# Patient Record
Sex: FEMALE | Race: WHITE | Employment: OTHER | URBAN - METROPOLITAN AREA
[De-identification: names, ages, dates, MRNs, and addresses within clinical notes are randomized per-mention and may not be internally consistent; named-entity substitution may affect disease eponyms.]

---

## 2017-01-05 RX ORDER — INSULIN ASPART 100 [IU]/ML
INJECTION, SOLUTION INTRAVENOUS; SUBCUTANEOUS
Qty: 3 PEN | Refills: 7 | Status: SHIPPED | OUTPATIENT
Start: 2017-01-05 | End: 2017-03-02 | Stop reason: SDUPTHER

## 2017-03-02 DIAGNOSIS — J30.2 SEASONAL ALLERGIC RHINITIS, UNSPECIFIED ALLERGIC RHINITIS TRIGGER: ICD-10-CM

## 2017-03-06 RX ORDER — LORATADINE 10 MG/1
TABLET ORAL
Qty: 90 TABLET | Refills: 0 | Status: SHIPPED | OUTPATIENT
Start: 2017-03-06 | End: 2017-06-02 | Stop reason: SDUPTHER

## 2017-03-06 RX ORDER — INSULIN ASPART 100 [IU]/ML
INJECTION, SOLUTION INTRAVENOUS; SUBCUTANEOUS
Qty: 3 PEN | Refills: 5 | Status: SHIPPED | OUTPATIENT
Start: 2017-03-06 | End: 2017-04-28 | Stop reason: SDUPTHER

## 2017-03-06 RX ORDER — PEN NEEDLE, DIABETIC 31 GX5/16"
NEEDLE, DISPOSABLE MISCELLANEOUS
Qty: 100 EACH | Refills: 5 | Status: SHIPPED | OUTPATIENT
Start: 2017-03-06 | End: 2017-10-02 | Stop reason: SDUPTHER

## 2017-04-05 ENCOUNTER — OFFICE VISIT (OUTPATIENT)
Dept: INTERNAL MEDICINE CLINIC | Age: 42
End: 2017-04-05

## 2017-04-05 VITALS
DIASTOLIC BLOOD PRESSURE: 90 MMHG | OXYGEN SATURATION: 97 % | BODY MASS INDEX: 44.75 KG/M2 | HEART RATE: 93 BPM | SYSTOLIC BLOOD PRESSURE: 174 MMHG | WEIGHT: 293 LBS

## 2017-04-05 DIAGNOSIS — J01.00 ACUTE NON-RECURRENT MAXILLARY SINUSITIS: Primary | ICD-10-CM

## 2017-04-05 PROCEDURE — 99213 OFFICE O/P EST LOW 20 MIN: CPT | Performed by: INTERNAL MEDICINE

## 2017-04-05 RX ORDER — SULFAMETHOXAZOLE AND TRIMETHOPRIM 800; 160 MG/1; MG/1
1 TABLET ORAL 2 TIMES DAILY
Qty: 20 TABLET | Refills: 0 | Status: SHIPPED | OUTPATIENT
Start: 2017-04-05 | End: 2017-04-15

## 2017-04-05 ASSESSMENT — ENCOUNTER SYMPTOMS
COUGH: 0
SHORTNESS OF BREATH: 1
SORE THROAT: 1
SINUS PRESSURE: 1
SINUS COMPLAINT: 1

## 2017-04-28 ENCOUNTER — OFFICE VISIT (OUTPATIENT)
Dept: INTERNAL MEDICINE CLINIC | Age: 42
End: 2017-04-28

## 2017-04-28 VITALS
WEIGHT: 293 LBS | HEART RATE: 94 BPM | DIASTOLIC BLOOD PRESSURE: 88 MMHG | SYSTOLIC BLOOD PRESSURE: 132 MMHG | BODY MASS INDEX: 45.63 KG/M2 | OXYGEN SATURATION: 96 %

## 2017-04-28 DIAGNOSIS — Z13.9 SCREENING: ICD-10-CM

## 2017-04-28 DIAGNOSIS — E78.5 HYPERLIPIDEMIA WITH TARGET LDL LESS THAN 100: ICD-10-CM

## 2017-04-28 DIAGNOSIS — E11.22 CONTROLLED TYPE 2 DIABETES MELLITUS WITH CHRONIC KIDNEY DISEASE, WITH LONG-TERM CURRENT USE OF INSULIN, UNSPECIFIED CKD STAGE (HCC): Primary | ICD-10-CM

## 2017-04-28 DIAGNOSIS — R80.9 MICROALBUMINURIA: ICD-10-CM

## 2017-04-28 DIAGNOSIS — Z79.4 CONTROLLED TYPE 2 DIABETES MELLITUS WITH CHRONIC KIDNEY DISEASE, WITH LONG-TERM CURRENT USE OF INSULIN, UNSPECIFIED CKD STAGE (HCC): Primary | ICD-10-CM

## 2017-04-28 DIAGNOSIS — Z23 NEED FOR STREPTOCOCCUS PNEUMONIAE VACCINATION: ICD-10-CM

## 2017-04-28 LAB
A/G RATIO: 1.1 (ref 1.1–2.2)
ALBUMIN SERPL-MCNC: 3.1 G/DL (ref 3.4–5)
ALP BLD-CCNC: 96 U/L (ref 40–129)
ALT SERPL-CCNC: 11 U/L (ref 10–40)
ANION GAP SERPL CALCULATED.3IONS-SCNC: 17 MMOL/L (ref 3–16)
AST SERPL-CCNC: 11 U/L (ref 15–37)
BILIRUB SERPL-MCNC: <0.2 MG/DL (ref 0–1)
BUN BLDV-MCNC: 45 MG/DL (ref 7–20)
CALCIUM SERPL-MCNC: 8.4 MG/DL (ref 8.3–10.6)
CHLORIDE BLD-SCNC: 102 MMOL/L (ref 99–110)
CHOLESTEROL, TOTAL: 299 MG/DL (ref 0–199)
CO2: 19 MMOL/L (ref 21–32)
CREAT SERPL-MCNC: 2.8 MG/DL (ref 0.6–1.1)
CREATININE URINE: 83.7 MG/DL (ref 28–259)
GFR AFRICAN AMERICAN: 22
GFR NON-AFRICAN AMERICAN: 18
GLOBULIN: 2.7 G/DL
GLUCOSE BLD-MCNC: 162 MG/DL (ref 70–99)
HDLC SERPL-MCNC: 39 MG/DL (ref 40–60)
LDL CHOLESTEROL CALCULATED: 223 MG/DL
MICROALBUMIN UR-MCNC: 609.4 MG/DL
MICROALBUMIN/CREAT UR-RTO: 7280.8 MG/G (ref 0–30)
POTASSIUM SERPL-SCNC: 4.9 MMOL/L (ref 3.5–5.1)
SODIUM BLD-SCNC: 138 MMOL/L (ref 136–145)
TOTAL PROTEIN: 5.8 G/DL (ref 6.4–8.2)
TRIGL SERPL-MCNC: 184 MG/DL (ref 0–150)
VLDLC SERPL CALC-MCNC: 37 MG/DL

## 2017-04-28 PROCEDURE — 99214 OFFICE O/P EST MOD 30 MIN: CPT | Performed by: INTERNAL MEDICINE

## 2017-04-28 PROCEDURE — 90732 PPSV23 VACC 2 YRS+ SUBQ/IM: CPT | Performed by: INTERNAL MEDICINE

## 2017-04-28 PROCEDURE — 90471 IMMUNIZATION ADMIN: CPT | Performed by: INTERNAL MEDICINE

## 2017-04-29 LAB
ESTIMATED AVERAGE GLUCOSE: 180 MG/DL
HBA1C MFR BLD: 7.9 %

## 2017-04-30 LAB — HIV-1 AND HIV-2 ANTIBODIES: NEGATIVE

## 2017-05-09 ENCOUNTER — TELEPHONE (OUTPATIENT)
Dept: INTERNAL MEDICINE CLINIC | Age: 42
End: 2017-05-09

## 2017-05-10 ENCOUNTER — OFFICE VISIT (OUTPATIENT)
Dept: INTERNAL MEDICINE CLINIC | Age: 42
End: 2017-05-10

## 2017-05-10 VITALS
WEIGHT: 293 LBS | SYSTOLIC BLOOD PRESSURE: 164 MMHG | OXYGEN SATURATION: 97 % | HEART RATE: 90 BPM | DIASTOLIC BLOOD PRESSURE: 94 MMHG | BODY MASS INDEX: 44.6 KG/M2

## 2017-05-10 DIAGNOSIS — E78.5 HYPERLIPIDEMIA WITH TARGET LDL LESS THAN 100: Primary | ICD-10-CM

## 2017-05-10 DIAGNOSIS — E11.22 CONTROLLED TYPE 2 DIABETES MELLITUS WITH CHRONIC KIDNEY DISEASE, WITH LONG-TERM CURRENT USE OF INSULIN, UNSPECIFIED CKD STAGE (HCC): ICD-10-CM

## 2017-05-10 DIAGNOSIS — N18.4 STAGE 4 CHRONIC KIDNEY DISEASE DUE TO DIABETES MELLITUS (HCC): ICD-10-CM

## 2017-05-10 DIAGNOSIS — Z79.4 CONTROLLED TYPE 2 DIABETES MELLITUS WITH CHRONIC KIDNEY DISEASE, WITH LONG-TERM CURRENT USE OF INSULIN, UNSPECIFIED CKD STAGE (HCC): ICD-10-CM

## 2017-05-10 DIAGNOSIS — E11.22 STAGE 4 CHRONIC KIDNEY DISEASE DUE TO DIABETES MELLITUS (HCC): ICD-10-CM

## 2017-05-10 PROCEDURE — 99214 OFFICE O/P EST MOD 30 MIN: CPT | Performed by: INTERNAL MEDICINE

## 2017-05-10 RX ORDER — AMLODIPINE BESYLATE 5 MG/1
5 TABLET ORAL DAILY
Qty: 30 TABLET | Refills: 3 | Status: SHIPPED | OUTPATIENT
Start: 2017-05-10 | End: 2017-08-30 | Stop reason: SDUPTHER

## 2017-05-10 RX ORDER — PRAVASTATIN SODIUM 40 MG
40 TABLET ORAL DAILY
Qty: 90 TABLET | Refills: 3 | Status: SHIPPED | OUTPATIENT
Start: 2017-05-10 | End: 2018-05-14

## 2017-05-10 ASSESSMENT — ENCOUNTER SYMPTOMS
EYE PAIN: 0
COUGH: 0
EYE ITCHING: 0
CHOKING: 0

## 2017-05-18 ENCOUNTER — TELEPHONE (OUTPATIENT)
Dept: INTERNAL MEDICINE CLINIC | Age: 42
End: 2017-05-18

## 2017-05-23 ENCOUNTER — OFFICE VISIT (OUTPATIENT)
Dept: INTERNAL MEDICINE CLINIC | Age: 42
End: 2017-05-23

## 2017-05-23 VITALS
SYSTOLIC BLOOD PRESSURE: 156 MMHG | HEIGHT: 69 IN | BODY MASS INDEX: 43.4 KG/M2 | WEIGHT: 293 LBS | DIASTOLIC BLOOD PRESSURE: 84 MMHG | OXYGEN SATURATION: 98 % | HEART RATE: 73 BPM

## 2017-05-23 DIAGNOSIS — N18.4 CHRONIC KIDNEY DISEASE, STAGE 4 (SEVERE) (HCC): ICD-10-CM

## 2017-05-23 DIAGNOSIS — E11.22 CONTROLLED TYPE 2 DIABETES MELLITUS WITH CHRONIC KIDNEY DISEASE, WITH LONG-TERM CURRENT USE OF INSULIN, UNSPECIFIED CKD STAGE (HCC): ICD-10-CM

## 2017-05-23 DIAGNOSIS — Z79.4 CONTROLLED TYPE 2 DIABETES MELLITUS WITH CHRONIC KIDNEY DISEASE, WITH LONG-TERM CURRENT USE OF INSULIN, UNSPECIFIED CKD STAGE (HCC): ICD-10-CM

## 2017-05-23 DIAGNOSIS — I10 ESSENTIAL HYPERTENSION: Primary | ICD-10-CM

## 2017-05-23 LAB
A/G RATIO: 1.2 (ref 1.1–2.2)
ALBUMIN SERPL-MCNC: 3.2 G/DL (ref 3.4–5)
ALP BLD-CCNC: 96 U/L (ref 40–129)
ALT SERPL-CCNC: 10 U/L (ref 10–40)
ANION GAP SERPL CALCULATED.3IONS-SCNC: 15 MMOL/L (ref 3–16)
AST SERPL-CCNC: 12 U/L (ref 15–37)
BASOPHILS ABSOLUTE: 0.1 K/UL (ref 0–0.2)
BASOPHILS RELATIVE PERCENT: 1 %
BILIRUB SERPL-MCNC: <0.2 MG/DL (ref 0–1)
BUN BLDV-MCNC: 49 MG/DL (ref 7–20)
CALCIUM SERPL-MCNC: 8.3 MG/DL (ref 8.3–10.6)
CHLORIDE BLD-SCNC: 104 MMOL/L (ref 99–110)
CO2: 19 MMOL/L (ref 21–32)
CREAT SERPL-MCNC: 2.6 MG/DL (ref 0.6–1.1)
EOSINOPHILS ABSOLUTE: 0.3 K/UL (ref 0–0.6)
EOSINOPHILS RELATIVE PERCENT: 4 %
GFR AFRICAN AMERICAN: 24
GFR NON-AFRICAN AMERICAN: 20
GLOBULIN: 2.7 G/DL
GLUCOSE BLD-MCNC: 248 MG/DL (ref 70–99)
HCT VFR BLD CALC: 35.3 % (ref 36–48)
HEMOGLOBIN: 11 G/DL (ref 12–16)
LYMPHOCYTES ABSOLUTE: 1.6 K/UL (ref 1–5.1)
LYMPHOCYTES RELATIVE PERCENT: 23.7 %
MCH RBC QN AUTO: 25.3 PG (ref 26–34)
MCHC RBC AUTO-ENTMCNC: 31 G/DL (ref 31–36)
MCV RBC AUTO: 81.5 FL (ref 80–100)
MONOCYTES ABSOLUTE: 0.6 K/UL (ref 0–1.3)
MONOCYTES RELATIVE PERCENT: 8.3 %
NEUTROPHILS ABSOLUTE: 4.4 K/UL (ref 1.7–7.7)
NEUTROPHILS RELATIVE PERCENT: 63 %
PDW BLD-RTO: 17.2 % (ref 12.4–15.4)
PLATELET # BLD: 325 K/UL (ref 135–450)
PMV BLD AUTO: 8.8 FL (ref 5–10.5)
POTASSIUM SERPL-SCNC: 4.6 MMOL/L (ref 3.5–5.1)
RBC # BLD: 4.34 M/UL (ref 4–5.2)
SODIUM BLD-SCNC: 138 MMOL/L (ref 136–145)
TOTAL PROTEIN: 5.9 G/DL (ref 6.4–8.2)
WBC # BLD: 7 K/UL (ref 4–11)

## 2017-05-23 PROCEDURE — 99214 OFFICE O/P EST MOD 30 MIN: CPT | Performed by: INTERNAL MEDICINE

## 2017-05-23 RX ORDER — MUPIROCIN CALCIUM 20 MG/G
CREAM TOPICAL
Qty: 15 G | Refills: 0 | Status: SHIPPED | OUTPATIENT
Start: 2017-05-23 | End: 2017-06-30

## 2017-05-23 ASSESSMENT — ENCOUNTER SYMPTOMS
SHORTNESS OF BREATH: 0
EYE REDNESS: 0
COUGH: 0
PHOTOPHOBIA: 0

## 2017-06-02 DIAGNOSIS — J30.2 SEASONAL ALLERGIC RHINITIS, UNSPECIFIED ALLERGIC RHINITIS TRIGGER: ICD-10-CM

## 2017-06-05 RX ORDER — LOSARTAN POTASSIUM AND HYDROCHLOROTHIAZIDE 12.5; 5 MG/1; MG/1
TABLET ORAL
Qty: 30 TABLET | Refills: 2 | Status: SHIPPED | OUTPATIENT
Start: 2017-06-05 | End: 2017-08-30 | Stop reason: SDUPTHER

## 2017-06-05 RX ORDER — LORATADINE 10 MG/1
TABLET ORAL
Qty: 30 TABLET | Refills: 0 | Status: SHIPPED | OUTPATIENT
Start: 2017-06-05 | End: 2021-05-10

## 2017-06-05 RX ORDER — FUROSEMIDE 20 MG/1
TABLET ORAL
Qty: 30 TABLET | Refills: 6 | Status: SHIPPED | OUTPATIENT
Start: 2017-06-05 | End: 2018-02-06 | Stop reason: SDUPTHER

## 2017-06-19 RX ORDER — INSULIN DETEMIR 100 [IU]/ML
INJECTION, SOLUTION SUBCUTANEOUS
Qty: 3 ML | Refills: 6 | Status: SHIPPED | OUTPATIENT
Start: 2017-06-19 | End: 2018-09-11 | Stop reason: SDUPTHER

## 2017-07-18 DIAGNOSIS — E11.22 CONTROLLED TYPE 2 DIABETES MELLITUS WITH CHRONIC KIDNEY DISEASE, WITH LONG-TERM CURRENT USE OF INSULIN, UNSPECIFIED CKD STAGE (HCC): Primary | ICD-10-CM

## 2017-07-18 DIAGNOSIS — Z79.4 CONTROLLED TYPE 2 DIABETES MELLITUS WITH CHRONIC KIDNEY DISEASE, WITH LONG-TERM CURRENT USE OF INSULIN, UNSPECIFIED CKD STAGE (HCC): Primary | ICD-10-CM

## 2017-07-25 ENCOUNTER — TELEPHONE (OUTPATIENT)
Dept: INTERNAL MEDICINE CLINIC | Age: 42
End: 2017-07-25

## 2017-08-21 ENCOUNTER — TELEPHONE (OUTPATIENT)
Dept: INTERNAL MEDICINE CLINIC | Age: 42
End: 2017-08-21

## 2017-08-25 ENCOUNTER — OFFICE VISIT (OUTPATIENT)
Dept: INTERNAL MEDICINE CLINIC | Age: 42
End: 2017-08-25

## 2017-08-25 VITALS
SYSTOLIC BLOOD PRESSURE: 116 MMHG | HEART RATE: 66 BPM | OXYGEN SATURATION: 99 % | DIASTOLIC BLOOD PRESSURE: 62 MMHG | BODY MASS INDEX: 44.15 KG/M2 | WEIGHT: 293 LBS

## 2017-08-25 DIAGNOSIS — I10 ESSENTIAL HYPERTENSION: Primary | ICD-10-CM

## 2017-08-25 DIAGNOSIS — K08.89 PAIN, DENTAL: ICD-10-CM

## 2017-08-25 PROCEDURE — 99212 OFFICE O/P EST SF 10 MIN: CPT | Performed by: INTERNAL MEDICINE

## 2017-08-25 RX ORDER — LABETALOL 100 MG/1
100 TABLET, FILM COATED ORAL 2 TIMES DAILY
Qty: 60 TABLET | Refills: 0 | COMMUNITY
Start: 2017-08-25 | End: 2018-05-24

## 2017-08-27 ASSESSMENT — ENCOUNTER SYMPTOMS
CHOKING: 0
COUGH: 0

## 2017-08-31 RX ORDER — LOSARTAN POTASSIUM AND HYDROCHLOROTHIAZIDE 12.5; 5 MG/1; MG/1
TABLET ORAL
Qty: 30 TABLET | Refills: 1 | Status: SHIPPED | OUTPATIENT
Start: 2017-08-31 | End: 2017-11-01 | Stop reason: SDUPTHER

## 2017-08-31 RX ORDER — AMLODIPINE BESYLATE 5 MG/1
TABLET ORAL
Qty: 30 TABLET | Refills: 2 | Status: SHIPPED | OUTPATIENT
Start: 2017-08-31 | End: 2018-02-06 | Stop reason: SDUPTHER

## 2017-10-09 RX ORDER — PEN NEEDLE, DIABETIC 31 GX5/16"
NEEDLE, DISPOSABLE MISCELLANEOUS
Qty: 100 EACH | Refills: 4 | Status: SHIPPED | OUTPATIENT
Start: 2017-10-09 | End: 2018-06-20 | Stop reason: SDUPTHER

## 2017-11-02 RX ORDER — PEN NEEDLE, DIABETIC 31 GX5/16"
NEEDLE, DISPOSABLE MISCELLANEOUS
Qty: 100 EACH | Refills: 4 | Status: SHIPPED | OUTPATIENT
Start: 2017-11-02 | End: 2018-05-14 | Stop reason: SDUPTHER

## 2017-11-02 RX ORDER — LOSARTAN POTASSIUM AND HYDROCHLOROTHIAZIDE 12.5; 5 MG/1; MG/1
TABLET ORAL
Qty: 30 TABLET | Refills: 0 | Status: SHIPPED | OUTPATIENT
Start: 2017-11-02 | End: 2017-12-12 | Stop reason: SDUPTHER

## 2017-11-24 ENCOUNTER — TELEPHONE (OUTPATIENT)
Dept: INTERNAL MEDICINE CLINIC | Age: 42
End: 2017-11-24

## 2017-11-24 NOTE — TELEPHONE ENCOUNTER
Patients  called and stated his wife has same cold and cough that he has, and would like to get an RX on board because she has Asthma and is coughing.

## 2017-11-27 RX ORDER — AZITHROMYCIN 250 MG/1
TABLET, FILM COATED ORAL
Qty: 1 PACKET | Refills: 0 | Status: SHIPPED | OUTPATIENT
Start: 2017-11-27 | End: 2017-12-07

## 2017-12-14 RX ORDER — LOSARTAN POTASSIUM AND HYDROCHLOROTHIAZIDE 12.5; 5 MG/1; MG/1
TABLET ORAL
Qty: 30 TABLET | Refills: 0 | Status: SHIPPED | OUTPATIENT
Start: 2017-12-14 | End: 2018-01-14 | Stop reason: SDUPTHER

## 2017-12-18 ENCOUNTER — OFFICE VISIT (OUTPATIENT)
Dept: INTERNAL MEDICINE CLINIC | Age: 42
End: 2017-12-18

## 2017-12-18 ENCOUNTER — TELEPHONE (OUTPATIENT)
Dept: INTERNAL MEDICINE CLINIC | Age: 42
End: 2017-12-18

## 2017-12-18 VITALS
HEART RATE: 87 BPM | OXYGEN SATURATION: 96 % | WEIGHT: 293 LBS | SYSTOLIC BLOOD PRESSURE: 150 MMHG | HEIGHT: 69 IN | DIASTOLIC BLOOD PRESSURE: 72 MMHG | BODY MASS INDEX: 43.4 KG/M2

## 2017-12-18 DIAGNOSIS — L08.9 BLISTER OF NOSE WITH INFECTION, INITIAL ENCOUNTER: Primary | ICD-10-CM

## 2017-12-18 DIAGNOSIS — S00.32XA BLISTER OF NOSE WITH INFECTION, INITIAL ENCOUNTER: Primary | ICD-10-CM

## 2017-12-18 PROCEDURE — 99213 OFFICE O/P EST LOW 20 MIN: CPT | Performed by: INTERNAL MEDICINE

## 2017-12-18 RX ORDER — MUPIROCIN CALCIUM 20 MG/G
CREAM TOPICAL
Qty: 15 G | Refills: 0 | Status: SHIPPED | OUTPATIENT
Start: 2017-12-18 | End: 2018-01-17

## 2017-12-18 RX ORDER — CLINDAMYCIN HYDROCHLORIDE 150 MG/1
150 CAPSULE ORAL 3 TIMES DAILY
Qty: 15 CAPSULE | Refills: 0 | Status: SHIPPED | OUTPATIENT
Start: 2017-12-18 | End: 2017-12-23

## 2017-12-18 NOTE — TELEPHONE ENCOUNTER
Patient would like to speak to someone to see what can ease the pain until she sees Dr. Adeline Murillo tomorrow.      Patient  [phone]

## 2017-12-18 NOTE — TELEPHONE ENCOUNTER
Patient's  called and stated that patient has been experiencing a swollen eyes and lips. Asked other physicians for any openings and there were none for today. Patient stated patient has been a patient since 2011 and never has been told this before. I suggested patient be seen at Urgent Care,  said ok, thank you and hung up.

## 2017-12-22 ENCOUNTER — TELEPHONE (OUTPATIENT)
Dept: INTERNAL MEDICINE CLINIC | Age: 42
End: 2017-12-22

## 2017-12-25 ASSESSMENT — ENCOUNTER SYMPTOMS
DIARRHEA: 0
SHORTNESS OF BREATH: 0
RHINORRHEA: 1
NAIL CHANGES: 0
COUGH: 0

## 2017-12-25 NOTE — PROGRESS NOTES
Subjective:      Patient ID: Lida Perdomo is a 36 y.o. female. Rash   This is a new problem. The current episode started in the past 7 days. The problem has been gradually worsening since onset. The affected locations include the face. The rash is characterized by blistering and pain. Associated symptoms include rhinorrhea. Pertinent negatives include no congestion, cough, diarrhea, fatigue, fever, nail changes or shortness of breath. Past treatments include cold compress. The treatment provided mild relief. There is no history of asthma or eczema. Review of Systems   Constitutional: Negative for fatigue and fever. HENT: Positive for rhinorrhea. Negative for congestion. Respiratory: Negative for cough and shortness of breath. Gastrointestinal: Negative for diarrhea. Skin: Positive for rash. Negative for nail changes. Vitals:    12/18/17 1418   BP: (!) 150/72   Site: Left Arm   Position: Sitting   Cuff Size: Large Adult   Pulse: 87   SpO2: 96%   Weight: 295 lb (133.8 kg)   Height: 5' 9\" (1.753 m)      Wt Readings from Last 3 Encounters:   12/18/17 295 lb (133.8 kg)   08/25/17 299 lb (135.6 kg)   05/23/17 298 lb 6.4 oz (135.4 kg)     BP Readings from Last 3 Encounters:   12/18/17 (!) 150/72   08/25/17 116/62   05/23/17 (!) 156/84     Body mass index is 43.56 kg/m². Facility age limit for growth percentiles is 20 years. Objective:   Physical Exam   Constitutional: She appears well-nourished. HENT:   Nose: Mucosal edema and rhinorrhea present. No nose lacerations, septal deviation or nasal septal hematoma. Right sinus exhibits no maxillary sinus tenderness and no frontal sinus tenderness. Left sinus exhibits no maxillary sinus tenderness and no frontal sinus tenderness. Eyes: EOM are normal. Pupils are equal, round, and reactive to light. Right eye exhibits no discharge. Left eye exhibits no discharge. Cardiovascular: Normal rate, regular rhythm and normal heart sounds.   Exam reveals no friction rub. No murmur heard. Pulmonary/Chest: Effort normal and breath sounds normal. No respiratory distress. She has no wheezes. She has no rales. Assessment/Plan:  Ana Hays was seen today for eye problem and oral swelling. Diagnoses and all orders for this visit:    Blister of nose with infection, initial encounter  -     mupirocin (BACTROBAN) 2 % cream; Apply 3 times daily to left nares  -     clindamycin (CLEOCIN) 150 MG capsule; Take 1 capsule by mouth 3 times daily for 5 days      No Follow-up on file.

## 2017-12-25 NOTE — TELEPHONE ENCOUNTER
That is a different insulin and would not work as well. Let's have her increase her toujeo to 20 units per night.

## 2018-01-04 ENCOUNTER — TELEPHONE (OUTPATIENT)
Dept: INTERNAL MEDICINE CLINIC | Age: 43
End: 2018-01-04

## 2018-01-05 NOTE — TELEPHONE ENCOUNTER
Please let patient know that we do not have any samples of Humalog at this time. We will try and get some next week.

## 2018-02-02 ENCOUNTER — TELEPHONE (OUTPATIENT)
Dept: INTERNAL MEDICINE CLINIC | Age: 43
End: 2018-02-02

## 2018-02-08 ENCOUNTER — PATIENT MESSAGE (OUTPATIENT)
Dept: INTERNAL MEDICINE CLINIC | Age: 43
End: 2018-02-08

## 2018-05-11 ENCOUNTER — TELEPHONE (OUTPATIENT)
Dept: INTERNAL MEDICINE CLINIC | Age: 43
End: 2018-05-11

## 2018-05-14 ENCOUNTER — TELEPHONE (OUTPATIENT)
Dept: INTERNAL MEDICINE CLINIC | Age: 43
End: 2018-05-14

## 2018-05-14 ENCOUNTER — OFFICE VISIT (OUTPATIENT)
Dept: INTERNAL MEDICINE CLINIC | Age: 43
End: 2018-05-14

## 2018-05-14 VITALS
WEIGHT: 293 LBS | BODY MASS INDEX: 45.19 KG/M2 | HEART RATE: 95 BPM | DIASTOLIC BLOOD PRESSURE: 76 MMHG | OXYGEN SATURATION: 92 % | SYSTOLIC BLOOD PRESSURE: 188 MMHG

## 2018-05-14 DIAGNOSIS — R23.2 HOT FLASHES: ICD-10-CM

## 2018-05-14 DIAGNOSIS — Z79.4 CONTROLLED TYPE 2 DIABETES MELLITUS WITH CHRONIC KIDNEY DISEASE, WITH LONG-TERM CURRENT USE OF INSULIN, UNSPECIFIED CKD STAGE (HCC): Primary | ICD-10-CM

## 2018-05-14 DIAGNOSIS — N28.9 NEPHROPATHY: ICD-10-CM

## 2018-05-14 DIAGNOSIS — E11.22 CONTROLLED TYPE 2 DIABETES MELLITUS WITH CHRONIC KIDNEY DISEASE, WITH LONG-TERM CURRENT USE OF INSULIN, UNSPECIFIED CKD STAGE (HCC): Primary | ICD-10-CM

## 2018-05-14 DIAGNOSIS — R60.0 PEDAL EDEMA: ICD-10-CM

## 2018-05-14 LAB
CREATININE URINE: 29.2 MG/DL (ref 28–259)
MICROALBUMIN UR-MCNC: 284.4 MG/DL
MICROALBUMIN/CREAT UR-RTO: 9739.7 MG/G (ref 0–30)

## 2018-05-14 PROCEDURE — 99214 OFFICE O/P EST MOD 30 MIN: CPT | Performed by: INTERNAL MEDICINE

## 2018-05-14 RX ORDER — FUROSEMIDE 20 MG/1
20 TABLET ORAL 2 TIMES DAILY
Qty: 60 TABLET | Refills: 5 | Status: SHIPPED | OUTPATIENT
Start: 2018-05-14 | End: 2018-05-24

## 2018-05-14 RX ORDER — AMOXICILLIN AND CLAVULANATE POTASSIUM 875; 125 MG/1; MG/1
1 TABLET, FILM COATED ORAL 2 TIMES DAILY
Qty: 20 TABLET | Refills: 0 | Status: SHIPPED | OUTPATIENT
Start: 2018-05-14 | End: 2018-05-24 | Stop reason: ALTCHOICE

## 2018-05-14 RX ORDER — AMLODIPINE BESYLATE 5 MG/1
5 TABLET ORAL DAILY
Qty: 30 TABLET | Refills: 11 | Status: SHIPPED | OUTPATIENT
Start: 2018-05-14 | End: 2018-05-24 | Stop reason: SDUPTHER

## 2018-05-14 RX ORDER — LOSARTAN POTASSIUM AND HYDROCHLOROTHIAZIDE 25; 100 MG/1; MG/1
1 TABLET ORAL DAILY
Qty: 30 TABLET | Refills: 5 | Status: SHIPPED | OUTPATIENT
Start: 2018-05-14 | End: 2018-05-24

## 2018-05-16 DIAGNOSIS — N28.9 NEPHROPATHY: ICD-10-CM

## 2018-05-16 DIAGNOSIS — R23.2 HOT FLASHES: ICD-10-CM

## 2018-05-16 DIAGNOSIS — Z79.4 CONTROLLED TYPE 2 DIABETES MELLITUS WITH CHRONIC KIDNEY DISEASE, WITH LONG-TERM CURRENT USE OF INSULIN, UNSPECIFIED CKD STAGE (HCC): ICD-10-CM

## 2018-05-16 DIAGNOSIS — E11.22 CONTROLLED TYPE 2 DIABETES MELLITUS WITH CHRONIC KIDNEY DISEASE, WITH LONG-TERM CURRENT USE OF INSULIN, UNSPECIFIED CKD STAGE (HCC): ICD-10-CM

## 2018-05-16 LAB
A/G RATIO: 1.1 (ref 1.1–2.2)
ALBUMIN SERPL-MCNC: 2.9 G/DL (ref 3.4–5)
ALP BLD-CCNC: 83 U/L (ref 40–129)
ALT SERPL-CCNC: 10 U/L (ref 10–40)
ANION GAP SERPL CALCULATED.3IONS-SCNC: 20 MMOL/L (ref 3–16)
AST SERPL-CCNC: 10 U/L (ref 15–37)
BASOPHILS ABSOLUTE: 0.1 K/UL (ref 0–0.2)
BASOPHILS RELATIVE PERCENT: 0.6 %
BILIRUB SERPL-MCNC: <0.2 MG/DL (ref 0–1)
BUN BLDV-MCNC: 84 MG/DL (ref 7–20)
CALCIUM SERPL-MCNC: 7.9 MG/DL (ref 8.3–10.6)
CHLORIDE BLD-SCNC: 98 MMOL/L (ref 99–110)
CO2: 17 MMOL/L (ref 21–32)
CREAT SERPL-MCNC: 6.4 MG/DL (ref 0.6–1.1)
EOSINOPHILS ABSOLUTE: 0.5 K/UL (ref 0–0.6)
EOSINOPHILS RELATIVE PERCENT: 4.3 %
ESTRADIOL LEVEL: 38 PG/ML
FOLLICLE STIMULATING HORMONE: 10.4 MIU/ML
GFR AFRICAN AMERICAN: 9
GFR NON-AFRICAN AMERICAN: 7
GLOBULIN: 2.6 G/DL
GLUCOSE BLD-MCNC: 142 MG/DL (ref 70–99)
HCT VFR BLD CALC: 25.3 % (ref 36–48)
HEMOGLOBIN: 8.4 G/DL (ref 12–16)
LUTEINIZING HORMONE: 6.5 MIU/ML
LYMPHOCYTES ABSOLUTE: 1.3 K/UL (ref 1–5.1)
LYMPHOCYTES RELATIVE PERCENT: 11.1 %
MCH RBC QN AUTO: 27.7 PG (ref 26–34)
MCHC RBC AUTO-ENTMCNC: 33.3 G/DL (ref 31–36)
MCV RBC AUTO: 83.3 FL (ref 80–100)
MONOCYTES ABSOLUTE: 1.1 K/UL (ref 0–1.3)
MONOCYTES RELATIVE PERCENT: 9.9 %
NEUTROPHILS ABSOLUTE: 8.5 K/UL (ref 1.7–7.7)
NEUTROPHILS RELATIVE PERCENT: 74.1 %
PDW BLD-RTO: 14.9 % (ref 12.4–15.4)
PLATELET # BLD: 315 K/UL (ref 135–450)
PMV BLD AUTO: 8.2 FL (ref 5–10.5)
POTASSIUM SERPL-SCNC: 4.4 MMOL/L (ref 3.5–5.1)
RBC # BLD: 3.04 M/UL (ref 4–5.2)
SODIUM BLD-SCNC: 135 MMOL/L (ref 136–145)
TOTAL PROTEIN: 5.5 G/DL (ref 6.4–8.2)
WBC # BLD: 11.5 K/UL (ref 4–11)

## 2018-05-17 LAB
ESTIMATED AVERAGE GLUCOSE: 200.1 MG/DL
HBA1C MFR BLD: 8.6 %

## 2018-05-24 ENCOUNTER — OFFICE VISIT (OUTPATIENT)
Dept: INTERNAL MEDICINE CLINIC | Age: 43
End: 2018-05-24

## 2018-05-24 VITALS
SYSTOLIC BLOOD PRESSURE: 166 MMHG | BODY MASS INDEX: 40.76 KG/M2 | DIASTOLIC BLOOD PRESSURE: 72 MMHG | HEART RATE: 78 BPM | WEIGHT: 276 LBS | OXYGEN SATURATION: 97 %

## 2018-05-24 DIAGNOSIS — R80.9 MICROALBUMINURIA: ICD-10-CM

## 2018-05-24 DIAGNOSIS — I10 ESSENTIAL HYPERTENSION: Primary | ICD-10-CM

## 2018-05-24 DIAGNOSIS — N18.5 CHRONIC KIDNEY DISEASE (CKD), STAGE V (HCC): ICD-10-CM

## 2018-05-24 PROCEDURE — 99214 OFFICE O/P EST MOD 30 MIN: CPT | Performed by: INTERNAL MEDICINE

## 2018-05-24 RX ORDER — AMLODIPINE BESYLATE 10 MG/1
10 TABLET ORAL DAILY
Qty: 90 TABLET | Refills: 1 | Status: SHIPPED | OUTPATIENT
Start: 2018-05-24 | End: 2018-09-28 | Stop reason: SDUPTHER

## 2018-05-24 RX ORDER — CALCITRIOL 0.25 UG/1
0.25 CAPSULE, LIQUID FILLED ORAL
COMMUNITY
Start: 2018-05-18 | End: 2018-09-06 | Stop reason: SDUPTHER

## 2018-05-24 RX ORDER — TORSEMIDE 20 MG/1
60 TABLET ORAL 2 TIMES DAILY
COMMUNITY
End: 2018-07-11 | Stop reason: SDUPTHER

## 2018-05-27 PROBLEM — N18.5 CHRONIC KIDNEY DISEASE (CKD), STAGE V (HCC): Status: ACTIVE | Noted: 2018-05-27

## 2018-05-27 ASSESSMENT — ENCOUNTER SYMPTOMS
EYE PAIN: 0
SHORTNESS OF BREATH: 0
COUGH: 0
CHOKING: 0

## 2018-05-30 ENCOUNTER — OFFICE VISIT (OUTPATIENT)
Dept: INTERNAL MEDICINE CLINIC | Age: 43
End: 2018-05-30

## 2018-05-30 VITALS
HEART RATE: 82 BPM | SYSTOLIC BLOOD PRESSURE: 150 MMHG | OXYGEN SATURATION: 98 % | DIASTOLIC BLOOD PRESSURE: 62 MMHG | WEIGHT: 272 LBS | BODY MASS INDEX: 40.17 KG/M2

## 2018-05-30 DIAGNOSIS — I10 ESSENTIAL HYPERTENSION: Primary | ICD-10-CM

## 2018-05-30 PROCEDURE — 99214 OFFICE O/P EST MOD 30 MIN: CPT | Performed by: INTERNAL MEDICINE

## 2018-05-30 RX ORDER — METOPROLOL TARTRATE 50 MG/1
50 TABLET, FILM COATED ORAL 2 TIMES DAILY
Qty: 60 TABLET | Refills: 3 | Status: SHIPPED | OUTPATIENT
Start: 2018-05-30 | End: 2018-07-31 | Stop reason: ALTCHOICE

## 2018-06-21 RX ORDER — PEN NEEDLE, DIABETIC 31 GX5/16"
NEEDLE, DISPOSABLE MISCELLANEOUS
Qty: 100 EACH | Refills: 2 | Status: SHIPPED | OUTPATIENT
Start: 2018-06-21 | End: 2018-10-17 | Stop reason: SDUPTHER

## 2018-06-25 ENCOUNTER — TELEPHONE (OUTPATIENT)
Dept: INTERNAL MEDICINE CLINIC | Age: 43
End: 2018-06-25

## 2018-06-27 ENCOUNTER — TELEPHONE (OUTPATIENT)
Dept: INTERNAL MEDICINE CLINIC | Age: 43
End: 2018-06-27

## 2018-06-27 RX ORDER — IBUPROFEN 200 MG
1 TABLET ORAL DAILY
Qty: 200 EACH | Refills: 2 | Status: SHIPPED | OUTPATIENT
Start: 2018-06-27 | End: 2021-05-10

## 2018-07-11 RX ORDER — TORSEMIDE 20 MG/1
60 TABLET ORAL 2 TIMES DAILY
Qty: 30 TABLET | Refills: 5 | Status: SHIPPED | OUTPATIENT
Start: 2018-07-11 | End: 2018-07-12 | Stop reason: SDUPTHER

## 2018-07-12 ENCOUNTER — OFFICE VISIT (OUTPATIENT)
Dept: INTERNAL MEDICINE CLINIC | Age: 43
End: 2018-07-12

## 2018-07-12 VITALS
HEART RATE: 74 BPM | DIASTOLIC BLOOD PRESSURE: 70 MMHG | TEMPERATURE: 98.3 F | WEIGHT: 278 LBS | SYSTOLIC BLOOD PRESSURE: 172 MMHG | BODY MASS INDEX: 41.05 KG/M2 | OXYGEN SATURATION: 95 %

## 2018-07-12 DIAGNOSIS — E11.21 DIABETIC NEPHROPATHY ASSOCIATED WITH TYPE 2 DIABETES MELLITUS (HCC): ICD-10-CM

## 2018-07-12 DIAGNOSIS — N18.5 CHRONIC KIDNEY DISEASE (CKD), STAGE V (HCC): Primary | ICD-10-CM

## 2018-07-12 PROCEDURE — 99213 OFFICE O/P EST LOW 20 MIN: CPT | Performed by: INTERNAL MEDICINE

## 2018-07-12 RX ORDER — TORSEMIDE 20 MG/1
40 TABLET ORAL 2 TIMES DAILY
Qty: 120 TABLET | Refills: 2 | Status: SHIPPED | OUTPATIENT
Start: 2018-07-12 | End: 2018-08-06 | Stop reason: SDUPTHER

## 2018-07-13 ENCOUNTER — TELEPHONE (OUTPATIENT)
Dept: INTERNAL MEDICINE CLINIC | Age: 43
End: 2018-07-13

## 2018-07-13 NOTE — TELEPHONE ENCOUNTER
Nephrology referral faxed to Dr. Red Cuellar at [phone]. Fax confirmation received. AARON sent via referral queue.

## 2018-07-16 ENCOUNTER — TELEPHONE (OUTPATIENT)
Dept: INTERNAL MEDICINE CLINIC | Age: 43
End: 2018-07-16

## 2018-07-16 DIAGNOSIS — E11.22 CONTROLLED TYPE 2 DIABETES MELLITUS WITH CHRONIC KIDNEY DISEASE, WITH LONG-TERM CURRENT USE OF INSULIN, UNSPECIFIED CKD STAGE (HCC): ICD-10-CM

## 2018-07-16 DIAGNOSIS — Z79.4 CONTROLLED TYPE 2 DIABETES MELLITUS WITH CHRONIC KIDNEY DISEASE, WITH LONG-TERM CURRENT USE OF INSULIN, UNSPECIFIED CKD STAGE (HCC): ICD-10-CM

## 2018-07-16 DIAGNOSIS — R80.9 MICROALBUMINURIA: ICD-10-CM

## 2018-07-16 DIAGNOSIS — N18.5 CHRONIC KIDNEY DISEASE (CKD), STAGE V (HCC): Primary | ICD-10-CM

## 2018-07-16 NOTE — TELEPHONE ENCOUNTER
We will place a referral for her to see Dr. Lavon Nunez. I will place an order and give to [facility].

## 2018-07-16 NOTE — TELEPHONE ENCOUNTER
Mr. Karla Neri called very upset about call from nephrologist referred to for 2nd opinion. Please call pt or  as soon as possible.

## 2018-07-24 ENCOUNTER — TELEPHONE (OUTPATIENT)
Dept: INTERNAL MEDICINE CLINIC | Age: 43
End: 2018-07-24

## 2018-07-24 NOTE — TELEPHONE ENCOUNTER
We have not received any samples from the [facility]. We will call when they arrive.

## 2018-07-27 ENCOUNTER — TELEPHONE (OUTPATIENT)
Dept: INTERNAL MEDICINE CLINIC | Age: 43
End: 2018-07-27

## 2018-07-27 NOTE — TELEPHONE ENCOUNTER
Patient's  called and stated patient was approved for Medicaid.  Patient's  is also requesting a refill for insulin aspart (NOVOLOG) 100 UNIT/ML injection vial

## 2018-07-31 ENCOUNTER — OFFICE VISIT (OUTPATIENT)
Dept: INTERNAL MEDICINE CLINIC | Age: 43
End: 2018-07-31

## 2018-07-31 VITALS
OXYGEN SATURATION: 93 % | WEIGHT: 277 LBS | BODY MASS INDEX: 40.91 KG/M2 | HEART RATE: 79 BPM | DIASTOLIC BLOOD PRESSURE: 62 MMHG | SYSTOLIC BLOOD PRESSURE: 164 MMHG | TEMPERATURE: 98 F

## 2018-07-31 DIAGNOSIS — N18.5 CHRONIC KIDNEY DISEASE (CKD), STAGE V (HCC): ICD-10-CM

## 2018-07-31 DIAGNOSIS — I10 ESSENTIAL HYPERTENSION: ICD-10-CM

## 2018-07-31 DIAGNOSIS — J01.01 ACUTE RECURRENT MAXILLARY SINUSITIS: Primary | ICD-10-CM

## 2018-07-31 PROCEDURE — 99214 OFFICE O/P EST MOD 30 MIN: CPT | Performed by: INTERNAL MEDICINE

## 2018-07-31 RX ORDER — NEBIVOLOL 5 MG/1
5 TABLET ORAL DAILY
Qty: 30 TABLET | Refills: 5 | Status: ON HOLD | OUTPATIENT
Start: 2018-07-31 | End: 2018-08-14 | Stop reason: SDUPTHER

## 2018-07-31 RX ORDER — NEBIVOLOL 5 MG/1
5 TABLET ORAL DAILY
Qty: 14 TABLET | Refills: 0 | COMMUNITY
Start: 2018-07-31 | End: 2021-02-08

## 2018-07-31 RX ORDER — AZITHROMYCIN 250 MG/1
250 TABLET, FILM COATED ORAL DAILY
Qty: 7 TABLET | Refills: 0 | Status: SHIPPED | OUTPATIENT
Start: 2018-07-31 | End: 2018-08-07

## 2018-07-31 RX ORDER — NEBIVOLOL 10 MG/1
10 TABLET ORAL DAILY
Qty: 30 TABLET | Refills: 5 | Status: SHIPPED | OUTPATIENT
Start: 2018-07-31 | End: 2018-07-31 | Stop reason: ALTCHOICE

## 2018-07-31 ASSESSMENT — ENCOUNTER SYMPTOMS
EYE REDNESS: 0
COUGH: 1
EYE PAIN: 0
SHORTNESS OF BREATH: 0
RHINORRHEA: 1

## 2018-07-31 NOTE — PROGRESS NOTES
 Not on file. Social History Main Topics    Smoking status: Never Smoker    Smokeless tobacco: Never Used    Alcohol use No    Drug use: No    Sexual activity: Yes     Other Topics Concern    Not on file     Social History Narrative    Lives with spouse      Vitals:    07/31/18 1043 07/31/18 1057   BP: (!) 164/64 (!) 164/62   Site: Left Arm    Position: Sitting    Cuff Size: Large Adult    Pulse: 79    Temp: 98 °F (36.7 °C)    TempSrc: Oral    SpO2: 93%    Weight: 277 lb (125.6 kg)       Wt Readings from Last 3 Encounters:   07/31/18 277 lb (125.6 kg)   07/12/18 278 lb (126.1 kg)   05/30/18 272 lb (123.4 kg)     BP Readings from Last 3 Encounters:   07/31/18 (!) 164/62 07/12/18 (!) 172/70   05/30/18 (!) 150/62     Body mass index is 40.91 kg/m². Facility age limit for growth percentiles is 20 years. Objective:   Physical Exam   Constitutional: She is oriented to person, place, and time. She appears well-nourished. No distress. HENT:   Nose: Rhinorrhea, nose lacerations and nasal deformity present. Right sinus exhibits maxillary sinus tenderness. Left sinus exhibits maxillary sinus tenderness. Mouth/Throat: No oropharyngeal exudate. Eyes: EOM are normal. Pupils are equal, round, and reactive to light. Right eye exhibits no discharge. Left eye exhibits no discharge. Neck: Normal range of motion. Neck supple. No tracheal deviation present. No thyromegaly present. Cardiovascular: Normal rate, regular rhythm and normal heart sounds. No murmur heard. Pulmonary/Chest: Effort normal and breath sounds normal. No respiratory distress. She has no wheezes. She has no rales. Neurological: She is alert and oriented to person, place, and time. No cranial nerve deficit. Assessment/Plan:  Barbara Son was seen today for peripheral neuropathy. Diagnoses and all orders for this visit:    Acute recurrent maxillary sinusitis-recurrent  -     azithromycin (ZITHROMAX) 250 MG tablet;  Take 1 tablet by mouth daily for 7 days Chronic Kidney Disease Stage IV    Essential hypertension-worsening  -     nebivolol (BYSTOLIC) 5 MG tablet; Take 1 tablet by mouth daily  -     nebivolol (BYSTOLIC) 5 MG tablet; Take 1 tablet by mouth daily    Chronic kidney disease (CKD), stage V ([facility]  -  Follow-up with Dr. Andre Toledo  Other orders  -         Return in about 1 month (around 8/31/2018) for f/u blood pressure.

## 2018-08-13 PROBLEM — N17.9 ACUTE RENAL FAILURE (ARF) (HCC): Status: ACTIVE | Noted: 2018-08-13

## 2018-09-12 ENCOUNTER — TELEPHONE (OUTPATIENT)
Dept: INTERNAL MEDICINE CLINIC | Age: 43
End: 2018-09-12

## 2018-09-19 NOTE — TELEPHONE ENCOUNTER
Received PA for NovoLOG FlexPen 100UNIT/ML pen-injectors. Medication is APPROVED, through 09/14/2018-09/14/2019. Please advise patient. Thank you.

## 2018-09-21 ENCOUNTER — TELEPHONE (OUTPATIENT)
Dept: INTERNAL MEDICINE CLINIC | Age: 43
End: 2018-09-21

## 2018-09-28 DIAGNOSIS — I10 ESSENTIAL HYPERTENSION: ICD-10-CM

## 2018-09-28 DIAGNOSIS — E11.29: ICD-10-CM

## 2018-09-28 RX ORDER — LANCETS 28 GAUGE
EACH MISCELLANEOUS
Qty: 120 EACH | Refills: 8 | Status: ON HOLD | OUTPATIENT
Start: 2018-09-28 | End: 2021-06-02 | Stop reason: ALTCHOICE

## 2018-09-28 RX ORDER — AMLODIPINE BESYLATE 10 MG/1
10 TABLET ORAL DAILY
Qty: 90 TABLET | Refills: 1 | Status: SHIPPED | OUTPATIENT
Start: 2018-09-28 | End: 2021-05-10

## 2018-09-28 NOTE — TELEPHONE ENCOUNTER
From: Theta Skiff  Sent: 9/27/2018 6:51 PM EDT  Subject: Medication Renewal Request    Theta Skiff would like a refill of the following medications:     glucose blood VI test strips (FREESTYLE LITE) strip [Ryan Olivarez MD]     FREESTYLE LANCETS MISC [Ryan Olivarez MD]     amLODIPine (NORVASC) 10 MG tablet [Ryan Olivarez MD]    Preferred pharmacy: [address], [address] [place] [address] [phone] -  [phone]    Comment:

## 2018-10-18 RX ORDER — PEN NEEDLE, DIABETIC 31 GX5/16"
NEEDLE, DISPOSABLE MISCELLANEOUS
Qty: 100 EACH | Refills: 11 | Status: SHIPPED | OUTPATIENT
Start: 2018-10-18 | End: 2019-12-07 | Stop reason: SDUPTHER

## 2018-11-08 ENCOUNTER — TELEPHONE (OUTPATIENT)
Dept: INTERNAL MEDICINE CLINIC | Age: 43
End: 2018-11-08

## 2018-12-07 ENCOUNTER — NURSE ONLY (OUTPATIENT)
Dept: INTERNAL MEDICINE CLINIC | Age: 43
End: 2018-12-07

## 2018-12-07 DIAGNOSIS — Z23 NEED FOR INFLUENZA VACCINATION: Primary | ICD-10-CM

## 2018-12-07 PROCEDURE — 90471 IMMUNIZATION ADMIN: CPT | Performed by: INTERNAL MEDICINE

## 2018-12-07 PROCEDURE — 90688 IIV4 VACCINE SPLT 0.5 ML IM: CPT | Performed by: INTERNAL MEDICINE

## 2018-12-07 NOTE — PROGRESS NOTES
Vaccine Information Sheet, \"Influenza - Inactivated\"  given to Vish Smith, or parent/legal guardian of  Vish Smith and verbalized understanding. Patient responses:    Have you ever had a reaction to a flu vaccine? No  Are you able to eat eggs without adverse effects? Yes  Do you have any current illness? No  Have you ever had [place] Syndrome? No    Flu vaccine given per order. Please see immunization tab.

## 2019-01-11 ENCOUNTER — TELEPHONE (OUTPATIENT)
Dept: INTERNAL MEDICINE CLINIC | Age: 44
End: 2019-01-11

## 2019-01-14 RX ORDER — AZITHROMYCIN 250 MG/1
250 TABLET, FILM COATED ORAL SEE ADMIN INSTRUCTIONS
Qty: 6 TABLET | Refills: 0 | Status: SHIPPED | OUTPATIENT
Start: 2019-01-14 | End: 2019-01-19

## 2019-08-21 DIAGNOSIS — E11.29: ICD-10-CM

## 2019-08-27 ENCOUNTER — TELEPHONE (OUTPATIENT)
Dept: PAIN MANAGEMENT | Age: 44
End: 2019-08-27

## 2019-08-27 NOTE — TELEPHONE ENCOUNTER
Submitted PA for insulin detemir (LEVEMIR FLEXTOUCH) 100 UNIT/ML injection pen   Via CMM Key: [id number] STATUS: APPROVED. Please notify patient thank you.

## 2019-09-12 RX ORDER — INSULIN DETEMIR 100 [IU]/ML
INJECTION, SOLUTION SUBCUTANEOUS
Qty: 15 PEN | Refills: 2 | Status: SHIPPED | OUTPATIENT
Start: 2019-09-12 | End: 2021-02-18 | Stop reason: SDUPTHER

## 2019-10-09 RX ORDER — INSULIN ASPART 100 [IU]/ML
INJECTION, SOLUTION INTRAVENOUS; SUBCUTANEOUS
Qty: 3 PEN | Refills: 4 | Status: SHIPPED | OUTPATIENT
Start: 2019-10-09 | End: 2021-02-18 | Stop reason: SDUPTHER

## 2019-12-10 RX ORDER — PEN NEEDLE, DIABETIC 31 GX5/16"
NEEDLE, DISPOSABLE MISCELLANEOUS
Qty: 90 EACH | Refills: 0 | Status: SHIPPED | OUTPATIENT
Start: 2019-12-10 | End: 2020-04-13

## 2020-03-30 RX ORDER — PEN NEEDLE, DIABETIC 31 GX5/16"
NEEDLE, DISPOSABLE MISCELLANEOUS
Qty: 100 EACH | Refills: 0 | OUTPATIENT
Start: 2020-03-30

## 2020-04-13 RX ORDER — PEN NEEDLE, DIABETIC 31 GX5/16"
NEEDLE, DISPOSABLE MISCELLANEOUS
Qty: 100 EACH | Refills: 1 | Status: SHIPPED | OUTPATIENT
Start: 2020-04-13 | End: 2021-05-10

## 2020-04-23 RX ORDER — PEN NEEDLE, DIABETIC 31 GX5/16"
NEEDLE, DISPOSABLE MISCELLANEOUS
Qty: 100 EACH | Refills: 0 | OUTPATIENT
Start: 2020-04-23

## 2020-08-26 RX ORDER — PEN NEEDLE, DIABETIC 31 GX5/16"
NEEDLE, DISPOSABLE MISCELLANEOUS
Qty: 100 EACH | Refills: 0 | OUTPATIENT
Start: 2020-08-26

## 2020-11-13 RX ORDER — INSULIN DETEMIR 100 [IU]/ML
INJECTION, SOLUTION SUBCUTANEOUS
Qty: 5 PEN | Refills: 1 | OUTPATIENT
Start: 2020-11-13

## 2020-11-13 RX ORDER — INSULIN ASPART 100 [IU]/ML
INJECTION, SOLUTION INTRAVENOUS; SUBCUTANEOUS
Qty: 5 PEN | Refills: 3 | OUTPATIENT
Start: 2020-11-13

## 2020-12-14 RX ORDER — INSULIN DETEMIR 100 [IU]/ML
INJECTION, SOLUTION SUBCUTANEOUS
Qty: 5 PEN | Refills: 1 | OUTPATIENT
Start: 2020-12-14

## 2020-12-14 RX ORDER — INSULIN ASPART 100 [IU]/ML
INJECTION, SOLUTION INTRAVENOUS; SUBCUTANEOUS
Qty: 5 PEN | Refills: 3 | OUTPATIENT
Start: 2020-12-14

## 2021-02-08 ENCOUNTER — HOSPITAL ENCOUNTER (INPATIENT)
Age: 46
LOS: 4 days | Discharge: HOME | DRG: 270 | End: 2021-02-12
Attending: STUDENT IN AN ORGANIZED HEALTH CARE EDUCATION/TRAINING PROGRAM | Admitting: HOSPITALIST
Payer: MEDICARE

## 2021-02-08 ENCOUNTER — APPOINTMENT (OUTPATIENT)
Dept: GENERAL RADIOLOGY | Age: 46
DRG: 270 | End: 2021-02-08
Payer: MEDICARE

## 2021-02-08 DIAGNOSIS — E13.69 OTHER SPECIFIED DIABETES MELLITUS WITH OTHER SPECIFIED COMPLICATION, WITH LONG-TERM CURRENT USE OF INSULIN (HCC): ICD-10-CM

## 2021-02-08 DIAGNOSIS — N18.6 ESRD (END STAGE RENAL DISEASE) (HCC): ICD-10-CM

## 2021-02-08 DIAGNOSIS — I96 DRY GANGRENE (HCC): Primary | ICD-10-CM

## 2021-02-08 DIAGNOSIS — E11.21 DIABETIC NEPHROPATHY ASSOCIATED WITH TYPE 2 DIABETES MELLITUS (HCC): ICD-10-CM

## 2021-02-08 DIAGNOSIS — Z79.4 OTHER SPECIFIED DIABETES MELLITUS WITH OTHER SPECIFIED COMPLICATION, WITH LONG-TERM CURRENT USE OF INSULIN (HCC): ICD-10-CM

## 2021-02-08 LAB
A/G RATIO: 0.9 (ref 1.1–2.2)
ALBUMIN SERPL-MCNC: 3.5 G/DL (ref 3.4–5)
ALP BLD-CCNC: 99 U/L (ref 40–129)
ALT SERPL-CCNC: 9 U/L (ref 10–40)
ANION GAP SERPL CALCULATED.3IONS-SCNC: 19 MMOL/L (ref 3–16)
ANION GAP SERPL CALCULATED.3IONS-SCNC: 19 MMOL/L (ref 3–16)
APTT: 31.2 SEC (ref 24.2–36.2)
AST SERPL-CCNC: 10 U/L (ref 15–37)
BASOPHILS ABSOLUTE: 0.1 K/UL (ref 0–0.2)
BASOPHILS RELATIVE PERCENT: 1.1 %
BILIRUB SERPL-MCNC: 0.3 MG/DL (ref 0–1)
BUN BLDV-MCNC: 79 MG/DL (ref 7–20)
BUN BLDV-MCNC: 81 MG/DL (ref 7–20)
C-REACTIVE PROTEIN: 8.9 MG/L (ref 0–5.1)
CALCIUM SERPL-MCNC: 9.1 MG/DL (ref 8.3–10.6)
CALCIUM SERPL-MCNC: 9.2 MG/DL (ref 8.3–10.6)
CHLORIDE BLD-SCNC: 93 MMOL/L (ref 99–110)
CHLORIDE BLD-SCNC: 93 MMOL/L (ref 99–110)
CO2: 22 MMOL/L (ref 21–32)
CO2: 22 MMOL/L (ref 21–32)
CREAT SERPL-MCNC: 13.5 MG/DL (ref 0.6–1.1)
CREAT SERPL-MCNC: 13.6 MG/DL (ref 0.6–1.1)
EOSINOPHILS ABSOLUTE: 0.4 K/UL (ref 0–0.6)
EOSINOPHILS RELATIVE PERCENT: 4.5 %
GFR AFRICAN AMERICAN: 4
GFR AFRICAN AMERICAN: 4
GFR NON-AFRICAN AMERICAN: 3
GFR NON-AFRICAN AMERICAN: 3
GLOBULIN: 4 G/DL
GLUCOSE BLD-MCNC: 179 MG/DL (ref 70–99)
GLUCOSE BLD-MCNC: 194 MG/DL (ref 70–99)
GLUCOSE BLD-MCNC: 386 MG/DL (ref 70–99)
HCT VFR BLD CALC: 36.2 % (ref 36–48)
HEMOGLOBIN: 11.7 G/DL (ref 12–16)
INR BLD: 0.92 (ref 0.86–1.14)
LACTIC ACID, SEPSIS: 1.1 MMOL/L (ref 0.4–1.9)
LYMPHOCYTES ABSOLUTE: 1.1 K/UL (ref 1–5.1)
LYMPHOCYTES RELATIVE PERCENT: 10.7 %
MCH RBC QN AUTO: 30 PG (ref 26–34)
MCHC RBC AUTO-ENTMCNC: 32.3 G/DL (ref 31–36)
MCV RBC AUTO: 93 FL (ref 80–100)
MONOCYTES ABSOLUTE: 0.7 K/UL (ref 0–1.3)
MONOCYTES RELATIVE PERCENT: 7 %
NEUTROPHILS ABSOLUTE: 7.6 K/UL (ref 1.7–7.7)
NEUTROPHILS RELATIVE PERCENT: 76.7 %
PDW BLD-RTO: 14.1 % (ref 12.4–15.4)
PERFORMED ON: ABNORMAL
PLATELET # BLD: 293 K/UL (ref 135–450)
PMV BLD AUTO: 8.2 FL (ref 5–10.5)
POTASSIUM SERPL-SCNC: 3.9 MMOL/L (ref 3.5–5.1)
POTASSIUM SERPL-SCNC: 3.9 MMOL/L (ref 3.5–5.1)
PROTHROMBIN TIME: 10.7 SEC (ref 10–13.2)
RBC # BLD: 3.89 M/UL (ref 4–5.2)
SEDIMENTATION RATE, ERYTHROCYTE: 84 MM/HR (ref 0–20)
SODIUM BLD-SCNC: 134 MMOL/L (ref 136–145)
SODIUM BLD-SCNC: 134 MMOL/L (ref 136–145)
TOTAL PROTEIN: 7.5 G/DL (ref 6.4–8.2)
WBC # BLD: 9.9 K/UL (ref 4–11)

## 2021-02-08 PROCEDURE — 99284 EMERGENCY DEPT VISIT MOD MDM: CPT

## 2021-02-08 PROCEDURE — 80053 COMPREHEN METABOLIC PANEL: CPT

## 2021-02-08 PROCEDURE — 90945 DIALYSIS ONE EVALUATION: CPT

## 2021-02-08 PROCEDURE — 85730 THROMBOPLASTIN TIME PARTIAL: CPT

## 2021-02-08 PROCEDURE — 2060000000 HC ICU INTERMEDIATE R&B

## 2021-02-08 PROCEDURE — 6360000002 HC RX W HCPCS: Performed by: PHYSICIAN ASSISTANT

## 2021-02-08 PROCEDURE — 6370000000 HC RX 637 (ALT 250 FOR IP): Performed by: PHYSICIAN ASSISTANT

## 2021-02-08 PROCEDURE — 2580000003 HC RX 258: Performed by: HOSPITALIST

## 2021-02-08 PROCEDURE — 83605 ASSAY OF LACTIC ACID: CPT

## 2021-02-08 PROCEDURE — 6370000000 HC RX 637 (ALT 250 FOR IP): Performed by: HOSPITALIST

## 2021-02-08 PROCEDURE — 86140 C-REACTIVE PROTEIN: CPT

## 2021-02-08 PROCEDURE — 2500000003 HC RX 250 WO HCPCS: Performed by: INTERNAL MEDICINE

## 2021-02-08 PROCEDURE — 36415 COLL VENOUS BLD VENIPUNCTURE: CPT

## 2021-02-08 PROCEDURE — 85610 PROTHROMBIN TIME: CPT

## 2021-02-08 PROCEDURE — 96365 THER/PROPH/DIAG IV INF INIT: CPT

## 2021-02-08 PROCEDURE — 87070 CULTURE OTHR SPECIMN AEROBIC: CPT

## 2021-02-08 PROCEDURE — 2580000003 HC RX 258: Performed by: PHYSICIAN ASSISTANT

## 2021-02-08 PROCEDURE — 3E1M39Z IRRIGATION OF PERITONEAL CAVITY USING DIALYSATE, PERCUTANEOUS APPROACH: ICD-10-PCS | Performed by: INTERNAL MEDICINE

## 2021-02-08 PROCEDURE — 6360000002 HC RX W HCPCS: Performed by: HOSPITALIST

## 2021-02-08 PROCEDURE — 73630 X-RAY EXAM OF FOOT: CPT

## 2021-02-08 PROCEDURE — 85025 COMPLETE CBC W/AUTO DIFF WBC: CPT

## 2021-02-08 PROCEDURE — 87205 SMEAR GRAM STAIN: CPT

## 2021-02-08 PROCEDURE — 85652 RBC SED RATE AUTOMATED: CPT

## 2021-02-08 PROCEDURE — 87040 BLOOD CULTURE FOR BACTERIA: CPT

## 2021-02-08 PROCEDURE — 83036 HEMOGLOBIN GLYCOSYLATED A1C: CPT

## 2021-02-08 RX ORDER — SODIUM CHLORIDE 0.9 % (FLUSH) 0.9 %
10 SYRINGE (ML) INJECTION EVERY 12 HOURS SCHEDULED
Status: DISCONTINUED | OUTPATIENT
Start: 2021-02-08 | End: 2021-02-12 | Stop reason: HOSPADM

## 2021-02-08 RX ORDER — NICOTINE POLACRILEX 4 MG
15 LOZENGE BUCCAL PRN
Status: DISCONTINUED | OUTPATIENT
Start: 2021-02-08 | End: 2021-02-12 | Stop reason: HOSPADM

## 2021-02-08 RX ORDER — FAMOTIDINE 20 MG/1
20 TABLET, FILM COATED ORAL DAILY
Status: DISCONTINUED | OUTPATIENT
Start: 2021-02-08 | End: 2021-02-08 | Stop reason: ALTCHOICE

## 2021-02-08 RX ORDER — TORSEMIDE 100 MG/1
200 TABLET ORAL DAILY
Status: DISCONTINUED | OUTPATIENT
Start: 2021-02-08 | End: 2021-02-12 | Stop reason: HOSPADM

## 2021-02-08 RX ORDER — DEXTROSE MONOHYDRATE 25 G/50ML
12.5 INJECTION, SOLUTION INTRAVENOUS PRN
Status: DISCONTINUED | OUTPATIENT
Start: 2021-02-08 | End: 2021-02-12 | Stop reason: HOSPADM

## 2021-02-08 RX ORDER — ACETAMINOPHEN 325 MG/1
650 TABLET ORAL EVERY 6 HOURS PRN
Status: DISCONTINUED | OUTPATIENT
Start: 2021-02-08 | End: 2021-02-11 | Stop reason: DRUGHIGH

## 2021-02-08 RX ORDER — SODIUM BICARBONATE 650 MG/1
650 TABLET ORAL 2 TIMES DAILY
Status: DISCONTINUED | OUTPATIENT
Start: 2021-02-08 | End: 2021-02-08 | Stop reason: ALTCHOICE

## 2021-02-08 RX ORDER — INSULIN LISPRO 100 [IU]/ML
0-3 INJECTION, SOLUTION INTRAVENOUS; SUBCUTANEOUS NIGHTLY
Status: DISCONTINUED | OUTPATIENT
Start: 2021-02-08 | End: 2021-02-08 | Stop reason: ALTCHOICE

## 2021-02-08 RX ORDER — SODIUM CHLORIDE 0.9 % (FLUSH) 0.9 %
10 SYRINGE (ML) INJECTION PRN
Status: DISCONTINUED | OUTPATIENT
Start: 2021-02-08 | End: 2021-02-12 | Stop reason: HOSPADM

## 2021-02-08 RX ORDER — PANTOPRAZOLE SODIUM 40 MG/1
40 TABLET, DELAYED RELEASE ORAL
Status: DISCONTINUED | OUTPATIENT
Start: 2021-02-09 | End: 2021-02-08

## 2021-02-08 RX ORDER — LANOLIN ALCOHOL/MO/W.PET/CERES
3 CREAM (GRAM) TOPICAL NIGHTLY PRN
Status: DISCONTINUED | OUTPATIENT
Start: 2021-02-08 | End: 2021-02-12 | Stop reason: HOSPADM

## 2021-02-08 RX ORDER — ACETAMINOPHEN 650 MG/1
650 SUPPOSITORY RECTAL EVERY 6 HOURS PRN
Status: DISCONTINUED | OUTPATIENT
Start: 2021-02-08 | End: 2021-02-12 | Stop reason: HOSPADM

## 2021-02-08 RX ORDER — CALCIUM ACETATE 667 MG/1
667 TABLET ORAL
COMMUNITY

## 2021-02-08 RX ORDER — POLYETHYLENE GLYCOL 3350 17 G/17G
17 POWDER, FOR SOLUTION ORAL DAILY PRN
Status: DISCONTINUED | OUTPATIENT
Start: 2021-02-08 | End: 2021-02-12 | Stop reason: HOSPADM

## 2021-02-08 RX ORDER — DEXTROSE MONOHYDRATE 50 MG/ML
100 INJECTION, SOLUTION INTRAVENOUS PRN
Status: DISCONTINUED | OUTPATIENT
Start: 2021-02-08 | End: 2021-02-12 | Stop reason: HOSPADM

## 2021-02-08 RX ORDER — HEPARIN SODIUM 5000 [USP'U]/ML
5000 INJECTION, SOLUTION INTRAVENOUS; SUBCUTANEOUS EVERY 8 HOURS SCHEDULED
Status: DISCONTINUED | OUTPATIENT
Start: 2021-02-08 | End: 2021-02-12 | Stop reason: HOSPADM

## 2021-02-08 RX ORDER — CALCIUM ACETATE 667 MG/1
2 CAPSULE ORAL
Status: DISCONTINUED | OUTPATIENT
Start: 2021-02-08 | End: 2021-02-08

## 2021-02-08 RX ORDER — METOPROLOL TARTRATE 50 MG/1
50 TABLET, FILM COATED ORAL 2 TIMES DAILY
Status: DISCONTINUED | OUTPATIENT
Start: 2021-02-08 | End: 2021-02-12 | Stop reason: HOSPADM

## 2021-02-08 RX ORDER — AMLODIPINE BESYLATE 5 MG/1
10 TABLET ORAL DAILY
Status: DISCONTINUED | OUTPATIENT
Start: 2021-02-08 | End: 2021-02-12 | Stop reason: HOSPADM

## 2021-02-08 RX ORDER — INSULIN LISPRO 100 [IU]/ML
0-6 INJECTION, SOLUTION INTRAVENOUS; SUBCUTANEOUS
Status: DISCONTINUED | OUTPATIENT
Start: 2021-02-08 | End: 2021-02-08 | Stop reason: ALTCHOICE

## 2021-02-08 RX ORDER — PANTOPRAZOLE SODIUM 40 MG/1
40 TABLET, DELAYED RELEASE ORAL NIGHTLY
Status: DISCONTINUED | OUTPATIENT
Start: 2021-02-08 | End: 2021-02-12 | Stop reason: HOSPADM

## 2021-02-08 RX ORDER — ONDANSETRON 2 MG/ML
4 INJECTION INTRAMUSCULAR; INTRAVENOUS EVERY 6 HOURS PRN
Status: DISCONTINUED | OUTPATIENT
Start: 2021-02-08 | End: 2021-02-12 | Stop reason: HOSPADM

## 2021-02-08 RX ORDER — PROMETHAZINE HYDROCHLORIDE 25 MG/1
12.5 TABLET ORAL EVERY 6 HOURS PRN
Status: DISCONTINUED | OUTPATIENT
Start: 2021-02-08 | End: 2021-02-12 | Stop reason: HOSPADM

## 2021-02-08 RX ORDER — GENTAMICIN SULFATE 1 MG/G
CREAM TOPICAL DAILY PRN
Status: DISCONTINUED | OUTPATIENT
Start: 2021-02-08 | End: 2021-02-12 | Stop reason: HOSPADM

## 2021-02-08 RX ORDER — OMEPRAZOLE 10 MG/1
10 CAPSULE, DELAYED RELEASE ORAL DAILY
Status: ON HOLD | COMMUNITY
End: 2021-06-02 | Stop reason: ALTCHOICE

## 2021-02-08 RX ORDER — SEVELAMER CARBONATE 800 MG/1
800 TABLET, FILM COATED ORAL
Status: DISCONTINUED | OUTPATIENT
Start: 2021-02-09 | End: 2021-02-12 | Stop reason: HOSPADM

## 2021-02-08 RX ADMIN — CALCIUM ACETATE 1334 MG: 667 CAPSULE ORAL at 19:41

## 2021-02-08 RX ADMIN — Medication 10 ML: at 21:26

## 2021-02-08 RX ADMIN — AMLODIPINE BESYLATE 10 MG: 5 TABLET ORAL at 19:48

## 2021-02-08 RX ADMIN — PANTOPRAZOLE SODIUM 40 MG: 40 TABLET, DELAYED RELEASE ORAL at 23:07

## 2021-02-08 RX ADMIN — HEPARIN SODIUM 5000 UNITS: 5000 INJECTION INTRAVENOUS; SUBCUTANEOUS at 22:56

## 2021-02-08 RX ADMIN — PIPERACILLIN AND TAZOBACTAM 3375 MG: 3; .375 INJECTION, POWDER, LYOPHILIZED, FOR SOLUTION INTRAVENOUS at 15:29

## 2021-02-08 RX ADMIN — METOPROLOL TARTRATE 50 MG: 50 TABLET, FILM COATED ORAL at 21:26

## 2021-02-08 ASSESSMENT — ENCOUNTER SYMPTOMS
ABDOMINAL PAIN: 0
COUGH: 0
DIARRHEA: 0
NAUSEA: 0
RHINORRHEA: 0
SHORTNESS OF BREATH: 0
VOMITING: 0
COLOR CHANGE: 1

## 2021-02-08 NOTE — H&P
HOSPITALISTS HISTORY AND PHYSICAL    2021 4:04 PM    Patient Information:  Maude Arcos is a 54 y.o. female [id number]  PCP:  Radha Manriquez MD (Tel: [phone] )    Chief complaint:    Chief Complaint   Patient presents with    Other     Sent by provider, middle toe on right foot necrotic, pt states no pain, bilster on ring toe and redness noted to nail on big toe, hx diabetes neuropathy and dialysis       History of Present Illness:  Oza Cowden is a 54 y.o. female who presented with right foot necrotic changes. With blister. Patient says started off blister on the right toe with increased redness noted. Patient history of diabetes neuropathy on peritoneal dialysis. Onset of splinters about 2 days ago. With increasing darkness of and redness. Patient unsure if there was any injury or not. Does not have any good sensation at baseline. Denies any fevers chills no nausea vomiting diarrhea currently no drainage. Zeferino Harvey REVIEW OF SYSTEMS:   Constitutional: Negative for fever,chills or night sweats  ENT: Negative for rhinorrhea, epistaxis, hoarseness, sore throat. Respiratory: Negative for shortness of breath,wheezing  Cardiovascular: Negative for chest pain, palpitations   Gastrointestinal: Negative for nausea, vomiting, diarrhea  Genitourinary: Negative for polyuria, dysuria   Hematologic/Lymphatic: Negative for bleeding tendency, easy bruising  Musculoskeletal: Negative for myalgias and arthralgias  Neurologic: Negative for confusion,dysarthria. Skin: Negative for itching,rash, good capillary refill. Psychiatric: Negative for depression,anxiety, agitation. Endocrine: Negative for polydipsia,polyuria,heat /cold intolerance. Past Medical History:   has a past medical history of Diabetes mellitus ([facility] [address] and Hypertension. Past Surgical History:   has a past surgical history that includes Tonsillectomy;   section; and other surgical history (08/16/2018). Medications:  No current facility-administered medications on file prior to encounter.       Current Outpatient Medications on File Prior to Encounter   Medication Sig Dispense Refill    VELPHORO 500 MG CHEW CHEW TWO TABLETS BY MOUTH THREE TIMES A DAY WITH MEALS 180 tablet 4    B-D ULTRAFINE III SHORT PEN 31G X 8 MM MISC USE FOUR TIMES A  each 1    NOVOLOG FLEXPEN 100 UNIT/ML injection pen IINJECT 20 UNITS UNDER THE SKIN THREE TIMES A DAY BEFORE MEALS 3 pen 4    LEVEMIR FLEXTOUCH 100 UNIT/ML injection pen INJECT 16 UNITS UNDER THE SKIN NIGHTLY 15 pen 2    blood glucose test strips (FREESTYLE LITE) strip 1 each by In Vitro route 2 times daily Patient has uncontrolled diabetes 100 each 9    metoprolol tartrate (LOPRESSOR) 25 MG tablet TAKE ONE TABLET BY MOUTH TWICE A DAY 60 tablet 4    calcitRIOL (ROCALTROL) 0.25 MCG capsule TAKE ONE CAPSULE BY MOUTH DAILY 30 capsule 4    torsemide (DEMADEX) 20 MG tablet TAKE THREE TABLETS BY MOUTH TWICE A  tablet 0    FREESTYLE LANCETS MISC Check blood sugars 4 times daily 120 each 8    amLODIPine (NORVASC) 10 MG tablet Take 1 tablet by mouth daily 90 tablet 1    sodium bicarbonate 650 MG tablet Take 1 tablet by mouth 2 times daily 60 tablet 1    metolazone (ZAROXOLYN) 2.5 MG tablet Take 1 tablet by mouth daily as needed (If wt gain >1.5lbs in 1 day) 30 tablet 1    torsemide (DEMADEX) 20 MG tablet Take 4 tablets by mouth 2 times daily 240 tablet 1    ranitidine (ZANTAC) 150 MG tablet Take 150 mg by mouth nightly      melatonin 3 MG TABS tablet Take 3 mg by mouth nightly as needed      insulin aspart (NOVOLOG) 100 UNIT/ML injection vial Inject 24 Units into the skin 3 times daily (before meals) Pt to use sliding scale (Patient taking differently: Inject 15 Units into the skin 3 times daily (before meals) Pt to use sliding scale) 4 vial 5    INSULIN SYRINGE .5CC/29G 29G X 1/2\" 0.5 ML MISC 1 each by Does not apply route daily 200 each 2    vitamin D (CHOLECALCIFEROL) 1000 units TABS tablet Take 5,000 Units by mouth      loratadine (CLARITIN) 10 MG tablet TAKE ONE TABLET BY MOUTH DAILY 30 tablet 0    fluticasone (FLONASE) 50 MCG/ACT nasal spray 2 sprays by Nasal route daily 1 Bottle 10    Blood Glucose Monitoring Suppl (FREESTYLE LITE) JOHNNIE 1 Device by Does not apply route 4 times daily. 1 Device 0    ondansetron (ZOFRAN) 8 MG tablet Take 1 tablet by mouth every 12 hours as needed for Nausea. 10 tablet 1    Insulin Pen Needle 32G X 5 MM MISC 1 each by Does not apply route daily. 50 each 3    [DISCONTINUED] Multiple Vitamins-Minerals (THERAPEUTIC MULTIVITAMIN-MINERALS) tablet Take 1 tablet by mouth daily. 30 tablet 11    [employer] Lancets MISC by Does not apply route. 50 each 11       Allergies: Allergies   Allergen Reactions    Zetia [Ezetimibe] Shortness Of Breath    Humalog [Insulin Lispro]      SOB, Leg swelling, fatigue    Lisinopril Swelling    Lantus [Insulin Glargine] Nausea And Vomiting    Victoza [Liraglutide] Nausea And Vomiting        Social History:   reports that she has never smoked. She has never used smokeless tobacco. She reports that she does not drink alcohol or use drugs. Family History:  Sister-hypertension  Physical Exam:  BP (!) 179/61   Pulse 60   Temp 98.2 °F (36.8 °C) (Oral)   Resp 18   Ht 5' 6\" (1.676 m)   Wt 251 lb 3 oz (113.9 kg)   LMP 01/15/2021   SpO2 97%   BMI 40.54 kg/m²     General appearance:  Appears comfortable. Well nourished  Eyes: Sclera clear, pupils equal  ENT: Moist mucus membranes, no thrush. Trachea midline. Cardiovascular: Regular rhythm, normal S1, S2. No murmur, gallop, rub. No edema in lower extremities  Respiratory: Clear to auscultation bilaterally, no wheeze, good inspiratory effort  Gastrointestinal: Abdomen soft, non-tender, not distended, normal bowel sounds  Musculoskeletal: No cyanosis in digits, neck supple  Neurology: Cranial nerves grossly intact.  Alert and oriented in time, place and person. No speech or motor deficits  Psychiatry: Appropriate affect. Not agitated  Skin: Warm, dry, normal turgor, no rash  Left third toe with dry gangrenous changes noted. With blister noted on the right second toe  Labs:  CBC:   Lab Results   Component Value Date    WBC 9.9 02/08/2021    RBC 3.89 02/08/2021    HGB 11.7 02/08/2021    HCT 36.2 02/08/2021    MCV 93.0 02/08/2021    MCH 30.0 02/08/2021    MCHC 32.3 02/08/2021    RDW 14.1 02/08/2021     02/08/2021    MPV 8.2 02/08/2021     BMP:    Lab Results   Component Value Date     02/08/2021    K 3.9 02/08/2021    K 3.8 08/16/2018    CL 93 02/08/2021    CO2 22 02/08/2021    BUN 79 02/08/2021    CREATININE 13.5 02/08/2021    CALCIUM 9.2 02/08/2021    GFRAA 4 02/08/2021    GFRAA >60 04/09/2013    LABGLOM 3 02/08/2021    GLUCOSE 179 02/08/2021       Chest Xray:   EKG:    I visualized CXR images and EKG strips        Problem List  Active Problems:    Gangrene ([address]  Resolved Problems:    * No resolved hospital problems. *        Assessment/Plan:   Dry gangrene  -Acute concern for infection.  -Patient was given Zosyn. - seems less infectious   -X-ray did not show any obvious change of osteomyelitis  -Podiatry will be consulted. Further recs to follow  -If concern for blood flow vascular consult may be needed. Insulin-dependent diabetes mellitus  -Continue home dose Levemir and also sliding scale added    End-stage renal disease on peritoneal dialysis  -Nephrology will be consulted.         Denny Francois MD    2/8/2021 4:04 PM

## 2021-02-08 NOTE — ED NOTES
Pt continues to rest on stretcher. Pts BP elevated, however pt otherwise in no distress at this time. Pt aware report must be called and then she will go to IP bed.       True Conroy RN  02/08/21 0180

## 2021-02-08 NOTE — ED NOTES
Pt sitting up on stretcher crocheting at this time. Pt in no distress. Abx complete. Denies needs. Pt denies pain. Call light within reach. Waiting on IP bed.      Jasmyne Ch RN  02/08/21 1077

## 2021-02-08 NOTE — ED NOTES
Pt presents to the ED after virtually speaking to PCP. Pt has hx of diabetes and noticed necrotic tissue to left 3rd digit of foot beginning on Friday. Pt also has large fluid filled blister to 2nd digit of left foot that she states appeared on Saturday. Pt has decreased sensation to first 3 toes of left foot. Cool to touch. Pt has moderate pedal pulse. Pt denies fevers or other complaints. States that she has been unable to check blood glucose at home recently.       Franklin Roy RN  02/08/21 2467

## 2021-02-08 NOTE — PROGRESS NOTES
Seen at bedside. Distal gangrene noted third toe left. Cultures taken. MRI left foot ordered. Vascular and inf disease consulted.     Kassi Castro.,  TARUN

## 2021-02-08 NOTE — ED PROVIDER NOTES
I independently performed a history and physical on [employer]. All diagnostic, treatment, and disposition decisions were made by myself in conjunction with the advanced practice provider. Briefly, this is a 54 y.o. female here for color change and blistering to the left toes. Hx of DM, neuropathy and peritoneal dialysis. She states that she noted that her left second toe developed a blister over the last 2 days. Over the last 3 days, her left third toe has been darkening. She is not sure if she injured it she does not have good sensation to that toe at baseline. She states that recently her Oul Glow has been under poor control because she has run out of glucose testing strips. She denies any streaking up into the foot, pain with moving her ankle, fevers or chills. On exam pt is resting comfortably  Cardiac RRR, no murmur  Lungs clear bilaterally, no increased work of breathing  Dry gangrenous changes to the left third toe. Fluid-filled blister noted to the right second toe. No streaking up into the foot. No pain with ranging the toes or the ankle. No crepitus across the lower extremities. XR FOOT LEFT (MIN 3 VIEWS)   Final Result   1. No definite evidence of osteomyelitis radiographically.    2. Soft tissue swelling at the medial aspect of the 2nd digit           Labs Reviewed   CBC WITH AUTO DIFFERENTIAL - Abnormal; Notable for the following components:       Result Value    RBC 3.89 (*)     Hemoglobin 11.7 (*)     All other components within normal limits    Narrative:     Performed at:  [facility] [address], [place], [address]   Phone [phone]   COMPREHENSIVE METABOLIC PANEL - Abnormal; Notable for the following components:    Sodium 134 (*)     Chloride 93 (*)     Anion Gap 19 (*)     Glucose 179 (*)     BUN 79 (*)     CREATININE 13.5 (*)     GFR Non- 3 (*)     GFR African American 4 (*)     Albumin/Globulin Ratio 0.9 (*)     ALT 9 (*)     AST 10 (*)     All other components within normal limits    Narrative:     PRESTON  [facility] tel. [phone],  Chemistry results called to and read back by [place], 02/08/2021  14:58, by [facility]  Performed at:  [facility] [address] [facility],  [place], [address]   Phone [phone]   SEDIMENTATION RATE - Abnormal; Notable for the following components:    Sed Rate 84 (*)     All other components within normal limits    Narrative:     Performed at:  [facility] [address] [facility] [facility],  [place], [address]   Phone [phone]   C-REACTIVE PROTEIN - Abnormal; Notable for the following components:    CRP 8.9 (*)     All other components within normal limits    Narrative:     Daniel Hill  [facility] tel. [phone],  Chemistry results called to and read back by [facility], 02/08/2021  14:58, by [facility]  Performed at:  [facility] [address] [facility],  [place], [address]   Phone [phone]   CULTURE, BLOOD 2   CULTURE, BLOOD 1   PROTIME-INR    Narrative:     Performed at:  [facility] [address] [facility] [facility],  [place], [address]   Phone [phone]   APTT    Narrative:     Performed at:  [facility] [address],  [place], [address]   Phone [phone]   LACTATE, SEPSIS    Narrative:     Performed at:  [facility] [address],  [place], [address]   Phone [phone]     Medications   piperacillin-tazobactam (ZOSYN) 3,375 mg in dextrose 5 % 50 mL IVPB (mini-bag) (3,375 mg Intravenous New Bag 2/8/21 1529)         Screenings     Yong Coma Scale  Eye Opening: Spontaneous  Best Verbal Response: Oriented  Best Motor Response: Obeys commands  [place] Coma Scale Score: 15      inflammatory markers significantly elevated  Cr elevation in the setting of ESRD         Pt appears comfortable upon presentation with vitals reassuring. Presentation appears consistent with dry gangrene to the left third digit. No signs of cellulitic change up into the foot. No signs of septic ankle. She does not meet sepsis criteria. Zosyn was started in the emergency room. We will avoid vancomycin at this time as there is a lack of purulence. No signs of obvious osteomyelitis changes on x-ray of the foot. Patient Referrals:  No follow-up provider specified. Discharge Medications:  New Prescriptions    No medications on file       FINAL IMPRESSION  1. Dry gangrene ([facility] [address]    2. ESRD (end stage renal disease) ([facility] [address]    3. Other specified diabetes mellitus with other specified complication, with long-term current use of insulin ([facility])        Blood pressure (!) 179/61, pulse 60, temperature 98.2 °F (36.8 °C), temperature source Oral, resp. rate 18, height 5' 6\" (1.676 m), weight 251 lb 3 oz (113.9 kg), last menstrual period 01/15/2021, SpO2 97 %, not currently breastfeeding.      For further details of [facility] emergency department encounter, please see documentation by advanced practice provider        Edgardo Manning MD  02/08/21 4598

## 2021-02-08 NOTE — ED PROVIDER NOTES
[facility] emergency [facility] [place]        Pt Name: Julio Snow  MRN: [id number]  [place] 1975  Date of evaluation: 2/8/2021  Provider: Corrinne Genera, PA-C  PCP: Hawa Manning MD     I have seen and evaluated this patient with my supervising physician Samy Benitez MD.    CHIEF COMPLAINT       Chief Complaint   Patient presents with    Other     Sent by provider, middle toe on right foot necrotic, pt states no pain, bilster on ring toe and redness noted to nail on big toe, hx diabetes neuropathy and dialysis       HISTORY OF PRESENT ILLNESS   (Location, Timing/Onset, Context/Setting, Quality, Duration, Modifying Factors, Severity, Associated Signs and Symptoms)  Note limiting factors. Julio Snow is a 54 y.o. female who presents to the emergency department today for evaluation for discoloration to her left toe. Patient states that she does have a history of diabetes, and she states that due to this she does have a neuropathy in her foot. The patient states that she is giving herself insulin as directed however she states that she does not have a glucometer, and therefore she states that she has been unable to check her sugars. The patient states that last week when she was putting on her pants she thinks that she may have injured her toe, also is unsure if the dog may have stepped on it. She states that she did notice some bruising to her left third toe 1 week ago, but she states that it never really went away. She states that starting on Friday she did notice that it appeared to be \"more black\". She states that today she noticed a blister on her left second toe, she states that she sent a picture to the nurse, and was told to come to the emergency room. Patient does not have any pain to the toe. She reports a tingling sensation to her left foot and her left toes and she states that this is normal for her. She denies any fever or chills.   She has no chest pain or shortness of breath. She has no nausea, vomiting or diarrhea. No urinary symptoms. She is a peritoneal dialysis patient she states that she is not had any change in the fluid color. She sees Dr. Deena Manning, nephrology. Patient otherwise has no complaints at this time. Nursing Notes were all reviewed and agreed with or any disagreements were addressed in the HPI. REVIEW OF SYSTEMS    (2-9 systems for level 4, 10 or more for level 5)     Review of Systems   Constitutional: Negative for activity change, appetite change, chills and fever. HENT: Negative for congestion and rhinorrhea. Respiratory: Negative for cough and shortness of breath. Cardiovascular: Negative for chest pain. Gastrointestinal: Negative for abdominal pain, diarrhea, nausea and vomiting. Genitourinary: Negative for difficulty urinating, dysuria and hematuria. Skin: Positive for color change. Neurological: Negative for weakness and numbness. Positives and Pertinent negatives as per HPI. Except as noted above in the ROS, all other systems were reviewed and negative.        PAST MEDICAL HISTORY     Past Medical History:   Diagnosis Date    Diabetes mellitus ([facility] [address]     Hypertension          SURGICAL HISTORY     Past Surgical History:   Procedure Laterality Date     SECTION      OTHER SURGICAL HISTORY  2018     lap PD cath placement lt side 62 cm    TONSILLECTOMY           CURRENTMEDICATIONS       Current Discharge Medication List      CONTINUE these medications which have NOT CHANGED    Details   omeprazole (PRILOSEC) 10 MG delayed release capsule Take 10 mg by mouth daily      VELPHORO 500 MG CHEW CHEW TWO TABLETS BY MOUTH THREE TIMES A DAY WITH MEALS  Qty: 180 tablet, Refills: 4    Associated Diagnoses: Chronic kidney disease (CKD), stage V (HCC)      NOVOLOG FLEXPEN 100 UNIT/ML injection pen IINJECT 20 UNITS UNDER THE SKIN THREE TIMES A DAY BEFORE MEALS  Qty: 3 pen, Refills: 4      LEVEMIR FLEXTOUCH 100 UNIT/ML injection pen INJECT 16 UNITS UNDER THE SKIN NIGHTLY  Qty: 15 pen, Refills: 2      metoprolol tartrate (LOPRESSOR) 25 MG tablet TAKE ONE TABLET BY MOUTH TWICE A DAY  Qty: 60 tablet, Refills: 4      torsemide (DEMADEX) 20 MG tablet TAKE THREE TABLETS BY MOUTH TWICE A DAY  Qty: 120 tablet, Refills: 0    Associated Diagnoses: Diabetic nephropathy associated with type 2 diabetes mellitus (HCC)      amLODIPine (NORVASC) 10 MG tablet Take 1 tablet by mouth daily  Qty: 90 tablet, Refills: 1    Associated Diagnoses: Essential hypertension      insulin aspart (NOVOLOG) 100 UNIT/ML injection vial Inject 24 Units into the skin 3 times daily (before meals) Pt to use sliding scale  Qty: 4 vial, Refills: 5      vitamin D (CHOLECALCIFEROL) 1000 units TABS tablet Take 5,000 Units by mouth      !! B-D ULTRAFINE III SHORT PEN 31G X 8 MM MISC USE FOUR TIMES A DAY  Qty: 100 each, Refills: 1      blood glucose test strips (FREESTYLE LITE) strip 1 each by In Vitro route 2 times daily Patient has uncontrolled diabetes  Qty: 100 each, Refills: 9    Associated Diagnoses: Type 2 diabetes mellitus with renal manifestations, controlled ([facility] [address]      ! ! FREESTYLE LANCETS MISC Check blood sugars 4 times daily  Qty: 120 each, Refills: 8      metolazone (ZAROXOLYN) 2.5 MG tablet Take 1 tablet by mouth daily as needed (If wt gain >1.5lbs in 1 day)  Qty: 30 tablet, Refills: 1      melatonin 3 MG TABS tablet Take 3 mg by mouth nightly as needed      INSULIN SYRINGE .5CC/29G 29G X 1/2\" 0.5 ML MISC 1 each by Does not apply route daily  Qty: 200 each, Refills: 2      loratadine (CLARITIN) 10 MG tablet TAKE ONE TABLET BY MOUTH DAILY  Qty: 30 tablet, Refills: 0    Associated Diagnoses: Seasonal allergic rhinitis, unspecified allergic rhinitis trigger      fluticasone (FLONASE) 50 MCG/ACT nasal spray 2 sprays by Nasal route daily  Qty: 1 Bottle, Refills: 10    Associated Diagnoses: Acute recurrent maxillary sinusitis      Blood Glucose Monitoring Suppl (FREESTYLE LITE) JOHNNIE 1 Device by Does not apply route 4 times daily. Qty: 1 Device, Refills: 0    Associated Diagnoses: Type 2 diabetes mellitus with renal manifestations, controlled ([facility])      ondansetron (ZOFRAN) 8 MG tablet Take 1 tablet by mouth every 12 hours as needed for Nausea. Qty: 10 tablet, Refills: 1    Associated Diagnoses: Vomiting      !! Insulin Pen Needle 32G X 5 MM MISC 1 each by Does not apply route daily. Qty: 50 each, Refills: 3      !! [employer] Lancets MISC by Does not apply route. Qty: 50 each, Refills: 11    Associated Diagnoses: Diabetes mellitus ([facility] [address]       ! ! - Potential duplicate medications found. Please discuss with provider. ALLERGIES     Zetia [ezetimibe], Humalog [insulin lispro], Lisinopril, Lantus [insulin glargine], and Victoza [liraglutide]    FAMILYHISTORY     History reviewed. No pertinent family history. SOCIAL HISTORY       Social History     Tobacco Use    Smoking status: Never Smoker    Smokeless tobacco: Never Used   Substance Use Topics    Alcohol use: No    Drug use: No       SCREENINGS    [place] Coma Scale  Eye Opening: Spontaneous  Best Verbal Response: Oriented  Best Motor Response: Obeys commands  [place] Coma Scale Score: 15        PHYSICAL EXAM    (up to 7 for level 4, 8 or more for level 5)     ED Triage Vitals   BP Temp Temp Source Pulse Resp SpO2 Height Weight   02/08/21 1337 02/08/21 1337 02/08/21 1337 02/08/21 1337 02/08/21 1337 02/08/21 1337 02/08/21 1337 02/08/21 1331   (!) 175/85 98.2 °F (36.8 °C) Oral 60 18 96 % 5' 6\" (1.676 m) 251 lb 3 oz (113.9 kg)       Physical Exam  Vitals signs and nursing note reviewed. Constitutional:       Appearance: She is well-developed. She is not diaphoretic. HENT:      Head: Normocephalic and atraumatic. Right Ear: External ear normal.      Left Ear: External ear normal.      Nose: Nose normal.   Eyes:      General:         Right eye: No discharge.          Left eye: No discharge. Neck:      Musculoskeletal: Normal range of motion and neck supple. Trachea: No tracheal deviation. Cardiovascular:      Rate and Rhythm: Normal rate and regular rhythm. Pulses: Normal pulses. Heart sounds: No murmur. Pulmonary:      Effort: Pulmonary effort is normal. No respiratory distress. Breath sounds: Normal breath sounds. No wheezing or rales. Abdominal:      General: Bowel sounds are normal. There is no distension. Palpations: Abdomen is soft. Tenderness: There is no abdominal tenderness. There is no guarding. Comments: Peritoneal dialysis catheter noted   Musculoskeletal: Normal range of motion. Comments: The left third toe has a bluish/purple discoloration, and on the plantar aspect of the left third toe there appears to be necrosis. She has no tenderness over this area. To the left second toe there is a 2 cm blister noted. No fluid draining. Dorsalis pedis and posterior tibialis pulse are 2+. Sensation at baseline. No bony tenderness   Skin:     General: Skin is warm and dry. Neurological:      Mental Status: She is alert and oriented to person, place, and time.    Psychiatric:         Behavior: Behavior normal.         DIAGNOSTIC RESULTS   LABS:    Labs Reviewed   CBC WITH AUTO DIFFERENTIAL - Abnormal; Notable for the following components:       Result Value    RBC 3.89 (*)     Hemoglobin 11.7 (*)     All other components within normal limits    Narrative:     Performed at:  [facility] [address], [place], [facility] [address]   Phone [phone]   COMPREHENSIVE METABOLIC PANEL - Abnormal; Notable for the following components:    Sodium 134 (*)     Chloride 93 (*)     Anion Gap 19 (*)     Glucose 179 (*)     BUN 79 (*)     CREATININE 13.5 (*)     GFR Non- 3 (*)     GFR African American 4 (*)     Albumin/Globulin Ratio 0.9 (*)     ALT 9 (*)     AST 10 (*)     All other components within normal limits    Narrative:     Ludivina Courtney  [facility] tel. [phone],  Chemistry results called to and read back by Brennon Veloz, 02/08/2021  14:58, by [facility]  Performed at:  [facility] [address], [place], [facility] [employer]   Phone [phone]   SEDIMENTATION RATE - Abnormal; Notable for the following components:    Sed Rate 84 (*)     All other components within normal limits    Narrative:     Performed at:  [facility] [address], [place], [address] [employer]   Phone [phone]   C-REACTIVE PROTEIN - Abnormal; Notable for the following components:    CRP 8.9 (*)     All other components within normal limits    Narrative:     Ludivina Courtney  [facility] tel. [phone],  Chemistry results called to and read back by Brennon Veloz, 02/08/2021  14:58, by [facility]  Performed at:  [facility] [address], [place], [facility] [employer]   Phone [phone]   CULTURE, BLOOD 2   CULTURE, BLOOD 1   CULTURE, WOUND   PROTIME-INR    Narrative:     Performed at:  [facility] [address], [place], [facility] [employer]   Phone [phone]   APTT    Narrative:     Performed at:  [facility] [address], [place], [address] [employer]   Phone [phone]   LACTATE, SEPSIS    Narrative:     Performed at:  [facility] [address], [place], [address] [employer]   Phone [phone]   BASIC METABOLIC PANEL   HEMOGLOBIN C1D   BASIC METABOLIC PANEL W/ REFLEX TO MG FOR LOW K   CBC   PHOSPHORUS   POCT GLUCOSE       All other labs were within normal range or not returned as of this dictation. EKG: All EKG's are interpreted by the Emergency Department Physician in the absence of a cardiologist.  Please see their note for interpretation of EKG.       RADIOLOGY:   Non-plain film images such as CT, Ultrasound and MRI are read by the radiologist. Plain radiographic pen 16 Units (Patient Supplied) (has no administration in time range)   melatonin tablet 3 mg (has no administration in time range)   torsemide (DEMADEX) tablet 200 mg (has no administration in time range)   glucose (GLUTOSE) 40 % oral gel 15 g (has no administration in time range)   dextrose 50 % IV solution (has no administration in time range)   glucagon (rDNA) injection 1 mg (has no administration in time range)   dextrose 5 % solution (has no administration in time range)   sodium chloride flush 0.9 % injection 10 mL (has no administration in time range)   sodium chloride flush 0.9 % injection 10 mL (has no administration in time range)   heparin (porcine) injection 5,000 Units (has no administration in time range)   promethazine (PHENERGAN) tablet 12.5 mg (has no administration in time range)     Or   ondansetron (ZOFRAN) injection 4 mg (has no administration in time range)   polyethylene glycol (GLYCOLAX) packet 17 g (has no administration in time range)   acetaminophen (TYLENOL) tablet 650 mg (has no administration in time range)     Or   acetaminophen (TYLENOL) suppository 650 mg (has no administration in time range)   metoprolol tartrate (LOPRESSOR) tablet 50 mg (has no administration in time range)   insulin aspart (NOVOLOG) injection pen 15 Units (Patient Supplied) (has no administration in time range)   insulin aspart (NOVOLOG) injection pen 0-6 Units (Patient Supplied) (has no administration in time range)   insulin aspart (NOVOLOG) injection vial 0-3 Units (Patient Supplied) (has no administration in time range)   piperacillin-tazobactam (ZOSYN) 3,375 mg in dextrose 5 % 50 mL IVPB (mini-bag) (0 mg Intravenous Stopped 2/8/21 1605)           Briefly, this is 65-year-old female who presents to the emergency department today for evaluation for discoloration to her left third toe. She is a peritoneal dialysis patient as well as a diabetic.   Patient states that she noticed this discoloration 1 week ago, worse this past Friday. Today noticed a blister. On examination she appears to have a dry gangrene/necrosis to her left third toe, as well as a blister noted to her left second toe. She is otherwise neurovascularly intact with her sensation at baseline. CBC shows no evidence of leukocytosis, there is a mild anemia, she is a peritoneal dialysis patient, CMP does show kidney disease, potassium is normal.  Sed rate and CRP are elevated. Her x-ray is negative. Lactic acid is normal    Patient will be placed on Zosyn due to concern for dry gangrene. She will need to be admitted for further care and evaluation, hospitalist has agreed for admission, the patient is updated and agreeable with plan. Stable for admission    FINAL IMPRESSION      1. Dry gangrene ([facility] [address]    2. ESRD (end stage renal disease) ([facility] [address]    3. Other specified diabetes mellitus with other specified complication, with long-term current use of insulin ([facility]          DISPOSITION/PLAN   DISPOSITION Admitted 02/08/2021 04:04:22 PM      PATIENT REFERREDTO:  No follow-up provider specified.     DISCHARGE MEDICATIONS:  Current Discharge Medication List          DISCONTINUED MEDICATIONS:  Current Discharge Medication List      STOP taking these medications       calcitRIOL (ROCALTROL) 0.25 MCG capsule Comments:   Reason for Stopping:         sodium bicarbonate 650 MG tablet Comments:   Reason for Stopping:         ranitidine (ZANTAC) 150 MG tablet Comments:   Reason for Stopping:         nebivolol (BYSTOLIC) 5 MG tablet Comments:   Reason for Stopping:                      (Please note that portions of this note were completed with a voice recognition program.  Efforts were made to edit the dictations but occasionally words are mis-transcribed.)    Opal Burns PA-C (electronically signed)            Opal Burns PA-C  02/08/21 6242

## 2021-02-08 NOTE — ED NOTES
Bed: 14  Expected date:   Expected time:   Means of arrival:   Comments:  [address] [place], [facility]  02/08/21 7820

## 2021-02-09 ENCOUNTER — APPOINTMENT (OUTPATIENT)
Dept: MRI IMAGING | Age: 46
DRG: 270 | End: 2021-02-09
Payer: MEDICARE

## 2021-02-09 ENCOUNTER — TELEPHONE (OUTPATIENT)
Dept: INTERNAL MEDICINE CLINIC | Age: 46
End: 2021-02-09

## 2021-02-09 LAB
ALBUMIN SERPL-MCNC: 3.1 G/DL (ref 3.4–5)
ANION GAP SERPL CALCULATED.3IONS-SCNC: 18 MMOL/L (ref 3–16)
ANION GAP SERPL CALCULATED.3IONS-SCNC: 22 MMOL/L (ref 3–16)
BUN BLDV-MCNC: 75 MG/DL (ref 7–20)
BUN BLDV-MCNC: 76 MG/DL (ref 7–20)
CALCIUM SERPL-MCNC: 9 MG/DL (ref 8.3–10.6)
CALCIUM SERPL-MCNC: 9 MG/DL (ref 8.3–10.6)
CHLORIDE BLD-SCNC: 95 MMOL/L (ref 99–110)
CHLORIDE BLD-SCNC: 96 MMOL/L (ref 99–110)
CO2: 19 MMOL/L (ref 21–32)
CO2: 22 MMOL/L (ref 21–32)
CREAT SERPL-MCNC: 13.4 MG/DL (ref 0.6–1.1)
CREAT SERPL-MCNC: 13.5 MG/DL (ref 0.6–1.1)
ESTIMATED AVERAGE GLUCOSE: 208.7 MG/DL
GFR AFRICAN AMERICAN: 4
GFR AFRICAN AMERICAN: 4
GFR NON-AFRICAN AMERICAN: 3
GFR NON-AFRICAN AMERICAN: 3
GLUCOSE BLD-MCNC: 101 MG/DL (ref 70–99)
GLUCOSE BLD-MCNC: 115 MG/DL (ref 70–99)
GLUCOSE BLD-MCNC: 125 MG/DL (ref 70–99)
GLUCOSE BLD-MCNC: 203 MG/DL (ref 70–99)
GLUCOSE BLD-MCNC: 269 MG/DL (ref 70–99)
GLUCOSE BLD-MCNC: 75 MG/DL (ref 70–99)
GLUCOSE BLD-MCNC: 92 MG/DL (ref 70–99)
GLUCOSE BLD-MCNC: 99 MG/DL (ref 70–99)
HBA1C MFR BLD: 8.9 %
HBV SURFACE AB TITR SER: <3.5 MIU/ML
HCT VFR BLD CALC: 33.1 % (ref 36–48)
HEMOGLOBIN: 10.8 G/DL (ref 12–16)
HEPATITIS B SURFACE ANTIGEN INTERPRETATION: NORMAL
MCH RBC QN AUTO: 29.9 PG (ref 26–34)
MCHC RBC AUTO-ENTMCNC: 32.6 G/DL (ref 31–36)
MCV RBC AUTO: 91.5 FL (ref 80–100)
PDW BLD-RTO: 14.1 % (ref 12.4–15.4)
PERFORMED ON: ABNORMAL
PERFORMED ON: NORMAL
PERFORMED ON: NORMAL
PHOSPHORUS: 7.4 MG/DL (ref 2.5–4.9)
PHOSPHORUS: 7.6 MG/DL (ref 2.5–4.9)
PLATELET # BLD: 272 K/UL (ref 135–450)
PMV BLD AUTO: 8.2 FL (ref 5–10.5)
POTASSIUM REFLEX MAGNESIUM: 3.8 MMOL/L (ref 3.5–5.1)
POTASSIUM SERPL-SCNC: 3.9 MMOL/L (ref 3.5–5.1)
RBC # BLD: 3.61 M/UL (ref 4–5.2)
SODIUM BLD-SCNC: 135 MMOL/L (ref 136–145)
SODIUM BLD-SCNC: 137 MMOL/L (ref 136–145)
WBC # BLD: 7.7 K/UL (ref 4–11)

## 2021-02-09 PROCEDURE — 6370000000 HC RX 637 (ALT 250 FOR IP): Performed by: HOSPITALIST

## 2021-02-09 PROCEDURE — 6360000002 HC RX W HCPCS: Performed by: INTERNAL MEDICINE

## 2021-02-09 PROCEDURE — APPNB30 APP NON BILLABLE TIME 0-30 MINS: Performed by: NURSE PRACTITIONER

## 2021-02-09 PROCEDURE — 86704 HEP B CORE ANTIBODY TOTAL: CPT

## 2021-02-09 PROCEDURE — 80069 RENAL FUNCTION PANEL: CPT

## 2021-02-09 PROCEDURE — 36415 COLL VENOUS BLD VENIPUNCTURE: CPT

## 2021-02-09 PROCEDURE — 99223 1ST HOSP IP/OBS HIGH 75: CPT | Performed by: INTERNAL MEDICINE

## 2021-02-09 PROCEDURE — 73718 MRI LOWER EXTREMITY W/O DYE: CPT

## 2021-02-09 PROCEDURE — 85027 COMPLETE CBC AUTOMATED: CPT

## 2021-02-09 PROCEDURE — 6370000000 HC RX 637 (ALT 250 FOR IP): Performed by: PHYSICIAN ASSISTANT

## 2021-02-09 PROCEDURE — 84100 ASSAY OF PHOSPHORUS: CPT

## 2021-02-09 PROCEDURE — 86706 HEP B SURFACE ANTIBODY: CPT

## 2021-02-09 PROCEDURE — 2580000003 HC RX 258: Performed by: INTERNAL MEDICINE

## 2021-02-09 PROCEDURE — 6370000000 HC RX 637 (ALT 250 FOR IP): Performed by: INTERNAL MEDICINE

## 2021-02-09 PROCEDURE — 87340 HEPATITIS B SURFACE AG IA: CPT

## 2021-02-09 PROCEDURE — APPSS60 APP SPLIT SHARED TIME 46-60 MINUTES: Performed by: NURSE PRACTITIONER

## 2021-02-09 PROCEDURE — 2060000000 HC ICU INTERMEDIATE R&B

## 2021-02-09 PROCEDURE — 2580000003 HC RX 258: Performed by: HOSPITALIST

## 2021-02-09 PROCEDURE — 6360000002 HC RX W HCPCS: Performed by: HOSPITALIST

## 2021-02-09 PROCEDURE — 90945 DIALYSIS ONE EVALUATION: CPT

## 2021-02-09 PROCEDURE — 93926 LOWER EXTREMITY STUDY: CPT

## 2021-02-09 PROCEDURE — 99221 1ST HOSP IP/OBS SF/LOW 40: CPT | Performed by: SURGERY

## 2021-02-09 RX ADMIN — VANCOMYCIN HYDROCHLORIDE 1000 MG: 1 INJECTION, POWDER, LYOPHILIZED, FOR SOLUTION INTRAVENOUS at 18:25

## 2021-02-09 RX ADMIN — TORSEMIDE 200 MG: 100 TABLET ORAL at 09:04

## 2021-02-09 RX ADMIN — GENTAMICIN SULFATE: 1 CREAM TOPICAL at 17:38

## 2021-02-09 RX ADMIN — HEPARIN SODIUM 5000 UNITS: 5000 INJECTION INTRAVENOUS; SUBCUTANEOUS at 21:10

## 2021-02-09 RX ADMIN — METOPROLOL TARTRATE 50 MG: 50 TABLET, FILM COATED ORAL at 21:10

## 2021-02-09 RX ADMIN — SEVELAMER CARBONATE 800 MG: 800 TABLET, FILM COATED ORAL at 14:05

## 2021-02-09 RX ADMIN — PIPERACILLIN AND TAZOBACTAM 3375 MG: 3; .375 INJECTION, POWDER, LYOPHILIZED, FOR SOLUTION INTRAVENOUS at 14:04

## 2021-02-09 RX ADMIN — HEPARIN SODIUM 5000 UNITS: 5000 INJECTION INTRAVENOUS; SUBCUTANEOUS at 06:42

## 2021-02-09 RX ADMIN — SEVELAMER CARBONATE 800 MG: 800 TABLET, FILM COATED ORAL at 18:25

## 2021-02-09 RX ADMIN — Medication 10 ML: at 09:04

## 2021-02-09 RX ADMIN — SEVELAMER CARBONATE 800 MG: 800 TABLET, FILM COATED ORAL at 09:04

## 2021-02-09 RX ADMIN — Medication 10 ML: at 21:13

## 2021-02-09 RX ADMIN — HEPARIN SODIUM 5000 UNITS: 5000 INJECTION INTRAVENOUS; SUBCUTANEOUS at 14:05

## 2021-02-09 RX ADMIN — METOPROLOL TARTRATE 50 MG: 50 TABLET, FILM COATED ORAL at 09:04

## 2021-02-09 RX ADMIN — PANTOPRAZOLE SODIUM 40 MG: 40 TABLET, DELAYED RELEASE ORAL at 21:10

## 2021-02-09 RX ADMIN — AMLODIPINE BESYLATE 10 MG: 5 TABLET ORAL at 09:04

## 2021-02-09 ASSESSMENT — ENCOUNTER SYMPTOMS
APNEA: 0
RHINORRHEA: 0
CHEST TIGHTNESS: 0
EYE DISCHARGE: 0
NAUSEA: 0
STRIDOR: 0
COLOR CHANGE: 0
TROUBLE SWALLOWING: 0
BLOOD IN STOOL: 0
DIARRHEA: 0
ABDOMINAL PAIN: 0
CHOKING: 0
SHORTNESS OF BREATH: 0
FACIAL SWELLING: 0
EYE REDNESS: 0
PHOTOPHOBIA: 0
COUGH: 0

## 2021-02-09 ASSESSMENT — PAIN SCALES - GENERAL
PAINLEVEL_OUTOF10: 0
PAINLEVEL_OUTOF10: 0

## 2021-02-09 NOTE — CONSULTS
Office : [phone]     Fax :[phone]       Nephrology Consult Note      Patient's Name: Óscar Mireles  10:28 PM  2021    Reason for Consult:  ESRD      Requesting Physician:  Aparna Armstrong MD      Chief Complaint:    Chief Complaint   Patient presents with    Other     Sent by provider, middle toe on right foot necrotic, pt states no pain, bilster on ring toe and redness noted to nail on big toe, hx diabetes neuropathy and dialysis         History of Present iIlness:    Óscar Mireles is a 54 y.o. female with h/o ESRD, HTN, DM 2, Anemia of chronic ds, secondary hyperparathyroidism who is on peritoneal dialysis and follows with me. She called today with c/o change in color of 3rd toe of left Foot and blister on 2nd toe. Has necrosis of 3rd toe. She noticed it on Friday night. She is doing her dialysis daily   Uses [facility] for peritoneal dialysis         No intake/output data recorded. Past Medical History:   Diagnosis Date    Diabetes mellitus ([facility]     Hypertension        Past Surgical History:   Procedure Laterality Date     SECTION      OTHER SURGICAL HISTORY  2018     lap PD cath placement lt side 62 cm    TONSILLECTOMY         History reviewed. No pertinent family history. reports that she has never smoked. She has never used smokeless tobacco. She reports that she does not drink alcohol or use drugs.         Allergies:  Zetia [ezetimibe], Humalog [insulin lispro], Lisinopril, Lantus [insulin glargine], and Victoza [liraglutide]    Current Medications:        amLODIPine (NORVASC) tablet 10 mg, Daily      insulin detemir (LEVEMIR) injection pen 16 Units (Patient Supplied), Nightly      melatonin tablet 3 mg, Nightly PRN      torsemide (DEMADEX) tablet 200 mg, Daily      glucose (GLUTOSE) 40 % oral gel 15 g, PRN      dextrose 50 % IV solution, PRN      glucagon (rDNA) injection 1 mg, PRN      dextrose 5 % solution, PRN      sodium chloride flush 0.9 % injection 10 mL, 2 times per day      sodium chloride flush 0.9 % injection 10 mL, PRN      heparin (porcine) injection 5,000 Units, 3 times per day      promethazine (PHENERGAN) tablet 12.5 mg, Q6H PRN    Or      ondansetron (ZOFRAN) injection 4 mg, Q6H PRN      polyethylene glycol (GLYCOLAX) packet 17 g, Daily PRN      acetaminophen (TYLENOL) tablet 650 mg, Q6H PRN    Or      acetaminophen (TYLENOL) suppository 650 mg, Q6H PRN      metoprolol tartrate (LOPRESSOR) tablet 50 mg, BID      insulin aspart (NOVOLOG) injection pen 15 Units (Patient Supplied), TID WC      insulin aspart (NOVOLOG) injection pen 0-6 Units (Patient Supplied), TID WC      insulin aspart (NOVOLOG) injection vial 0-3 Units (Patient Supplied), Nightly      Calcium Acetate (Phos Binder) CAPS 1,334 mg, TID WC      gentamicin (GARAMYCIN) 0.1 % cream, Daily PRN      pantoprazole (PROTONIX) tablet 40 mg, Nightly        Review of Systems:   14 point ROS obtained but were negative except mentioned in HPI      Physical exam:     Vitals:  BP (!) 195/78   Pulse 62   Temp 96.7 °F (35.9 °C) (Temporal)   Resp 17   Ht 5' 6\" (1.676 m)   Wt 244 lb 7.8 oz (110.9 kg)   LMP 01/15/2021   SpO2 97%   BMI 39.46 kg/m²   Constitutional:  OAA X3 NAD  Skin: no rash, turgor wnl  Heent:  eomi, mmm  Neck: no bruits or jvd noted  Cardiovascular:  S1, S2 without m/r/g  Respiratory: CTA B without w/r/r  Abdomen:  +bs, soft, nt, nd  Ext: no  lower extremity edema, necrotic 3rd toe rt foot   Psychiatric: mood and affect appropriate  Musculoskeletal:  Rom, muscular strength intact    Labs:  CBC:   Recent Labs     02/08/21  1408   WBC 9.9   HGB 11.7*        BMP:    Recent Labs     02/08/21  1408 02/08/21 1932   * 134*   K 3.9 3.9   CL 93* 93*   CO2 22 22   BUN 79* 81*   CREATININE 13.5* 13.6*   GLUCOSE 179* 194*     Ca/Mg/Phos:   Recent Labs     02/08/21  1408 02/08/21  1932   CALCIUM 9.2 9.1     Hepatic:   Recent Labs     02/08/21  1408   AST 10*   ALT 9*   BILITOT 0.3   ALKPHOS 99     Troponin: No results for input(s): TROPONINI in the last 72 hours. BNP: No results for input(s): BNP in the last 72 hours. Lipids: No results for input(s): CHOL, TRIG, HDL, LDLCALC, LABVLDL in the last 72 hours. ABGs: No results for input(s): PHART, PO2ART, VZC7RLY in the last 72 hours. INR:   Recent Labs     02/08/21  1407   INR 0.92     UA:No results for input(s): Yvetta Sow, GLUCOSEU, BILIRUBINUR, [place], BLOODU, PHUR, PROTEINU, UROBILINOGEN, NITRU, LEUKOCYTESUR, Maritza Nissen in the last 72 hours. Urine Microscopic: No results for input(s): LABCAST, BACTERIA, COMU, HYALCAST, WBCUA, RBCUA, EPIU in the last 72 hours. Urine Culture: No results for input(s): LABURIN in the last 72 hours. Urine Chemistry: No results for input(s): Lenine Chalet, PROTEINUR, NAUR in the last 72 hours. IMAGING:  XR FOOT LEFT (MIN 3 VIEWS)   Final Result   1. No definite evidence of osteomyelitis radiographically. 2. Soft tissue swelling at the medial aspect of the 2nd digit         MRI FOOT LEFT WO CONTRAST    (Results Pending)         Assessment/Plan :      1. ESRD management   Start PD tonight   Use CCPD   2.5 % solution   4 cycles   Total time 8 hrs   D/w Dialysis nurse     2. HTN. Elevated   Resume home BP meds     3. Anemia of chronic ds. Get LUIZA    4. Acid- base disorder. monitor     5. Electrolytes. Monitor     6. Hyperphosphatemia. Takes velphoro   Check phos levels     7. 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  Consult vascular surgery         D/w primary team      Thank you for allowing us to participate in care of Silver Quiles         Electronically signed by: Merrick Chinchilla MD, 2/8/2021, 10:28 PM      [facility] : [phone]  Fax :[phone]

## 2021-02-09 NOTE — PLAN OF CARE
Problem: Cardiovascular  Goal: Hemodynamic stability  Outcome: Ongoing     Problem: Pain Control  Goal: Maintain pain level at or below patient's acceptable level (or 5 if patient is unable to determine acceptable level)  Outcome: Ongoing     Vitals:    02/09/21 0400   BP: (!) 182/98   Pulse: 57   Resp: 16   Temp: 97.7 °F (36.5 °C)   SpO2: 96%     Pt awake with RN at bedside. Pt denies pain. VSS with 182/98; no PRN meds ordered. See [facility] & all flowsheets. Will continue to monitor.

## 2021-02-09 NOTE — CONSULTS
[facility]  Consultation Note    Chief Complaint / Reason for Consultation  dry gangrene left foot, possible need for angio     History of Present Illness  Patient is a 54 y.o. female with past medical history of HTN, DM and ESRD on PD who presented to the ED with complaints of left 3rd toe discoloration. She reports a history of diabetic neuropathy and on Friday is when she noticed some discoloration to her left 3rd toe. She thought she might of fractured it because of the bruising but then the color turned more black. She also reports a blister to her 2nd and 3rd toe that has since popped. She denies any fever or chills. She denies any previous history of foot wounds. She reports her blood sugars have not been controlled recently. She has been on peritoneal dialysis for about a year. She denies any known history of PVD. She denies tobacco use. She does report cramping and swelling at times in her legs. She reports she wears compression socks at home occasionally to control swelling. We have been consulted for further evaluation for PVD given left foot ulceration. Review of Systems  + left 3rd digit foot discoloration. Denies fevers, chills, chest pain, shortness of breath, nausea, vomiting, hematemesis, diarrhea, constipation, melena, hematochezia, wt changes, vision problems, blindness, hearing problems, facial droop, slurred speech, extremity weakness, dysuria.     Past Medical History:   Diagnosis Date    Diabetes mellitus ([address]     Hypertension        Past Surgical History:   Procedure Laterality Date     SECTION      OTHER SURGICAL HISTORY  2018     lap PD cath placement lt side 62 cm    TONSILLECTOMY         Allergies   Allergen Reactions    Zetia [Ezetimibe] Shortness Of Breath    Humalog [Insulin Lispro]      SOB, Leg swelling, fatigue    Lisinopril Swelling    Lantus [Insulin Glargine] Nausea And Vomiting    Victoza [Liraglutide] Nausea And Vomiting       Social History     Socioeconomic History    Marital status:      Spouse name: Not on file    Number of children: Not on file    Years of education: Not on file    Highest education level: Not on file   Occupational History    Not on file   Social Needs    Financial resource strain: Not on file    Food insecurity     Worry: Not on file     Inability: Not on file    Transportation needs     Medical: Not on file     Non-medical: Not on file   Tobacco Use    Smoking status: Never Smoker    Smokeless tobacco: Never Used   Substance and Sexual Activity    Alcohol use: No    Drug use: No    Sexual activity: Yes   Lifestyle    Physical activity     Days per week: Not on file     Minutes per session: Not on file    Stress: Not on file   Relationships    Social connections     Talks on phone: Not on file     Gets together: Not on file     Attends Anabaptist service: Not on file     Active member of club or organization: Not on file     Attends meetings of clubs or organizations: Not on file     Relationship status: Not on file    Intimate partner violence     Fear of current or ex partner: Not on file     Emotionally abused: Not on file     Physically abused: Not on file     Forced sexual activity: Not on file   Other Topics Concern    Not on file   Social History Narrative    Lives with spouse       History reviewed.  No pertinent family history.  - No history of bleeding or clotting disorders    Vital Signs  Vitals:    02/09/21 0104 02/09/21 0400 02/09/21 0655 02/09/21 0845   BP: (!) 183/63 (!) 182/98 (!) 140/75 (!) 155/66   Pulse: 61 57 69 59   Resp: 16 16 20 18   Temp: 97.8 °F (36.6 °C) 97.7 °F (36.5 °C) 97.8 °F (36.6 °C) 97.4 °F (36.3 °C)   TempSrc: Temporal Temporal  Temporal   SpO2: 96% 96%  97%   Weight:   242 lb 4.6 oz (109.9 kg)    Height:           Physical Examination  General: no apparent distress, appears stated age  Psychiatric: affect appropriate  Head/Eyes/Ears/Nose/Throat:  Normocephalic, atraumatic, PERRLA, face symmetric  Neck:  no adenopathy, no carotid bruit, no JVD, supple, symmetrical, trachea midline, thyroid not enlarged, no tenderness/mass/nodules  Chest/Lungs: clear to auscultation bilaterally, no accessory muscle use  Cardiac:  regular rate and rhythm, S1, S2 normal, no murmur, click, rub or gallop  Abdomen: soft, nontender, active bowel sounds, + PD   Vascular exam:  - R radial: 2+  - L radial: 2+  - R femoral: 2+  - L femoral: 2+  - R DP: 2+  - L DP: + doppler  - R PT: 2+   - L PT: + doppler  Extremities: left 3rd toe with dry gangrene, no drainage, no malodor, + erythema          Labs  Lab Results   Component Value Date    WBC 7.7 02/09/2021    HGB 10.8 02/09/2021    HCT 33.1 02/09/2021    MCV 91.5 02/09/2021     02/09/2021     Lab Results   Component Value Date     02/09/2021    K 3.8 02/09/2021    CL 95 02/09/2021    CO2 22 02/09/2021    PHOS 7.4 02/09/2021    BUN 75 02/09/2021    CREATININE 13.5 02/09/2021      No components found for: GLU    Scheduled Meds:    amLODIPine  10 mg Oral Daily    insulin detemir  16 Units Subcutaneous Nightly    torsemide  200 mg Oral Daily    sodium chloride flush  10 mL Intravenous 2 times per day    heparin (porcine)  5,000 Units Subcutaneous 3 times per day    metoprolol tartrate  50 mg Oral BID    insulin aspart  15 Units Subcutaneous TID     insulin aspart  0-6 Units Subcutaneous TID     insulin aspart  0-3 Units Subcutaneous Nightly    pantoprazole  40 mg Oral Nightly    sevelamer  800 mg Oral TID WC     Continuous Infusions:    dextrose         Imaging:     Xray left foot 2/8/21:  Impression   1. No definite evidence of osteomyelitis radiographically. 2. Soft tissue swelling at the medial aspect of the 2nd digit       Assessment:   Left 3rd toe gangrene   DM type 2, uncontrolled - last A1c 8.6 (5/16/18)  ESRD on PD    Plan:   Will get arterial duplex of left leg to further evaluate arterial disease and if shows significant disease likely will need angiogram of left leg. MRI left foot pending. Okay to eat today from vascular standpoint. Wound care per podiatry. Will follow up after duplex results with further recommendations and plan. Plan discussed with Dr. Mickey Cobos. Thank you for the consultation. Patient educated on plan of care and disease process. All questions answered. Electronically signed by RICHARD Neumann CNP on 2/9/2021 at 9:35 AM     Vascular Staff    I independently performed an evaluation on [employer]. I have reviewed the above documentation completed by Giovanna Marmolejo CNP. Please see my additional contributions to the HPI, physical exam, assessment, and medical decision making. 55 yo with multiple medical issues presented with discoloration to left 3rd digit. She denies claudication or pain. Vascular surgery has been consulted to evaluate perfusion. PE:  AF  Cyanotic left 3rd digit. Small open ulceration. No malodor or drainage  Monophasic pedal signals present            -Limited study due to shadowing from calcification in calf and calcified    arteries.    -Bilateral MARIO's were not available due to extremely high brachial blood    pressure (systolic over 628 mmHG).  -Elevated velocity of the left distal popliteal artery suggest a greater    than 50% stenosis. I:  Left 3rd digit gangrene  DM  ESRD  P:  Will plan to discuss duplex findings with pt and recommend peripheral angiogram to assist with possible surgical intervention/wound healing. Will follow  Sincerely appreciate the consultation. Bam Cobos M.D., FACS.  2/9/2021  6:32 PM

## 2021-02-09 NOTE — TELEPHONE ENCOUNTER
Pt is currently in the hospital. She wants a refill on her levemir. Pt not able to take lantus. Per Dr Jong Mabry refill denied. He states the hospital should accommodate her needs on her medications. Pt hasn't been seen in our office for 2 yrs as well. Pt instructed to call our office upon d/c of the hospital so we can schedule a hospital f/u.

## 2021-02-09 NOTE — FLOWSHEET NOTE
Treatment initiated without complications or complaints from patient. Effluent yellow/clear. Patient resting comfortably with VSS upon exiting room. 02/08/21 2026   Vitals   BP (!) 171/73   Temp 97.1 °F (36.2 °C)   Temp Source Temporal   Pulse 65   Resp 18   SpO2 96 %   Height 5' 6\" (1.676 m)   Weight 244 lb 7.8 oz (110.9 kg)   Cycler   Verification of Prescription CCPD   Total Volume Programmed 8000 mL   Therapy Time (Hours:Minutes) 08:00   Fill Volume 2000 mL   Dextrose Setting Same (Nonextraneal)   Number of Cycles 4   Bag Volume 5000 mL   Number of Bags Used 2   Dianeal Solution Other (Comment)  (Delflex 2.5% in 5L bags)   I Drain (mL) 6 mL     Copy of dialysis treatment record placed in chart, to be scanned into EMR. Report and troubleshooting hotline number given to Nereyda Castillo RN.
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
## 2021-02-09 NOTE — FLOWSHEET NOTE
Messaged hospitalist via [employer] with update at 1:07 AM , \"Recheck  at 0102. \"     Message Read at 1:08 AM, and hospitalist responded at 1:11 AM, \"Ok. Would just monitor overnight. Don't want to give her more since she won't be eating. Can readdress in the morning when the physician can discuss options with her for coverage. \"     RN read message at 1:22 AM and responded, \"I agree.  I will check in AM.\"    Message Read at 1:27 AM

## 2021-02-09 NOTE — FLOWSHEET NOTE
02/09/21 1655   Vitals   BP (!) 172/83   Temp 97 °F (36.1 °C)   Pulse 62   Resp 18   Weight 242 lb 15.2 oz (110.2 kg)       Connected to Cycler    CCPD order: Total Volume: 8 L                       Therapy Time Duration (Hours): 08:00                        Exchange Volume: 2 L                        Number of Exchanges: 4                        No Final Fill    Initial drain bypassed, 6 ml  Effluent clear without fibrin. Exit site care done per protocol. Garamycin . 1% cream to site. DSD/occlusive to site. Skin around exit site red/scabs present.     Report given to Yvon Soriano RN

## 2021-02-09 NOTE — FLOWSHEET NOTE
Sofia Woo from lab called RN with panic BUN 81 (was 79 on the previous labs done) and creat 13.6. Dialysis here at bedside to do pt's PD and updated; no new changes at this time. Pt to received PD per cycler.

## 2021-02-09 NOTE — PROGRESS NOTES
[facility] PROGRESS NOTE    2/9/2021 1:47 PM        Name: Natalya Wooten Admitted: 2/8/2021  Primary Care Provider: Gordon Shi MD (Tel: [phone])      Subjective:  . Feeling ok, no complaints. Denies pain. Gangrene started last week.      Reviewed interval ancillary notes    Current Medications      piperacillin-tazobactam (ZOSYN) 3,375 mg in dextrose 5 % 50 mL IVPB (mini-bag), Q12H      vancomycin (VANCOCIN) 1,750 mg in dextrose 5 % 500 mL IVPB, Once      [START ON 2/10/2021] vancomycin (VANCOCIN) intermittent dosing (placeholder), RX Placeholder      amLODIPine (NORVASC) tablet 10 mg, Daily      insulin detemir (LEVEMIR) injection pen 16 Units (Patient Supplied), Nightly      melatonin tablet 3 mg, Nightly PRN      torsemide (DEMADEX) tablet 200 mg, Daily      glucose (GLUTOSE) 40 % oral gel 15 g, PRN      dextrose 50 % IV solution, PRN      glucagon (rDNA) injection 1 mg, PRN      dextrose 5 % solution, PRN      sodium chloride flush 0.9 % injection 10 mL, 2 times per day      sodium chloride flush 0.9 % injection 10 mL, PRN      heparin (porcine) injection 5,000 Units, 3 times per day      promethazine (PHENERGAN) tablet 12.5 mg, Q6H PRN    Or      ondansetron (ZOFRAN) injection 4 mg, Q6H PRN      polyethylene glycol (GLYCOLAX) packet 17 g, Daily PRN      acetaminophen (TYLENOL) tablet 650 mg, Q6H PRN    Or      acetaminophen (TYLENOL) suppository 650 mg, Q6H PRN      metoprolol tartrate (LOPRESSOR) tablet 50 mg, BID      insulin aspart (NOVOLOG) injection pen 15 Units (Patient Supplied), TID WC      insulin aspart (NOVOLOG) injection pen 0-6 Units (Patient Supplied), TID WC      insulin aspart (NOVOLOG) injection vial 0-3 Units (Patient Supplied), Nightly      gentamicin (GARAMYCIN) 0.1 % cream, Daily PRN      pantoprazole (PROTONIX) tablet 40 mg, Nightly      sevelamer (RENVELA) tablet 800 mg, TID WC        Objective:  BP (!) 155/66   Pulse 59   Temp 97.4 °F (36.3 °C) (Temporal)   Resp 18   Ht 5' 6\" (1.676 m)   Wt 242 lb 4.6 oz (109.9 kg)   LMP 01/15/2021   SpO2 97%   BMI 39.11 kg/m²     Intake/Output Summary (Last 24 hours) at 2/9/2021 1347  Last data filed at 2/9/2021 0655  Gross per 24 hour   Intake 50 ml   Output 1534 ml   Net -1484 ml      Wt Readings from Last 3 Encounters:   02/09/21 242 lb 4.6 oz (109.9 kg)   09/06/18 258 lb 9.6 oz (117.3 kg)   08/16/18 267 lb 13.7 oz (121.5 kg)       General appearance:  Appears comfortable  Eyes: Sclera clear. Pupils equal.  ENT: Moist oral mucosa. Trachea midline, no adenopathy. Cardiovascular: Regular rhythm, normal S1, S2. No murmur. No edema in lower extremities  Respiratory: Not using accessory muscles. Good inspiratory effort. Clear to auscultation bilaterally, no wheeze or crackles. GI: Abdomen soft, no tenderness, not distended, normal bowel sounds  Musculoskeletal: No cyanosis in digits, neck supple  Neurology: CN 2-12 grossly intact. No speech or motor deficits  Psych: Normal affect. Alert and oriented in time, place and person  Skin: dry gangrene L 4th toe.      Labs and Tests:  CBC:   Recent Labs     02/08/21  1408 02/09/21  0616   WBC 9.9 7.7   HGB 11.7* 10.8*    272     BMP:    Recent Labs     02/08/21  1408 02/08/21  1932 02/09/21  0615   * 134* 137  135*   K 3.9 3.9 3.9  3.8   CL 93* 93* 96*  95*   CO2 22 22 19*  22   BUN 79* 81* 76*  75*   CREATININE 13.5* 13.6* 13.4*  13.5*   GLUCOSE 179* 194* 99  101*     Hepatic:   Recent Labs     02/08/21  1408   AST 10*   ALT 9*   BILITOT 0.3   ALKPHOS 99         Problem List  Active Problems:    Diabetes mellitus ([address]    Diabetes mellitus type 2 in obese (HCC)    ESRD (end stage renal disease) (HCC)    Gangrene (HCC)    Non-smoker    Elevated C-reactive protein (CRP)    Elevated sed rate    Diabetic polyneuropathy associated with type 2 diabetes mellitus ([facility] [address]  Resolved Problems:    * No resolved hospital problems. *       Assessment & Plan:   1. Gangrene L 4th toe-going for arterial duplex and MRI of the foot. Vascular and podiatry on board. Currently on vanco and zosyn pending MRI. 2. ESRD-on peritoneal dialysis. Nephro on board. 3. Dm 2-a1c 8.9. on levemir 16 qhs, novolog 15 tid plu sSSI. Sugars currently acceptable. 4. Hypertension-uncontrolled. On norvasc and metoprolol.        Diet: DIET RENAL;  Code:Full Code  DVT PPX: heparin      Suzie Rendon PA-C   2/9/2021 1:47 PM

## 2021-02-09 NOTE — FLOWSHEET NOTE
Danna Ocasio via [employer] at 2120, \"Pt takes omeprazole nightly at home. Can she please resume this? Pt is requesting dose now tonight. \"

## 2021-02-09 NOTE — PROGRESS NOTES
Clinical Pharmacy Note: Pharmacy to Dose Vancomycin    Tod Holter is a 54 y.o. female started on Vancomycin for necrotic left toe; consult received from Dr. Jayne Allen to manage therapy. Also receiving the following antibiotics: Zosyn.    ESRD. Patient uses cycler for peritoneal dialysis       Assessment/Plan:   Initiate vancomycin 15mg/kg = 1750mg IVPB x1 today.  Next dose will be based on random vancomycin level since patient is on peritoneal dialysis.  A vancomycin random has been ordered for 2/11/21 0600    Changes in regimen will be determined based on culture results, renal function, and clinical response. [address] [place] [address] will continue to monitor and adjust regimen as necessary. Allergies:  Zetia [ezetimibe], Humalog [insulin lispro], Lisinopril, Lantus [insulin glargine], and Victoza [liraglutide]         Recent Labs     02/08/21  1932 02/09/21  0615   CREATININE 13.6* 13.4*  13.5*       Recent Labs     02/08/21  1408 02/09/21  0616   WBC 9.9 7.7       Ht Readings from Last 1 Encounters:   02/08/21 5' 6\" (1.676 m)        Wt Readings from Last 1 Encounters:   02/09/21 242 lb 4.6 oz (109.9 kg)         Estimated Creatinine Clearance: 7 mL/min (A) (based on SCr of 13.5 mg/dL (HH)).   Patient receives peritoneal dialysis    Thank you for the consult,  Tiff Alva PharmD

## 2021-02-09 NOTE — CONSULTS
[address]                     [facility], [address]                                  CONSULTATION    PATIENT NAME: Aquilino Perez                         :        1975  MED REC NO:   [id number]                          ROOM:       [id number]  ACCOUNT NO:   [de-identified]                           ADMIT DATE: 2021  PROVIDER:     Madhavi Gilbert DPM    CONSULT DATE:  2021    REASON FOR CONSULTATION:  Gangrene, third toe left foot. HISTORY OF PRESENT ILLNESS:  This is a 53-year-old admitted to the [facility] with a black and necrotic third toe of her left foot. Apparently, it has been there for a little bit over a week. She had  seen it and decided to be admit herself to the [facility]. PAST MEDICAL HISTORY:  Positive for diabetes mellitus. MEDICATIONS:  Please refer to chart. PHYSICAL EXAMINATION:  VASCULAR:  Dorsalis pedis and posterior tibial pulses are not palpable  bilaterally. MUSCULOSKELETAL:  Good range of motion is noted at the little foot and  the ankle joint at this time. DERMATOLOGIC:  There is a dark necrosis noted distally to the third toe  of the left foot.  _____ to both second and third toes is noted at this  time with culture was taken as well. X-ray on the left foot does not reveal any specific osteomyelitis to the  great toe of her left foot. ASSESSMENT:  Gangrenous third toe of the left foot. PLAN:  Vascular surgery is consulted along with Infectious Disease. Cultures were taken as well. MRI was ordered of the left foot to rule  out any osteomyelitis to the distal third toe and I will continue to  follow.         Sasha Garvey DPM    D: 2021 19:13:18       T: 2021 20:59:24     [email or web address]  Job#: [id number]     Doc#: [id number]    CC:

## 2021-02-09 NOTE — FLOWSHEET NOTE
Kathy here now from dialysis, disconnected PD, and brought standing scale in to weigh patient. Pt tolerating well.  See dialysis's notes

## 2021-02-09 NOTE — CONSULTS
Infectious Diseases   Consult Note        Admission Date: 2/8/2021  Hospital Day: Hospital Day: 2   Attending: Indio Wayne MD  Date of service: 2/9/21     Reason for admission: Gangrene [facility]) [facility]    Chief complaint/ Reason for consult: Left diabetic foot infection with gangrene    Microbiology:        I have reviewed allavailable micro lab data and cultures    · Blood culture (2/2) - collected on 2/8/2021: In process  · Left foot wound culture  - collected on 2/8/2021: In process      Antibiotics and immunizations:       Current antibiotics: All antibiotics and their doses were reviewed by me    Recent Abx Admin                   piperacillin-tazobactam (ZOSYN) 3,375 mg in dextrose 5 % 50 mL IVPB (mini-bag) (mg) 3,375 mg New Bag 02/08/21 1529                  Immunization History: All immunization history was reviewed by me today. Immunization History   Administered Date(s) Administered    Influenza Virus Vaccine 10/23/2014, 12/09/2015    Influenza, Quadv, IM, (6 mo and older Fluzone, Flulaval, Fluarix and 3 yrs and older Afluria) 12/07/2018    Influenza, Quadv, IM, PF (6 mo and older Fluzone, Flulaval, Fluarix, and 3 yrs and older Afluria) 11/08/2016    Pneumococcal Polysaccharide (Ipsnpthnn86) 04/28/2017    Td, unspecified formulation 07/01/2007       Known drug allergies: All allergies were reviewed and updated    Allergies   Allergen Reactions    Zetia [Ezetimibe] Shortness Of Breath    Humalog [Insulin Lispro]      SOB, Leg swelling, fatigue    Lisinopril Swelling    Lantus [Insulin Glargine] Nausea And Vomiting    Victoza [Liraglutide] Nausea And Vomiting       Social history:     Social History:  All social andepidemiologic history was reviewed and updated by me today as needed. · Tobacco use:   reports that she has never smoked. She has never used smokeless tobacco.  · Alcohol use:   reports no history of alcohol use.   · Currently lives in: [place] [address]  ·  reports no history of drug use. Assessment:     The patient is a 54 y.o. old female who  has a past medical history of Diabetes mellitus ([address] and Hypertension. with following problems:    · Left diabetic foot infection  · Elevated sed rate of 84  · Elevated CRP of 8.9.  · Longstanding type 2 diabetes mellitus  · ESRD on hemodialysis  · Essential hypertension  · Non-smoker  · Obesity Class 2 due to excess calorie intake : Body mass index is 39.11 kg/m². Discussion:      The patient has longstanding type 2 diabetes mellitus and has been admitted with left diabetic foot infection with ulceration and gangrene    MRI of the left foot has been ordered and is pending. She is a dialysis patient. Nephrology is following. Plan:     Diagnostic Workup:    · I reviewed blood cultures and left foot wound culture  · Follow-up on MRI of the left foot  · Continue to follow fever curve, WBC count and blood cultures  · Follow up on liverand renal functions closely    Antimicrobials:    · Will order IV vancomycin with dialysis  · Target vancomycin random level of 15-20  · Will order IV Zosyn at dialysis dose of 3.375 g every 12 hours  · We will follow up on the culture results, MRI results and clinical progress and will make further recommendations accordingly. · Continue close vitals monitoring. · Maintain good glycemic control. · Fall precautions. Aspiration precautions. · Continue to watch for new fever or diarrhea. · DVT prophylaxis. · Discussed all above with patient and RN. Drug Monitoring:    · Continue serial monitoring for antibiotic toxicity as follows: Vancomycin level, CBC, CMP  · Continue to watch for following: new or worsening fever, hypotension, hives, lip swelling and redness or purulence at vascular access sites. I/v access Management:    · Continue to monitor i.v access sites for erythema, induration, discharge or tenderness.    · As always, continue efforts to minimizetubes/lines/drains as clinically  Hypertension          Past Surgical History: All pastsurgical history was reviewed today. Past Surgical History:   Procedure Laterality Date     SECTION      OTHER SURGICAL HISTORY  2018     lap PD cath placement lt side 62 cm    TONSILLECTOMY           Family History: All family history was reviewed today. History reviewed. No pertinent family history. Medications: All current and past medications were reviewed.     Medications Prior to Admission: omeprazole (PRILOSEC) 10 MG delayed release capsule, Take 10 mg by mouth daily  calcium acetate 667 MG TABS, Take 667 mg by mouth 3 times daily (with meals)  VELPHORO 500 MG CHEW, CHEW TWO TABLETS BY MOUTH THREE TIMES A DAY WITH MEALS  NOVOLOG FLEXPEN 100 UNIT/ML injection pen, IINJECT 20 UNITS UNDER THE SKIN THREE TIMES A DAY BEFORE MEALS  LEVEMIR FLEXTOUCH 100 UNIT/ML injection pen, INJECT 16 UNITS UNDER THE SKIN NIGHTLY  metoprolol tartrate (LOPRESSOR) 25 MG tablet, TAKE ONE TABLET BY MOUTH TWICE A DAY (Patient taking differently: 50 mg )  torsemide (DEMADEX) 20 MG tablet, TAKE THREE TABLETS BY MOUTH TWICE A DAY (Patient taking differently: Take 200 mg by mouth daily )  amLODIPine (NORVASC) 10 MG tablet, Take 1 tablet by mouth daily  insulin aspart (NOVOLOG) 100 UNIT/ML injection vial, Inject 24 Units into the skin 3 times daily (before meals) Pt to use sliding scale (Patient taking differently: Inject 15 Units into the skin 3 times daily (before meals) Pt to use sliding scale)  vitamin D (CHOLECALCIFEROL) 1000 units TABS tablet, Take 5,000 Units by mouth  B-D ULTRAFINE III SHORT PEN 31G X 8 MM MISC, USE FOUR TIMES A DAY  blood glucose test strips (FREESTYLE LITE) strip, 1 each by In Vitro route 2 times daily Patient has uncontrolled diabetes  [DISCONTINUED] calcitRIOL (ROCALTROL) 0.25 MCG capsule, TAKE ONE CAPSULE BY MOUTH DAILY  FREESTYLE LANCETS MISC, Check blood sugars 4 times daily  metolazone (ZAROXOLYN) 2.5 MG tablet, Take 1 tablet by mouth daily as needed (If wt gain >1.5lbs in 1 day)  [DISCONTINUED] sodium bicarbonate 650 MG tablet, Take 1 tablet by mouth 2 times daily  [DISCONTINUED] torsemide (DEMADEX) 20 MG tablet, Take 4 tablets by mouth 2 times daily  melatonin 3 MG TABS tablet, Take 3 mg by mouth nightly as needed  [DISCONTINUED] ranitidine (ZANTAC) 150 MG tablet, Take 150 mg by mouth nightly  INSULIN SYRINGE .5CC/29G 29G X 1/2\" 0.5 ML MISC, 1 each by Does not apply route daily  loratadine (CLARITIN) 10 MG tablet, TAKE ONE TABLET BY MOUTH DAILY  fluticasone (FLONASE) 50 MCG/ACT nasal spray, 2 sprays by Nasal route daily  Blood Glucose Monitoring Suppl (FREESTYLE LITE) JOHNNIE, 1 Device by Does not apply route 4 times daily. ondansetron (ZOFRAN) 8 MG tablet, Take 1 tablet by mouth every 12 hours as needed for Nausea. Insulin Pen Needle 32G X 5 MM MISC, 1 each by Does not apply route daily. [employer] Lancets MISC, by Does not apply route.  piperacillin-tazobactam  3,375 mg Intravenous Q12H    amLODIPine  10 mg Oral Daily    insulin detemir  16 Units Subcutaneous Nightly    torsemide  200 mg Oral Daily    sodium chloride flush  10 mL Intravenous 2 times per day    heparin (porcine)  5,000 Units Subcutaneous 3 times per day    metoprolol tartrate  50 mg Oral BID    insulin aspart  15 Units Subcutaneous TID     insulin aspart  0-6 Units Subcutaneous TID WC    insulin aspart  0-3 Units Subcutaneous Nightly    pantoprazole  40 mg Oral Nightly    sevelamer  800 mg Oral TID WC          REVIEW OF SYSTEMS:       Review of Systems   Constitutional: Negative for chills, diaphoresis and unexpected weight change. HENT: Negative for congestion, ear discharge, ear pain, facial swelling, hearing loss, rhinorrhea and trouble swallowing. Eyes: Negative for photophobia, discharge, redness and visual disturbance. Respiratory: Negative for apnea, cough, choking, chest tightness, shortness of breath and stridor.     Cardiovascular: Negative for chest pain and palpitations. Gastrointestinal: Negative for abdominal pain, blood in stool, diarrhea and nausea. Endocrine: Negative for polydipsia, polyphagia and polyuria. Genitourinary: Negative for difficulty urinating, dysuria, frequency, hematuria, menstrual problem and vaginal discharge. Musculoskeletal: Negative for arthralgias, joint swelling, myalgias and neck stiffness. Skin: Negative for color change and rash. Left foot pain and left third toe discoloration   Allergic/Immunologic: Negative for immunocompromised state. Neurological: Negative for dizziness, seizures, speech difficulty, light-headedness and headaches. Hematological: Negative for adenopathy. Psychiatric/Behavioral: Negative for agitation, hallucinations and suicidal ideas. Objective:       PHYSICAL EXAM:      Vitals:   Vitals:    02/09/21 0104 02/09/21 0400 02/09/21 0655 02/09/21 0845   BP: (!) 183/63 (!) 182/98 (!) 140/75 (!) 155/66   Pulse: 61 57 69 59   Resp: 16 16 20 18   Temp: 97.8 °F (36.6 °C) 97.7 °F (36.5 °C) 97.8 °F (36.6 °C) 97.4 °F (36.3 °C)   TempSrc: Temporal Temporal  Temporal   SpO2: 96% 96%  97%   Weight:   242 lb 4.6 oz (109.9 kg)    Height:           Physical Exam  Vitals signs and nursing note reviewed. Constitutional:       General: She is not in acute distress. Appearance: She is well-developed. She is not diaphoretic. HENT:      Head: Normocephalic. Right Ear: External ear normal.      Left Ear: External ear normal.      Nose: Nose normal.   Eyes:      General: No scleral icterus. Right eye: No discharge. Left eye: No discharge. Conjunctiva/sclera: Conjunctivae normal.      Pupils: Pupils are equal, round, and reactive to light. Neck:      Musculoskeletal: Normal range of motion and neck supple. Cardiovascular:      Rate and Rhythm: Normal rate and regular rhythm. Heart sounds: No murmur. No friction rub.    Pulmonary:      Effort: Pulmonary effort is normal.      Breath sounds: No stridor. No wheezing or rales. Chest:      Chest wall: No tenderness. Abdominal:      Palpations: Abdomen is soft. There is no mass. Tenderness: There is no abdominal tenderness. There is no guarding or rebound. Musculoskeletal:         General: Swelling and tenderness present. Comments: Left third toe swelling with discoloration and gangrenous changes toward the distal phalanx   Lymphadenopathy:      Cervical: No cervical adenopathy. Skin:     General: Skin is warm and dry. Findings: No erythema or rash. Neurological:      Mental Status: She is alert and oriented to person, place, and time. Motor: No abnormal muscle tone. Psychiatric:         Judgment: Judgment normal.          Lines: All vascular access sites are healthy with no local erythema, discharge or tenderness. Intake and output:     I/O last 3 completed shifts: In: 48 [IV Piggyback:50]  Out: 56 [Urine:240]    Lab Data:   All available labs were reviewed by me today.      CBC:   Recent Labs     02/08/21  1408 02/09/21  0616   WBC 9.9 7.7   RBC 3.89* 3.61*   HGB 11.7* 10.8*   HCT 36.2 33.1*    272   MCV 93.0 91.5   MCH 30.0 29.9   MCHC 32.3 32.6   RDW 14.1 14.1        BMP:  Recent Labs     02/08/21  1408 02/08/21  1932 02/09/21  0615   * 134* 137  135*   K 3.9 3.9 3.9  3.8   CL 93* 93* 96*  95*   CO2 22 22 19*  22   BUN 79* 81* 76*  75*   CREATININE 13.5* 13.6* 13.4*  13.5*   CALCIUM 9.2 9.1 9.0  9.0   GLUCOSE 179* 194* 99  101*        Hepatic FunctionPanel:   Lab Results   Component Value Date    ALKPHOS 99 02/08/2021    ALT 9 02/08/2021    AST 10 02/08/2021    PROT 7.5 02/08/2021    PROT 7.1 12/13/2012    BILITOT 0.3 02/08/2021    BILIDIR 0.1 03/06/2014    IBILI 0.1 03/06/2014    LABALBU 3.1 02/09/2021       CPK:   Lab Results   Component Value Date    CKTOTAL 45 12/19/2013     ESR:   Lab Results   Component Value Date    SEDRATE 84 (H) 02/08/2021 CRP:   Lab Results   Component Value Date    CRP 8.9 (H) 02/08/2021         Imaging: All pertinent images and reports for the current visit were reviewed by meduring this visit. XR FOOT LEFT (MIN 3 VIEWS)   Final Result   1. No definite evidence of osteomyelitis radiographically. 2. Soft tissue swelling at the medial aspect of the 2nd digit         MRI FOOT LEFT WO CONTRAST    (Results Pending)   VL DUP LOWER EXTREMITY ARTERIES LEFT    (Results Pending)       Outside records:    Labs, Microbiology, Radiology and pertinent results from Care everywhere, if available, were reviewed as a part ofthe consultation. Problem list:       Patient Active Problem List   Diagnosis Code    Diabetes mellitus ([facility] E11.9    Obesity E66.9    Microalbuminuria R80.9    Hyperlipidemia with target LDL less than 100 E78.5    Hypertension I10    Diabetes mellitus type 2 in obese ([facility]) E11.69, E66.9    Chronic kidney disease (CKD), stage V ([facility]) N18.5    Acute renal failure (ARF) ([facility]) N17.9    ESRD (end stage renal disease) ([facility] N18.6    Gangrene ([facility]) I96    Non-smoker Z78.9    Elevated C-reactive protein (CRP) R79.82    Elevated sed rate R70.0    Diabetic polyneuropathy associated with type 2 diabetes mellitus ([facility] E11.42         Please note that this chart was generated using Dragon dictation software. Although every effort was made to ensure the accuracy of this automated transcription, some errors in transcription may have occurred inadvertently. If you may need any clarification, please do not hesitate to contact me through EPIC or at the phone number provided below with my electronic signature. Any pictures or media included in this note were obtained after taking informed verbal consent from the patient and with their approval to include those in the patient's medical record.       Elinor Dawson MD, MPH  2/9/21, 11:35 AM [facility]   [address], [address] [facility], [address] [place] [address]  Office: [phone]  Fax: [phone]  Clinic days:  Tuesday & Thursday

## 2021-02-09 NOTE — FLOWSHEET NOTE
02/09/21 0655   Vitals   BP (!) 140/75   Pulse 69   Resp 20   Weight 242 lb 4.6 oz (109.9 kg)       Disconnected from cycler. Total Time: 08:00  Total UF: 1288 ml  Total Volume: 8007 ml  Dwell time gained: 00:16    Effluent clear without fibrin.    0 alarms overnight    Stated last BM 2-7-21, denies need for laxative. Plan to put pt on CCPD this evening if pt's still at hospital  Placed CCPD flow sheets in the chart for EMR scan  Tolerated without difficulty, no c/o.   Report given to Heber Burnham RN

## 2021-02-09 NOTE — PROGRESS NOTES
Waiting for MRI results. Duplex scan reviewed and will probably need angiography and MRI to decide on toe amputation, right foot.     King Stefan DPM

## 2021-02-10 LAB
GLUCOSE BLD-MCNC: 127 MG/DL (ref 70–99)
GLUCOSE BLD-MCNC: 142 MG/DL (ref 70–99)
GLUCOSE BLD-MCNC: 219 MG/DL (ref 70–99)
GLUCOSE BLD-MCNC: 86 MG/DL (ref 70–99)
HEPATITIS B CORE TOTAL ANTIBODY: NEGATIVE
PERFORMED ON: ABNORMAL
PERFORMED ON: NORMAL

## 2021-02-10 PROCEDURE — 6370000000 HC RX 637 (ALT 250 FOR IP): Performed by: HOSPITALIST

## 2021-02-10 PROCEDURE — 6360000002 HC RX W HCPCS: Performed by: HOSPITALIST

## 2021-02-10 PROCEDURE — 6370000000 HC RX 637 (ALT 250 FOR IP): Performed by: INTERNAL MEDICINE

## 2021-02-10 PROCEDURE — 2580000003 HC RX 258: Performed by: INTERNAL MEDICINE

## 2021-02-10 PROCEDURE — 6370000000 HC RX 637 (ALT 250 FOR IP): Performed by: PHYSICIAN ASSISTANT

## 2021-02-10 PROCEDURE — APPNB30 APP NON BILLABLE TIME 0-30 MINS: Performed by: NURSE PRACTITIONER

## 2021-02-10 PROCEDURE — APPSS30 APP SPLIT SHARED TIME 16-30 MINUTES: Performed by: NURSE PRACTITIONER

## 2021-02-10 PROCEDURE — 99233 SBSQ HOSP IP/OBS HIGH 50: CPT | Performed by: INTERNAL MEDICINE

## 2021-02-10 PROCEDURE — 2060000000 HC ICU INTERMEDIATE R&B

## 2021-02-10 PROCEDURE — 6360000002 HC RX W HCPCS: Performed by: INTERNAL MEDICINE

## 2021-02-10 PROCEDURE — 2580000003 HC RX 258: Performed by: HOSPITALIST

## 2021-02-10 RX ADMIN — METOPROLOL TARTRATE 50 MG: 50 TABLET, FILM COATED ORAL at 21:36

## 2021-02-10 RX ADMIN — SEVELAMER CARBONATE 800 MG: 800 TABLET, FILM COATED ORAL at 16:51

## 2021-02-10 RX ADMIN — Medication 10 ML: at 09:31

## 2021-02-10 RX ADMIN — TORSEMIDE 200 MG: 100 TABLET ORAL at 09:31

## 2021-02-10 RX ADMIN — HEPARIN SODIUM 5000 UNITS: 5000 INJECTION INTRAVENOUS; SUBCUTANEOUS at 05:53

## 2021-02-10 RX ADMIN — SEVELAMER CARBONATE 800 MG: 800 TABLET, FILM COATED ORAL at 09:31

## 2021-02-10 RX ADMIN — SEVELAMER CARBONATE 800 MG: 800 TABLET, FILM COATED ORAL at 12:45

## 2021-02-10 RX ADMIN — PIPERACILLIN AND TAZOBACTAM 3375 MG: 3; .375 INJECTION, POWDER, LYOPHILIZED, FOR SOLUTION INTRAVENOUS at 01:27

## 2021-02-10 RX ADMIN — AMLODIPINE BESYLATE 10 MG: 5 TABLET ORAL at 09:31

## 2021-02-10 RX ADMIN — PIPERACILLIN AND TAZOBACTAM 3375 MG: 3; .375 INJECTION, POWDER, LYOPHILIZED, FOR SOLUTION INTRAVENOUS at 13:40

## 2021-02-10 RX ADMIN — Medication 10 ML: at 21:30

## 2021-02-10 RX ADMIN — PANTOPRAZOLE SODIUM 40 MG: 40 TABLET, DELAYED RELEASE ORAL at 21:36

## 2021-02-10 RX ADMIN — METOPROLOL TARTRATE 50 MG: 50 TABLET, FILM COATED ORAL at 09:31

## 2021-02-10 ASSESSMENT — ENCOUNTER SYMPTOMS
APNEA: 0
RHINORRHEA: 0
PHOTOPHOBIA: 0
BLOOD IN STOOL: 0
STRIDOR: 0
CHEST TIGHTNESS: 0
DIARRHEA: 0
NAUSEA: 0
EYE DISCHARGE: 0
COLOR CHANGE: 0
TROUBLE SWALLOWING: 0
ABDOMINAL PAIN: 0
EYE REDNESS: 0
SHORTNESS OF BREATH: 0
FACIAL SWELLING: 0
COUGH: 0
CHOKING: 0

## 2021-02-10 ASSESSMENT — PAIN SCALES - GENERAL
PAINLEVEL_OUTOF10: 0
PAINLEVEL_OUTOF10: 0

## 2021-02-10 NOTE — PLAN OF CARE
Pt in bed resting. Pt reports no pain. Zosyn adjusted by pharmacy to reflect extended infusion rate of 12.5ml/hr instead of 100ml/hr. IV replaced as old IV leaking and bleeding. Zosyn currently infusing. BP remains elevated. No PRN BP medications ordered at this time. Foot elevated on pillow and foot of bed elevated. Pt is NPO for possible angiogram. Pt currently on cycler. Pt ambulated to BR independently. Call light within reach. Pt uses call light appropriately. Pt is A&O x4. 1 unit of Novolog given per HS sliding scale. Pt does not have levemir here at this time, pharmacy does not stock levemir, and pt is allergic to lantus.      Vitals:    02/10/21 0123   BP: (!) 189/77   Pulse: 58   Resp: 18   Temp: 97.5 °F (36.4 °C)   SpO2: 97%       Problem: Cardiovascular  Goal: Hemodynamic stability  2/10/2021 0156 by Mouna Chun RN  Outcome: Ongoing     Problem: Pain Control  Goal: Maintain pain level at or below patient's acceptable level (or 5 if patient is unable to determine acceptable level)  2/10/2021 0156 by Mouna Chun RN  Outcome: Ongoing     Problem: Skin Integrity:  Goal: Will show no infection signs and symptoms  Description: Will show no infection signs and symptoms  Outcome: Ongoing     Problem: Skin Integrity:  Goal: Absence of new skin breakdown  Description: Absence of new skin breakdown  Outcome: Ongoing     Problem: Serum Glucose Level - Abnormal:  Goal: Ability to maintain appropriate glucose levels will improve  Description: Ability to maintain appropriate glucose levels will improve  Outcome: Ongoing     Problem: Sensory Perception - Impaired:  Goal: Ability to maintain a stable neurologic state will improve  Description: Ability to maintain a stable neurologic state will improve  Outcome: Ongoing

## 2021-02-10 NOTE — PROGRESS NOTES
Office : [phone]     Fax :[phone]       Nephrology progress Note      Patient's Name: Fermin Betancur  8:22 AM  2/10/2021    Reason for Consult:  ESRD. Chief Complaint:    Chief Complaint   Patient presents with    Other     Sent by provider, middle toe on right foot necrotic, pt states no pain, bilster on ring toe and redness noted to nail on big toe, hx diabetes neuropathy and dialysis         History of Present iIlness:    Fermin Bteancur is a 54 y.o. female with h/o ESRD, HTN, DM 2, Anemia of chronic ds, secondary hyperparathyroidism who is on peritoneal dialysis and follows with me. She called today with c/o change in color of 3rd toe of left Foot and blister on 2nd toe. Has necrosis of 3rd toe. She noticed it on Friday night. She is doing her dialysis daily   Uses cycler for peritoneal dialysis         Interval history. Left toe gangrene area stabilized. No drainage noted. Seen on peritoneal dialysis  Tolerated peritoneal dialysis well. Exit site clear        I/O last 3 completed shifts:   In: 10 [I.V.:10]  Out: 1364     Past Medical History:   Diagnosis Date    Diabetes mellitus ([facility]     Hypertension          Current Medications:        piperacillin-tazobactam (ZOSYN) 3,375 mg in dextrose 5 % 50 mL IVPB (mini-bag), Q12H      vancomycin (VANCOCIN) intermittent dosing (placeholder), RX Placeholder      amLODIPine (NORVASC) tablet 10 mg, Daily      insulin detemir (LEVEMIR) injection pen 16 Units (Patient Supplied), Nightly      melatonin tablet 3 mg, Nightly PRN      torsemide (DEMADEX) tablet 200 mg, Daily      glucose (GLUTOSE) 40 % oral gel 15 g, PRN      dextrose 50 % IV solution, PRN      glucagon (rDNA) injection 1 mg, PRN      dextrose 5 % solution, PRN     sodium chloride flush 0.9 % injection 10 mL, 2 times per day      sodium chloride flush 0.9 % injection 10 mL, PRN      heparin (porcine) injection 5,000 Units, 3 times per day      promethazine (PHENERGAN) tablet 12.5 mg, Q6H PRN    Or      ondansetron (ZOFRAN) injection 4 mg, Q6H PRN      polyethylene glycol (GLYCOLAX) packet 17 g, Daily PRN      acetaminophen (TYLENOL) tablet 650 mg, Q6H PRN    Or      acetaminophen (TYLENOL) suppository 650 mg, Q6H PRN      metoprolol tartrate (LOPRESSOR) tablet 50 mg, BID      insulin aspart (NOVOLOG) injection pen 15 Units (Patient Supplied), TID WC      insulin aspart (NOVOLOG) injection pen 0-6 Units (Patient Supplied), TID WC      insulin aspart (NOVOLOG) injection vial 0-3 Units (Patient Supplied), Nightly      gentamicin (GARAMYCIN) 0.1 % cream, Daily PRN      pantoprazole (PROTONIX) tablet 40 mg, Nightly      sevelamer (RENVELA) tablet 800 mg, TID WC        Physical exam:     Vitals:  BP (!) 164/93   Pulse 60   Temp 97.5 °F (36.4 °C) (Temporal)   Resp 18   Ht 5' 6\" (1.676 m)   Wt 242 lb 8.1 oz (110 kg) Comment: standing scale  LMP 01/15/2021   SpO2 97%   BMI 39.14 kg/m²   Constitutional:  OAA X3 NAD  Skin: no rash, turgor wnl  Heent:  eomi, mmm  Neck: no bruits or jvd noted  Cardiovascular:  S1, S2 without m/r/g  Respiratory: CTA B without w/r/r  Abdomen:  +bs, soft, nt, nd  Ext: no  lower extremity edema, necrotic 3rd toe rt foot     Labs:  CBC:   Recent Labs     02/08/21  1408 02/09/21  0616   WBC 9.9 7.7   HGB 11.7* 10.8*    272     BMP:    Recent Labs     02/08/21  1408 02/08/21  1932 02/09/21  0615   * 134* 137  135*   K 3.9 3.9 3.9  3.8   CL 93* 93* 96*  95*   CO2 22 22 19*  22   BUN 79* 81* 76*  75*   CREATININE 13.5* 13.6* 13.4*  13.5*   GLUCOSE 179* 194* 99  101*     Ca/Mg/Phos:   Recent Labs     02/08/21  1408 02/08/21  1932 02/09/21  0615   CALCIUM 9.2 9.1 9.0  9.0   PHOS  --   --  7.6*  7.4*     Hepatic:   Recent Labs     02/08/21  1408   AST 10*   ALT 9*   BILITOT 0.3   ALKPHOS 99     Troponin: No results for input(s): TROPONINI in the last 72 hours. BNP: No results for input(s): BNP in the last 72 hours. Lipids: No results for input(s): CHOL, TRIG, HDL, LDLCALC, LABVLDL in the last 72 hours. ABGs: No results for input(s): PHART, PO2ART, PFC1MLC in the last 72 hours. INR:   Recent Labs     02/08/21  1407   INR 0.92     UA:No results for input(s): Clinton Blind, GLUCOSEU, BILIRUBINUR, Berneice Stank, BLOODU, PHUR, PROTEINU, UROBILINOGEN, NITRU, LEUKOCYTESUR, Mathew Link in the last 72 hours. Urine Microscopic: No results for input(s): LABCAST, BACTERIA, COMU, HYALCAST, WBCUA, RBCUA, EPIU in the last 72 hours. Urine Culture: No results for input(s): LABURIN in the last 72 hours. Urine Chemistry: No results for input(s): Sachi Dimas, PROTEINUR, NAUR in the last 72 hours. IMAGING:  MRI FOOT LEFT WO CONTRAST   Final Result   1. No osteomyelitis or other acute osseous abnormality. 2. Skin blister along the lateral aspect of the 2nd toe with underlying   subcutaneous edema compatible with cellulitis. VL DUP LOWER EXTREMITY ARTERIES LEFT   Final Result      XR FOOT LEFT (MIN 3 VIEWS)   Final Result   1. No definite evidence of osteomyelitis radiographically. 2. Soft tissue swelling at the medial aspect of the 2nd digit               Assessment/Plan :      1. ESRD management   Tolerated PD well. Continue PD at night  Use CCPD   2.5 % solution   4 cycles   Total time 8 hrs   D/w Dialysis nurse     2. HTN. Better controlled. Continue delete home BP meds     3. Anemia of chronic ds. Gets LUIZA  Hemoglobin 10.8    4. Acid- base disorder. monitor     5. Electrolytes. Monitor     6. Hyperphosphatemia. Takes velphoro at home  Phos level is 7.4. Will give Renvela 1 tab p.o. 3 times daily with meals    7. 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  Consulted vascular surgery   She needs angiogram.  Most likely has popliteal stenosis. Discussed with her in detail. Will give gentle fluids periprocedure. Discussed with vascular surgery.   Minimal contrast will be used        Thank you for allowing us to participate in care of Delores Herrmann         Electronically signed by: Ravindra Azevedo MD, 2/10/2021, 8:22 AM      [facility] [address]  Office : [phone]  Fax :[phone]

## 2021-02-10 NOTE — PROGRESS NOTES
[facility]    2/10/2021 1:14 PM        Name: Darek Sanchez . Admitted: 2/8/2021  Primary Care Provider: Jacinto Ng MD (Tel: [phone])      Subjective:  . Feeling ok, no complaints. Denies pain.     Reviewed interval ancillary notes    Current Medications      piperacillin-tazobactam (ZOSYN) 3,375 mg in dextrose 5 % 50 mL IVPB (mini-bag), Q12H      amLODIPine (NORVASC) tablet 10 mg, Daily      insulin detemir (LEVEMIR) injection pen 16 Units (Patient Supplied), Nightly      melatonin tablet 3 mg, Nightly PRN      torsemide (DEMADEX) tablet 200 mg, Daily      glucose (GLUTOSE) 40 % oral gel 15 g, PRN      dextrose 50 % IV solution, PRN      glucagon (rDNA) injection 1 mg, PRN      dextrose 5 % solution, PRN      sodium chloride flush 0.9 % injection 10 mL, 2 times per day      sodium chloride flush 0.9 % injection 10 mL, PRN      [Held by provider] heparin (porcine) injection 5,000 Units, 3 times per day      promethazine (PHENERGAN) tablet 12.5 mg, Q6H PRN    Or      ondansetron (ZOFRAN) injection 4 mg, Q6H PRN      polyethylene glycol (GLYCOLAX) packet 17 g, Daily PRN      acetaminophen (TYLENOL) tablet 650 mg, Q6H PRN    Or      acetaminophen (TYLENOL) suppository 650 mg, Q6H PRN      metoprolol tartrate (LOPRESSOR) tablet 50 mg, BID      insulin aspart (NOVOLOG) injection pen 15 Units (Patient Supplied), TID WC      insulin aspart (NOVOLOG) injection pen 0-6 Units (Patient Supplied), TID WC      insulin aspart (NOVOLOG) injection vial 0-3 Units (Patient Supplied), Nightly      gentamicin (GARAMYCIN) 0.1 % cream, Daily PRN      pantoprazole (PROTONIX) tablet 40 mg, Nightly      sevelamer (RENVELA) tablet 800 mg, TID WC        Objective:  BP (!) 170/68   Pulse 57   Temp 97.9 °F (36.6 °C) (Temporal)   Resp 18   Ht 5' 6\" (1.676 m)   Wt 242 lb 8.1 oz (110 kg) Comment: standing scale  LMP 01/15/2021   SpO2 97%   BMI 39.14 kg/m²     Intake/Output Summary (Last 24 hours) at 2/10/2021 1314  Last data filed at 2/10/2021 1230  Gross per 24 hour   Intake 570 ml   Output 1364 ml   Net -794 ml      Wt Readings from Last 3 Encounters:   02/10/21 242 lb 8.1 oz (110 kg)   09/06/18 258 lb 9.6 oz (117.3 kg)   08/16/18 267 lb 13.7 oz (121.5 kg)       General appearance:  Appears comfortable  Eyes: Sclera clear. Pupils equal.  ENT: Moist oral mucosa. Trachea midline, no adenopathy. Cardiovascular: Regular rhythm, normal S1, S2. No murmur. No edema in lower extremities  Respiratory: Not using accessory muscles. Good inspiratory effort. Clear to auscultation bilaterally, no wheeze or crackles. GI: Abdomen soft, no tenderness, not distended, normal bowel sounds  Musculoskeletal: No cyanosis in digits, neck supple  Neurology: CN 2-12 grossly intact. No speech or motor deficits  Psych: Normal affect. Alert and oriented in time, place and person  Skin: dry gangrene L 4th toe. Labs and Tests:  CBC:   Recent Labs     02/08/21  1408 02/09/21  0616   WBC 9.9 7.7   HGB 11.7* 10.8*    272     BMP:    Recent Labs     02/08/21  1408 02/08/21  1932 02/09/21  0615   * 134* 137  135*   K 3.9 3.9 3.9  3.8   CL 93* 93* 96*  95*   CO2 22 22 19*  22   BUN 79* 81* 76*  75*   CREATININE 13.5* 13.6* 13.4*  13.5*   GLUCOSE 179* 194* 99  101*     Hepatic:   Recent Labs     02/08/21  1408   AST 10*   ALT 9*   BILITOT 0.3   ALKPHOS 99         Problem List  Active Problems:    Type 2 diabetes mellitus with left diabetic foot infection (HCC)    Diabetes mellitus type 2 in obese (HCC)    ESRD (end stage renal disease) (HCC)    Gangrene (HCC)    Non-smoker    Elevated C-reactive protein (CRP)    Elevated sed rate    Diabetic polyneuropathy associated with [facility]    Diabetes education, encounter for  Resolved Problems:    * No resolved hospital problems.  *       Assessment & Plan:   1. Gangrene L 4th toe-Podiatry, Vascular, and ID on board. Currently on vanco and zosyn. MRI negative for osteo. Await final ID and podiatry input. 2. PVD-going for angiogram Thursday. 3. ESRD-on peritoneal dialysis. Nephro on board. 4. Dm 2-a1c 8.9. on levemir 16 qhs, novolog 15 tid plu sSSI. Sugars currently acceptable. 5. Hypertension- On norvasc and metoprolol. Discussed with Dr Urban Marlow.        Diet: DIET RENAL;  Diet NPO, After Midnight Exceptions are: Sips of Water with Meds  Code:Full Code  DVT PPX: heparin      Mike Hansen PA-C   2/10/2021 1:14 PM

## 2021-02-10 NOTE — FLOWSHEET NOTE
Disconnected from [facility] per protocol. Effluent: clear yellow, no fibrin noted. Total time: 8 hr 8 min   Total UF:  1358 ml. Total Volume:  8007 ml. Dwell time gained:  0 hr 22 min. Pt Tolerated procedure without difficulty.    Report given to: Cindy Shah RN  Last BM: 2/8/21.       02/10/21 6529   Vitals   BP (!) 164/93   Temp 97.5 °F (36.4 °C)   Temp Source Temporal   Pulse 60   Resp 18   SpO2 97 %   Weight 242 lb 8.1 oz (110 kg)  (standing scale)   Cycler   Ultrafiltration (UF) (mL) 1358 mL

## 2021-02-10 NOTE — PROGRESS NOTES
Infectious Diseases   Progress Note      Admission Date: 2/8/2021  Hospital Day: Hospital Day: 3   Attending: Tony Flores MD  Date of service: 2/10/2021     Chief complaint/ Reason for consult:     · Left diabetic foot infection  · Elevated sed rate of 84  · Elevated CRP of 8.9.  · Longstanding type 2 diabetes mellitus    Microbiology:        I have reviewed allavailable micro lab data and cultures    · Blood culture (2/2) - collected on 2/8/2021: In process  · Left toe wound culture  - collected on 2/8/2021: In process      Antibiotics and immunizations:       Current antibiotics: All antibiotics and their doses were reviewed by me    Recent Abx Admin                   piperacillin-tazobactam (ZOSYN) 3,375 mg in dextrose 5 % 50 mL IVPB (mini-bag) (mg) 3,375 mg New Bag 02/10/21 0127    vancomycin 1000 mg IVPB in 250 mL D5W addavial (mg) 1,000 mg New Bag 02/09/21 1825    piperacillin-tazobactam (ZOSYN) 3,375 mg in dextrose 5 % 50 mL IVPB (mini-bag) (mg) 3,375 mg New Bag 02/09/21 1404                  Immunization History: All immunization history was reviewed by me today. Immunization History   Administered Date(s) Administered    Influenza Virus Vaccine 10/23/2014, 12/09/2015    Influenza, Quadv, IM, (6 mo and older Fluzone, Flulaval, Fluarix and 3 yrs and older Afluria) 12/07/2018    Influenza, Quadv, IM, PF (6 mo and older Fluzone, Flulaval, Fluarix, and 3 yrs and older Afluria) 11/08/2016    Pneumococcal Polysaccharide ([id number]) 04/28/2017    Td, unspecified formulation 07/01/2007       Known drug allergies:      All allergies were reviewed and updated    Allergies   Allergen Reactions    Zetia [Ezetimibe] Shortness Of Breath    Humalog [Insulin Lispro]      SOB, Leg swelling, fatigue    Lisinopril Swelling    Lantus [Insulin Glargine] Nausea And Vomiting     Side effect, not an allergy    Victoza [Liraglutide] Nausea And Vomiting     Side effect, not an allergy         Social history: Social History:  All social andepidemiologic history was reviewed and updated by me today as needed. · Tobacco use:   reports that she has never smoked. She has never used smokeless tobacco.  · Alcohol use:   reports no history of alcohol use. · Currently lives in: [place] [id number]  ·  reports no history of drug use. Assessment:     The patient is a 54 y.o. old female who  has a past medical history of Diabetes mellitus ([address] and Hypertension. with following problems:    · Left diabetic foot infection-culture in process  · Elevated sed rate of 84-monitor trends  · Elevated CRP of 8.9.  · Longstanding type 2 diabetes mellitus-counseling done  · ESRD on peritoneal dialysis  · Essential hypertension-blood pressure okay  · Non-smoker  · Obesity Class 2 due to excess calorie intake : Body mass index is 39.11 kg/m². Discussion:      The patient is on empiric IV vancomycin and Zosyn at dialysis doses. Blood cultures from admission are negative so far. She is afebrile. Left foot MRI reviewed. Did not show any osteomyelitis. Some cellulitis noted at the neck secondary left second toe area. Vascular surgery and podiatry following. Plan:     Diagnostic Workup:    · Continue to follow  fever curve, WBC count and blood cultures. · Continue to monitor blood counts, liver and renal function. Antimicrobials:    · I do not think further MRSA coverage is needed. I will stop IV vancomycin today  · Will continue IV Zosyn 3.375 g every 12 hour  · We will plan to switch to oral antibiotic versus stopping antibiotic after the foot surgery depending upon the surgical findings  · We will follow up on the culture results and clinical progress and will make further recommendations accordingly. · Continue close vitals monitoring. · Maintain good glycemic control. · Fall precautions. Aspiration precautions. · Continue to watch for new fever or diarrhea. · DVT prophylaxis.   · Discussed all above with patient and RN. Drug Monitoring:    · Continue monitoring for antibiotic toxicity as follows: CBC, CMP is afebrile. · Continue to watch for following: new or worsening fever, new hypotension, hives, lip swelling and redness or purulence at vascular access sites. I/v access Management:    · Continue to monitor i.v access sites for erythema, induration, discharge or tenderness. · As always, continue efforts to minimize tubes/lines/drains as clinically appropriate to reduce chances of line associated infections. Patient education and counseling:        · The patient was educated in detail about the side-effects of various antibiotics and things to watch for like new rashes, lip swelling, severe reaction, worsening diarrhea, break through fever etc.  · Discussed patient's condition and what to expect. All of the patient's questions were addressed in a satisfactory manner and patient verbalized understanding all instructions. Level of complexity of visit: High     Diabetes mellitus education and counseling:    Patient was educated in detail about the importance of keeping diabetes under control to improve the cure rate of infection and prevent future infections. Patient was advised to check blood glucose level regularly and to stay compliant with the diabetes medications. Patient was advised to the trim the toe nails carefully, wear shoes or slippers at all times and check both feet everyday before going to bed to look for any cuts, blisters, swelling or redness. Importance of washing the feet everyday with soap and water and keeping them dry, and seeking prompt medical attention in case of a non-healing wound or ulcer on the feet was also highlighted. TIME SPENT TODAY:     - Spent over  36  minutes on visit (including interval history, physical exam, review of data including labs, cultures, imaging, development and implementation of treatment plan and coordination of complex care).  More than 50 percent of this includes face-to-face time spent with the patient for counseling and coordination of care. Thank you for involving me in the care of your patient. I will continue to follow. If you have anyadditional questions, please do not hesitate to contact me. Subjective: Interval history: Interval history was obtained from chart review and patient/ RN. The patient is afebrile. She is tolerating antibiotic okay. No diarrhea. REVIEW OF SYSTEMS:      Review of Systems   Constitutional: Negative for chills, diaphoresis and unexpected weight change. HENT: Negative for congestion, ear discharge, ear pain, facial swelling, hearing loss, rhinorrhea and trouble swallowing. Eyes: Negative for photophobia, discharge, redness and visual disturbance. Respiratory: Negative for apnea, cough, choking, chest tightness, shortness of breath and stridor. Cardiovascular: Negative for chest pain and palpitations. Gastrointestinal: Negative for abdominal pain, blood in stool, diarrhea and nausea. Endocrine: Negative for polydipsia, polyphagia and polyuria. Genitourinary: Negative for difficulty urinating, dysuria, frequency, hematuria, menstrual problem and vaginal discharge. Musculoskeletal: Negative for arthralgias, joint swelling, myalgias and neck stiffness. Skin: Negative for color change and rash. Allergic/Immunologic: Negative for immunocompromised state. Neurological: Negative for dizziness, seizures, speech difficulty, light-headedness and headaches. Hematological: Negative for adenopathy. Psychiatric/Behavioral: Negative for agitation, hallucinations and suicidal ideas. Past Medical History: All past medical history reviewed today. Past Medical History:   Diagnosis Date    Diabetes [facility] ([facility]     Hypertension        Past Surgical History: All past surgical history was reviewed today.     Past Surgical History:   Procedure Laterality Date     SECTION      OTHER SURGICAL HISTORY  08/16/2018     lap PD cath placement lt side 62 cm    TONSILLECTOMY         Family History: All family history was reviewed today. History reviewed. No pertinent family history. Objective:       PHYSICAL EXAM:      Vitals:   Vitals:    02/09/21 2107 02/10/21 0123 02/10/21 0420 02/10/21 0915   BP: (!) 179/82 (!) 189/77 (!) 164/93 (!) 168/72   Pulse: 66 58 60 62   Resp: 18 18 18 18   Temp: 97.3 °F (36.3 °C) 97.5 °F (36.4 °C) 97.5 °F (36.4 °C) 97.5 °F (36.4 °C)   TempSrc: Temporal Temporal Temporal Temporal   SpO2: 95% 97% 97% 99%   Weight:   242 lb 8.1 oz (110 kg)    Height:           Physical Exam  Vitals signs and nursing note reviewed. Constitutional:       Appearance: She is well-developed. She is not diaphoretic. Comments: The patient was seen earlier today. HENT:      Head: Normocephalic and atraumatic. Mouth/Throat:      Pharynx: No oropharyngeal exudate. Eyes:      General: No scleral icterus. Right eye: No discharge. Left eye: No discharge. Conjunctiva/sclera: Conjunctivae normal.      Pupils: Pupils are equal, round, and reactive to light. Neck:      Musculoskeletal: Normal range of motion and neck supple. Thyroid: No thyromegaly. Cardiovascular:      Rate and Rhythm: Normal rate and regular rhythm. Heart sounds: Normal heart sounds. No murmur. No friction rub. Pulmonary:      Effort: No respiratory distress. Breath sounds: No stridor. No wheezing or rales. Abdominal:      General: Bowel sounds are normal.      Palpations: Abdomen is soft. Tenderness: There is no abdominal tenderness. There is no guarding or rebound. Musculoskeletal: Normal range of motion. General: No tenderness. Lymphadenopathy:      Cervical: No cervical adenopathy. Skin:     General: Skin is warm and dry. Findings: No erythema or rash.       Comments: Gangrenous changes of right second toe   Neurological: Mental Status: She is alert and oriented to person, place, and time. Motor: No abnormal muscle tone. Lines: All vascular access sites are healthy with no local erythema, discharge or tenderness. Intake and output:    I/O last 3 completed shifts: In: 10 [I.V.:10]  Out: 1364     Lab Data:   All available labs and old records have been reviewed by me. CBC:  Recent Labs     02/08/21  1408 02/09/21  0616   WBC 9.9 7.7   RBC 3.89* 3.61*   HGB 11.7* 10.8*   HCT 36.2 33.1*    272   MCV 93.0 91.5   MCH 30.0 29.9   MCHC 32.3 32.6   RDW 14.1 14.1        BMP:  Recent Labs     02/08/21  1408 02/08/21  1932 02/09/21  0615   * 134* 137  135*   K 3.9 3.9 3.9  3.8   CL 93* 93* 96*  95*   CO2 22 22 19*  22   BUN 79* 81* 76*  75*   CREATININE 13.5* 13.6* 13.4*  13.5*   CALCIUM 9.2 9.1 9.0  9.0   GLUCOSE 179* 194* 99  101*        Hepatic Function Panel:   Lab Results   Component Value Date    ALKPHOS 99 02/08/2021    ALT 9 02/08/2021    AST 10 02/08/2021    PROT 7.5 02/08/2021    PROT 7.1 12/13/2012    BILITOT 0.3 02/08/2021    BILIDIR 0.1 03/06/2014    IBILI 0.1 03/06/2014    LABALBU 3.1 02/09/2021       CPK:   Lab Results   Component Value Date    CKTOTAL 45 12/19/2013     ESR:   Lab Results   Component Value Date    SEDRATE 84 (H) 02/08/2021     CRP:   Lab Results   Component Value Date    CRP 8.9 (H) 02/08/2021           Imaging: All pertinent images and reports for the current visit were reviewed by me during this visit. MRI FOOT LEFT WO CONTRAST   Final Result   1. No osteomyelitis or other acute osseous abnormality. 2. Skin blister along the lateral aspect of the 2nd toe with underlying   subcutaneous edema compatible with cellulitis. VL DUP LOWER EXTREMITY ARTERIES LEFT   Final Result      XR FOOT LEFT (MIN 3 VIEWS)   Final Result   1. No definite evidence of osteomyelitis radiographically.    2. Soft tissue swelling at the medial aspect of the 2nd digit Medications: All current and past medications were reviewed.  piperacillin-tazobactam  3,375 mg Intravenous Q12H    amLODIPine  10 mg Oral Daily    insulin detemir  16 Units Subcutaneous Nightly    torsemide  200 mg Oral Daily    sodium chloride flush  10 mL Intravenous 2 times per day    [Held by provider] heparin (porcine)  5,000 Units Subcutaneous 3 times per day    metoprolol tartrate  50 mg Oral BID    insulin aspart  15 Units Subcutaneous TID WC    insulin aspart  0-6 Units Subcutaneous TID WC    insulin aspart  0-3 Units Subcutaneous Nightly    pantoprazole  40 mg Oral Nightly    sevelamer  800 mg Oral TID WC        dextrose         melatonin, glucose, dextrose, glucagon (rDNA), dextrose, sodium chloride flush, promethazine **OR** ondansetron, polyethylene glycol, acetaminophen **OR** acetaminophen, gentamicin      Problem list:       Patient Active Problem List   Diagnosis Code    [facility] E11.9    Obesity E66.9    Microalbuminuria R80.9    Hyperlipidemia with target LDL less than 100 E78.5    Hypertension I10    Diabetes mellitus type 2 in obese (HCC) E11.69, E66.9    Chronic kidney disease (CKD), stage V ([facility]) N18.5    Acute renal failure (ARF) ([facility]) N17.9    ESRD (end stage renal disease) ([facility]) N18.6    Dry gangrene ([facility]) I96    Non-smoker Z78.9    Elevated C-reactive protein (CRP) R79.82    Elevated sed rate R70.0    Diabetic polyneuropathy associated with type 2 diabetes mellitus ([facility] E11.42       Please note that this chart was generated using Dragon dictation software. Although every effort was made to ensure the accuracy of this automated transcription, some errors in transcription may have occurred inadvertently. If you may need any clarification, please do not hesitate to contact me through EPIC or at the phone number provided below with my electronic signature.   Any pictures or media included in this note were obtained after taking informed verbal consent from the patient and with their approval to include those in the patient's medical record.     Dmitry Rea MD, MPH  2/10/2021 , 12:35 PM   [facility]   [address] [place] [address], [address] [place] [address]  Office: [phone]  Fax: [phone]  Clinic days:  Tuesday & Thursday

## 2021-02-10 NOTE — PROGRESS NOTES
No changes third toe left. Necrosis distally. MRI third toe left showing no osteomyelitis so far. Angiogram tomorrow. Will follow on Friday to check toe.     Akash Miller DPM

## 2021-02-10 NOTE — PROGRESS NOTES
Vascular Progress Note    2/10/2021 8:09 AM    Chief complaint / Reason for visit : left 3rd toe gangrene     Subjective:  Patient resting in bed. She reports she is doing well. She denies any pain to her left foot or toes.   Appears hemodynamically stable, afebrile     Vital Signs: BP (!) 164/93   Pulse 60   Temp 97.5 °F (36.4 °C) (Temporal)   Resp 18   Ht 5' 6\" (1.676 m)   Wt 242 lb 8.1 oz (110 kg) Comment: standing scale  LMP 01/15/2021   SpO2 97%   BMI 39.14 kg/m²      I/O:      Intake/Output Summary (Last 24 hours) at 2/10/2021 0809  Last data filed at 2/10/2021 0420  Gross per 24 hour   Intake 10 ml   Output 1364 ml   Net -1354 ml       Physical Exam:   General: no apparent distress, appears stated age  Chest/Lungs: no accessory muscle use  Abdomen: + PD catheter   Vascular:  Nonpalpable left pedal pulses, monophasic pedal signals   Extremities: left 3rd toe cyanosis, no purulence or malodor     Labs:   Lab Results   Component Value Date     02/09/2021     02/09/2021    K 3.8 02/09/2021    K 3.9 02/09/2021    CL 95 02/09/2021    CL 96 02/09/2021    CO2 22 02/09/2021    CO2 19 02/09/2021    BUN 75 02/09/2021    BUN 76 02/09/2021    CREATININE 13.5 02/09/2021    CREATININE 13.4 02/09/2021    GFRAA 4 02/09/2021    GFRAA 4 02/09/2021    GFRAA >60 04/09/2013    LABGLOM 3 02/09/2021    LABGLOM 3 02/09/2021    GLUCOSE 101 02/09/2021    GLUCOSE 99 02/09/2021    PHOS 7.4 02/09/2021    PHOS 7.6 02/09/2021    MG 2.40 08/15/2018    CALCIUM 9.0 02/09/2021    CALCIUM 9.0 02/09/2021     Lab Results   Component Value Date    WBC 7.7 02/09/2021    RBC 3.61 02/09/2021    HGB 10.8 02/09/2021    HCT 33.1 02/09/2021    MCV 91.5 02/09/2021    RDW 14.1 02/09/2021     02/09/2021     Lab Results   Component Value Date    INR 0.92 02/08/2021    PROTIME 10.7 02/08/2021        Imaging:    LLE arterial duplex 2/9/21:  Conclusions        Summary        -Limited study due to shadowing from calcification in calf and calcified    arteries.    -Bilateral MARIO's were not available due to extremely high brachial blood    pressure (systolic over 465 mmHG).  -Elevated velocity of the left distal popliteal artery suggest a greater    than 50% stenosis. MRI left foot 2/9/21:  Impression   1. No osteomyelitis or other acute osseous abnormality. 2. Skin blister along the lateral aspect of the 2nd toe with underlying   subcutaneous edema compatible with cellulitis.             Scheduled Meds:    piperacillin-tazobactam  3,375 mg Intravenous Q12H    vancomycin (VANCOCIN) intermittent dosing (placeholder)   Other RX Placeholder    amLODIPine  10 mg Oral Daily    insulin detemir  16 Units Subcutaneous Nightly    torsemide  200 mg Oral Daily    sodium chloride flush  10 mL Intravenous 2 times per day    heparin (porcine)  5,000 Units Subcutaneous 3 times per day    metoprolol tartrate  50 mg Oral BID    insulin aspart  15 Units Subcutaneous TID WC    insulin aspart  0-6 Units Subcutaneous TID WC    insulin aspart  0-3 Units Subcutaneous Nightly    pantoprazole  40 mg Oral Nightly    sevelamer  800 mg Oral TID WC     Continuous Infusions:    dextrose           Assessment:   Left 3rd toe gangrene   DM type 2, uncontrolled - last A1c 8.6 (5/16/18)  ESRD on PD    Plan:  Duplex reviewed with greater than 50% stenosis of the left distal popliteal artery. Recommend moving forward with peripheral angiogram of left leg to assist with wound healing for possible surgical intervention with podiatry. D/w patient and she is agreeable to move forward with angiogram.  Will tentatively plan for left leg angiogram with possible intervention tomorrow afternoon as schedule allows. NPO after midnight. Wound care per podiatry. Patient educated on plan of care and disease process. All questions answered.         Electronically signed by RICHARD Choi CNP on 2/10/2021 at 8:09 AM

## 2021-02-11 LAB
ANION GAP SERPL CALCULATED.3IONS-SCNC: 17 MMOL/L (ref 3–16)
BUN BLDV-MCNC: 75 MG/DL (ref 7–20)
CALCIUM SERPL-MCNC: 8.9 MG/DL (ref 8.3–10.6)
CHLORIDE BLD-SCNC: 94 MMOL/L (ref 99–110)
CO2: 22 MMOL/L (ref 21–32)
CREAT SERPL-MCNC: 13 MG/DL (ref 0.6–1.1)
GFR AFRICAN AMERICAN: 4
GFR NON-AFRICAN AMERICAN: 3
GLUCOSE BLD-MCNC: 154 MG/DL (ref 70–99)
GLUCOSE BLD-MCNC: 193 MG/DL (ref 70–99)
GLUCOSE BLD-MCNC: 195 MG/DL (ref 70–99)
GLUCOSE BLD-MCNC: 239 MG/DL (ref 70–99)
GLUCOSE BLD-MCNC: 327 MG/DL (ref 70–99)
GRAM STAIN RESULT: NORMAL
HCT VFR BLD CALC: 32.9 % (ref 36–48)
HEMOGLOBIN: 10.9 G/DL (ref 12–16)
INR BLD: 0.91 (ref 0.86–1.14)
MCH RBC QN AUTO: 30.6 PG (ref 26–34)
MCHC RBC AUTO-ENTMCNC: 33 G/DL (ref 31–36)
MCV RBC AUTO: 92.6 FL (ref 80–100)
PDW BLD-RTO: 14.1 % (ref 12.4–15.4)
PERFORMED ON: ABNORMAL
PLATELET # BLD: 256 K/UL (ref 135–450)
PMV BLD AUTO: 8.2 FL (ref 5–10.5)
POC ACT LR: 152 SEC
POC ACT LR: 205 SEC
POC ACT LR: 227 SEC
POTASSIUM SERPL-SCNC: 4.2 MMOL/L (ref 3.5–5.1)
PROTHROMBIN TIME: 10.6 SEC (ref 10–13.2)
RBC # BLD: 3.56 M/UL (ref 4–5.2)
SODIUM BLD-SCNC: 133 MMOL/L (ref 136–145)
VANCOMYCIN RANDOM: 7.9 UG/ML
WBC # BLD: 7.3 K/UL (ref 4–11)
WOUND/ABSCESS: NORMAL

## 2021-02-11 PROCEDURE — B4101ZZ FLUOROSCOPY OF ABDOMINAL AORTA USING LOW OSMOLAR CONTRAST: ICD-10-PCS | Performed by: SURGERY

## 2021-02-11 PROCEDURE — 2580000003 HC RX 258: Performed by: SURGERY

## 2021-02-11 PROCEDURE — 37225 HC FEM POP TERRITORY ATHERECTOMY: CPT

## 2021-02-11 PROCEDURE — 6370000000 HC RX 637 (ALT 250 FOR IP): Performed by: SURGERY

## 2021-02-11 PROCEDURE — 75774 ARTERY X-RAY EACH VESSEL: CPT

## 2021-02-11 PROCEDURE — 85027 COMPLETE CBC AUTOMATED: CPT

## 2021-02-11 PROCEDURE — 04CU3ZZ EXTIRPATION OF MATTER FROM LEFT PERONEAL ARTERY, PERCUTANEOUS APPROACH: ICD-10-PCS | Performed by: SURGERY

## 2021-02-11 PROCEDURE — 6370000000 HC RX 637 (ALT 250 FOR IP)

## 2021-02-11 PROCEDURE — 80048 BASIC METABOLIC PNL TOTAL CA: CPT

## 2021-02-11 PROCEDURE — APPNB30 APP NON BILLABLE TIME 0-30 MINS: Performed by: NURSE PRACTITIONER

## 2021-02-11 PROCEDURE — 90945 DIALYSIS ONE EVALUATION: CPT

## 2021-02-11 PROCEDURE — 75625 CONTRAST EXAM ABDOMINL AORTA: CPT | Performed by: SURGERY

## 2021-02-11 PROCEDURE — 6360000002 HC RX W HCPCS: Performed by: SURGERY

## 2021-02-11 PROCEDURE — 85610 PROTHROMBIN TIME: CPT

## 2021-02-11 PROCEDURE — 04CN3ZZ EXTIRPATION OF MATTER FROM LEFT POPLITEAL ARTERY, PERCUTANEOUS APPROACH: ICD-10-PCS | Performed by: SURGERY

## 2021-02-11 PROCEDURE — 85347 COAGULATION TIME ACTIVATED: CPT

## 2021-02-11 PROCEDURE — 75716 ARTERY X-RAYS ARMS/LEGS: CPT | Performed by: SURGERY

## 2021-02-11 PROCEDURE — 37229 HC TIB PER TERRITORY ATHER: CPT

## 2021-02-11 PROCEDURE — C1769 GUIDE WIRE: HCPCS

## 2021-02-11 PROCEDURE — 76937 US GUIDE VASCULAR ACCESS: CPT | Performed by: SURGERY

## 2021-02-11 PROCEDURE — 04CQ3ZZ EXTIRPATION OF MATTER FROM LEFT ANTERIOR TIBIAL ARTERY, PERCUTANEOUS APPROACH: ICD-10-PCS | Performed by: SURGERY

## 2021-02-11 PROCEDURE — 6360000002 HC RX W HCPCS: Performed by: HOSPITALIST

## 2021-02-11 PROCEDURE — 99152 MOD SED SAME PHYS/QHP 5/>YRS: CPT

## 2021-02-11 PROCEDURE — 36415 COLL VENOUS BLD VENIPUNCTURE: CPT

## 2021-02-11 PROCEDURE — C1887 CATHETER, GUIDING: HCPCS

## 2021-02-11 PROCEDURE — 99233 SBSQ HOSP IP/OBS HIGH 50: CPT | Performed by: INTERNAL MEDICINE

## 2021-02-11 PROCEDURE — 6360000002 HC RX W HCPCS

## 2021-02-11 PROCEDURE — C1725 CATH, TRANSLUMIN NON-LASER: HCPCS

## 2021-02-11 PROCEDURE — 6370000000 HC RX 637 (ALT 250 FOR IP): Performed by: NURSE PRACTITIONER

## 2021-02-11 PROCEDURE — C2623 CATH, TRANSLUMIN, DRUG-COAT: HCPCS

## 2021-02-11 PROCEDURE — APPSS30 APP SPLIT SHARED TIME 16-30 MINUTES: Performed by: NURSE PRACTITIONER

## 2021-02-11 PROCEDURE — C1724 CATH, TRANS ATHEREC,ROTATION: HCPCS

## 2021-02-11 PROCEDURE — 2580000003 HC RX 258: Performed by: HOSPITALIST

## 2021-02-11 PROCEDURE — 37229 PR REVSC OPN/PRQ TIB/PERO W/ATHRC/ANGIOP SM VSL: CPT | Performed by: SURGERY

## 2021-02-11 PROCEDURE — 2580000003 HC RX 258

## 2021-02-11 PROCEDURE — B41G1ZZ FLUOROSCOPY OF LEFT LOWER EXTREMITY ARTERIES USING LOW OSMOLAR CONTRAST: ICD-10-PCS | Performed by: SURGERY

## 2021-02-11 PROCEDURE — 6370000000 HC RX 637 (ALT 250 FOR IP): Performed by: INTERNAL MEDICINE

## 2021-02-11 PROCEDURE — 6360000004 HC RX CONTRAST MEDICATION: Performed by: SURGERY

## 2021-02-11 PROCEDURE — 99152 MOD SED SAME PHYS/QHP 5/>YRS: CPT | Performed by: SURGERY

## 2021-02-11 PROCEDURE — 75625 CONTRAST EXAM ABDOMINL AORTA: CPT

## 2021-02-11 PROCEDURE — 80202 ASSAY OF VANCOMYCIN: CPT

## 2021-02-11 PROCEDURE — C1894 INTRO/SHEATH, NON-LASER: HCPCS

## 2021-02-11 PROCEDURE — 75716 ARTERY X-RAYS ARMS/LEGS: CPT

## 2021-02-11 PROCEDURE — 37225 PR REVSC OPN/PRQ FEM/POP W/ATHRC/ANGIOP SM VSL: CPT | Performed by: SURGERY

## 2021-02-11 PROCEDURE — 99153 MOD SED SAME PHYS/QHP EA: CPT

## 2021-02-11 PROCEDURE — 2000000000 HC ICU R&B

## 2021-02-11 PROCEDURE — 2500000003 HC RX 250 WO HCPCS

## 2021-02-11 RX ORDER — CLOPIDOGREL BISULFATE 75 MG/1
75 TABLET ORAL DAILY
Status: DISCONTINUED | OUTPATIENT
Start: 2021-02-12 | End: 2021-02-12 | Stop reason: HOSPADM

## 2021-02-11 RX ORDER — LACTOBACILLUS RHAMNOSUS GG 10B CELL
1 CAPSULE ORAL 2 TIMES DAILY WITH MEALS
Status: DISCONTINUED | OUTPATIENT
Start: 2021-02-11 | End: 2021-02-12 | Stop reason: HOSPADM

## 2021-02-11 RX ORDER — SODIUM CHLORIDE 0.9 % (FLUSH) 0.9 %
10 SYRINGE (ML) INJECTION EVERY 12 HOURS SCHEDULED
Status: DISCONTINUED | OUTPATIENT
Start: 2021-02-11 | End: 2021-02-12 | Stop reason: HOSPADM

## 2021-02-11 RX ORDER — ACETAMINOPHEN 650 MG
TABLET, EXTENDED RELEASE ORAL DAILY
Status: DISCONTINUED | OUTPATIENT
Start: 2021-02-11 | End: 2021-02-12 | Stop reason: HOSPADM

## 2021-02-11 RX ORDER — ASPIRIN 81 MG/1
81 TABLET ORAL DAILY
Status: DISCONTINUED | OUTPATIENT
Start: 2021-02-12 | End: 2021-02-12 | Stop reason: HOSPADM

## 2021-02-11 RX ORDER — ATORVASTATIN CALCIUM 20 MG/1
20 TABLET, FILM COATED ORAL NIGHTLY
Status: DISCONTINUED | OUTPATIENT
Start: 2021-02-11 | End: 2021-02-12 | Stop reason: HOSPADM

## 2021-02-11 RX ORDER — AMOXICILLIN AND CLAVULANATE POTASSIUM 500; 125 MG/1; MG/1
1 TABLET, FILM COATED ORAL
Status: DISCONTINUED | OUTPATIENT
Start: 2021-02-11 | End: 2021-02-12 | Stop reason: HOSPADM

## 2021-02-11 RX ORDER — IODIXANOL 320 MG/ML
70 INJECTION, SOLUTION INTRAVASCULAR ONCE
Status: COMPLETED | OUTPATIENT
Start: 2021-02-11 | End: 2021-02-11

## 2021-02-11 RX ORDER — ACETAMINOPHEN 325 MG/1
650 TABLET ORAL EVERY 4 HOURS PRN
Status: DISCONTINUED | OUTPATIENT
Start: 2021-02-11 | End: 2021-02-12 | Stop reason: HOSPADM

## 2021-02-11 RX ORDER — SODIUM CHLORIDE 0.9 % (FLUSH) 0.9 %
10 SYRINGE (ML) INJECTION PRN
Status: DISCONTINUED | OUTPATIENT
Start: 2021-02-11 | End: 2021-02-12 | Stop reason: HOSPADM

## 2021-02-11 RX ORDER — SODIUM CHLORIDE 9 MG/ML
INJECTION, SOLUTION INTRAVENOUS CONTINUOUS
Status: DISCONTINUED | OUTPATIENT
Start: 2021-02-11 | End: 2021-02-12 | Stop reason: HOSPADM

## 2021-02-11 RX ADMIN — SEVELAMER CARBONATE 800 MG: 800 TABLET, FILM COATED ORAL at 11:49

## 2021-02-11 RX ADMIN — AMOXICILLIN AND CLAVULANATE POTASSIUM 1 TABLET: 500; 125 TABLET, FILM COATED ORAL at 16:13

## 2021-02-11 RX ADMIN — IODIXANOL 70 ML: 320 INJECTION, SOLUTION INTRAVASCULAR at 10:03

## 2021-02-11 RX ADMIN — PIPERACILLIN AND TAZOBACTAM 3375 MG: 3; .375 INJECTION, POWDER, LYOPHILIZED, FOR SOLUTION INTRAVENOUS at 13:37

## 2021-02-11 RX ADMIN — PIPERACILLIN AND TAZOBACTAM 3375 MG: 3; .375 INJECTION, POWDER, LYOPHILIZED, FOR SOLUTION INTRAVENOUS at 01:15

## 2021-02-11 RX ADMIN — SODIUM CHLORIDE: 9 INJECTION, SOLUTION INTRAVENOUS at 11:34

## 2021-02-11 RX ADMIN — AMLODIPINE BESYLATE 10 MG: 5 TABLET ORAL at 11:49

## 2021-02-11 RX ADMIN — METOPROLOL TARTRATE 50 MG: 50 TABLET, FILM COATED ORAL at 21:03

## 2021-02-11 RX ADMIN — SEVELAMER CARBONATE 800 MG: 800 TABLET, FILM COATED ORAL at 21:02

## 2021-02-11 RX ADMIN — TORSEMIDE 200 MG: 100 TABLET ORAL at 11:49

## 2021-02-11 RX ADMIN — METOPROLOL TARTRATE 50 MG: 50 TABLET, FILM COATED ORAL at 11:49

## 2021-02-11 RX ADMIN — GENTAMICIN SULFATE: 1 CREAM TOPICAL at 18:24

## 2021-02-11 RX ADMIN — Medication: at 13:43

## 2021-02-11 RX ADMIN — Medication 10 ML: at 11:49

## 2021-02-11 RX ADMIN — Medication 10 ML: at 16:21

## 2021-02-11 RX ADMIN — ATORVASTATIN CALCIUM 20 MG: 20 TABLET, FILM COATED ORAL at 21:03

## 2021-02-11 RX ADMIN — Medication 1 CAPSULE: at 16:12

## 2021-02-11 RX ADMIN — PANTOPRAZOLE SODIUM 40 MG: 40 TABLET, DELAYED RELEASE ORAL at 21:03

## 2021-02-11 ASSESSMENT — ENCOUNTER SYMPTOMS
EYE REDNESS: 0
ABDOMINAL PAIN: 0
APNEA: 0
BLOOD IN STOOL: 0
SHORTNESS OF BREATH: 0
NAUSEA: 0
RHINORRHEA: 0
EYE DISCHARGE: 0
CHEST TIGHTNESS: 0
PHOTOPHOBIA: 0
CHOKING: 0
COLOR CHANGE: 0
COUGH: 0
TROUBLE SWALLOWING: 0
DIARRHEA: 0
FACIAL SWELLING: 0
STRIDOR: 0

## 2021-02-11 ASSESSMENT — PAIN SCALES - GENERAL: PAINLEVEL_OUTOF10: 0

## 2021-02-11 NOTE — PROGRESS NOTES
Vascular    Discussed with pt need for pregnancy test prior to proceeding with procedure. Pt stated that she was not pregnant and did not want pregnancy test.  She verbally consented to moving forward with procedure without test.  I discussed possible risks of radiation exposure if she were to be pregnant. Veronika Ayala M.D., FACS.   2/11/2021  8:40 AM

## 2021-02-11 NOTE — PROGRESS NOTES
[facility]    2/11/2021 12:39 PM        Name: Emy Ingram . Admitted: 2/8/2021  Primary Care Provider: Olamide Casas MD (Tel: [phone])      Subjective:  . Feeling ok, no complaints. Denies pain. Just returned from having left leg angioplasty. She is hopeful for discharge tomorrow.     Reviewed interval ancillary notes    Current Medications      0.9 % sodium chloride infusion, Continuous      sodium chloride flush 0.9 % injection 10 mL, 2 times per day      sodium chloride flush 0.9 % injection 10 mL, PRN      acetaminophen (TYLENOL) tablet 650 mg, Q4H PRN      [START ON 2/12/2021] clopidogrel (PLAVIX) tablet 75 mg, Daily      atorvastatin (LIPITOR) tablet 20 mg, Nightly      [START ON 2/12/2021] aspirin EC tablet 81 mg, Daily      povidone-iodine (BETADINE) 10 % external solution, Daily      piperacillin-tazobactam (ZOSYN) 3,375 mg in dextrose 5 % 50 mL IVPB (mini-bag), Q12H      amLODIPine (NORVASC) tablet 10 mg, Daily      insulin detemir (LEVEMIR) injection pen 16 Units (Patient Supplied), Nightly      melatonin tablet 3 mg, Nightly PRN      torsemide (DEMADEX) tablet 200 mg, Daily      glucose (GLUTOSE) 40 % oral gel 15 g, PRN      dextrose 50 % IV solution, PRN      glucagon (rDNA) injection 1 mg, PRN      dextrose 5 % solution, PRN      sodium chloride flush 0.9 % injection 10 mL, 2 times per day      sodium chloride flush 0.9 % injection 10 mL, PRN      [Held by provider] heparin (porcine) injection 5,000 Units, 3 times per day      promethazine (PHENERGAN) tablet 12.5 mg, Q6H PRN    Or      ondansetron (ZOFRAN) injection 4 mg, Q6H PRN      polyethylene glycol (GLYCOLAX) packet 17 g, Daily PRN      acetaminophen (TYLENOL) suppository 650 mg, Q6H PRN      metoprolol tartrate (LOPRESSOR) tablet 50 mg, BID      insulin aspart (NOVOLOG) injection pen 15 Units (Patient Supplied), TID WC      insulin aspart (NOVOLOG) injection pen 0-6 Units (Patient Supplied), TID WC      insulin aspart (NOVOLOG) injection vial 0-3 Units (Patient Supplied), Nightly      gentamicin (GARAMYCIN) 0.1 % cream, Daily PRN      pantoprazole (PROTONIX) tablet 40 mg, Nightly      sevelamer (RENVELA) tablet 800 mg, TID WC        Objective:  BP (!) 197/64   Pulse 55   Temp 97.3 °F (36.3 °C) (Temporal)   Resp 13   Ht 5' 6\" (1.676 m)   Wt 253 lb 15.5 oz (115.2 kg)   LMP 01/15/2021   SpO2 96%   BMI 40.99 kg/m²     Intake/Output Summary (Last 24 hours) at 2/11/2021 1239  Last data filed at 2/11/2021 2153  Gross per 24 hour   Intake 10 ml   Output 1639 ml   Net -1629 ml      Wt Readings from Last 3 Encounters:   02/11/21 253 lb 15.5 oz (115.2 kg)   09/06/18 258 lb 9.6 oz (117.3 kg)   08/16/18 267 lb 13.7 oz (121.5 kg)       General appearance:  Appears comfortable  Eyes: Sclera clear. Pupils equal.  ENT: Moist oral mucosa. Trachea midline, no adenopathy. Cardiovascular: Regular rhythm, normal S1, S2. No murmur. No edema in lower extremities  Respiratory: Not using accessory muscles. Good inspiratory effort. Clear to auscultation bilaterally, no wheeze or crackles. GI: Abdomen soft, no tenderness, not distended, normal bowel sounds  Musculoskeletal: No cyanosis in digits, neck supple  Neurology: CN 2-12 grossly intact. No speech or motor deficits  Psych: Normal affect. Alert and oriented in time, place and person  Skin: dry gangrene L 4th toe.      Labs and Tests:  CBC:   Recent Labs     02/08/21  1408 02/09/21  0616 02/11/21  0432   WBC 9.9 7.7 7.3   HGB 11.7* 10.8* 10.9*    272 256     BMP:    Recent Labs     02/08/21  1932 02/09/21  0615 02/11/21  0432   * 137  135* 133*   K 3.9 3.9  3.8 4.2   CL 93* 96*  95* 94*   CO2 22 19*  22 22   BUN 81* 76*  75* 75*   CREATININE 13.6* 13.4*  13.5* 13.0*   GLUCOSE 194* 99  101* 239*     Hepatic:   Recent Labs     02/08/21  1408 AST 10*   ALT 9*   BILITOT 0.3   ALKPHOS 99     Angiogram by Dr Cyrus Cagle  Procedure: 1. Ultrasound guided right CFA access  2. Aortogram with BLE runoff  3. Selective left AT angiogram  4. Left TP trunk and peroneal atherectomy  5. Left TP trunk and peroneal PTA  6. Left popliteal atherectomy  7. Left popliteal PTA    Problem List  Active Problems:    Type 2 diabetes mellitus with left diabetic foot infection (HCC)    Diabetes mellitus type 2 in obese (HCC)    ESRD (end stage renal disease) ([facility] [address]    Dry gangrene (HCC)    Non-smoker    Elevated C-reactive protein (CRP)    Elevated sed rate    Diabetic polyneuropathy associated with type 2 diabetes mellitus ([facility] [address]    Diabetes education, encounter for  Resolved Problems:    * No resolved hospital problems. *       Assessment & Plan:   1. Gangrene L 4th toe-Podiatry, Vascular, and ID on board. Currently on zosyn. MRI negative for osteo. Await final ID and podiatry input. 2. PVD-patient is status post left tibioperoneal trunk and peroneal atherectomy with left tibioperoneal trunk and peroneal angioplasty, left popliteal atherectomy and left popliteal angioplasty. On aspirin and Plavix. Discussed with vascular. 3. ESRD-on peritoneal dialysis. Nephro on board. 4. Dm 2-a1c 8.9. on levemir 16 qhs, novolog 15 tid plu sSSI. Sugars currently acceptable. 5. Hypertension- On norvasc and metoprolol. Discussed with Dr Urban Marlow. 6. Disposition-DC tomorrow if okay with rest of consultants.       Diet: DIET CARB CONTROL;  Code:Full Code  DVT PPX: heparin      Mike Hansen PA-C   2/11/2021 12:39 PM

## 2021-02-11 NOTE — OP NOTE
Brief Postoperative Note    Baldev Chan  YOB: 1975  [id number]    Pre-operative Diagnosis: left 3rd foot gangrene    Post-operative Diagnosis: Same    Procedure: 1. Ultrasound guided right CFA access  2. Aortogram with BLE runoff  3. Selective left AT angiogram  4. Left TP trunk and peroneal atherectomy  5. Left TP trunk and peroneal PTA  6. Left popliteal atherectomy  7.   Left popliteal PTA    Anesthesia: Local    Surgeons/Assistants: Ria Urban    Estimated Blood Loss: less than 50     Complications: None    Specimens: Was Not Obtained    Findings: 70% left popliteal stenosis, 80% left TP trunk stenosis    Electronically signed by Usha Gracia MD on 2/11/2021 at 9:47 AM

## 2021-02-11 NOTE — FLOWSHEET NOTE
CCPD treatment completed and patient disconnected from cycler per protocol. Effluent: clear yellow Fibrin (no) Specimen collected and sent to lab (yes/no)  Total time: 8hrs 22min   Total UF:  1558 ml. Total Volume:  8007 ml. Dwell time gained:  0 hr 27min. Tolerance of procedure : Tolerated well. No complications noted.         02/11/21 0812   Vitals   BP (!) 143/93   Temp 97.5 °F (36.4 °C)   Temp Source Temporal   Pulse 93   Resp 18   Cycler   Ultrafiltration (UF) (mL) 1558 mL   Average Dwell Time (Hours:Minutes) 90   Lost Dwell Time (Hours:Minutes) 0

## 2021-02-11 NOTE — PLAN OF CARE
Problem: Cardiovascular  Goal: Hemodynamic stability  2/11/2021 1848 by Pratima Cook RN  Outcome: Ongoing  Note: Hypertension - chronic      Problem: Pain Control  Goal: Maintain pain level at or below patient's acceptable level (or 5 if patient is unable to determine acceptable level)  2/11/2021 1848 by Pratima Cook RN  Outcome: Ongoing  Note: Pt has no c/o pain      Problem: Skin Integrity:  Goal: Will show no infection signs and symptoms  2/11/2021 1848 by Pratima Cook RN  Outcome: Ongoing  Note: Pt showing no S/S of infection; afebrile this shift      Problem: Serum Glucose Level - Abnormal:  Goal: Ability to maintain appropriate glucose levels will improve  2/11/2021 1848 by Pratima Cook RN  Outcome: Ongoing  Note: ACHS- home diabetes meds      Problem: Falls - Risk of:  Goal: Will remain free from falls  2/11/2021 1848 by Pratima Cook RN  Outcome: Ongoing  Note: Fall Risk = medium; Up as tolerated; Patient has steady gait; Ambulation Equipment: None; Call-light within reach and patient uses call-light for assistance; Bed alarm: No; Non-skid socks worn when out of bed; Side rails up 2/4; Educated patient on fall risk status and need to call for assistance;  Patient verbalizes understanding of fall risk status and fall education; Pt experienced no falls/injuries this shift

## 2021-02-11 NOTE — PROGRESS NOTES
Office : [phone]     Fax :[phone]       Nephrology progress Note      Patient's Name: Kira Starks  9:13 AM  2/11/2021    Reason for Consult:  ESRD. Chief Complaint:    Chief Complaint   Patient presents with    Other     Sent by provider, middle toe on right foot necrotic, pt states no pain, bilster on ring toe and redness noted to nail on big toe, hx diabetes neuropathy and dialysis         History of Present iIlness:    Kira Starks is a 54 y.o. female with h/o ESRD, HTN, DM 2, Anemia of chronic ds, secondary hyperparathyroidism who is on peritoneal dialysis and follows with me. She called today with c/o change in color of 3rd toe of left Foot and blister on 2nd toe. Has necrosis of 3rd toe. She noticed it on Friday night. She is doing her dialysis daily   Uses cycler for peritoneal dialysis         Interval history. Left toe gangrene area stabilized. No drainage noted. S/p angiogram  PROCEDURE:  1. Ultrasound-guided right common femoral artery access. 2.  Abdominal aortogram with bilateral lower extremity runoff. 3.  Selective left anterior tibial artery angiogram.  4.  Selective left peroneal artery angiogram.  5.  Left tibioperoneal trunk and peroneal atherectomy ([facility]  1.25 mm orbital atherectomy. 6.  Left TP trunk and peroneal PTA ([place] 2.5 x 150, 3 x 60). 7.  Left popliteal atherectomy ([facility] 1.25 mm orbital  atherectomy). 8.  Left popliteal PTA (William 4 x 100, IN. PACT 4 x 120). I/O last 3 completed shifts:   In: 18 [P.O.:560; I.V.:10]  Out: 80     Past Medical History:   Diagnosis Date    Diabetes mellitus ([facility] [address]     Hypertension          Current Medications:        0.9 % sodium chloride infusion, Continuous     piperacillin-tazobactam (ZOSYN) 3,375 mg in dextrose 5 % 50 mL IVPB (mini-bag), Q12H      amLODIPine (NORVASC) tablet 10 mg, Daily      insulin detemir (LEVEMIR) injection pen 16 Units (Patient Supplied), Nightly      melatonin tablet 3 mg, Nightly PRN      torsemide (DEMADEX) tablet 200 mg, Daily      glucose (GLUTOSE) 40 % oral gel 15 g, PRN      dextrose 50 % IV solution, PRN      glucagon (rDNA) injection 1 mg, PRN      dextrose 5 % solution, PRN      sodium chloride flush 0.9 % injection 10 mL, 2 times per day      sodium chloride flush 0.9 % injection 10 mL, PRN      [Held by provider] heparin (porcine) injection 5,000 Units, 3 times per day      promethazine (PHENERGAN) tablet 12.5 mg, Q6H PRN    Or      ondansetron (ZOFRAN) injection 4 mg, Q6H PRN      polyethylene glycol (GLYCOLAX) packet 17 g, Daily PRN      acetaminophen (TYLENOL) tablet 650 mg, Q6H PRN    Or      acetaminophen (TYLENOL) suppository 650 mg, Q6H PRN      metoprolol tartrate (LOPRESSOR) tablet 50 mg, BID      insulin aspart (NOVOLOG) injection pen 15 Units (Patient Supplied), TID WC      insulin aspart (NOVOLOG) injection pen 0-6 Units (Patient Supplied), TID WC      insulin aspart (NOVOLOG) injection vial 0-3 Units (Patient Supplied), Nightly      gentamicin (GARAMYCIN) 0.1 % cream, Daily PRN      pantoprazole (PROTONIX) tablet 40 mg, Nightly      sevelamer (RENVELA) tablet 800 mg, TID WC        Physical exam:     Vitals:  BP (!) 158/66   Pulse 57   Temp 97.5 °F (36.4 °C) (Temporal)   Resp 18   Ht 5' 6\" (1.676 m)   Wt 240 lb 8.4 oz (109.1 kg)   LMP 01/15/2021   SpO2 97%   BMI 38.82 kg/m²   Constitutional:  OAA X3 NAD  Skin: no rash, turgor wnl  Heent:  eomi, mmm  Neck: no bruits or jvd noted  Cardiovascular:  S1, S2 without m/r/g  Respiratory: CTA B without w/r/r  Abdomen:  +bs, soft, nt, nd  Ext: no  lower extremity edema, necrotic 3rd toe rt foot     Labs:  CBC:   Recent Labs     02/08/21  1408 management   Tolerated PD well. Continue PD at night  Use CCPD   2.5 % solution   4 cycles   Total time 8 hrs   D/w Dialysis nurse     2. HTN. Better controlled. Continue current  home BP meds     3. Anemia of chronic ds. Gets LUIZA  Hemoglobin 10.8    4. DM 2. On insulin     5. Electrolytes. Monitor     6. Hyperphosphatemia. Takes velphoro at home  Phos level is 7.4. Will give Renvela 1 tab p.o. 3 times daily with meals    7. Dry gangrene left foot 3rd toe.    S/p angiogram and angioplasty   2nd toe left foot- dry, no drainage noted   BC so far negative   Wound culture no growth last 3 days   Zosyn stopped per ID   augmentin PO started   No evidence of osteomyelitis on MRI       Thank you for allowing us to participate in care of Baldev Chan         Electronically signed by: Abdelrahman Vargas MD, 2/11/2021, 9:13 AM      [facility] [address]  Office : [phone]  Fax :[phone]

## 2021-02-11 NOTE — PROGRESS NOTES
Vascular Progress Note    2/11/2021 12:26 PM    Chief complaint / Reason for visit : left 3rd toe gangrene     Subjective:  Patient resting in bed. She reports she is doing well with no complaints at this time. Appears hemodynamically stable, afebrile. Vital Signs: BP (!) 210/66   Pulse 60   Temp 97.3 °F (36.3 °C) (Temporal)   Resp 12   Ht 5' 6\" (1.676 m)   Wt 253 lb 15.5 oz (115.2 kg)   LMP 01/15/2021   SpO2 96%   BMI 40.99 kg/m²      I/O:      Intake/Output Summary (Last 24 hours) at 2/11/2021 1226  Last data filed at 2/11/2021 4781  Gross per 24 hour   Intake 330 ml   Output 1639 ml   Net -1309 ml       Physical Exam:   General: no apparent distress, appears stated age  Chest/Lungs: no accessory muscle use  Abdomen: + PD catheter   Vascular:  palpable left DP/PT pulses with biphasic doppler   Extremities: left 3rd toe cyanosis, no purulence or malodor, feet warm and well perfused   Right groin site soft, no hematoma or bleeding     Labs:   Lab Results   Component Value Date     02/11/2021    K 4.2 02/11/2021    K 3.8 02/09/2021    CL 94 02/11/2021    CO2 22 02/11/2021    BUN 75 02/11/2021    CREATININE 13.0 02/11/2021    GFRAA 4 02/11/2021    GFRAA >60 04/09/2013    LABGLOM 3 02/11/2021    GLUCOSE 239 02/11/2021    PHOS 7.4 02/09/2021    PHOS 7.6 02/09/2021    MG 2.40 08/15/2018    CALCIUM 8.9 02/11/2021     Lab Results   Component Value Date    WBC 7.3 02/11/2021    RBC 3.56 02/11/2021    HGB 10.9 02/11/2021    HCT 32.9 02/11/2021    MCV 92.6 02/11/2021    RDW 14.1 02/11/2021     02/11/2021     Lab Results   Component Value Date    INR 0.91 02/11/2021    PROTIME 10.6 02/11/2021        Imaging:    LLE arterial duplex 2/9/21:  Conclusions        Summary        -Limited study due to shadowing from calcification in calf and calcified    arteries.    -Bilateral MARIO's were not available due to extremely high brachial blood    pressure (systolic over 672 mmHG).     -Elevated velocity of the left distal popliteal artery suggest a greater    than 50% stenosis. MRI left foot 2/9/21:  Impression   1. No osteomyelitis or other acute osseous abnormality. 2. Skin blister along the lateral aspect of the 2nd toe with underlying   subcutaneous edema compatible with cellulitis.             Scheduled Meds:    sodium chloride flush  10 mL Intravenous 2 times per day    [START ON 2/12/2021] clopidogrel  75 mg Oral Daily    atorvastatin  20 mg Oral Nightly    [START ON 2/12/2021] aspirin  81 mg Oral Daily    povidone-iodine   Topical Daily    piperacillin-tazobactam  3,375 mg Intravenous Q12H    amLODIPine  10 mg Oral Daily    insulin detemir  16 Units Subcutaneous Nightly    torsemide  200 mg Oral Daily    sodium chloride flush  10 mL Intravenous 2 times per day    [Held by provider] heparin (porcine)  5,000 Units Subcutaneous 3 times per day    metoprolol tartrate  50 mg Oral BID    insulin aspart  15 Units Subcutaneous TID WC    insulin aspart  0-6 Units Subcutaneous TID WC    insulin aspart  0-3 Units Subcutaneous Nightly    pantoprazole  40 mg Oral Nightly    sevelamer  800 mg Oral TID WC     Continuous Infusions:    sodium chloride 50 mL/hr at 02/11/21 1134    dextrose           Assessment:   Left 3rd toe dry gangrene   PVD s/p left TP trunk, peroneal and popliteal PTA and atherectomy - POD # 0 - good distal perfusion   DM type 2, uncontrolled - last A1c 8.6 (5/16/18)  ESRD on PD    Plan: Will start on Aspirin 81 mg, Plavix 75 mg and statin therapy. Continue local wound care to right 3rd toe ulceration with betadine daily. Podiatry following. Continue IVFs for renal protection, nephrology following. No further vascular intervention or testing at this time. Follow up with Dr. Emmy Paris in 2-3 weeks. Patient educated on plan of care and disease process. All questions answered.         Electronically signed by RICHARD Laws CNP on 2/11/2021 at 12:26 PM

## 2021-02-11 NOTE — FLOWSHEET NOTE
CCPD Order   Exchanges: 4   Exchange Volume: 2000 ml   Total Time: 8 hrs   Dextrose: 2.5%   Last Fill: 0 ml   Total Volume: 8000 ml     Orders verified. Supplies taken to pt's room. Report received. Cycler set up, primed and pre tested. Dressing changed on [facility] Cath site. Pt connected to cycler. CCPD initiated without problem. Initial effluent clear.         02/11/21 1820   Vitals   BP (!) 165/65   Temp 97.7 °F (36.5 °C)   Temp Source Temporal   Pulse 67   Resp 18   Weight 246 lb 4.1 oz (111.7 kg)   Cycler   Verification of Prescription CCPD   Total Volume Programmed 8000 mL   Therapy Time (Hours:Minutes) 8:00   Fill Volume 2000 mL   Last Fill Volume 0 mL   Dextrose Setting Same (Nonextraneal)   Number of Cycles 4   Bag Volume 5000 mL   Number of Bags Used 2   Dianeal Solution Other (Comment)  (Delflex 2.5% in 5000 ml)

## 2021-02-11 NOTE — PROGRESS NOTES
Infectious Diseases   Progress Note      Admission Date: 2/8/2021  Hospital Day: Hospital Day: 4   Attending: Alecia Gibbs MD  Date of service: 2/11/2021     Chief complaint/ Reason for consult:     · Left diabetic foot infection  · Elevated sed rate of 84  · Elevated CRP of 8.9.  · Longstanding type 2 diabetes mellitus    Microbiology:        I have reviewed allavailable micro lab data and cultures    · Blood culture (2/2) - collected on 2/8/2021: In process  · Left toe wound culture  - collected on 2/8/2021: In process      Antibiotics and immunizations:       Current antibiotics: All antibiotics and their doses were reviewed by me    Recent Abx Admin                   piperacillin-tazobactam (ZOSYN) 3,375 mg in dextrose 5 % 50 mL IVPB (mini-bag) (mg) 3,375 mg New Bag 02/11/21 1337     3,375 mg New Bag  0115                  Immunization History: All immunization history was reviewed by me today. Immunization History   Administered Date(s) Administered    Influenza Virus Vaccine 10/23/2014, 12/09/2015    Influenza, Quadv, IM, (6 mo and older Fluzone, Flulaval, Fluarix and 3 yrs and older Afluria) 12/07/2018    Influenza, Quadv, IM, PF (6 mo and older Fluzone, Flulaval, Fluarix, and 3 yrs and older Afluria) 11/08/2016    Pneumococcal Polysaccharide ([id number]) 04/28/2017    Td, unspecified formulation 07/01/2007       Known drug allergies: All allergies were reviewed and updated    Allergies   Allergen Reactions    Zetia [Ezetimibe] Shortness Of Breath    Humalog [Insulin Lispro]      SOB, Leg swelling, fatigue    Lisinopril Swelling    Lantus [Insulin Glargine] Nausea And Vomiting     Side effect, not an allergy    Victoza [Liraglutide] Nausea And Vomiting     Side effect, not an allergy         Social history:     Social History:  All social andepidemiologic history was reviewed and updated by me today as needed. · Tobacco use:   reports that she has never smoked.  She has never used smokeless tobacco.  · Alcohol use:   reports no history of alcohol use. · Currently lives in: [place] [id number]  ·  reports no history of drug use. Assessment:     The patient is a 54 y.o. old female who  has a past medical history of Diabetes mellitus ([address] and Hypertension. with following problems:    · Left diabetic foot infection culture negative so far  · Elevated sed rate of 84-monitor trends  · Elevated CRP of 8.9.  · Longstanding type 2 diabetes mellitus-maintain good glycemic control  · ESRD on peritoneal dialysis  · Essential hypertension-blood pressure okay  · Non-smoker  · Obesity Class 2 due to excess calorie intake : Body mass index is 39.11 kg/m².-Counseling done      Discussion:      She remains on IV Zosyn. She has received 1 dose of 1 g IV vancomycin on 2/9/2020. She is a peritoneal dialysis patient. Vancomycin random level was 7.9 today. Left second toe wound culture from 2/8/2021 remains negative. The patient underwent leg angiography today with atherectomy of multiple vessels    Plan:     Diagnostic Workup:      · Continue to follow  fever curve, WBC count and blood cultures. · Continue to monitor blood counts, liver and renal function. Antimicrobials:    · Will stop IV Zosyn today  · Will order oral Augmentin 500 mg every 24 hour in case there is an infection on the left second toe  · No need for further IV vancomycin  · We will follow up on the culture results and clinical progress and will make further recommendations accordingly. · We will follow up on podiatry surgical plans  · Continue close vitals monitoring. · Maintain good glycemic control. · Fall precautions. Aspiration precautions. · Continue to watch for new fever or diarrhea. · DVT prophylaxis. · Discussed all above with patient and RN.       Drug Monitoring:    · Continue monitoring for antibiotic toxicity as follows: CBC, CMP   · Continue to watch for following: new or worsening fever, new hypotension, hives, lip swelling and redness or purulence at vascular access sites. I/v access Management:    · Continue to monitor i.v access sites for erythema, induration, discharge or tenderness. · As always, continue efforts to minimize tubes/lines/drains as clinically appropriate to reduce chances of line associated infections. Patient education and counseling:       · The patient was educated in detail about the side-effects of various antibiotics and things to watch for like new rashes, lip swelling, severe reaction, worsening diarrhea, break through fever etc.  · Discussed patient's condition and what to expect. All of the patient's questions were addressed in a satisfactory manner and patient verbalized understanding all instructions. Level of complexity of visit: High     Weight loss counseling:    Extensive weight loss counseling was done. It is important to set a realistic weight loss goal. First goal should be to avoid gaining more weight and staying at current weight (or within 5 percent). People at high risk of developing diabetes who are able to lose 5 percent of their body weight and maintain this weight will reduce their risk of developing diabetes by about 50 percent and reduce their blood pressure. Losing more than 15 percent of  body weight and staying at this weight is an extremely good result, even if you never reach your \"dream\" or \"ideal\" weight. Lifestyle changes including changing eating habits, substituting excess carbohydrates with proteins, stress reduction, using self-help programs like Weight Watchers®, Overeaters Anonymous®, and Take Off Pounds Sensibly (TOPS)© , following DASH diet and increasing exercise or walking briskly daily for half hour to and hour 5-7 days a week was suggested among other measures.  Information was given about various weight loss education programs and their websites like www.cdc.gov/healthyweight, www.choosemyplate.gov and www.health.gov/dietaryguidelines/    TIME SPENT TODAY:     - Spent over  36  minutes on visit (including interval history, physical exam, review of data including labs, cultures, imaging, development and implementation of treatment plan and coordination of complex care). More than 50 percent of this includes face-to-face time spent with the patient for counseling and coordination of care. Thank you for involving me in the care of your patient. I will continue to follow. If you have anyadditional questions, please do not hesitate to contact me. Subjective: Interval history: Interval history was obtained from chart review and patient/ RN. She is afebrile. She is on IV Zosyn. She is tolerating it okay. REVIEW OF SYSTEMS:      Review of Systems   Constitutional: Negative for chills, diaphoresis and unexpected weight change. HENT: Negative for congestion, ear discharge, ear pain, facial swelling, hearing loss, rhinorrhea and trouble swallowing. Eyes: Negative for photophobia, discharge, redness and visual disturbance. Respiratory: Negative for apnea, cough, choking, chest tightness, shortness of breath and stridor. Cardiovascular: Negative for chest pain and palpitations. Gastrointestinal: Negative for abdominal pain, blood in stool, diarrhea and nausea. Endocrine: Negative for polydipsia, polyphagia and polyuria. Genitourinary: Negative for difficulty urinating, dysuria, frequency, hematuria, menstrual problem and vaginal discharge. Musculoskeletal: Negative for arthralgias, joint swelling, myalgias and neck stiffness. Skin: Negative for color change and rash. Allergic/Immunologic: Negative for immunocompromised state. Neurological: Negative for dizziness, seizures, speech difficulty, light-headedness and headaches. Hematological: Negative for adenopathy. Psychiatric/Behavioral: Negative for agitation, hallucinations and suicidal ideas. Past Medical History:  All Findings: No erythema or rash. Comments: Partial left second toe gangrene noted again   Neurological:      Mental Status: She is alert and oriented to person, place, and time. Motor: No abnormal muscle tone. Psychiatric:         Judgment: Judgment normal.            Lines: All vascular access sites are healthy with no local erythema, discharge or tenderness. Intake and output:    I/O last 3 completed shifts: In: 10 [I.V.:10]  Out: 1639     Lab Data:   All available labs and old records have been reviewed by me. CBC:  Recent Labs     02/09/21  0616 02/11/21 0432   WBC 7.7 7.3   RBC 3.61* 3.56*   HGB 10.8* 10.9*   HCT 33.1* 32.9*    256   MCV 91.5 92.6   MCH 29.9 30.6   MCHC 32.6 33.0   RDW 14.1 14.1        BMP:  Recent Labs     02/08/21  1932 02/09/21  0615 02/11/21 0432   * 137  135* 133*   K 3.9 3.9  3.8 4.2   CL 93* 96*  95* 94*   CO2 22 19*  22 22   BUN 81* 76*  75* 75*   CREATININE 13.6* 13.4*  13.5* 13.0*   CALCIUM 9.1 9.0  9.0 8.9   GLUCOSE 194* 99  101* 239*        Hepatic Function Panel:   Lab Results   Component Value Date    ALKPHOS 99 02/08/2021    ALT 9 02/08/2021    AST 10 02/08/2021    PROT 7.5 02/08/2021    PROT 7.1 12/13/2012    BILITOT 0.3 02/08/2021    BILIDIR 0.1 03/06/2014    IBILI 0.1 03/06/2014    LABALBU 3.1 02/09/2021       CPK:   Lab Results   Component Value Date    CKTOTAL 45 12/19/2013     ESR:   Lab Results   Component Value Date    SEDRATE 84 (H) 02/08/2021     CRP:   Lab Results   Component Value Date    CRP 8.9 (H) 02/08/2021           Imaging: All pertinent images and reports for the current visit were reviewed by me during this visit. MRI FOOT LEFT WO CONTRAST   Final Result   1. No osteomyelitis or other acute osseous abnormality. 2. Skin blister along the lateral aspect of the 2nd toe with underlying   subcutaneous edema compatible with cellulitis.          VL DUP LOWER EXTREMITY ARTERIES LEFT   Final Result      XR FOOT LEFT (MIN 3 VIEWS)   Final Result   1. No definite evidence of osteomyelitis radiographically. 2. Soft tissue swelling at the medial aspect of the 2nd digit             Medications: All current and past medications were reviewed.      sodium chloride flush  10 mL Intravenous 2 times per day    [START ON 2/12/2021] clopidogrel  75 mg Oral Daily    atorvastatin  20 mg Oral Nightly    [START ON 2/12/2021] aspirin  81 mg Oral Daily    povidone-iodine   Topical Daily    piperacillin-tazobactam  3,375 mg Intravenous Q12H    amLODIPine  10 mg Oral Daily    insulin detemir  16 Units Subcutaneous Nightly    torsemide  200 mg Oral Daily    sodium chloride flush  10 mL Intravenous 2 times per day    [Held by provider] heparin (porcine)  5,000 Units Subcutaneous 3 times per day    metoprolol tartrate  50 mg Oral BID    insulin aspart  15 Units Subcutaneous TID WC    insulin aspart  0-6 Units Subcutaneous TID WC    insulin aspart  0-3 Units Subcutaneous Nightly    pantoprazole  40 mg Oral Nightly    sevelamer  800 mg Oral TID WC        sodium chloride 50 mL/hr at 02/11/21 1134    dextrose         sodium chloride flush, acetaminophen, melatonin, glucose, dextrose, glucagon (rDNA), dextrose, sodium chloride flush, promethazine **OR** ondansetron, polyethylene glycol, [DISCONTINUED] acetaminophen **OR** acetaminophen, gentamicin      Problem list:       Patient Active Problem List   Diagnosis Code    Type 2 diabetes mellitus with left diabetic foot infection (HCC) E11.628, L08.9    Obesity E66.9    Microalbuminuria R80.9    Hyperlipidemia with target LDL less than 100 E78.5    Hypertension I10    Diabetes mellitus type 2 in obese (HCC) E11.69, E66.9    Chronic kidney disease (CKD), stage V ([address] N18.5    Acute renal failure (ARF) ([facility] N17.9    ESRD (end stage renal disease) ([facility] N18.6    Dry gangrene (HCC) I96    Non-smoker Z78.9    Elevated C-reactive protein (CRP) R79.82    Elevated sed rate R70.0    Diabetic polyneuropathy associated with type 2 diabetes mellitus ([facility] E11.42    Diabetes education, encounter for Z71.89       Please note that this chart was generated using Dragon dictation software. Although every effort was made to ensure the accuracy of this automated transcription, some errors in transcription may have occurred inadvertently. If you may need any clarification, please do not hesitate to contact me through EPIC or at the phone number provided below with my electronic signature. Any pictures or media included in this note were obtained after taking informed verbal consent from the patient and with their approval to include those in the patient's medical record.     Vanessa Baptiste MD, MPH  2/11/2021 , 3:17 PM   [facility] Infectious Disease   [address] [place] [address], [address] [place] [address]  Office: [phone]  Fax: [phone]  Clinic days:  Tuesday & Thursday

## 2021-02-11 NOTE — FLOWSHEET NOTE
CCPD Order              Exchanges: 4              Exchange Volume: 2000              Total Time: 8hrs              Dextrose: Delflex 2.5%              Total Volume: 8000ml     Orders verified. Supplies taken to pt's room. Report received. Cycler set up, primed and pre tested. Dressing changed on [facility] Cath site. Initial effluent clear yellow.        02/10/21 1950   Cycler   Verification of Prescription CCPD   Total Volume Programmed 8000 mL   Therapy Time (Hours:Minutes) 0800   Fill Volume 2000 mL   Dextrose Setting Same (Nonextraneal)   I Drain Alarm 0 mL   Number of Cycles 4   Bag Volume 5000 mL   Number of Bags Used 2   Dianeal Solution   (Delflex 2.5%)   I Drain (mL) 81 mL

## 2021-02-11 NOTE — PLAN OF CARE
Pt was able to get a shower before being placed on PD cycler overnight. VSS and assessments completed. Necrotic toe does not appear to have changed in color this shift. Zosyn given at 0130 and stopped at 0530. Heparin held for procedure today and pt NPO since midnight. Novolog 1 unit given for glucose 142. Pt ambulates independently in room and calls appropriately for any needs.      Vitals:    02/11/21 0448   BP: (!) 158/66   Pulse: 57   Resp: 18   Temp: 97.5 °F (36.4 °C)   SpO2: 97%       Problem: Cardiovascular  Goal: Hemodynamic stability  Outcome: Ongoing     Problem: Pain Control  Goal: Maintain pain level at or below patient's acceptable level (or 5 if patient is unable to determine acceptable level)  Outcome: Ongoing     Problem: Skin Integrity:  Goal: Will show no infection signs and symptoms  Description: Will show no infection signs and symptoms  Outcome: Ongoing     Problem: Serum Glucose Level - Abnormal:  Goal: Ability to maintain appropriate glucose levels will improve  Description: Ability to maintain appropriate glucose levels will improve  Outcome: Ongoing     Problem: Falls - Risk of:  Goal: Will remain free from falls  Description: Will remain free from falls  Outcome: Ongoing     Problem: Sensory Perception - Impaired:  Goal: Ability to maintain a stable neurologic state will improve  Description: Ability to maintain a stable neurologic state will improve  Outcome: Ongoing

## 2021-02-12 ENCOUNTER — NURSE TRIAGE (OUTPATIENT)
Dept: OTHER | Facility: CLINIC | Age: 46
End: 2021-02-12

## 2021-02-12 VITALS
HEART RATE: 60 BPM | OXYGEN SATURATION: 94 % | RESPIRATION RATE: 18 BRPM | SYSTOLIC BLOOD PRESSURE: 180 MMHG | HEIGHT: 66 IN | DIASTOLIC BLOOD PRESSURE: 65 MMHG | WEIGHT: 247.58 LBS | BODY MASS INDEX: 39.79 KG/M2 | TEMPERATURE: 98.2 F

## 2021-02-12 LAB
ANION GAP SERPL CALCULATED.3IONS-SCNC: 19 MMOL/L (ref 3–16)
BASOPHILS ABSOLUTE: 0.1 K/UL (ref 0–0.2)
BASOPHILS RELATIVE PERCENT: 0.9 %
BLOOD CULTURE, ROUTINE: NORMAL
BUN BLDV-MCNC: 76 MG/DL (ref 7–20)
CALCIUM SERPL-MCNC: 8.7 MG/DL (ref 8.3–10.6)
CHLORIDE BLD-SCNC: 95 MMOL/L (ref 99–110)
CO2: 21 MMOL/L (ref 21–32)
CREAT SERPL-MCNC: 14.3 MG/DL (ref 0.6–1.1)
CULTURE, BLOOD 2: NORMAL
EOSINOPHILS ABSOLUTE: 0.4 K/UL (ref 0–0.6)
EOSINOPHILS RELATIVE PERCENT: 4.7 %
GFR AFRICAN AMERICAN: 3
GFR NON-AFRICAN AMERICAN: 3
GLUCOSE BLD-MCNC: 137 MG/DL (ref 70–99)
GLUCOSE BLD-MCNC: 161 MG/DL (ref 70–99)
GLUCOSE BLD-MCNC: 165 MG/DL (ref 70–99)
GLUCOSE BLD-MCNC: 234 MG/DL (ref 70–99)
HCT VFR BLD CALC: 31.3 % (ref 36–48)
HEMOGLOBIN: 10.3 G/DL (ref 12–16)
LYMPHOCYTES ABSOLUTE: 1.2 K/UL (ref 1–5.1)
LYMPHOCYTES RELATIVE PERCENT: 14 %
MCH RBC QN AUTO: 30.3 PG (ref 26–34)
MCHC RBC AUTO-ENTMCNC: 32.9 G/DL (ref 31–36)
MCV RBC AUTO: 92.2 FL (ref 80–100)
MONOCYTES ABSOLUTE: 0.9 K/UL (ref 0–1.3)
MONOCYTES RELATIVE PERCENT: 11.3 %
NEUTROPHILS ABSOLUTE: 5.8 K/UL (ref 1.7–7.7)
NEUTROPHILS RELATIVE PERCENT: 69.1 %
PDW BLD-RTO: 14 % (ref 12.4–15.4)
PERFORMED ON: ABNORMAL
PLATELET # BLD: 268 K/UL (ref 135–450)
PMV BLD AUTO: 8.4 FL (ref 5–10.5)
POTASSIUM SERPL-SCNC: 4.4 MMOL/L (ref 3.5–5.1)
RBC # BLD: 3.39 M/UL (ref 4–5.2)
SODIUM BLD-SCNC: 135 MMOL/L (ref 136–145)
VANCOMYCIN RANDOM: 8 UG/ML
WBC # BLD: 8.4 K/UL (ref 4–11)

## 2021-02-12 PROCEDURE — 80202 ASSAY OF VANCOMYCIN: CPT

## 2021-02-12 PROCEDURE — 6370000000 HC RX 637 (ALT 250 FOR IP): Performed by: SURGERY

## 2021-02-12 PROCEDURE — APPNB30 APP NON BILLABLE TIME 0-30 MINS: Performed by: NURSE PRACTITIONER

## 2021-02-12 PROCEDURE — 85025 COMPLETE CBC W/AUTO DIFF WBC: CPT

## 2021-02-12 PROCEDURE — 6370000000 HC RX 637 (ALT 250 FOR IP): Performed by: NURSE PRACTITIONER

## 2021-02-12 PROCEDURE — 6370000000 HC RX 637 (ALT 250 FOR IP): Performed by: INTERNAL MEDICINE

## 2021-02-12 PROCEDURE — 2580000003 HC RX 258: Performed by: SURGERY

## 2021-02-12 PROCEDURE — 80048 BASIC METABOLIC PNL TOTAL CA: CPT

## 2021-02-12 PROCEDURE — APPSS30 APP SPLIT SHARED TIME 16-30 MINUTES: Performed by: NURSE PRACTITIONER

## 2021-02-12 RX ORDER — ATORVASTATIN CALCIUM 20 MG/1
20 TABLET, FILM COATED ORAL NIGHTLY
Qty: 30 TABLET | Refills: 1 | Status: SHIPPED | OUTPATIENT
Start: 2021-02-12 | End: 2021-04-28 | Stop reason: SDUPTHER

## 2021-02-12 RX ORDER — ASPIRIN 81 MG/1
81 TABLET ORAL DAILY
Qty: 30 TABLET | Refills: 1 | Status: ON HOLD
Start: 2021-02-12 | End: 2021-06-16 | Stop reason: HOSPADM

## 2021-02-12 RX ORDER — TORSEMIDE 100 MG/1
200 TABLET ORAL DAILY
Qty: 60 TABLET | Refills: 1 | Status: ON HOLD
Start: 2021-02-12 | End: 2021-06-16 | Stop reason: HOSPADM

## 2021-02-12 RX ORDER — AMOXICILLIN AND CLAVULANATE POTASSIUM 500; 125 MG/1; MG/1
1 TABLET, FILM COATED ORAL
Qty: 10 TABLET | Refills: 0 | Status: SHIPPED | OUTPATIENT
Start: 2021-02-13 | End: 2021-02-23

## 2021-02-12 RX ORDER — LACTOBACILLUS RHAMNOSUS GG 10B CELL
1 CAPSULE ORAL 2 TIMES DAILY WITH MEALS
Qty: 20 CAPSULE | Refills: 0 | Status: SHIPPED | OUTPATIENT
Start: 2021-02-12 | End: 2021-02-22

## 2021-02-12 RX ORDER — METOPROLOL TARTRATE 50 MG/1
50 TABLET, FILM COATED ORAL 2 TIMES DAILY
Qty: 60 TABLET | Refills: 1 | Status: ON HOLD | OUTPATIENT
Start: 2021-02-12 | End: 2021-06-16 | Stop reason: HOSPADM

## 2021-02-12 RX ORDER — CLOPIDOGREL BISULFATE 75 MG/1
75 TABLET ORAL DAILY
Qty: 30 TABLET | Refills: 1 | Status: ON HOLD
Start: 2021-02-12 | End: 2021-06-16 | Stop reason: HOSPADM

## 2021-02-12 RX ADMIN — ASPIRIN 81 MG: 81 TABLET, FILM COATED ORAL at 08:21

## 2021-02-12 RX ADMIN — AMLODIPINE BESYLATE 10 MG: 5 TABLET ORAL at 08:21

## 2021-02-12 RX ADMIN — METOPROLOL TARTRATE 50 MG: 50 TABLET, FILM COATED ORAL at 08:21

## 2021-02-12 RX ADMIN — AMOXICILLIN AND CLAVULANATE POTASSIUM 1 TABLET: 500; 125 TABLET, FILM COATED ORAL at 08:21

## 2021-02-12 RX ADMIN — CLOPIDOGREL BISULFATE 75 MG: 75 TABLET ORAL at 08:21

## 2021-02-12 RX ADMIN — SEVELAMER CARBONATE 800 MG: 800 TABLET, FILM COATED ORAL at 08:21

## 2021-02-12 RX ADMIN — Medication: at 08:25

## 2021-02-12 RX ADMIN — Medication 1 CAPSULE: at 08:21

## 2021-02-12 RX ADMIN — Medication 10 ML: at 08:22

## 2021-02-12 RX ADMIN — TORSEMIDE 200 MG: 100 TABLET ORAL at 08:20

## 2021-02-12 ASSESSMENT — PAIN SCALES - GENERAL
PAINLEVEL_OUTOF10: 0

## 2021-02-12 NOTE — PROGRESS NOTES
Doing well. Discussed with Dr Yuliana Hidalgo and Dr Helena Watters. Dc abx unless Dr Yuliana Hidalgo would like to keep her on them. Keep patient until seen by podiatry late this afternoon to determine if she needs toe amp.        Lina Espinosa PA-C

## 2021-02-12 NOTE — PROGRESS NOTES
Pt discharged via wheel chair with her belongings to the main entrance. Assist into her son's car without incident.

## 2021-02-12 NOTE — PROGRESS NOTES
Discharge instructions reviewed with patient. Patient verbalized understanding. All home medications have been reviewed, questions answered and patient voiced understanding. All medication side effects reviewed and patient verbalized understanding. Patient given prescriptions, discharge instructions, and appointment times. Patient discharged to home with family via private car. Taken to lobby via wheelchair. Follow up appointment(s) reviewed with patient and all attempts made to schedule within 7 days of discharge. [facility]  Depression Screen    1. During the past month, have you often been bothered by feeling down, depressed, or hopeless?   no    2. During the past month, have you often been bothered by little interest or pleasure in       doing things?    no

## 2021-02-12 NOTE — PROGRESS NOTES
Office : [phone]     Fax :[phone]       Nephrology progress Note      Patient's Name: Natalya Cuello  8:14 AM  2/12/2021    Reason for Consult:  ESRD. Chief Complaint:    Chief Complaint   Patient presents with    Other     Sent by provider, middle toe on right foot necrotic, pt states no pain, bilster on ring toe and redness noted to nail on big toe, hx diabetes neuropathy and dialysis         History of Present iIlness:    Natalya Cuello is a 54 y.o. female with h/o ESRD, HTN, DM 2, Anemia of chronic ds, secondary hyperparathyroidism who is on peritoneal dialysis and follows with me. She called today with c/o change in color of 3rd toe of left Foot and blister on 2nd toe. Has necrosis of 3rd toe. She noticed it on Friday night. She is doing her dialysis daily   Uses cycler for peritoneal dialysis         Interval history. Feels much better. Tolerating PD well. UF 1400 mL overnight. Left foot third toe gangrenous area stable. Denies any pain in the toe          S/p angiogram  PROCEDURE:  1. Ultrasound-guided right common femoral artery access. 2.  Abdominal aortogram with bilateral lower extremity runoff. 3.  Selective left anterior tibial artery angiogram.  4.  Selective left peroneal artery angiogram.  5.  Left tibioperoneal trunk and peroneal atherectomy ([facility]  1.25 mm orbital atherectomy. 6.  Left TP trunk and peroneal PTA ([place] 2.5 x 150, 3 x 60). 7.  Left popliteal atherectomy ([facility] 1.25 mm orbital  atherectomy). 8.  Left popliteal PTA ([place] 4 x 100, IN. PACT 4 x 120). I/O last 3 completed shifts:   In: 2289 [P.O.:1440; I.V.:849]  Out: 2967     Past Medical History:   Diagnosis Date    Diabetes mellitus ([facility]     Hypertension (Temporal)   Resp 18   Ht 5' 6\" (1.676 m)   Wt 247 lb 9.2 oz (112.3 kg)   LMP 01/15/2021   SpO2 94%   BMI 39.96 kg/m²   Constitutional:  OAA X3 NAD  Skin: no rash, turgor wnl  Heent:  eomi, mmm  Neck: no bruits or jvd noted  Cardiovascular:  S1, S2 without m/r/g  Respiratory: CTA B without w/r/r  Abdomen:  +bs, soft, nt, nd  Ext: no  lower extremity edema, dry gangrene 3rd toe rt foot     Labs:  CBC:   Recent Labs     02/11/21 0432 02/12/21  0526   WBC 7.3 8.4   HGB 10.9* 10.3*    268     BMP:    Recent Labs     02/11/21 0432 02/12/21  0526   * 135*   K 4.2 4.4   CL 94* 95*   CO2 22 21   BUN 75* 76*   CREATININE 13.0* 14.3*   GLUCOSE 239* 165*     Ca/Mg/Phos:   Recent Labs     02/11/21 0432 02/12/21  0526   CALCIUM 8.9 8.7     Hepatic:   No results for input(s): AST, ALT, ALB, BILITOT, ALKPHOS in the last 72 hours. Troponin: No results for input(s): TROPONINI in the last 72 hours. BNP: No results for input(s): BNP in the last 72 hours. Lipids: No results for input(s): CHOL, TRIG, HDL, LDLCALC, LABVLDL in the last 72 hours. ABGs: No results for input(s): PHART, PO2ART, [id number] in the last 72 hours. INR:   Recent Labs     02/11/21 0432   INR 0.91     UA:No results for input(s): Lugene Geoff, GLUCOSEU, BILIRUBINUR, [place], BLOODU, PHUR, PROTEINU, UROBILINOGEN, NITRU, LEUKOCYTESUR, Foreman Yordan in the last 72 hours. Urine Microscopic: No results for input(s): LABCAST, BACTERIA, COMU, HYALCAST, WBCUA, RBCUA, EPIU in the last 72 hours. Urine Culture: No results for input(s): LABURIN in the last 72 hours. Urine Chemistry: No results for input(s): Elenore Garb, PROTEINUR, NAUR in the last 72 hours. IMAGING:  MRI FOOT LEFT WO CONTRAST   Final Result   1. No osteomyelitis or other acute osseous abnormality. 2. Skin blister along the lateral aspect of the 2nd toe with underlying   subcutaneous edema compatible with cellulitis.          VL DUP LOWER EXTREMITY

## 2021-02-12 NOTE — DISCHARGE SUMMARY
[facility] DISCHARGE SUMMARY    Patient Demographics    Patient. Magui Michael  Date of Birth. 1975  MRN. [id number]     Primary care provider. Jimbo Tabor MD  (Tel: [phone])    Admit date: 2/8/2021    Discharge date (blank if same as Note Date): Note Date: 2/12/2021     Reason for Hospitalization. Chief Complaint   Patient presents with    Other     Sent by provider, middle toe on right foot necrotic, pt states no pain, bilster on ring toe and redness noted to nail on big toe, hx diabetes neuropathy and dialysis         Significant Findings. Active Problems:    Diabetes mellitus ([address]    Diabetes mellitus type 2 in obese ([address]    ESRD (end stage renal disease) ([address]    Gangrene (HCC)    Non-smoker    Elevated C-reactive protein (CRP)    Elevated sed rate    Diabetic polyneuropathy associated with type 2 diabetes mellitus ([address]    Weight loss counseling, encounter for  Resolved Problems:    * No resolved hospital problems. *       Problems and results from this hospitalization that need follow up. 1. PVD with gangrene    Significant test results and incidental findings. Angiogram findings  1. Abdominal aortogram.  Right and left renal arteries proximally are  widely patent. Infrarenal abdominal aorta, bilateral common external  and internal iliac arteries are widely patent. 2.  Right lower extremity runoff. Right common femoral, profunda and  SFA are patent. Popliteal is patent. There is a three-vessel runoff on  the right. 3.  Left lower extremity runoff. Left common femoral and profunda are  patent. SFA is patent. The popliteal shows approximately 60-70%  stenosis in the P2 and P3 sections. Anterior tibial artery is widely  patent. The tibioperoneal trunk has approximately 80-90% stenosis  extending into the proximal peroneal artery.   The posterior tibial  artery is occluded. MRI L foot  1. No osteomyelitis or other acute osseous abnormality. 2. Skin blister along the lateral aspect of the 2nd toe with underlying   subcutaneous edema compatible with cellulitis. Invasive procedures and treatments. 1. Left leg angiogram with left tibioperoneal trunk and peroneal atherectomy, left tibioperoneal trunk and peroneal angioplasty, left popliteal atherectomy and left popliteal angioplasty by Dr Rodriguez Son on 2/12    [facility] Course. Patient is a 79-year-old female with end-stage renal disease on peritoneal dialysis who presented with gangrene involving the left third toe for the past week. X-ray was negative for osteomyelitis. She was admitted and started on IV antibiotics. She was seen by vascular surgery, podiatry, infectious disease. She underwent an angiogram with left tibioperoneal trunk and peroneal atherectomy, left tibioperoneal trunk, peroneal angioplasty, left popliteal atherectomy, and popliteal angioplasty. He tolerated procedure well and was monitored overnight. She will follow up with podiatry out patient to determine if she will need amputation of that left third toe in the future. Consults. IP CONSULT TO NEPHROLOGY  IP CONSULT TO PODIATRY  IP CONSULT TO VASCULAR SURGERY  IP CONSULT TO INFECTIOUS DISEASES    Physical examination on discharge day. BP (!) 165/64   Pulse 69   Temp 98.4 °F (36.9 °C) (Temporal)   Resp 18   Ht 5' 6\" (1.676 m)   Wt 247 lb 9.2 oz (112.3 kg)   LMP 01/15/2021   SpO2 94%   BMI 39.96 kg/m²   General appearance. Alert. Looks comfortable. HEENT. Sclera clear. Moist mucus membranes. Cardiovascular. Regular rate and rhythm, normal S1, S2. No murmur. Respiratory. Not using accessory muscles. Clear to auscultation bilaterally, no wheeze. Gastrointestinal. Abdomen soft, non-tender, not distended, normal bowel sounds  Neurology. Facial symmetry. No speech deficits. Moving all extremities equally. Extremities. No edema in lower extremities. Skin. Warm, dry, normal turgor, dry gangrene L 3rd toe    Condition at time of discharge stable    Medication instructions provided to patient at discharge. Medication List      START taking these medications    amoxicillin-clavulanate 500-125 MG per tablet  Commonly known as: AUGMENTIN  Take 1 tablet by mouth every 24 hours for 10 days  Start taking on: February 13, 2021     aspirin 81 MG EC tablet  Take 1 tablet by mouth daily     atorvastatin 20 MG tablet  Commonly known as: LIPITOR  Take 1 tablet by mouth nightly     clopidogrel 75 MG tablet  Commonly known as: PLAVIX  Take 1 tablet by mouth daily     lactobacillus capsule  Take 1 capsule by mouth 2 times daily (with meals) for 10 days        CHANGE how you take these medications    metoprolol tartrate 50 MG tablet  Commonly known as: LOPRESSOR  Take 1 tablet by mouth 2 times daily  What changed:   · medication strength  · See the new instructions. NovoLOG FlexPen 100 UNIT/ML injection pen  Generic drug: insulin aspart  IINJECT 20 UNITS UNDER THE SKIN THREE TIMES A DAY BEFORE MEALS  What changed: Another medication with the same name was removed. Continue taking this medication, and follow the directions you see here. torsemide 100 MG tablet  Commonly known as: DEMADEX  Take 2 tablets by mouth daily  What changed:   · medication strength  · See the new instructions. CONTINUE taking these medications    amLODIPine 10 MG tablet  Commonly known as: NORVASC  Take 1 tablet by mouth daily     blood glucose test strips strip  Commonly known as: FREESTYLE LITE  1 each by In Vitro route 2 times daily Patient has uncontrolled diabetes     calcium acetate 667 MG Tabs     fluticasone 50 MCG/ACT nasal spray  Commonly known as: FLONASE  2 sprays by Nasal route daily     FreeStyle Lite Barb  1 Device by Does not apply route 4 times daily. * Insulin Pen Needle 32G X 5 MM Misc  1 each by Does not apply route daily.      * B-D

## 2021-02-12 NOTE — TELEPHONE ENCOUNTER
Reason for Disposition   Nursing judgment    Answer Assessment - Initial Assessment Questions  1. REASON FOR CALL or QUESTION: \"What is your reason for calling today? \" or \"How can I best help you? \" or \"What question do you have that I can help answer? \"      Patient calling to schedule follow up appointment from surgery. Patient was just discharged from the hospital a few hours ago and has no new symptoms or issues. Protocols used: INFORMATION ONLY CALL - NO TRIAGE-ADULT-OH    Patient called at [facility])  with red flag complaint. Brief description of triage: No Triage    Triage indicates for patient to N/A    Care advice provided, patient verbalizes understanding; denies any other questions or concerns; instructed to call back for any new or worsening symptoms. Writer provided warm transfer to [address] at [facility] for appointment scheduling. Attention Provider: Thank you for allowing me to participate in the care of your patient. The patient was connected to triage in response to information provided to the ECC. Please do not respond through this encounter as the response is not directed to a shared pool.

## 2021-02-12 NOTE — OP NOTE
[facility]                     [facility], [address]                                OPERATIVE REPORT    PATIENT NAME: Betty Martel                         :        1975  MED REC NO:   [id number]                          ROOM:       [id number]  ACCOUNT NO:   [de-identified]                           ADMIT DATE: 2021  PROVIDER:     Powell Apley, MD    DATE OF PROCEDURE:  2021    PRE-PROCEDURE DIAGNOSIS:  Left third digit gangrene. POST-PROCEDURE DIAGNOSIS:  Left third digit gangrene. PROCEDURE:  1. Ultrasound-guided right common femoral artery access. 2.  Abdominal aortogram with bilateral lower extremity runoff. 3.  Selective left anterior tibial artery angiogram.  4.  Selective left peroneal artery angiogram.  5.  Left tibioperoneal trunk and peroneal atherectomy ([facility]  1.25 mm orbital atherectomy. 6.  Left TP trunk and peroneal PTA ([place] 2.5 x 150, 3 x 60). 7.  Left popliteal atherectomy ([facility] 1.25 mm orbital  atherectomy). 8.  Left popliteal PTA (William 4 x 100, IN. PACT 4 x 120). SURGEON:  Powell Apley, MD    ANESTHESIA:  Local/IV. ESTIMATED BLOOD LOSS:  Minimal.    COMPLICATIONS:  None. INDICATIONS:  The patient is a 79-year-old female, who has had gangrene  of the left third digit of her left foot. She presents for angiographic  evaluation after noninvasive studies suggest critical popliteal  stenosis. All risks, benefits, alternative were discussed in detail. All questions were answered. PROCEDURE DETAILS:  After witnessed informed consent was obtained, the  patient was brought to the Cath Lab where under my supervision, Versed  and fentanyl were administered for intravenous sedation. Blood  pressure, heart rate and pulse oximetry were monitored by an independent  trained observer that was present.   I spent 63 minutes of direct peroneal starting first with a [place] 2.5 x 150 followed by a [place] 3 x  60 with complete resolution of the stenosis. In regards to the  popliteal, a William 4 x 100 was inflated to nominal pressure for 2  minutes followed by a drug-coated Admiral IN. PACT 4 x 120 inflated to  nominal pressure for 3 minutes with complete resolution of the stenosis  and brisk flow into an anterior tibial and peroneal runoff into the left  foot. The procedure was terminated at this point. A short 6-Czech  sheath was placed in the right groin. She tolerated the procedure well,  was transferred to the recovery room in stable condition. FINDINGS:  1. Abdominal aortogram.  Right and left renal arteries proximally are  widely patent. Infrarenal abdominal aorta, bilateral common external  and internal iliac arteries are widely patent. 2.  Right lower extremity runoff. Right common femoral, profunda and  SFA are patent. Popliteal is patent. There is a three-vessel runoff on  the right. 3.  Left lower extremity runoff. Left common femoral and profunda are  patent. SFA is patent. The popliteal shows approximately 60-70%  stenosis in the P2 and P3 sections. Anterior tibial artery is widely  patent. The tibioperoneal trunk has approximately 80-90% stenosis  extending into the proximal peroneal artery. The posterior tibial  artery is occluded. PLAN:  The patient underwent successful intervention of critical  stenosis of the left popliteal as well as tibioperoneal trunk with  atherectomy and balloon angioplasty. She will be placed on appropriate  antiplatelet regimen and is okay to proceed with any podiatric  intervention deemed necessary.         Noe Gallardo MD    D: 02/11/2021 9:56:34       T: 02/11/2021 10:04:55     [id number]  Job#: [id number]     Doc#: [id number]    CC:

## 2021-02-12 NOTE — CARE COORDINATION
Discharge Planning Assessment    SW discharge planner met with patient to discuss reason for admission, current living situation, and potential needs at the time of discharge. Demographics/Insurance verified:  Yes    Current type of dwelling:  House    Living arrangements:  Going to stay with son who lives in [place] for a few weeks to follow up with all her appointments. Pt normally from [place]. Level of function/Support:  Pt is independent with ADLs. Son is supportive. PCP:  Dr. Ronit Alaniz    Last Visit to PCP:  6 months ago    DME:  None. No needs reported. Active with any community resources/agencies/skilled home care:  None. No non-skilled needs reported. Medication compliance issues:  No    Financial issues that could impact healthcare:  No    Tentative discharge plan:  D/C home today with no needs. Discussed with patient and/or family that on the day of discharge home tentative time of discharge will be between 10 AM and noon. Transportation at the time of discharge:  Son will .     Electronically signed by JERRY Yoder, KIMBERLEYW on 2/12/2021 at 12:40 PM

## 2021-02-12 NOTE — FLOWSHEET NOTE
Disconnected from [facility] per protocol. Effluent: clear yellow, no fibrin noted. Total time: 8 hr 28 min   Total UF:  1409 ml. Total Volume:  8007 ml. Dwell time gained:  0 hr 10 min. Pt Tolerated procedure without difficulty.    Report given to: OSMAR Valencia  Last BM: 2/11/21.       02/12/21 0311   Vitals   BP (!) 144/54   Temp 97.8 °F (36.6 °C)   Temp Source Oral   Pulse 60   Resp 18   SpO2 98 %   Weight 247 lb 9.2 oz (112.3 kg)   Cycler   Ultrafiltration (UF) (mL) 1409 mL

## 2021-02-12 NOTE — PROGRESS NOTES
50% stenosis. MRI left foot 2/9/21:  Impression   1. No osteomyelitis or other acute osseous abnormality. 2. Skin blister along the lateral aspect of the 2nd toe with underlying   subcutaneous edema compatible with cellulitis.             Scheduled Meds:    sodium chloride flush  10 mL Intravenous 2 times per day    clopidogrel  75 mg Oral Daily    atorvastatin  20 mg Oral Nightly    aspirin  81 mg Oral Daily    povidone-iodine   Topical Daily    amoxicillin-clavulanate  1 tablet Oral Daily    lactobacillus  1 capsule Oral BID WC    amLODIPine  10 mg Oral Daily    insulin detemir  16 Units Subcutaneous Nightly    torsemide  200 mg Oral Daily    sodium chloride flush  10 mL Intravenous 2 times per day    [Held by provider] heparin (porcine)  5,000 Units Subcutaneous 3 times per day    metoprolol tartrate  50 mg Oral BID    insulin aspart  15 Units Subcutaneous TID WC    insulin aspart  0-6 Units Subcutaneous TID WC    insulin aspart  0-3 Units Subcutaneous Nightly    pantoprazole  40 mg Oral Nightly    sevelamer  800 mg Oral TID WC     Continuous Infusions:    sodium chloride 50 mL/hr at 02/11/21 1134    dextrose           Assessment:   Left 3rd toe dry gangrene   PVD s/p left TP trunk, peroneal and popliteal PTA and atherectomy - POD # 1 - good distal perfusion   DM type 2, uncontrolled - last A1c 8.6 (5/16/18)  ESRD on PD    Plan:  Continue Aspirin 81 mg, Plavix 75 mg and statin therapy. Continue local wound care to right 3rd toe ulceration with betadine daily. Podiatry following. Nephrology following. No further vascular intervention or testing at this time. Okay to discharge from vascular standpoint. Follow up with Dr. Sonya Fuentes in 2-3 weeks. Patient educated on plan of care and disease process. All questions answered.         Electronically signed by RICHARD Vidales CNP on 2/12/2021 at 8:09 AM

## 2021-02-16 ENCOUNTER — TELEPHONE (OUTPATIENT)
Dept: INTERNAL MEDICINE CLINIC | Age: 46
End: 2021-02-16

## 2021-02-18 ENCOUNTER — OFFICE VISIT (OUTPATIENT)
Dept: INTERNAL MEDICINE CLINIC | Age: 46
End: 2021-02-18
Payer: MEDICARE

## 2021-02-18 VITALS
HEART RATE: 66 BPM | OXYGEN SATURATION: 98 % | SYSTOLIC BLOOD PRESSURE: 136 MMHG | TEMPERATURE: 96.6 F | DIASTOLIC BLOOD PRESSURE: 84 MMHG | BODY MASS INDEX: 38.25 KG/M2 | WEIGHT: 237 LBS

## 2021-02-18 DIAGNOSIS — E66.9 DIABETES MELLITUS TYPE 2 IN OBESE (HCC): ICD-10-CM

## 2021-02-18 DIAGNOSIS — N18.6 ESRD (END STAGE RENAL DISEASE) (HCC): Primary | ICD-10-CM

## 2021-02-18 DIAGNOSIS — E11.69 DIABETES MELLITUS TYPE 2 IN OBESE (HCC): ICD-10-CM

## 2021-02-18 DIAGNOSIS — I96 GANGRENE (HCC): ICD-10-CM

## 2021-02-18 PROCEDURE — 1111F DSCHRG MED/CURRENT MED MERGE: CPT | Performed by: INTERNAL MEDICINE

## 2021-02-18 PROCEDURE — 99496 TRANSJ CARE MGMT HIGH F2F 7D: CPT | Performed by: INTERNAL MEDICINE

## 2021-02-18 RX ORDER — BLOOD-GLUCOSE METER
1 KIT MISCELLANEOUS DAILY
Qty: 1 DEVICE | Refills: 0 | Status: ON HOLD | OUTPATIENT
Start: 2021-02-18 | End: 2021-06-02 | Stop reason: ALTCHOICE

## 2021-02-18 RX ORDER — INSULIN DETEMIR 100 [IU]/ML
12 INJECTION, SOLUTION SUBCUTANEOUS NIGHTLY
Qty: 2 PEN | Refills: 2 | Status: SHIPPED | OUTPATIENT
Start: 2021-02-18 | End: 2021-05-03

## 2021-02-18 RX ORDER — INSULIN DETEMIR 100 [IU]/ML
12 INJECTION, SOLUTION SUBCUTANEOUS NIGHTLY
Qty: 15 PEN | Refills: 2 | Status: SHIPPED | OUTPATIENT
Start: 2021-02-18 | End: 2021-02-18 | Stop reason: SDUPTHER

## 2021-02-18 RX ORDER — INSULIN ASPART 100 [IU]/ML
10 INJECTION, SOLUTION INTRAVENOUS; SUBCUTANEOUS
Qty: 3 PEN | Refills: 4 | Status: SHIPPED | OUTPATIENT
Start: 2021-02-18

## 2021-02-18 RX ORDER — BLOOD-GLUCOSE METER
1 KIT MISCELLANEOUS
Qty: 200 EACH | Refills: 11 | Status: ON HOLD | OUTPATIENT
Start: 2021-02-18 | End: 2021-06-02 | Stop reason: ALTCHOICE

## 2021-02-18 ASSESSMENT — ENCOUNTER SYMPTOMS
EYE PAIN: 0
CHOKING: 0
EYE REDNESS: 0
COUGH: 0
FACIAL SWELLING: 0

## 2021-02-18 ASSESSMENT — PATIENT HEALTH QUESTIONNAIRE - PHQ9
2. FEELING DOWN, DEPRESSED OR HOPELESS: 0
SUM OF ALL RESPONSES TO PHQ QUESTIONS 1-9: 0
SUM OF ALL RESPONSES TO PHQ9 QUESTIONS 1 & 2: 0
SUM OF ALL RESPONSES TO PHQ QUESTIONS 1-9: 0

## 2021-02-18 NOTE — PROGRESS NOTES
Post-Discharge Transitional Care Management Services or Hospital Follow Up      Gifty Love   YOB: 1975    Date of Office Visit:  2/18/2021  Date of Hospital Admission: 2/8/21  Date of Hospital Discharge: 2/12/21  Readmission Risk Score(high >=14%.  Medium >=10%):Readmission Risk Score: 17      Care management risk score Rising risk (score 2-5) and Complex Care (Scores >=6): 6     Non face to face  following discharge, date last encounter closed (first attempt may have been earlier): 2/16/2021 11:30 AM 2/16/2021 11:30 AM    Call initiated 2 business days of discharge: Yes     Patient Active Problem List   Diagnosis    Diabetes mellitus [facility]    Obesity    Microalbuminuria    Hyperlipidemia with target LDL less than 100    Hypertension    Diabetes mellitus type 2 in obese ([facility]    Chronic kidney disease (CKD), stage V ([facility]    Acute renal failure (ARF) ([facility]    ESRD (end stage renal disease) ([facility]    Gangrene ([facility]    Non-smoker    Elevated C-reactive protein (CRP)    Elevated sed rate    Diabetic polyneuropathy associated with type 2 diabetes mellitus (HCC)    Weight loss counseling, encounter for       Allergies   Allergen Reactions    Zetia [Ezetimibe] Shortness Of Breath    Humalog [Insulin Lispro]      SOB, Leg swelling, fatigue    Lisinopril Swelling    Lantus [Insulin Glargine] Nausea And Vomiting     Side effect, not an allergy    Victoza [Liraglutide] Nausea And Vomiting     Side effect, not an allergy         Medications listed as ordered at the time of discharge from [facility] Medication Instructions MERARI:    Printed on:02/18/21 7714   Medication Information                      amLODIPine (NORVASC) 10 MG tablet  Take 1 tablet by mouth daily             amoxicillin-clavulanate (AUGMENTIN) 500-125 MG per tablet  Take 1 tablet by mouth every 24 hours for 10 days             aspirin 81 MG EC tablet  Take 1 tablet by mouth daily atorvastatin (LIPITOR) 20 MG tablet  Take 1 tablet by mouth nightly             B-D ULTRAFINE III SHORT PEN 31G X 8 MM MISC  USE FOUR TIMES A DAY             Blood Glucose Monitoring Suppl (FREESTYLE LITE) JOHNNIE  1 Device by Does not apply route 4 times daily. Blood Glucose Monitoring Suppl (FREESTYLE LITE) JOHNNIE  1 Device by Does not apply route daily Insurance preferred device             blood glucose test strips (FREESTYLE LITE) strip  1 each by In Vitro route 2 times daily Patient has uncontrolled diabetes             blood glucose test strips (FREESTYLE LITE) strip  1 each by In Vitro route 3 times daily (before meals) As needed. calcium acetate 667 MG TABS  Take 667 mg by mouth 3 times daily (with meals)             clopidogrel (PLAVIX) 75 MG tablet  Take 1 tablet by mouth daily             fluticasone (FLONASE) 50 MCG/ACT nasal spray  2 sprays by Nasal route daily             FREESTYLE LANCETS MISC  Check blood sugars 4 times daily             insulin aspart (NOVOLOG FLEXPEN) 100 UNIT/ML injection pen  Inject 10 Units into the skin 3 times daily (before meals)             insulin detemir (LEVEMIR FLEXTOUCH) 100 UNIT/ML injection pen  Inject 12 Units into the skin nightly             Insulin Pen Needle 32G X 5 MM MISC  1 each by Does not apply route daily. Insulin Pen Needle 32G X 6 MM MISC  Use with insulin pens 4 times daily             INSULIN SYRINGE .5CC/29G 29G X 1/2\" 0.5 ML MISC  1 each by Does not apply route daily             [employer] Lancets MISC  by Does not apply route.              lactobacillus (CULTURELLE) capsule  Take 1 capsule by mouth 2 times daily (with meals) for 10 days             loratadine (CLARITIN) 10 MG tablet  TAKE ONE TABLET BY MOUTH DAILY             melatonin 3 MG TABS tablet  Take 3 mg by mouth nightly as needed             metolazone (ZAROXOLYN) 2.5 MG tablet  Take 1 tablet by mouth daily as needed (If wt gain >1.5lbs in 1 day)  amoxicillin-clavulanate (AUGMENTIN) 500-125 MG per tablet Take 1 tablet by mouth every 24 hours for 10 days 10 tablet 0    omeprazole (PRILOSEC) 10 MG delayed release capsule Take 10 mg by mouth daily      calcium acetate 667 MG TABS Take 667 mg by mouth 3 times daily (with meals)      VELPHORO 500 MG CHEW CHEW TWO TABLETS BY MOUTH THREE TIMES A DAY WITH MEALS 180 tablet 4    B-D ULTRAFINE III SHORT PEN 31G X 8 MM MISC USE FOUR TIMES A  each 1    blood glucose test strips (FREESTYLE LITE) strip 1 each by In Vitro route 2 times daily Patient has uncontrolled diabetes 100 each 9    FREESTYLE LANCETS MISC Check blood sugars 4 times daily 120 each 8    amLODIPine (NORVASC) 10 MG tablet Take 1 tablet by mouth daily 90 tablet 1    metolazone (ZAROXOLYN) 2.5 MG tablet Take 1 tablet by mouth daily as needed (If wt gain >1.5lbs in 1 day) 30 tablet 1    melatonin 3 MG TABS tablet Take 3 mg by mouth nightly as needed      INSULIN SYRINGE .5CC/29G 29G X 1/2\" 0.5 ML MISC 1 each by Does not apply route daily 200 each 2    vitamin D (CHOLECALCIFEROL) 1000 units TABS tablet Take 5,000 Units by mouth      loratadine (CLARITIN) 10 MG tablet TAKE ONE TABLET BY MOUTH DAILY 30 tablet 0    fluticasone (FLONASE) 50 MCG/ACT nasal spray 2 sprays by Nasal route daily 1 Bottle 10    Blood Glucose Monitoring Suppl (FREESTYLE LITE) JOHNNIE 1 Device by Does not apply route 4 times daily. 1 Device 0    ondansetron (ZOFRAN) 8 MG tablet Take 1 tablet by mouth every 12 hours as needed for Nausea. 10 tablet 1    Insulin Pen Needle 32G X 5 MM MISC 1 each by Does not apply route daily. 50 each 3    [employer] Lancets MISC by Does not apply route. 50 each 11        Medications patient taking as of now reconciled against medications ordered at time of hospital discharge: Yes    Chief Complaint   Patient presents with   [address] from Hospital       [facility]    Inpatient course: Discharge summary reviewed- see chart. Interval history/Current status: patient is doing betting with your diabetes. She still has elevated blood sugars. In addition,  She still has a gangrenous toe that is quite tender. She was told that it would likely need to be resected. She still has some pain when walking. She is trying to control her blood sugars in order to increase blood flow. Patient is doing home dialysis. She is tolerating it well. She sees Dr. Jimena Silva. Review of Systems   Constitutional: Negative for chills and diaphoresis. HENT: Negative for facial swelling. Eyes: Negative for pain and redness. Respiratory: Negative for cough and choking. Cardiovascular: Negative for leg swelling. Vitals:    02/18/21 0828   BP: 136/84   Site: Left Upper Arm   Position: Sitting   Cuff Size: Large Adult   Pulse: 66   Temp: 96.6 °F (35.9 °C)   TempSrc: Infrared   SpO2: 98%   Weight: 237 lb (107.5 kg)     Body mass index is 38.25 kg/m². Wt Readings from Last 3 Encounters:   02/18/21 237 lb (107.5 kg)   02/12/21 247 lb 9.2 oz (112.3 kg)   09/06/18 258 lb 9.6 oz (117.3 kg)     BP Readings from Last 3 Encounters:   02/18/21 136/84   02/12/21 (!) 180/65   09/06/18 (!) 170/83       Physical Exam  Constitutional:       Appearance: Normal appearance. She is obese. HENT:      Right Ear: Tympanic membrane and ear canal normal.      Left Ear: Tympanic membrane and ear canal normal.      Nose: Nose normal. No congestion or rhinorrhea. Eyes:      General:         Right eye: No discharge. Left eye: No discharge. Pupils: Pupils are equal, round, and reactive to light. Neck:      Musculoskeletal: Normal range of motion and neck supple. No neck rigidity. Cardiovascular:      Rate and Rhythm: Normal rate and regular rhythm. Heart sounds: No murmur. No friction rub. Pulmonary:      Effort: Pulmonary effort is normal. No respiratory distress. Breath sounds: Normal breath sounds. No stridor. No wheezing or rhonchi. Musculoskeletal:      Left foot: Deformity present. Feet:    Feet:      Left foot:      Skin integrity: Skin breakdown present. Neurological:      Mental Status: She is alert. Assessment/Plan:  1. ESRD (end stage renal disease) ([facility]  Stable  -  Continue home dialysis  - [place] DISCHARGE MEDS RECONCILED W/ CURRENT OUTPATIENT MED LIST    2. Gangrene ([facility] [address]  Worsening  -  Follow- up with vascular surgery  -  Improved blood pressure control  - IA DISCHARGE MEDS RECONCILED W/ CURRENT OUTPATIENT MED LIST    3. Diabetes mellitus type 2 in obese (HCC)  - IA DISCHARGE MEDS RECONCILED W/ CURRENT OUTPATIENT MED LIST  - insulin aspart (NOVOLOG FLEXPEN) 100 UNIT/ML injection pen; Inject 10 Units into the skin 3 times daily (before meals)  Dispense: 3 pen; Refill: 4  - Blood Glucose Monitoring Suppl (FREESTYLE LITE) JOHNNIE; 1 Device by Does not apply route daily Insurance preferred device  Dispense: 1 Device; Refill: 0  - blood glucose test strips (FREESTYLE LITE) strip; 1 each by In Vitro route 3 times daily (before meals) As needed. Dispense: 200 each; Refill: 11  - insulin detemir (LEVEMIR FLEXTOUCH) 100 UNIT/ML injection pen; Inject 12 Units into the skin nightly  Dispense: 2 pen; Refill: 2  - Insulin Pen Needle 32G X 6 MM MISC; Use with insulin pens 4 times daily  Dispense: 200 each;  Refill: 5        Medical Decision Making: moderate complexity

## 2021-02-19 ENCOUNTER — TELEPHONE (OUTPATIENT)
Dept: INTERNAL MEDICINE CLINIC | Age: 46
End: 2021-02-19

## 2021-02-19 NOTE — TELEPHONE ENCOUNTER
Submitted PA for Rush Test strips  Via  Key: [id number] - PA Case ID: [id number] - Rx #: [id number]   CMM STATUS: DENIED. Please notify patient, thank you.

## 2021-02-23 ENCOUNTER — TELEPHONE (OUTPATIENT)
Dept: INTERNAL MEDICINE CLINIC | Age: 46
End: 2021-02-23

## 2021-02-23 NOTE — TELEPHONE ENCOUNTER
Patient calling to schedule an emergent pre op exam for a toe amputation. Patient does not have a surgical date yet b/c the podiatrist, Dr Stanton Pressley, wants to see her again on 02/25/2021 to decide if they can save one of the 2 toes that are infected. After the appointment Thursday she will know how quickly she will need a pre op appointment. Per Dr Jose Shelton, based on surgery date, may be able to use the last OV from 02/18/2021. May need to have a VV if an addendum to that visit is needed urgently. Patient advised and she will contact office after she meets w/the podiatrist/surgeon on 02/25/2021.

## 2021-02-25 ENCOUNTER — TELEPHONE (OUTPATIENT)
Dept: INTERNAL MEDICINE CLINIC | Age: 46
End: 2021-02-25

## 2021-02-25 NOTE — PROGRESS NOTES
suggested someone stay with you the first 24 hrs. Your surgery will be cancelled if you do not have a ride home. 8. A parent/legal guardian must accompany a child scheduled for surgery and plan to stay at the hospital until the child is discharged. Please do not bring other children with you. 9. Please wear simple, loose fitting clothing to the hospital.  Rosie Anjum not bring valuables (money, credit cards, checkbooks, etc.) Do not wear any makeup (including no eye makeup) or nail polish on your fingers or toes. 10. DO NOT wear any jewelry or piercings on day of surgery. All body piercing jewelry must be removed. 11. If you have ___dentures, they will be removed before going to the OR; we will provide you a container. If you wear ___contact lenses or _x__glasses, they will be removed; please bring a case for them. 12. Please see your family doctor/pediatrician for a history & physical and/or concerning medications. Bring any test results/reports from your physician's office. PCP Wilson______________Phone___________H&P Appt. Date________             13 If you  have a Living Will and Durable Power of  for Healthcare, please bring in a copy. 15. Notify your Surgeon if you develop any illness between now and surgery  time, cough, cold, fever, sore throat, nausea, vomiting, etc.  Please notify your surgeon if you experience dizziness, shortness of breath or blurred vision between now & the time of your surgery             15. DO NOT shave your operative site 96 hours prior to surgery. For face & neck surgery, men may use an electric razor 48 hours prior to surgery. 16. Shower the night before or morning of surgery using an antibacterial soap or as you have been instructed. 17. To provide excellent care visitors will be limited to one in the room at any given time. 18.  Please bring picture ID and insurance card.              19. Visit our web site for additional information:  [email or web address]              20.During flu season no children under the age of 15 are permitted in the hospital for the safety of all patients. 21. If you take a long acting insulin in the evening only  take half of your usual  dose the night  before your procedure              22. If you use a c-pap please bring DOS if staying overnight,             23.For your convenience [facility] has a pharmacy on site to fill your prescriptions. 24. If you use oxygen and have a portable tank please bring it  with you the DOS             25. Bring a complete list of all your medications with name and dose include any supplements. 26. Other__________________________________________   *Please call pre admission testing if you any further questions   Gelacio Flores   [address] [place] [address]. [place]  [phone]   [facility]    __ Done-Where _____  _x Scheduled __2/26/21 Rapid_ Where [facility]___   __ Other __________      VISITOR POLICY(subject to change)    There is a one visitor policy at [facility] for all surgeries and endoscopies. Whether the visitor can stay or will be asked to wait in the car will depend on the current policy and if social distancing can be maintained. The policy is subject to change at any time. Please make sure the visitor has a cell phone that is on,charged and able to accept calls, as this may be the way that the staff communicates with them. Pain management is NO VISITOR policyThe patients ride is expected to remain in the car with a cell phone for communication. If the ride is leaving the hospital grounds please make sure they are back in time for pickup. Have the patient inform the staff on arrival what their rides plans are while the patient is in the facility. At the MAIN there is one visitor allowed. Please note that the visitor policy is subject to change. All above information reviewed with patient in person or by phone. Patient verbalizes understanding. All questions and concerns addressed.                                                                                                  Patient/Rep__patient_________________                                                                                                                                    PRE OP INSTRUCTIONS

## 2021-02-25 NOTE — TELEPHONE ENCOUNTER
Pt came in the office to give a FAX number for pre-op   [phone] fax.  Pt states everything should be in the system her surgery is scheduled for 2/26/21

## 2021-02-26 ENCOUNTER — HOSPITAL ENCOUNTER (OUTPATIENT)
Age: 46
Setting detail: OUTPATIENT SURGERY
Discharge: HOME | End: 2021-02-26
Attending: PODIATRIST | Admitting: PODIATRIST
Payer: MEDICARE

## 2021-02-26 ENCOUNTER — ANESTHESIA EVENT (OUTPATIENT)
Dept: OPERATING ROOM | Age: 46
End: 2021-02-26
Payer: MEDICARE

## 2021-02-26 ENCOUNTER — APPOINTMENT (OUTPATIENT)
Dept: GENERAL RADIOLOGY | Age: 46
End: 2021-02-26
Attending: PODIATRIST
Payer: MEDICARE

## 2021-02-26 ENCOUNTER — ANESTHESIA (OUTPATIENT)
Dept: OPERATING ROOM | Age: 46
End: 2021-02-26
Payer: MEDICARE

## 2021-02-26 VITALS
DIASTOLIC BLOOD PRESSURE: 59 MMHG | OXYGEN SATURATION: 100 % | RESPIRATION RATE: 18 BRPM | SYSTOLIC BLOOD PRESSURE: 113 MMHG

## 2021-02-26 VITALS
TEMPERATURE: 97.3 F | WEIGHT: 234 LBS | BODY MASS INDEX: 34.66 KG/M2 | DIASTOLIC BLOOD PRESSURE: 65 MMHG | HEART RATE: 68 BPM | RESPIRATION RATE: 16 BRPM | OXYGEN SATURATION: 100 % | SYSTOLIC BLOOD PRESSURE: 159 MMHG | HEIGHT: 69 IN

## 2021-02-26 DIAGNOSIS — G89.18 POST-OPERATIVE PAIN: Primary | ICD-10-CM

## 2021-02-26 LAB
ANION GAP SERPL CALCULATED.3IONS-SCNC: 19 MMOL/L (ref 3–16)
BUN BLDV-MCNC: 65 MG/DL (ref 7–20)
CALCIUM SERPL-MCNC: 9.4 MG/DL (ref 8.3–10.6)
CHLORIDE BLD-SCNC: 91 MMOL/L (ref 99–110)
CO2: 23 MMOL/L (ref 21–32)
CREAT SERPL-MCNC: 13.9 MG/DL (ref 0.6–1.1)
GFR AFRICAN AMERICAN: 3
GFR NON-AFRICAN AMERICAN: 3
GLUCOSE BLD-MCNC: 269 MG/DL (ref 70–99)
GLUCOSE BLD-MCNC: 301 MG/DL (ref 70–99)
GLUCOSE BLD-MCNC: 302 MG/DL (ref 70–99)
HCG(URINE) PREGNANCY TEST: NEGATIVE
INR BLD: 0.95 (ref 0.86–1.14)
PERFORMED ON: ABNORMAL
PERFORMED ON: ABNORMAL
POTASSIUM SERPL-SCNC: 4.6 MMOL/L (ref 3.5–5.1)
PROTHROMBIN TIME: 11 SEC (ref 10–13.2)
REASON FOR REJECTION: NORMAL
REJECTED TEST: NORMAL
SARS-COV-2, NAAT: NOT DETECTED
SODIUM BLD-SCNC: 133 MMOL/L (ref 136–145)

## 2021-02-26 PROCEDURE — 3600000013 HC SURGERY LEVEL 3 ADDTL 15MIN: Performed by: PODIATRIST

## 2021-02-26 PROCEDURE — 2500000003 HC RX 250 WO HCPCS: Performed by: PODIATRIST

## 2021-02-26 PROCEDURE — 2580000003 HC RX 258: Performed by: PODIATRIST

## 2021-02-26 PROCEDURE — 7100000010 HC PHASE II RECOVERY - FIRST 15 MIN: Performed by: PODIATRIST

## 2021-02-26 PROCEDURE — 7100000001 HC PACU RECOVERY - ADDTL 15 MIN: Performed by: PODIATRIST

## 2021-02-26 PROCEDURE — 87635 SARS-COV-2 COVID-19 AMP PRB: CPT

## 2021-02-26 PROCEDURE — 2500000003 HC RX 250 WO HCPCS: Performed by: NURSE ANESTHETIST, CERTIFIED REGISTERED

## 2021-02-26 PROCEDURE — 7100000000 HC PACU RECOVERY - FIRST 15 MIN: Performed by: PODIATRIST

## 2021-02-26 PROCEDURE — 7100000011 HC PHASE II RECOVERY - ADDTL 15 MIN: Performed by: PODIATRIST

## 2021-02-26 PROCEDURE — 88305 TISSUE EXAM BY PATHOLOGIST: CPT

## 2021-02-26 PROCEDURE — 3700000001 HC ADD 15 MINUTES (ANESTHESIA): Performed by: PODIATRIST

## 2021-02-26 PROCEDURE — 88311 DECALCIFY TISSUE: CPT

## 2021-02-26 PROCEDURE — 6360000002 HC RX W HCPCS: Performed by: NURSE ANESTHETIST, CERTIFIED REGISTERED

## 2021-02-26 PROCEDURE — 80048 BASIC METABOLIC PNL TOTAL CA: CPT

## 2021-02-26 PROCEDURE — 85610 PROTHROMBIN TIME: CPT

## 2021-02-26 PROCEDURE — 3600000003 HC SURGERY LEVEL 3 BASE: Performed by: PODIATRIST

## 2021-02-26 PROCEDURE — 73620 X-RAY EXAM OF FOOT: CPT

## 2021-02-26 PROCEDURE — 84703 CHORIONIC GONADOTROPIN ASSAY: CPT

## 2021-02-26 PROCEDURE — 2709999900 HC NON-CHARGEABLE SUPPLY: Performed by: PODIATRIST

## 2021-02-26 PROCEDURE — 3700000000 HC ANESTHESIA ATTENDED CARE: Performed by: PODIATRIST

## 2021-02-26 RX ORDER — MEPERIDINE HYDROCHLORIDE 25 MG/ML
12.5 INJECTION INTRAMUSCULAR; INTRAVENOUS; SUBCUTANEOUS EVERY 5 MIN PRN
Status: DISCONTINUED | OUTPATIENT
Start: 2021-02-26 | End: 2021-02-26 | Stop reason: HOSPADM

## 2021-02-26 RX ORDER — OXYCODONE HYDROCHLORIDE 5 MG/1
10 TABLET ORAL PRN
Status: DISCONTINUED | OUTPATIENT
Start: 2021-02-26 | End: 2021-02-26 | Stop reason: HOSPADM

## 2021-02-26 RX ORDER — PROMETHAZINE HYDROCHLORIDE 25 MG/1
25 TABLET ORAL EVERY 6 HOURS PRN
Qty: 28 TABLET | Refills: 0 | Status: SHIPPED | OUTPATIENT
Start: 2021-02-26 | End: 2021-05-24 | Stop reason: SDUPTHER

## 2021-02-26 RX ORDER — FENTANYL CITRATE 50 UG/ML
50 INJECTION, SOLUTION INTRAMUSCULAR; INTRAVENOUS EVERY 5 MIN PRN
Status: DISCONTINUED | OUTPATIENT
Start: 2021-02-26 | End: 2021-02-26 | Stop reason: HOSPADM

## 2021-02-26 RX ORDER — ONDANSETRON 2 MG/ML
INJECTION INTRAMUSCULAR; INTRAVENOUS PRN
Status: DISCONTINUED | OUTPATIENT
Start: 2021-02-26 | End: 2021-02-26 | Stop reason: SDUPTHER

## 2021-02-26 RX ORDER — KETAMINE HCL IN NACL, ISO-OSM 100MG/10ML
SYRINGE (ML) INJECTION PRN
Status: DISCONTINUED | OUTPATIENT
Start: 2021-02-26 | End: 2021-02-26 | Stop reason: SDUPTHER

## 2021-02-26 RX ORDER — HYDROMORPHONE HCL 110MG/55ML
0.25 PATIENT CONTROLLED ANALGESIA SYRINGE INTRAVENOUS EVERY 5 MIN PRN
Status: DISCONTINUED | OUTPATIENT
Start: 2021-02-26 | End: 2021-02-26 | Stop reason: HOSPADM

## 2021-02-26 RX ORDER — LABETALOL HYDROCHLORIDE 5 MG/ML
5 INJECTION, SOLUTION INTRAVENOUS EVERY 10 MIN PRN
Status: DISCONTINUED | OUTPATIENT
Start: 2021-02-26 | End: 2021-02-26 | Stop reason: HOSPADM

## 2021-02-26 RX ORDER — LIDOCAINE HYDROCHLORIDE 10 MG/ML
INJECTION, SOLUTION EPIDURAL; INFILTRATION; INTRACAUDAL; PERINEURAL
Status: COMPLETED | OUTPATIENT
Start: 2021-02-26 | End: 2021-02-26

## 2021-02-26 RX ORDER — LIDOCAINE HYDROCHLORIDE 10 MG/ML
0.5 INJECTION, SOLUTION EPIDURAL; INFILTRATION; INTRACAUDAL; PERINEURAL ONCE
Status: DISCONTINUED | OUTPATIENT
Start: 2021-02-26 | End: 2021-02-26 | Stop reason: HOSPADM

## 2021-02-26 RX ORDER — DEXAMETHASONE SODIUM PHOSPHATE 4 MG/ML
INJECTION, SOLUTION INTRA-ARTICULAR; INTRALESIONAL; INTRAMUSCULAR; INTRAVENOUS; SOFT TISSUE PRN
Status: DISCONTINUED | OUTPATIENT
Start: 2021-02-26 | End: 2021-02-26 | Stop reason: SDUPTHER

## 2021-02-26 RX ORDER — SODIUM CHLORIDE 9 MG/ML
INJECTION, SOLUTION INTRAVENOUS CONTINUOUS
Status: DISCONTINUED | OUTPATIENT
Start: 2021-02-26 | End: 2021-02-26 | Stop reason: HOSPADM

## 2021-02-26 RX ORDER — LIDOCAINE HYDROCHLORIDE 20 MG/ML
INJECTION, SOLUTION EPIDURAL; INFILTRATION; INTRACAUDAL; PERINEURAL PRN
Status: DISCONTINUED | OUTPATIENT
Start: 2021-02-26 | End: 2021-02-26 | Stop reason: SDUPTHER

## 2021-02-26 RX ORDER — PROMETHAZINE HYDROCHLORIDE 25 MG/ML
6.25 INJECTION, SOLUTION INTRAMUSCULAR; INTRAVENOUS PRN
Status: DISCONTINUED | OUTPATIENT
Start: 2021-02-26 | End: 2021-02-26 | Stop reason: HOSPADM

## 2021-02-26 RX ORDER — HYDROCODONE BITARTRATE AND ACETAMINOPHEN 5; 325 MG/1; MG/1
1 TABLET ORAL EVERY 6 HOURS PRN
Qty: 28 TABLET | Refills: 0 | Status: SHIPPED | OUTPATIENT
Start: 2021-02-26 | End: 2021-03-05

## 2021-02-26 RX ORDER — PROPOFOL 10 MG/ML
INJECTION, EMULSION INTRAVENOUS PRN
Status: DISCONTINUED | OUTPATIENT
Start: 2021-02-26 | End: 2021-02-26 | Stop reason: SDUPTHER

## 2021-02-26 RX ORDER — BUPIVACAINE HYDROCHLORIDE 5 MG/ML
INJECTION, SOLUTION EPIDURAL; INTRACAUDAL
Status: COMPLETED | OUTPATIENT
Start: 2021-02-26 | End: 2021-02-26

## 2021-02-26 RX ORDER — HYDROMORPHONE HCL 110MG/55ML
0.5 PATIENT CONTROLLED ANALGESIA SYRINGE INTRAVENOUS EVERY 5 MIN PRN
Status: DISCONTINUED | OUTPATIENT
Start: 2021-02-26 | End: 2021-02-26 | Stop reason: HOSPADM

## 2021-02-26 RX ORDER — MIDAZOLAM HYDROCHLORIDE 1 MG/ML
INJECTION INTRAMUSCULAR; INTRAVENOUS PRN
Status: DISCONTINUED | OUTPATIENT
Start: 2021-02-26 | End: 2021-02-26 | Stop reason: SDUPTHER

## 2021-02-26 RX ORDER — DIPHENHYDRAMINE HYDROCHLORIDE 50 MG/ML
12.5 INJECTION INTRAMUSCULAR; INTRAVENOUS
Status: DISCONTINUED | OUTPATIENT
Start: 2021-02-26 | End: 2021-02-26 | Stop reason: HOSPADM

## 2021-02-26 RX ORDER — OXYCODONE HYDROCHLORIDE 5 MG/1
5 TABLET ORAL PRN
Status: DISCONTINUED | OUTPATIENT
Start: 2021-02-26 | End: 2021-02-26 | Stop reason: HOSPADM

## 2021-02-26 RX ADMIN — PROPOFOL 50 MG: 10 INJECTION, EMULSION INTRAVENOUS at 12:36

## 2021-02-26 RX ADMIN — PROPOFOL 50 MG: 10 INJECTION, EMULSION INTRAVENOUS at 12:41

## 2021-02-26 RX ADMIN — MIDAZOLAM 1 MG: 1 INJECTION INTRAMUSCULAR; INTRAVENOUS at 12:29

## 2021-02-26 RX ADMIN — DEXAMETHASONE SODIUM PHOSPHATE 4 MG: 4 INJECTION, SOLUTION INTRAMUSCULAR; INTRAVENOUS at 12:37

## 2021-02-26 RX ADMIN — ONDANSETRON 4 MG: 2 INJECTION INTRAMUSCULAR; INTRAVENOUS at 12:37

## 2021-02-26 RX ADMIN — PROPOFOL 50 MG: 10 INJECTION, EMULSION INTRAVENOUS at 12:45

## 2021-02-26 RX ADMIN — SODIUM CHLORIDE: 9 INJECTION, SOLUTION INTRAVENOUS at 11:20

## 2021-02-26 RX ADMIN — PROPOFOL 50 MG: 10 INJECTION, EMULSION INTRAVENOUS at 12:38

## 2021-02-26 RX ADMIN — PROPOFOL 50 MG: 10 INJECTION, EMULSION INTRAVENOUS at 12:35

## 2021-02-26 RX ADMIN — Medication 30 MG: at 12:35

## 2021-02-26 RX ADMIN — LIDOCAINE HYDROCHLORIDE 100 MG: 20 INJECTION, SOLUTION EPIDURAL; INFILTRATION; INTRACAUDAL; PERINEURAL at 12:35

## 2021-02-26 RX ADMIN — MIDAZOLAM 1 MG: 1 INJECTION INTRAMUSCULAR; INTRAVENOUS at 12:26

## 2021-02-26 ASSESSMENT — PULMONARY FUNCTION TESTS
PIF_VALUE: 1
PIF_VALUE: 0
PIF_VALUE: 1

## 2021-02-26 ASSESSMENT — PAIN - FUNCTIONAL ASSESSMENT: PAIN_FUNCTIONAL_ASSESSMENT: 0-10

## 2021-02-26 NOTE — PROGRESS NOTES
Received from  OR , Drowsy ,nasal cannula,left 3rd toe amputated dressing dry an intact,elevated,ice pack placed,circulation good,warmer,vss.

## 2021-02-26 NOTE — BRIEF OP NOTE
Brief Postoperative Note      Patient: Darek Sanchez  YOB: 1975  MRN: [id number]    Date of Procedure: 2/26/2021    Pre-Op Diagnosis: I70.262  ATHEROSCLEROSIS WITH GANGRENE OF LEFT TOES  E08.52  DIABETES WITH PERIPHERAL ANGIOPATHY AND GANGRENE-LEFT    Post-Op Diagnosis: Same       Procedure(s):  AMPUTATION OF THE LEFT THIRD TOE    Surgeon(s):  Daylin Still DPM    Assistant:  First Assistant: Paige Jones    Anesthesia: Monitor Anesthesia Care    Estimated Blood Loss (mL): Minimal    Complications: None    Specimens:   ID Type Source Tests Collected by Time Destination   A :  Tissue Tissue SURGICAL PATHOLOGY Daylin Still DPM 2/26/2021 1202        Implants:  * No implants in log *      Drains: * No LDAs found *    Findings: as dictated    Electronically signed by Daylin Still DPM on 2/26/2021 at 12:57 PM

## 2021-02-26 NOTE — OP NOTE
Operative Note    Trice Villanueva  [id number]  YOB: 1975    Surgeon: Carlitos Grant DPM  Assistant: Ronit  Pre-operative Diagnosis:     1. Atherosclerosis with gangrene of the left foot (I70.262)    2. Diabetes with peripheral angiopathy and gangrene (E08.52)  Post-operative Diagnosis: same  Procedure: Amputation of the left 3rd toe (22723)  Pathology: 3rd digit sent to pathology  Anesthesia: mac  Hemostasis: ankle tourniquet, total time 14 minutes  Est. Blood Loss: <15cc   Materials: 4-0 vicryl, 4-0 nylon  Injectables: 10cc of 1% Lidocaine plain; 10cc of 0.5% Marcaine plain    INDICATIONS FOR PROCEDURE: This patient has signs and symptoms clinically  consistent with the above mentioned preoperative diagnosis of gangrene of the left 3rd toe. Having failed conservative treatment, including antibiotics, betadine paint, it was determined that the patient would benefit from surgical intervention. All potential risks, benefits, and complications were discussed with the patient prior to the scheduling of surgery. All the patient's questions were answered and no guarantees were given. The patient wished to proceed with surgery, and informed written consent was obtained. DETAILS OF PROCEDURE: The patient was brought from the pre-operative area and placed on the operating table in the supine position. A pneumatic ankle tourniquet was placed around the patient's well-padded left lower extremity. Following IV sedation, a local anesthetic block was then injected proximal to the incision site consisting of 10cc of 1% lidocaine plain. The left  lower extremity was then scrubbed, prepped, and draped in the usual sterile fashion. A time-out was performed. The patient, procedure, and operative site were confirmed. The foot was elevated and the tourniquet was then inflated to 250 mmHg and the following procedure was performed. Attention was then directed to the 3rd digit on the left foot.  A towel clamp was then clamped around the distal aspect of the left 3rd digit and used to stabilize the digit. A #15 surgical blade was used to make a full thickness incision through the skin. The incision was then deepened through the subcutaneous tissue to the level of bone. Next, the 3rd metatarsophalangeal joint was identified and attention was then directed to this location. A #15 blade was then used to release the extensor tendon as well as the medial and lateral collateral ligaments present at the 3rd metatarsophalangeal joint. The flexor tendon was then severed planarly at the level of the 3rd metatarsophalangeal joint and digit removed distally and passed from the operative field and placed on the back table. .   Attention was then directed to the head of the 3rd metatarsal. The cartilage was noted to white, shiny, pearly and hard, all which are consistent with healthy cartilage. It was determined that no further resection was necessary. At this time, the incision site was flushed using copious amounts of normal saline. The deep layers were re-approximated using 4-0 vicryl. The skin was re-approximated using 4-0 nylon    At this time, a local anesthetic was injected about the incision sites consisting of 10cc of 0.5% Marcaine plain, for the patient's postoperative comfort. A soft sterile dressing was applied consisting of betadine ointment, adaptic, gauze, kenyatta, and ace. The pneumatic ankle tourniquet was rapidly deflated after a total time of 14 minutes and a prompt hyperemic response was noted on all aspects of the patient's left lower extremity. END OF PROCEDURE: The patient tolerated the procedure and anesthesia well and was transported from the operating room to the PACU with vital signs stable and vascular status intact to all aspects of the patient's left lower extremity and digital capillary refill time immediate to the digits of the left  foot.  Following a period of post-operative monitoring, the patient will be discharged home with written and oral wound care and follow-up instructions. The patient is to follow-up with me in my private office within 3-5 days. The patient is to keep dressing clean, dry and intact at all times. The patient is to call with if any complications occur.     Neeta Scanlon DPM  [phone]

## 2021-02-26 NOTE — ANESTHESIA POSTPROCEDURE EVALUATION
Department of Anesthesiology  Postprocedure Note    Patient: Gladys Addison  MRN: [id number]  YOB: 1975  Date of evaluation: 2/26/2021  Time:  1:14 PM     Procedure Summary     Date: 02/26/21 Room / Location: [address]    Anesthesia Start: 6616 Anesthesia Stop: 6495    Procedure: AMPUTATION OF THE LEFT THIRD TOE (Left Foot) Diagnosis:       (I70.262  ATHEROSCLEROSIS WITH GANGRENE OF LEFT TOES)      (E08.52  DIABETES WITH PERIPHERAL ANGIOPATHY AND GANGRENE-LEFT)    Surgeons: Malcolm Simmons DPM Responsible Provider: Lillie Umaña MD    Anesthesia Type: general ASA Status: 3          Anesthesia Type: general    Ngoc Phase I: Ngoc Score: 7    Ngoc Phase II:      Last vitals: Reviewed and per EMR flowsheets.        Anesthesia Post Evaluation    Level of consciousness: awake  Complications: no

## 2021-02-26 NOTE — ANESTHESIA PRE PROCEDURE
Department of Anesthesiology  Preprocedure Note       Name:  Julio Snow   Age:  54 y.o.  :  1975                                          MRN:  [id number]         Date:  2021      Surgeon: Hollie Whitney):  Sanjuana Kayser, DPM    Procedure: Procedure(s):  AMPUTATION OF THE LEFT THIRD TOE    Medications prior to admission:   Prior to Admission medications    Medication Sig Start Date End Date Taking?  Authorizing Provider   insulin aspart (NOVOLOG FLEXPEN) 100 UNIT/ML injection pen Inject 10 Units into the skin 3 times daily (before meals) 21  Yes Hawa Manning MD   insulin detemir (LEVEMIR FLEXTOUCH) 100 UNIT/ML injection pen Inject 12 Units into the skin nightly 21  Yes Hawa Manning MD   aspirin 81 MG EC tablet Take 1 tablet by mouth daily 21  Yes Tommie Vitale PA-C   atorvastatin (LIPITOR) 20 MG tablet Take 1 tablet by mouth nightly 21  Yes Tommie Vitale PA-C   metoprolol tartrate (LOPRESSOR) 50 MG tablet Take 1 tablet by mouth 2 times daily 21  Yes Tommie Vitale PA-C   torsemide (DEMADEX) 100 MG tablet Take 2 tablets by mouth daily 21  Yes Tommie Vitale PA-C   clopidogrel (PLAVIX) 75 MG tablet Take 1 tablet by mouth daily 21  Yes Tommie Vitale PA-C   omeprazole (PRILOSEC) 10 MG delayed release capsule Take 10 mg by mouth daily   Yes Historical Provider, MD   calcium acetate 667 MG TABS Take 667 mg by mouth 3 times daily (with meals)   Yes Historical Provider, MD   VELPHORO 500 MG CHEW CHEW TWO TABLETS BY MOUTH THREE TIMES A DAY WITH MEALS 9/3/20  Yes Mario Alberto Espinoza MD   amLODIPine (NORVASC) 10 MG tablet Take 1 tablet by mouth daily 18  Yes Hawa Manning MD   metolazone (ZAROXOLYN) 2.5 MG tablet Take 1 tablet by mouth daily as needed (If wt gain >1.5lbs in 1 day) 18  Yes Patt Gilman MD   vitamin D (CHOLECALCIFEROL) 1000 units TABS tablet Take 5,000 Units by mouth 18  Yes Historical Provider, MD loratadine (CLARITIN) 10 MG tablet TAKE ONE TABLET BY MOUTH DAILY 6/5/17  Yes Danielle Washington MD   fluticasone [facility]) 50 MCG/ACT nasal spray 2 sprays by Nasal route daily 4/8/16  Yes Danielle Washington MD   ondansetron (ZOFRAN) 8 MG tablet Take 1 tablet by mouth every 12 hours as needed for Nausea. 2/28/14  Yes Danielle Washington MD   Blood Glucose Monitoring Suppl (FREESTYLE LITE) JOHNNIE 1 Device by Does not apply route daily Insurance preferred device 2/18/21   Danielle Washington MD   blood glucose test strips (FREESTYLE LITE) strip 1 each by In Vitro route 3 times daily (before meals) As needed. 2/18/21   Danielle Washington MD   Insulin Pen Needle 32G X 6 MM MISC Use with insulin pens 4 times daily 2/18/21   Danielle Washington MD   B-D ULTRAFINE III SHORT PEN 31G X 8 MM MISC USE FOUR TIMES A DAY 4/13/20   Danielle Washington MD   blood glucose test strips (FREESTYLE LITE) strip 1 each by In Vitro route 2 times daily Patient has uncontrolled diabetes 8/21/19   MD AMELIA AlbarranSTYLE LANCETS MISC Check blood sugars 4 times daily 9/28/18   Danielle Washington MD   melatonin 3 MG TABS tablet Take 3 mg by mouth nightly as needed    Historical Provider, MD   INSULIN SYRINGE .5CC/29G 29G X 1/2\" 0.5 ML MISC 1 each by Does not apply route daily 6/27/18   Calvin Griffin MD   Blood Glucose Monitoring Suppl (FREESTYLE LITE) JOHNNIE 1 Device by Does not apply route 4 times daily. 8/12/14   Danielle Washington MD   Insulin Pen Needle 32G X 5 MM MISC 1 each by Does not apply route daily. 2/3/14   Danielle Washington MD   Multiple Vitamins-Minerals (THERAPEUTIC MULTIVITAMIN-MINERALS) tablet Take 1 tablet by mouth daily. 1/30/14 11/13/14  Danielle Washington MD   [employer] Lancets MISC by Does not apply route.  4/5/13   Danielle Washington MD       Current medications:    Current Facility-Administered Medications   Medication Dose Route Frequency Provider Last Rate Last Admin  0.9 % sodium chloride infusion   Intravenous Continuous Fowler TARUN Simmons        lidocaine PF 1 % injection 0.5 mL  0.5 mL Intradermal Once Katharina Arriaga DPM        ceFAZolin (ANCEF) 2000 mg in dextrose 5 % 100 mL IVPB  2,000 mg Intravenous Once Katharina Arriaga DPM        meperidine (DEMEROL) injection 12.5 mg  12.5 mg Intravenous Q5 Min PRN Lillie Umaña MD        HYDROmorphone (DILAUDID) injection 0.25 mg  0.25 mg Intravenous Q5 Min PRN Lillie Umaña MD        fentaNYL (SUBLIMAZE) injection 50 mcg  50 mcg Intravenous Q5 Min PRN Lillie Umaña MD        HYDROmorphone (DILAUDID) injection 0.25 mg  0.25 mg Intravenous Q5 Min PRN Lillie Umaña MD        HYDROmorphone (DILAUDID) injection 0.5 mg  0.5 mg Intravenous Q5 Min PRN Lillie Umaña MD        oxyCODONE (ROXICODONE) immediate release tablet 5 mg  5 mg Oral PRN Lillie Umaña MD        Or    oxyCODONE (ROXICODONE) immediate release tablet 10 mg  10 mg Oral PRN Lillie Umaña MD        promethazine (PHENERGAN) injection 6.25 mg  6.25 mg Intravenous PRN Lillie Umaña MD        diphenhydrAMINE (BENADRYL) injection 12.5 mg  12.5 mg Intravenous Once PRN Lillie Umaña MD        labetalol (NORMODYNE;TRANDATE) injection 5 mg  5 mg Intravenous Q10 Min PRN Lillie Umaña MD           Allergies:     Allergies   Allergen Reactions    Zetia [Ezetimibe] Shortness Of Breath    Humalog [Insulin Lispro]      SOB, Leg swelling, fatigue    Lisinopril Swelling    Lantus [Insulin Glargine] Nausea And Vomiting     Side effect, not an allergy    Victoza [Liraglutide] Nausea And Vomiting     Side effect, not an allergy         Problem List:    Patient Active Problem List   Diagnosis Code    Diabetes mellitus ([facility] E11.9    Obesity E66.9    Microalbuminuria R80.9    Hyperlipidemia with target LDL less than 100 E78.5    Hypertension I10    Diabetes mellitus type 2 in obese (HCC) E11.69, E66.9    Chronic kidney disease (CKD), stage V ([facility] N18.5  Acute renal failure (ARF) (HCC) N17.9    ESRD (end stage renal disease) ([facility]) N18.6    Gangrene (HCC) I96    Non-smoker Z78.9    Elevated C-reactive protein (CRP) R79.82    Elevated sed rate R70.0    Diabetic polyneuropathy associated with type 2 diabetes mellitus (HCC) E11.42    Weight loss counseling, encounter for Z71.3       Past Medical History:        Diagnosis Date    Diabetes mellitus ([facility]     End stage kidney disease ([facility]     peritoneal dialysis every night at home x 2 years    Hypertension     Neuropathy     Peripheral vascular disease ([facility]        Past Surgical History:        Procedure Laterality Date     SECTION      OTHER SURGICAL HISTORY  2018     lap PD cath placement lt side 62 cm    TONSILLECTOMY         Social History:    Social History     Tobacco Use    Smoking status: Never Smoker    Smokeless tobacco: Never Used   Substance Use Topics    Alcohol use: No                                Counseling given: Not Answered      Vital Signs (Current):   Vitals:    21 1208 21 1058 21 1102   Temp:   97.4 °F (36.3 °C)   TempSrc:   Temporal   Weight: 239 lb (108.4 kg) 234 lb (106.1 kg)    Height: 5' 9\" (1.753 m) 5' 9\" (1.753 m)                                               BP Readings from Last 3 Encounters:   21 136/84   21 (!) 180/65   18 (!) 170/83       NPO Status: Time of last liquid consumption:                         Time of last solid consumption:                         Date of last liquid consumption: 21                        Date of last solid food consumption: 21    BMI:   Wt Readings from Last 3 Encounters:   21 234 lb (106.1 kg)   21 237 lb (107.5 kg)   21 247 lb 9.2 oz (112.3 kg)     Body mass index is 34.56 kg/m².     CBC:   Lab Results   Component Value Date    WBC 8.4 2021    RBC 3.39 2021    HGB 10.3 2021    HCT 31.3 2021    MCV 92.2 2021 RDW 14.0 02/12/2021     02/12/2021       CMP:   Lab Results   Component Value Date     02/12/2021    K 4.4 02/12/2021    K 3.8 02/09/2021    CL 95 02/12/2021    CO2 21 02/12/2021    BUN 76 02/12/2021    CREATININE 14.3 02/12/2021    GFRAA 3 02/12/2021    GFRAA >60 04/09/2013    AGRATIO 0.9 02/08/2021    LABGLOM 3 02/12/2021    GLUCOSE 165 02/12/2021    PROT 7.5 02/08/2021    PROT 7.1 12/13/2012    CALCIUM 8.7 02/12/2021    BILITOT 0.3 02/08/2021    ALKPHOS 99 02/08/2021    AST 10 02/08/2021    ALT 9 02/08/2021       POC Tests: No results for input(s): POCGLU, POCNA, POCK, POCCL, POCBUN, POCHEMO, POCHCT in the last 72 hours. Coags:   Lab Results   Component Value Date    PROTIME 10.6 02/11/2021    INR 0.91 02/11/2021    APTT 31.2 02/08/2021       HCG (If Applicable):   Lab Results   Component Value Date    PREGTESTUR Negative 08/16/2018        ABGs: No results found for: PHART, PO2ART, SEW5TVW, GGS0AFO, BEART, X8VYRVCU     Type & Screen (If Applicable):  No results found for: LABABO, LABRH    Drug/Infectious Status (If Applicable):  No results found for: HIV, HEPCAB    COVID-19 Screening (If Applicable): No results found for: COVID19      Anesthesia Evaluation    Airway: Mallampati: II  TM distance: >3 FB   Neck ROM: full  Mouth opening: > = 3 FB Dental:          Pulmonary:                              Cardiovascular:    (+) hypertension:,         Rhythm: regular  Rate: normal                    Neuro/Psych:   (+) neuromuscular disease:,             GI/Hepatic/Renal:             Endo/Other:    (+) Diabetes, . Abdominal:           Vascular:                                        Anesthesia Plan      general     ASA 3       Induction: intravenous. Anesthetic plan and risks discussed with patient. Plan discussed with CRNA.                   Danna Tellez MD   2/26/2021

## 2021-02-26 NOTE — PROGRESS NOTES
Discharge instructions reviewed with patient/responsible adult. All home medications have been reviewed, questions answered and patient verbalized understanding. Discharge instructions signed and copies given. Patient discharged per w/c with belongings.

## 2021-02-26 NOTE — H&P
Department of Podiatric Surgery  History and Physical Update      HISTORY OF PRESENT ILLNESS:      The patient is a 54 y.o. female presents today for operative correction of left 3rd toe gangrene. The patient underwent a history and physical with their primary care physician <30 days ago and denies any changes since.      Past Medical History:        Diagnosis Date    Diabetes mellitus ([facility]     End stage kidney disease ([facility]     peritoneal dialysis every night at home x 2 years    Hypertension     Neuropathy     [facility]        Past Surgical History:        Procedure Laterality Date     SECTION      OTHER SURGICAL HISTORY  2018     lap PD cath placement lt side 62 cm    TONSILLECTOMY                   Medications Prior to Admission:   Medications Prior to Admission: insulin aspart (NOVOLOG FLEXPEN) 100 UNIT/ML injection pen, Inject 10 Units into the skin 3 times daily (before meals)  insulin detemir (LEVEMIR FLEXTOUCH) 100 UNIT/ML injection pen, Inject 12 Units into the skin nightly  aspirin 81 MG EC tablet, Take 1 tablet by mouth daily  atorvastatin (LIPITOR) 20 MG tablet, Take 1 tablet by mouth nightly  metoprolol tartrate (LOPRESSOR) 50 MG tablet, Take 1 tablet by mouth 2 times daily  torsemide (DEMADEX) 100 MG tablet, Take 2 tablets by mouth daily  clopidogrel (PLAVIX) 75 MG tablet, Take 1 tablet by mouth daily  omeprazole (PRILOSEC) 10 MG delayed release capsule, Take 10 mg by mouth daily  calcium acetate 667 MG TABS, Take 667 mg by mouth 3 times daily (with meals)  VELPHORO 500 MG CHEW, CHEW TWO TABLETS BY MOUTH THREE TIMES A DAY WITH MEALS  amLODIPine (NORVASC) 10 MG tablet, Take 1 tablet by mouth daily  metolazone (ZAROXOLYN) 2.5 MG tablet, Take 1 tablet by mouth daily as needed (If wt gain >1.5lbs in 1 day)  vitamin D (CHOLECALCIFEROL) 1000 units TABS tablet, Take 5,000 Units by mouth  loratadine (CLARITIN) 10 MG tablet, TAKE ONE TABLET BY MOUTH Pulse 80   Temp 97.4 °F (36.3 °C) (Temporal)   Resp 16   Ht 5' 9\" (1.753 m)   Wt 234 lb (106.1 kg)   LMP 02/15/2021 (Approximate)   SpO2 98%   Breastfeeding No   BMI 34.56 kg/m²   CONSTITUTIONAL:  awake, alert, cooperative, no apparent distress, and appears stated age  EYES:  pupils equal, round and reactive to light, extra ocular muscles intact, sclera clear, conjunctiva normal  ENT:  normocepalic, without obvious abnormality  NECK:  Supple, symmetrical, trachea midline, no adenopathy, thyroid symmetric, not enlarged and no tenderness, skin normal  LUNGS:  No increased work of breathing, good air exchange, clear to auscultation bilaterally, no crackles or wheezing  CARDIOVASCULAR:  Normal apical impulse, regular rate and rhythm  ABDOMEN:  normal bowel sounds, soft, non-distended, non-tender, no masses palpated, no hepatosplenomegally      ASSESSMENT AND PLAN:  1.  Gangrene of the left 3rd toe    - Plan for Amputation of the left 3rd toe      Lala Moreira DPM  [phone]

## 2021-03-23 ENCOUNTER — OFFICE VISIT (OUTPATIENT)
Dept: INTERNAL MEDICINE CLINIC | Age: 46
End: 2021-03-23
Payer: MEDICARE

## 2021-03-23 VITALS
DIASTOLIC BLOOD PRESSURE: 54 MMHG | TEMPERATURE: 96.9 F | SYSTOLIC BLOOD PRESSURE: 118 MMHG | WEIGHT: 234 LBS | BODY MASS INDEX: 34.56 KG/M2 | HEART RATE: 63 BPM | OXYGEN SATURATION: 93 %

## 2021-03-23 DIAGNOSIS — E78.5 HYPERLIPIDEMIA WITH TARGET LDL LESS THAN 100: ICD-10-CM

## 2021-03-23 DIAGNOSIS — N18.6 ESRD (END STAGE RENAL DISEASE) (HCC): ICD-10-CM

## 2021-03-23 DIAGNOSIS — E11.21 TYPE 2 DIABETES MELLITUS WITH DIABETIC NEPHROPATHY, WITH LONG-TERM CURRENT USE OF INSULIN (HCC): Primary | ICD-10-CM

## 2021-03-23 DIAGNOSIS — Z79.4 TYPE 2 DIABETES MELLITUS WITH DIABETIC NEPHROPATHY, WITH LONG-TERM CURRENT USE OF INSULIN (HCC): Primary | ICD-10-CM

## 2021-03-23 DIAGNOSIS — I10 ESSENTIAL HYPERTENSION: ICD-10-CM

## 2021-03-23 PROBLEM — N18.5 CHRONIC KIDNEY DISEASE (CKD), STAGE V (HCC): Status: RESOLVED | Noted: 2018-05-27 | Resolved: 2021-03-23

## 2021-03-23 PROCEDURE — 99214 OFFICE O/P EST MOD 30 MIN: CPT | Performed by: INTERNAL MEDICINE

## 2021-03-23 PROCEDURE — 2022F DILAT RTA XM EVC RTNOPTHY: CPT | Performed by: INTERNAL MEDICINE

## 2021-03-23 PROCEDURE — G8417 CALC BMI ABV UP PARAM F/U: HCPCS | Performed by: INTERNAL MEDICINE

## 2021-03-23 PROCEDURE — G8484 FLU IMMUNIZE NO ADMIN: HCPCS | Performed by: INTERNAL MEDICINE

## 2021-03-23 PROCEDURE — 1036F TOBACCO NON-USER: CPT | Performed by: INTERNAL MEDICINE

## 2021-03-23 PROCEDURE — G8427 DOCREV CUR MEDS BY ELIG CLIN: HCPCS | Performed by: INTERNAL MEDICINE

## 2021-03-23 PROCEDURE — 3052F HG A1C>EQUAL 8.0%<EQUAL 9.0%: CPT | Performed by: INTERNAL MEDICINE

## 2021-03-23 RX ORDER — HYDROCODONE BITARTRATE AND ACETAMINOPHEN 5; 325 MG/1; MG/1
TABLET ORAL
COMMUNITY
Start: 2021-02-26 | End: 2021-05-10

## 2021-03-23 ASSESSMENT — PATIENT HEALTH QUESTIONNAIRE - PHQ9
SUM OF ALL RESPONSES TO PHQ QUESTIONS 1-9: 0
2. FEELING DOWN, DEPRESSED OR HOPELESS: 0
SUM OF ALL RESPONSES TO PHQ9 QUESTIONS 1 & 2: 0
1. LITTLE INTEREST OR PLEASURE IN DOING THINGS: 0

## 2021-03-23 NOTE — PROGRESS NOTES
Fermin Betancur (:  1975) is a 54 y.o. female,Established patient, here for evaluation of the following chief complaint(s):  Diabetes      ASSESSMENT/PLAN:  1. Type 2 diabetes mellitus with diabetic nephropathy, with long-term current use of insulin ([facility]  Stable  -  [facility]  -    Try to optimize glucose control    2. ESRD (end stage renal disease) ([facility]  stable  -     Jing Barcenas MD, Endocrinology, [facility]  -    Follow-up with nephrology  -   Patient is on transplant list  -  Continue dialysis    3. Hyperlipidemia with target LDL less than 100  -  Continue lipitor    4. Essential hypertension  -  Continue metoprolol  -  Continue dialysis    Return in about 3 months (around 2021) for diabetes 30 min. SUBJECTIVE/OBJECTIVE:  HPI   Treatment Adherence:   Medication compliance:  compliant most of the time  Diet compliance:  compliant most of the time  Weight trend: decreasing  Current exercise: no regular exercise  Barriers: none    Diabetes Mellitus Type 2: Current symptoms/problems include nausea. Home blood sugar records: fasting range: 170-220  Any episodes of hypoglycemia? no  Eye exam current (within one year): no  Tobacco history: She  reports that she has never smoked. She has never used smokeless tobacco.   Daily Aspirin? Yes    Hypertension:  Home blood pressure monitoring: No.  She is adherent to a low sodium diet. Patient denies chest pain, shortness of breath and headache. Antihypertensive medication side effects: no medication side effects noted. Use of agents associated with hypertension: none. Hyperlipidemia:  No new myalgias or GI upset on atorvastatin (Lipitor).        Lab Results   Component Value Date    LABA1C 8.9 2021    LABA1C 8.6 2018    LABA1C 7.9 2017     Lab Results   Component Value Date    LABMICR 284.40 (H) 2018    CREATININE 13.9 (HH) 02/26/2021     Lab Results   Component Value Date    ALT 9 (L) 02/08/2021    AST 10 (L) 02/08/2021     Lab Results   Component Value Date    CHOL 299 (H) 04/28/2017    TRIG 184 (H) 04/28/2017    HDL 39 (L) 04/28/2017    LDLCALC 223 (H) 04/28/2017    LDLDIRECT 240 (H) 12/09/2013          Review of Systems   Constitutional: Negative for chills and diaphoresis. HENT: Negative for drooling and ear discharge. Eyes: Negative for pain and redness. Respiratory: Negative for choking and chest tightness. Vitals:    03/23/21 1148   BP: (!) 118/54   Pulse: 63   Temp: 96.9 °F (36.1 °C)   TempSrc: Infrared   SpO2: 93%   Weight: 234 lb (106.1 kg)      Wt Readings from Last 3 Encounters:   03/23/21 234 lb (106.1 kg)   02/26/21 234 lb (106.1 kg)   02/18/21 237 lb (107.5 kg)     BP Readings from Last 3 Encounters:   03/23/21 (!) 118/54   02/26/21 (!) 159/65   02/26/21 (!) 113/59     Body mass index is 34.56 kg/m². Facility age limit for growth percentiles is 20 years. Physical Exam  Constitutional:       General: She is not in acute distress. Appearance: Normal appearance. She is not ill-appearing. HENT:      Head: Normocephalic and atraumatic. Right Ear: Tympanic membrane and ear canal normal.      Left Ear: Tympanic membrane and ear canal normal.      Nose: Nose normal. No congestion or rhinorrhea. Mouth/Throat:      Mouth: Mucous membranes are moist.      Pharynx: No oropharyngeal exudate or posterior oropharyngeal erythema. Eyes:      General:         Right eye: No discharge. Left eye: No discharge. Extraocular Movements: Extraocular movements intact. Pupils: Pupils are equal, round, and reactive to light. Neck:      Musculoskeletal: Normal range of motion. No neck rigidity or muscular tenderness. Cardiovascular:      Rate and Rhythm: Normal rate and regular rhythm. Pulses: Normal pulses. Heart sounds: No murmur. No friction rub.    Pulmonary:      Effort: Pulmonary effort is normal. No respiratory distress. Breath sounds: No stridor. No wheezing or rhonchi. Abdominal:      General: Abdomen is flat. There is no distension. Palpations: There is no mass. Tenderness: There is no abdominal tenderness. Hernia: No hernia is present. Neurological:      Mental Status: She is alert. An electronic signature was used to authenticate this note.     --Radha Manriquez MD

## 2021-03-27 ASSESSMENT — ENCOUNTER SYMPTOMS
EYE REDNESS: 0
CHOKING: 0
CHEST TIGHTNESS: 0
EYE PAIN: 0

## 2021-04-28 ENCOUNTER — PATIENT MESSAGE (OUTPATIENT)
Dept: INTERNAL MEDICINE CLINIC | Age: 46
End: 2021-04-28

## 2021-04-28 ENCOUNTER — TELEPHONE (OUTPATIENT)
Dept: INTERNAL MEDICINE CLINIC | Age: 46
End: 2021-04-28

## 2021-04-28 ENCOUNTER — OFFICE VISIT (OUTPATIENT)
Dept: VASCULAR SURGERY | Age: 46
End: 2021-04-28
Payer: MEDICARE

## 2021-04-28 VITALS
HEIGHT: 69 IN | SYSTOLIC BLOOD PRESSURE: 128 MMHG | DIASTOLIC BLOOD PRESSURE: 80 MMHG | BODY MASS INDEX: 34.21 KG/M2 | WEIGHT: 231 LBS

## 2021-04-28 DIAGNOSIS — I70.245 ATHEROSCLEROSIS OF NATIVE ARTERIES OF LEFT LEG WITH ULCERATION OF OTHER PART OF FOOT (HCC): Primary | ICD-10-CM

## 2021-04-28 DIAGNOSIS — Z12.11 COLON CANCER SCREENING: ICD-10-CM

## 2021-04-28 DIAGNOSIS — Z12.31 ENCOUNTER FOR SCREENING MAMMOGRAM FOR MALIGNANT NEOPLASM OF BREAST: Primary | ICD-10-CM

## 2021-04-28 PROCEDURE — G8427 DOCREV CUR MEDS BY ELIG CLIN: HCPCS | Performed by: SURGERY

## 2021-04-28 PROCEDURE — 99212 OFFICE O/P EST SF 10 MIN: CPT | Performed by: SURGERY

## 2021-04-28 PROCEDURE — G8417 CALC BMI ABV UP PARAM F/U: HCPCS | Performed by: SURGERY

## 2021-04-28 PROCEDURE — 1036F TOBACCO NON-USER: CPT | Performed by: SURGERY

## 2021-04-28 RX ORDER — CLOPIDOGREL BISULFATE 75 MG/1
75 TABLET ORAL DAILY
Qty: 90 TABLET | Refills: 3 | Status: SHIPPED | OUTPATIENT
Start: 2021-04-28 | End: 2021-05-10

## 2021-04-28 RX ORDER — ATORVASTATIN CALCIUM 20 MG/1
20 TABLET, FILM COATED ORAL NIGHTLY
Qty: 90 TABLET | Refills: 1 | Status: ON HOLD | OUTPATIENT
Start: 2021-04-28 | End: 2021-06-16 | Stop reason: SDUPTHER

## 2021-04-28 NOTE — Clinical Note
I saw Brian Garland in follow-up today. I am going to recheck an arterial duplex of that left leg. If any evidence of recurrent disease.   We'll plan to move forward with repeat peripheral angiogram  Thanks  rich

## 2021-04-28 NOTE — PROGRESS NOTES
by mouth daily (Patient not taking: Reported on 4/28/2021) 90 tablet 1     No current facility-administered medications for this visit. Allergies:  Zetia [ezetimibe], Quinolones, Humalog [insulin lispro], Lisinopril, Lantus [insulin glargine], and Victoza [liraglutide]     Review of Systems:   · Constitutional: there has been no unanticipated weight loss. There's been no change in energy level, sleep pattern, or activity level. · Eyes: No visual changes or diplopia. No scleral icterus. · ENT: No Headaches, hearing loss or vertigo. No mouth sores or sore throat. · Cardiovascular: Reviewed in HPI  · Respiratory: No cough or wheezing, no sputum production. No hematemesis. · Gastrointestinal: No abdominal pain, appetite loss, blood in stools. No change in bowel or bladder habits. · Genitourinary: No dysuria, trouble voiding, or hematuria. · Musculoskeletal:  No gait disturbance, weakness or joint complaints. · Integumentary: No rash or pruritis. · Neurological: No headache, diplopia, change in muscle strength, numbness or tingling. No change in gait, balance, coordination, mood, affect, memory, mentation, behavior. · Psychiatric: No anxiety, no depression. · Endocrine: No malaise, fatigue or temperature intolerance. No excessive thirst, fluid intake, or urination. No tremor. · Hematologic/Lymphatic: No abnormal bruising or bleeding, blood clots or swollen lymph nodes. · Allergic/Immunologic: No nasal congestion or hives.     Physical Examination:    Vitals:    04/28/21 1017   BP: 128/80          General appearance: alert, appears stated age, cooperative and no distress  Biphasic anterior tibial signal.  Biphasic, lateral malleolar signal.  Dressing not removed      Assessment:     Patient Active Problem List   Diagnosis    Diabetes mellitus ([address]    Obesity    Microalbuminuria    Hyperlipidemia with target LDL less than 100    Hypertension    Diabetes mellitus type 2 in obese ([address]    Acute renal failure (ARF) ([facility]    ESRD (end stage renal disease) ([facility]    Gangrene ([facility]    Non-smoker    Elevated C-reactive protein (CRP)    Elevated sed rate    Diabetic polyneuropathy associated with type 2 diabetes mellitus ([facility]    Weight loss counseling, encounter for       Plan:  1. Atherosclerosis of native arteries of left leg with ulceration of other part of foot [facility])  59-year-old female with slowly healing left foot ulceration. Known history of peripheral vascular disease and intervention in February. Recommend repeat arterial duplex of the left lower extremity to evaluate for any recurrent disease. We'll contact with results. If any evidence of recurrence. Would recommend repeat peripheral Rip Canter.  This was discussed with the patient today. - VL DUP LOWER EXTREMITY ARTERIES LEFT; Future        Thank you for allowing me to participate in the care of this individual.  Please do not hesitate to contact me with any questions. Torin Griffith M.D., FACS.  4/28/2021  10:32 AM

## 2021-04-28 NOTE — TELEPHONE ENCOUNTER
From: Manuela Beaver  To: Chris Sweeney MD  Sent: 4/28/2021 11:49 AM EDT  Subject: Prescription Question    I need to know if Dr Darian White can refill my atorvastatin 20 mg my YOB: 1975 the pharmacy is the [facility] thank you

## 2021-04-29 NOTE — TELEPHONE ENCOUNTER
Happy to refer for mammogram and colonoscopy. She will need an appointment for me to write a letter for the transplant. She can call [facility] to schedule a pap smear.

## 2021-04-29 NOTE — TELEPHONE ENCOUNTER
Spoke with the pt. All referral information and numbers given. Pt added to the schedule to discuss letter that she needs.

## 2021-04-30 DIAGNOSIS — E66.9 DIABETES MELLITUS TYPE 2 IN OBESE (HCC): ICD-10-CM

## 2021-04-30 DIAGNOSIS — E11.69 DIABETES MELLITUS TYPE 2 IN OBESE (HCC): ICD-10-CM

## 2021-05-03 RX ORDER — INSULIN DETEMIR 100 [IU]/ML
INJECTION, SOLUTION SUBCUTANEOUS
Qty: 5 PEN | Refills: 1 | Status: SHIPPED | OUTPATIENT
Start: 2021-05-03 | End: 2021-05-24 | Stop reason: SDUPTHER

## 2021-05-10 ENCOUNTER — VIRTUAL VISIT (OUTPATIENT)
Dept: ENDOCRINOLOGY | Age: 46
End: 2021-05-10
Payer: MEDICARE

## 2021-05-10 ENCOUNTER — HOSPITAL ENCOUNTER (OUTPATIENT)
Dept: WOMENS IMAGING | Age: 46
Discharge: HOME | End: 2021-05-10
Payer: MEDICARE

## 2021-05-10 ENCOUNTER — PROCEDURE VISIT (OUTPATIENT)
Dept: SURGERY | Age: 46
End: 2021-05-10
Payer: MEDICARE

## 2021-05-10 VITALS — WEIGHT: 231 LBS | HEIGHT: 69 IN | BODY MASS INDEX: 34.21 KG/M2

## 2021-05-10 DIAGNOSIS — N18.6 ESRD (END STAGE RENAL DISEASE) (HCC): ICD-10-CM

## 2021-05-10 DIAGNOSIS — Z12.31 ENCOUNTER FOR SCREENING MAMMOGRAM FOR MALIGNANT NEOPLASM OF BREAST: ICD-10-CM

## 2021-05-10 DIAGNOSIS — E78.5 HYPERLIPIDEMIA WITH TARGET LDL LESS THAN 100: ICD-10-CM

## 2021-05-10 DIAGNOSIS — I10 ESSENTIAL HYPERTENSION: ICD-10-CM

## 2021-05-10 DIAGNOSIS — I70.245 ATHEROSCLEROSIS OF NATIVE ARTERIES OF LEFT LEG WITH ULCERATION OF OTHER PART OF FOOT (HCC): ICD-10-CM

## 2021-05-10 PROCEDURE — 77063 BREAST TOMOSYNTHESIS BI: CPT

## 2021-05-10 PROCEDURE — 3052F HG A1C>EQUAL 8.0%<EQUAL 9.0%: CPT | Performed by: INTERNAL MEDICINE

## 2021-05-10 PROCEDURE — 1036F TOBACCO NON-USER: CPT | Performed by: INTERNAL MEDICINE

## 2021-05-10 PROCEDURE — G8417 CALC BMI ABV UP PARAM F/U: HCPCS | Performed by: INTERNAL MEDICINE

## 2021-05-10 PROCEDURE — 2022F DILAT RTA XM EVC RTNOPTHY: CPT | Performed by: INTERNAL MEDICINE

## 2021-05-10 PROCEDURE — 93926 LOWER EXTREMITY STUDY: CPT | Performed by: SURGERY

## 2021-05-10 PROCEDURE — 99204 OFFICE O/P NEW MOD 45 MIN: CPT | Performed by: INTERNAL MEDICINE

## 2021-05-10 PROCEDURE — G8427 DOCREV CUR MEDS BY ELIG CLIN: HCPCS | Performed by: INTERNAL MEDICINE

## 2021-05-10 RX ORDER — FLASH GLUCOSE SENSOR
KIT MISCELLANEOUS
Qty: 2 EACH | Refills: 3 | Status: SHIPPED | OUTPATIENT
Start: 2021-05-10

## 2021-05-10 RX ORDER — FLASH GLUCOSE SCANNING READER
EACH MISCELLANEOUS
Qty: 1 DEVICE | Refills: 0 | Status: SHIPPED | OUTPATIENT
Start: 2021-05-10

## 2021-05-10 RX ORDER — NIFEDIPINE 60 MG/1
60 TABLET, EXTENDED RELEASE ORAL NIGHTLY
COMMUNITY
Start: 2021-04-23 | End: 2021-05-24 | Stop reason: SDUPTHER

## 2021-05-10 NOTE — PROGRESS NOTES
Curt Monroe is a 54 y.o. female is referred to me by Dr Joshua De Jesus  for evaluation and management of uncontrolled type 2 diabetes in the setting of end-stage renal disease awaiting transplant. Curt Monroe was diagnosed with Diabetes mellitus at age 31   She didn't had gestational diabetes . At the time of diagnosis patient had multiple yeast infection one year after her last child birth and was evaluated for high glucose and was noted to be diabetic at the time. Curt Monroe got diabetic education in the past.  Comorbid conditions: Neuropathy, Nephropathy, Retinopathy, Chronic Kidney Disease and Coronary Artery Disease  She has ESRD and is awaiting transplant , she currently does PD and follows with Dr Jeronimo Salinas. Patient also has essential hypertension and is on Procardia and metoprolol and torsemide. She has hyperlipidemia and is on Lipitor 20 mg daily  Denies any myalgias      INTERIM:    Diabetes  She presents for her initial diabetic visit. She has type 2 diabetes mellitus. No MedicAlert identification noted. The initial diagnosis of diabetes was made 20 years ago. Her disease course has been stable. Hypoglycemia symptoms include dizziness and nervousness/anxiousness. Associated symptoms include foot paresthesias. Pertinent negatives for diabetes include no weight loss. There are no hypoglycemic complications. Symptoms are stable. Diabetic complications include heart disease, nephropathy and peripheral neuropathy. Risk factors for coronary artery disease include diabetes mellitus, obesity and dyslipidemia. Current diabetic treatment includes insulin injections. She is compliant with treatment most of the time. Her weight is stable. She is following a diabetic diet. Meal planning includes avoidance of concentrated sweets. She has had a previous visit with a dietitian. She rarely participates in exercise. Her breakfast blood glucose is taken between 7-8 am. Her breakfast blood glucose range is generally >200 mg/dl. Her lunch blood glucose is taken between 12-1 pm. Her lunch blood glucose range is generally 180-200 mg/dl. An ACE inhibitor/angiotensin II receptor blocker is being taken. She sees a podiatrist.Eye exam is not current. levemir 16 units and novolog 20 units with each meal, currently fasting glucose are running above 200, slightly better during the day  --Diet has been an issue as she has to follow a renal diet and for the phosphorus diet sometimes she ends up eating more carbs than desired. Patient was admitted in the hospital in 2021 for dry gangrenous left foot toe and underwent amputation  Past Medical History:   Diagnosis Date    Diabetes mellitus ([address]     End stage kidney disease ([address]     peritoneal dialysis every night at home x 2 years    Hypertension     Neuropathy     Peripheral vascular disease ([address]       Patient Active Problem List   Diagnosis    Diabetes mellitus ([address]    Obesity    Microalbuminuria    Hyperlipidemia with target LDL less than 100    Hypertension    Diabetes mellitus type 2 in obese ([address]    Acute renal failure (ARF) ([address]    ESRD (end stage renal disease) ([address]    Gangrene ([address]    Non-smoker    Elevated C-reactive protein (CRP)    Elevated sed rate    Diabetic polyneuropathy associated with type 2 diabetes mellitus ([address]    Weight loss counseling, encounter for     Past Surgical History:   Procedure Laterality Date     SECTION      OTHER SURGICAL HISTORY  2018     lap PD cath placement lt side 62 cm    TOE AMPUTATION Left 2021    AMPUTATION OF THE LEFT THIRD TOE performed by Callum Montilla DPM at [address] [place] [address] Drive History     Socioeconomic History    Marital status:       Spouse name: Not on file    Number of children: Not on file    Years of education: Not on file    Highest education level: Not on file   Occupational History    Not on file   Social Needs    Financial resource strain: Not promethazine (PHENERGAN) 25 MG tablet Take 1 tablet by mouth every 6 hours as needed for Nausea 28 tablet 0    insulin aspart (NOVOLOG FLEXPEN) 100 UNIT/ML injection pen Inject 10 Units into the skin 3 times daily (before meals) 3 pen 4    Blood Glucose Monitoring Suppl (FREESTYLE LITE) JOHNNIE 1 Device by Does not apply route daily Insurance preferred device 1 Device 0    blood glucose test strips (FREESTYLE LITE) strip 1 each by In Vitro route 3 times daily (before meals) As needed. 200 each 11    Insulin Pen Needle 32G X 6 MM MISC Use with insulin pens 4 times daily 200 each 5    aspirin 81 MG EC tablet Take 1 tablet by mouth daily 30 tablet 1    metoprolol tartrate (LOPRESSOR) 50 MG tablet Take 1 tablet by mouth 2 times daily 60 tablet 1    torsemide (DEMADEX) 100 MG tablet Take 2 tablets by mouth daily 60 tablet 1    clopidogrel (PLAVIX) 75 MG tablet Take 1 tablet by mouth daily 30 tablet 1    omeprazole (PRILOSEC) 10 MG delayed release capsule Take 10 mg by mouth daily      calcium acetate 667 MG TABS Take 667 mg by mouth 3 times daily (with meals)      FREESTYLE LANCETS MISC Check blood sugars 4 times daily 120 each 8    vitamin D (CHOLECALCIFEROL) 1000 units TABS tablet Take 5,000 Units by mouth      fluticasone (FLONASE) 50 MCG/ACT nasal spray 2 sprays by Nasal route daily 1 Bottle 10    ondansetron (ZOFRAN) 8 MG tablet Take 1 tablet by mouth every 12 hours as needed for Nausea. 10 tablet 1     No current facility-administered medications for this visit.       Allergies   Allergen Reactions    Zetia [Ezetimibe] Shortness Of Breath    Quinolones Nausea Only     Other reaction(s): Vomiting    Humalog [Insulin Lispro]      SOB, Leg swelling, fatigue    Lisinopril Swelling    Lantus [Insulin Glargine] Nausea And Vomiting     Side effect, not an allergy    Victoza [Liraglutide] Nausea And Vomiting     Side effect, not an allergy       Family Status   Relation Name Status    Mother      Phil Ortega was counseled regarding symptoms of current diagnosis, course and complications of disease if inadequately treated, side effects of medications, diagnosis, treatment options, and prognosis, risks, benefits, complications, and alternatives of treatment, labs, imaging and other studies and treatment targets and goals. She understands instructions and counseling    TELEHEALTH EVALUATION -- Audio/Visual (During [id number] public health emergency)  Pursuant to the emergency declaration under the [address] [place] [address] waiver authority and the [employer] Act, this Virtual  Visit was conducted, with patient's consent, to reduce the patient's risk of exposure to COVID-19 and provide care for  patient. Services were provided through a video synchronous discussion virtually to substitute for in-person clinic visit. Patient's location : home     Patient provided verbal consent to use the video visit. Total time spent : 45 + minReviewing the chart, conducting an interview, performing a limited exam by video and educating the patient on my assessment plan. Return in about 3 months (around 8/10/2021). Please note that some or all of this report was generated using voice recognition software. Please notify me in case of any questions about the content of this document, as some errors in transcription may have occurred .

## 2021-05-11 ENCOUNTER — NURSE ONLY (OUTPATIENT)
Dept: ENDOCRINOLOGY | Age: 46
End: 2021-05-11
Payer: MEDICARE

## 2021-05-11 LAB
HBA1C MFR BLD: 8.2 %
HPV COMMENT: NORMAL
HPV TYPE 16: NOT DETECTED
HPV TYPE 18: NOT DETECTED
HPVOH (OTHER TYPES): NOT DETECTED

## 2021-05-11 PROCEDURE — 83036 HEMOGLOBIN GLYCOSYLATED A1C: CPT | Performed by: INTERNAL MEDICINE

## 2021-05-11 RX ORDER — FLASH GLUCOSE SENSOR
KIT MISCELLANEOUS
Qty: 1 EACH | Refills: 0 | COMMUNITY
Start: 2021-05-11

## 2021-05-11 RX ORDER — FLASH GLUCOSE SCANNING READER
EACH MISCELLANEOUS
Qty: 1 EACH | Refills: 0 | COMMUNITY
Start: 2021-05-11

## 2021-05-11 NOTE — PROGRESS NOTES
Patient presents for a [facility] placement. Lien placed with no issues. Patient tolerated well. Sensor activated. Patient to return to office next week for download and food journal provided.

## 2021-05-12 ENCOUNTER — VIRTUAL VISIT (OUTPATIENT)
Dept: INTERNAL MEDICINE CLINIC | Age: 46
End: 2021-05-12
Payer: MEDICARE

## 2021-05-12 DIAGNOSIS — N18.6 ESRD (END STAGE RENAL DISEASE) (HCC): Primary | ICD-10-CM

## 2021-05-12 DIAGNOSIS — Z49.02: ICD-10-CM

## 2021-05-12 PROCEDURE — 99213 OFFICE O/P EST LOW 20 MIN: CPT | Performed by: INTERNAL MEDICINE

## 2021-05-12 PROCEDURE — G8427 DOCREV CUR MEDS BY ELIG CLIN: HCPCS | Performed by: INTERNAL MEDICINE

## 2021-05-12 PROCEDURE — G8417 CALC BMI ABV UP PARAM F/U: HCPCS | Performed by: INTERNAL MEDICINE

## 2021-05-12 PROCEDURE — 1036F TOBACCO NON-USER: CPT | Performed by: INTERNAL MEDICINE

## 2021-05-12 SDOH — ECONOMIC STABILITY: FOOD INSECURITY: WITHIN THE PAST 12 MONTHS, YOU WORRIED THAT YOUR FOOD WOULD RUN OUT BEFORE YOU GOT MONEY TO BUY MORE.: NEVER TRUE

## 2021-05-12 SDOH — ECONOMIC STABILITY: FOOD INSECURITY: WITHIN THE PAST 12 MONTHS, THE FOOD YOU BOUGHT JUST DIDN'T LAST AND YOU DIDN'T HAVE MONEY TO GET MORE.: NEVER TRUE

## 2021-05-12 SDOH — ECONOMIC STABILITY: TRANSPORTATION INSECURITY
IN THE PAST 12 MONTHS, HAS THE LACK OF TRANSPORTATION KEPT YOU FROM MEDICAL APPOINTMENTS OR FROM GETTING MEDICATIONS?: NO

## 2021-05-12 ASSESSMENT — ENCOUNTER SYMPTOMS
EYE PAIN: 0
EYE REDNESS: 0
CHOKING: 0
CHEST TIGHTNESS: 0

## 2021-05-12 NOTE — PROGRESS NOTES
state where the provider was credentialed to provide care. An electronic signature was used to authenticate this note.     --Janace Epley, MD

## 2021-05-15 ENCOUNTER — HOSPITAL ENCOUNTER (EMERGENCY)
Age: 46
Discharge: HOME | End: 2021-05-16
Attending: EMERGENCY MEDICINE
Payer: MEDICARE

## 2021-05-15 DIAGNOSIS — R10.9 FLANK PAIN: Primary | ICD-10-CM

## 2021-05-15 PROCEDURE — 99283 EMERGENCY DEPT VISIT LOW MDM: CPT

## 2021-05-15 PROCEDURE — 96375 TX/PRO/DX INJ NEW DRUG ADDON: CPT

## 2021-05-15 PROCEDURE — 96365 THER/PROPH/DIAG IV INF INIT: CPT

## 2021-05-15 ASSESSMENT — PAIN SCALES - GENERAL: PAINLEVEL_OUTOF10: 9

## 2021-05-16 ENCOUNTER — APPOINTMENT (OUTPATIENT)
Dept: CT IMAGING | Age: 46
End: 2021-05-16
Payer: MEDICARE

## 2021-05-16 VITALS
SYSTOLIC BLOOD PRESSURE: 123 MMHG | TEMPERATURE: 97.5 F | HEART RATE: 82 BPM | OXYGEN SATURATION: 95 % | DIASTOLIC BLOOD PRESSURE: 98 MMHG | BODY MASS INDEX: 34.51 KG/M2 | RESPIRATION RATE: 16 BRPM | WEIGHT: 233 LBS | HEIGHT: 69 IN

## 2021-05-16 LAB
A/G RATIO: 0.8 (ref 1.1–2.2)
ALBUMIN SERPL-MCNC: 3.4 G/DL (ref 3.4–5)
ALP BLD-CCNC: 112 U/L (ref 40–129)
ALT SERPL-CCNC: <5 U/L (ref 10–40)
ANION GAP SERPL CALCULATED.3IONS-SCNC: 21 MMOL/L (ref 3–16)
AST SERPL-CCNC: 9 U/L (ref 15–37)
BASOPHILS ABSOLUTE: 0.2 K/UL (ref 0–0.2)
BASOPHILS RELATIVE PERCENT: 1 %
BILIRUB SERPL-MCNC: <0.2 MG/DL (ref 0–1)
BILIRUBIN URINE: NEGATIVE
BLOOD, URINE: ABNORMAL
BUN BLDV-MCNC: 66 MG/DL (ref 7–20)
CALCIUM SERPL-MCNC: 10 MG/DL (ref 8.3–10.6)
CHLORIDE BLD-SCNC: 89 MMOL/L (ref 99–110)
CLARITY: ABNORMAL
CO2: 22 MMOL/L (ref 21–32)
COLOR: YELLOW
COMMENT UA: NORMAL
CREAT SERPL-MCNC: 12.2 MG/DL (ref 0.6–1.1)
EOSINOPHILS ABSOLUTE: 0 K/UL (ref 0–0.6)
EOSINOPHILS RELATIVE PERCENT: 0.2 %
EPITHELIAL CELLS, UA: NORMAL /HPF (ref 0–5)
GFR AFRICAN AMERICAN: 4
GFR NON-AFRICAN AMERICAN: 3
GLOBULIN: 4.2 G/DL
GLUCOSE BLD-MCNC: 255 MG/DL (ref 70–99)
GLUCOSE URINE: 500 MG/DL
HCG QUALITATIVE: NEGATIVE
HCT VFR BLD CALC: 33 % (ref 36–48)
HEMOGLOBIN: 10.9 G/DL (ref 12–16)
KETONES, URINE: NEGATIVE MG/DL
LEUKOCYTE ESTERASE, URINE: ABNORMAL
LYMPHOCYTES ABSOLUTE: 1 K/UL (ref 1–5.1)
LYMPHOCYTES RELATIVE PERCENT: 6.4 %
MCH RBC QN AUTO: 30 PG (ref 26–34)
MCHC RBC AUTO-ENTMCNC: 32.9 G/DL (ref 31–36)
MCV RBC AUTO: 91.1 FL (ref 80–100)
MICROSCOPIC EXAMINATION: YES
MONOCYTES ABSOLUTE: 0.8 K/UL (ref 0–1.3)
MONOCYTES RELATIVE PERCENT: 5.6 %
NEUTROPHILS ABSOLUTE: 13.2 K/UL (ref 1.7–7.7)
NEUTROPHILS RELATIVE PERCENT: 86.8 %
NITRITE, URINE: NEGATIVE
PDW BLD-RTO: 13.6 % (ref 12.4–15.4)
PH UA: 5.5 (ref 5–8)
PLATELET # BLD: 376 K/UL (ref 135–450)
PMV BLD AUTO: 8.3 FL (ref 5–10.5)
POTASSIUM REFLEX MAGNESIUM: 4.5 MMOL/L (ref 3.5–5.1)
PROTEIN UA: >=300 MG/DL
RBC # BLD: 3.63 M/UL (ref 4–5.2)
RBC UA: NORMAL /HPF (ref 0–4)
SODIUM BLD-SCNC: 132 MMOL/L (ref 136–145)
SPECIFIC GRAVITY UA: 1.02 (ref 1–1.03)
TOTAL PROTEIN: 7.6 G/DL (ref 6.4–8.2)
URINE TYPE: ABNORMAL
UROBILINOGEN, URINE: 0.2 E.U./DL
WBC # BLD: 15.2 K/UL (ref 4–11)
WBC UA: NORMAL /HPF (ref 0–5)

## 2021-05-16 PROCEDURE — 74176 CT ABD & PELVIS W/O CONTRAST: CPT

## 2021-05-16 PROCEDURE — 36415 COLL VENOUS BLD VENIPUNCTURE: CPT

## 2021-05-16 PROCEDURE — 2500000003 HC RX 250 WO HCPCS: Performed by: EMERGENCY MEDICINE

## 2021-05-16 PROCEDURE — 6360000002 HC RX W HCPCS: Performed by: EMERGENCY MEDICINE

## 2021-05-16 PROCEDURE — 96375 TX/PRO/DX INJ NEW DRUG ADDON: CPT

## 2021-05-16 PROCEDURE — 80053 COMPREHEN METABOLIC PANEL: CPT

## 2021-05-16 PROCEDURE — 85025 COMPLETE CBC W/AUTO DIFF WBC: CPT

## 2021-05-16 PROCEDURE — 84703 CHORIONIC GONADOTROPIN ASSAY: CPT

## 2021-05-16 PROCEDURE — 81001 URINALYSIS AUTO W/SCOPE: CPT

## 2021-05-16 PROCEDURE — 96365 THER/PROPH/DIAG IV INF INIT: CPT

## 2021-05-16 RX ORDER — CLINDAMYCIN HYDROCHLORIDE 300 MG/1
300 CAPSULE ORAL 4 TIMES DAILY
Qty: 40 CAPSULE | Refills: 0 | Status: ON HOLD | OUTPATIENT
Start: 2021-05-16 | End: 2021-05-26

## 2021-05-16 RX ORDER — HYDROMORPHONE HYDROCHLORIDE 1 MG/ML
0.5 INJECTION, SOLUTION INTRAMUSCULAR; INTRAVENOUS; SUBCUTANEOUS ONCE
Status: COMPLETED | OUTPATIENT
Start: 2021-05-16 | End: 2021-05-16

## 2021-05-16 RX ORDER — ONDANSETRON 2 MG/ML
4 INJECTION INTRAMUSCULAR; INTRAVENOUS ONCE
Status: COMPLETED | OUTPATIENT
Start: 2021-05-16 | End: 2021-05-16

## 2021-05-16 RX ORDER — CLINDAMYCIN PHOSPHATE 900 MG/50ML
900 INJECTION INTRAVENOUS ONCE
Status: COMPLETED | OUTPATIENT
Start: 2021-05-16 | End: 2021-05-16

## 2021-05-16 RX ORDER — HYDROCODONE BITARTRATE AND ACETAMINOPHEN 5; 325 MG/1; MG/1
1 TABLET ORAL EVERY 6 HOURS PRN
Qty: 10 TABLET | Refills: 0 | Status: SHIPPED | OUTPATIENT
Start: 2021-05-16 | End: 2021-05-19

## 2021-05-16 RX ADMIN — ONDANSETRON 4 MG: 2 INJECTION INTRAMUSCULAR; INTRAVENOUS at 00:34

## 2021-05-16 RX ADMIN — HYDROMORPHONE HYDROCHLORIDE 0.5 MG: 1 INJECTION, SOLUTION INTRAMUSCULAR; INTRAVENOUS; SUBCUTANEOUS at 00:34

## 2021-05-16 RX ADMIN — CLINDAMYCIN PHOSPHATE 900 MG: 900 INJECTION, SOLUTION INTRAVENOUS at 01:58

## 2021-05-16 ASSESSMENT — ENCOUNTER SYMPTOMS
EYES NEGATIVE: 1
RESPIRATORY NEGATIVE: 1
ALLERGIC/IMMUNOLOGIC NEGATIVE: 1

## 2021-05-16 NOTE — ED NOTES
Discharge instructions reviewed with pt. Pt verbalized understanding. Peripheral IV removed without complications. Pt given copy of discharge instructions. Son here to take pt home.       Liliam Sun RN  05/16/21 [id number]

## 2021-05-16 NOTE — ED PROVIDER NOTES
[address] [facility]        Pt Name: Shanta Mireles  MRN: [id number]  [place] 1975  Date of evaluation: 5/15/2021  Provider: Nighat Yang MD  PCP: Chris Sweeney MD            CHIEF COMPLAINT       Chief Complaint   Patient presents with    Abdominal Pain     c/o left sided abd pain since 5pm. hx kidney disease. HISTORY OF PRESENT ILLNESS   (Location/Symptom, Timing/Onset, Context/Setting, Quality, Duration, Modifying Factors, Severity)  Note limiting factors. Shanta Mireles is a 54 y.o. female who comes in with left-sided abdominal pain since 5 PM tonight. She does peritoneal dialysis at home. She has nausea without vomiting she been afebrile. The pain is sharp and severe and radiates from the back to the front she rates it as 9 out of 10 in its intensity. No exacerbating or relieving factors no other associated signs or symptoms. Nursing Notes were all reviewed and agreed with or any disagreements were addressed  in the HPI. REVIEW OF SYSTEMS    (2-9 systems for level 4, 10 or more for level 5)     Review of Systems   Constitutional: Negative. HENT: Negative. Eyes: Negative. Respiratory: Negative. Cardiovascular: Negative. Endocrine: Negative. Genitourinary: Positive for flank pain. Skin: Negative. Allergic/Immunologic: Negative. Neurological: Negative. Hematological: Negative. Psychiatric/Behavioral: Negative.         PAST MEDICAL HISTORY     Past Medical History:   Diagnosis Date    Diabetes mellitus ([address]     End stage kidney disease ([address]     peritoneal dialysis every night at home x 2 years    Hypertension     Neuropathy     Peripheral vascular disease ([address]        SURGICAL HISTORY     Past Surgical History:   Procedure Laterality Date     SECTION      OTHER SURGICAL HISTORY  2018     lap PD cath placement lt side 62 cm    TOE AMPUTATION Left 2021 (DEMADEX) 100 MG TABLET    Take 2 tablets by mouth daily    VELPHORO 500 MG CHEW    CHEW TWO TABLETS BY MOUTH THREE TIMES A DAY WITH MEALS    VITAMIN D (CHOLECALCIFEROL) 1000 UNITS TABS TABLET    Take 5,000 Units by mouth       ALLERGIES     Zetia [ezetimibe], Quinolones, Humalog [insulin lispro], Lisinopril, Lantus [insulin glargine], and Victoza [liraglutide]    FAMILYHISTORY       Family History   Problem Relation Age of Onset    Coronary Art Dis Mother     High Blood Pressure Mother     Heart Disease Mother     Diabetes Mother     Cancer Father     Coronary Art Dis Father     High Blood Pressure Father     Heart Disease Father     Diabetes Father         SOCIAL HISTORY       Social History     Socioeconomic History    Marital status:      Spouse name: None    Number of children: None    Years of education: None    Highest education level: None   Occupational History    None   Tobacco Use    Smoking status: Never Smoker    Smokeless tobacco: Never Used   Vaping Use    Vaping Use: Never used   Substance and Sexual Activity    Alcohol use: No    Drug use: No    Sexual activity: Yes   Other Topics Concern    None   Social History Narrative    Lives with spouse     Social Determinants of Health     Financial Resource Strain: Low Risk     Difficulty of Paying Living Expenses: Not hard at all   Food Insecurity: No Food Insecurity    Worried About Running Out of Food in the Last Year: Never true    [address] in the Last Year: Never true   Transportation Needs: No Transportation Needs    Lack of Transportation (Medical): No    Lack of Transportation (Non-Medical):  No   Physical Activity:     Days of Exercise per Week:     Minutes of Exercise per Session:    Stress:     Feeling of Stress :    Social Connections:     Frequency of Communication with Friends and Family:     Frequency of Social Gatherings with Friends and Family:     Attends [employer] Services:     Active Member of Clubs or Organizations:     Attends Club or Organization Meetings:     Marital Status:    Intimate Partner Violence:     Fear of Current or Ex-Partner:     Emotionally Abused:     Physically Abused:     Sexually Abused:        SCREENINGS             PHYSICAL EXAM    (up to 7 for level 4, 8 or more for level 5)     ED Triage Vitals [05/15/21 2243]   BP Temp Temp Source Pulse Resp SpO2 Height Weight   (!) 184/82 97.5 °F (36.4 °C) Infrared 80 16 95 % 5' 9\" (1.753 m) 233 lb (105.7 kg)       Physical Exam  Constitutional:       General: She is in acute distress. HENT:      Head: Normocephalic and atraumatic. Eyes:      Extraocular Movements: Extraocular movements intact. Cardiovascular:      Rate and Rhythm: Normal rate and regular rhythm. Heart sounds: Normal heart sounds. Pulmonary:      Effort: Pulmonary effort is normal.      Breath sounds: Normal breath sounds. Abdominal:      General: Abdomen is protuberant. Bowel sounds are normal. There is no distension. Palpations: Abdomen is soft. Tenderness: There is no abdominal tenderness. Skin:     General: Skin is warm. Capillary Refill: Capillary refill takes less than 2 seconds. Neurological:      General: No focal deficit present. Mental Status: She is alert and oriented to person, place, and time.    Psychiatric:         Mood and Affect: Mood normal.         Behavior: Behavior normal.         DIAGNOSTIC RESULTS   LABS:    Labs Reviewed   CBC WITH AUTO DIFFERENTIAL - Abnormal; Notable for the following components:       Result Value    WBC 15.2 (*)     RBC 3.63 (*)     Hemoglobin 10.9 (*)     Hematocrit 33.0 (*)     Neutrophils Absolute 13.2 (*)     All other components within normal limits    Narrative:     Performed at:  [facility] [address], [place], [address]   Phone [phone]   COMPREHENSIVE METABOLIC PANEL W/ REFLEX TO MG FOR LOW K - Abnormal; Notable for the following components:    Sodium 132 (*)     Chloride 89 (*)     Anion Gap 21 (*)     Glucose 255 (*)     BUN 66 (*)     CREATININE 12.2 (*)     GFR Non- 3 (*)     GFR African American 4 (*)     Albumin/Globulin Ratio 0.8 (*)     ALT <5 (*)     AST 9 (*)     All other components within normal limits    Narrative:     Noam Median  SFERF tel. [phone],  Chemistry results called to and read back by OSMAR Leyva, 05/16/2021  00:40, by Joselo Dumont  Performed at:  [facility] [address] [facility], "[employer]   Phone [phone]   URINALYSIS - Abnormal; Notable for the following components:    Clarity, UA CLOUDY (*)     Glucose, Ur 500 (*)     Blood, Urine SMALL (*)     Protein, UA >=300 (*)     Leukocyte Esterase, Urine TRACE (*)     All other components within normal limits    Narrative:     Performed at:  [facility] [address] [facility], [facility] [employer]   Phone [phone]   HCG, SERUM, QUALITATIVE    Narrative:     Performed at:  [facility] [address], [place], "[employer]   Phone [phone]   MICROSCOPIC URINALYSIS    Narrative:     Performed at:  [facility] [address], [place], "[employer]   Phone [phone]       All other labs were within normal range or not returned as of this dictation. EKG:       RADIOLOGY:        Interpretation per the Radiologist below, if available at the time of this note:    CT ABDOMEN PELVIS WO CONTRAST Additional Contrast? None   Final Result   Mild pelvicaliectasis on the left which may be due to congenital UPJ   narrowing with no obvious stone or filling defect at the UPJ. Recommend   urological follow-up. Mild constipation with no bowel obstruction. 1.7 cm umbilical hernia with no bowel loops in the hernia      Peritoneal dialysis catheter looped in the left pelvis.       Unremarkable uterus with no pelvic mass or active inflammation. Hazy right basilar atelectasis and/or infiltrate and associated small right   pleural effusion. Recommend follow-up with serial chest x-rays. Moderate coronary artery calcifications. No results found. PROCEDURES   Unless otherwise noted below, none     Procedures    CRITICAL CARE TIME   N/A    CONSULTS:  None      EMERGENCY DEPARTMENT COURSE and DIFFERENTIAL DIAGNOSIS/MDM:   Vitals:    Vitals:    05/15/21 2243 05/16/21 0030 05/16/21 0046   BP: (!) 184/82 (!) 203/82 (!) 234/53   Pulse: 80 82    Resp: 16     Temp: 97.5 °F (36.4 °C)     TempSrc: Infrared     SpO2: 95% 96% 94%   Weight: 233 lb (105.7 kg)     Height: 5' 9\" (1.753 m)         Patient was given thefollowing medications:  Medications   clindamycin (CLEOCIN) 900 mg in dextrose 5 % 50 mL IVPB (has no administration in time range)   HYDROmorphone HCl PF (DILAUDID) injection 0.5 mg (0.5 mg Intravenous Given 5/16/21 0034)   ondansetron (ZOFRAN) injection 4 mg (4 mg Intravenous Given 5/16/21 0034)           This patient has some abdominal pain. CT scan does not show any acute process. She does have a leukocytosis but she is afebrile. Pain has been controlled here in the emergency department. She is not have any vomiting and she is not toxic appearing. She does peritoneal dialysis so I have to be concerned about bacterial peritonitis so I am going to treat empirically and give her a first dose of IV antibiotic here in the emergency department she will go home with antibiotic. She is return should she have fever, increased abdominal pain, persistent vomiting, or any other concerning symptom. She voices understanding and is pleased with the plan. There is no acute appendicitis, cholecystitis, or bowel obstruction. No stone is seen.   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  She is discharged home in good condition     FINAL IMPRESSION 1. Flank pain          DISPOSITION/PLAN   DISPOSITION Decision To Discharge 05/16/2021 01:42:07 AM      PATIENT REFERREDTO:  Brad Hinojosa MD  [facility]  [phone]    Call         DISCHARGE MEDICATIONS:  New Prescriptions    CLINDAMYCIN (CLEOCIN) 300 MG CAPSULE    Take 1 capsule by mouth 4 times daily for 10 days    HYDROCODONE-ACETAMINOPHEN (NORCO) 5-325 MG PER TABLET    Take 1 tablet by mouth every 6 hours as needed for Pain for up to 3 days. Intended supply: 3 days.  Take lowest dose possible to manage pain       DISCONTINUED MEDICATIONS:  Discontinued Medications    No medications on file              (Please note that portions ofthis note were completed with a voice recognition program.  Efforts were made to edit the dictations but occasionally words are mis-transcribed.)    Riya Lawson MD (electronically signed)              Riya Lawson MD  05/16/21 4311

## 2021-05-18 ENCOUNTER — TELEPHONE (OUTPATIENT)
Dept: ENDOCRINOLOGY | Age: 46
End: 2021-05-18

## 2021-05-18 ENCOUNTER — NURSE ONLY (OUTPATIENT)
Dept: ENDOCRINOLOGY | Age: 46
End: 2021-05-18
Payer: MEDICARE

## 2021-05-18 PROCEDURE — 95250 CONT GLUC MNTR PHYS/QHP EQP: CPT | Performed by: INTERNAL MEDICINE

## 2021-05-18 PROCEDURE — 95251 CONT GLUC MNTR ANALYSIS I&R: CPT | Performed by: INTERNAL MEDICINE

## 2021-05-18 NOTE — TELEPHONE ENCOUNTER
Diabetes Continuous Glucose Monitoring Report         Reason for Study:     - improve diabetic control without risk of hypoglycemia       Current Medication regimen:   Levemir 20 units  NovoLog 20 units with each meal     CGMS Report   CGMS insertion date May 11, 2021  CGMS data collection was performed on May 18, 2021  Patient provided information on her  diet, activities and insulin dosing  during this period. Data was available for 7 days     Sensor Data Report:   - 12 AM to 6 AM: Overnight blood glucose pattern shows stable glycemia  - 6   AM to 10 AM:  Post breakfast minimal hyperglycemia is noted  --10AM to 5 PM : Occasional hypoglycemia observed during this time.   - 5   PM to 8 PM: Post meal hypoglycemia is noted       Average reading 126 mg/dL     Coefficient of variation 29.6  % of time <70 mg/dL   2%   % of time >180  mg/dL 8%   % of time within range 90%  Number of hypoglycemia episodes noted: 3     Impression:   CGMS shows postprandial/corrective hypoglycemia, overnight glucose stable     Recommendation:      Patient was advised to make the following changes in her diabetic regimen  Reduce the amount of short-acting insulin with each meal from 20units to 16 units

## 2021-05-18 NOTE — PROGRESS NOTES
Patient presents for a sensor download.  Sensor downloaded with no issues and sent to Dr. Bertrand Hart for review with food journal.

## 2021-05-19 ENCOUNTER — TELEPHONE (OUTPATIENT)
Dept: INTERNAL MEDICINE CLINIC | Age: 46
End: 2021-05-19

## 2021-05-19 ENCOUNTER — APPOINTMENT (OUTPATIENT)
Dept: GENERAL RADIOLOGY | Age: 46
End: 2021-05-19
Payer: MEDICARE

## 2021-05-19 ENCOUNTER — APPOINTMENT (OUTPATIENT)
Dept: CT IMAGING | Age: 46
End: 2021-05-19
Payer: MEDICARE

## 2021-05-19 ENCOUNTER — HOSPITAL ENCOUNTER (EMERGENCY)
Age: 46
Discharge: HOME | End: 2021-05-19
Attending: EMERGENCY MEDICINE
Payer: MEDICARE

## 2021-05-19 VITALS
DIASTOLIC BLOOD PRESSURE: 50 MMHG | OXYGEN SATURATION: 98 % | HEART RATE: 81 BPM | RESPIRATION RATE: 18 BRPM | TEMPERATURE: 99 F | SYSTOLIC BLOOD PRESSURE: 121 MMHG

## 2021-05-19 DIAGNOSIS — R55 SYNCOPE AND COLLAPSE: Primary | ICD-10-CM

## 2021-05-19 LAB
ANION GAP SERPL CALCULATED.3IONS-SCNC: 22 MMOL/L (ref 3–16)
BASOPHILS ABSOLUTE: 0.2 K/UL (ref 0–0.2)
BASOPHILS RELATIVE PERCENT: 1.3 %
BUN BLDV-MCNC: 56 MG/DL (ref 7–20)
CALCIUM SERPL-MCNC: 10.1 MG/DL (ref 8.3–10.6)
CHLORIDE BLD-SCNC: 87 MMOL/L (ref 99–110)
CO2: 24 MMOL/L (ref 21–32)
CREAT SERPL-MCNC: 13.1 MG/DL (ref 0.6–1.1)
EKG ATRIAL RATE: 71 BPM
EKG DIAGNOSIS: NORMAL
EKG P AXIS: 9 DEGREES
EKG P-R INTERVAL: 170 MS
EKG Q-T INTERVAL: 426 MS
EKG QRS DURATION: 104 MS
EKG QTC CALCULATION (BAZETT): 462 MS
EKG R AXIS: 44 DEGREES
EKG T AXIS: 102 DEGREES
EKG VENTRICULAR RATE: 71 BPM
EOSINOPHILS ABSOLUTE: 0.3 K/UL (ref 0–0.6)
EOSINOPHILS RELATIVE PERCENT: 2.3 %
GFR AFRICAN AMERICAN: 4
GFR NON-AFRICAN AMERICAN: 3
GLUCOSE BLD-MCNC: 225 MG/DL (ref 70–99)
HCT VFR BLD CALC: 33.8 % (ref 36–48)
HEMOGLOBIN: 11.1 G/DL (ref 12–16)
LYMPHOCYTES ABSOLUTE: 1.5 K/UL (ref 1–5.1)
LYMPHOCYTES RELATIVE PERCENT: 11.6 %
MCH RBC QN AUTO: 30.4 PG (ref 26–34)
MCHC RBC AUTO-ENTMCNC: 33 G/DL (ref 31–36)
MCV RBC AUTO: 92.3 FL (ref 80–100)
MONOCYTES ABSOLUTE: 0.9 K/UL (ref 0–1.3)
MONOCYTES RELATIVE PERCENT: 7.4 %
NEUTROPHILS ABSOLUTE: 9.7 K/UL (ref 1.7–7.7)
NEUTROPHILS RELATIVE PERCENT: 77.4 %
PDW BLD-RTO: 13.6 % (ref 12.4–15.4)
PLATELET # BLD: 379 K/UL (ref 135–450)
PMV BLD AUTO: 8.2 FL (ref 5–10.5)
POTASSIUM REFLEX MAGNESIUM: 4.2 MMOL/L (ref 3.5–5.1)
RBC # BLD: 3.66 M/UL (ref 4–5.2)
SODIUM BLD-SCNC: 133 MMOL/L (ref 136–145)
TROPONIN: 0.14 NG/ML
TROPONIN: 0.15 NG/ML
WBC # BLD: 12.6 K/UL (ref 4–11)

## 2021-05-19 PROCEDURE — 99283 EMERGENCY DEPT VISIT LOW MDM: CPT

## 2021-05-19 PROCEDURE — 36415 COLL VENOUS BLD VENIPUNCTURE: CPT

## 2021-05-19 PROCEDURE — 93005 ELECTROCARDIOGRAM TRACING: CPT | Performed by: PHYSICIAN ASSISTANT

## 2021-05-19 PROCEDURE — 80048 BASIC METABOLIC PNL TOTAL CA: CPT

## 2021-05-19 PROCEDURE — 85025 COMPLETE CBC W/AUTO DIFF WBC: CPT

## 2021-05-19 PROCEDURE — 2580000003 HC RX 258: Performed by: PHYSICIAN ASSISTANT

## 2021-05-19 PROCEDURE — 71046 X-RAY EXAM CHEST 2 VIEWS: CPT

## 2021-05-19 PROCEDURE — 84484 ASSAY OF TROPONIN QUANT: CPT

## 2021-05-19 PROCEDURE — 70450 CT HEAD/BRAIN W/O DYE: CPT

## 2021-05-19 RX ORDER — 0.9 % SODIUM CHLORIDE 0.9 %
500 INTRAVENOUS SOLUTION INTRAVENOUS ONCE
Status: COMPLETED | OUTPATIENT
Start: 2021-05-19 | End: 2021-05-19

## 2021-05-19 RX ORDER — 0.9 % SODIUM CHLORIDE 0.9 %
1000 INTRAVENOUS SOLUTION INTRAVENOUS ONCE
Status: DISCONTINUED | OUTPATIENT
Start: 2021-05-19 | End: 2021-05-19

## 2021-05-19 RX ADMIN — SODIUM CHLORIDE 500 ML: 9 INJECTION, SOLUTION INTRAVENOUS at 16:07

## 2021-05-19 ASSESSMENT — ENCOUNTER SYMPTOMS
NAUSEA: 0
COUGH: 0
CHEST TIGHTNESS: 0
BACK PAIN: 0
VOMITING: 0
SHORTNESS OF BREATH: 0
RESPIRATORY NEGATIVE: 1
GASTROINTESTINAL NEGATIVE: 1
ABDOMINAL PAIN: 0

## 2021-05-19 NOTE — ED PROVIDER NOTES
ED Attending Attestation Note     Date of evaluation: 5/19/2021    This patient was seen by the advance practice provider. I have seen and examined the patient, agree with the workup, evaluation, management and diagnosis. The care plan has been discussed. I have reviewed the ECG and concur with the BELEN's interpretation. My assessment reveals a 48-year-old woman with history of CKD on nightly PD who presents to the emergency department following a syncopal event. Patient states that she had a normal dialysis last night. This morning she did not have a breakfast and went about her day. While standing in line in the sun she started feel lightheaded and weak in the knees and subsequently had a syncopal event. She denies any chest pain or double breathing. She did not have any loss of bladder or bowel function. Here the patient feels improved though still feels a little lightheaded. She denies any chest pain or difficulty breathing still. On examination I find a pleasant adult female, speaking in complete sentences. No increased work of breathing or accessory muscle use during respirations. Lungs are clear to auscultation bilaterally. Abdomen is soft. No focal areas of tenderness. We will obtain EKG, laboratory work-up however I assume this is most likely orthostatic syncope that will be able to follow-up in the outpatient setting.     Aníbal Ochoa MD MPH   Physician        Jose Grant MD  05/19/21 9341

## 2021-05-19 NOTE — TELEPHONE ENCOUNTER
----- Message from [employer] sent at 5/18/2021  4:41 PM EDT -----  Subject: Appointment Request    Reason for Call: Routine Pre-Op    QUESTIONS  Type of Appointment? Established Patient  Reason for appointment request? No appointments available during search  Additional Information for Provider? Pt is having surgery at [facility] on 5/26 and would like to schedule a pre op appointment.   ---------------------------------------------------------------------------  --------------  [address]  What is the best way for the office to contact you? OK to leave message on   voicemail  Preferred Call Back Phone Number? [phone]  ---------------------------------------------------------------------------  --------------  SCRIPT ANSWERS  Relationship to Patient? Self  Appointment reason? Symptomatic  Select script based on patient symptoms? Adult Pre-Op   Do you have question for your provider that need to be answered prior to   scheduling your pre-op appointment? No  Have you been diagnosed with, tested for, or told that you are suspected   of having COVID-19 (Coronavirus)? No  Have you had a fever or taken medication to treat a fever within the past   3 days? No  Have you had a cough, shortness of breath or flu-like symptoms within the   past 3 days? No  Do you currently have flu-like symptoms including fever or chills, cough,   shortness of breath, or difficulty breathing, or new loss of taste or   smell? No  (Service Expert  click yes below to proceed with [employer] As Usual   Scheduling)?  Yes

## 2021-05-19 NOTE — ED NOTES
Pt placed on CMU, pulse ox, and NIBP. Pt given call light and instructed on its use. No new needs at this time. Will cont to monitor.       Ana Maria Wadsworth RN  05/19/21 9792

## 2021-05-19 NOTE — ED PROVIDER NOTES
[facility]          PHYSICIAN ASSISTANT NOTE       Date of evaluation: 5/19/2021    Chief Complaint     Loss of Consciousness (Pt to ED via EMS s/p syncopal episode outside while picking up [employer] supplies. pt fell backwards and hit head. pt takes plavix. )      History of Present Illness     Jung Gonzalez is a 54 y.o. female who presents to the emergency department after a syncopal episode. The patient went to her dialysis clinic to  supplies for home peritoneal dialysis. She was standing outside in the sun waiting for them to load her supplies when she started to feel \"woozy\". She then had a syncopal episode. She states she immediately woke up with people over her. She denies dizziness or lightheadedness currently. Denies chest pain, shortness of breath, abdominal pain, nausea or vomiting. She does make urine and denies increased urinary frequency or dysuria with urination when she makes urine. She denies blurry or double vision. Denies numbness or tingling of her extremities. Denies fevers or chills. Denies recent sick contacts. She states she does dialysis every evening. She has not missed dialysis. She states she has been drinking some fluids today but has not eaten yet. Review of Systems     Review of Systems   Constitutional: Negative for chills and fever. HENT: Negative. Respiratory: Negative. Negative for cough, chest tightness and shortness of breath. Cardiovascular: Negative. Negative for chest pain, palpitations and leg swelling. Gastrointestinal: Negative. Negative for abdominal pain, nausea and vomiting. Genitourinary: Negative. Negative for difficulty urinating, dysuria, flank pain and frequency. Musculoskeletal: Negative for back pain and neck pain. Skin: Negative. Neurological: Positive for syncope and light-headedness. Negative for dizziness, seizures, weakness, numbness and headaches.    Psychiatric/Behavioral: Negative. All other systems reviewed and are negative. Past Medical, Surgical, Family, and Social History     She has a past medical history of Diabetes mellitus ([address] End stage kidney disease ([address] Hypertension, Neuropathy, and Peripheral vascular disease ([address] She has a past surgical history that includes Tonsillectomy;  section; other surgical history (2018); and Toe amputation (Left, 2021). Her family history includes Cancer in her father; Coronary Art Dis in her father and mother; Diabetes in her father and mother; Heart Disease in her father and mother; High Blood Pressure in her father and mother. She reports that she has never smoked. She has never used smokeless tobacco. She reports that she does not drink alcohol and does not use drugs. Medications     Current Discharge Medication List      CONTINUE these medications which have NOT CHANGED    Details   HYDROcodone-acetaminophen (NORCO) 5-325 MG per tablet Take 1 tablet by mouth every 6 hours as needed for Pain for up to 3 days. Intended supply: 3 days. Take lowest dose possible to manage pain  Qty: 10 tablet, Refills: 0    Associated Diagnoses: Flank pain      clindamycin (CLEOCIN) 300 MG capsule Take 1 capsule by mouth 4 times daily for 10 days  Qty: 40 capsule, Refills: 0      !! Continuous Blood Gluc Sensor (FREESTYLE KOREY 2 SENSOR) MISC Change every 14 days  Qty: 1 each, Refills: 0    Associated Diagnoses: Uncontrolled type 2 diabetes mellitus with complication, with long-term current use of insulin ([address]      ! ! Continuous Blood Gluc  (FREESTYLE KOREY 2 READER) JOHNNIE Use to check glucose  Qty: 1 each, Refills: 0    Associated Diagnoses: Uncontrolled type 2 diabetes mellitus with complication, with long-term current use of insulin ([facility])      NIFEdipine (PROCARDIA XL) 60 MG extended release tablet       !!  Continuous Blood Gluc Sensor (FREESTYLE KOREY 14 DAY SENSOR) MISC Check glucose ---change every 14 days  Qty: 2 each, Refills: 3      !! Continuous Blood Gluc  (FREESTYLE KOREY 14 DAY READER) JOHNNIE To test glucose  Qty: 1 Device, Refills: 0      LEVEMIR FLEXTOUCH 100 UNIT/ML injection pen INJECT UNDER THE SKIN 16 EVERY NIGHT AT BEDTIME  Qty: 5 pen, Refills: 1    Associated Diagnoses: Diabetes mellitus type 2 in obese ([facility])      atorvastatin (LIPITOR) 20 MG tablet Take 1 tablet by mouth nightly  Qty: 90 tablet, Refills: 1      VELPHORO 500 MG CHEW CHEW TWO TABLETS BY MOUTH THREE TIMES A DAY WITH MEALS  Qty: 180 tablet, Refills: 0    Associated Diagnoses: Chronic kidney disease (CKD), stage V ([facility])      promethazine (PHENERGAN) 25 MG tablet Take 1 tablet by mouth every 6 hours as needed for Nausea  Qty: 28 tablet, Refills: 0      insulin aspart (NOVOLOG FLEXPEN) 100 UNIT/ML injection pen Inject 10 Units into the skin 3 times daily (before meals)  Qty: 3 pen, Refills: 4    Associated Diagnoses: Diabetes mellitus type 2 in obese ([facility])      Blood Glucose Monitoring Suppl (FREESTYLE LITE) JOHNNIE 1 Device by Does not apply route daily Insurance preferred device  Qty: 1 Device, Refills: 0    Associated Diagnoses: Diabetes mellitus type 2 in obese ([facility])      blood glucose test strips (FREESTYLE LITE) strip 1 each by In Vitro route 3 times daily (before meals) As needed.   Qty: 200 each, Refills: 11    Associated Diagnoses: Diabetes mellitus type 2 in obese ([facility])      Insulin Pen Needle 32G X 6 MM MISC Use with insulin pens 4 times daily  Qty: 200 each, Refills: 5    Associated Diagnoses: Diabetes mellitus type 2 in obese ([facility])      aspirin 81 MG EC tablet Take 1 tablet by mouth daily  Qty: 30 tablet, Refills: 1      metoprolol tartrate (LOPRESSOR) 50 MG tablet Take 1 tablet by mouth 2 times daily  Qty: 60 tablet, Refills: 1      torsemide (DEMADEX) 100 MG tablet Take 2 tablets by mouth daily  Qty: 60 tablet, Refills: 1    Associated Diagnoses: Diabetic nephropathy associated with type 2 diabetes mellitus ([facility] clopidogrel (PLAVIX) 75 MG tablet Take 1 tablet by mouth daily  Qty: 30 tablet, Refills: 1      omeprazole (PRILOSEC) 10 MG delayed release capsule Take 10 mg by mouth daily      calcium acetate 667 MG TABS Take 667 mg by mouth 3 times daily (with meals)      FREESTYLE LANCETS MISC Check blood sugars 4 times daily  Qty: 120 each, Refills: 8      vitamin D (CHOLECALCIFEROL) 1000 units TABS tablet Take 5,000 Units by mouth      fluticasone (FLONASE) 50 MCG/ACT nasal spray 2 sprays by Nasal route daily  Qty: 1 Bottle, Refills: 10    Associated Diagnoses: Acute recurrent maxillary sinusitis      ondansetron (ZOFRAN) 8 MG tablet Take 1 tablet by mouth every 12 hours as needed for Nausea. Qty: 10 tablet, Refills: 1    Associated Diagnoses: Vomiting       !! - Potential duplicate medications found. Please discuss with provider. Allergies     She is allergic to zetia [ezetimibe], quinolones, humalog [insulin lispro], lisinopril, lantus [insulin glargine], and victoza [liraglutide]. Physical Exam     INITIAL VITALS: BP: (!) 142/40, Temp: 99 °F (37.2 °C), Pulse: 80, Resp: 16, SpO2: 97 %  Physical Exam  Vitals and nursing note reviewed. Constitutional:       General: She is not in acute distress. Appearance: Normal appearance. She is well-developed. HENT:      Head: Normocephalic and atraumatic. Right Ear: External ear normal.      Left Ear: External ear normal.      Nose: Nose normal.      Mouth/Throat:      Mouth: Mucous membranes are moist.   Eyes:      General:         Right eye: No discharge. Left eye: No discharge. Extraocular Movements: Extraocular movements intact. Conjunctiva/sclera: Conjunctivae normal.      Pupils: Pupils are equal, round, and reactive to light. Cardiovascular:      Rate and Rhythm: Normal rate and regular rhythm. Heart sounds: Normal heart sounds. No murmur heard.      Pulmonary:      Effort: Pulmonary effort is normal.      Breath sounds: Normal breath sounds. No wheezing, rhonchi or rales. Abdominal:      General: Bowel sounds are normal.      Palpations: Abdomen is soft. Tenderness: There is no abdominal tenderness. There is no guarding or rebound. Musculoskeletal:         General: Normal range of motion. Cervical back: Normal range of motion and neck supple. Comments: Distal pulses 2+ bilaterally of upper and lower extremities. She is moving all extremities without difficulty   Skin:     General: Skin is warm and dry. Capillary Refill: Capillary refill takes less than 2 seconds. Neurological:      General: No focal deficit present. Mental Status: She is alert and oriented to person, place, and time. Sensory: No sensory deficit. Deep Tendon Reflexes: Reflexes normal.      Comments: 5 out of 5 strength of bilateral upper and lower extremities. Intact sensation throughout. No nystagmus on examination   Psychiatric:         Mood and Affect: Mood normal.         Behavior: Behavior normal.         Thought Content: Thought content normal.         Judgment: Judgment normal.         Diagnostic Results     EKG   Interpreted in conjunction with emergency department physician Sarahy Abernathy MD  Normal sinus rhythm with a rate of 71, normal axis. QTC slightly prolonged 462. No ST elevation or depression. No acute ischemic patterns. RADIOLOGY:  CT HEAD WO CONTRAST   Final Result      No acute intracranial pathology                 XR CHEST (2 VW)   Final Result      Small to moderate right-sided effusion with compressive atelectasis in the right lung base.                       LABS:   Results for orders placed or performed during the hospital encounter of 05/19/21   CBC auto differential   Result Value Ref Range    WBC 12.6 (H) 4.0 - 11.0 K/uL    RBC 3.66 (L) 4.00 - 5.20 M/uL    Hemoglobin 11.1 (L) 12.0 - 16.0 g/dL    Hematocrit 33.8 (L) 36.0 - 48.0 %    MCV 92.3 80.0 - 100.0 fL    MCH 30.4 26.0 - 34.0 pg    MCHC 33.0 31.0 - 36.0 g/dL    RDW 13.6 12.4 - 15.4 %    Platelets 978 183 - 103 K/uL    MPV 8.2 5.0 - 10.5 fL    Neutrophils % 77.4 %    Lymphocytes % 11.6 %    Monocytes % 7.4 %    Eosinophils % 2.3 %    Basophils % 1.3 %    Neutrophils Absolute 9.7 (H) 1.7 - 7.7 K/uL    Lymphocytes Absolute 1.5 1.0 - 5.1 K/uL    Monocytes Absolute 0.9 0.0 - 1.3 K/uL    Eosinophils Absolute 0.3 0.0 - 0.6 K/uL    Basophils Absolute 0.2 0.0 - 0.2 K/uL   Basic Metabolic Panel w/ Reflex to MG   Result Value Ref Range    Sodium 133 (L) 136 - 145 mmol/L    Potassium reflex Magnesium 4.2 3.5 - 5.1 mmol/L    Chloride 87 (L) 99 - 110 mmol/L    CO2 24 21 - 32 mmol/L    Anion Gap 22 (H) 3 - 16    Glucose 225 (H) 70 - 99 mg/dL    BUN 56 (H) 7 - 20 mg/dL    CREATININE 13.1 (HH) 0.6 - 1.1 mg/dL    GFR Non-African American 3 (A) >60    GFR  4 (A) >60    Calcium 10.1 8.3 - 10.6 mg/dL   Troponin (lab)   Result Value Ref Range    Troponin 0.15 (H) <0.01 ng/mL   Troponin (lab)   Result Value Ref Range    Troponin 0.14 (H) <0.01 ng/mL   EKG 12 Lead   Result Value Ref Range    Ventricular Rate 71 BPM    Atrial Rate 71 BPM    P-R Interval 170 ms    QRS Duration 104 ms    Q-T Interval 426 ms    QTc Calculation (Bazett) 462 ms    P Axis 9 degrees    R Axis 44 degrees    T Axis 102 degrees    Diagnosis       EKG performed in ER and to be interpreted by ER physician. Confirmed by MD, ER ([id number]),  Clint Orta ([id number]) on 5/19/2021 3:53:22 PM           RECENT VITALS:  BP: (!) 142/40, Temp: 99 °F (37.2 °C), Pulse: 80, Resp: 16, SpO2: 97 %     Procedures         ED Course     Nursing Notes, Past Medical Hx,Past Surgical Hx, Social Hx, Allergies, and Family Hx were reviewed.     The patient was given the following medications:  Orders Placed This Encounter   Medications    DISCONTD: 0.9 % sodium chloride bolus    0.9 % sodium chloride bolus       CONSULTS:  None    MEDICAL DECISION MAKING / ASSESSMENT / Sydni Billy is a 54 y.o. female who presented to the emergency department after a syncopal episode. On examination she is neurovascularly intact. She denies any pain in her extremities, neck or back. Denies blurry or double vision. She had an IV established with labs drawn. White count is 12.6 improved from previous of 15. Hemoglobin is baseline. Her BMP shows a glucose of 225, BUN of 56 with a creatinine of 13. Potassium was within normal limits at 4.2. Again the patient does peritoneal dialysis every evening and has supplies at home. She will troponin was 0.15 with her second troponin down slightly 0.14. Chest x-ray shows small to moderate right-sided effusion with compressive atelectasis. CT of the head shows no hemorrhage, mass-effect or shift. In discussion with the patient she denies cough, congestion, chest pain or shortness of breath. She has no lower extremity swelling. Denies abdominal pain or fullness. The patient does admit to not eating much today prior to picking up supplies, was standing in the sun and heat and this may be a contributing factor to her syncopal episode. She currently denies dizziness, lightheadedness and states she feels at her normal baseline. I do feel the patient is stable for discharge to home. She will follow up with her primary care physician for reevaluation. She has an appointment with her cardiologist this Friday, in 2 days. She is to return to the emergency department for worsening symptoms or concerns. This patient was also evaluated by the attending physician. All care plans were discussed and agreed upon. Clinical Impression     1.  Syncope and collapse        Disposition     PATIENT REFERRED TO:  Chris Sweeney, [id number] [id number] [facility],[address] [facility]  [phone]    Schedule an appointment as soon as possible for a visit   for follow up    your cardiologist      on Friday as scheduled      DISCHARGE MEDICATIONS:  Current Discharge Medication List          DISPOSITION

## 2021-05-20 ENCOUNTER — TELEPHONE (OUTPATIENT)
Dept: VASCULAR SURGERY | Age: 46
End: 2021-05-20

## 2021-05-20 ENCOUNTER — PREP FOR PROCEDURE (OUTPATIENT)
Dept: VASCULAR SURGERY | Age: 46
End: 2021-05-20

## 2021-05-20 RX ORDER — SODIUM CHLORIDE 9 MG/ML
25 INJECTION, SOLUTION INTRAVENOUS PRN
Status: CANCELLED | OUTPATIENT
Start: 2021-05-20

## 2021-05-20 RX ORDER — SODIUM CHLORIDE 9 MG/ML
INJECTION, SOLUTION INTRAVENOUS CONTINUOUS
Status: CANCELLED | OUTPATIENT
Start: 2021-05-20

## 2021-05-20 RX ORDER — SODIUM CHLORIDE 0.9 % (FLUSH) 0.9 %
5-40 SYRINGE (ML) INJECTION EVERY 12 HOURS SCHEDULED
Status: CANCELLED | OUTPATIENT
Start: 2021-05-20

## 2021-05-20 RX ORDER — SODIUM CHLORIDE 0.9 % (FLUSH) 0.9 %
5-40 SYRINGE (ML) INJECTION PRN
Status: CANCELLED | OUTPATIENT
Start: 2021-05-20

## 2021-05-20 NOTE — PROGRESS NOTES
Name_______________________________________Printed:____________________  Date and time of surgery___5/26/2021   1430_____________________Arrival Time:___1300   [facility]_____________   1. The instructions given regarding when and if a patient needs to stop oral intake prior to surgery varies. Follow the specific instructions you were given                  _X__Nothing to eat or to drink after Midnight the night before.                   ____Carbo loading or ERAS instructions will be given to select patients-if you have been given those instructions -please do the following                           The evening before your surgery after dinner before midnight drink 40 ounces of gatorade. If you are diabetic use sugar free. The morning of surgery drink 40 ounces of water. This needs to be finished 3 hours prior to your surgery start time. 2. Take the following pills with a small sip of water on the morning of surgery___METOPROLOL________________________________________________                  Do not take blood pressure medications ending in pril or sartan the sb prior to surgery or the morning of surgery_   3. Aspirin, Ibuprofen, Advil, Naproxen, Vitamin E and other Anti-inflammatory products and supplements should be stopped for 5 -7days before surgery or as directed by your physician. 4.X  Check with your Doctor regarding stopping Plavix, Coumadin,Eliquis, Lovenox,Effient,Pradaxa,Xarelto, Fragmin or other blood thinners and follow their instructions. 5. Do not smoke, and do not drink any alcoholic beverages 24 hours prior to surgery. This includes NA Beer. Refrain from the usage of any recreational drugs. 6. You may brush your teeth and gargle the morning of surgery. DO NOT SWALLOW WATER   7. You MUST make arrangements for a responsible adult to stay on site while you are here and take you home after your surgery. You will not be allowed to leave alone or drive yourself home.   It is strongly suggested someone stay with you the first 24 hrs. Your surgery will be cancelled if you do not have a ride home. 8. A parent/legal guardian must accompany a child scheduled for surgery and plan to stay at the hospital until the child is discharged. Please do not bring other children with you. 9. Please wear simple, loose fitting clothing to the hospital.  [place] Courts not bring valuables (money, credit cards, checkbooks, etc.) Do not wear any makeup (including no eye makeup) or nail polish on your fingers or toes. 10. DO NOT wear any jewelry or piercings on day of surgery. All body piercing jewelry must be removed. 11. If you have ___dentures, they will be removed before going to the OR; we will provide you a container. If you wear ___contact lenses or ___glasses, they will be removed; please bring a case for them. 12. Please see your family doctor/pediatrician for a history & physical and/or concerning medications. Bring any test results/reports from your physician's office. PCP__________________Phone___________H&P Appt. Date________             13 If you  have a Living Will and Durable Power of  for Healthcare, please bring in a copy. 15. Notify your Surgeon if you develop any illness between now and surgery  time, cough, cold, fever, sore throat, nausea, vomiting, etc.  Please notify your surgeon if you experience dizziness, shortness of breath or blurred vision between now & the time of your surgery             15. DO NOT shave your operative site 96 hours prior to surgery. For face & neck surgery, men may use an electric razor 48 hours prior to surgery. 16. Shower the night before or morning of surgery using an antibacterial soap or as you have been instructed. 17. To provide excellent care visitors will be limited to one in the room at any given time. 18.  Please bring picture ID and insurance card.              19.  Visit our web site for additional information:  [email or web address]              20.During flu season no children under the age of 15 are permitted in the hospital for the safety of all patients. 21.X  If you take a long acting insulin in the evening only  take half of your usual  dose the night  before your procedure              22. If you use a c-pap please bring DOS if staying overnight,             23.For your convenience [facility] has a pharmacy on site to fill your prescriptions. 24. If you use oxygen and have a portable tank please bring it  with you the DOS             25. Bring a complete list of all your medications with name and dose include any supplements. 26. Other__________________________________________   *Please call pre admission testing if you any further questions   Tia Flores   [address]    [place]. [address]    __ Done-Where _____  __ Scheduled ___ Where ___   _X_ Other __PT GIVEN NUMBER TO CALL AND MAKE APPOINTMENT FOR 5/21/2021 OR 5/24/2021________  __ VACCINATED-instructed to bring card DOS      VISITOR POLICY(subject to change)    There is a one visitor policy at [facility] for all surgeries and endoscopies. Whether the visitor can stay or will be asked to wait in the car will depend on the current policy and if social distancing can be maintained. The policy is subject to change at any time. Please make sure the visitor has a cell phone that is on,charged and able to accept calls, as this may be the way that the staff communicates with them. Pain management is NO VISITOR policyThe patients ride is expected to remain in the car with a cell phone for communication. If the ride is leaving the hospital grounds please make sure they are back in time for pickup.  Have the patient inform the staff on arrival what their rides plans are while the patient is in the

## 2021-05-24 ENCOUNTER — OFFICE VISIT (OUTPATIENT)
Dept: INTERNAL MEDICINE CLINIC | Age: 46
End: 2021-05-24
Payer: MEDICARE

## 2021-05-24 ENCOUNTER — OFFICE VISIT (OUTPATIENT)
Dept: PRIMARY CARE CLINIC | Age: 46
End: 2021-05-24
Payer: MEDICARE

## 2021-05-24 VITALS
TEMPERATURE: 98.1 F | BODY MASS INDEX: 33.52 KG/M2 | HEART RATE: 96 BPM | OXYGEN SATURATION: 99 % | WEIGHT: 227 LBS | DIASTOLIC BLOOD PRESSURE: 60 MMHG | SYSTOLIC BLOOD PRESSURE: 102 MMHG

## 2021-05-24 DIAGNOSIS — Z01.818 PRE-OP EXAM: ICD-10-CM

## 2021-05-24 DIAGNOSIS — Z20.828 EXPOSURE TO SARS-ASSOCIATED CORONAVIRUS: Primary | ICD-10-CM

## 2021-05-24 DIAGNOSIS — E66.9 DIABETES MELLITUS TYPE 2 IN OBESE (HCC): ICD-10-CM

## 2021-05-24 DIAGNOSIS — E11.69 DIABETES MELLITUS TYPE 2 IN OBESE (HCC): ICD-10-CM

## 2021-05-24 DIAGNOSIS — I96 GANGRENE OF TOE OF LEFT FOOT (HCC): Primary | ICD-10-CM

## 2021-05-24 LAB — SARS-COV-2: NOT DETECTED

## 2021-05-24 PROCEDURE — 99213 OFFICE O/P EST LOW 20 MIN: CPT | Performed by: NURSE PRACTITIONER

## 2021-05-24 PROCEDURE — 2022F DILAT RTA XM EVC RTNOPTHY: CPT | Performed by: NURSE PRACTITIONER

## 2021-05-24 PROCEDURE — G8417 CALC BMI ABV UP PARAM F/U: HCPCS | Performed by: NURSE PRACTITIONER

## 2021-05-24 PROCEDURE — G8428 CUR MEDS NOT DOCUMENT: HCPCS | Performed by: NURSE PRACTITIONER

## 2021-05-24 PROCEDURE — G8427 DOCREV CUR MEDS BY ELIG CLIN: HCPCS | Performed by: NURSE PRACTITIONER

## 2021-05-24 PROCEDURE — 99211 OFF/OP EST MAY X REQ PHY/QHP: CPT | Performed by: NURSE PRACTITIONER

## 2021-05-24 RX ORDER — NIFEDIPINE 60 MG/1
60 TABLET, EXTENDED RELEASE ORAL NIGHTLY
Qty: 30 TABLET | Refills: 0 | Status: ON HOLD
Start: 2021-05-24 | End: 2021-06-16 | Stop reason: HOSPADM

## 2021-05-24 RX ORDER — INSULIN DETEMIR 100 [IU]/ML
INJECTION, SOLUTION SUBCUTANEOUS
Qty: 5 PEN | Refills: 1 | Status: ON HOLD
Start: 2021-05-24 | End: 2021-06-02 | Stop reason: DRUGHIGH

## 2021-05-24 RX ORDER — PROMETHAZINE HYDROCHLORIDE 25 MG/1
25 TABLET ORAL EVERY 6 HOURS PRN
Qty: 28 TABLET | Refills: 0 | Status: ON HOLD | OUTPATIENT
Start: 2021-05-24 | End: 2021-06-02 | Stop reason: ALTCHOICE

## 2021-05-24 NOTE — PATIENT INSTRUCTIONS

## 2021-05-24 NOTE — PROGRESS NOTES
Subjective:      Patient ID: Phil Ortega is a 51 y.o. female. Presents today for pre op exam for left toe removal      Review of Systems    Objective:   Physical Exam  Abdominal:       Musculoskeletal:        Back:    Feet:      Comments: Walking boot noted left foot with dry dressing noted        Assessment:      Pre op exam  Left second toe gangrene      Plan:      Cleared for surgery        Tom Maicolosvaldo Blanchard, APRN - CNP    Subjective:      Phil Ortega is a 51 y.o. female who presents to the office today for a preoperative consultation at the request of surgeon Dr. Monty Coulter plans on performing amputation of hallux and second toe on May 26. This consultation is requested for the specific conditions prompting preoperative evaluation (i.e. because of potential affect on operative risk): Diabetes. Planned anesthesia is General.  The patient has the following known anesthesia issues: none  Patient has a bleeding risk of : no recent abnormal bleeding  Patient does not have objection to receiving blood products if needed. Patient's medications, allergies, past medical, surgical, social and family histories were reviewed and updated as appropriate. Review of Systems  Pertinent items are noted in HPI.       Objective:      /60   Pulse 96   Temp 98.1 °F (36.7 °C)   Wt 227 lb (103 kg)   LMP 05/12/2021 (Approximate)   SpO2 99%   BMI 33.52 kg/m²     General Appearance:  Alert, cooperative, no distress, appears stated age   Head:  Normocephalic, without obvious abnormality, atraumatic   Eyes:  PERRL, conjunctiva/corneas clear, EOM's intact, fundi benign, both eyes   Ears:  Normal TM's and external ear canals, both ears   Nose: Nares normal, septum midline,mucosa normal, no drainage or sinus tenderness   Throat: Lips, mucosa, and tongue normal; teeth and gums normal   Neck: Supple, symmetrical, trachea midline, no adenopathy;  thyroid: not enlarged, symmetric, no tenderness/mass/nodules; no carotid bruit or JVD   Back:   Symmetric, no curvature, ROM normal, no CVA tenderness   Lungs:   Clear to auscultation bilaterally, respirations unlabored   Breasts:  No masses or tenderness   Heart:  Regular rate and rhythm, S1 and S2 normal, no murmur, rub, or gallop   Abdomen:   Soft, non-tender, bowel sounds active all four quadrants,  no masses, no organomegaly   Pelvic: Deferred   Extremities: Extremities normal, atraumatic, no cyanosis or edema   Pulses: 2+ and symmetric   Skin: Skin color, texture, turgor normal, no rashes or lesions   Lymph nodes: Cervical, supraclavicular, and axillary nodes normal   Neurologic: Normal         Predictors of intubation difficulty:   Morbid obesity? no   Anatomically abnormal facies? no   Prominent incisors? no   Receding mandible? no   Short, thick neck? no   Neck range of motion: normal   Mallampati score:  I (soft palate, uvula, fauces, tonsillar pillars visible)   Thyromental distance: < 6cm   Mouth openin cm   Dentition: Chipped teeth present (front)    Cardiographics  ECG: see report  Echocardiogram: mild small basal anterior ischemia    Imaging  Chest X-Ray: not indicated     Lab Review   Admission on 2021, Discharged on 2021   Component Date Value    WBC 2021 12.6*    RBC 2021 3.66*    Hemoglobin 2021 11.1*    Hematocrit 2021 33.8*    MCV 2021 92.3     MCH 2021 30.4     MCHC 2021 33.0     RDW 2021 13.6     Platelets  379     MPV 2021 8.2     Neutrophils % 2021 77.4     Lymphocytes % 2021 11.6     Monocytes % 2021 7.4     Eosinophils % 2021 2.3     Basophils % 2021 1.3     Neutrophils Absolute 2021 9.7*    Lymphocytes Absolute 2021 1.5     Monocytes Absolute 2021 0.9     Eosinophils Absolute 2021 0.3     Basophils Absolute 2021 0.2     Sodium 2021 133*    Potassium reflex Magnesi* 2021 4.2     Chloride 05/19/2021 87*    CO2 05/19/2021 24     Anion Gap 05/19/2021 22*    Glucose 05/19/2021 225*    BUN 05/19/2021 56*    CREATININE 05/19/2021 13.1*    GFR Non- 05/19/2021 3*    GFR  05/19/2021 4*    Calcium 05/19/2021 10.1     Troponin 05/19/2021 0.15*    Ventricular Rate 05/19/2021 71     Atrial Rate 05/19/2021 71     P-R Interval 05/19/2021 170     QRS Duration 05/19/2021 104     Q-T Interval 05/19/2021 426     QTc Calculation (Bazett) 05/19/2021 462     P Axis 05/19/2021 9     R Axis 05/19/2021 44     T Axis 05/19/2021 102     Diagnosis 05/19/2021 EKG performed in ER and to be interpreted by ER physician. Confirmed by MD, ER ([id number]),  Ana Laura Davalos ([id number]) on 5/19/2021 3:53:22 PM     Troponin 05/19/2021 0.14*   Admission on 05/15/2021, Discharged on 05/16/2021   Component Date Value    WBC 05/16/2021 15.2*    RBC 05/16/2021 3.63*    Hemoglobin 05/16/2021 10.9*    Hematocrit 05/16/2021 33.0*    MCV 05/16/2021 91.1     MCH 05/16/2021 30.0     MCHC 05/16/2021 32.9     RDW 05/16/2021 13.6     Platelets 64/87/6144 376     MPV 05/16/2021 8.3     Neutrophils % 05/16/2021 86.8     Lymphocytes % 05/16/2021 6.4     Monocytes % 05/16/2021 5.6     Eosinophils % 05/16/2021 0.2     Basophils % 05/16/2021 1.0     Neutrophils Absolute 05/16/2021 13.2*    Lymphocytes Absolute 05/16/2021 1.0     Monocytes Absolute 05/16/2021 0.8     Eosinophils Absolute 05/16/2021 0.0     Basophils Absolute 05/16/2021 0.2     Sodium 05/16/2021 132*    Potassium reflex Magnesi* 05/16/2021 4.5     Chloride 05/16/2021 89*    CO2 05/16/2021 22     Anion Gap 05/16/2021 21*    Glucose 05/16/2021 255*    BUN 05/16/2021 66*    CREATININE 05/16/2021 12.2*    GFR Non- 05/16/2021 3*    GFR  05/16/2021 4*    Calcium 05/16/2021 10.0     Total Protein 05/16/2021 7.6     Albumin 05/16/2021 3.4     Albumin/Globulin Ratio 05/16/2021 0.8*    Total Bilirubin 05/16/2021 <0.2     Alkaline Phosphatase 05/16/2021 112     ALT 05/16/2021 <5*    AST 05/16/2021 9*    Globulin 05/16/2021 4.2     Color, UA 05/16/2021 YELLOW     Clarity, UA 05/16/2021 CLOUDY*    Glucose, Ur 05/16/2021 500*    Bilirubin Urine 05/16/2021 Negative     Ketones, Urine 05/16/2021 Negative     Specific Gravity, UA 05/16/2021 1.017     Blood, Urine 05/16/2021 SMALL*    pH, UA 05/16/2021 5.5     Protein, UA 05/16/2021 >=300*    Urobilinogen, Urine 05/16/2021 0.2     Nitrite, Urine 05/16/2021 Negative     Leukocyte Esterase, Urine 05/16/2021 TRACE*    Microscopic Examination 05/16/2021 YES     Urine Type 05/16/2021 NotGiven     hCG Qual 05/16/2021 Negative     WBC, UA 05/16/2021 6-9     RBC, UA 05/16/2021 0-2     Epithelial Cells, UA 05/16/2021 2-5     Urinalysis Comments 05/16/2021 see below    Nurse Only on 05/11/2021   Component Date Value    Hemoglobin A1C 05/11/2021 8.2    Orders Only on 05/07/2021   Component Date Value    HPV TYPE 16 05/07/2021 Not Detected     HPV TYPE 18 05/07/2021 Not Detected     HPVOH (OTHER TYPES) 05/07/2021 Not Detected     HPV Comment 05/07/2021 See below    Orders Only on 04/20/2021   Component Date Value    Left Ventricular Ejectio* 04/20/2021 43     LVEF MODALITY 04/20/2021 Nuclear     Left Ventricular Ejectio* 04/20/2021 58     LVEF MODALITY 04/20/2021 ECHO          Assessment:        51 y.o. female with planned surgery as above. Known risk factors for perioperative complications: None    Difficulty with intubation is not anticipated. Cardiac Risk Estimation: per the Revised Cardiac Risk Index (Circ. 100:1043, 1999), the patient's risk factors for cardiac complications include on insulin, putting her in: RCI RISK CLASS II (1 risk factor, risk of major cardiac compl. appr. 1.3%)    Current medications which may produce withdrawal symptoms if withheld perioperatively: none       Plan:      1.  Preoperative workup as follows

## 2021-05-24 NOTE — PROGRESS NOTES
Shanta Mireles received a viral test for COVID-19. They were educated on isolation and quarantine as appropriate. For any symptoms, they were directed to seek care from their PCP, given contact information to establish with a doctor, directed to an urgent care or the emergency room.

## 2021-05-26 ENCOUNTER — HOSPITAL ENCOUNTER (OUTPATIENT)
Age: 46
Setting detail: OUTPATIENT SURGERY
Discharge: HOME | End: 2021-05-26
Attending: PODIATRIST | Admitting: PODIATRIST
Payer: MEDICARE

## 2021-05-26 ENCOUNTER — ANESTHESIA (OUTPATIENT)
Dept: OPERATING ROOM | Age: 46
End: 2021-05-26
Payer: MEDICARE

## 2021-05-26 ENCOUNTER — ANESTHESIA EVENT (OUTPATIENT)
Dept: OPERATING ROOM | Age: 46
End: 2021-05-26
Payer: MEDICARE

## 2021-05-26 ENCOUNTER — APPOINTMENT (OUTPATIENT)
Dept: GENERAL RADIOLOGY | Age: 46
End: 2021-05-26
Attending: PODIATRIST
Payer: MEDICARE

## 2021-05-26 VITALS
OXYGEN SATURATION: 100 % | DIASTOLIC BLOOD PRESSURE: 55 MMHG | RESPIRATION RATE: 12 BRPM | SYSTOLIC BLOOD PRESSURE: 117 MMHG

## 2021-05-26 VITALS
RESPIRATION RATE: 16 BRPM | SYSTOLIC BLOOD PRESSURE: 153 MMHG | HEART RATE: 60 BPM | DIASTOLIC BLOOD PRESSURE: 74 MMHG | TEMPERATURE: 97.5 F | BODY MASS INDEX: 33.47 KG/M2 | WEIGHT: 226 LBS | OXYGEN SATURATION: 98 % | HEIGHT: 69 IN

## 2021-05-26 DIAGNOSIS — G89.18 POST-OPERATIVE PAIN: Primary | ICD-10-CM

## 2021-05-26 LAB
ANION GAP SERPL CALCULATED.3IONS-SCNC: 19 MMOL/L (ref 3–16)
BUN BLDV-MCNC: 55 MG/DL (ref 7–20)
CALCIUM SERPL-MCNC: 9.9 MG/DL (ref 8.3–10.6)
CHLORIDE BLD-SCNC: 89 MMOL/L (ref 99–110)
CO2: 27 MMOL/L (ref 21–32)
CREAT SERPL-MCNC: 13.9 MG/DL (ref 0.6–1.1)
GFR AFRICAN AMERICAN: 3
GFR NON-AFRICAN AMERICAN: 3
GLUCOSE BLD-MCNC: 130 MG/DL (ref 70–99)
GLUCOSE BLD-MCNC: 137 MG/DL (ref 70–99)
GLUCOSE BLD-MCNC: 170 MG/DL (ref 70–99)
HCG(URINE) PREGNANCY TEST: NEGATIVE
PERFORMED ON: ABNORMAL
PERFORMED ON: ABNORMAL
POTASSIUM SERPL-SCNC: 3.9 MMOL/L (ref 3.5–5.1)
SODIUM BLD-SCNC: 135 MMOL/L (ref 136–145)

## 2021-05-26 PROCEDURE — 3700000000 HC ANESTHESIA ATTENDED CARE: Performed by: PODIATRIST

## 2021-05-26 PROCEDURE — 7100000011 HC PHASE II RECOVERY - ADDTL 15 MIN: Performed by: PODIATRIST

## 2021-05-26 PROCEDURE — 6360000002 HC RX W HCPCS: Performed by: NURSE ANESTHETIST, CERTIFIED REGISTERED

## 2021-05-26 PROCEDURE — 7100000001 HC PACU RECOVERY - ADDTL 15 MIN: Performed by: PODIATRIST

## 2021-05-26 PROCEDURE — 2580000003 HC RX 258: Performed by: PODIATRIST

## 2021-05-26 PROCEDURE — 2500000003 HC RX 250 WO HCPCS: Performed by: NURSE ANESTHETIST, CERTIFIED REGISTERED

## 2021-05-26 PROCEDURE — 3600000013 HC SURGERY LEVEL 3 ADDTL 15MIN: Performed by: PODIATRIST

## 2021-05-26 PROCEDURE — 73630 X-RAY EXAM OF FOOT: CPT

## 2021-05-26 PROCEDURE — 6370000000 HC RX 637 (ALT 250 FOR IP): Performed by: STUDENT IN AN ORGANIZED HEALTH CARE EDUCATION/TRAINING PROGRAM

## 2021-05-26 PROCEDURE — 3700000001 HC ADD 15 MINUTES (ANESTHESIA): Performed by: PODIATRIST

## 2021-05-26 PROCEDURE — 7100000000 HC PACU RECOVERY - FIRST 15 MIN: Performed by: PODIATRIST

## 2021-05-26 PROCEDURE — 6360000002 HC RX W HCPCS: Performed by: PODIATRIST

## 2021-05-26 PROCEDURE — 84703 CHORIONIC GONADOTROPIN ASSAY: CPT

## 2021-05-26 PROCEDURE — 80048 BASIC METABOLIC PNL TOTAL CA: CPT

## 2021-05-26 PROCEDURE — 2580000003 HC RX 258: Performed by: NURSE ANESTHETIST, CERTIFIED REGISTERED

## 2021-05-26 PROCEDURE — 2709999900 HC NON-CHARGEABLE SUPPLY: Performed by: PODIATRIST

## 2021-05-26 PROCEDURE — 3600000003 HC SURGERY LEVEL 3 BASE: Performed by: PODIATRIST

## 2021-05-26 PROCEDURE — 7100000010 HC PHASE II RECOVERY - FIRST 15 MIN: Performed by: PODIATRIST

## 2021-05-26 PROCEDURE — 2500000003 HC RX 250 WO HCPCS: Performed by: PODIATRIST

## 2021-05-26 RX ORDER — PROMETHAZINE HYDROCHLORIDE 25 MG/1
25 TABLET ORAL EVERY 6 HOURS PRN
Qty: 20 TABLET | Refills: 0 | Status: ON HOLD | OUTPATIENT
Start: 2021-05-26 | End: 2021-06-16

## 2021-05-26 RX ORDER — LIDOCAINE HYDROCHLORIDE 10 MG/ML
INJECTION, SOLUTION EPIDURAL; INFILTRATION; INTRACAUDAL; PERINEURAL
Status: COMPLETED | OUTPATIENT
Start: 2021-05-26 | End: 2021-05-26

## 2021-05-26 RX ORDER — SODIUM CHLORIDE 9 MG/ML
INJECTION, SOLUTION INTRAVENOUS CONTINUOUS PRN
Status: DISCONTINUED | OUTPATIENT
Start: 2021-05-26 | End: 2021-05-26 | Stop reason: SDUPTHER

## 2021-05-26 RX ORDER — POVIDONE-IODINE 10 MG/G
OINTMENT TOPICAL
Status: COMPLETED | OUTPATIENT
Start: 2021-05-26 | End: 2021-05-26

## 2021-05-26 RX ORDER — HYDROCODONE BITARTRATE AND ACETAMINOPHEN 5; 325 MG/1; MG/1
2 TABLET ORAL PRN
Status: DISCONTINUED | OUTPATIENT
Start: 2021-05-26 | End: 2021-05-26 | Stop reason: HOSPADM

## 2021-05-26 RX ORDER — SODIUM CHLORIDE 0.9 % (FLUSH) 0.9 %
5-40 SYRINGE (ML) INJECTION EVERY 12 HOURS SCHEDULED
Status: CANCELLED | OUTPATIENT
Start: 2021-05-26

## 2021-05-26 RX ORDER — BUPIVACAINE HYDROCHLORIDE 5 MG/ML
INJECTION, SOLUTION EPIDURAL; INTRACAUDAL
Status: COMPLETED | OUTPATIENT
Start: 2021-05-26 | End: 2021-05-26

## 2021-05-26 RX ORDER — HYDROCODONE BITARTRATE AND ACETAMINOPHEN 5; 325 MG/1; MG/1
1 TABLET ORAL PRN
Status: DISCONTINUED | OUTPATIENT
Start: 2021-05-26 | End: 2021-05-26 | Stop reason: HOSPADM

## 2021-05-26 RX ORDER — ONDANSETRON 2 MG/ML
4 INJECTION INTRAMUSCULAR; INTRAVENOUS
Status: DISCONTINUED | OUTPATIENT
Start: 2021-05-26 | End: 2021-05-26 | Stop reason: HOSPADM

## 2021-05-26 RX ORDER — SODIUM CHLORIDE 9 MG/ML
25 INJECTION, SOLUTION INTRAVENOUS PRN
Status: CANCELLED | OUTPATIENT
Start: 2021-05-26

## 2021-05-26 RX ORDER — PROPOFOL 10 MG/ML
INJECTION, EMULSION INTRAVENOUS CONTINUOUS PRN
Status: DISCONTINUED | OUTPATIENT
Start: 2021-05-26 | End: 2021-05-26 | Stop reason: SDUPTHER

## 2021-05-26 RX ORDER — LABETALOL HYDROCHLORIDE 5 MG/ML
5 INJECTION, SOLUTION INTRAVENOUS EVERY 10 MIN PRN
Status: DISCONTINUED | OUTPATIENT
Start: 2021-05-26 | End: 2021-05-26 | Stop reason: HOSPADM

## 2021-05-26 RX ORDER — MIDAZOLAM HYDROCHLORIDE 2 MG/2ML
2 INJECTION, SOLUTION INTRAMUSCULAR; INTRAVENOUS
Status: CANCELLED | OUTPATIENT
Start: 2021-05-26 | End: 2021-05-26

## 2021-05-26 RX ORDER — SODIUM CHLORIDE 9 MG/ML
INJECTION, SOLUTION INTRAVENOUS CONTINUOUS
Status: DISCONTINUED | OUTPATIENT
Start: 2021-05-26 | End: 2021-05-26 | Stop reason: HOSPADM

## 2021-05-26 RX ORDER — HYDROCODONE BITARTRATE AND ACETAMINOPHEN 5; 325 MG/1; MG/1
1 TABLET ORAL
Status: COMPLETED | OUTPATIENT
Start: 2021-05-26 | End: 2021-05-26

## 2021-05-26 RX ORDER — LIDOCAINE HYDROCHLORIDE 10 MG/ML
0.5 INJECTION, SOLUTION EPIDURAL; INFILTRATION; INTRACAUDAL; PERINEURAL ONCE
Status: DISCONTINUED | OUTPATIENT
Start: 2021-05-26 | End: 2021-05-26 | Stop reason: HOSPADM

## 2021-05-26 RX ORDER — SODIUM CHLORIDE 0.9 % (FLUSH) 0.9 %
5-40 SYRINGE (ML) INJECTION PRN
Status: CANCELLED | OUTPATIENT
Start: 2021-05-26

## 2021-05-26 RX ORDER — HYDROMORPHONE HCL 110MG/55ML
0.25 PATIENT CONTROLLED ANALGESIA SYRINGE INTRAVENOUS EVERY 5 MIN PRN
Status: DISCONTINUED | OUTPATIENT
Start: 2021-05-26 | End: 2021-05-26 | Stop reason: HOSPADM

## 2021-05-26 RX ORDER — HYDROCODONE BITARTRATE AND ACETAMINOPHEN 5; 325 MG/1; MG/1
1 TABLET ORAL EVERY 6 HOURS PRN
Qty: 28 TABLET | Refills: 0 | Status: ON HOLD | OUTPATIENT
Start: 2021-05-26 | End: 2021-06-16 | Stop reason: HOSPADM

## 2021-05-26 RX ORDER — SODIUM CHLORIDE, SODIUM LACTATE, POTASSIUM CHLORIDE, CALCIUM CHLORIDE 600; 310; 30; 20 MG/100ML; MG/100ML; MG/100ML; MG/100ML
INJECTION, SOLUTION INTRAVENOUS CONTINUOUS
Status: CANCELLED | OUTPATIENT
Start: 2021-05-26

## 2021-05-26 RX ORDER — HYDROMORPHONE HCL 110MG/55ML
0.5 PATIENT CONTROLLED ANALGESIA SYRINGE INTRAVENOUS EVERY 5 MIN PRN
Status: DISCONTINUED | OUTPATIENT
Start: 2021-05-26 | End: 2021-05-26 | Stop reason: HOSPADM

## 2021-05-26 RX ORDER — KETAMINE HCL IN NACL, ISO-OSM 100MG/10ML
SYRINGE (ML) INJECTION PRN
Status: DISCONTINUED | OUTPATIENT
Start: 2021-05-26 | End: 2021-05-26 | Stop reason: SDUPTHER

## 2021-05-26 RX ADMIN — PROPOFOL 50 MCG/KG/MIN: 10 INJECTION, EMULSION INTRAVENOUS at 13:41

## 2021-05-26 RX ADMIN — HYDROCODONE BITARTRATE AND ACETAMINOPHEN 1 TABLET: 5; 325 TABLET ORAL at 14:46

## 2021-05-26 RX ADMIN — Medication 30 MG: at 13:41

## 2021-05-26 RX ADMIN — SODIUM CHLORIDE: 9 INJECTION, SOLUTION INTRAVENOUS at 13:35

## 2021-05-26 RX ADMIN — SODIUM CHLORIDE: 9 INJECTION, SOLUTION INTRAVENOUS at 12:46

## 2021-05-26 RX ADMIN — CEFAZOLIN 2000 MG: 10 INJECTION, POWDER, FOR SOLUTION INTRAVENOUS at 13:26

## 2021-05-26 ASSESSMENT — PULMONARY FUNCTION TESTS
PIF_VALUE: 0
PIF_VALUE: 1
PIF_VALUE: 1
PIF_VALUE: 0
PIF_VALUE: 0
PIF_VALUE: 1
PIF_VALUE: 0
PIF_VALUE: 1
PIF_VALUE: 0

## 2021-05-26 ASSESSMENT — PAIN DESCRIPTION - PROGRESSION: CLINICAL_PROGRESSION: GRADUALLY IMPROVING

## 2021-05-26 ASSESSMENT — PAIN DESCRIPTION - FREQUENCY: FREQUENCY: CONTINUOUS

## 2021-05-26 ASSESSMENT — PAIN DESCRIPTION - DESCRIPTORS
DESCRIPTORS: ACHING
DESCRIPTORS: ACHING

## 2021-05-26 ASSESSMENT — PAIN DESCRIPTION - LOCATION: LOCATION: BUTTOCKS

## 2021-05-26 NOTE — PROGRESS NOTES
Discharge instructions reviewed with Todd Esteban (son) via telephone, all questions answered and verbalized understanding of instructions. Scripts for [employer] and Phenergan placed in pt discharge folder to fill at preferred pharmacy.

## 2021-05-26 NOTE — PROGRESS NOTES
Xray completed at bedside. Pt awake and alert, no complaints of left foot pain, no report of nausea. VSS.

## 2021-05-26 NOTE — ANESTHESIA PRE PROCEDURE
Department of Anesthesiology  Preprocedure Note       Name:  Uriah Salinas   Age:  51 y.o.  :  1975                                          MRN:  [id number]         Date:  2021      Surgeon: Loni Lara):  Briana Sadler DPM    Procedure: Procedure(s):  AMPUTATION OF HALLUX AND SECOND TOE, LEFT FOOT    Medications prior to admission:   Prior to Admission medications    Medication Sig Start Date End Date Taking?  Authorizing Provider   promethazine (PHENERGAN) 25 MG tablet Take 1 tablet by mouth every 6 hours as needed for Nausea 21  Yes Mannie Fix, APRN - CNP   insulin detemir (LEVEMIR FLEXTOUCH) 100 UNIT/ML injection pen INJECT UNDER THE SKIN 16 EVERY NIGHT AT BEDTIME 21  Yes [place] Quince Flatter, APRN - CNP   NIFEdipine (PROCARDIA XL) 60 MG extended release tablet Take 1 tablet by mouth nightly Take 60 mg by mouth nightly 21  Yes Mannie Fix, APRN - PATRICIO   atorvastatin (LIPITOR) 20 MG tablet Take 1 tablet by mouth nightly 21  Yes Mariah Finley MD   VELPHORO 500 MG CHEW CHEW TWO TABLETS BY MOUTH THREE TIMES A DAY WITH MEALS 21  Yes Noe Hollis MD   insulin aspart (NOVOLOG FLEXPEN) 100 UNIT/ML injection pen Inject 10 Units into the skin 3 times daily (before meals) 21  Yes Mariah Finley MD   metoprolol tartrate (LOPRESSOR) 50 MG tablet Take 1 tablet by mouth 2 times daily 21  Yes Nidhi Zeng PA-C   torsemide (DEMADEX) 100 MG tablet Take 2 tablets by mouth daily 21  Yes Nidhi Zeng PA-C   clopidogrel (PLAVIX) 75 MG tablet Take 1 tablet by mouth daily 21  Yes Nidhi Zeng PA-C   omeprazole (PRILOSEC) 10 MG delayed release capsule Take 10 mg by mouth daily   Yes Historical Provider, MD   calcium acetate 667 MG TABS Take 667 mg by mouth 3 times daily (with meals)   Yes Historical Provider, MD   Continuous Blood Gluc Sensor (FREESTYLE KOREY 2 SENSOR) MISC Change every 14 days 21   Anushka Mon MD   Continuous MD Nessa        HYDROmorphone (DILAUDID) injection 0.5 mg  0.5 mg Intravenous Q5 Min PRN Jimi Lancaster MD        HYDROcodone-acetaminophen (NORCO) 5-325 MG per tablet 1 tablet  1 tablet Oral PRN Jimi Lancaster MD        Or    HYDROcodone-acetaminophen (NORCO) 5-325 MG per tablet 2 tablet  2 tablet Oral PRN Jimi Lancaster MD        ondansetron (ZOFRAN) injection 4 mg  4 mg Intravenous Once PRN Jimi Lancaster MD        labetalol (NORMODYNE;TRANDATE) injection 5 mg  5 mg Intravenous Q10 Min PRN Krause MD Anisha           Allergies:     Allergies   Allergen Reactions    Zetia [Ezetimibe] Shortness Of Breath    Quinolones Nausea Only     Other reaction(s): Vomiting    Humalog [Insulin Lispro]      SOB, Leg swelling, fatigue    Lisinopril Swelling    Lantus [Insulin Glargine] Nausea And Vomiting     Side effect, not an allergy    Victoza [Liraglutide] Nausea And Vomiting     Side effect, not an allergy         Problem List:    Patient Active Problem List   Diagnosis Code    Diabetes mellitus ([facility] E11.9    Obesity E66.9    Microalbuminuria R80.9    Hyperlipidemia with target LDL less than 100 E78.5    Hypertension I10    Diabetes mellitus type 2 in obese (HCC) E11.69, E66.9    Acute renal failure (ARF) ([facility]) N17.9    ESRD (end stage renal disease) ([facility]) N18.6    Non-smoker Z78.9    Elevated C-reactive protein (CRP) R79.82    Elevated sed rate R70.0    Diabetic polyneuropathy associated with type 2 diabetes mellitus ([facility]) E11.42    Weight loss counseling, encounter for Z71.3       Past Medical History:        Diagnosis Date    Diabetes mellitus ([address]     End stage kidney disease ([address]     peritoneal dialysis every night at home x 2 years    Hypertension     Neuropathy     Peripheral vascular disease ([address]        Past Surgical History:        Procedure Laterality Date     SECTION      OTHER SURGICAL HISTORY  2018     lap PD cath placement lt side 62 cm    TOE AMPUTATION Left 2021 AMPUTATION OF THE LEFT THIRD TOE performed by Clover Magaña DPM at [facility]         Social History:    Social History     Tobacco Use    Smoking status: Never Smoker    Smokeless tobacco: Never Used   Substance Use Topics    Alcohol use: No                                Counseling given: Not Answered      Vital Signs (Current):   Vitals:    05/20/21 1314 05/26/21 1232   Pulse:  64   Resp:  16   Temp:  96.9 °F (36.1 °C)   TempSrc:  Temporal   SpO2:  98%   Weight: 233 lb (105.7 kg) 226 lb (102.5 kg)   Height: 5' 9\" (1.753 m) 5' 9\" (1.753 m)                                              BP Readings from Last 3 Encounters:   05/24/21 102/60   05/19/21 (!) 121/50   05/16/21 (!) 123/98       NPO Status:  before mn                                                                                BMI:   Wt Readings from Last 3 Encounters:   05/26/21 226 lb (102.5 kg)   05/24/21 227 lb (103 kg)   05/15/21 233 lb (105.7 kg)     Body mass index is 33.37 kg/m². CBC:   Lab Results   Component Value Date    WBC 12.6 05/19/2021    RBC 3.66 05/19/2021    HGB 11.1 05/19/2021    HCT 33.8 05/19/2021    MCV 92.3 05/19/2021    RDW 13.6 05/19/2021     05/19/2021       CMP:   Lab Results   Component Value Date     05/19/2021    K 4.2 05/19/2021    CL 87 05/19/2021    CO2 24 05/19/2021    BUN 56 05/19/2021    CREATININE 13.1 05/19/2021    GFRAA 4 05/19/2021    GFRAA >60 04/09/2013    AGRATIO 0.8 05/16/2021    LABGLOM 3 05/19/2021    GLUCOSE 225 05/19/2021    PROT 7.6 05/16/2021    PROT 7.1 12/13/2012    CALCIUM 10.1 05/19/2021    BILITOT <0.2 05/16/2021    ALKPHOS 112 05/16/2021    AST 9 05/16/2021    ALT <5 05/16/2021       POC Tests: No results for input(s): POCGLU, POCNA, POCK, POCCL, POCBUN, POCHEMO, POCHCT in the last 72 hours.     Coags:   Lab Results   Component Value Date    PROTIME 11.0 02/26/2021    INR 0.95 02/26/2021    APTT 31.2 02/08/2021       HCG (If Applicable):   Lab Results Component Value Date    PREGTESTUR Negative 02/26/2021        ABGs: No results found for: PHART, PO2ART, NDW9XPD, NZT3UJU, BEART, L9BLBLEP     Type & Screen (If Applicable):  No results found for: LABABO, LABRH    Drug/Infectious Status (If Applicable):  No results found for: HIV, HEPCAB    COVID-19 Screening (If Applicable):   Lab Results   Component Value Date    COVID19 Not Detected 05/24/2021           Anesthesia Evaluation  Patient summary reviewed  Airway: Mallampati: II  TM distance: >3 FB   Neck ROM: full  Mouth opening: > = 3 FB Dental:      Comment: Very poor dentition ,dental decay     Pulmonary:Negative Pulmonary ROS and normal exam                               Cardiovascular:    (+) hypertension: moderate, CAD:,         Rhythm: regular  Rate: normal                 ROS comment: 1. Mild small basal anterior ischemia.     2. Moderate global hypokinesis with ejection fraction of 43% at rest.     3. Findings indicate high cardiac risk.            Neuro/Psych:   (+) neuromuscular disease:,             GI/Hepatic/Renal:             Endo/Other:    (+) DiabetesType II DM, poorly controlled, , .                 Abdominal:   (+) obese,         Vascular:                                        Anesthesia Plan      MAC     ASA 3       Induction: intravenous. Anesthetic plan and risks discussed with patient. Plan discussed with CRNA.     Attending anesthesiologist reviewed and agrees with Ovi Stoll MD   5/26/2021

## 2021-05-26 NOTE — PROGRESS NOTES
Pt medicated with PO pain medication for chronic buttock/ tailbone pain. Pt tolerating PO fluids and crackers well, no report of nausea.

## 2021-05-26 NOTE — PROGRESS NOTES
Glucose 170. Pt resting in bed with eyes closed. Left leg elevated, xray called to complete film at bedside.

## 2021-05-26 NOTE — PROGRESS NOTES
Pt assisted with dressing and ambulation into wheel chair. Pt discharged in stable condition to son to be transported home. Left foot dressing clean dry and intact, left walking boot in place. Pt able to weight bear as tolerated with boot in place. Pt pain tolerable at time of discharge.

## 2021-05-26 NOTE — H&P
Department of Podiatric Surgery  History and Physical Update      HISTORY OF PRESENT ILLNESS:      The patient is a 51 y.o. female presents today for operative correction of gangrene of the left foot. The patient underwent a history and physical with their primary care physician <30 days ago and denies any changes since.      Past Medical History:        Diagnosis Date    Diabetes mellitus ([facility]     End stage kidney disease ([facility] [address]     peritoneal dialysis every night at home x 2 years    Hypertension     Neuropathy     Peripheral vascular disease ([facility] [address]        Past Surgical History:        Procedure Laterality Date     SECTION      OTHER SURGICAL HISTORY  2018     lap PD cath placement lt side 62 cm    TOE AMPUTATION Left 2021    AMPUTATION OF THE LEFT THIRD TOE performed by Romayne Cella, DPM at [place] [address]                   Medications Prior to Admission:   Medications Prior to Admission: promethazine (PHENERGAN) 25 MG tablet, Take 1 tablet by mouth every 6 hours as needed for Nausea  insulin detemir (LEVEMIR FLEXTOUCH) 100 UNIT/ML injection pen, INJECT UNDER THE SKIN 16 EVERY NIGHT AT BEDTIME  NIFEdipine (PROCARDIA XL) 60 MG extended release tablet, Take 1 tablet by mouth nightly Take 60 mg by mouth nightly  atorvastatin (LIPITOR) 20 MG tablet, Take 1 tablet by mouth nightly  VELPHORO 500 MG CHEW, CHEW TWO TABLETS BY MOUTH THREE TIMES A DAY WITH MEALS  insulin aspart (NOVOLOG FLEXPEN) 100 UNIT/ML injection pen, Inject 10 Units into the skin 3 times daily (before meals)  metoprolol tartrate (LOPRESSOR) 50 MG tablet, Take 1 tablet by mouth 2 times daily  torsemide (DEMADEX) 100 MG tablet, Take 2 tablets by mouth daily  clopidogrel (PLAVIX) 75 MG tablet, Take 1 tablet by mouth daily  omeprazole (PRILOSEC) 10 MG delayed release capsule, Take 10 mg by mouth daily  calcium acetate 667 MG TABS, Take 667 mg by mouth 3 times daily (with meals)  [DISCONTINUED] clindamycin (CLEOCIN) 300 MG capsule, Take 1 capsule by mouth 4 times daily for 10 days  Continuous Blood Gluc Sensor (FREESTYLE KOREY 2 SENSOR) MISC, Change every 14 days  Continuous Blood Gluc  (FREESTYLE KOREY 2 READER) JOHNNIE, Use to check glucose  Continuous Blood Gluc Sensor (FREESTYLE KOREY 14 DAY SENSOR) MISC, Check glucose ---change every 14 days  Continuous Blood Gluc  (FREESTYLE KOREY 14 DAY READER) JOHNNIE, To test glucose  Blood Glucose Monitoring Suppl (FREESTYLE LITE) JOHNNIE, 1 Device by Does not apply route daily Insurance preferred device  blood glucose test strips (FREESTYLE LITE) strip, 1 each by In Vitro route 3 times daily (before meals) As needed. Insulin Pen Needle 32G X 6 MM MISC, Use with insulin pens 4 times daily  aspirin 81 MG EC tablet, Take 1 tablet by mouth daily  FREESTYLE LANCETS MISC, Check blood sugars 4 times daily  vitamin D (CHOLECALCIFEROL) 1000 units TABS tablet, Take 5,000 Units by mouth daily   fluticasone (FLONASE) 50 MCG/ACT nasal spray, 2 sprays by Nasal route daily  ondansetron (ZOFRAN) 8 MG tablet, Take 1 tablet by mouth every 12 hours as needed for Nausea. Allergies:  Zetia [ezetimibe], Quinolones, Humalog [insulin lispro], Lisinopril, Lantus [insulin glargine], and Victoza [liraglutide]    Social History:   TOBACCO:   reports that she has never smoked. She has never used smokeless tobacco.  ETOH:   reports no history of alcohol use. DRUGS:   reports no history of drug use.     Family History:       Problem Relation Age of Onset    Coronary Art Dis Mother     High Blood Pressure Mother     Heart Disease Mother     Diabetes Mother     Cancer Father     Coronary Art Dis Father     High Blood Pressure Father     Heart Disease Father     Diabetes Father        REVIEW OF SYSTEMS:  CONSTITUTIONAL:  negative  RESPIRATORY:  negative  CARDIOVASCULAR:  negative  GASTROINTESTINAL:  negative  MUSCULOSKELETAL:  negative  NEUROLOGICAL:  negative    PHYSICAL EXAM:  VITALS:  BP (!) 172/96   Pulse 64   Temp 96.9 °F (36.1 °C) (Temporal)   Resp 16   Ht 5' 9\" (1.753 m)   Wt 226 lb (102.5 kg)   LMP 05/12/2021 (Approximate)   SpO2 98%   BMI 33.37 kg/m²   CONSTITUTIONAL:  awake, alert, cooperative, no apparent distress, and appears stated age  EYES:  pupils equal, round and reactive to light, extra ocular muscles intact, sclera clear, conjunctiva normal  ENT:  normocepalic, without obvious abnormality  NECK:  Supple, symmetrical, trachea midline, no adenopathy, thyroid symmetric, not enlarged and no tenderness, skin normal  LUNGS:  No increased work of breathing, good air exchange, clear to auscultation bilaterally, no crackles or wheezing  CARDIOVASCULAR:  Normal apical impulse, regular rate and rhythm  ABDOMEN:  normal bowel sounds, soft, non-distended, non-tender, no masses palpated, no hepatosplenomegally    ASSESSMENT AND PLAN:  1.  Gangrene of the left foot    - Plan for amputation of the left hallux and 2nd digit, left      [place] TARUN Ferrara  [phone]

## 2021-05-26 NOTE — PROGRESS NOTES
Pt arrived from OR, report from crna/rn. Pt had left foot surgery, with toe amputation. Left foot dressing clean dry and intact, walking boot in place upon arrival. Pt awakening and responsive to voice upon arrival to pacu, respirations even unlabored and adequate on room air.

## 2021-05-26 NOTE — OP NOTE
Operative Note    OPERATIVE REPORT    Scarlett Srinivasan  [id number]  YOB: 1975    Surgeon: Inder Yeager DPM  Assistant: Mayela Concepcion, PGY-3  Pre-operative Diagnosis: Gangrene of left foot (X73)  Post-operative Diagnosis: same  Procedure:     1. Amputation of the left hallux (68423)    2. Amputation of the left 2nd toe (42616)  Pathology: none obtained  Anesthesia: MAC  Hemostasis: anatomic dissection  Est. Blood Loss: <15cc   Materials: 3-0 vicryl, 4-0 nylon  Injectables: 20cc of 1% Lidocaine plain; 10cc of 0.5% Marcaine plain    INDICATIONS FOR PROCEDURE: This patient has signs and symptoms clinically  consistent with the above mentioned preoperative diagnosis of gangrene of the hallux and 2nd digit, s/p 3rd digit amputation dehiscence due to peripheral vascular disease. Patient had been undergoing wound care betadine changes, and was awaiting vascular intervention before amputation occurred. However, last week in my office, she was found to have wet changes to the gangrene, concerning for infection, so the decision was made to take the patient for amputations and plan for vascular intervention to follow. All potential risks, benefits, and complications were discussed with the patient prior to the scheduling of surgery. All the patient's questions were answered and no guarantees were given. The patient wished to proceed with surgery, and informed written consent was obtained. DETAILS OF PROCEDURE: The patient was brought from the pre-operative area and placed on the operating table in the supine position. Following IV sedation, a local anesthetic block was then injected proximal to the incision site consisting of 20cc of 1% lidocaine plain. The left  lower extremity was then scrubbed, prepped, and draped in the usual sterile fashion. A time-out was performed. The patient, procedure, and operative site were confirmed and the following procedure was performed.     Procedure #1: Amputation of the left hallux (31416)  Attention was then directed to the hallux of the left foot, where using a #15 blade, a full thickness skin incision was created circumferentially at the base of the hallux and 2nd digit, at the digital sulcus. A towel clamp was then clamped around the distal aspect of the hallux and used to stabilize the digit. The incision was then deepened through the subcutaneous tissue to the level of bone. Next, the 1st metatarsophalangeal joint was identified and attention was then directed to this location. A #15 blade was then used to release the extensor tendon as well as the medial and lateral collateral ligaments present at the 1st metatarsophalangeal joint. The flexor tendon was then severed planarly at the level of the 1st metatarsophalangeal joint and digit removed distally and passed from the operative field and placed on the back table. .   Attention was then directed to the head of the 1st metatarsal. The cartilage was noted to white, shiny, pearly and hard, all which are consistent with healthy cartilage. Procedure #2: Amputation of the left 2nd toe (00119)  Attention was then directed to the 2nd digit on the left foot. A towel clamp was then clamped around the distal aspect of the 2nd digit and used to stabilize the digit. A #15 surgical blade was used to deepen the existing incision through the subcutaneous tissue to the level of bone. Next, the 2nd metatarsophalangeal joint was identified and attention was then directed to this location. A #15 blade was then used to release the extensor tendon as well as the medial and lateral collateral ligaments present at the 2nd metatarsophalangeal joint. The flexor tendon was then severed planarly at the level of the 2nd metatarsophalangeal joint and digit removed distally and passed from the operative field and placed on the back table. .   Attention was then directed to the head of the 2nd.  The cartilage was noted to white, shiny, pearly and hard, all which are consistent with healthy cartilage. It was determined that no further resection was necessary. At this time, the incision site was flushed using copious amounts of normal saline. The subcutaneous tissues were re-approximated using 3-0 vicryl. The skin was re-approximated using 4-0 nylon. At this time, a local anesthetic was injected about the incision sites consisting of 10cc of 0.5% Marcaine plain, for the patient's postoperative comfort. A soft sterile dressing was applied consisting of betadine ointment, adaptic, gauze, kenyatta, webroll, and coban. END OF PROCEDURE: The patient tolerated the procedure and anesthesia well and was transported from the operating room to the PACU with vital signs stable and vascular status intact to all aspects of the patient's left lower extremity and digital capillary refill time immediate to the digits of the left  foot. Following a period of post-operative monitoring, the patient will be discharged home with written and oral wound care and follow-up instructions. She has plans for angiogram with intervention tomorrow. The patient is to follow-up with me in my private office within 3-5 days. The patient is to keep dressing clean, dry and intact at all times. The patient is to call with if any complications occur.     Inder Yeager DPM  [phone]

## 2021-05-27 ENCOUNTER — HOSPITAL ENCOUNTER (OUTPATIENT)
Dept: CARDIAC CATH/INVASIVE PROCEDURES | Age: 46
Discharge: HOME | End: 2021-05-27
Attending: SURGERY | Admitting: SURGERY
Payer: MEDICARE

## 2021-05-27 ENCOUNTER — TELEPHONE (OUTPATIENT)
Dept: VASCULAR SURGERY | Age: 46
End: 2021-05-27

## 2021-05-27 VITALS — WEIGHT: 229 LBS | BODY MASS INDEX: 33.92 KG/M2 | HEIGHT: 69 IN

## 2021-05-27 DIAGNOSIS — I70.245 ATHEROSCLEROSIS OF NATIVE ARTERIES OF LEFT LEG WITH ULCERATION OF OTHER PART OF FOOT (HCC): Primary | ICD-10-CM

## 2021-05-27 LAB — POC ACT LR: 224 SEC

## 2021-05-27 PROCEDURE — 6360000004 HC RX CONTRAST MEDICATION: Performed by: SURGERY

## 2021-05-27 PROCEDURE — 75625 CONTRAST EXAM ABDOMINL AORTA: CPT

## 2021-05-27 PROCEDURE — 75710 ARTERY X-RAYS ARM/LEG: CPT | Performed by: SURGERY

## 2021-05-27 PROCEDURE — 99153 MOD SED SAME PHYS/QHP EA: CPT

## 2021-05-27 PROCEDURE — 37225 HC FEM POP TERRITORY ATHERECTOMY: CPT

## 2021-05-27 PROCEDURE — 6360000002 HC RX W HCPCS

## 2021-05-27 PROCEDURE — C1724 CATH, TRANS ATHEREC,ROTATION: HCPCS

## 2021-05-27 PROCEDURE — C1725 CATH, TRANSLUMIN NON-LASER: HCPCS

## 2021-05-27 PROCEDURE — 75716 ARTERY X-RAYS ARMS/LEGS: CPT

## 2021-05-27 PROCEDURE — 37228 HC TIB PER TERRITORY PLASTY: CPT

## 2021-05-27 PROCEDURE — 76937 US GUIDE VASCULAR ACCESS: CPT | Performed by: SURGERY

## 2021-05-27 PROCEDURE — 85347 COAGULATION TIME ACTIVATED: CPT

## 2021-05-27 PROCEDURE — C1894 INTRO/SHEATH, NON-LASER: HCPCS

## 2021-05-27 PROCEDURE — 37225 PR REVSC OPN/PRQ FEM/POP W/ATHRC/ANGIOP SM VSL: CPT | Performed by: SURGERY

## 2021-05-27 PROCEDURE — C1760 CLOSURE DEV, VASC: HCPCS

## 2021-05-27 PROCEDURE — 75774 ARTERY X-RAY EACH VESSEL: CPT

## 2021-05-27 PROCEDURE — C1769 GUIDE WIRE: HCPCS

## 2021-05-27 PROCEDURE — 6370000000 HC RX 637 (ALT 250 FOR IP)

## 2021-05-27 PROCEDURE — 99152 MOD SED SAME PHYS/QHP 5/>YRS: CPT | Performed by: SURGERY

## 2021-05-27 PROCEDURE — 37228 PR REVSC OPN/PRQ TIB/PERO W/ANGIOPLASTY UNI: CPT | Performed by: SURGERY

## 2021-05-27 PROCEDURE — C1887 CATHETER, GUIDING: HCPCS

## 2021-05-27 PROCEDURE — 75625 CONTRAST EXAM ABDOMINL AORTA: CPT | Performed by: SURGERY

## 2021-05-27 PROCEDURE — 99152 MOD SED SAME PHYS/QHP 5/>YRS: CPT

## 2021-05-27 PROCEDURE — 2500000003 HC RX 250 WO HCPCS

## 2021-05-27 RX ADMIN — IOPAMIDOL 60 ML: 755 INJECTION, SOLUTION INTRAVENOUS at 09:22

## 2021-05-27 NOTE — H&P
Consultation/History & Physical    Date of Admission:  2021  Date of Consultation:  2021    PCP:  Tim Lopez MD     Chief Complaint: left foot ulceration    History of Present Illness:  Lynne Kulkarni is a 51 y.o. female who presents with left foot ulceration. Presents today for repeat peripheral angiogram.     PMH:   has a past medical history of Diabetes mellitus ([facility] End stage kidney disease ([facility] [facility] Hypertension, Neuropathy, and Peripheral vascular disease ([facility] [address] [facility] PSH:   has a past surgical history that includes Tonsillectomy;  section; other surgical history (2018); Toe amputation (Left, 2021); and Toe amputation (Left, 2021). Allergies: Allergies   Allergen Reactions    Zetia [Ezetimibe] Shortness Of Breath    Quinolones Nausea Only     Other reaction(s): Vomiting    Humalog [Insulin Lispro]      SOB, Leg swelling, fatigue    Lisinopril Swelling    Lantus [Insulin Glargine] Nausea And Vomiting     Side effect, not an allergy    Victoza [Liraglutide] Nausea And Vomiting     Side effect, not an allergy          Home Meds:    Prior to Admission medications    Medication Sig Start Date End Date Taking? Authorizing Provider   HYDROcodone-acetaminophen (NORCO) 5-325 MG per tablet Take 1 tablet by mouth every 6 hours as needed for Pain for up to 7 days. Intended supply: 7 days.  Take lowest dose possible to manage pain 21 Yes Paulina Crane DPM   promethazine (PHENERGAN) 25 MG tablet Take 1 tablet by mouth every 6 hours as needed for Nausea 21  Yes RICHARD Farfan CNP   insulin detemir (LEVEMIR FLEXTOUCH) 100 UNIT/ML injection pen INJECT UNDER THE SKIN 16 EVERY NIGHT AT BEDTIME 21  Yes RICHARD Ahuja CNP   NIFEdipine (PROCARDIA XL) 60 MG extended release tablet Take 1 tablet by mouth nightly Take 60 mg by mouth nightly 21  Yes RICHARD Farfan CNP   Continuous Blood Gluc Sensor (FREESTYLE KOREY 2 SENSOR) MISC Change every 14 days 5/11/21  Yes Geovanni Solano MD   Continuous Blood Gluc  (FREESTYLE KOREY 2 READER) JOHNNIE Use to check glucose 5/11/21  Yes Geovanni Solano MD   Continuous Blood Gluc Sensor (FREESTYLE KOREY 14 DAY SENSOR) MISC Check glucose ---change every 14 days 5/10/21  Yes Geovanni Solano MD   Continuous Blood Gluc  (FREESTYLE KOREY 14 DAY READER) JOHNNIE To test glucose 5/10/21  Yes Geovanni Solano MD   atorvastatin (LIPITOR) 20 MG tablet Take 1 tablet by mouth nightly 4/28/21  Yes Dar Al MD   VELPHORO 500 MG CHEW CHEW TWO TABLETS BY MOUTH THREE TIMES A DAY WITH MEALS 4/17/21  Yes Andrae Awan MD   insulin aspart (NOVOLOG FLEXPEN) 100 UNIT/ML injection pen Inject 10 Units into the skin 3 times daily (before meals) 2/18/21  Yes Dar Al MD   Blood Glucose Monitoring Suppl (FREESTYLE LITE) JOHNNIE 1 Device by Does not apply route daily Insurance preferred device 2/18/21  Yes Dar Al MD   blood glucose test strips (FREESTYLE LITE) strip 1 each by In Vitro route 3 times daily (before meals) As needed.  2/18/21  Yes Dar Al MD   Insulin Pen Needle 32G X 6 MM MISC Use with insulin pens 4 times daily 2/18/21  Yes Dar Al MD   aspirin 81 MG EC tablet Take 1 tablet by mouth daily 2/12/21  Yes Memory Linda, PA-C   metoprolol tartrate (LOPRESSOR) 50 MG tablet Take 1 tablet by mouth 2 times daily 2/12/21  Yes Memory Linda, PA-C   torsemide (DEMADEX) 100 MG tablet Take 2 tablets by mouth daily 2/12/21  Yes Memory Linda, PA-C   clopidogrel (PLAVIX) 75 MG tablet Take 1 tablet by mouth daily 2/12/21  Yes Memory Linda, PA-C   omeprazole (PRILOSEC) 10 MG delayed release capsule Take 10 mg by mouth daily   Yes Historical Provider, MD   calcium acetate 667 MG TABS Take 667 mg by mouth 3 times daily (with meals)   Yes Historical Provider, MD   FREESTYLE LANCETS MISC Check blood sugars 4 times daily 9/28/18  Yes Lamond Shone Toni Vitale MD   vitamin D (CHOLECALCIFEROL) 1000 units TABS tablet Take 5,000 Units by mouth daily  5/18/18  Yes Historical Provider, MD   fluticasone [facility]) 50 MCG/ACT nasal spray 2 sprays by Nasal route daily 4/8/16  Yes Alfonso Dhaliwal MD   ondansetron (ZOFRAN) 8 MG tablet Take 1 tablet by mouth every 12 hours as needed for Nausea. 2/28/14  Yes Alfonso Dhaliwal MD   promethazine (PHENERGAN) 25 MG tablet Take 1 tablet by mouth every 6 hours as needed for Nausea WARNING:  May cause drowsiness. May impair ability to operate vehicles or machinery. Do not use in combination with alcohol. 5/26/21 6/2/21  Dl Jackson DPM   Multiple Vitamins-Minerals (THERAPEUTIC MULTIVITAMIN-MINERALS) tablet Take 1 tablet by mouth daily. 1/30/14 11/13/14  Alfonso Dhaliwal MD        [facility] Meds:    No current facility-administered medications for this encounter. Social History:       Social History     Socioeconomic History    Marital status:      Spouse name: Not on file    Number of children: Not on file    Years of education: Not on file    Highest education level: Not on file   Occupational History    Not on file   Tobacco Use    Smoking status: Never Smoker    Smokeless tobacco: Never Used   Vaping Use    Vaping Use: Never used   Substance and Sexual Activity    Alcohol use: No    Drug use: No    Sexual activity: Not on file   Other Topics Concern    Not on file   Social History Narrative    Lives with spouse     Social Determinants of Health     Financial Resource Strain: Low Risk     Difficulty of Paying Living Expenses: Not hard at all   Food Insecurity: No Food Insecurity    Worried About Running Out of Food in the Last Year: Never true    Rush of Food in the Last Year: Never true   Transportation Needs: No Transportation Needs    Lack of Transportation (Medical): No    Lack of Transportation (Non-Medical):  No   Physical Activity:     Days of Exercise per Week:     Minutes of Exercise per Session:    Stress:     Feeling of Stress :    Social Connections:     Frequency of Communication with Friends and Family:     Frequency of Social Gatherings with Friends and Family:     Attends Christian Services:     Active Member of Clubs or Organizations:     Attends Club or Organization Meetings:     Marital Status:    Intimate Partner Violence:     Fear of Current or Ex-Partner:     Emotionally Abused:     Physically Abused:     Sexually Abused:        Family Histroy:      Family History   Problem Relation Age of Onset    Coronary Art Dis Mother     High Blood Pressure Mother     Heart Disease Mother     Diabetes Mother     Cancer Father     Coronary Art Dis Father     High Blood Pressure Father     Heart Disease Father     Diabetes Father        Review of Systems:  Review of systems performed and negative with the exception of the above findings        Physical Exam   Vital Signs: Ht 5' 9\" (1.753 m)   Wt 229 lb (103.9 kg)   LMP 05/12/2021 (Approximate)   BMI 33.82 kg/m²        Admission Weight: 229 lb (103.9 kg)   ASA 2 - Patient with mild systemic disease with no functional limitations    Mallampati Airway Assessment:  Mallampati Class II - (soft palate, fauces & uvula are visible)    General appearance: alert, appears stated age, cooperative and no distress  Head: Normocephalic, without obvious abnormality, atraumatic  Lungs: clear to auscultation bilaterally  Heart: regular rate and rhythm, S1, S2 normal, no murmur, click, rub or gallop  Abdomen: soft, non-tender.  Bowel sounds normal. No masses,  no organomegaly  Extremities: extremities normal, atraumatic, no cyanosis or edema  Pulses:      Labs:    BMP:   Lab Results   Component Value Date     05/26/2021    K 3.9 05/26/2021    K 4.2 05/19/2021    CL 89 05/26/2021    CO2 27 05/26/2021    PHOS 7.4 02/09/2021    PHOS 7.6 02/09/2021    BUN 55 05/26/2021    CREATININE 13.9 05/26/2021      No components found for: GLU  Lab Results   Component Value Date    MG 2.40 08/15/2018      CBC:   Lab Results   Component Value Date    WBC 12.6 05/19/2021    HGB 11.1 05/19/2021    HCT 33.8 05/19/2021    MCV 92.3 05/19/2021     05/19/2021      Coagulation:   Lab Results   Component Value Date    INR 0.95 02/26/2021    APTT 31.2 02/08/2021     Cardiac markers:   Lab Results   Component Value Date    CKTOTAL 45 12/19/2013    TROPONINI 0.14 05/19/2021     Lab Results   Component Value Date    LABA1C 8.2 05/11/2021    No results found for: ALB    Lab Results   Component Value Date    BILITOT <0.2 05/16/2021    BILIDIR 0.1 03/06/2014    AST 9 05/16/2021    ALT <5 05/16/2021    ALKPHOS 112 05/16/2021      Lab Results   Component Value Date    CHOL 299 04/28/2017    HDL 39 04/28/2017    HDL 49 03/21/2012    LDLCALC 223 04/28/2017    TRIG 184 04/28/2017          Diagnosis:  Patient Active Problem List   Diagnosis    Diabetes mellitus ([facility]    Obesity    Microalbuminuria    Hyperlipidemia with target LDL less than 100    Hypertension    Diabetes mellitus type 2 in obese ([facility]    Acute renal failure (ARF) ([facility]    ESRD (end stage renal disease) ([facility]    Non-smoker    Elevated C-reactive protein (CRP)    Elevated sed rate    Diabetic polyneuropathy associated with type 2 diabetes mellitus ([facility]    Weight loss counseling, encounter for           Plan: Aortogram with runoff and possible left leg intervention        Eduardo Werner M.D., FACS.   5/27/2021  7:35 AM

## 2021-05-27 NOTE — OP NOTE
[address]                     [address] [facility], [address] [facility]                                OPERATIVE REPORT    PATIENT NAME: Sima Basilio                       :        1975  MED REC NO:   [id number]                          ROOM:  ACCOUNT NO:   [de-identified]                           ADMIT DATE: 2021  PROVIDER:     Ying Cannon MD    DATE OF PROCEDURE:  2021    PREPROCEDURE DIAGNOSIS:  Left foot ulceration. POSTPROCEDURE DIAGNOSIS:  Left foot ulceration. PROCEDURE:  1. Ultrasound-guided right common femoral artery access. 2.  Abdominal aortogram with selective left lower extremity angiogram.  3.  Left popliteal atherectomy (Jetstream 2.1/3.0). 4.  Left popliteal PTA (William 4 x 100, [place] 4 x 100). 5.  Selective left anterior tibial and dorsalis pedis angiogram.  6.  Left dorsalis pedis PTA ([place] 2 x 60). SURGEON:  Ying Cannon MD    ANESTHESIA:  Local/IV. ESTIMATED BLOOD LOSS:  Minimal.    COMPLICATIONS:  None. INDICATIONS:  The patient is a 50-year-old female with a nonhealing  ulceration of her left foot and known history of peripheral vascular  disease and she presents today for repeat angiographic evaluation. All  risks, benefits and alternatives were discussed in detail. All  questions were answered. PROCEDURE:  After witnessed informed consent was obtained, the patient  was brought to the cath lab where under my supervision Versed and  fentanyl were administered intravenously for moderate sedation. Pulse  oximetry, heart rate and blood pressure were monitored by an independent  trained observer that was present. I spent an hour and 30 minutes of  face-to-face sedation time with the patient. Her right groin was  carefully prepped and draped. Ultrasound was used to identify the right  common femoral artery which was noted to be patent. An image was saved  to the patient's permanent medical record. Under direct visualization,  it was accessed with a 4-[place] micropuncture needle. Bentson wire was  introduced and a 5-[place] sheath was placed. An Omni Flush catheter was  inserted into the abdominal aorta and an abdominal aortogram was  performed. It was pulled back to the level of the aortic bifurcation  and used to hook the aortic bifurcation. A stiff Glidewire was placed  up and over the bifurcation and a 7-[place] sheath was placed up and over  the bifurcation into the left common femoral artery. 5000 heparin with  an additional 2000 heparin was given to maintain ACTs near 250. With  this then done, a selective angiogram was performed of the entire left  lower extremity revealing complete occlusion of the left P3 section of  the popliteal as well as occlusion of the distal anterior tibial and  dorsalis pedis artery. With this then done, an 0.014 Spart[place]ore wire  was advanced into the peroneal artery. A Jetstream 2.1/3.0 was used to  provide atherectomy on two passes blades down and one pass blades up of  the P3 section of the popliteal.  This was followed up with balloon  angioplasty using a William 4 x 100 followed by [place] drug-coated  balloon 4 x 100 inflated to nominal pressure for 3 minutes with complete  resolution of the stenosis and brisk flow into the anterior tibial and  peroneal artery. Selective catheterization was performed of the  anterior tibial artery showing complete occlusion of the dorsalis pedis  and pedal arch. This was selectively engaged with a combination of  wires to complete the arch and a [place] 2 x 60 was then inflated with  restoration of flow into the dorsalis pedis and into the pedal arch. The procedure was terminated at this point. A 6-B[place]i StarClose was  placed in the right groin. The patient tolerated the procedure well and  was transferred to recovery room in stable condition. FINDINGS:  1.   Abdominal aortogram:  Infrarenal aorta and bilateral common, external and internal iliac arteries are widely patent. 2.  Left lower extremity runoff:  Left common femoral, profunda and SFA  are patent. However, there is calcification noted in the wall, but no  stenosis noted. Popliteal is occluded at the P3 section with  reconstitution of the peroneal and tibioperoneal trunk. There is  complete occlusion of the distal anterior tibial and dorsalis pedis  artery. PLAN:  The patient underwent successful intervention of a completely  occluded left popliteal and distal anterior tibial and dorsalis pedis  artery. She will continue on her current medical regimen and ongoing  wound care. We will plan for followup ultrasound in two weeks to  reassess and continue to ensure maximum arterial flow distally.         Robert Liu MD    D: 05/27/2021 9:22:32       T: 05/27/2021 9:30:34     [email or web address]  Job#: [id number]     Doc#: [id number]    CC:

## 2021-05-28 ENCOUNTER — TELEPHONE (OUTPATIENT)
Dept: INTERNAL MEDICINE CLINIC | Age: 46
End: 2021-05-28

## 2021-05-28 NOTE — TELEPHONE ENCOUNTER
[facility] Trans[facility] Initial Follow Up Call    Outreach made within 2 business days of discharge: Yes    Patient: Shani Smiley Patient : 1975   MRN: [id number]  Reason for Admission: There are no discharge diagnoses documented for the most recent discharge. Discharge Date: 21       Spoke with: Carlos Boxer    Discharge department/facility: [facility] Interactive Patient Contact:  Was patient able to fill all prescriptions:   Was patient instructed to bring all medications to the follow-up visit:   Is patient taking all medications as directed in the discharge summary?  Yes  Does patient understand their discharge instructions: Yes  Does patient have questions or concerns that need addressed prior to 7-14 day follow up office visit: no    Scheduled appointment with PCP within 7-14 days    Follow Up  Future Appointments   Date Time Provider Vincent Canada   6/15/2021  9:30 AM VASCULAR LAB ROOM 2-[facility]   6/15/2021 10:50 AM Iman Vásquez MD FF VASC/ENDO MMA   2021  1:00 PM MD ISAAC Veloz, [address] [place] [address] Maria Del Rosario Payne

## 2021-05-31 ENCOUNTER — HOSPITAL ENCOUNTER (INPATIENT)
Age: 46
LOS: 16 days | Discharge: NURSING, REHAB OR LONG-TERM CARE | DRG: 233 | End: 2021-06-16
Attending: STUDENT IN AN ORGANIZED HEALTH CARE EDUCATION/TRAINING PROGRAM | Admitting: FAMILY MEDICINE
Payer: MEDICARE

## 2021-05-31 ENCOUNTER — APPOINTMENT (OUTPATIENT)
Dept: GENERAL RADIOLOGY | Age: 46
DRG: 233 | End: 2021-05-31
Payer: MEDICARE

## 2021-05-31 DIAGNOSIS — Z98.890 S/P LEFT ATRIAL APPENDAGE LIGATION: ICD-10-CM

## 2021-05-31 DIAGNOSIS — R55 NEAR SYNCOPE: Primary | ICD-10-CM

## 2021-05-31 DIAGNOSIS — N18.6 ESRD ON PERITONEAL DIALYSIS (HCC): ICD-10-CM

## 2021-05-31 DIAGNOSIS — Z99.2 ESRD ON PERITONEAL DIALYSIS (HCC): ICD-10-CM

## 2021-05-31 DIAGNOSIS — Z95.1 S/P CORONARY ARTERY BYPASS GRAFT X 3: ICD-10-CM

## 2021-05-31 DIAGNOSIS — G89.18 POST-OPERATIVE PAIN: ICD-10-CM

## 2021-05-31 DIAGNOSIS — R94.31 ABNORMAL EKG: ICD-10-CM

## 2021-05-31 DIAGNOSIS — E87.1 HYPONATREMIA: ICD-10-CM

## 2021-05-31 LAB
A/G RATIO: 0.7 (ref 1.1–2.2)
ALBUMIN SERPL-MCNC: 3.2 G/DL (ref 3.4–5)
ALP BLD-CCNC: 120 U/L (ref 40–129)
ALT SERPL-CCNC: <5 U/L (ref 10–40)
ANION GAP SERPL CALCULATED.3IONS-SCNC: 22 MMOL/L (ref 3–16)
ANISOCYTOSIS: ABNORMAL
AST SERPL-CCNC: 9 U/L (ref 15–37)
BANDED NEUTROPHILS RELATIVE PERCENT: 1 % (ref 0–7)
BASOPHILS ABSOLUTE: 0.1 K/UL (ref 0–0.2)
BASOPHILS RELATIVE PERCENT: 1 %
BILIRUB SERPL-MCNC: <0.2 MG/DL (ref 0–1)
BUN BLDV-MCNC: 56 MG/DL (ref 7–20)
CALCIUM SERPL-MCNC: 9.1 MG/DL (ref 8.3–10.6)
CHLORIDE BLD-SCNC: 83 MMOL/L (ref 99–110)
CO2: 23 MMOL/L (ref 21–32)
CREAT SERPL-MCNC: 11.8 MG/DL (ref 0.6–1.1)
EOSINOPHILS ABSOLUTE: 0 K/UL (ref 0–0.6)
EOSINOPHILS RELATIVE PERCENT: 0 %
GFR AFRICAN AMERICAN: 4
GFR NON-AFRICAN AMERICAN: 3
GLOBULIN: 4.9 G/DL
GLUCOSE BLD-MCNC: 192 MG/DL (ref 70–99)
GLUCOSE BLD-MCNC: 209 MG/DL (ref 70–99)
GLUCOSE BLD-MCNC: 335 MG/DL (ref 70–99)
HCT VFR BLD CALC: 33 % (ref 36–48)
HEMOGLOBIN: 10.8 G/DL (ref 12–16)
LYMPHOCYTES ABSOLUTE: 0.7 K/UL (ref 1–5.1)
LYMPHOCYTES RELATIVE PERCENT: 6 %
MAGNESIUM: 2.2 MG/DL (ref 1.8–2.4)
MCH RBC QN AUTO: 30.2 PG (ref 26–34)
MCHC RBC AUTO-ENTMCNC: 32.7 G/DL (ref 31–36)
MCV RBC AUTO: 92.2 FL (ref 80–100)
METAMYELOCYTES RELATIVE PERCENT: 2 %
MONOCYTES ABSOLUTE: 1.1 K/UL (ref 0–1.3)
MONOCYTES RELATIVE PERCENT: 10 %
NEUTROPHILS ABSOLUTE: 9.5 K/UL (ref 1.7–7.7)
NEUTROPHILS RELATIVE PERCENT: 80 %
PDW BLD-RTO: 13.9 % (ref 12.4–15.4)
PERFORMED ON: ABNORMAL
PERFORMED ON: ABNORMAL
PLATELET # BLD: 382 K/UL (ref 135–450)
PLATELET SLIDE REVIEW: ADEQUATE
PMV BLD AUTO: 8.2 FL (ref 5–10.5)
POLYCHROMASIA: ABNORMAL
POTASSIUM SERPL-SCNC: 3.7 MMOL/L (ref 3.5–5.1)
PRO-BNP: ABNORMAL PG/ML (ref 0–124)
RBC # BLD: 3.58 M/UL (ref 4–5.2)
SLIDE REVIEW: ABNORMAL
SODIUM BLD-SCNC: 128 MMOL/L (ref 136–145)
TOTAL PROTEIN: 8.1 G/DL (ref 6.4–8.2)
TROPONIN: 0.17 NG/ML
WBC # BLD: 11.4 K/UL (ref 4–11)

## 2021-05-31 PROCEDURE — 71045 X-RAY EXAM CHEST 1 VIEW: CPT

## 2021-05-31 PROCEDURE — 80053 COMPREHEN METABOLIC PANEL: CPT

## 2021-05-31 PROCEDURE — 85025 COMPLETE CBC W/AUTO DIFF WBC: CPT

## 2021-05-31 PROCEDURE — 83036 HEMOGLOBIN GLYCOSYLATED A1C: CPT

## 2021-05-31 PROCEDURE — 6360000002 HC RX W HCPCS: Performed by: FAMILY MEDICINE

## 2021-05-31 PROCEDURE — 2060000000 HC ICU INTERMEDIATE R&B

## 2021-05-31 PROCEDURE — 93005 ELECTROCARDIOGRAM TRACING: CPT | Performed by: PHYSICIAN ASSISTANT

## 2021-05-31 PROCEDURE — 99284 EMERGENCY DEPT VISIT MOD MDM: CPT

## 2021-05-31 PROCEDURE — 84484 ASSAY OF TROPONIN QUANT: CPT

## 2021-05-31 PROCEDURE — 6370000000 HC RX 637 (ALT 250 FOR IP): Performed by: FAMILY MEDICINE

## 2021-05-31 PROCEDURE — 83735 ASSAY OF MAGNESIUM: CPT

## 2021-05-31 PROCEDURE — 83880 ASSAY OF NATRIURETIC PEPTIDE: CPT

## 2021-05-31 RX ORDER — OMEPRAZOLE 10 MG/1
10 CAPSULE, DELAYED RELEASE ORAL DAILY
Status: DISCONTINUED | OUTPATIENT
Start: 2021-05-31 | End: 2021-05-31 | Stop reason: CLARIF

## 2021-05-31 RX ORDER — SODIUM CHLORIDE 0.9 % (FLUSH) 0.9 %
5-40 SYRINGE (ML) INJECTION EVERY 12 HOURS SCHEDULED
Status: DISCONTINUED | OUTPATIENT
Start: 2021-05-31 | End: 2021-06-07

## 2021-05-31 RX ORDER — POLYETHYLENE GLYCOL 3350 17 G/17G
17 POWDER, FOR SOLUTION ORAL DAILY PRN
Status: DISCONTINUED | OUTPATIENT
Start: 2021-05-31 | End: 2021-06-09

## 2021-05-31 RX ORDER — INSULIN LISPRO 100 [IU]/ML
0-6 INJECTION, SOLUTION INTRAVENOUS; SUBCUTANEOUS NIGHTLY
Status: DISCONTINUED | OUTPATIENT
Start: 2021-05-31 | End: 2021-05-31 | Stop reason: ALTCHOICE

## 2021-05-31 RX ORDER — INSULIN LISPRO 100 [IU]/ML
0-12 INJECTION, SOLUTION INTRAVENOUS; SUBCUTANEOUS
Status: DISCONTINUED | OUTPATIENT
Start: 2021-05-31 | End: 2021-05-31 | Stop reason: ALTCHOICE

## 2021-05-31 RX ORDER — SODIUM CHLORIDE 0.9 % (FLUSH) 0.9 %
5-40 SYRINGE (ML) INJECTION PRN
Status: DISCONTINUED | OUTPATIENT
Start: 2021-05-31 | End: 2021-06-07

## 2021-05-31 RX ORDER — ONDANSETRON 2 MG/ML
4 INJECTION INTRAMUSCULAR; INTRAVENOUS EVERY 6 HOURS PRN
Status: DISCONTINUED | OUTPATIENT
Start: 2021-05-31 | End: 2021-06-09

## 2021-05-31 RX ORDER — NIFEDIPINE 30 MG/1
60 TABLET, EXTENDED RELEASE ORAL NIGHTLY
Status: DISCONTINUED | OUTPATIENT
Start: 2021-05-31 | End: 2021-06-02

## 2021-05-31 RX ORDER — HEPARIN SODIUM 5000 [USP'U]/ML
5000 INJECTION, SOLUTION INTRAVENOUS; SUBCUTANEOUS EVERY 8 HOURS SCHEDULED
Status: DISCONTINUED | OUTPATIENT
Start: 2021-05-31 | End: 2021-06-09

## 2021-05-31 RX ORDER — ACETAMINOPHEN 325 MG/1
650 TABLET ORAL EVERY 6 HOURS PRN
Status: DISCONTINUED | OUTPATIENT
Start: 2021-05-31 | End: 2021-06-08 | Stop reason: SDUPTHER

## 2021-05-31 RX ORDER — ATORVASTATIN CALCIUM 20 MG/1
20 TABLET, FILM COATED ORAL NIGHTLY
Status: DISCONTINUED | OUTPATIENT
Start: 2021-05-31 | End: 2021-06-16 | Stop reason: HOSPADM

## 2021-05-31 RX ORDER — PROMETHAZINE HYDROCHLORIDE 25 MG/1
12.5 TABLET ORAL EVERY 6 HOURS PRN
Status: DISCONTINUED | OUTPATIENT
Start: 2021-05-31 | End: 2021-06-09

## 2021-05-31 RX ORDER — METOPROLOL TARTRATE 50 MG/1
50 TABLET, FILM COATED ORAL 2 TIMES DAILY
Status: DISCONTINUED | OUTPATIENT
Start: 2021-05-31 | End: 2021-06-09

## 2021-05-31 RX ORDER — TORSEMIDE 100 MG/1
200 TABLET ORAL DAILY
Status: DISCONTINUED | OUTPATIENT
Start: 2021-06-01 | End: 2021-06-16 | Stop reason: HOSPADM

## 2021-05-31 RX ORDER — DEXTROSE MONOHYDRATE 25 G/50ML
12.5 INJECTION, SOLUTION INTRAVENOUS PRN
Status: DISCONTINUED | OUTPATIENT
Start: 2021-05-31 | End: 2021-06-09

## 2021-05-31 RX ORDER — SODIUM CHLORIDE 9 MG/ML
25 INJECTION, SOLUTION INTRAVENOUS PRN
Status: DISCONTINUED | OUTPATIENT
Start: 2021-05-31 | End: 2021-06-07

## 2021-05-31 RX ORDER — ASPIRIN 81 MG/1
81 TABLET ORAL DAILY
Status: DISCONTINUED | OUTPATIENT
Start: 2021-05-31 | End: 2021-06-09

## 2021-05-31 RX ORDER — PANTOPRAZOLE SODIUM 40 MG/1
40 TABLET, DELAYED RELEASE ORAL
Status: DISCONTINUED | OUTPATIENT
Start: 2021-05-31 | End: 2021-06-09

## 2021-05-31 RX ORDER — DEXTROSE MONOHYDRATE 50 MG/ML
100 INJECTION, SOLUTION INTRAVENOUS PRN
Status: DISCONTINUED | OUTPATIENT
Start: 2021-05-31 | End: 2021-06-09

## 2021-05-31 RX ORDER — CLOPIDOGREL BISULFATE 75 MG/1
75 TABLET ORAL DAILY
Status: DISCONTINUED | OUTPATIENT
Start: 2021-05-31 | End: 2021-06-03

## 2021-05-31 RX ORDER — HYDRALAZINE HYDROCHLORIDE 20 MG/ML
10 INJECTION INTRAMUSCULAR; INTRAVENOUS EVERY 4 HOURS PRN
Status: DISCONTINUED | OUTPATIENT
Start: 2021-05-31 | End: 2021-06-09

## 2021-05-31 RX ORDER — ACETAMINOPHEN 650 MG/1
650 SUPPOSITORY RECTAL EVERY 6 HOURS PRN
Status: DISCONTINUED | OUTPATIENT
Start: 2021-05-31 | End: 2021-06-09

## 2021-05-31 RX ORDER — NICOTINE POLACRILEX 4 MG
15 LOZENGE BUCCAL PRN
Status: DISCONTINUED | OUTPATIENT
Start: 2021-05-31 | End: 2021-06-09

## 2021-05-31 RX ADMIN — CLOPIDOGREL BISULFATE 75 MG: 75 TABLET ORAL at 17:26

## 2021-05-31 RX ADMIN — METOPROLOL TARTRATE 50 MG: 50 TABLET, FILM COATED ORAL at 21:50

## 2021-05-31 RX ADMIN — NIFEDIPINE 60 MG: 30 TABLET, FILM COATED, EXTENDED RELEASE ORAL at 21:50

## 2021-05-31 RX ADMIN — ATORVASTATIN CALCIUM 20 MG: 20 TABLET, FILM COATED ORAL at 21:50

## 2021-05-31 RX ADMIN — PANTOPRAZOLE SODIUM 40 MG: 40 TABLET, DELAYED RELEASE ORAL at 17:26

## 2021-05-31 RX ADMIN — INSULIN DETEMIR 20 UNITS: 100 INJECTION, SOLUTION SUBCUTANEOUS at 21:51

## 2021-05-31 RX ADMIN — HEPARIN SODIUM 5000 UNITS: 5000 INJECTION INTRAVENOUS; SUBCUTANEOUS at 21:50

## 2021-05-31 RX ADMIN — ASPIRIN 81 MG: 81 TABLET, COATED ORAL at 17:27

## 2021-05-31 ASSESSMENT — ENCOUNTER SYMPTOMS
NAUSEA: 0
WHEEZING: 0
DIARRHEA: 0
COLOR CHANGE: 0
SHORTNESS OF BREATH: 1
STRIDOR: 0
ABDOMINAL DISTENTION: 0
CONSTIPATION: 0
BACK PAIN: 0
COUGH: 0
VOMITING: 0
ABDOMINAL PAIN: 0

## 2021-05-31 ASSESSMENT — PAIN SCALES - GENERAL
PAINLEVEL_OUTOF10: 0
PAINLEVEL_OUTOF10: 5

## 2021-05-31 NOTE — ED PROVIDER NOTES
level 4, 10 or more for level 5)     Review of Systems   Constitutional: Positive for fatigue. Negative for chills and fever. HENT: Negative. Eyes: Negative for visual disturbance. Respiratory: Positive for shortness of breath. Negative for cough, wheezing and stridor. Cardiovascular: Negative for chest pain, palpitations and leg swelling. Gastrointestinal: Negative for abdominal distention, abdominal pain, constipation, diarrhea, nausea and vomiting. Endocrine: Negative. Genitourinary: Negative. Musculoskeletal: Negative for back pain, neck pain and neck stiffness. Skin: Negative for color change, pallor, rash and wound. Neurological: Positive for dizziness, weakness and light-headedness. Negative for tremors, seizures, syncope, facial asymmetry, speech difficulty, numbness and headaches. Psychiatric/Behavioral: Negative for confusion. All other systems reviewed and are negative. Positives and Pertinent negatives as per HPI. Except as noted above in the ROS, all other systems were reviewed and negative.        PAST MEDICAL HISTORY     Past Medical History:   Diagnosis Date    Diabetes mellitus ([facility] [address]     End stage kidney disease ([facility] [address]     peritoneal dialysis every night at home x 2 years    Hypertension     Neuropathy     Peripheral vascular disease ([facility] [address]          SURGICAL HISTORY     Past Surgical History:   Procedure Laterality Date     SECTION      OTHER SURGICAL HISTORY  2018     lap PD cath placement lt side 62 cm    TOE AMPUTATION Left 2021    AMPUTATION OF THE LEFT THIRD TOE performed by Lupis Uribe DPM at [facility] Left 2021    AMPUTATION OF HALLUX AND SECOND TOE, LEFT FOOT performed by Lupis Uribe DPM at [facility]       Previous Medications    ASPIRIN 81 MG EC TABLET    Take 1 tablet by mouth daily    ATORVASTATIN (LIPITOR) 20 MG TABLET    Take 1 tablet by mouth nightly (PHENERGAN) 25 MG TABLET    Take 1 tablet by mouth every 6 hours as needed for Nausea WARNING:  May cause drowsiness. May impair ability to operate vehicles or machinery. Do not use in combination with alcohol. TORSEMIDE (DEMADEX) 100 MG TABLET    Take 2 tablets by mouth daily    VELPHORO 500 MG CHEW    CHEW TWO TABLETS BY MOUTH THREE TIMES A DAY WITH MEALS    VITAMIN D (CHOLECALCIFEROL) 1000 UNITS TABS TABLET    Take 5,000 Units by mouth daily          ALLERGIES     Zetia [ezetimibe], Quinolones, Humalog [insulin lispro], Lisinopril, Lantus [insulin glargine], and Victoza [liraglutide]    FAMILYHISTORY       Family History   Problem Relation Age of Onset    Coronary Art Dis Mother     High Blood Pressure Mother     Heart Disease Mother     Diabetes Mother     Cancer Father     Coronary Art Dis Father     High Blood Pressure Father     Heart Disease Father     Diabetes Father           SOCIAL HISTORY       Social History     Tobacco Use    Smoking status: Never Smoker    Smokeless tobacco: Never Used   Vaping Use    Vaping Use: Never used   Substance Use Topics    Alcohol use: No    Drug use: No       SCREENINGS   NIH Stroke Scale  NIH Stroke Scale Assessed: Yes  Interval: Baseline  Level of Consciousness (1a. ): Alert  LOC Questions (1b. ):  Answers both correctly  LOC Commands (1c. ): Performs both tasks correctly  Best Gaze (2. ): Normal  Visual (3. ): No visual loss  Facial Palsy (4. ): Normal symmetrical movement  Motor Arm, Left (5a. ): No drift  Motor Arm, Right (5b. ): No drift  Motor Leg, Left (6a. ): No drift  Motor Leg, Right (6b. ): No drift  Limb Ataxia (7. ): Absent  Sensory (8. ): Normal  Best Language (9. ): No aphasia  Dysarthria (10. ): Normal  Extinction and Inattention (11): No abnormality  Total: 0         PHYSICAL EXAM    (up to 7 for level 4, 8 or more for level 5)     ED Triage Vitals [05/31/21 1158]   BP Temp Temp Source Pulse Resp SpO2 Height Weight   127/81 98.3 °F (36.8 °C) Oral 90 18 97 % 5' 9\" (1.753 m) 227 lb (103 kg)       Physical Exam  Vitals and nursing note reviewed. Constitutional:       Appearance: Normal appearance. She is well-developed. She is not toxic-appearing or diaphoretic. HENT:      Head: Normocephalic and atraumatic. Right Ear: External ear normal.      Left Ear: External ear normal.      Nose: Nose normal.      Mouth/Throat:      Mouth: Mucous membranes are moist.      Pharynx: Oropharynx is clear. Eyes:      General: No scleral icterus. Right eye: No discharge. Left eye: No discharge. Extraocular Movements: Extraocular movements intact. Conjunctiva/sclera: Conjunctivae normal.      Pupils: Pupils are equal, round, and reactive to light. Cardiovascular:      Rate and Rhythm: Normal rate. Pulmonary:      Effort: Pulmonary effort is normal.      Breath sounds: Normal breath sounds. Abdominal:      General: Bowel sounds are normal. There is no distension. Palpations: Abdomen is soft. Tenderness: There is no abdominal tenderness. There is no right CVA tenderness or left CVA tenderness. Musculoskeletal:         General: Normal range of motion. Cervical back: Normal range of motion. Skin:     General: Skin is warm and dry. Capillary Refill: Capillary refill takes less than 2 seconds. Coloration: Skin is not jaundiced or pale. Findings: No bruising, erythema, lesion or rash. Neurological:      General: No focal deficit present. Mental Status: She is alert and oriented to person, place, and time. Cranial Nerves: No cranial nerve deficit. Comments: No pronator drift, facial droop or slurred speech. Normal finger to nose coordination. Normal rapid alternating hand movement. Normal heel to shin coordination   Omaira Hallpike negative without nystagmus. 5 out of 5 strength in all 4 extremities without focal weakness, paresthesia or radiculopathy.    Psychiatric:         Mood limits    Narrative:     Daylin Barrios  [facility] tel. [phone],  Chemistry results called to and read back by ivy luis er, 05/31/2021  13:21, by Yara Jackson  [facility] tel. [phone],  Chemistry results called to and read back by rn  er, 05/31/2021 13:18, by  Jamilah Anderson  Performed at:  [facility] [address],  [place], [place]   Phone [phone]   MAGNESIUM    Narrative:     Daylin Barrios  [facility] tel. [phone],  Chemistry results called to and read back by rn  er, 05/31/2021 13:18, by  Jamilah Anderson  Performed at:  [facility] [address],  [place], [place]   Phone [phone]   URINE RT REFLEX TO CULTURE       All other labs were within normal range or not returned as of this dictation. EKG: All EKG's are interpreted by the Emergency Department Physician in the absence of a cardiologist.  Please see their note for interpretation of EKG. RADIOLOGY:   Non-plain film images such as CT, Ultrasound and MRI are read by the radiologist. Plain radiographic images are visualized and preliminarily interpreted by the ED Provider with the below findings:        Interpretation per the Radiologist below, if available at the time of this note:    XR CHEST PORTABLE   Final Result   Cardiomegaly with stable right pleural effusion           XR FOOT LEFT (MIN 3 VIEWS)    Result Date: 5/26/2021  EXAMINATION: THREE XRAY VIEWS OF THE LEFT FOOT 5/26/2021 2:45 pm COMPARISON: 02/26/2021 HISTORY: ORDERING SYSTEM PROVIDED HISTORY: s/p first and second digit amp at mpj TECHNOLOGIST PROVIDED HISTORY: Reason for exam:->s/p first and second digit amp at mpj Reason for Exam: s/p first and second digit amp at j Acuity: Acute Type of Exam: Initial FINDINGS: Status post partial amputation of 1st through 3rd digits. No new fracture or dislocation. No acute appearing erosive change. Diffuse osteopenia.      Status post partial amputation of 1st through 3rd digits without evidence of complication. PROCEDURES   Unless otherwise noted below, none     Procedures    CRITICAL CARE TIME   N/A    CONSULTS:  IP CONSULT TO HOSPITALIST      EMERGENCY DEPARTMENT COURSE and DIFFERENTIAL DIAGNOSIS/MDM:   Vitals:    Vitals:    05/31/21 1300 05/31/21 1315 05/31/21 1330 05/31/21 1345   BP: (!) 146/96 (!) 147/78 (!) 178/48 (!) 192/38   Pulse: 79 90 79 76   Resp: 17 16     Temp:       TempSrc:       SpO2: 98% 99%     Weight:       Height:           Patient was given the following medications:  Medications - No data to display        This patient presents to the emergency department after having increasing episodes of lightheadedness and near syncope for the past week or so. [facility] and had a normal head CT. NIH scale is 0. Blood work reveals mild leukocytosis. Patient has chronic renal insufficiency and chronically elevated troponin. She is hyponatremic. EKG shows subtle EKG changes. Given her persistent near syncopal episodes, I do feel mission is warranted for further evaluation. Patient understands and agrees with plan. My suspicion is low for STEMI, PE, sepsis, cerebellar compromise, posterior stroke,carotid dissection, sinus abscess, acute fracture, acute CVA, ICH, SAH, TIA, meningitis, encephalitis, pseudotumor cerebri, temporal arteritis, sentinel bleed from ruptured aneurysm, hypertensive urgency or emergency, subdural hematoma, epidural hematoma. FINAL IMPRESSION      1. Near syncope    2. Abnormal EKG    3. ESRD on peritoneal dialysis ([facility] [address]    4. Hyponatremia          DISPOSITION/PLAN   DISPOSITION Decision To Admit 05/31/2021 03:17:21 PM      PATIENT REFERRED TO:  No follow-up provider specified.     DISCHARGE MEDICATIONS:  New Prescriptions    No medications on file       DISCONTINUED MEDICATIONS:  Discontinued Medications    No medications on file              (Please note that portions of this note were completed with a voice recognition program.  Efforts were made to edit the dictations but occasionally words are mis-transcribed.)    José Miguel Tavares PA-C (electronically signed)            José Miguel Tavares PA-C  05/31/21 [place]

## 2021-05-31 NOTE — H&P
HOSPITALISTS HISTORY AND PHYSICAL    5/31/2021 7:19 PM    Patient Information:  Izzy Neff is a 51 y.o. female [id number]  PCP:  Onel Matute MD (Tel: [phone] )    Chief complaint:    Chief Complaint   Patient presents with    Loss of Consciousness     last week had syncopal episode, cont to feel faint, dizzy while walking. History of Present Illness:  Perry Shore is a 51 y.o. female with h/o ESRD on PD ( follows up with dr. Kevon Roberts), HTN , DM , Dyslipidemia,  PVD , L toe amputation  presents with c/o syncopal episode. States she feels dizziness when standing and she passes out. The sx are ongoing for several months but are more frequent now. The sx are worst in the morning States her BP is very labile . Systolic BP can drop as low as 101. Today her BP was 192/38. She takes procardia and lasix in the morning and metoprolol twice a day  She also followed up with cardiology at [facility] for renal transplant clearance. States her last stress test was in 04/21 that showed basal anterior ischemia and global hypokinesias. She was told she will need an coronary angiogram. Requesting  to see [facility] cardiology    REVIEW OF SYSTEMS:   Constitutional: Negative for fever,chills or night sweats  ENT: Negative for rhinorrhea, epistaxis, hoarseness, sore throat. Respiratory: Negative for shortness of breath,wheezing  Cardiovascular: Negative for chest pain, palpitations   Gastrointestinal: Negative for nausea, vomiting, diarrhea  Genitourinary: Negative for polyuria, dysuria   Hematologic/Lymphatic: Negative for bleeding tendency, easy bruising  Musculoskeletal: Negative for myalgias and arthralgias  Neurologic: Negative for confusion,dysarthria. Skin: Negative for itching,rash  Psychiatric: Negative for depression,anxiety, agitation. Endocrine: Negative for polydipsia,polyuria,heat /cold intolerance.     Past Medical History:   has a past medical history of Diabetes mellitus ([facility] [address] End stage kidney disease ([facility] [address] Hypertension, Neuropathy, and Peripheral vascular disease ([facility] [address] Past Surgical History:   has a past surgical history that includes Tonsillectomy;  section; other surgical history (2018); Toe amputation (Left, 2021); and Toe amputation (Left, 2021). Medications:  No current facility-administered medications on file prior to encounter. Current Outpatient Medications on File Prior to Encounter   Medication Sig Dispense Refill    HYDROcodone-acetaminophen (NORCO) 5-325 MG per tablet Take 1 tablet by mouth every 6 hours as needed for Pain for up to 7 days. Intended supply: 7 days. Take lowest dose possible to manage pain 28 tablet 0    promethazine (PHENERGAN) 25 MG tablet Take 1 tablet by mouth every 6 hours as needed for Nausea WARNING:  May cause drowsiness. May impair ability to operate vehicles or machinery. Do not use in combination with alcohol.  20 tablet 0    promethazine (PHENERGAN) 25 MG tablet Take 1 tablet by mouth every 6 hours as needed for Nausea 28 tablet 0    insulin detemir (LEVEMIR FLEXTOUCH) 100 UNIT/ML injection pen INJECT UNDER THE SKIN 16 EVERY NIGHT AT BEDTIME 5 pen 1    NIFEdipine (PROCARDIA XL) 60 MG extended release tablet Take 1 tablet by mouth nightly Take 60 mg by mouth nightly 30 tablet 0    atorvastatin (LIPITOR) 20 MG tablet Take 1 tablet by mouth nightly 90 tablet 1    VELPHORO 500 MG CHEW CHEW TWO TABLETS BY MOUTH THREE TIMES A DAY WITH MEALS 180 tablet 0    insulin aspart (NOVOLOG FLEXPEN) 100 UNIT/ML injection pen Inject 10 Units into the skin 3 times daily (before meals) 3 pen 4    aspirin 81 MG EC tablet Take 1 tablet by mouth daily 30 tablet 1    metoprolol tartrate (LOPRESSOR) 50 MG tablet Take 1 tablet by mouth 2 times daily 60 tablet 1    torsemide (DEMADEX) 100 MG tablet Take 2 tablets by mouth daily 60 tablet 1    clopidogrel (PLAVIX) 75 MG tablet Take 1 tablet by mouth daily 30 tablet 1    omeprazole (PRILOSEC) 10 MG delayed release capsule Take 10 mg by mouth daily      calcium acetate 667 MG TABS Take 667 mg by mouth 3 times daily (with meals)      vitamin D (CHOLECALCIFEROL) 1000 units TABS tablet Take 5,000 Units by mouth daily       fluticasone (FLONASE) 50 MCG/ACT nasal spray 2 sprays by Nasal route daily 1 Bottle 10    ondansetron (ZOFRAN) 8 MG tablet Take 1 tablet by mouth every 12 hours as needed for Nausea. 10 tablet 1    Continuous Blood Gluc Sensor (FREESTYLE KOREY 2 SENSOR) MISC Change every 14 days 1 each 0    Continuous Blood Gluc  (FREESTYLE KOREY 2 READER) JOHNNIE Use to check glucose 1 each 0    Continuous Blood Gluc Sensor (FREESTYLE KOREY 14 DAY SENSOR) MISC Check glucose ---change every 14 days 2 each 3    Continuous Blood Gluc  (FREESTYLE KOREY 14 DAY READER) JOHNNIE To test glucose 1 Device 0    Blood Glucose Monitoring Suppl (FREESTYLE LITE) JOHNNIE 1 Device by Does not apply route daily Insurance preferred device 1 Device 0    blood glucose test strips (FREESTYLE LITE) strip 1 each by In Vitro route 3 times daily (before meals) As needed. 200 each 11    Insulin Pen Needle 32G X 6 MM MISC Use with insulin pens 4 times daily 200 each 5    FREESTYLE LANCETS MISC Check blood sugars 4 times daily 120 each 8    [DISCONTINUED] Multiple Vitamins-Minerals (THERAPEUTIC MULTIVITAMIN-MINERALS) tablet Take 1 tablet by mouth daily. 30 tablet 11       Allergies: Allergies   Allergen Reactions    Zetia [Ezetimibe] Shortness Of Breath    Quinolones Nausea Only     Other reaction(s): Vomiting    Humalog [Insulin Lispro]      SOB, Leg swelling, fatigue    Lisinopril Swelling    Lantus [Insulin Glargine] Nausea And Vomiting     Side effect, not an allergy    Victoza [Liraglutide] Nausea And Vomiting     Side effect, not an allergy          Social History:   reports that she has never smoked.  She has never used smokeless tobacco. She reports that she does not drink alcohol and does not use drugs. Family History:  family history includes Cancer in her father; Coronary Art Dis in her father and mother; Diabetes in her father and mother; Heart Disease in her father and mother; High Blood Pressure in her father and mother. ,     Physical Exam:  BP (!) 171/64   Pulse 76   Temp 98.3 °F (36.8 °C) (Oral)   Resp 18   Ht 5' 9\" (1.753 m)   Wt 227 lb (103 kg)   LMP 05/12/2021 (Approximate)   SpO2 96%   BMI 33.52 kg/m²     General appearance:  Appears comfortable. Well nourished  Eyes: Sclera clear, pupils equal  ENT: Moist mucus membranes, no thrush. Trachea midline. Cardiovascular: Regular rhythm, normal S1, S2. No murmur, gallop, rub. No edema in lower extremities  Respiratory: Clear to auscultation bilaterally, no wheeze, good inspiratory effort  Gastrointestinal: Abdomen soft, non-tender, not distended, normal bowel sounds  Musculoskeletal: No cyanosis in digits, neck supple  Neurology: Cranial nerves grossly intact. Alert and oriented in time, place and person. No speech or motor deficits  Psychiatry: Appropriate affect. Not agitated  Skin: Warm, dry, normal turgor, no rash    Labs:  CBC:   Lab Results   Component Value Date    WBC 11.4 05/31/2021    RBC 3.58 05/31/2021    HGB 10.8 05/31/2021    HCT 33.0 05/31/2021    MCV 92.2 05/31/2021    MCH 30.2 05/31/2021    MCHC 32.7 05/31/2021    RDW 13.9 05/31/2021     05/31/2021    MPV 8.2 05/31/2021     BMP:    Lab Results   Component Value Date     05/31/2021    K 3.7 05/31/2021    K 4.2 05/19/2021    CL 83 05/31/2021    CO2 23 05/31/2021    BUN 56 05/31/2021    CREATININE 11.8 05/31/2021    CALCIUM 9.1 05/31/2021    GFRAA 4 05/31/2021    GFRAA >60 04/09/2013    LABGLOM 3 05/31/2021    GLUCOSE 335 05/31/2021       Chest Xray:   EKG:      Problem List  Active Problems:    Syncope and collapse  Resolved Problems:    * No resolved hospital problems. *        Assessment/Plan:       1.  Syncopal and collapse  The pt BP is very labile  Orthostatic vitals ordered  Last ECHO in 04.21 showed mild valvular pathology  Also showed d CHF  Will get MrI brain to r/out CNS pathology  Cardiology consulted     ESRD On daily PD  Last session was last night   Consulted nephrology    Admit as inpatient. I anticipate hospitalization spanning more than two midnights for investigation and treatment of the above medically necessary diagnoses.       Michael Valiente MD    5/31/2021 7:19 PM

## 2021-05-31 NOTE — ED PROVIDER NOTES
I independently performed a history and physical on [facility]. All diagnostic, treatment, and disposition decisions were made by myself in conjunction with the advanced practice provider. Briefly, this is a 51 y.o. female here for syncopal episode x2 in the last 2 weeks. She states that she fainted today with minimal prodrome. No chest pain or trouble breathing. She does have a history of ESRD on peritoneal dialysis which she does nightly and has not missed any. She states that she had an abnormal stress test last month and is due for an angiogram at [facility] on Thursday due to this. Currently on renal transplant list.    On exam pt is resting comfortably  Cardiac RRR, no murmur  Lungs clear bilaterally, no increased work of breathing  Abdomen soft +fluid wave   No calf edema or tenderness  Neuro no drift in extremities       EKG  The Ekg interpreted by me in the absence of a cardiologist shows. normal sinus rhythm with a rate of 91  Axis is   Normal  QTc is  normal  Intervals and Durations are unremarkable. When compared to prior May 19, 2021, there are inferior lateral ST depressions. Occasional PVC. No STEMI. Screenings  NIH Stroke Scale  NIH Stroke Scale Assessed: Yes  Interval: Baseline  Level of Consciousness (1a. ): Alert  LOC Questions (1b. ):  Answers both correctly  LOC Commands (1c. ): Performs both tasks correctly  Best Gaze (2. ): Normal  Visual (3. ): No visual loss  Facial Palsy (4. ): Normal symmetrical movement  Motor Arm, Left (5a. ): No drift  Motor Arm, Right (5b. ): No drift  Motor Leg, Left (6a. ): No drift  Motor Leg, Right (6b. ): No drift  Limb Ataxia (7. ): Absent  Sensory (8. ): Normal  Best Language (9. ): No aphasia  Dysarthria (10. ): Normal  Extinction and Inattention (11): No abnormality  Total: 0                Patient is 70-year-old female on peritoneal dialysis with history of diabetes presenting to the emergency room for syncopal episode x2 in the last 2 weeks. On my evaluation she is hypertensive but comfortable. Chest pain-free however EKG is concerning for new inferior lateral ST depressions. Initial troponin nonischemic. This is concerning in the setting of a recently abnormal stress test.  She is due for coronary angiogram at [facility] on Thursday and warrants inpatient admission today for further ACS work-up. She is agreeable to admission. Patient Referrals:  No follow-up provider specified. Discharge Medications:  New Prescriptions    No medications on file       FINAL IMPRESSION  1. Near syncope    2. Abnormal EKG    3. ESRD on peritoneal dialysis ([facility] [address]    4. Hyponatremia        Blood pressure (!) 192/38, pulse 76, temperature 98.3 °F (36.8 °C), temperature source Oral, resp. rate 16, height 5' 9\" (1.753 m), weight 227 lb (103 kg), last menstrual period 05/12/2021, SpO2 99 %, not currently breastfeeding.      For further details of [facility] emergency department encounter, please see documentation by advanced practice provider     Marcia Wu MD  05/31/21 0600

## 2021-05-31 NOTE — PROGRESS NOTES
A&O x 4. Lives at home with her son. Recently had three toes amputated on left foot. May get OOB with boot on left foot. Admitted for syncopal episode x2 in the last 2 weeks. She states that she fainted today. Peritoneal dialysis patient, does nightly. She states that she had an abnormal stress test last month and is due for an angiogram at [facility] on Thursday due to this. Currently on renal transplant list. She brought in her sliding scale insulin from home, pharmacy placed a sticker to scan. MRI questionnaire is printed and in the room for her to fill out.

## 2021-05-31 NOTE — PROGRESS NOTES
Patient states she usually see Dr. Adriano Lozoya for nephrology. He has been consulted. Perfect serve sent and Dr. Satnam Ambriz who is covering for Adriano Lozoya, I requested orders for PD.

## 2021-06-01 ENCOUNTER — APPOINTMENT (OUTPATIENT)
Dept: MRI IMAGING | Age: 46
DRG: 233 | End: 2021-06-01
Payer: MEDICARE

## 2021-06-01 LAB
ANION GAP SERPL CALCULATED.3IONS-SCNC: 19 MMOL/L (ref 3–16)
BUN BLDV-MCNC: 63 MG/DL (ref 7–20)
CALCIUM SERPL-MCNC: 8.9 MG/DL (ref 8.3–10.6)
CHLORIDE BLD-SCNC: 86 MMOL/L (ref 99–110)
CO2: 26 MMOL/L (ref 21–32)
CREAT SERPL-MCNC: 13.2 MG/DL (ref 0.6–1.1)
EKG ATRIAL RATE: 91 BPM
EKG DIAGNOSIS: NORMAL
EKG P AXIS: -18 DEGREES
EKG P-R INTERVAL: 152 MS
EKG Q-T INTERVAL: 380 MS
EKG QRS DURATION: 102 MS
EKG QTC CALCULATION (BAZETT): 467 MS
EKG R AXIS: 75 DEGREES
EKG T AXIS: -53 DEGREES
EKG VENTRICULAR RATE: 91 BPM
ESTIMATED AVERAGE GLUCOSE: 205.9 MG/DL
GFR AFRICAN AMERICAN: 4
GFR NON-AFRICAN AMERICAN: 3
GLUCOSE BLD-MCNC: 127 MG/DL (ref 70–99)
GLUCOSE BLD-MCNC: 140 MG/DL (ref 70–99)
GLUCOSE BLD-MCNC: 171 MG/DL (ref 70–99)
GLUCOSE BLD-MCNC: 204 MG/DL (ref 70–99)
GLUCOSE BLD-MCNC: 223 MG/DL (ref 70–99)
HBA1C MFR BLD: 8.8 %
HBV SURFACE AB TITR SER: <3.5 MIU/ML
HCT VFR BLD CALC: 31.7 % (ref 36–48)
HEMOGLOBIN: 10.5 G/DL (ref 12–16)
HEPATITIS B SURFACE ANTIGEN INTERPRETATION: NORMAL
MCH RBC QN AUTO: 30.1 PG (ref 26–34)
MCHC RBC AUTO-ENTMCNC: 33.1 G/DL (ref 31–36)
MCV RBC AUTO: 90.9 FL (ref 80–100)
PDW BLD-RTO: 14 % (ref 12.4–15.4)
PERFORMED ON: ABNORMAL
PHOSPHORUS: 6.9 MG/DL (ref 2.5–4.9)
PLATELET # BLD: 340 K/UL (ref 135–450)
PMV BLD AUTO: 8.2 FL (ref 5–10.5)
POTASSIUM SERPL-SCNC: 3.9 MMOL/L (ref 3.5–5.1)
RBC # BLD: 3.48 M/UL (ref 4–5.2)
SODIUM BLD-SCNC: 131 MMOL/L (ref 136–145)
WBC # BLD: 8.9 K/UL (ref 4–11)

## 2021-06-01 PROCEDURE — 99223 1ST HOSP IP/OBS HIGH 75: CPT | Performed by: INTERNAL MEDICINE

## 2021-06-01 PROCEDURE — 2580000003 HC RX 258: Performed by: INTERNAL MEDICINE

## 2021-06-01 PROCEDURE — 90945 DIALYSIS ONE EVALUATION: CPT

## 2021-06-01 PROCEDURE — 6370000000 HC RX 637 (ALT 250 FOR IP): Performed by: INTERNAL MEDICINE

## 2021-06-01 PROCEDURE — 84100 ASSAY OF PHOSPHORUS: CPT

## 2021-06-01 PROCEDURE — 2060000000 HC ICU INTERMEDIATE R&B

## 2021-06-01 PROCEDURE — 2580000003 HC RX 258: Performed by: FAMILY MEDICINE

## 2021-06-01 PROCEDURE — 6370000000 HC RX 637 (ALT 250 FOR IP): Performed by: FAMILY MEDICINE

## 2021-06-01 PROCEDURE — 36415 COLL VENOUS BLD VENIPUNCTURE: CPT

## 2021-06-01 PROCEDURE — 99222 1ST HOSP IP/OBS MODERATE 55: CPT | Performed by: NURSE PRACTITIONER

## 2021-06-01 PROCEDURE — 87340 HEPATITIS B SURFACE AG IA: CPT

## 2021-06-01 PROCEDURE — 86706 HEP B SURFACE ANTIBODY: CPT

## 2021-06-01 PROCEDURE — 85027 COMPLETE CBC AUTOMATED: CPT

## 2021-06-01 PROCEDURE — 6360000002 HC RX W HCPCS: Performed by: FAMILY MEDICINE

## 2021-06-01 PROCEDURE — 70551 MRI BRAIN STEM W/O DYE: CPT

## 2021-06-01 PROCEDURE — 80048 BASIC METABOLIC PNL TOTAL CA: CPT

## 2021-06-01 PROCEDURE — 93010 ELECTROCARDIOGRAM REPORT: CPT | Performed by: INTERNAL MEDICINE

## 2021-06-01 RX ORDER — SEVELAMER CARBONATE 800 MG/1
800 TABLET, FILM COATED ORAL
Status: DISCONTINUED | OUTPATIENT
Start: 2021-06-01 | End: 2021-06-16 | Stop reason: HOSPADM

## 2021-06-01 RX ORDER — SODIUM CHLORIDE, SODIUM LACTATE, CALCIUM CHLORIDE, MAGNESIUM CHLORIDE AND DEXTROSE 2.5; 538; 448; 18.3; 5.08 G/100ML; MG/100ML; MG/100ML; MG/100ML; MG/100ML
3000 INJECTION, SOLUTION INTRAPERITONEAL
Status: DISCONTINUED | OUTPATIENT
Start: 2021-06-01 | End: 2021-06-03

## 2021-06-01 RX ORDER — GENTAMICIN SULFATE 1 MG/G
CREAM TOPICAL DAILY
Status: DISCONTINUED | OUTPATIENT
Start: 2021-06-01 | End: 2021-06-16 | Stop reason: HOSPADM

## 2021-06-01 RX ADMIN — INSULIN DETEMIR 20 UNITS: 100 INJECTION, SOLUTION SUBCUTANEOUS at 20:48

## 2021-06-01 RX ADMIN — Medication 10 ML: at 10:14

## 2021-06-01 RX ADMIN — ATORVASTATIN CALCIUM 20 MG: 20 TABLET, FILM COATED ORAL at 20:47

## 2021-06-01 RX ADMIN — HEPARIN SODIUM 5000 UNITS: 5000 INJECTION INTRAVENOUS; SUBCUTANEOUS at 20:47

## 2021-06-01 RX ADMIN — TORSEMIDE 200 MG: 100 TABLET ORAL at 10:13

## 2021-06-01 RX ADMIN — NIFEDIPINE 60 MG: 30 TABLET, FILM COATED, EXTENDED RELEASE ORAL at 20:46

## 2021-06-01 RX ADMIN — PANTOPRAZOLE SODIUM 40 MG: 40 TABLET, DELAYED RELEASE ORAL at 06:02

## 2021-06-01 RX ADMIN — SODIUM CHLORIDE, SODIUM LACTATE, CALCIUM CHLORIDE, MAGNESIUM CHLORIDE AND DEXTROSE 3000 ML: 2.5; 538; 448; 18.3; 5.08 INJECTION, SOLUTION INTRAPERITONEAL at 20:54

## 2021-06-01 RX ADMIN — HYDRALAZINE HYDROCHLORIDE 10 MG: 20 INJECTION INTRAMUSCULAR; INTRAVENOUS at 16:25

## 2021-06-01 RX ADMIN — HEPARIN SODIUM 5000 UNITS: 5000 INJECTION INTRAVENOUS; SUBCUTANEOUS at 06:02

## 2021-06-01 RX ADMIN — METOPROLOL TARTRATE 50 MG: 50 TABLET, FILM COATED ORAL at 10:13

## 2021-06-01 RX ADMIN — GENTAMICIN SULFATE: 1 CREAM TOPICAL at 18:12

## 2021-06-01 RX ADMIN — HYDRALAZINE HYDROCHLORIDE 10 MG: 20 INJECTION INTRAMUSCULAR; INTRAVENOUS at 06:02

## 2021-06-01 RX ADMIN — HEPARIN SODIUM 5000 UNITS: 5000 INJECTION INTRAVENOUS; SUBCUTANEOUS at 16:20

## 2021-06-01 RX ADMIN — Medication 10 ML: at 22:11

## 2021-06-01 RX ADMIN — ASPIRIN 81 MG: 81 TABLET, COATED ORAL at 10:13

## 2021-06-01 RX ADMIN — SEVELAMER CARBONATE 800 MG: 800 TABLET, FILM COATED ORAL at 17:14

## 2021-06-01 RX ADMIN — CLOPIDOGREL BISULFATE 75 MG: 75 TABLET ORAL at 10:13

## 2021-06-01 RX ADMIN — METOPROLOL TARTRATE 50 MG: 50 TABLET, FILM COATED ORAL at 20:46

## 2021-06-01 ASSESSMENT — PAIN SCALES - GENERAL
PAINLEVEL_OUTOF10: 0
PAINLEVEL_OUTOF10: 2
PAINLEVEL_OUTOF10: 0
PAINLEVEL_OUTOF10: 0
PAINLEVEL_OUTOF10: 2

## 2021-06-01 ASSESSMENT — PAIN DESCRIPTION - DESCRIPTORS: DESCRIPTORS: THROBBING

## 2021-06-01 ASSESSMENT — PAIN DESCRIPTION - PAIN TYPE: TYPE: SURGICAL PAIN

## 2021-06-01 ASSESSMENT — PAIN DESCRIPTION - LOCATION
LOCATION: COCCYX
LOCATION: FOOT

## 2021-06-01 ASSESSMENT — PAIN DESCRIPTION - ORIENTATION: ORIENTATION: LEFT

## 2021-06-01 ASSESSMENT — PAIN DESCRIPTION - FREQUENCY: FREQUENCY: INTERMITTENT

## 2021-06-01 NOTE — PROGRESS NOTES
155/58   Pulse 74   Temp 97.9 °F (36.6 °C) (Oral)   Resp 18   Ht 5' 9\" (1.753 m)   Wt 225 lb 1.6 oz (102.1 kg)   LMP 05/12/2021 (Approximate)   SpO2 97%   BMI 33.24 kg/m²     General appearance: No apparent distress, appears stated age and cooperative. HEENT: Pupils equal, round, and reactive to light. Conjunctivae/corneas clear. Neck: Supple, with full range of motion. No jugular venous distention. Trachea midline. Respiratory:  Normal respiratory effort. Clear to auscultation, bilaterally without Rales/Wheezes/Rhonchi. Cardiovascular: Regular rate and rhythm with normal S1/S2 without murmurs, rubs or gallops. Abdomen: Soft, non-tender, non-distended with normal bowel sounds. Musculoskeletal: No clubbing, cyanosis or edema bilaterally. Full range of motion without deformity. Skin: Skin color, texture, turgor normal.  No rashes or lesions. Neurologic:  Neurovascularly intact without any focal sensory/motor deficits. Cranial nerves: II-XII intact, grossly non-focal.  Psychiatric: Alert and oriented, thought content appropriate, normal insight  Capillary Refill: Brisk,< 3 seconds   Peripheral Pulses: +2 palpable, equal bilaterally       Labs:   Recent Labs     05/31/21  1247 06/01/21  0454   WBC 11.4* 8.9   HGB 10.8* 10.5*   HCT 33.0* 31.7*    340     Recent Labs     05/31/21  1247 06/01/21  0454   * 131*   K 3.7 3.9   CL 83* 86*   CO2 23 26   BUN 56* 63*   CREATININE 11.8* 13.2*   CALCIUM 9.1 8.9   PHOS  --  6.9*     Recent Labs     05/31/21  1247   AST 9*   ALT <5*   BILITOT <0.2   ALKPHOS 120     No results for input(s): INR in the last 72 hours.   Recent Labs     05/31/21  1247   TROPONINI 0.17*       Urinalysis:      Lab Results   Component Value Date    NITRU Negative 05/16/2021    WBCUA 6-9 05/16/2021    BACTERIA 1+ 02/28/2014    RBCUA 0-2 05/16/2021    BLOODU SMALL 05/16/2021    SPECGRAV 1.017 05/16/2021    GLUCOSEU 500 05/16/2021       Radiology:  XR CHEST PORTABLE   Final Result Cardiomegaly with stable right pleural effusion         MRI BRAIN WO CONTRAST    (Results Pending)           Assessment/Plan:    Active Hospital Problems    Diagnosis     Syncope and collapse [R55]      Presyncope  Secondary to labile BP? Possibly orthostatic, however orthostatic vital signs negative here  Vasovagal?  Autonomic instability in view of diabetes?   Cardiology evaluation pending    ESRD on PD  Nephrology consulted    Hypertension  With labile BP  BP meds adjusting per nephrology    DVT Prophylaxis: Heparin  Diet: DIET CARB CONTROL;  Code Status: Full Code      Electronically signed by Pradeep Oreilly MD on 6/1/2021 at 2:50 PM

## 2021-06-01 NOTE — CONSULTS
[facility]   Electrophysiology Nurse Practitioner  Consult    Date: 6/1/2021  Date of admission: 5/31/2021 11:53 AM  Reason for Admission: Syncope and collapse [R55]    Consult Requesting Physician: Mindi Ormond, MD    -Reason for Consultation: Syncope    Chief Complaint   Patient presents with    Loss of Consciousness     last week had syncopal episode, cont to feel faint, dizzy while walking. HISTORY OF PRESENT ILLNESS: History obtained from patient and medical record. Dilip Miranda is a 51 y.o. female with a past medical history of DM, HTN, ESRD on peritoneal dialysis, and PAD. Pt presented to hospital following a syncopal episode at home. Pt reports she was walking towards her son's car and became dizzy and lightheaded, then fainted. No CP or SOB prior to the episode. She states she was only out for a few seconds. No loss of bowel or bladder control. Pt reports she had a similar episode a few weeks ago. Her primary cardiologist had placed a cardiac event monitor that she wearing during the episode. She is currently being worked up for possible kidney transplant. She recently had a GXT that was concerning for ischemia. Her cardiologist at [facility] was preparing her for a coronary angiogram.     Interval Hx: Today, she is being seen for consult for syncope. She feels ok. No recurrent syncope. She remains in sinus rhythm on telemetry. Her blood pressure is elevated. Patient seen and examined. Clinical notes reviewed. Telemetry reviewed. No new complaints today. No major events overnight. Denies having chest pain, palpitations, shortness of breath, orthopnea, cough, or dizziness at the time of this visit. Allergies:   Allergies   Allergen Reactions    Zetia [Ezetimibe] Shortness Of Breath    Quinolones Nausea Only     Other reaction(s): Vomiting    Humalog [Insulin Lispro]      SOB, Leg swelling, fatigue    Lisinopril Swelling    Lantus [Insulin Glargine] Nausea And Vomiting Side effect, not an allergy    Victoza [Liraglutide] Nausea And Vomiting     Side effect, not an allergy       Home Meds:  Prior to Visit Medications    Medication Sig Taking? Authorizing Provider   HYDROcodone-acetaminophen (NORCO) 5-325 MG per tablet Take 1 tablet by mouth every 6 hours as needed for Pain for up to 7 days. Intended supply: 7 days. Take lowest dose possible to manage pain Yes Camella Bogard, DPM   promethazine (PHENERGAN) 25 MG tablet Take 1 tablet by mouth every 6 hours as needed for Nausea WARNING:  May cause drowsiness. May impair ability to operate vehicles or machinery. Do not use in combination with alcohol.  Yes Camella Bogard, DPM   promethazine (PHENERGAN) 25 MG tablet Take 1 tablet by mouth every 6 hours as needed for Nausea Yes Tom Gtz APRN - CNP   insulin detemir (LEVEMIR FLEXTOUCH) 100 UNIT/ML injection pen INJECT UNDER THE SKIN 16 EVERY NIGHT AT BEDTIME Yes Tom Gtz APRN - CNP   NIFEdipine (PROCARDIA XL) 60 MG extended release tablet Take 1 tablet by mouth nightly Take 60 mg by mouth nightly Yes Tom Gtz APRN - CNP   atorvastatin (LIPITOR) 20 MG tablet Take 1 tablet by mouth nightly Yes Patel Soriano MD   VELPHORO 500 MG CHEW CHEW TWO TABLETS BY MOUTH THREE TIMES A DAY WITH MEALS Yes Amna Puente MD   insulin aspart (NOVOLOG FLEXPEN) 100 UNIT/ML injection pen Inject 10 Units into the skin 3 times daily (before meals) Yes Patel Soriano MD   aspirin 81 MG EC tablet Take 1 tablet by mouth daily Yes Ovidio Hale PA-C   metoprolol tartrate (LOPRESSOR) 50 MG tablet Take 1 tablet by mouth 2 times daily Yes Ovidio Hale PA-C   torsemide (DEMADEX) 100 MG tablet Take 2 tablets by mouth daily Yes Ovidio Hale PA-C   clopidogrel (PLAVIX) 75 MG tablet Take 1 tablet by mouth daily Yes Ovidio Hale PA-C   omeprazole (PRILOSEC) 10 MG delayed release capsule Take 10 mg by mouth daily Yes Historical Provider, MD   calcium acetate 667 MG TABS Take 667 mg by mouth 3 times daily (with meals) Yes Historical Provider, MD   vitamin D (CHOLECALCIFEROL) 1000 units TABS tablet Take 5,000 Units by mouth daily  Yes Historical Provider, MD   fluticasone (FLONASE) 50 MCG/ACT nasal spray 2 sprays by Nasal route daily Yes Aravind Cannon MD   ondansetron (ZOFRAN) 8 MG tablet Take 1 tablet by mouth every 12 hours as needed for Nausea. Yes Aravind Cannon MD   Continuous Blood Gluc Sensor (FREESTYLE KOREY 2 SENSOR) MISC Change every 14 days  Munir Nielson MD   Continuous Blood Gluc  (FREESTYLE KOREY 2 READER) JOHNNIE Use to check glucose  Munir Nielson MD   Continuous Blood Gluc Sensor (FREESTYLE KOREY 14 DAY SENSOR) MISC Check glucose ---change every 14 days  Munir Nielson MD   Continuous Blood Gluc  (FREESTYLE KOREY 14 DAY READER) JOHNNIE To test glucose  Munir Nielson MD   Blood Glucose Monitoring Suppl (FREESTYLE LITE) JOHNNIE 1 Device by Does not apply route daily Insurance preferred device  Aravind Cannon MD   blood glucose test strips (FREESTYLE LITE) strip 1 each by In Vitro route 3 times daily (before meals) As needed. Aravind Cannon MD   Insulin Pen Needle 32G X 6 MM MISC Use with insulin pens 4 times daily  Aravind Cannon MD   FREESTYLE LANCETS MISC Check blood sugars 4 times daily  Aravind Cannon MD   Multiple Vitamins-Minerals (THERAPEUTIC MULTIVITAMIN-MINERALS) tablet Take 1 tablet by mouth daily. Aravind Cannon MD      Past Medical History:  Past Medical History:   Diagnosis Date    Diabetes mellitus [facility]     End stage kidney disease ([facility]     peritoneal dialysis every night at home x 2 years    Hypertension     Neuropathy     Peripheral vascular disease ([facility]       Past Surgical History:    has a past surgical history that includes Tonsillectomy;  section; other surgical history (2018); Toe amputation (Left, 2021); and Toe amputation (Left, 2021). murmurs, rubs, or gallops. Peripheral pulses 2+, capillary refill < 3 seconds. No peripheral edema, no elevation of JVP  · Respiratory: Respirations symmetric and unlabored. Lungs clear to auscultation bilaterally, no wheezing, crackles, or rhonchi  · Gastrointestinal: Abdomen soft and rotund. Bowel sounds normoactive in all quadrants without tenderness or masses. · Musculoskeletal: Bilateral upper and lower extremity strength 5/5 with full ROM. Boot on foot  · Neurologic/Psych: Awake and orientated to person, place and time. Calm affect, appropriate mood    Pertinent labs, diagnostic, device, and imaging results reviewed as a part of this visit    Labs:  BMP:   Recent Labs     21  1247 21  0454   * 131*   K 3.7 3.9   CL 83* 86*   CO2 23 26   PHOS  --  6.9*   BUN 56* 63*   CREATININE 11.8* 13.2*   MG 2.20  --      Estimated Creatinine Clearance: 7 mL/min (A) (based on SCr of 13.2 mg/dL [facility])). CBC:   Recent Labs     21  1247 21  0454   WBC 11.4* 8.9   HGB 10.8* 10.5*   HCT 33.0* 31.7*   MCV 92.2 90.9    340     Thyroid:   Lab Results   Component Value Date    TSH 1.99 2014     Lipids:  Lab Results   Component Value Date    CHOL 299 2017    HDL 39 2017    HDL 49 2012    TRIG 184 2017     LFTS:   Lab Results   Component Value Date    ALT <5 2021    AST 9 2021    ALKPHOS 120 2021    PROT 8.1 2021    PROT 7.1 2012    AGRATIO 0.7 2021    BILITOT <0.2 2021     Cardiac Enzymes:   Lab Results   Component Value Date    CKTOTAL 45 2013    TROPONINI 0.17 2021    TROPONINI 0.14 2021    TROPONINI 0.15 2021     Coags:   Lab Results   Component Value Date    PROTIME 11.0 2021    INR 0.95 2021     EC21  NSR with ST & T wave abnormality    ECHO:  ()  - Left ventricle: The cavity size is mildly dilated.  Wall thickness is normal. Systolic function was normal. The estimated ejection fraction was in the range of 55% to 60%. Wall motion was normal; there were no regional wall motion abnormalities. Features are consistent with a pseudonormal left ventricular filling pattern, with concomitant abnormal relaxation and increased filling pressure (grade 2 diastolic dysfunction). - Aortic valve: Mild calcification. Mild thickening.  - Mitral valve: Mildly calcified annulus. Mild thickening.  - Right ventricle: Systolic function was normal by objective interpretation. Stress Test: 4/21 ()  1. Mild small basal anterior ischemia.       2. Moderate global hypokinesis with ejection fraction of 43% at rest.       3. Findings indicate high cardiac risk. CXR: 5/31/21  Cardiomegaly with stable right pleural effusion    Problem List:   Patient Active Problem List    Diagnosis Date Noted    Syncope and collapse 05/31/2021    Atherosclerosis of native arteries of left leg with ulceration of other part of foot ([address]     Weight loss counseling, encounter for     Non-smoker     Elevated C-reactive protein (CRP)     Elevated sed rate     Diabetic polyneuropathy associated with type 2 diabetes mellitus ([address]     ESRD (end stage renal disease) ([address]     Acute renal failure (ARF) ([address] 08/13/2018    Diabetes mellitus type 2 in obese ([address] 11/24/2014    Hyperlipidemia with target LDL less than 100 03/26/2012    Hypertension 03/26/2012    Microalbuminuria 12/15/2011    Diabetes mellitus ([address] 12/14/2011    Obesity 12/14/2011        Assessment and Plan:     1.  Syncope  - Etiology not established  ~ Differential diagnosis include cardiac arrhythmias, orthostatic hypotension, autonomic dysfunction     - EF 55-60% on recent echo, but low on GXT   - C to rule out CAD/ischemia   - Will check her event monitor to see if arrhythmia present during syncopal episode yesterday    - Avoid dehydration with adequate fluid intake and increased sodium intake  - Recommend lying down and elevating legs for

## 2021-06-01 NOTE — CONSULTS
Office : [phone]     Fax :[phone]       Nephrology Consult Note      Patient's Name: Jacinda Dacosta  8:01 AM  2021    Reason for Consult:  ESRD      Requesting Physician:  Vika Gonzalez MD      Chief Complaint:    Chief Complaint   Patient presents with    Loss of Consciousness     last week had syncopal episode, cont to feel faint, dizzy while walking. History of Present Ilness:    Jacinda Dacosta is a 51 y.o. female with severe end-stage renal disease secondary to diabetic nephropathy on peritoneal dialysis, hypertension, peripheral vascular disease, diabetic neuropathy who was admitted after she had a syncopal episode. She had similar episode 2 weeks ago. Recently had angiogram left lower extremity for gangrene left foot second toe. Denies any shortness of breath. Denies any abdominal pain. Denies any nausea vomiting. No intake/output data recorded.     Past Medical History:   Diagnosis Date    Diabetes mellitus ([address]     End stage kidney disease ([facility]     peritoneal dialysis every night at home x 2 years    Hypertension     Neuropathy     Peripheral vascular disease ([facility]        Past Surgical History:   Procedure Laterality Date     SECTION      OTHER SURGICAL HISTORY  2018     lap PD cath placement lt side 62 cm    TOE AMPUTATION Left 2021    AMPUTATION OF THE LEFT THIRD TOE performed by Kelsie Brewer DPM at [facility] TOE AMPUTATION Left 2021    AMPUTATION OF HALLUX AND SECOND TOE, LEFT FOOT performed by Kelsie Brewer DPM at [facility] TONSILLECTOMY         Family History   Problem Relation Age of Onset    Coronary Art Dis Mother     High Blood Pressure Mother     Heart Disease Mother     Diabetes Mother     Cancer Father     Coronary Art Dis Father     High Blood Pressure Father     Heart Disease Father     Diabetes Father         reports that she has never smoked. She has never used smokeless tobacco. She reports that she does not drink alcohol and does not use drugs.         Allergies:  Zetia [ezetimibe], Quinolones, Humalog [insulin lispro], Lisinopril, Lantus [insulin glargine], and Victoza [liraglutide]    Current Medications:    hydrALAZINE (APRESOLINE) injection 10 mg, Q4H PRN  aspirin EC tablet 81 mg, Daily  atorvastatin (LIPITOR) tablet 20 mg, Nightly  clopidogrel (PLAVIX) tablet 75 mg, Daily  metoprolol tartrate (LOPRESSOR) tablet 50 mg, BID  NIFEdipine (PROCARDIA XL) extended release tablet 60 mg, Nightly  torsemide (DEMADEX) tablet 200 mg, Daily  glucose (GLUTOSE) 40 % oral gel 15 g, PRN  dextrose 50 % IV solution, PRN  glucagon (rDNA) injection 1 mg, PRN  dextrose 5 % solution, PRN  sodium chloride flush 0.9 % injection 5-40 mL, 2 times per day  sodium chloride flush 0.9 % injection 5-40 mL, PRN  0.9 % sodium chloride infusion, PRN  heparin (porcine) injection 5,000 Units, 3 times per day  promethazine (PHENERGAN) tablet 12.5 mg, Q6H PRN   Or  ondansetron (ZOFRAN) injection 4 mg, Q6H PRN  polyethylene glycol (GLYCOLAX) packet 17 g, Daily PRN  acetaminophen (TYLENOL) tablet 650 mg, Q6H PRN   Or  acetaminophen (TYLENOL) suppository 650 mg, Q6H PRN  pantoprazole (PROTONIX) tablet 40 mg, QAM AC  insulin aspart (NOVOLOG) injection pen 0-12 Units PATIENT SUPPLIED, TID AC  insulin aspart (NOVOLOG) injection pen 0-6 Units PATIENT SUPPLIED, Nightly  insulin detemir (LEVEMIR) injection pen 20 Units, Nightly        Review of Systems:   14 point ROS obtained but were negative except mentioned in HPI      Physical exam:     Vitals:  BP (!) 174/99   Pulse 65   Temp 98 °F (36.7 °C) (Oral)   Resp 18   Ht 5' 9\" (1.753 m)   Wt 225 lb 1.6 oz (102.1 kg)   LMP 05/12/2021 (Approximate)   SpO2 97%   BMI 33.24 kg/m²   Constitutional:  OAA X3 NAD  Skin: no rash, turgor wnl  Heent:  eomi, mmm  Neck: no bruits or jvd noted  Cardiovascular:  S1, S2 without m/r/g  Respiratory: CTA B without w/r/r  Abdomen:  +bs, soft, nt, nd  Ext: no  lower extremity edema  Psychiatric: mood and affect appropriate  Musculoskeletal:  Rom, muscular strength intact    Labs:  CBC:   Recent Labs     05/31/21  1247 06/01/21  0454   WBC 11.4* 8.9   HGB 10.8* 10.5*    340     BMP:    Recent Labs     05/31/21  1247 06/01/21  0454   * 131*   K 3.7 3.9   CL 83* 86*   CO2 23 26   BUN 56* 63*   CREATININE 11.8* 13.2*   GLUCOSE 335* 127*     Ca/Mg/Phos:   Recent Labs     05/31/21  1247 06/01/21  0454   CALCIUM 9.1 8.9   MG 2.20  --      Hepatic:   Recent Labs     05/31/21  1247   AST 9*   ALT <5*   BILITOT <0.2   ALKPHOS 120     Troponin:   Recent Labs     05/31/21  1247   TROPONINI 0.17*     BNP: No results for input(s): BNP in the last 72 hours. Lipids: No results for input(s): CHOL, TRIG, HDL, LDLCALC, LABVLDL in the last 72 hours. ABGs: No results for input(s): PHART, PO2ART, LMS4UJU in the last 72 hours. INR: No results for input(s): INR in the last 72 hours. UA:No results for input(s): Rebbeca Ligas, GLUCOSEU, BILIRUBINUR, Marcille Hick, BLOODU, PHUR, PROTEINU, UROBILINOGEN, NITRU, LEUKOCYTESUR, LABMICR, URINETYPE in the last 72 hours. Urine Microscopic: No results for input(s): LABCAST, BACTERIA, COMU, HYALCAST, WBCUA, RBCUA, EPIU in the last 72 hours. Urine Culture: No results for input(s): LABURIN in the last 72 hours. Urine Chemistry: No results for input(s): Rexine Check, PROTEINUR, NAUR in the last 72 hours. IMAGING:  XR CHEST PORTABLE   Final Result   Cardiomegaly with stable right pleural effusion         MRI BRAIN WO CONTRAST    (Results Pending)                       Assessment/Plan :      1. ESRD. On peritoneal dialysis. Will start peritoneal dialysis tonight. She is on cycler at home.   Use 2500 mL 2.5% diet no solution. Total number of cycles 5    2. HTN. Labile. Check orthostatics statics    3. Anemia of chronic disease. Gets micera at dialysis unit    4. Acid- base disorder. Monitor    5. Electrolytes monitor and replace as needed    6. Syncopal episode. Check orthostatics. Consult cardiology    7. Peripheral vascular disease. S/p amputation of left foot toes.   Follows podiatry    D/w primary team      Thank you for allowing us to participate in care of Taryn Earl         Electronically signed by: Miranda Bonilla MD, 6/1/2021, 8:01 AM      [facility] [address]  Office : [phone]  Fax :[phone]

## 2021-06-01 NOTE — PLAN OF CARE
Problem: Falls - Risk of:  Goal: Will remain free from falls  Description: Will remain free from falls  2/8/7171 5102 by Jing Villela RN  Outcome: Ongoing  Note: All fall precautions in place, bed in lowest position, frequent rounding to assist with toileting. Pt absent of fall this shift.     5/31/2021 1817 by Pamela Looney RN  Outcome: Ongoing  Goal: Absence of physical injury  Description: Absence of physical injury  3/6/7663 3227 by Jing Villela RN  Outcome: Ongoing  5/31/2021 1817 by Pamela Looney RN  Outcome: Ongoing     Problem: Pain:  Goal: Pain level will decrease  Description: Pain level will decrease  3/8/3332 7239 by Jing Villela RN  Outcome: Ongoing  Note: Pt does not complain of pain at this time  5/31/2021 1817 by Pamela Looney RN  Outcome: Ongoing  Goal: Control of acute pain  Description: Control of acute pain  6/2/3783 8706 by Jing Villela RN  Outcome: Ongoing  5/31/2021 1817 by Pamela Looney RN  Outcome: Ongoing  Goal: Control of chronic pain  Description: Control of chronic pain  4/2/3933 6952 by Jing Villela RN  Outcome: Ongoing  5/31/2021 1817 by Pamela Looney RN  Outcome: Ongoing

## 2021-06-01 NOTE — PROGRESS NOTES
Dr Olvin Gonsalves called- stated he did not want the patient to have PD this evening due to the syncopal episode. Relayed info to pt, pt verbalized understanding.

## 2021-06-02 LAB
ANION GAP SERPL CALCULATED.3IONS-SCNC: 23 MMOL/L (ref 3–16)
BUN BLDV-MCNC: 63 MG/DL (ref 7–20)
CALCIUM SERPL-MCNC: 8.8 MG/DL (ref 8.3–10.6)
CHLORIDE BLD-SCNC: 86 MMOL/L (ref 99–110)
CO2: 24 MMOL/L (ref 21–32)
CREAT SERPL-MCNC: 13.2 MG/DL (ref 0.6–1.1)
GFR AFRICAN AMERICAN: 4
GFR NON-AFRICAN AMERICAN: 3
GLUCOSE BLD-MCNC: 169 MG/DL (ref 70–99)
GLUCOSE BLD-MCNC: 193 MG/DL (ref 70–99)
GLUCOSE BLD-MCNC: 244 MG/DL (ref 70–99)
GLUCOSE BLD-MCNC: 253 MG/DL (ref 70–99)
GLUCOSE BLD-MCNC: 265 MG/DL (ref 70–99)
GLUCOSE BLD-MCNC: 282 MG/DL (ref 70–99)
HCT VFR BLD CALC: 31.8 % (ref 36–48)
HEMOGLOBIN: 10.5 G/DL (ref 12–16)
LEFT VENTRICULAR EJECTION FRACTION MODE: NORMAL
LV EF: 50 %
MCH RBC QN AUTO: 30 PG (ref 26–34)
MCHC RBC AUTO-ENTMCNC: 33 G/DL (ref 31–36)
MCV RBC AUTO: 91.1 FL (ref 80–100)
PDW BLD-RTO: 14.1 % (ref 12.4–15.4)
PERFORMED ON: ABNORMAL
PLATELET # BLD: 389 K/UL (ref 135–450)
PMV BLD AUTO: 7.9 FL (ref 5–10.5)
POTASSIUM SERPL-SCNC: 3.7 MMOL/L (ref 3.5–5.1)
RBC # BLD: 3.49 M/UL (ref 4–5.2)
SODIUM BLD-SCNC: 133 MMOL/L (ref 136–145)
WBC # BLD: 9.3 K/UL (ref 4–11)

## 2021-06-02 PROCEDURE — 2580000003 HC RX 258: Performed by: INTERNAL MEDICINE

## 2021-06-02 PROCEDURE — C1894 INTRO/SHEATH, NON-LASER: HCPCS

## 2021-06-02 PROCEDURE — B2111ZZ FLUOROSCOPY OF MULTIPLE CORONARY ARTERIES USING LOW OSMOLAR CONTRAST: ICD-10-PCS | Performed by: INTERNAL MEDICINE

## 2021-06-02 PROCEDURE — 85027 COMPLETE CBC AUTOMATED: CPT

## 2021-06-02 PROCEDURE — 2709999900 HC NON-CHARGEABLE SUPPLY

## 2021-06-02 PROCEDURE — 6370000000 HC RX 637 (ALT 250 FOR IP): Performed by: INTERNAL MEDICINE

## 2021-06-02 PROCEDURE — 6370000000 HC RX 637 (ALT 250 FOR IP): Performed by: FAMILY MEDICINE

## 2021-06-02 PROCEDURE — 80048 BASIC METABOLIC PNL TOTAL CA: CPT

## 2021-06-02 PROCEDURE — 2100000000 HC CCU R&B

## 2021-06-02 PROCEDURE — 93458 L HRT ARTERY/VENTRICLE ANGIO: CPT | Performed by: INTERNAL MEDICINE

## 2021-06-02 PROCEDURE — 99233 SBSQ HOSP IP/OBS HIGH 50: CPT | Performed by: NURSE PRACTITIONER

## 2021-06-02 PROCEDURE — 2580000003 HC RX 258: Performed by: FAMILY MEDICINE

## 2021-06-02 PROCEDURE — 6360000002 HC RX W HCPCS

## 2021-06-02 PROCEDURE — 93458 L HRT ARTERY/VENTRICLE ANGIO: CPT

## 2021-06-02 PROCEDURE — C1760 CLOSURE DEV, VASC: HCPCS

## 2021-06-02 PROCEDURE — 99233 SBSQ HOSP IP/OBS HIGH 50: CPT | Performed by: INTERNAL MEDICINE

## 2021-06-02 PROCEDURE — B2131ZZ FLUOROSCOPY OF MULTIPLE CORONARY ARTERY BYPASS GRAFTS USING LOW OSMOLAR CONTRAST: ICD-10-PCS | Performed by: INTERNAL MEDICINE

## 2021-06-02 PROCEDURE — 6360000002 HC RX W HCPCS: Performed by: FAMILY MEDICINE

## 2021-06-02 PROCEDURE — 6360000004 HC RX CONTRAST MEDICATION: Performed by: INTERNAL MEDICINE

## 2021-06-02 PROCEDURE — 99152 MOD SED SAME PHYS/QHP 5/>YRS: CPT | Performed by: INTERNAL MEDICINE

## 2021-06-02 PROCEDURE — C1769 GUIDE WIRE: HCPCS

## 2021-06-02 PROCEDURE — 99152 MOD SED SAME PHYS/QHP 5/>YRS: CPT

## 2021-06-02 PROCEDURE — 36415 COLL VENOUS BLD VENIPUNCTURE: CPT

## 2021-06-02 PROCEDURE — 2500000003 HC RX 250 WO HCPCS

## 2021-06-02 PROCEDURE — 4A023N7 MEASUREMENT OF CARDIAC SAMPLING AND PRESSURE, LEFT HEART, PERCUTANEOUS APPROACH: ICD-10-PCS | Performed by: INTERNAL MEDICINE

## 2021-06-02 RX ORDER — FLUTICASONE PROPIONATE 50 MCG
1 SPRAY, SUSPENSION (ML) NASAL DAILY PRN
COMMUNITY

## 2021-06-02 RX ORDER — OXYCODONE HYDROCHLORIDE AND ACETAMINOPHEN 5; 325 MG/1; MG/1
1 TABLET ORAL EVERY 4 HOURS PRN
Status: DISCONTINUED | OUTPATIENT
Start: 2021-06-02 | End: 2021-06-09

## 2021-06-02 RX ORDER — SODIUM CHLORIDE 0.9 % (FLUSH) 0.9 %
5-40 SYRINGE (ML) INJECTION PRN
Status: DISCONTINUED | OUTPATIENT
Start: 2021-06-02 | End: 2021-06-09

## 2021-06-02 RX ORDER — ACETAMINOPHEN 325 MG/1
650 TABLET ORAL EVERY 4 HOURS PRN
Status: DISCONTINUED | OUTPATIENT
Start: 2021-06-02 | End: 2021-06-09

## 2021-06-02 RX ORDER — SODIUM CHLORIDE 0.9 % (FLUSH) 0.9 %
5-40 SYRINGE (ML) INJECTION EVERY 12 HOURS SCHEDULED
Status: DISCONTINUED | OUTPATIENT
Start: 2021-06-02 | End: 2021-06-09

## 2021-06-02 RX ORDER — OXYCODONE HYDROCHLORIDE AND ACETAMINOPHEN 5; 325 MG/1; MG/1
2 TABLET ORAL EVERY 4 HOURS PRN
Status: DISCONTINUED | OUTPATIENT
Start: 2021-06-02 | End: 2021-06-09

## 2021-06-02 RX ORDER — SODIUM CHLORIDE 9 MG/ML
25 INJECTION, SOLUTION INTRAVENOUS PRN
Status: DISCONTINUED | OUTPATIENT
Start: 2021-06-02 | End: 2021-06-09

## 2021-06-02 RX ORDER — INSULIN DETEMIR 100 [IU]/ML
20 INJECTION, SOLUTION SUBCUTANEOUS NIGHTLY
Status: ON HOLD | COMMUNITY
End: 2021-06-16 | Stop reason: HOSPADM

## 2021-06-02 RX ORDER — NIFEDIPINE 30 MG/1
30 TABLET, EXTENDED RELEASE ORAL NIGHTLY
Status: DISCONTINUED | OUTPATIENT
Start: 2021-06-03 | End: 2021-06-09

## 2021-06-02 RX ADMIN — Medication 10 ML: at 19:51

## 2021-06-02 RX ADMIN — HEPARIN SODIUM 5000 UNITS: 5000 INJECTION INTRAVENOUS; SUBCUTANEOUS at 06:58

## 2021-06-02 RX ADMIN — TORSEMIDE 200 MG: 100 TABLET ORAL at 10:03

## 2021-06-02 RX ADMIN — SODIUM CHLORIDE, SODIUM LACTATE, CALCIUM CHLORIDE, MAGNESIUM CHLORIDE AND DEXTROSE 3000 ML: 2.5; 538; 448; 18.3; 5.08 INJECTION, SOLUTION INTRAPERITONEAL at 01:15

## 2021-06-02 RX ADMIN — ASPIRIN 81 MG: 81 TABLET, COATED ORAL at 10:03

## 2021-06-02 RX ADMIN — METOPROLOL TARTRATE 50 MG: 50 TABLET, FILM COATED ORAL at 19:50

## 2021-06-02 RX ADMIN — HYDRALAZINE HYDROCHLORIDE 10 MG: 20 INJECTION INTRAMUSCULAR; INTRAVENOUS at 22:15

## 2021-06-02 RX ADMIN — SODIUM CHLORIDE, SODIUM LACTATE, CALCIUM CHLORIDE, MAGNESIUM CHLORIDE AND DEXTROSE 3000 ML: 2.5; 538; 448; 18.3; 5.08 INJECTION, SOLUTION INTRAPERITONEAL at 06:07

## 2021-06-02 RX ADMIN — HEPARIN SODIUM 5000 UNITS: 5000 INJECTION INTRAVENOUS; SUBCUTANEOUS at 21:59

## 2021-06-02 RX ADMIN — PANTOPRAZOLE SODIUM 40 MG: 40 TABLET, DELAYED RELEASE ORAL at 10:04

## 2021-06-02 RX ADMIN — SODIUM CHLORIDE, SODIUM LACTATE, CALCIUM CHLORIDE, MAGNESIUM CHLORIDE AND DEXTROSE 3000 ML: 2.5; 538; 448; 18.3; 5.08 INJECTION, SOLUTION INTRAPERITONEAL at 03:00

## 2021-06-02 RX ADMIN — CLOPIDOGREL BISULFATE 75 MG: 75 TABLET ORAL at 10:03

## 2021-06-02 RX ADMIN — Medication 10 ML: at 08:11

## 2021-06-02 RX ADMIN — ACETAMINOPHEN 650 MG: 325 TABLET ORAL at 12:54

## 2021-06-02 RX ADMIN — SODIUM CHLORIDE, SODIUM LACTATE, CALCIUM CHLORIDE, MAGNESIUM CHLORIDE AND DEXTROSE 3000 ML: 2.5; 538; 448; 18.3; 5.08 INJECTION, SOLUTION INTRAPERITONEAL at 18:00

## 2021-06-02 RX ADMIN — ATORVASTATIN CALCIUM 20 MG: 20 TABLET, FILM COATED ORAL at 19:50

## 2021-06-02 RX ADMIN — METOPROLOL TARTRATE 50 MG: 50 TABLET, FILM COATED ORAL at 10:03

## 2021-06-02 RX ADMIN — GENTAMICIN SULFATE: 1 CREAM TOPICAL at 18:00

## 2021-06-02 RX ADMIN — IOPAMIDOL 91 ML: 755 INJECTION, SOLUTION INTRAVENOUS at 16:38

## 2021-06-02 ASSESSMENT — PAIN SCALES - GENERAL
PAINLEVEL_OUTOF10: 0
PAINLEVEL_OUTOF10: 4
PAINLEVEL_OUTOF10: 0

## 2021-06-02 NOTE — PROGRESS NOTES
[facility]   Electrophysiology Nurse Practitioner  Progress Note    Date: 6/2/2021  Date of admission: 5/31/2021 11:53 AM  Reason for Admission: Syncope and collapse [R55]    Consult Requesting Physician: Carmelo Escamilla MD    -Reason for Consultation: Syncope    Chief Complaint   Patient presents with    Loss of Consciousness     last week had syncopal episode, cont to feel faint, dizzy while walking. HISTORY OF PRESENT ILLNESS: History obtained from patient and medical record. Curt Monroe is a 51 y.o. female with a past medical history of DM, HTN, ESRD on peritoneal dialysis, and PAD. Pt presented to hospital following a syncopal episode at home. Pt reports she was walking towards her son's car and became dizzy and lightheaded, then fainted. No CP or SOB prior to the episode. She states she was only out for a few seconds. No loss of bowel or bladder control. Pt reports she had a similar episode a few weeks ago. Her primary cardiologist had placed a cardiac event monitor that she wearing during the episode. She is currently being worked up for possible kidney transplant. She recently had a GXT that was concerning for ischemia. Her cardiologist at [facility] was preparing her for a coronary angiogram.     Interval Hx: Today, she is being seen for follow up. She feels ok. She remains in sinus rhythm without any arrhythmias. Her BP is elevated this morning. We discussed need for left heart cath to assess for CAD. Patient seen and examined. Clinical notes reviewed. Telemetry reviewed. No new complaints today. No major events overnight. Denies having chest pain, palpitations, shortness of breath, orthopnea, cough, or dizziness at the time of this visit. Allergies:   Allergies   Allergen Reactions    Zetia [Ezetimibe] Shortness Of Breath    Quinolones Nausea Only     Other reaction(s): Vomiting    Humalog [Insulin Lispro]      SOB, Leg swelling, fatigue    Lisinopril Swelling    Lantus [Insulin Glargine] Nausea And Vomiting     Side effect, not an allergy    Victoza [Liraglutide] Nausea And Vomiting     Side effect, not an allergy       Home Meds:  Prior to Visit Medications    Medication Sig Taking? Authorizing Provider   HYDROcodone-acetaminophen (NORCO) 5-325 MG per tablet Take 1 tablet by mouth every 6 hours as needed for Pain for up to 7 days. Intended supply: 7 days. Take lowest dose possible to manage pain Yes Candida Sheikh DPM   promethazine (PHENERGAN) 25 MG tablet Take 1 tablet by mouth every 6 hours as needed for Nausea WARNING:  May cause drowsiness. May impair ability to operate vehicles or machinery. Do not use in combination with alcohol.  Yes Candida Sheikh DPM   promethazine (PHENERGAN) 25 MG tablet Take 1 tablet by mouth every 6 hours as needed for Nausea Yes RICHARD Marti CNP   insulin detemir (LEVEMIR FLEXTOUCH) 100 UNIT/ML injection pen INJECT UNDER THE SKIN 16 EVERY NIGHT AT BEDTIME Yes RICHARD Marti CNP   NIFEdipine (PROCARDIA XL) 60 MG extended release tablet Take 1 tablet by mouth nightly Take 60 mg by mouth nightly Yes RICHARD Marti CNP   atorvastatin (LIPITOR) 20 MG tablet Take 1 tablet by mouth nightly Yes Jerry Serrano MD   VELPHORO 500 MG CHEW CHEW TWO TABLETS BY MOUTH THREE TIMES A DAY WITH MEALS Yes Cecy Valadez MD   insulin aspart (NOVOLOG FLEXPEN) 100 UNIT/ML injection pen Inject 10 Units into the skin 3 times daily (before meals) Yes Jerry Serrano MD   aspirin 81 MG EC tablet Take 1 tablet by mouth daily Yes Kell Coleman PA-C   metoprolol tartrate (LOPRESSOR) 50 MG tablet Take 1 tablet by mouth 2 times daily Yes Kell Coleman PA-C   torsemide (DEMADEX) 100 MG tablet Take 2 tablets by mouth daily Yes Kell Coleman PA-C   clopidogrel (PLAVIX) 75 MG tablet Take 1 tablet by mouth daily Yes Kell Coleman PA-C   omeprazole (PRILOSEC) 10 MG delayed release capsule Take 10 mg by mouth daily monitor to see if arrhythmia present during syncopal episode yesterday (awaiting call back from  to see if rhythm strips are available)    ~ If no arrhythmia during syncopal episode, will continue outpatient monitoring at d/c    - Avoid dehydration with adequate fluid intake and increased sodium intake  - Recommend lying down and elevating legs for future prodromal symptoms  - 20-30mmHg compression stockings recommended    2. Abnormal GXT   - Recent GXT concerning for ischemia with EKG changes   - On ASA, plavix, BB, statin   - Will have IC see to discuss LHC while admitted (Planned for outpatient with her primary cardiologist on 6/3), but pt wants to discuss doing procedure while here    ~ Keep NPO        3. HTN  - Uncontrolled: Goal <130/80  - Continue current medications    4. ESRD   - On dialysis   - Followed by nephrology    5. PAD   - S/p left foot toe amputation (5/26) and angioplasty (5/27)   - On ASA, plavix, statin   - Podiatry following    6. Type 2 DM  - Unstable: Goal A1C <7  - Continue with current medications   - Management per hospitalist    All pertinent information and plan of care discussed with the EP physician. All questions and concerns were addressed to the patient/family. Alternatives to my treatment were discussed. I have discussed the above stated plan and the patient verbalized understanding and agreed with the plan. Discussed plan with patient and nurse. Thank you for allowing to us to participate in the care of Baldev Angelo     The patient was seen for >35 minutes. I reviewed interval history, physical exam, review of data including labs, imaging, development and implementation of treatment plan and coordination of complex care.     RICHARD Connor-CNP  [facility]   Office: [phone]

## 2021-06-02 NOTE — CONSULTS
PODIATRY CONSULT NOTE    HISTORY OF PRESENT ILLNESS:                The patient is a 51 y.o. female with significant past medical history as stated below who is consulted for a recent toe amputation. The patient has been well-established with me in the outpatient setting. She has an extensive history of gangrenous changes to the left foot and has history of amputation of the 3rd digit. She recently underwent amputation of toes 1 and 2 on the left by me on 21 and also underwent left popliteal angioplasty and distal anterior tibial/dorsalis pedis angioplasty by Dr. Freya Zhang on 21. She was supposed to be seen for her first post-operative appointment in my office today but was admitted due to a syncopal episode on 21. The patient states that she is doing well, just feeling a little tired. She has occasional burning pain in the foot, but states that it is mild. She has kept her dressing clean, dry, and intact since surgery and has been compliant with ambulating in her surgical shoe.     Past Medical History:        Diagnosis Date    Diabetes mellitus ([address]     End stage kidney disease ([address]     peritoneal dialysis every night at home x 2 years    Hypertension     Neuropathy     Peripheral vascular disease ([address]        Past Surgical History:        Procedure Laterality Date     SECTION      OTHER SURGICAL HISTORY  2018     lap PD cath placement lt side 62 cm    TOE AMPUTATION Left 2021    AMPUTATION OF THE LEFT THIRD TOE performed by Giovana Cristina DPM at [facility] TOE AMPUTATION Left 2021    AMPUTATION OF HALLUX AND SECOND TOE, LEFT FOOT performed by Giovana Cristina DPM at [facility]         Current Medications:    Current Facility-Administered Medications: dianeal lo-massimo 2.5% 3,000 mL, 3,000 mL, Intraperitoneal, 5x Daily  gentamicin (GARAMYCIN) 0.1 % cream, , Topical, Daily  sevelamer (RENVELA) tablet 800 mg, 800 mg, Oral, TID WC  hydrALAZINE (APRESOLINE) injection 10 mg, 10 mg, Intravenous, Q4H PRN  aspirin EC tablet 81 mg, 81 mg, Oral, Daily  atorvastatin (LIPITOR) tablet 20 mg, 20 mg, Oral, Nightly  clopidogrel (PLAVIX) tablet 75 mg, 75 mg, Oral, Daily  metoprolol tartrate (LOPRESSOR) tablet 50 mg, 50 mg, Oral, BID  NIFEdipine (PROCARDIA XL) extended release tablet 60 mg, 60 mg, Oral, Nightly  torsemide (DEMADEX) tablet 200 mg, 200 mg, Oral, Daily  glucose (GLUTOSE) 40 % oral gel 15 g, 15 g, Oral, PRN  dextrose 50 % IV solution, 12.5 g, Intravenous, PRN  glucagon (rDNA) injection 1 mg, 1 mg, Intramuscular, PRN  dextrose 5 % solution, 100 mL/hr, Intravenous, PRN  sodium chloride flush 0.9 % injection 5-40 mL, 5-40 mL, Intravenous, 2 times per day  sodium chloride flush 0.9 % injection 5-40 mL, 5-40 mL, Intravenous, PRN  0.9 % sodium chloride infusion, 25 mL, Intravenous, PRN  heparin (porcine) injection 5,000 Units, 5,000 Units, Subcutaneous, 3 times per day  promethazine (PHENERGAN) tablet 12.5 mg, 12.5 mg, Oral, Q6H PRN **OR** ondansetron (ZOFRAN) injection 4 mg, 4 mg, Intravenous, Q6H PRN  polyethylene glycol (GLYCOLAX) packet 17 g, 17 g, Oral, Daily PRN  acetaminophen (TYLENOL) tablet 650 mg, 650 mg, Oral, Q6H PRN **OR** acetaminophen (TYLENOL) suppository 650 mg, 650 mg, Rectal, Q6H PRN  pantoprazole (PROTONIX) tablet 40 mg, 40 mg, Oral, QAM AC  insulin aspart (NOVOLOG) injection pen 0-12 Units PATIENT SUPPLIED, 0-12 Units, Subcutaneous, TID AC  insulin aspart (NOVOLOG) injection pen 0-6 Units PATIENT SUPPLIED, 0-6 Units, Subcutaneous, Nightly  insulin detemir (LEVEMIR) injection pen 20 Units, 20 Units, Subcutaneous, Nightly    Allergies:   Zetia [ezetimibe], Quinolones, Humalog [insulin lispro], Lisinopril, Lantus [insulin glargine], and Victoza [liraglutide]    Social History:    TOBACCO:   reports that she has never smoked. She has never used smokeless tobacco.  ETOH:   reports no history of alcohol use.   DRUGS:   reports no history of drug use.    Family History:       Problem Relation Age of Onset    Coronary Art Dis Mother     High Blood Pressure Mother     Heart Disease Mother     Diabetes Mother     Cancer Father     Coronary Art Dis Father     High Blood Pressure Father     Heart Disease Father     Diabetes Father        REVIEW OF SYSTEMS:    CONSTITUTIONAL:  negative  RESPIRATORY:  negative  CARDIOVASCULAR:  negative  GASTROINTESTINAL:  negative  MUSCULOSKELETAL:  negative  NEUROLOGICAL:  negative    PHYSICAL EXAM:      Vitals:    BP (!) 155/82   Pulse 71   Temp 97.8 °F (36.6 °C) (Oral)   Resp 18   Ht 5' 9\" (1.753 m)   Wt 230 lb 4.8 oz (104.5 kg)   LMP 05/12/2021 (Approximate)   SpO2 96%   BMI 34.01 kg/m²     LABS:   Recent Labs     06/01/21  0454 06/02/21  0416   WBC 8.9 9.3   HGB 10.5* 10.5*   HCT 31.7* 31.8*    389     Recent Labs     06/01/21  0454 06/02/21  0416   * 133*   K 3.9 3.7   CL 86* 86*   CO2 26 24   PHOS 6.9*  --    BUN 63* 63*   CREATININE 13.2* 13.2*     Recent Labs     05/31/21  1247   PROT 8.1       VASCULAR: DP and PT pulses are nonpalpable b/l. CFT is sluggish to the digits of the foot b/l. No pain with calf compression b/l. NEUROLOGIC: Gross and epicritic sensation is diminished b/l    DERMATOLOGIC: Incision site noted to the distal aspect of the 1st through 3rd metatarsals on left foot. Skin edges are well-coapted and all sutures are intact. Necrotic changes are appreciated with evidence of ischemic/deep tissue injury to the plantar 5th metatarsal head. Incision is dry with no active drainage noted. No erythema noted. MUSCULOSKELETAL: Muscle strength is 5/5 for all pedal groups tested. Amputation of toes 1-3 noted to the left. IMPRESSION/RECOMMENDATIONS:    1. Gangrene of left foot - s/p amputation of toes 1 and 2 on the left (5/26/21)  2. Peripheral arterial disease - s/p left popliteal, anterior tibial, and dorsalis pedis angioplasty by Dr. Shawnee Gillespie on 5/27/21  3.  Diabetes mellitus with peripheral neuropathy  4. End stage renal disease on daily PD    - Patient was seen and examined at bedside. Continued (expected) ischemic/necrotic changes appreciated to the incision site  - No indication for imaging at this time  - No signs of infection noted. Site is dry. No need for antibiotics  - Will continue to allow necrotic changes to demarcate to the surgical site. If they progress, patient may require outpatient revision. If they remain stable, will likely be able to treat with wound care. - Dressing placed consisting of betadine paint, adaptic, gauze, kerlix, and ace. Dressing is to remain clean, dry, and intact  - She will follow up in my office in one week for her second post-operative appointment  - WBAT in a surgical shoe    DISPO: Amputation site is stable with some expected necrotic changes. Ambulating in a surgical shoe. Dressing is to stay clean, dry, and intact. She will follow up in my office within one week of discharge. - Will sign off on the patient. If any other pedal problems occur please contact me. Thank you for the opportunity to take part in the patient's care.      Jan Lucia DPM  [phone]

## 2021-06-02 NOTE — PROGRESS NOTES
CCPD Order   Exchanges: 5   Exchange Volume: 2500 ml   Total Time: 10 hrs   Dextrose: 2.5%   Last Fill: 0 ml   Total Volume: 54484 ml    Emergency disconnect instructions reviewed with primary nurse, instructions hung on pt door     Orders verified. Supplies taken to pt's room. Report received. Cycler set up, primed and pre tested. Dressing changed on [facility] Cath site. Pt connected to cycler. CCPD initiated without problem. Initial effluent clear.

## 2021-06-02 NOTE — CONSULTS
[address] [facility]  [phone]      Chief Complaint   Patient presents with    Loss of Consciousness     last week had syncopal episode, cont to feel faint, dizzy while walking. History of Present Illness:  Taryn Earl is a 51 y.o. patient who presented to the hospital with complaints of dizziness and lightheadedness. Questionable episode of syncope. She has severe DM complicated by ESRD and PAD with amputations. Patient has recently been undergoing evaluation for possible renal transplant has had stress test performed at [facility] which was abnormal.  Patient is scheduled for outpatient left heart cath on 6/3/2021 . No CP/SOB. +CAD in family. No smoking history I have been asked to provide consultation regarding further management and testing. Past Medical History:   has a past medical history of Diabetes mellitus ([facility] End stage kidney disease ([facility] Hypertension, Neuropathy, and Peripheral vascular disease ([facility] Surgical History:   has a past surgical history that includes Tonsillectomy;  section; other surgical history (2018); Toe amputation (Left, 2021); and Toe amputation (Left, 2021). Social History:   reports that she has never smoked. She has never used smokeless tobacco. She reports that she does not drink alcohol and does not use drugs. Family History:  family history includes Cancer in her father; Coronary Art Dis in her father and mother; Diabetes in her father and mother; Heart Disease in her father and mother; High Blood Pressure in her father and mother. Home Medications:  Were reviewed and are listed in nursing record. and/or listed below  Prior to Admission medications    Medication Sig Start Date End Date Taking?  Authorizing Provider   fluticasone (FLONASE) 50 MCG/ACT nasal spray 1 spray by Each Nostril route daily as needed for Rhinitis   Yes Historical Provider, MD   insulin detemir (LEVEMIR FLEXTOUCH) 100 UNIT/ML injection pen Inject 20 Units into the skin nightly   Yes Historical Provider, MD   HYDROcodone-acetaminophen (NORCO) 5-325 MG per tablet Take 1 tablet by mouth every 6 hours as needed for Pain for up to 7 days. Intended supply: 7 days. Take lowest dose possible to manage pain 5/26/21 6/2/21 Yes Lela Dee DPM   promethazine (PHENERGAN) 25 MG tablet Take 1 tablet by mouth every 6 hours as needed for Nausea WARNING:  May cause drowsiness. May impair ability to operate vehicles or machinery. Do not use in combination with alcohol.  5/26/21 6/2/21 Yes Lela Dee DPM   NIFEdipine (PROCARDIA XL) 60 MG extended release tablet Take 1 tablet by mouth nightly Take 60 mg by mouth nightly 5/24/21  Yes RICHARD Babb - CNP   atorvastatin (LIPITOR) 20 MG tablet Take 1 tablet by mouth nightly 4/28/21  Yes Tory Vargas MD   VELPHORO 500 MG CHEW CHEW TWO TABLETS BY MOUTH THREE TIMES A DAY WITH MEALS 4/17/21  Yes Archana Fisher MD   insulin aspart (NOVOLOG FLEXPEN) 100 UNIT/ML injection pen Inject 10 Units into the skin 3 times daily (before meals) 2/18/21  Yes Tory Vargas MD   aspirin 81 MG EC tablet Take 1 tablet by mouth daily 2/12/21  Yes Juliet Leal PA-C   metoprolol tartrate (LOPRESSOR) 50 MG tablet Take 1 tablet by mouth 2 times daily 2/12/21  Yes Juliet Leal PA-C   torsemide (DEMADEX) 100 MG tablet Take 2 tablets by mouth daily 2/12/21  Yes Juliet Leal PA-C   clopidogrel (PLAVIX) 75 MG tablet Take 1 tablet by mouth daily 2/12/21  Yes Juliet Leal PA-C   calcium acetate 667 MG TABS Take 667 mg by mouth 3 times daily (with meals)   Yes Historical Provider, MD   Continuous Blood Gluc Sensor (FREESTYLE KOREY 2 SENSOR) MISC Change every 14 days 5/11/21   Ting Hess MD   Continuous Blood Gluc  (FREESTYLE KOREY 2 READER) JOHNNIE Use to check glucose 5/11/21   Ting Hess MD   Continuous Blood Gluc Sensor (FREESTYLE KOREY 14 DAY SENSOR) MISC Check glucose ---change 100 mL/hr Intravenous PRN Eddie Davis MD        sodium chloride flush 0.9 % injection 5-40 mL  5-40 mL Intravenous 2 times per day Eddie Davis MD   10 mL at 06/02/21 0811    sodium chloride flush 0.9 % injection 5-40 mL  5-40 mL Intravenous PRN Eddie Davis MD        0.9 % sodium chloride infusion  25 mL Intravenous PRN Eddie Davis MD        heparin (porcine) injection 5,000 Units  5,000 Units Subcutaneous 3 times per day Eddie Davis MD   5,000 Units at 06/02/21 0658    promethazine (PHENERGAN) tablet 12.5 mg  12.5 mg Oral Q6H PRN Eddie Davis MD        Or    ondansetron (ZOFRAN) injection 4 mg  4 mg Intravenous Q6H PRN Eddie Davis MD        polyethylene glycol (GLYCOLAX) packet 17 g  17 g Oral Daily PRN Eddie Davis MD        acetaminophen (TYLENOL) tablet 650 mg  650 mg Oral Q6H PRN Eddie Davis MD   650 mg at 06/02/21 1254    Or    acetaminophen (TYLENOL) suppository 650 mg  650 mg Rectal Q6H PRN Eddie Davis MD        pantoprazole (PROTONIX) tablet 40 mg  40 mg Oral QAM AC Adali Coughlin MD   40 mg at 06/02/21 1004    insulin aspart (NOVOLOG) injection pen 0-12 Units PATIENT SUPPLIED  0-12 Units Subcutaneous TID AC Eddie Davis MD   4 Units at 06/02/21 1300    insulin aspart (NOVOLOG) injection pen 0-6 Units PATIENT SUPPLIED  0-6 Units Subcutaneous Nightly Eddie Davis MD   1 Units at 06/01/21 2047        Allergies:  Zetia [ezetimibe], Quinolones, Humalog [insulin lispro], Lisinopril, Lantus [insulin glargine], and Victoza [liraglutide]     Review of Systems:     · Constitutional: there has been no unanticipated weight loss. There's been no change in energy level, sleep pattern, or activity level. · Eyes: No visual changes or diplopia. No scleral icterus. · ENT: No Headaches, hearing loss or vertigo. No mouth sores or sore throat. · Cardiovascular: Reviewed in HPI  · Respiratory: No cough or wheezing, no sputum production. No hematemesis.     · Gastrointestinal: No abdominal pain, appetite loss, blood in stools. No change in bowel or bladder habits. · Genitourinary: No dysuria, trouble voiding, or hematuria. · Musculoskeletal:  No gait disturbance, weakness or joint complaints. · Integumentary: No rash or pruritis. · Neurological: No headache, diplopia, change in muscle strength, numbness or tingling. No change in gait, balance, coordination, mood, affect, memory, mentation, behavior. · Psychiatric: No anxiety, no depression. · Endocrine: No malaise, fatigue or temperature intolerance. No excessive thirst, fluid intake, or urination. No tremor. · Hematologic/Lymphatic: No abnormal bruising or bleeding, blood clots or swollen lymph nodes. · Allergic/Immunologic: No nasal congestion or hives.   ·     Physical Examination:    Vitals:    06/02/21 1200   BP: (!) 164/54   Pulse: 67   Resp: 18   Temp: 98.2 °F (36.8 °C)   SpO2: 97%    Weight: 230 lb 4.8 oz (104.5 kg)         General Appearance:  Alert, cooperative, no distress, appears stated age   Head:  Normocephalic, without obvious abnormality, atraumatic   Eyes:  PERRL, conjunctiva/corneas clear       Nose: Nares normal, no drainage or sinus tenderness   Throat: Lips, mucosa, and tongue normal   Neck: Supple, symmetrical, trachea midline, no adenopathy, thyroid: not enlarged, symmetric, no tenderness/mass/nodules, no carotid bruit or JVD       Lungs:   Clear to auscultation bilaterally, respirations unlabored   Chest Wall:  No tenderness or deformity   Heart:  Regular rate and rhythm, S1, S2 normal, no murmur, rub or gallop   Abdomen:   Soft, non-tender, bowel sounds active all four quadrants,  no masses, no organomegaly           Extremities: Extremities normal, atraumatic, no cyanosis or edema   Pulses: 2+ and symmetric   Skin: Skin color, texture, turgor normal, no rashes or lesions   Pysch: Normal mood and affect   Neurologic: Normal gross motor and sensory exam.         Labs  CBC:   Lab Results   Component Value Date    WBC 9.3 06/02/2021    RBC 3.49 06/02/2021    HGB 10.5 06/02/2021    HCT 31.8 06/02/2021    MCV 91.1 06/02/2021    RDW 14.1 06/02/2021     06/02/2021     CMP:    Lab Results   Component Value Date     06/02/2021    K 3.7 06/02/2021    K 4.2 05/19/2021    CL 86 06/02/2021    CO2 24 06/02/2021    BUN 63 06/02/2021    CREATININE 13.2 06/02/2021    GFRAA 4 06/02/2021    GFRAA >60 04/09/2013    AGRATIO 0.7 05/31/2021    LABGLOM 3 06/02/2021    GLUCOSE 253 06/02/2021    PROT 8.1 05/31/2021    PROT 7.1 12/13/2012    CALCIUM 8.8 06/02/2021    BILITOT <0.2 05/31/2021    ALKPHOS 120 05/31/2021    AST 9 05/31/2021    ALT <5 05/31/2021     PT/INR:  No results found for: PTINR  Lab Results   Component Value Date    CKTOTAL 45 12/19/2013    TROPONINI 0.17 (H) 05/31/2021       EKG:  I have reviewed EKG with the following interpretation:  Impression:  Sinus rhythm with occasional Premature ventricular complexesST & T wave abnormality, consider inferolateral ischemiaProlonged     Pt has CAD with following history:  CABG: (date/details),   Valve replacement (date/details)   GXT/Myoview/Lexiscan: (date)  (results)   IMPRESSION:       1. Mild small basal anterior ischemia.       2. Moderate global hypokinesis with ejection fraction of 43% at rest.       3. Findings indicate high cardiac risk. Stress/echo: (date) (result)   CATH/PCI:  (date)- (results)  - (# and type of stents) -   ECHO: (date) results EF    %.     - Left ventricle: The cavity size is mildly dilated. Wall thickness is normal. Systolic   function was    normal. The estimated ejection fraction was in the range of 55% to 60%. Wall motion was   normal;    there were no regional wall motion abnormalities. Features are consistent with a pseudonormal   left    ventricular filling pattern, with concomitant abnormal relaxation and increased filling   pressure    (grade 2 diastolic dysfunction). - Aortic valve: Mild calcification.  Mild thickening.  - Mitral valve: Mildly calcified annulus. Mild thickening.  - Right ventricle: Systolic function was normal by objective interpretation. ANA (date) (results)  EP procedures/studies/ablation:  (specific data). Device information:  Event monitor: (date/results)    All testing and labs listed below were personally reviewed. Assessment  Patient Active Problem List   Diagnosis    Diabetes mellitus ([facility]    Obesity    Microalbuminuria    Hyperlipidemia with target LDL less than 100    Hypertension    Diabetes mellitus type 2 in obese ([facility]    Acute renal failure (ARF) ([facility]    [facility]    Non-smoker    Elevated C-reactive protein (CRP)    Elevated sed rate    Diabetic polyneuropathy associated with type 2 diabetes mellitus (HCC)    Weight loss counseling, encounter for    Atherosclerosis of native arteries of left leg with ulceration of other part of foot ([facility]    Syncope and collapse         Plan:    I had the opportunity to review the clinical symptoms and presentation of Jung Gonzalez. Assessment/Plan:  Active Problems:    Syncope and collapse  Plan: unknown cause. Possibly vasovagal syncope. Needs ischemic evaluation for Renal Transplant. Recent positive stress test. Based on these findings I recommend left heart cath for definitive evaluation of coronary arteries. Risks, benefits, expectations, and alternative treatments were discussed. Questions appropriately answered. Jung Gonzalez agrees to proceed and verbalized understanding. I will address the patient's cardiac risk factors and adjusted pharmacologic treatment as needed. In addition, I have reinforced the need for patient directed risk factor modification. Tobacco use was discussed with the patient and educated on the negative effects. I have asked the patient to not utilize these agents. Thank you for allowing to us to participate in the care or Jung Gonzalez.  Further evaluation will be based upon the patient's clinical course and testing results. All questions and concerns were addressed to the patient/family. Alternatives to my treatment were discussed. The note was completed using EMR. Every effort was made to ensure accuracy; however, inadvertent computerized transcription errors may be present.     Andreia Rashid MD, MD 6/2/2021 2:37 PM

## 2021-06-02 NOTE — PRE SEDATION
Daily Juju Calhoun MD   200 mg at 06/02/21 1003    glucose (GLUTOSE) 40 % oral gel 15 g  15 g Oral PRN Juju Calhoun MD        dextrose 50 % IV solution  12.5 g Intravenous PRN Juju Calhoun MD        glucagon (rDNA) injection 1 mg  1 mg Intramuscular PRN Juju Calhoun MD        dextrose 5 % solution  100 mL/hr Intravenous PRN Juju Calhoun MD        sodium chloride flush 0.9 % injection 5-40 mL  5-40 mL Intravenous 2 times per day Juju Calhoun MD   10 mL at 06/02/21 0811    sodium chloride flush 0.9 % injection 5-40 mL  5-40 mL Intravenous PRN Juju Calhoun MD        0.9 % sodium chloride infusion  25 mL Intravenous PRN Juju Calhoun MD        heparin (porcine) injection 5,000 Units  5,000 Units Subcutaneous 3 times per day Juju Calhoun MD   5,000 Units at 06/02/21 0658    promethazine (PHENERGAN) tablet 12.5 mg  12.5 mg Oral Q6H PRN Juju Calhoun MD        Or    ondansetron (ZOFRAN) injection 4 mg  4 mg Intravenous Q6H PRN Juju Calhoun MD        polyethylene glycol (GLYCOLAX) packet 17 g  17 g Oral Daily PRN Juju Calhoun MD        acetaminophen (TYLENOL) tablet 650 mg  650 mg Oral Q6H PRN Juju Calhoun MD   650 mg at 06/02/21 1254    Or    acetaminophen (TYLENOL) suppository 650 mg  650 mg Rectal Q6H PRN Juju Calhoun MD        pantoprazole (PROTONIX) tablet 40 mg  40 mg Oral QAM AC Adali Coughlin MD   40 mg at 06/02/21 1004    insulin aspart (NOVOLOG) injection pen 0-12 Units PATIENT SUPPLIED  0-12 Units Subcutaneous TID AC Juju Calhoun MD   4 Units at 06/02/21 1300    insulin aspart (NOVOLOG) injection pen 0-6 Units PATIENT SUPPLIED  0-6 Units Subcutaneous Nightly Juju Calhoun MD   1 Units at 06/01/21 2047           Pre-Sedation:    Pre-Sedation Documentation and Exam:  I have assessed the patient and agree with the H&P present on the chart.     Prior History of Anesthesia Complications:   none    Modified Mallampati:  II (soft palate, uvula, fauces visible)    ASA Classification:  Class 2 - A normal healthy patient with mild systemic disease      Ngoc Scale: Activity:  2 - Able to move 4 extremities voluntarily on command  Respiration:  2 - Able to breathe deeply and cough freely  Circulation:  2 - BP+/- 20mmHg of normal  Consciousness:  2 - Fully awake  Oxygen Saturation (color):  2 - Able to maintain oxygen saturation >92% on room air    Sedation/Anesthesia Plan:  Guard the patient's safety and welfare. Minimize physical discomfort and pain. Minimize negative psychological responses to treatment by providing sedation and analgesia and maximize the potential amnesia. Patient to meet pre-procedure discharge plan.     Medication Planned:  midazolam intravenously and fentanyl intravenously    Patient is an appropriate candidate for plan of sedation: yes      Electronically signed by Andreia Rashid MD on 6/2/2021 at 2:51 PM

## 2021-06-02 NOTE — PROGRESS NOTES
Pharmacy Medication History Note      List of current medications patient is taking is complete. Source of information: Christopher Short 22 made to medication list:  Medications flagged for removal (include reason, ex. noncompliance):  none    Medications removed (include reason, ex. therapy complete or physician discontinued): Ondansetron- therapy completed  Promethazine- duplicate    Medications added/doses adjusted:  none    Other notes (ex. Recent course of antibiotics, Coumadin dosing):  Denies use of other OTC or herbal medications. Last dose times updated. Hayden Patel PharmD  ED Pharmacist [id number]  6/2/2021    No current facility-administered medications on file prior to encounter. Current Outpatient Medications on File Prior to Encounter   Medication Sig Dispense Refill    fluticasone (FLONASE) 50 MCG/ACT nasal spray 1 spray by Each Nostril route daily as needed for Rhinitis      insulin detemir (LEVEMIR FLEXTOUCH) 100 UNIT/ML injection pen Inject 20 Units into the skin nightly      HYDROcodone-acetaminophen (NORCO) 5-325 MG per tablet Take 1 tablet by mouth every 6 hours as needed for Pain for up to 7 days. Intended supply: 7 days. Take lowest dose possible to manage pain 28 tablet 0    promethazine (PHENERGAN) 25 MG tablet Take 1 tablet by mouth every 6 hours as needed for Nausea WARNING:  May cause drowsiness. May impair ability to operate vehicles or machinery. Do not use in combination with alcohol.  20 tablet 0    NIFEdipine (PROCARDIA XL) 60 MG extended release tablet Take 1 tablet by mouth nightly Take 60 mg by mouth nightly 30 tablet 0    atorvastatin (LIPITOR) 20 MG tablet Take 1 tablet by mouth nightly 90 tablet 1    VELPHORO 500 MG CHEW CHEW TWO TABLETS BY MOUTH THREE TIMES A DAY WITH MEALS 180 tablet 0    insulin aspart (NOVOLOG FLEXPEN) 100 UNIT/ML injection pen Inject 10 Units into the skin 3 times daily (before meals) 3 pen 4    aspirin 81 MG EC tablet Take 1 tablet by mouth daily 30 tablet 1    metoprolol tartrate (LOPRESSOR) 50 MG tablet Take 1 tablet by mouth 2 times daily 60 tablet 1    torsemide (DEMADEX) 100 MG tablet Take 2 tablets by mouth daily 60 tablet 1    clopidogrel (PLAVIX) 75 MG tablet Take 1 tablet by mouth daily 30 tablet 1    calcium acetate 667 MG TABS Take 667 mg by mouth 3 times daily (with meals)      [DISCONTINUED] promethazine (PHENERGAN) 25 MG tablet Take 1 tablet by mouth every 6 hours as needed for Nausea 28 tablet 0    [DISCONTINUED] insulin detemir (LEVEMIR FLEXTOUCH) 100 UNIT/ML injection pen INJECT UNDER THE SKIN 16 EVERY NIGHT AT BEDTIME 5 pen 1    Continuous Blood Gluc Sensor (FREESTYLE KOREY 2 SENSOR) MISC Change every 14 days 1 each 0    Continuous Blood Gluc  (FREESTYLE KOREY 2 READER) JOHNNIE Use to check glucose 1 each 0    Continuous Blood Gluc Sensor (FREESTYLE KOREY 14 DAY SENSOR) MISC Check glucose ---change every 14 days 2 each 3    Continuous Blood Gluc  (FREESTYLE KOREY 14 DAY READER) JOHNNIE To test glucose 1 Device 0    Insulin Pen Needle 32G X 6 MM MISC Use with insulin pens 4 times daily 200 each 5    [DISCONTINUED] Blood Glucose Monitoring Suppl (FREESTYLE LITE) JOHNNIE 1 Device by Does not apply route daily Insurance preferred device 1 Device 0    [DISCONTINUED] blood glucose test strips (FREESTYLE LITE) strip 1 each by In Vitro route 3 times daily (before meals) As needed. 200 each 11    [DISCONTINUED] omeprazole (PRILOSEC) 10 MG delayed release capsule Take 10 mg by mouth daily      [DISCONTINUED] FREESTYLE LANCETS MISC Check blood sugars 4 times daily 120 each 8    [DISCONTINUED] vitamin D (CHOLECALCIFEROL) 1000 units TABS tablet Take 5,000 Units by mouth daily       [DISCONTINUED] fluticasone (FLONASE) 50 MCG/ACT nasal spray 2 sprays by Nasal route daily 1 Bottle 10    [DISCONTINUED] ondansetron (ZOFRAN) 8 MG tablet Take 1 tablet by mouth every 12 hours as needed for Nausea.  10 tablet 1    [DISCONTINUED] Multiple

## 2021-06-02 NOTE — OP NOTE
Patient:  Mojgan Beavers   :   1975    Procedural Summary  ~Consent:   Obtained written and verbal consent      Risks/benefits explained in detail  ~Procedure:    Left Heart Catheterization  ~Medications:    Procedural sedation with minimal conscious sedation  ~Complications:   None  ~Blood Loss:    <10cc  ~Specimens:    None obtained  ~Pre-sedation re-evaluation: Performed immediately prior to procedure. Medication and Procedural Reconciliation:  An independent trained observer pushed medications at my direction. We monitored the patient's level of consciousness and vital signs/physiologic status throughout the procedure duration (see start and stop times below). Sedation: 4 mg Versed, 150 mcg Fentanyl  Sedation start: 1553  Sedation stop: 1613    Cardiac Cath PCI:  Anatomy:   LM-nml   LAD-ostial 90%  Cx-ostial 80%  OM- nml  RCA-prox 20%  RPDA- prox 90%  LVEF- 50%  LVG- nml  LVEDP- 0    Contrast: 91  Flouro Time: 1.9  Access: R CFA. Ultrasound guidance used to determine aforementioned artery patency, size (>2mm), anatomic variations and ideal puncture location. Real-time ultrasound utilized concurrent with vascular needle entry into the artery. Image(s) permanently recorded and reported in the patient chart. Perc stick safe zone    Impression  ~Coronary Angiography w/ severe MVD  ~LVG with LVEF of 50 and no regional wall motion abnormalities        Recommendation  ~Aggressive medical treatment and risk factor modification  ~Recommend CABG. Needs plavix to be held. Will discuss with CT surgery and Vascular.           Lana Williamson MD, MD 2021 4:19 PM

## 2021-06-02 NOTE — CONSULTS
Cardiothoracic Surgery Consultation           PCP: Adalberto Waldron MD    Referring Physician: Dr. Kelvin Hill    DIAGNOSIS:  Severe multivessel coronary artery disease    CHIEF COMPLAINT:  syncope, dizziness    History Obtained From:  patient, electronic medical record    HISTORY OF PRESENT ILLNESS:      The patient is a 51 y.o. female with significant past medical history of ESRD (PD nightly X 2 yrs), uncontrolled DM2, HTN, HLD, peripheral neuropathy, PAD (toe amputations) who presents to the ER on 5/31 with lightheadedness, dizziness, and syncopal episode. Patient had a similar syncopal episode a few weeks prior. Only lost consciousness a few seconds. She reports in retrospect that the pre-syncopal episodes were associated with chest pressure, but not pain. Left heart angiogram personally reviewed found severe multivessel coronary artery disease with good-sized distal targets. CVTS was consulted for surgical evaluation for myocardial revascularization. Non contrast CT chest also personally reviewed shows circumferential ascending aortic calcifications. Patient was being worked up for kidney transplant and had an abnormal stress test 4/20/21 and was scheduled to have left heart angiogram at  (Dr. Esteban Hall) on 6/3/2021. Patient follows Dr. Paris Blackman for PAD and had left popliteal angioplasty and distal anterior tibial/dorsalis pedis angioplasty on 5/27/21. She recently underwent amputation of left toes 1 and 2 for gangrene by Dr. Buddy Butler on 5/26/21. The third toe on her left foot was amputated back in 2/2021. No signs of infection noted by Dr. Buddy Butler on 6/2/2021.      Patient is currently  hemodynamically stable and denying any complaints of chest pain or shortness of breath. Volodymyr has a family history of coronary heart disease. Patient is a non smoker. Patient has not been vaccinated for covid with the last dose more than 14 days ago. Last dose of plavix on 6/2/2021.      Past Medical History: FLEXPEN) 100 UNIT/ML injection pen Inject 10 Units into the skin 3 times daily (before meals) 2/18/21  Yes Janace Epley, MD   aspirin 81 MG EC tablet Take 1 tablet by mouth daily 2/12/21  Yes Bria Bean PA-C   metoprolol tartrate (LOPRESSOR) 50 MG tablet Take 1 tablet by mouth 2 times daily 2/12/21  Yes Bria Bean PA-C   torsemide (DEMADEX) 100 MG tablet Take 2 tablets by mouth daily 2/12/21  Yes Bria Bean PA-C   clopidogrel (PLAVIX) 75 MG tablet Take 1 tablet by mouth daily 2/12/21  Yes Bria Bean PA-C   calcium acetate 667 MG TABS Take 667 mg by mouth 3 times daily (with meals)   Yes Genesis Rosas MD   Continuous Blood Gluc Sensor (FREESTYLE KOREY 2 SENSOR) MISC Change every 14 days 5/11/21   Melissa Ontiveros MD   Continuous Blood Gluc  (FREESTYLE KOREY 2 READER) JOHNNIE Use to check glucose 5/11/21   Melissa Ontiveros MD   Continuous Blood Gluc Sensor (FREESTYLE KOREY 14 DAY SENSOR) MISC Check glucose ---change every 14 days 5/10/21   Melissa Ontiveros MD   Continuous Blood Gluc  (FREESTYLE KOREY 14 DAY READER) JOHNNIE To test glucose 5/10/21   Melissa Ontiveros MD   Insulin Pen Needle 32G X 6 MM MISC Use with insulin pens 4 times daily 2/18/21   Janace Epley, MD   Multiple Vitamins-Minerals (THERAPEUTIC MULTIVITAMIN-MINERALS) tablet Take 1 tablet by mouth daily.  1/30/14 11/13/14  Janace Epley, MD      Scheduled Meds:    insulin detemir  24 Units Subcutaneous Nightly    [START ON 6/3/2021] NIFEdipine  30 mg Oral Nightly    sodium chloride flush  5-40 mL Intravenous 2 times per day    dianeal lo-massimo 2.5%  3,000 mL Intraperitoneal 5x Daily    gentamicin   Topical Daily    sevelamer  800 mg Oral TID WC    aspirin  81 mg Oral Daily    atorvastatin  20 mg Oral Nightly    clopidogrel  75 mg Oral Daily    metoprolol tartrate  50 mg Oral BID    torsemide  200 mg Oral Daily    sodium chloride flush  5-40 mL Intravenous 2 times per day    heparin (porcine)  5,000 Units Subcutaneous 3 times per day    pantoprazole  40 mg Oral QAM AC    insulin aspart  0-12 Units Subcutaneous TID AC    insulin aspart  0-6 Units Subcutaneous Nightly     Continuous Infusions:    sodium chloride      dextrose      sodium chloride         Current Facility-Administered Medications   Medication Dose Route Frequency Provider Last Rate Last Admin    insulin detemir (LEVEMIR) injection pen 24 Units  24 Units Subcutaneous Nightly Sarbjit BARNHART MD   24 Units at 06/02/21 1949    [START ON 6/3/2021] NIFEdipine (PROCARDIA XL) extended release tablet 30 mg  30 mg Oral Nightly Travon Garcia MD        sodium chloride flush 0.9 % injection 5-40 mL  5-40 mL Intravenous 2 times per day Nimisha Patel MD   10 mL at 06/02/21 1951    sodium chloride flush 0.9 % injection 5-40 mL  5-40 mL Intravenous PRN Nimisha Patel MD        0.9 % sodium chloride infusion  25 mL Intravenous PRN Nimisha Patel MD        acetaminophen (TYLENOL) tablet 650 mg  650 mg Oral Q4H PRN Nimisha Patel MD        oxyCODONE-acetaminophen (PERCOCET) 5-325 MG per tablet 1 tablet  1 tablet Oral Q4H PRN Nimisha Patel MD        Or    oxyCODONE-acetaminophen (PERCOCET) 5-325 MG per tablet 2 tablet  2 tablet Oral Q4H PRN Nimisha Patel MD        dianeal lo-massimo 2.5% 3,000 mL  3,000 mL Intraperitoneal 5x Daily Travon Garcia MD   3,000 mL at 06/02/21 1800    gentamicin (GARAMYCIN) 0.1 % cream   Topical Daily Travon Garcia MD   Given at 06/02/21 1800    sevelamer (RENVELA) tablet 800 mg  800 mg Oral TID WC Travon Garcia MD   800 mg at 06/01/21 1714    hydrALAZINE (APRESOLINE) injection 10 mg  10 mg Intravenous Q4H PRN Carrie Cordero MD   10 mg at 06/01/21 1625    aspirin EC tablet 81 mg  81 mg Oral Daily Adali Coughlin MD   81 mg at 06/02/21 1003    atorvastatin (LIPITOR) tablet 20 mg  20 mg Oral Nightly Carrie Cordero MD   20 mg at 06/02/21 1950    clopidogrel (PLAVIX) tablet 75 mg  75 mg Oral Daily Carrie Cordero MD   75 mg at 06/02/21 1003    metoprolol tartrate (LOPRESSOR) tablet 50 mg  50 mg Oral BID Pham Reyna MD   50 mg at 06/02/21 1950    torsemide (DEMADEX) tablet 200 mg  200 mg Oral Daily Pham Reyna MD   200 mg at 06/02/21 1003    glucose (GLUTOSE) 40 % oral gel 15 g  15 g Oral PRN Pham Reyna MD        dextrose 50 % IV solution  12.5 g Intravenous PRN Pham Reyna MD        glucagon (rDNA) injection 1 mg  1 mg Intramuscular PRN Pham Reyna MD        dextrose 5 % solution  100 mL/hr Intravenous PRN Pham Reyna MD        sodium chloride flush 0.9 % injection 5-40 mL  5-40 mL Intravenous 2 times per day Pham Reyna MD   10 mL at 06/02/21 0811    sodium chloride flush 0.9 % injection 5-40 mL  5-40 mL Intravenous PRN Pham Reyna MD        0.9 % sodium chloride infusion  25 mL Intravenous PRN Pham Reyna MD        heparin (porcine) injection 5,000 Units  5,000 Units Subcutaneous 3 times per day Pham Reyna MD   5,000 Units at 06/02/21 0658    promethazine (PHENERGAN) tablet 12.5 mg  12.5 mg Oral Q6H PRN Pham Reyna MD        Or    ondansetron (ZOFRAN) injection 4 mg  4 mg Intravenous Q6H PRN Pham Reyna MD        polyethylene glycol (GLYCOLAX) packet 17 g  17 g Oral Daily PRN Pham Reyna MD        acetaminophen (TYLENOL) tablet 650 mg  650 mg Oral Q6H PRN Pham Reyna MD   650 mg at 06/02/21 1254    Or    acetaminophen (TYLENOL) suppository 650 mg  650 mg Rectal Q6H PRN Pham Reyna MD        pantoprazole (PROTONIX) tablet 40 mg  40 mg Oral QAM AC Adali Coughlin MD   40 mg at 06/02/21 1004    insulin aspart (NOVOLOG) injection pen 0-12 Units PATIENT SUPPLIED  0-12 Units Subcutaneous TID AC Pham Reyna MD   2 Units at 06/02/21 1808    insulin aspart (NOVOLOG) injection pen 0-6 Units PATIENT SUPPLIED  0-6 Units Subcutaneous Nightly Pham Reyna MD   1 Units at 06/02/21 1951       Allergies:  Zetia [ezetimibe], Quinolones, Humalog [insulin lispro], Lisinopril, Lantus [insulin glargine], and and rhythm, S1, S2 normal, no murmur, click, rub or gallop    Pulses:     carotid brachial radial femoral popliteal DP PT   RIGHT 2+ 2+ 2+ 2+ 1+ 1+ 1+   LEFT 2+ 2+ 2+ 2+ 1+ DP dp     Abdomen:  Soft, normal bowel sounds, non-tender, no hepatosplenomegaly, aorta likely normal and bruits absent    Musculoskeletal:  Back is straight and non-tender,  No CVAT, full ROM of upper and lower extremities. Extremities:   + BLE varicose veins (worse left), no clubbing, or cyanosis, or edema. Left foot with amputation of toes 1, 2, 3. Ace wrapped, no drainage.       Skin: warm and normal turgor, no ulcers, infections, or rashes, no rashes, no ecchymoses, no petechiae, no nodules, no jaundice, no purpura    Neurological: awake, alert and oriented x 3, motor 5/5 bilateral upper and lower extremities, sensation grossly intact    Psychiatric: Mood and affect appear appropriate    LABS:  BMP:   Lab Results   Component Value Date     06/02/2021    K 3.7 06/02/2021    K 4.2 05/19/2021    CL 86 06/02/2021    CO2 24 06/02/2021    PHOS 6.9 06/01/2021    BUN 63 06/02/2021    CREATININE 13.2 06/02/2021      No components found for: GLU  Lab Results   Component Value Date    MG 2.20 05/31/2021      CBC:   Lab Results   Component Value Date    WBC 9.3 06/02/2021    HGB 10.5 06/02/2021    HCT 31.8 06/02/2021    MCV 91.1 06/02/2021     06/02/2021      Coagulation:   Lab Results   Component Value Date    INR 0.95 02/26/2021    APTT 31.2 02/08/2021     Cardiac markers:   Lab Results   Component Value Date    CKTOTAL 45 12/19/2013    TROPONINI 0.17 05/31/2021     HgbA1c:  Lab Results   Component Value Date    LABA1C 8.8 05/31/2021      Hepatic Profile:  No results found for: ALB    Lab Results   Component Value Date    BILITOT <0.2 05/31/2021    BILIDIR 0.1 03/06/2014    AST 9 05/31/2021    ALT <5 05/31/2021    ALKPHOS 120 05/31/2021      Cholesterol:  Lab Results   Component Value Date    CHOL 299 04/28/2017    HDL 39 04/28/2017 the left which may be due to congenital UPJ narrowing with no obvious stone or filling defect at the UPJ.  Recommend urological follow-up. -Mild constipation with no bowel obstruction. -1.7 cm umbilical hernia with no bowel loops in the hernia. Peritoneal dialysis catheter looped in the left pelvis.  -Unremarkable uterus with no pelvic mass or active inflammation.  -Hazy right basilar atelectasis and/or infiltrate and associated small right pleural effusion. Recommend follow-up with serial chest x-rays.  -Moderate coronary artery calcifications. CT HEAD WO CONTRAST: 5/19/2021  No acute intracranial pathology      MRI HEAD: 6/1/2021  1. No acute intracranial abnormality. 2. Mild chronic white matter microvascular ischemic changes. 3. Right maxillary sinusitis.     CXRAY:  5/31/2021  Cardiomegaly.  Pulmonary vasculature within normal limits.  Left lung clear with sharp costophrenic angle.  Stable right pleural effusion with right basilar airspace disease likely atelectasis     CAROTIDS: 6/3/2021  Impressions  Right Impression  The right internal carotid artery appears to have a <50% diameter reducing stenosis based on velocity criteria. The right vertebral artery demonstrates normal antegrade flow. The right subclavian artery is visualized and demonstrates multiphasic flow. Left Impression The left internal carotid artery appears to have a <50% diameter reducing stenosis based on velocity criteria. The left vertebral artery demonstrates normal antegrade flow. The left subclavian artery is visualized and demonstrates multiphasic flow. The left brachial pressure was not obtained due to IV placement .     [id number] Score for Atrial Fibrillation Stroke Risk   Risk   Factors  Component Value   C CHF No 0   H HTN Yes 1   A2 Age >= 75 No,  (54 y.o.) 0   D DM Yes 1   S2 Prior Stroke/TIA No 0   V Vascular Disease No 1   A Age 74-69 No,  (54 y.o.) 0   Sc Sex female 1    [id number]  Score  4   Score last updated 6/2/21 9:37 PM EDT    Click here for a link to the UpToDate guideline \"Atrial Fibrillation: Anticoagulation therapy to prevent embolization    Disclaimer: Risk Score calculation is dependent on accuracy of patient problem list and past encounter diagnosis. CTS Adult Cardiac Risk: CABG: Pending carotids, PFTs  Mortality Risk: 1.391%  Renal Failure: 0.971%  Permanent Stroke: 0.857%  Prolonged Ventilation: 8.165%  DSW Infection: 0.804%  Reoperation: 2.779%  Morbidity and Mortality: 12.823%  Short Length of Stay: 34.783%  Long Length of Stay: 4.793%      ASSESSMENT AND PLAN:    3V CAD, ACP, ESRD, DM, HTN, HLD, [id number] score 4    Best option CABG with NYA clip. I discussed the R/B/Alt/E of intra-op assessment for OPCAB vs on-pump no cross-clamp vs on-pump CABG with the patient vs PCI vs medicine vs doing nothing. She understands that with surgery, she has the above risks of death and major complication including, but not limited to: bleeding (with possible need for transfusion), infection, heart attack, heart block (with possible need for permanent pacemaker), stroke (with possible permanent neurologic deficit), kidney failure (with possible need for dialysis), respiratory insufficiency (with possible need for tracheostomy and / or prolonged ventilator support), intestinal ischemia, intestinal infarction, limb ischemia, limb loss, recurrence of diease and need for further surgery. We discussed the necessity of scars and the possibility of chronic pain at or associated with the surgical sites. All questions answered. Thank you Dr. Heike Leal for the consultation. Electronically signed by RICHARD Felix CNP on 6/2/2021 at 7:58 PM    Attending Supervising Physicians Attestation Statement  I saw and evaluated the patient. I discussed the findings and plans with nurse practitioner and agree as documented in her note except for where noted and changed.     Electronically signed by Avinash Cedeño MD on 6/4/21 at 9:29 AM EDT

## 2021-06-02 NOTE — PROGRESS NOTES
Hospitalist Progress Note      PCP: Messi Bond MD    Date of Admission: 5/31/2021    Chief Complaint: [facility] Course: 79-year-old female was past medical history of ESRD on peritoneal dialysis, hypertension, hyperlipidemia, diabetes, peripheral vascular disease status post left toe amputation last week presents with presyncopal episode. She also describes a syncopal episode couple of weeks ago. Patient has recently been undergoing evaluation for possible renal transplant has had stress test performed at [facility] which was abnormal.  Patient is scheduled for outpatient left heart cath on 6/3/2021 . Patient is labile blood pressures, but negative orthostatics. Nephrology is adjusting blood pressure medications. Cardiology evaluation pending. Subjective: Patient seen and examined at bedside. She denies any complaints at this time.       Medications:  Reviewed    Infusion Medications    dextrose      sodium chloride       Scheduled Medications    insulin detemir  24 Units Subcutaneous Nightly    [START ON 6/3/2021] NIFEdipine  30 mg Oral Nightly    dianeal lo-massimo 2.5%  3,000 mL Intraperitoneal 5x Daily    gentamicin   Topical Daily    sevelamer  800 mg Oral TID WC    aspirin  81 mg Oral Daily    atorvastatin  20 mg Oral Nightly    clopidogrel  75 mg Oral Daily    metoprolol tartrate  50 mg Oral BID    torsemide  200 mg Oral Daily    sodium chloride flush  5-40 mL Intravenous 2 times per day    heparin (porcine)  5,000 Units Subcutaneous 3 times per day    pantoprazole  40 mg Oral QAM AC    insulin aspart  0-12 Units Subcutaneous TID AC    insulin aspart  0-6 Units Subcutaneous Nightly     PRN Meds: hydrALAZINE, glucose, dextrose, glucagon (rDNA), dextrose, sodium chloride flush, sodium chloride, promethazine **OR** ondansetron, polyethylene glycol, acetaminophen **OR** acetaminophen      Intake/Output Summary (Last 24 hours) at 6/2/2021 1208  Last data filed at [facility] 6-9 05/16/2021    BACTERIA 1+ 02/28/2014    RBCUA 0-2 05/16/2021    BLOODU SMALL 05/16/2021    SPECGRAV 1.017 05/16/2021    GLUCOSEU 500 05/16/2021       Radiology:  MRI BRAIN WO CONTRAST   Final Result   1. No acute intracranial abnormality. 2. Mild chronic white matter microvascular ischemic changes. 3. Right maxillary sinusitis. XR CHEST PORTABLE   Final Result   Cardiomegaly with stable right pleural effusion                 Assessment/Plan:    Active Hospital Problems    Diagnosis     Syncope and collapse [R55]      Presyncope  Secondary to labile BP? Possibly orthostatic, however orthostatic vital signs negative here  Vasovagal?  Autonomic instability in view of diabetes?   Cardiology evaluation pending    ESRD on PD  Nephrology consulted    Hypertension  With labile BP  BP meds adjusting per nephrology    DVT Prophylaxis: Heparin  Diet: Diet NPO, After Midnight  Code Status: Full Code      Electronically signed by Jazz Murphy MD on 6/2/2021 at 12:08 PM

## 2021-06-02 NOTE — PROGRESS NOTES
Office : [phone]     Fax :[phone]       Nephrology progress  Note      Patient's Name: Nanci Fox  7:50 AM  2021    Reason for Consult:  ESRD      Requesting Physician:  Tete Henry MD      Chief Complaint:    Chief Complaint   Patient presents with    Loss of Consciousness     last week had syncopal episode, cont to feel faint, dizzy while walking. History of Present Ilness:    Nanci Fox is a 51 y.o. female with severe end-stage renal disease secondary to diabetic nephropathy on peritoneal dialysis, hypertension, peripheral vascular disease, diabetic neuropathy who was admitted after she had a syncopal episode. She had similar episode 2 weeks ago. Recently had angiogram left lower extremity for gangrene left foot second toe. Denies any shortness of breath. Denies any abdominal pain. Denies any nausea vomiting.     Interval HX :    Getting CCPD   Tolerating well   UF is 1300 ml     I/O last 3 completed shifts:  In: -   Out: 76 [Urine:50]    Past Medical History:   Diagnosis Date    [facility]     End stage kidney disease ([facility]     peritoneal dialysis every night at home x 2 years    Hypertension     Neuropathy     Peripheral vascular disease ([facility]        Past Surgical History:   Procedure Laterality Date     SECTION      OTHER SURGICAL HISTORY  2018     lap PD cath placement lt side 62 cm    TOE AMPUTATION Left 2021    AMPUTATION OF THE LEFT THIRD TOE performed by Yesenia Mcdowell DPM at [id number] [place] [address] [address] TOE AMPUTATION Left 2021    AMPUTATION OF HALLUX AND SECOND TOE, LEFT FOOT performed by Yesenia Mcdowell DPM at [facility]         Family History   Problem Relation Age of Onset    Coronary Art Dis Mother     High Blood Pressure Mother     Heart Disease Mother     Diabetes Mother     Cancer Father     Coronary Art Dis Father     High Blood Pressure Father     Heart Disease Father     Diabetes Father      Current Medications:    insulin detemir (LEVEMIR) injection pen 24 Units, Nightly  dianeal lo-massimo 2.5% 3,000 mL, 5x Daily  gentamicin (GARAMYCIN) 0.1 % cream, Daily  sevelamer (RENVELA) tablet 800 mg, TID WC  hydrALAZINE (APRESOLINE) injection 10 mg, Q4H PRN  aspirin EC tablet 81 mg, Daily  atorvastatin (LIPITOR) tablet 20 mg, Nightly  clopidogrel (PLAVIX) tablet 75 mg, Daily  metoprolol tartrate (LOPRESSOR) tablet 50 mg, BID  NIFEdipine (PROCARDIA XL) extended release tablet 60 mg, Nightly  torsemide (DEMADEX) tablet 200 mg, Daily  glucose (GLUTOSE) 40 % oral gel 15 g, PRN  dextrose 50 % IV solution, PRN  glucagon (rDNA) injection 1 mg, PRN  dextrose 5 % solution, PRN  sodium chloride flush 0.9 % injection 5-40 mL, 2 times per day  sodium chloride flush 0.9 % injection 5-40 mL, PRN  0.9 % sodium chloride infusion, PRN  heparin (porcine) injection 5,000 Units, 3 times per day  promethazine (PHENERGAN) tablet 12.5 mg, Q6H PRN   Or  ondansetron (ZOFRAN) injection 4 mg, Q6H PRN  polyethylene glycol (GLYCOLAX) packet 17 g, Daily PRN  acetaminophen (TYLENOL) tablet 650 mg, Q6H PRN   Or  acetaminophen (TYLENOL) suppository 650 mg, Q6H PRN  pantoprazole (PROTONIX) tablet 40 mg, QAM AC  insulin aspart (NOVOLOG) injection pen 0-12 Units PATIENT SUPPLIED, TID AC  insulin aspart (NOVOLOG) injection pen 0-6 Units PATIENT SUPPLIED, Nightly          Physical exam:     Vitals:  BP (!) 155/82   Pulse 71   Temp 97.8 °F (36.6 °C) (Oral)   Resp 18   Ht 5' 9\" (1.753 m)   Wt 230 lb 4.8 oz (104.5 kg)   LMP 05/12/2021 (Approximate)   SpO2 96%   BMI 34.01 kg/m²   Constitutional:  OAA X3 NAD  Skin: no rash, turgor wnl  Heent:  eomi, mmm  Neck: no bruits or jvd noted  Cardiovascular:  S1, S2 without m/r/g  Respiratory: CTA B without w/r/r  Abdomen:  +bs, soft, nt, nd  Ext: no  lower extremity edema  Psychiatric: mood and affect appropriate  Musculoskeletal:  Rom, muscular strength intact    Labs:  CBC:   Recent Labs     05/31/21 1247 06/01/21 0454 06/02/21  0416   WBC 11.4* 8.9 9.3   HGB 10.8* 10.5* 10.5*    340 389     BMP:    Recent Labs     05/31/21 1247 06/01/21  0454 06/02/21  0416   * 131* 133*   K 3.7 3.9 3.7   CL 83* 86* 86*   CO2 23 26 24   BUN 56* 63* 63*   CREATININE 11.8* 13.2* 13.2*   GLUCOSE 335* 127* 253*     Ca/Mg/Phos:   Recent Labs     05/31/21 1247 06/01/21  0454 06/02/21  0416   CALCIUM 9.1 8.9 8.8   MG 2.20  --   --    PHOS  --  6.9*  --      Hepatic:   Recent Labs     05/31/21 1247   AST 9*   ALT <5*   BILITOT <0.2   ALKPHOS 120     Troponin:   Recent Labs     05/31/21 1247   TROPONINI 0.17*     BNP: No results for input(s): BNP in the last 72 hours. Lipids: No results for input(s): CHOL, TRIG, HDL, LDLCALC, LABVLDL in the last 72 hours. ABGs: No results for input(s): PHART, PO2ART, AXM1FZL in the last 72 hours. INR: No results for input(s): INR in the last 72 hours. UA:No results for input(s): Alice Edward, GLUCOSEU, BILIRUBINUR, Bonnee Shark, BLOODU, PHUR, PROTEINU, UROBILINOGEN, NITRU, LEUKOCYTESUR, LABMICR, URINETYPE in the last 72 hours. Urine Microscopic: No results for input(s): LABCAST, BACTERIA, COMU, HYALCAST, WBCUA, RBCUA, EPIU in the last 72 hours. Urine Culture: No results for input(s): LABURIN in the last 72 hours. Urine Chemistry: No results for input(s): Elyce Frankel, PROTEINUR, NAUR in the last 72 hours. IMAGING:  MRI BRAIN WO CONTRAST   Final Result   1. No acute intracranial abnormality. 2. Mild chronic white matter microvascular ischemic changes. 3. Right maxillary sinusitis. XR CHEST PORTABLE   Final Result   Cardiomegaly with stable right pleural effusion                 Assessment/Plan :      1. ESRD.   On peritoneal dialysis. Continue peritoneal dialysis tonight. She is on cycler at home. Use 2500 mL 2.5% dineal  solution. Total number of cycles 5    2. HTN. Labile. Check orthostatics statics  Decrease dose of nifedipine XL to 30 mg po daily     3. Anemia of chronic disease. Gets micera at dialysis unit    4. Acid- base disorder. Monitor    5. Electrolytes monitor and replace as needed    6. Syncopal episode. EP following     7. Peripheral vascular disease. S/p amputation of left foot toes.   Follows podiatry    D/w primary team      Thank you for allowing us to participate in care of Lazaro Ramirez         Electronically signed by: Mariaa Abdi MD, 6/2/2021, 7:50 AM      [facility] [address]  Office : [phone]  Fax :[phone]

## 2021-06-03 ENCOUNTER — APPOINTMENT (OUTPATIENT)
Dept: CT IMAGING | Age: 46
DRG: 233 | End: 2021-06-03
Payer: MEDICARE

## 2021-06-03 PROBLEM — I25.118 CORONARY ARTERY DISEASE OF NATIVE ARTERY OF NATIVE HEART WITH STABLE ANGINA PECTORIS (HCC): Status: ACTIVE | Noted: 2021-06-03

## 2021-06-03 PROBLEM — E43 SEVERE MALNUTRITION (HCC): Status: ACTIVE | Noted: 2021-06-03

## 2021-06-03 LAB
ANION GAP SERPL CALCULATED.3IONS-SCNC: 22 MMOL/L (ref 3–16)
BASE EXCESS ARTERIAL: -0.2 MMOL/L (ref -3–3)
BUN BLDV-MCNC: 61 MG/DL (ref 7–20)
CALCIUM SERPL-MCNC: 8.8 MG/DL (ref 8.3–10.6)
CARBOXYHEMOGLOBIN ARTERIAL: 0.9 % (ref 0–1.5)
CHLORIDE BLD-SCNC: 86 MMOL/L (ref 99–110)
CO2: 24 MMOL/L (ref 21–32)
CREAT SERPL-MCNC: 13.6 MG/DL (ref 0.6–1.1)
GFR AFRICAN AMERICAN: 4
GFR NON-AFRICAN AMERICAN: 3
GLUCOSE BLD-MCNC: 162 MG/DL (ref 70–99)
GLUCOSE BLD-MCNC: 165 MG/DL (ref 70–99)
GLUCOSE BLD-MCNC: 229 MG/DL (ref 70–99)
GLUCOSE BLD-MCNC: 246 MG/DL (ref 70–99)
GLUCOSE BLD-MCNC: 259 MG/DL (ref 70–99)
HCO3 ARTERIAL: 24.4 MMOL/L (ref 21–29)
HCT VFR BLD CALC: 32.1 % (ref 36–48)
HEMOGLOBIN, ART, EXTENDED: 10.5 G/DL (ref 12–16)
HEMOGLOBIN: 10.6 G/DL (ref 12–16)
MCH RBC QN AUTO: 29.9 PG (ref 26–34)
MCHC RBC AUTO-ENTMCNC: 32.9 G/DL (ref 31–36)
MCV RBC AUTO: 90.9 FL (ref 80–100)
METHEMOGLOBIN ARTERIAL: 0.4 %
O2 CONTENT ARTERIAL: 14 ML/DL
O2 SAT, ARTERIAL: 97 %
O2 THERAPY: ABNORMAL
PCO2 ARTERIAL: 38.9 MMHG (ref 35–45)
PDW BLD-RTO: 14.1 % (ref 12.4–15.4)
PERFORMED ON: ABNORMAL
PH ARTERIAL: 7.41 (ref 7.35–7.45)
PLATELET # BLD: 421 K/UL (ref 135–450)
PMV BLD AUTO: 7.9 FL (ref 5–10.5)
PO2 ARTERIAL: 88 MMHG (ref 75–108)
POTASSIUM SERPL-SCNC: 3.8 MMOL/L (ref 3.5–5.1)
RBC # BLD: 3.54 M/UL (ref 4–5.2)
SODIUM BLD-SCNC: 132 MMOL/L (ref 136–145)
TCO2 ARTERIAL: 57.4 MMOL/L
WBC # BLD: 9.8 K/UL (ref 4–11)

## 2021-06-03 PROCEDURE — 80048 BASIC METABOLIC PNL TOTAL CA: CPT

## 2021-06-03 PROCEDURE — 85027 COMPLETE CBC AUTOMATED: CPT

## 2021-06-03 PROCEDURE — 82803 BLOOD GASES ANY COMBINATION: CPT

## 2021-06-03 PROCEDURE — 6370000000 HC RX 637 (ALT 250 FOR IP): Performed by: FAMILY MEDICINE

## 2021-06-03 PROCEDURE — 6360000002 HC RX W HCPCS: Performed by: FAMILY MEDICINE

## 2021-06-03 PROCEDURE — 2580000003 HC RX 258: Performed by: INTERNAL MEDICINE

## 2021-06-03 PROCEDURE — 99233 SBSQ HOSP IP/OBS HIGH 50: CPT | Performed by: INTERNAL MEDICINE

## 2021-06-03 PROCEDURE — 90945 DIALYSIS ONE EVALUATION: CPT

## 2021-06-03 PROCEDURE — 6370000000 HC RX 637 (ALT 250 FOR IP): Performed by: INTERNAL MEDICINE

## 2021-06-03 PROCEDURE — 2100000000 HC CCU R&B

## 2021-06-03 PROCEDURE — 93971 EXTREMITY STUDY: CPT

## 2021-06-03 PROCEDURE — 99233 SBSQ HOSP IP/OBS HIGH 50: CPT | Performed by: NURSE PRACTITIONER

## 2021-06-03 PROCEDURE — 36600 WITHDRAWAL OF ARTERIAL BLOOD: CPT

## 2021-06-03 PROCEDURE — 71250 CT THORAX DX C-: CPT

## 2021-06-03 PROCEDURE — 93880 EXTRACRANIAL BILAT STUDY: CPT

## 2021-06-03 RX ADMIN — ATORVASTATIN CALCIUM 20 MG: 20 TABLET, FILM COATED ORAL at 21:03

## 2021-06-03 RX ADMIN — Medication 10 ML: at 21:08

## 2021-06-03 RX ADMIN — METOPROLOL TARTRATE 50 MG: 50 TABLET, FILM COATED ORAL at 21:03

## 2021-06-03 RX ADMIN — HEPARIN SODIUM 5000 UNITS: 5000 INJECTION INTRAVENOUS; SUBCUTANEOUS at 21:03

## 2021-06-03 RX ADMIN — TORSEMIDE 200 MG: 100 TABLET ORAL at 08:41

## 2021-06-03 RX ADMIN — HEPARIN SODIUM 5000 UNITS: 5000 INJECTION INTRAVENOUS; SUBCUTANEOUS at 13:34

## 2021-06-03 RX ADMIN — METOPROLOL TARTRATE 50 MG: 50 TABLET, FILM COATED ORAL at 08:41

## 2021-06-03 RX ADMIN — ASPIRIN 81 MG: 81 TABLET, COATED ORAL at 08:41

## 2021-06-03 RX ADMIN — HEPARIN SODIUM 5000 UNITS: 5000 INJECTION INTRAVENOUS; SUBCUTANEOUS at 05:28

## 2021-06-03 RX ADMIN — Medication 10 ML: at 08:42

## 2021-06-03 RX ADMIN — PANTOPRAZOLE SODIUM 40 MG: 40 TABLET, DELAYED RELEASE ORAL at 06:14

## 2021-06-03 RX ADMIN — SEVELAMER CARBONATE 800 MG: 800 TABLET, FILM COATED ORAL at 08:41

## 2021-06-03 RX ADMIN — GENTAMICIN SULFATE: 1 CREAM TOPICAL at 18:31

## 2021-06-03 RX ADMIN — NIFEDIPINE 30 MG: 30 TABLET, FILM COATED, EXTENDED RELEASE ORAL at 21:03

## 2021-06-03 RX ADMIN — SEVELAMER CARBONATE 800 MG: 800 TABLET, FILM COATED ORAL at 11:21

## 2021-06-03 ASSESSMENT — PAIN SCALES - GENERAL
PAINLEVEL_OUTOF10: 0

## 2021-06-03 NOTE — PROGRESS NOTES
[address]   Electrophysiology Nurse Practitioner  Progress Note    Date: 6/3/2021  Date of admission: 5/31/2021 11:53 AM  Reason for Admission: Syncope and collapse [R55]    Consult Requesting Physician: Carmelo Escamilla MD    -Reason for Consultation: Syncope    Chief Complaint   Patient presents with    Loss of Consciousness     last week had syncopal episode, cont to feel faint, dizzy while walking. HISTORY OF PRESENT ILLNESS: History obtained from patient and medical record. Curt Monroe is a 51 y.o. female with a past medical history of DM, HTN, ESRD on peritoneal dialysis, and PAD. Pt presented to hospital following a syncopal episode at home. Pt reports she was walking towards her son's car and became dizzy and lightheaded, then fainted. No CP or SOB prior to the episode. She states she was only out for a few seconds. No loss of bowel or bladder control. Pt reports she had a similar episode a few weeks ago. Her primary cardiologist had placed a cardiac event monitor that she wearing during the episode. She is currently being worked up for possible kidney transplant. She recently had a GXT that was concerning for ischemia. Her cardiologist at [facility] [place], [place] was preparing her for a coronary angiogram.     Interval Hx: Today, she is being seen for follow up. Pt underwent cardiac cath yesterday and was found to have MVD. Plans for CABG next week. She remains in sinus rhythm without arrhythmia. Her BP is mildly elevated. We discussed her event monitor results that were unremarkable for cause of her syncope. Patient seen and examined. Clinical notes reviewed. Telemetry reviewed. No new complaints today. No major events overnight. Denies having chest pain, palpitations, shortness of breath, orthopnea, cough, or dizziness at the time of this visit. Allergies:   Allergies   Allergen Reactions    Zetia [Ezetimibe] Shortness Of Breath    Quinolones Nausea Only     Other reaction(s): Vomiting    Humalog [Insulin Lispro]      SOB, Leg swelling, fatigue    Lisinopril Swelling    Lantus [Insulin Glargine] Nausea And Vomiting     Side effect, not an allergy    Victoza [Liraglutide] Nausea And Vomiting     Side effect, not an allergy       Home Meds:  Prior to Visit Medications    Medication Sig Taking?  Authorizing Provider   fluticasone (FLONASE) 50 MCG/ACT nasal spray 1 spray by Each Nostril route daily as needed for Rhinitis Yes Historical Provider, MD   insulin detemir (LEVEMIR FLEXTOUCH) 100 UNIT/ML injection pen Inject 20 Units into the skin nightly Yes Historical Provider, MD   NIFEdipine (PROCARDIA XL) 60 MG extended release tablet Take 1 tablet by mouth nightly Take 60 mg by mouth nightly Yes RICHARD Johnson CNP   atorvastatin (LIPITOR) 20 MG tablet Take 1 tablet by mouth nightly Yes Chris Sweeney MD   VELPHORO 500 MG CHEW CHEW TWO TABLETS BY MOUTH THREE TIMES A DAY WITH MEALS Yes Marycruz Hammer MD   insulin aspart (NOVOLOG FLEXPEN) 100 UNIT/ML injection pen Inject 10 Units into the skin 3 times daily (before meals) Yes Chris Sweeney MD   aspirin 81 MG EC tablet Take 1 tablet by mouth daily Yes Faisal Sandoval PA-C   metoprolol tartrate (LOPRESSOR) 50 MG tablet Take 1 tablet by mouth 2 times daily Yes Faisal Sandoval PA-C   torsemide (DEMADEX) 100 MG tablet Take 2 tablets by mouth daily Yes Faisal Sandoval PA-C   clopidogrel (PLAVIX) 75 MG tablet Take 1 tablet by mouth daily Yes Faisal Sandoval PA-C   calcium acetate 667 MG TABS Take 667 mg by mouth 3 times daily (with meals) Yes Historical Provider, MD   Continuous Blood Gluc Sensor (FREESTYLE KOREY 2 SENSOR) MISC Change every 14 days  Marcio Yi MD   Continuous Blood Gluc  (FREESTYLE KOREY 2 READER) JOHNNIE Use to check glucose  Marcio Yi MD   Continuous Blood Gluc Sensor (FREESTYLE KOREY 14 DAY SENSOR) MISC Check glucose ---change every 14 days  Marcio Yi MD   Continuous Blood Gluc  (FREESTYLE KOREY 14 DAY READER) JOHNNIE To test glucose  Azul Johnson MD   Insulin Pen Needle 32G X 6 MM MISC Use with insulin pens 4 times daily  Brittani Felton MD   Multiple Vitamins-Minerals (THERAPEUTIC MULTIVITAMIN-MINERALS) tablet Take 1 tablet by mouth daily. Brittani Felton MD      Past Medical History:  Past Medical History:   Diagnosis Date    [facility]     End stage kidney disease ([facility]     peritoneal dialysis every night at home x 2 years    Hypertension     Neuropathy     Peripheral vascular disease ([facility]       Past Surgical History:    has a past surgical history that includes Tonsillectomy;  section; other surgical history (2018); Toe amputation (Left, 2021); and Toe amputation (Left, 2021). Social History:  Reviewed. reports that she has never smoked. She has never used smokeless tobacco. She reports that she does not drink alcohol and does not use drugs. Family History:  Reviewed. family history includes Cancer in her father; Coronary Art Dis in her father and mother; Diabetes in her father and mother; Heart Disease in her father and mother; High Blood Pressure in her father and mother. Review of System:  · Constitutional: Negative for fever, night sweats, chills, weight changes, or weakness  · Skin: Negative for rash, dry skin, pruritus, bruising, bleeding, blood clots, or changes in skin pigment  · HEENT: Negative for vision changes, ringing in the ears, sore throat, dysphagia, or swollen lymph nodes  · Respiratory: Reviewed in HPI  · Cardiovascular: Reviewed in HPI  · Gastrointestinal: Negative for abdominal pain, N/V/D, constipation, or black/tarry stools  · Genito-Urinary: Negative for dysuria, incontinence, urgency, or hematuria  · Musculoskeletal: Negative for joint swelling, muscle pain, or injuries  · Neurological/Psych: Negative for confusion, seizures, headaches, balance issues or TIA-like symptoms.  No anxiety, depression, or insomnia    Physical Examination:  Vitals:    06/03/21 0835   BP: (!) 149/66   Pulse: 75   Resp: 15   Temp: 96.7 °F (35.9 °C)   SpO2: 96%      In: -   Out: 989    Wt Readings from Last 3 Encounters:   06/03/21 211 lb 10.3 oz (96 kg)   05/27/21 229 lb (103.9 kg)   05/26/21 226 lb (102.5 kg)       Telemetry: Personally Reviewed  - Sinus rhythm   · Constitutional: Cooperative and in no apparent distress, and appears well nourished  · Skin: Warm and pink; no pallor, cyanosis, bruising, or clubbing  · HEENT: Symmetric and normocephalic. PERRL, EOM intact. Conjunctiva pink with clear sclera. Mucus membranes pink and moist. Teeth intact. Thyroid smooth without nodules or goiter. · Cardiovascular: Regular rate and rhythm. S1/S2 present without murmurs, rubs, or gallops. Peripheral pulses 2+, capillary refill < 3 seconds. No peripheral edema, no elevation of JVP  · Respiratory: Respirations symmetric and unlabored. Lungs clear to auscultation bilaterally, no wheezing, crackles, or rhonchi  · Gastrointestinal: Abdomen soft and rotund. Bowel sounds normoactive in all quadrants without tenderness or masses. + Obese  · Musculoskeletal: Bilateral upper and lower extremity strength 5/5 with full ROM. + Dressing to left foot s/p amputation  · Neurologic/Psych: Awake and orientated to person, place and time. Calm affect, appropriate mood    Pertinent labs, diagnostic, device, and imaging results reviewed as a part of this visit    Labs:  BMP:   Recent Labs     05/31/21  1247 06/01/21  0454 06/02/21  0416 06/03/21  0545   * 131* 133* 132*   K 3.7 3.9 3.7 3.8   CL 83* 86* 86* 86*   CO2 23 26 24 24   PHOS  --  6.9*  --   --    BUN 56* 63* 63* 61*   CREATININE 11.8* 13.2* 13.2* 13.6*   MG 2.20  --   --   --      Estimated Creatinine Clearance: 6 mL/min (A) (based on SCr of 13.6 mg/dL [facility])).    CBC:   Recent Labs     06/01/21  0454 06/02/21  0416 06/03/21  0545   WBC 8.9 9.3 9.8   HGB 10.5* 10.5* 10.6*   HCT 31.7* 31.8* 32.1* Patient Active Problem List    Diagnosis Date Noted    Near syncope 05/31/2021    Atherosclerosis of native arteries of left leg with ulceration of other part of foot ([facility]     Weight loss counseling, encounter for     Non-smoker     Elevated C-reactive protein (CRP)     Elevated sed rate     Diabetic poly[facility]uropathy [facility] with type [facility]     ESRD (end stage renal disease) ([facility]     Acute renal failure (ARF) ([facility] 08/13/2018    Diabetes mellitus type 2 in obese ([facility] 11/24/2014    Hyperlipidemia with target LDL less than 100 03/26/2012    Hypertension 03/26/2012    Microalbuminuria 12/15/2011    Diabetes mellitus ([facility] 12/14/2011    Obesity 12/14/2011        Assessment and Plan:     1. Syncope   - Etiology likely from autonomic dysfunction secondary to uncontrolled diabetes   - EF 55-60% on recent echo, but low on GXT   - LHC with MVD   - Event monitor results reviewed from  (No arrhythmias documented during syncopal episode)    - Avoid dehydration with adequate fluid intake and increased sodium intake  - Discussed avoidance of large meals  - Recommend lying down and elevating legs for future prodromal symptoms  - 20-30mmHg compression stockings recommended    - If recurrent in future, consider using mestinon (Unable to use florinef/midodrine due to HTN)    2. CAD   - MVD on cath   - No CP or SOB currently    - On ASA, plavix, BB, statin   - Plan for CABG next week   - CVTS following    3. HTN  - Fairly well controlled: Goal <130/80  - Continue current medications    4. ESRD   - On dialysis   - Followed by nephrology    5. PAD   - S/p left foot toe amputation (5/26) and angioplasty (5/27)   - On ASA, plavix, statin   - Podiatry following    6. Type 2 DM  - Unstable: Goal A1C <7  - Continue with current medications   - Management per hospitalist    No further EP recommendations. EP will sign off. Please call if there are any questions. Thank you for consult.      All pertinent information and plan of care discussed with the EP physician. All questions and concerns were addressed to the patient/family. Alternatives to my treatment were discussed. I have discussed the above stated plan and the patient verbalized understanding and agreed with the plan. Discussed plan with patient and nurse. Thank you for allowing to us to participate in the care of Leonidas Wright     The patient was seen for >35 minutes. I reviewed interval history, physical exam, review of data including labs, imaging, development and implementation of treatment plan and coordination of complex care.     Norman Unger, RICHARD-CNP  [address]   Office: [phone]

## 2021-06-03 NOTE — CARE COORDINATION
Chart reviewed for discharge planning. Barrier(s) to discharge-patient needs a CABG, she has to wait until Plavix metabolizes  Tentative discharge plan-pending post op course, home w/ Home health anticipated  Tentative discharge date-when stable    *Case management will continue to follow progress and update discharge plan as needed.   SERJIO FishmanN, CCM, RN  [facility]  [phone]

## 2021-06-03 NOTE — PROGRESS NOTES
Pharmacy called to verify peritoneal dialysis fluid bags, not correlating with Epic. Dosage is ok, will continue with current bags until the AM. Pharmacist seemed to believe FK administered the bags.

## 2021-06-03 NOTE — PROGRESS NOTES
Office : [phone]     Fax :[phone]       Nephrology progress  Note      Patient's Name: Britni Garcia  8:09 AM  6/3/2021    Reason for Consult:  ESRD      Requesting Physician:  Wilson Orozco MD      Chief Complaint:    Chief Complaint   Patient presents with    Loss of Consciousness     last week had syncopal episode, cont to feel faint, dizzy while walking. History of Present Ilness:    Britni Garcia is a 51 y.o. female with severe end-stage renal disease secondary to diabetic nephropathy on peritoneal dialysis, hypertension, peripheral vascular disease, diabetic neuropathy who was admitted after she had a syncopal episode. She had similar episode 2 weeks ago. Recently had angiogram left lower extremity for gangrene left foot second toe. Denies any shortness of breath. Denies any abdominal pain. Denies any nausea vomiting.     Interval HX :      S/p LHC   Has multivessel CAD     Getting CCPD   Tolerating well   UF is 1300 ml     I/O last 3 completed shifts:  In: -   Out: 2349     Past Medical History:   Diagnosis Date    Diabetes mellitus ([address]     End stage kidney disease ([address]     peritoneal dialysis every night at home x 2 years    Hypertension     Neuropathy     Peripheral vascular disease ([address]        Past Surgical History:   Procedure Laterality Date     SECTION      OTHER SURGICAL HISTORY  2018     lap PD cath placement lt side 62 cm    TOE AMPUTATION Left 2021    AMPUTATION OF THE LEFT THIRD TOE performed by Sam Antony DPM at [address] TOE AMPUTATION Left 2021    AMPUTATION OF HALLUX AND SECOND TOE, LEFT FOOT performed by Sam Antony DPM at [facility]         Family History   Problem Relation Age of Onset    Coronary Art Dis Mother     High Blood Pressure Mother     Heart Disease Mother     Diabetes Mother     Cancer Father     Coronary Art Dis Father     High Blood Pressure Father     Heart Disease Father     Diabetes Father      Current Medications:    insulin detemir (LEVEMIR) injection pen 24 Units, Nightly  NIFEdipine (PROCARDIA XL) extended release tablet 30 mg, Nightly  sodium chloride flush 0.9 % injection 5-40 mL, 2 times per day  sodium chloride flush 0.9 % injection 5-40 mL, PRN  0.9 % sodium chloride infusion, PRN  acetaminophen (TYLENOL) tablet 650 mg, Q4H PRN  oxyCODONE-acetaminophen (PERCOCET) 5-325 MG per tablet 1 tablet, Q4H PRN   Or  oxyCODONE-acetaminophen (PERCOCET) 5-325 MG per tablet 2 tablet, Q4H PRN  dianeal lo-massimo 2.5% 3,000 mL, 5x Daily  gentamicin (GARAMYCIN) 0.1 % cream, Daily  sevelamer (RENVELA) tablet 800 mg, TID WC  hydrALAZINE (APRESOLINE) injection 10 mg, Q4H PRN  aspirin EC tablet 81 mg, Daily  atorvastatin (LIPITOR) tablet 20 mg, Nightly  [Held by provider] clopidogrel (PLAVIX) tablet 75 mg, Daily  metoprolol tartrate (LOPRESSOR) tablet 50 mg, BID  torsemide (DEMADEX) tablet 200 mg, Daily  glucose (GLUTOSE) 40 % oral gel 15 g, PRN  dextrose 50 % IV solution, PRN  glucagon (rDNA) injection 1 mg, PRN  dextrose 5 % solution, PRN  sodium chloride flush 0.9 % injection 5-40 mL, 2 times per day  sodium chloride flush 0.9 % injection 5-40 mL, PRN  0.9 % sodium chloride infusion, PRN  heparin (porcine) injection 5,000 Units, 3 times per day  promethazine (PHENERGAN) tablet 12.5 mg, Q6H PRN   Or  ondansetron (ZOFRAN) injection 4 mg, Q6H PRN  polyethylene glycol (GLYCOLAX) packet 17 g, Daily PRN  acetaminophen (TYLENOL) tablet 650 mg, Q6H PRN   Or  acetaminophen (TYLENOL) suppository 650 mg, Q6H PRN  pantoprazole (PROTONIX) tablet 40 mg, QAM AC  insulin aspart (NOVOLOG) injection pen 0-12 Units PATIENT SUPPLIED, TID AC  insulin aspart (NOVOLOG) injection pen 0-6 Units PATIENT SUPPLIED, Nightly          Physical exam:     Vitals:  BP (!) 150/60   Pulse 66   Temp 96.6 °F (35.9 °C) (Temporal)   Resp 15   Ht 5' 9\" (1.753 m)   Wt 211 lb 10.3 oz (96 kg)   LMP 05/12/2021 (Approximate)   SpO2 98%   BMI 31.25 kg/m²   Constitutional:  OAA X3 NAD  Skin: no rash, turgor wnl  Heent:  eomi, mmm  Neck: no bruits or jvd noted  Cardiovascular:  S1, S2 without m/r/g  Respiratory: CTA B without w/r/r  Abdomen:  +bs, soft, nt, nd  Ext: no  lower extremity edema  Psychiatric: mood and affect appropriate  Musculoskeletal:  Rom, muscular strength intact    Labs:  CBC:   Recent Labs     06/01/21  0454 06/02/21  0416 06/03/21  0545   WBC 8.9 9.3 9.8   HGB 10.5* 10.5* 10.6*    389 421     BMP:    Recent Labs     06/01/21  0454 06/02/21  0416 06/03/21  0545   * 133* 132*   K 3.9 3.7 3.8   CL 86* 86* 86*   CO2 26 24 24   BUN 63* 63* 61*   CREATININE 13.2* 13.2* 13.6*   GLUCOSE 127* 253* 229*     Ca/Mg/Phos:   Recent Labs     05/31/21  1247 06/01/21  0454 06/02/21  0416 06/03/21  0545   CALCIUM 9.1 8.9 8.8 8.8   MG 2.20  --   --   --    PHOS  --  6.9*  --   --      Hepatic:   Recent Labs     05/31/21  1247   AST 9*   ALT <5*   BILITOT <0.2   ALKPHOS 120     Troponin:   Recent Labs     05/31/21  1247   TROPONINI 0.17*     BNP: No results for input(s): BNP in the last 72 hours. Lipids: No results for input(s): CHOL, TRIG, HDL, LDLCALC, LABVLDL in the last 72 hours. ABGs: No results for input(s): PHART, PO2ART, BNJ5RHB in the last 72 hours. INR: No results for input(s): INR in the last 72 hours. UA:No results for input(s): [place], GLUCOSEU, BILIRUBINUR, [place], BLOODU, PHUR, PROTEINU, UROBILINOGEN, NITRU, LEUKOCYTESUR, LABMICR, URINETYPE in the last 72 hours. Urine Microscopic: No results for input(s): LABCAST, BACTERIA, COMU, HYALCAST, WBCUA, RBCUA, EPIU in the last 72 hours. Urine Culture: No results for input(s): LABURIN in the last 72 hours.   Urine

## 2021-06-03 NOTE — PLAN OF CARE
Problem: Falls - Risk of:  Goal: Will remain free from falls  Outcome: Ongoing  Note: Fall Risk = high; Up as tolerated; Patient has steady gait; Ambulation Equipment: None; Call-light within reach and patient uses call-light for assistance; Bed alarm: NO; Non-skid socks worn when out of bed; Side rails up 2/4; Educated patient on fall risk status and need to call for assistance;  Patient verbalizes understanding of fall risk status and fall education; Pt experienced no falls/injuries this shift      Problem: Pain:  Goal: Pain level will decrease  Outcome: Ongoing  Note: Pt has no c/o pain

## 2021-06-03 NOTE — PROGRESS NOTES
[address] Daily Progress Note      Admit Date:  5/31/2021    Chief Complaint   Patient presents with    Loss of Consciousness     last week had syncopal episode, cont to feel faint, dizzy while walking. Subjective:  Ms. Bc Skinner denies exertional chest pain, SOB/CABA, PND, palpitations, light-headedness, or edema.  Had episode of dyspnea at rest last night that could be related to anxiety    Objective:   BP (!) 149/61   Pulse 70   Temp 96.8 °F (36 °C) (Temporal)   Resp 16   Ht 5' 9\" (1.753 m)   Wt 211 lb 10.3 oz (96 kg)   LMP 05/12/2021 (Approximate)   SpO2 97%   BMI 31.25 kg/m²       Intake/Output Summary (Last 24 hours) at 6/3/2021 1337  Last data filed at 6/3/2021 0645  Gross per 24 hour   Intake --   Output 989 ml   Net -989 ml       TELEMETRY: Sinus     Physical Exam:  General:  Awake, alert, oriented x 3, NAD  Skin:  Warm and dry  Neck:  JVD flat  Chest:  normal air entry  Cardiovascular:  RRR S1S2, no S3, no mrmr  Abdomen:  Soft, ND, NT, No HSM  Extremities:  No edema    Medications:    insulin aspart  0-18 Units Subcutaneous TID WC    insulin aspart  0-9 Units Subcutaneous Nightly    insulin detemir  24 Units Subcutaneous Nightly    NIFEdipine  30 mg Oral Nightly    sodium chloride flush  5-40 mL Intravenous 2 times per day    gentamicin   Topical Daily    sevelamer  800 mg Oral TID WC    aspirin  81 mg Oral Daily    atorvastatin  20 mg Oral Nightly    [Held by provider] clopidogrel  75 mg Oral Daily    metoprolol tartrate  50 mg Oral BID    torsemide  200 mg Oral Daily    sodium chloride flush  5-40 mL Intravenous 2 times per day    heparin (porcine)  5,000 Units Subcutaneous 3 times per day    pantoprazole  40 mg Oral QAM AC      sodium chloride      dextrose      sodium chloride       sodium chloride flush, sodium chloride, acetaminophen, oxyCODONE-acetaminophen **OR** oxyCODONE-acetaminophen, hydrALAZINE, glucose, dextrose, glucagon (rDNA), dextrose, sodium chloride flush, sodium chloride, promethazine **OR** ondansetron, polyethylene glycol, acetaminophen **OR** acetaminophen    Lab Data:  CBC:   Recent Labs     06/01/21 0454 06/02/21 0416 06/03/21  0545   WBC 8.9 9.3 9.8   HGB 10.5* 10.5* 10.6*   HCT 31.7* 31.8* 32.1*   MCV 90.9 91.1 90.9    389 421     BMP:   Recent Labs     06/01/21 0454 06/02/21 0416 06/03/21  0545   * 133* 132*   K 3.9 3.7 3.8   CL 86* 86* 86*   CO2 26 24 24   PHOS 6.9*  --   --    BUN 63* 63* 61*   CREATININE 13.2* 13.2* 13.6*     LIVER PROFILE: No results for input(s): AST, ALT, LIPASE, BILIDIR, BILITOT, ALKPHOS in the last 72 hours. Invalid input(s): AMYLASE,  ALB  PT/INR: No results for input(s): PROTIME, INR in the last 72 hours. APTT: No results for input(s): APTT in the last 72 hours. BNP:  No results for input(s): BNP in the last 72 hours. IMAGING: Cardiac Cath PCI:  Anatomy:   LM-nml   LAD-ostial 90%  Cx-ostial 80%  OM- nml  RCA-prox 20%  RPDA- prox 90%  LVEF- 50%  LVG- nml  LVEDP- 0    Assessment/Plan:  Active Problems:       CAD: severe MVD, needs CABG. Was taking plavix for PAD and recent endovascular procedure. Plavix on hold for CABG next week. Cont aspirin statin.         Carlos Sharma MD, MD 6/3/2021 1:37 PM

## 2021-06-03 NOTE — PLAN OF CARE
Fall risk precautions in place, Fall risk assessment protocol followed. Bed in lowest position with wheels locked, SR up X2, bed alarm in place and activated, yellow blanket on bed, non-skid socks on pt, fall risk ID on pt arm, call light in reach, pt encouraged to call before getting out of bed and for any other needs or complaints. No falls this shift. Will continue to monitor.

## 2021-06-03 NOTE — PROGRESS NOTES
Comprehensive Nutrition Assessment    Type and Reason for Visit:  Initial, Positive Nutrition Screen    Nutrition Recommendations/Plan:   1. Continue current diet  2. RD ordered Ensure HP 1 x a day- vanilla  3. Encourage po     Nutrition Assessment:  Pt has increased nutrient needs related to recent amputation of left 1 & 2 toe on foot. Pt reports poor appetite over the past few weeks, just didn't feel hungry. Pt has lost 24lbs over the past month resulting in a -10% change in wt. Pt denies any n/v. Pt is open to a supplement - Ensure HP 1 x a day. RD to order. Pt denies any other needs at this time. Malnutrition Assessment:  Malnutrition Status:  Severe malnutrition    Context:  Chronic Illness     Findings of the 6 clinical characteristics of malnutrition:  Energy Intake:  7 - 75% or less estimated energy requirements for 1 month or longer  Weight Loss:  7 - Greater than 5% over 1 month     Body Fat Loss:  No significant body fat loss     Muscle Mass Loss:  No significant muscle mass loss    Fluid Accumulation:  No significant fluid accumulation     Strength:  Not Performed    Estimated Daily Nutrient Needs:  Energy (kcal):  9030-7271 cals (15-18 cals/96kg); Weight Used for Energy Requirements:  Current     Protein (g):   gms (1.2-2.0gms/IBW 66kg); Weight Used for Protein Requirements:  Ideal        Fluid (ml/day):   ; Method Used for Fluid Requirements:  1 ml/kcal      Nutrition Related Findings:  No edema noted. I/O's: -2417. Na 132L, Gluc 229H      Wounds:  Surgical Incision (recent toe amputation on left foot)       Current Nutrition Therapies:    ADULT DIET; Regular; 4 carb choices (60 gm/meal); Low Fat/Low Chol/High Fiber/QUINN; Low Phosphorus (Less than 1000 mg)  Adult Oral Nutrition Supplement;  Low Calorie/High Protein Oral Supplement    Anthropometric Measures:  · Height: 5' 9\" (175.3 cm)  · Current Body Weight: 211 lb (95.7 kg)   · Usual Body Weight: 235 lb (106.6 kg)     · Ideal Body Weight: 145 lbs; % Ideal Body Weight     · BMI: 31.1  · BMI Categories: Obese Class 1 (BMI 30.0-34. 9)       Nutrition Diagnosis:   · Severe malnutrition related to inadequate protein-energy intake as evidenced by poor intake prior to admission, weight loss greater than or equal to 5% in 1 month      Nutrition Interventions:   Food and/or Nutrient Delivery:  Continue Current Diet, Start Oral Nutrition Supplement  Nutrition Education/Counseling:  Education not indicated   Coordination of Nutrition Care:  Continue to monitor while inpatient    Goals:  PO 50% at meals and supp       Nutrition Monitoring and Evaluation:   Behavioral-Environmental Outcomes:  None Identified   Food/Nutrient Intake Outcomes:  Food and Nutrient Intake, Supplement Intake  Physical Signs/Symptoms Outcomes:  Biochemical Data, Weight (renal labs)     Discharge Planning:     Too soon to determine     Electronically signed by Dara Barnhart RD, [address] , LD on 6/3/21 at 10:10 AM EDT    Contact: [phone]

## 2021-06-03 NOTE — PLAN OF CARE
Nutrition Problem #1: Severe malnutrition  Intervention: Food and/or Nutrient Delivery: Continue Current Diet, Start Oral Nutrition Supplement  Nutritional Goals: PO 50% at meals and supp

## 2021-06-03 NOTE — PROGRESS NOTES
ABG drawn x  1 attempt(s) from Left Radial. Patient had positive modified Alejandro's Test with good collateral circulation. Patient was on room air at time of puncture. Pressure held for 5 minutes. No bleeding or bruising noted at puncture site.   Patient tolerated procedure well

## 2021-06-03 NOTE — PROGRESS NOTES
Hospitalist Progress Note      PCP: Blanca Gabriel MD    Date of Admission: 5/31/2021    Chief Complaint: [facility] Course: 62-year-old female was past medical history of ESRD on peritoneal dialysis, hypertension, hyperlipidemia, diabetes, peripheral vascular disease status post left toe amputation last week presents with presyncopal episode. She also describes a syncopal episode couple of weeks ago. Patient has recently been undergoing evaluation for possible renal transplant has had stress test performed at [facility] which was abnormal.  Patient is scheduled for outpatient left heart cath on 6/3/2021 . Patient is labile blood pressures, but negative orthostatics. Nephrology is adjusting blood pressure medications. She had left heart cath performed 6/3/2021 which showed multivessel disease. CTS consulted the plan for possible CABG. Her Plavix is being held    Subjective: Patient seen and examined at bedside. She tolerated this procedure well. She has no new complaints. She understands the plan for evaluation for CABG and possible procedure next week.       Medications:  Reviewed    Infusion Medications    sodium chloride      dextrose      sodium chloride       Scheduled Medications    insulin aspart  0-18 Units Subcutaneous TID WC    insulin aspart  0-9 Units Subcutaneous Nightly    insulin detemir  24 Units Subcutaneous Nightly    NIFEdipine  30 mg Oral Nightly    sodium chloride flush  5-40 mL Intravenous 2 times per day    gentamicin   Topical Daily    sevelamer  800 mg Oral TID WC    aspirin  81 mg Oral Daily    atorvastatin  20 mg Oral Nightly    metoprolol tartrate  50 mg Oral BID    torsemide  200 mg Oral Daily    sodium chloride flush  5-40 mL Intravenous 2 times per day    heparin (porcine)  5,000 Units Subcutaneous 3 times per day    pantoprazole  40 mg Oral QAM AC     PRN Meds: sodium chloride flush, sodium chloride, acetaminophen, oxyCODONE-acetaminophen **OR** --   --      No results for input(s): AST, ALT, BILIDIR, BILITOT, ALKPHOS in the last 72 hours. No results for input(s): INR in the last 72 hours. No results for input(s): [place] in the last 72 hours. Urinalysis:      Lab Results   Component Value Date    NITRU Negative 05/16/2021    WBCUA 6-9 05/16/2021    BACTERIA 1+ 02/28/2014    RBCUA 0-2 05/16/2021    BLOODU SMALL 05/16/2021    SPECGRAV 1.017 05/16/2021    GLUCOSEU 500 05/16/2021       Radiology:  VL DUP CAROTID BILATERAL   Final Result      VL PRE OP VEIN MAPPING   Final Result      CT CHEST WO CONTRAST   Final Result   No acute intrathoracic abnormality         MRI BRAIN WO CONTRAST   Final Result   1. No acute intracranial abnormality. 2. Mild chronic white matter microvascular ischemic changes. 3. Right maxillary sinusitis. XR CHEST PORTABLE   Final Result   Cardiomegaly with stable right pleural effusion                 Assessment/Plan:    Active Hospital Problems    Diagnosis     Severe malnutrition ([address] [E43]     Coronary artery disease of native artery of native heart with stable angina pectoris (HCC) [I25.118]     Near syncope [R55]     Atherosclerosis of native arteries of left leg with ulceration of other part of foot ([address] [I70.245]     Diabetic polyneuropathy associated with type 2 diabetes mellitus ([address] [E11.42]     ESRD (end stage renal disease) ([address] [N18.6]     Diabetes mellitus ([address] [E11.9]        Multivessel CAD  Status post left heart cath 6/3/2021  CTS eval in progress  Plavix is being held for possible procedure medial next week    Presyncope  Secondary to labile BP? Possibly orthostatic, however orthostatic vital signs negative here  Vasovagal?  Autonomic instability in view of diabetes? Limited cardiac output in view of severe CAD? ESRD on PD  Nephrology consulted    Hypertension  With labile BP  BP meds adjusting per nephrology    DVT Prophylaxis: Heparin  Diet: ADULT DIET;  Regular; 4 carb choices (60 gm/meal); Low Fat/Low Chol/High Fiber/QUINN; Low Phosphorus (Less than 1000 mg)  Adult Oral Nutrition Supplement;  Low Calorie/High Protein Oral Supplement  Code Status: Full Code      Electronically signed by Lis Tyson MD on 6/3/2021 at 6:17 PM

## 2021-06-03 NOTE — PROGRESS NOTES
Cardiothoracic Surgery Progress Note    CC: multivessel coronary artery disease, ESRD (on PD), PAD, uncontrolled DM2, HTN, HLD    Left heart angiogram with multivessel coronary artery disease. Last dose of plavix 6/2. Plan for CABG/NYA next week after plavix metabolism pending pre-op testing and risk stratification. Patient hemodynamically stable and denying any chest pain or shortness of breath. Plan discussed with patient and patient amenable to plan. All questions answered. Spoke to patient's son Haresh Chacon) on phone and all questions answered. Full consult to follow.      Electronically signed by RICHARD Soto CNP on 6/3/21 at 10:46 AM EDT

## 2021-06-04 ENCOUNTER — APPOINTMENT (OUTPATIENT)
Dept: INTERVENTIONAL RADIOLOGY/VASCULAR | Age: 46
DRG: 233 | End: 2021-06-04
Payer: MEDICARE

## 2021-06-04 ENCOUNTER — ANESTHESIA EVENT (OUTPATIENT)
Dept: OPERATING ROOM | Age: 46
DRG: 233 | End: 2021-06-04
Payer: MEDICARE

## 2021-06-04 LAB
ANION GAP SERPL CALCULATED.3IONS-SCNC: 22 MMOL/L (ref 3–16)
APTT: 30.1 SEC (ref 24.2–36.2)
BUN BLDV-MCNC: 61 MG/DL (ref 7–20)
CALCIUM SERPL-MCNC: 9.1 MG/DL (ref 8.3–10.6)
CHLORIDE BLD-SCNC: 89 MMOL/L (ref 99–110)
CHOLESTEROL, TOTAL: 189 MG/DL (ref 0–199)
CO2: 22 MMOL/L (ref 21–32)
CREAT SERPL-MCNC: 14.3 MG/DL (ref 0.6–1.1)
GFR AFRICAN AMERICAN: 3
GFR NON-AFRICAN AMERICAN: 3
GLUCOSE BLD-MCNC: 135 MG/DL (ref 70–99)
GLUCOSE BLD-MCNC: 152 MG/DL (ref 70–99)
GLUCOSE BLD-MCNC: 244 MG/DL (ref 70–99)
GLUCOSE BLD-MCNC: 248 MG/DL (ref 70–99)
GLUCOSE BLD-MCNC: 309 MG/DL (ref 70–99)
HCT VFR BLD CALC: 33.2 % (ref 36–48)
HDLC SERPL-MCNC: 17 MG/DL (ref 40–60)
HEMOGLOBIN: 10.9 G/DL (ref 12–16)
INR BLD: 1.02 (ref 0.86–1.14)
LDL CHOLESTEROL CALCULATED: ABNORMAL MG/DL
LDL CHOLESTEROL DIRECT: 80 MG/DL
MCH RBC QN AUTO: 29.9 PG (ref 26–34)
MCHC RBC AUTO-ENTMCNC: 32.8 G/DL (ref 31–36)
MCV RBC AUTO: 91.3 FL (ref 80–100)
PDW BLD-RTO: 14.1 % (ref 12.4–15.4)
PERFORMED ON: ABNORMAL
PLATELET # BLD: 427 K/UL (ref 135–450)
PMV BLD AUTO: 7.9 FL (ref 5–10.5)
POTASSIUM SERPL-SCNC: 3.7 MMOL/L (ref 3.5–5.1)
PROTHROMBIN TIME: 11.8 SEC (ref 10–13.2)
RBC # BLD: 3.63 M/UL (ref 4–5.2)
SODIUM BLD-SCNC: 133 MMOL/L (ref 136–145)
TRIGL SERPL-MCNC: 500 MG/DL (ref 0–150)
VLDLC SERPL CALC-MCNC: ABNORMAL MG/DL
WBC # BLD: 10.6 K/UL (ref 4–11)

## 2021-06-04 PROCEDURE — 76937 US GUIDE VASCULAR ACCESS: CPT

## 2021-06-04 PROCEDURE — 2500000003 HC RX 250 WO HCPCS: Performed by: INTERNAL MEDICINE

## 2021-06-04 PROCEDURE — 85610 PROTHROMBIN TIME: CPT

## 2021-06-04 PROCEDURE — 80048 BASIC METABOLIC PNL TOTAL CA: CPT

## 2021-06-04 PROCEDURE — 77001 FLUOROGUIDE FOR VEIN DEVICE: CPT

## 2021-06-04 PROCEDURE — 2580000003 HC RX 258: Performed by: INTERNAL MEDICINE

## 2021-06-04 PROCEDURE — APPSS15 APP SPLIT SHARED TIME 0-15 MINUTES: Performed by: NURSE PRACTITIONER

## 2021-06-04 PROCEDURE — 80061 LIPID PANEL: CPT

## 2021-06-04 PROCEDURE — 99233 SBSQ HOSP IP/OBS HIGH 50: CPT | Performed by: INTERNAL MEDICINE

## 2021-06-04 PROCEDURE — C1752 CATH,HEMODIALYSIS,SHORT-TERM: HCPCS

## 2021-06-04 PROCEDURE — 6360000002 HC RX W HCPCS: Performed by: RADIOLOGY

## 2021-06-04 PROCEDURE — 85730 THROMBOPLASTIN TIME PARTIAL: CPT

## 2021-06-04 PROCEDURE — 36556 INSERT NON-TUNNEL CV CATH: CPT

## 2021-06-04 PROCEDURE — 6370000000 HC RX 637 (ALT 250 FOR IP): Performed by: FAMILY MEDICINE

## 2021-06-04 PROCEDURE — 6360000002 HC RX W HCPCS: Performed by: FAMILY MEDICINE

## 2021-06-04 PROCEDURE — 2140000000 HC CCU INTERMEDIATE R&B

## 2021-06-04 PROCEDURE — APPNB30 APP NON BILLABLE TIME 0-30 MINS: Performed by: NURSE PRACTITIONER

## 2021-06-04 PROCEDURE — 85027 COMPLETE CBC AUTOMATED: CPT

## 2021-06-04 PROCEDURE — 6370000000 HC RX 637 (ALT 250 FOR IP): Performed by: INTERNAL MEDICINE

## 2021-06-04 PROCEDURE — 83721 ASSAY OF BLOOD LIPOPROTEIN: CPT

## 2021-06-04 PROCEDURE — 90935 HEMODIALYSIS ONE EVALUATION: CPT

## 2021-06-04 PROCEDURE — 2580000003 HC RX 258: Performed by: FAMILY MEDICINE

## 2021-06-04 PROCEDURE — 02HV33Z INSERTION OF INFUSION DEVICE INTO SUPERIOR VENA CAVA, PERCUTANEOUS APPROACH: ICD-10-PCS | Performed by: THORACIC SURGERY (CARDIOTHORACIC VASCULAR SURGERY)

## 2021-06-04 PROCEDURE — 5A1D70Z PERFORMANCE OF URINARY FILTRATION, INTERMITTENT, LESS THAN 6 HOURS PER DAY: ICD-10-PCS | Performed by: INTERNAL MEDICINE

## 2021-06-04 RX ORDER — LIDOCAINE HYDROCHLORIDE 10 MG/ML
20 INJECTION, SOLUTION EPIDURAL; INFILTRATION; INTRACAUDAL; PERINEURAL ONCE
Status: COMPLETED | OUTPATIENT
Start: 2021-06-04 | End: 2021-06-04

## 2021-06-04 RX ORDER — HEPARIN SODIUM 1000 [USP'U]/ML
6000 INJECTION, SOLUTION INTRAVENOUS; SUBCUTANEOUS ONCE
Status: COMPLETED | OUTPATIENT
Start: 2021-06-04 | End: 2021-06-04

## 2021-06-04 RX ORDER — HEPARIN SODIUM 200 [USP'U]/100ML
7 INJECTION, SOLUTION INTRAVENOUS CONTINUOUS
Status: ACTIVE | OUTPATIENT
Start: 2021-06-04 | End: 2021-06-04

## 2021-06-04 RX ADMIN — PANTOPRAZOLE SODIUM 40 MG: 40 TABLET, DELAYED RELEASE ORAL at 06:10

## 2021-06-04 RX ADMIN — SEVELAMER CARBONATE 800 MG: 800 TABLET, FILM COATED ORAL at 12:37

## 2021-06-04 RX ADMIN — Medication 10 ML: at 21:55

## 2021-06-04 RX ADMIN — Medication 10 ML: at 21:56

## 2021-06-04 RX ADMIN — TORSEMIDE 200 MG: 100 TABLET ORAL at 08:05

## 2021-06-04 RX ADMIN — METOPROLOL TARTRATE 50 MG: 50 TABLET, FILM COATED ORAL at 08:05

## 2021-06-04 RX ADMIN — Medication 10 ML: at 08:06

## 2021-06-04 RX ADMIN — SEVELAMER CARBONATE 800 MG: 800 TABLET, FILM COATED ORAL at 08:05

## 2021-06-04 RX ADMIN — NIFEDIPINE 30 MG: 30 TABLET, FILM COATED, EXTENDED RELEASE ORAL at 21:54

## 2021-06-04 RX ADMIN — METOPROLOL TARTRATE 50 MG: 50 TABLET, FILM COATED ORAL at 21:54

## 2021-06-04 RX ADMIN — LIDOCAINE HYDROCHLORIDE 8 ML: 10 INJECTION, SOLUTION EPIDURAL; INFILTRATION; INTRACAUDAL; PERINEURAL at 12:00

## 2021-06-04 RX ADMIN — HEPARIN SODIUM 5000 UNITS: 5000 INJECTION INTRAVENOUS; SUBCUTANEOUS at 06:10

## 2021-06-04 RX ADMIN — HEPARIN SODIUM IN SODIUM CHLORIDE 7 UNITS/KG/HR: 200 INJECTION INTRAVENOUS at 12:15

## 2021-06-04 RX ADMIN — ASPIRIN 81 MG: 81 TABLET, COATED ORAL at 08:05

## 2021-06-04 RX ADMIN — ATORVASTATIN CALCIUM 20 MG: 20 TABLET, FILM COATED ORAL at 21:55

## 2021-06-04 RX ADMIN — HEPARIN SODIUM 3400 UNITS: 1000 INJECTION INTRAVENOUS; SUBCUTANEOUS at 12:27

## 2021-06-04 ASSESSMENT — PAIN SCALES - GENERAL
PAINLEVEL_OUTOF10: 0

## 2021-06-04 NOTE — PLAN OF CARE
Problem: Falls - Risk of:  Goal: Will remain free from falls  Description: Will remain free from falls  6/3/2021 2214 by Ann Marie Mckinney RN  Outcome: Ongoing  Note: Pt remains free of falls. Fall risk protocol in place. Bed locked in lowest position. Call light in reach. Non skid socks in place. Pt instructed to call for assistance, verbalizes understanding. Will continue to monitor. Problem: Pain:  Goal: Pain level will decrease  Description: Pain level will decrease  6/3/2021 2214 by Ann Marie Mckinney RN  Outcome: Ongoing  Note: Pt alert and oriented. Pt able to communicate present pain and use the pain scale appropriately. Nonpharmacological pain reducers and pain medication offered as needed. Will cont to monitor.

## 2021-06-04 NOTE — PROGRESS NOTES
[facility] Daily Progress Note      Admit Date:  5/31/2021    Chief Complaint   Patient presents with    Loss of Consciousness     last week had syncopal episode, cont to feel faint, dizzy while walking. Subjective:  Ms. Bryce Felix denies exertional chest pain, SOB/CABA, PND, palpitations, light-headedness, or edema.     Objective:   BP (!) 161/52   Pulse 69   Temp 96.4 °F (35.8 °C) (Temporal)   Resp 18   Ht 5' 9\" (1.753 m)   Wt 215 lb 2.7 oz (97.6 kg)   LMP 05/12/2021 (Approximate)   SpO2 98%   BMI 31.77 kg/m²       Intake/Output Summary (Last 24 hours) at 6/4/2021 1334  Last data filed at 6/4/2021 0555  Gross per 24 hour   Intake --   Output 1107 ml   Net -1107 ml       TELEMETRY: Sinus     Physical Exam:  General:  Awake, alert, oriented x 3, NAD  Skin:  Warm and dry  Neck:  JVD flat  Chest:  normal air entry  Cardiovascular:  RRR S1S2, no S3, no mrmr  Abdomen:  Soft, ND, NT, No HSM  Extremities:  No edema    Medications:    insulin aspart  0-18 Units Subcutaneous TID WC    insulin aspart  0-9 Units Subcutaneous Nightly    insulin detemir  24 Units Subcutaneous Nightly    NIFEdipine  30 mg Oral Nightly    sodium chloride flush  5-40 mL Intravenous 2 times per day    gentamicin   Topical Daily    sevelamer  800 mg Oral TID WC    aspirin  81 mg Oral Daily    atorvastatin  20 mg Oral Nightly    metoprolol tartrate  50 mg Oral BID    torsemide  200 mg Oral Daily    sodium chloride flush  5-40 mL Intravenous 2 times per day    [Held by provider] heparin (porcine)  5,000 Units Subcutaneous 3 times per day    pantoprazole  40 mg Oral QAM AC      Heparin (Porcine) Stopped (06/04/21 1229)    sodium chloride      dextrose      sodium chloride       sodium chloride flush, sodium chloride, acetaminophen, oxyCODONE-acetaminophen **OR** oxyCODONE-acetaminophen, hydrALAZINE, glucose, dextrose, glucagon (rDNA), dextrose, sodium chloride flush, sodium chloride, promethazine **OR** ondansetron, polyethylene glycol, acetaminophen **OR** acetaminophen    Lab Data:  CBC:   Recent Labs     06/02/21  0416 06/03/21  0545 06/04/21  0600   WBC 9.3 9.8 10.6   HGB 10.5* 10.6* 10.9*   HCT 31.8* 32.1* 33.2*   MCV 91.1 90.9 91.3    421 427     BMP:   Recent Labs     06/02/21  0416 06/03/21  0545 06/04/21  0600   * 132* 133*   K 3.7 3.8 3.7   CL 86* 86* 89*   CO2 24 24 22   BUN 63* 61* 61*   CREATININE 13.2* 13.6* 14.3*     LIVER PROFILE: No results for input(s): AST, ALT, LIPASE, BILIDIR, BILITOT, ALKPHOS in the last 72 hours. Invalid input(s): AMYLASE,  ALB  PT/INR:   Recent Labs     06/04/21  0600   PROTIME 11.8   INR 1.02     APTT:   Recent Labs     06/04/21  0600   APTT 30.1     BNP:  No results for input(s): BNP in the last 72 hours. IMAGING: Cardiac Cath PCI:  Anatomy:   LM-nml   LAD-ostial 90%  Cx-ostial 80%  OM- nml  RCA-prox 20%  RPDA- prox 90%  LVEF- 50%  LVG- nml  LVEDP- 0    Assessment/Plan:  Active Problems:       CAD: severe MVD, severe Diabetes, needs CABG. Was taking plavix for PAD and recent endovascular procedure. Plavix on hold for CABG next week. Cont aspirin statin. Plan for HD pre CABG.          Eddie Richter MD, MD 6/4/2021 1:34 PM

## 2021-06-04 NOTE — PLAN OF CARE
Problem: Falls - Risk of:  Goal: Will remain free from falls  Outcome: Ongoing  Note: Fall Risk = high; Up as tolerated; Patient has steady gait; Ambulation Equipment: None; Call-light within reach and patient uses call-light for assistance; Bed alarm: NO; Non-skid socks worn when out of bed; Side rails up 2/4; Educated patient on fall risk status and need to call for assistance;  Patient verbalizes understanding of fall risk status and fall education; Pt experienced no falls/injuries this shift      Problem: Pain:  Goal: Pain level will decrease  Outcome: Ongoing  Note: Pt has no c/o pain     Pt had vascath placed today - R fem     - did NOT tolerate HD, will try again tomorrow

## 2021-06-04 NOTE — PROGRESS NOTES
Physician Progress Note      Francois Staley  CSN #:                  [id number]  :                       1975  ADMIT DATE:       2021 11:53 AM  [address] [place] DATE:  Carmenjohnny Shone  PROVIDER #:        Kalin Clark MD          QUERY TEXT:    Dear Dr. Harpal Morley,    Patient admitted with lightheadedness worsening. Noted to have Multivessel CAD   on LHC. If possible, please document in progress notes and discharge summary   if you are evaluating and/or treating any of the following: The medical record reflects the following:  Risk Factors: CAD, ESRD, DM uncontrolled  Clinical Indicators: Patient with complaints of persistent lightheadedness. Per EPS progress notes of 6/3, syncope etiology likely from autonomic   dysfunction secondary to uncontrolled diabetes, event monitor shows no   arrhythmias documented during syncopal episodes. Cardiology consult of 21   notes syncope possible vasovagal syncope. Treatment: Telemetry, EKG, LHC, Echo, Cardiology consult, EPS consult,   Nephrology consult, Imaging, Event monitor. Thank you. Radha Bo RN CDS   [email or web address]  Options provided:  -- Syncope due to orthostatic hypotension secondary to Diabetic autonomic   neuropathy  -- Syncope due to other hypotension  -- Other - I will add my own diagnosis  -- Disagree - Not applicable / Not valid  -- Disagree - Clinically unable to determine / Unknown  -- Refer to Clinical Documentation Reviewer    PROVIDER RESPONSE TEXT:    Provider is clinically unable to determine a response to this query. Query created by: Eli Sims on 6/3/2021 12:58 PM      QUERY TEXT:    Dear Dr. Harpal Morley,    Patient admitted with syncope. If possible, please document in progress notes   and discharge summary if you are evaluating and /or treating any of the   following:     The medical record reflects the following:    Risk Factors: Diabetes, ESRD, Recent surgical procedure  Clinical Indicators: Dietitian note of 6/3/21 with the following, patient with   severe malnutrition in the context of chronic illness, patient with increased   nutritional needs related to recent amputations, patient with poor appetite   and anorexia. Weight loss of 24 pounds in last month. Meeting ASPEN of   energy intake and weight loss. Treatment: Ensure supplement daily, Monitoring of intake. Lab work. Thank you   [email or web address]  Options provided:  -- Protein calorie malnutrition severe  -- Other - I will add my own diagnosis  -- Disagree - Not applicable / Not valid  -- Disagree - Clinically unable to determine / Unknown  -- Refer to Clinical Documentation Reviewer    PROVIDER RESPONSE TEXT:    Provider is clinically unable to determine a response to this query.     Query created by: Elliott Ocampo on 6/3/2021 1:24 PM      Electronically signed by:  Braeden Rosales MD 6/4/2021 6:19 PM

## 2021-06-04 NOTE — PROGRESS NOTES
CCPD Order   Exchanges: 5   Exchange Volume: 2500 ml   Total Time: 10 hrs   Dextrose: 2.5%   Last Fill: 0 ml   Total Volume: 71196 ml    Emergency disconnect instructions on pt door. Pt stable upon leaving room      Orders verified. Supplies taken to pt's room. Report received. Cycler set up, primed and pre tested. Dressing changed on [facility] Cath site. Pt connected to cycler. CCPD initiated without problem. Initial effluent clear.

## 2021-06-04 NOTE — DISCHARGE SUMMARY
Discharge Summary    Patient:  Doreen Hannah 1975 [id number]   Admission Date:  5/31/2021 11:53 AM  Discharge Date:  6/16/2021    Principle Diagnosis:  Severe multivessel coronary artery disease    Secondary Diagnosis:    Diabetes mellitus ([facility]  ESRD (end stage renal disease) ([facility]  Diabetic polyneuropathy   Atherosclerosis of native arteries of left leg with ulceration of other part of foot ([facility]  HLD  HTN     Angiogram: 6/2/2021  Anatomy:   LM-nml   LAD-ostial 90%  Cx-ostial 80%  OM- nml  RCA-prox 20%  RPDA- prox 90%  LVEF- 50%  LVG- nml  LVEDP- 0    Procedure: 6/9/2021  URGENT CABG CORONARY ARTERY BYPASS GRAFTS X3. VEIN TO PDA X1, VEIN TO OM X1, LEFT INTERNAL MAMMARY ARTERY TO LAD X1. ENDOSCOPIC HARVEST RIGHT LEG SAPHENOUS VEIN. LIGATION NYA USING 35MM ATRICLIP. EPI AORTIC ULTRASOUND. TOTAL CARDIOPULMONARY BYPASS. DOPPLER GRAFT FLOW VERIFICATION. BILATERAL INITERCOSTAL NERVE BLOCK USING 1.3% EXPAREL.     History:The patient is a 51 y.o. female with significant past medical history of ESRD (PD nightly X 2 yrs), uncontrolled DM2, HTN, HLD, peripheral neuropathy, PAD (toe amputations) who presents to the ER on 5/31 with lightheadedness, dizziness, and syncopal episode. Patient had a similar syncopal episode a few weeks prior. Only lost consciousness a few seconds. She reports in retrospect that the pre-syncopal episodes were associated with chest pressure, but not pain. Left heart angiogram personally reviewed found severe multivessel coronary artery disease with good-sized distal targets. CVTS was consulted for surgical evaluation for myocardial revascularization. Non contrast CT chest also personally reviewed shows circumferential ascending aortic calcifications. Patient was being worked up for kidney transplant and had an abnormal stress test 4/20/21 and was scheduled to have left heart angiogram at  (Dr. Sabrina Jimenes) on 6/3/2021.  Patient follows Dr. Felipe Brown for PAD and had left popliteal angioplasty and mouth nightly             bisacodyl (DULCOLAX) 10 MG suppository  Place 1 suppository rectally daily as needed for Constipation             calcium acetate 667 MG TABS  Take 667 mg by mouth 3 times daily (with meals)             Continuous Blood Gluc  (FREESTYLE KOREY 14 DAY READER) JOHNNIE  To test glucose             Continuous Blood Gluc  (FREESTYLE KOREY 2 READER) JOHNNIE  Use to check glucose             Continuous Blood Gluc Sensor (FREESTYLE KOREY 14 DAY SENSOR) MISC  Check glucose ---change every 14 days             Continuous Blood Gluc Sensor (FREESTYLE KOREY 2 SENSOR) MISC  Change every 14 days             fluticasone (FLONASE) 50 MCG/ACT nasal spray  1 spray by Each Nostril route daily as needed for Rhinitis             insulin aspart (NOVOLOG FLEXPEN) 100 UNIT/ML injection pen  Inject 10 Units into the skin 3 times daily (before meals)             insulin aspart (NOVOLOG) 100 UNIT/ML injection vial  Inject 0-18 Units into the skin 3 times daily (with meals)             insulin aspart (NOVOLOG) 100 UNIT/ML injection vial  Inject 0-9 Units into the skin nightly             insulin detemir (LEVEMIR FLEXTOUCH) 100 UNIT/ML injection pen  Inject 25 Units into the skin nightly             Insulin Pen Needle 32G X 6 MM MISC  Use with insulin pens 4 times daily             metoprolol tartrate (LOPRESSOR) 25 MG tablet  Take 1 tablet by mouth 2 times daily             nystatin (MYCOSTATIN) 516292 UNIT/ML suspension  Take 5 mLs by mouth 4 times daily for 10 days             pantoprazole (PROTONIX) 40 MG tablet  Take 1 tablet by mouth daily             polyethylene glycol (GLYCOLAX) 17 g packet  Take 17 g by mouth daily as needed for Constipation             promethazine (PHENERGAN) 25 MG tablet  Take 1 tablet by mouth every 6 hours as needed for Nausea WARNING:  May cause drowsiness. May impair ability to operate vehicles or machinery. Do not use in combination with alcohol. sennosides-docusate sodium (SENOKOT-S) 8.6-50 MG tablet  Take 1 tablet by mouth 2 times daily             sevelamer (RENVELA) 800 MG tablet  Take 1 tablet by mouth 3 times daily (with meals)             traMADol (ULTRAM) 50 MG tablet  Take 1 tablet by mouth every 6 hours as needed for Pain for up to 7 days. zolpidem (AMBIEN) 5 MG tablet  Take 1 tablet by mouth nightly as needed for Sleep for up to 14 days. Labs:  Renal:   Lab Results   Component Value Date     06/16/2021    K 4.2 06/16/2021    K 5.6 06/15/2021    CL 91 06/16/2021    CO2 22 06/16/2021    PHOS 4.5 06/14/2021    BUN 59 06/16/2021    CREATININE 6.8 06/16/2021     Heme:   Lab Results   Component Value Date    WBC 14.5 06/16/2021    HGB 7.3 06/16/2021    HCT 22.7 06/16/2021    MCV 89.5 06/16/2021     06/16/2021     HgA1C:   Lab Results   Component Value Date    LABA1C 8.8 05/31/2021     Admission WT: 101 kg  Pre-Op WT: 99.5 kg  Discharge WT: 103.1 kg      Patient Instructions: See AVS for full list of discharge instructions given to patient  Activity: DO NOT LIFT, PUSH, OR PULL ANYTHING OVER 5 POUNDS FOR 8 WEEK from the day of surgery  Diet:  cardiac diet and diabetic diet  Wound Care:  [facility] YOUR INCISIONS DAILY WITH A CLEAN WASHCLOTH AND ANTIBACTERIAL SOAP. Do not wash your incisions after you have cleansed other parts of your body. Covid-19 protocol: Patient was instructed to adhere strictly to social distancing measures and avoid  close contact with individuals who are at risk for being asymptomatic carries and/or have had known contact with a diagnosed COVID-19 patient over the next few weeks.     Follow-up Appointments:   Cardiothoracic Surgeon, Dr. Frantz Chandra - appointment to be scheduled once discharged from [facility]    Cardiology CNP Paulette Calhoun on 7/9 @ 9:15am      Electronically signed by RICHARD Santo - CNP on 6/16/2021 at 10:00 PM

## 2021-06-04 NOTE — PROGRESS NOTES
Office : [phone]     Fax :[phone]       Nephrology progress  Note      Patient's Name: Britni Garcia  11:19 AM  2021    Reason for Consult:  ESRD    History of Present Ilness:    Britni Garcia is a 51 y.o. female with severe end-stage renal disease secondary to diabetic nephropathy on peritoneal dialysis, hypertension, peripheral vascular disease, diabetic neuropathy who was admitted after she had a syncopal episode. She had similar episode 2 weeks ago. Recently had angiogram left lower extremity for gangrene left foot second toe. Denies any shortness of breath. Denies any abdominal pain. Denies any nausea vomiting. Interval HX :    No new complaints today.   Denies any chest pain  S/p [facility]   Has multivessel CAD     Plan for CABG on Wednesday    I/O last 3 completed shifts:  In: -   Out: 1107     Past Medical History:   Diagnosis Date    Diabetes mellitus ([address]     End stage kidney disease ([address]     peritoneal dialysis every night at home x 2 years    Hypertension     Neuropathy     Peripheral vascular disease [address]        Past Surgical History:   Procedure Laterality Date     SECTION      OTHER SURGICAL HISTORY  2018     lap PD cath placement lt side 62 cm    TOE AMPUTATION Left 2021    AMPUTATION OF THE LEFT THIRD TOE performed by Sam Antony DPM at [address] TOE AMPUTATION Left 2021    AMPUTATION OF HALLUX AND SECOND TOE, LEFT FOOT performed by Sam Antony DPM at [address] TONSILLECTOMY         Family History   Problem Relation Age of Onset    Coronary Art Dis Mother     High Blood Pressure Mother     Heart Disease Mother     Diabetes Mother     Cancer Father     Coronary Art Dis Father     High Blood Pressure X3 NAD  Skin: no rash, turgor wnl  Heent:  eomi, mmm  Neck: no bruits or jvd noted  Cardiovascular:  S1, S2 without m/r/g  Respiratory: CTA B without w/r/r  Abdomen:  +bs, soft, nt, nd  Ext: no  lower extremity edema  Psychiatric: mood and affect appropriate  Musculoskeletal:  Rom, muscular strength intact    Labs:  CBC:   Recent Labs     06/02/21  0416 06/03/21  0545 06/04/21  0600   WBC 9.3 9.8 10.6   HGB 10.5* 10.6* 10.9*    421 427     BMP:    Recent Labs     06/02/21  0416 06/03/21  0545 06/04/21  0600   * 132* 133*   K 3.7 3.8 3.7   CL 86* 86* 89*   CO2 24 24 22   BUN 63* 61* 61*   CREATININE 13.2* 13.6* 14.3*   GLUCOSE 253* 229* 248*     Ca/Mg/Phos:   Recent Labs     06/02/21 0416 06/03/21  0545 06/04/21  0600   CALCIUM 8.8 8.8 9.1     Hepatic:   No results for input(s): AST, ALT, ALB, BILITOT, ALKPHOS in the last 72 hours. Troponin:   No results for input(s): TROPONINI in the last 72 hours. BNP: No results for input(s): BNP in the last 72 hours. Lipids: No results for input(s): CHOL, TRIG, HDL, LDLCALC, LABVLDL in the last 72 hours. ABGs:   Recent Labs     06/03/21  1133   PHART 7.406   PO2ART 88.0   FPT9IST 38.9     INR:   Recent Labs     06/04/21  0600   INR 1.02     UA:No results for input(s): Linda Labs, GLUCOSEU, BILIRUBINUR, Dub Dancer, BLOODU, PHUR, PROTEINU, UROBILINOGEN, NITRU, LEUKOCYTESUR, Manny Bergeron in the last 72 hours. Urine Microscopic: No results for input(s): LABCAST, BACTERIA, COMU, HYALCAST, WBCUA, RBCUA, EPIU in the last 72 hours. Urine Culture: No results for input(s): LABURIN in the last 72 hours. Urine Chemistry: No results for input(s): Eudelia Moots, PROTEINUR, NAUR in the last 72 hours. IMAGING:  VL DUP CAROTID BILATERAL   Final Result      VL PRE OP VEIN MAPPING   Final Result      CT CHEST WO CONTRAST   Final Result   No acute intrathoracic abnormality         MRI BRAIN WO CONTRAST   Final Result   1.  No acute intracranial abnormality. 2. Mild chronic white matter microvascular ischemic changes. 3. Right maxillary sinusitis. XR CHEST PORTABLE   Final Result   Cardiomegaly with stable right pleural effusion         IR FLUORO GUIDED CVA DEVICE PLMT/REPLACE/REMOVAL    (Results Pending)           Assessment/Plan :      1. ESRD. On peritoneal dialysis. Discussed with her in detail. We will have to switch her temporarily to hemodialysis pre and postop. She has given consent. Will consult IR to put up temporary catheter  Will start hemodialysis today. Discussed with dialysis nurse    2. HTN. Labile. Check orthostatics statics  Decreased dose of nifedipine XL to 30 mg po daily     3. Anemia of chronic disease. Gets micera at dialysis unit    4. Acid- base disorder. Monitor    5. Electrolytes monitor and replace as needed    6. Syncopal episode. EP following   S/p [facility]   Has Multivessel CAD   Plan for CABG on Wednesday next week      7. Peripheral vascular disease. S/p amputation of left foot toes.  Podiatry following             Thank you for allowing us to participate in care of Leonidas Wright         Electronically signed by: Edu Tate MD, 6/4/2021, 11:19 AM      [facility] of [address]  Office : [phone]  Fax :[phone]

## 2021-06-04 NOTE — PROGRESS NOTES
CC: 3V CAD, hostile ascending aorta, ESRD, poorly controlled DM, HTN, HLD    Patient seen today and discussed with Dr. Gonzalez Nielsen done  No c/o  CTAB RRR  As per CC  For HD during plavix metabolism in anticipation of CABG next week  All questions answered

## 2021-06-04 NOTE — PROGRESS NOTES
Hospitalist Progress Note      PCP: Vika Gonzalez MD    Date of Admission: 5/31/2021    Chief Complaint: [facility] Course: 14-year-old female was past medical history of ESRD on peritoneal dialysis, hypertension, hyperlipidemia, diabetes, peripheral vascular disease status post left toe amputation last week presents with presyncopal episode. She also describes a syncopal episode couple of weeks ago. Patient has recently been undergoing evaluation for possible renal transplant has had stress test performed at [facility] which was abnormal.  Patient is scheduled for outpatient left heart cath on 6/3/2021 . Patient is labile blood pressures, but negative orthostatics. Nephrology is adjusting blood pressure medications. She had left heart cath performed 6/3/2021 which showed multivessel disease. CTS consulted the plan for possible CABG. Her Plavix is being held    Subjective: Patient seen and examined at bedside. She tolerated this procedure well. She has no new complaints. She understands the plan for evaluation for CABG and possible procedure next week.       Medications:  Reviewed    Infusion Medications    sodium chloride      dextrose      sodium chloride       Scheduled Medications    insulin aspart  0-18 Units Subcutaneous TID WC    insulin aspart  0-9 Units Subcutaneous Nightly    insulin detemir  24 Units Subcutaneous Nightly    NIFEdipine  30 mg Oral Nightly    sodium chloride flush  5-40 mL Intravenous 2 times per day    gentamicin   Topical Daily    sevelamer  800 mg Oral TID WC    aspirin  81 mg Oral Daily    atorvastatin  20 mg Oral Nightly    metoprolol tartrate  50 mg Oral BID    torsemide  200 mg Oral Daily    sodium chloride flush  5-40 mL Intravenous 2 times per day    [Held by provider] heparin (porcine)  5,000 Units Subcutaneous 3 times per day    pantoprazole  40 mg Oral QAM AC     PRN Meds: sodium chloride flush, sodium chloride, acetaminophen, oxyCODONE-acetaminophen **OR** oxyCODONE-acetaminophen, hydrALAZINE, glucose, dextrose, glucagon (rDNA), dextrose, sodium chloride flush, sodium chloride, promethazine **OR** ondansetron, polyethylene glycol, acetaminophen **OR** acetaminophen      Intake/Output Summary (Last 24 hours) at 6/4/2021 1805  Last data filed at 6/4/2021 1730  Gross per 24 hour   Intake 700 ml   Output 1295 ml   Net -595 ml       Physical Exam Performed:    /87   Pulse 77   Temp 96.5 °F (35.8 °C) (Temporal)   Resp 16   Ht 5' 9\" (1.753 m)   Wt 222 lb 10.6 oz (101 kg)   LMP 05/12/2021 (Approximate)   SpO2 98%   BMI 32.88 kg/m²     General appearance: No apparent distress, appears stated age and cooperative. HEENT: Pupils equal, round, and reactive to light. Conjunctivae/corneas clear. Neck: Supple, with full range of motion. No jugular venous distention. Trachea midline. Respiratory:  Normal respiratory effort. Clear to auscultation, bilaterally without Rales/Wheezes/Rhonchi. Cardiovascular: Regular rate and rhythm with normal S1/S2 without murmurs, rubs or gallops. Abdomen: Soft, non-tender, non-distended with normal bowel sounds. Musculoskeletal: No clubbing, cyanosis or edema bilaterally. Full range of motion without deformity. Skin: Skin color, texture, turgor normal.  No rashes or lesions. Neurologic:  Neurovascularly intact without any focal sensory/motor deficits.  Cranial nerves: II-XII intact, grossly non-focal.  Psychiatric: Alert and oriented, thought content appropriate, normal insight  Capillary Refill: Brisk,< 3 seconds   Peripheral Pulses: +2 palpable, equal bilaterally       Labs:   Recent Labs     06/02/21  0416 06/03/21  0545 06/04/21  0600   WBC 9.3 9.8 10.6   HGB 10.5* 10.6* 10.9*   HCT 31.8* 32.1* 33.2*    421 427     Recent Labs     06/02/21  0416 06/03/21  0545 06/04/21  0600   * 132* 133*   K 3.7 3.8 3.7   CL 86* 86* 89*   CO2 24 24 22   BUN 63* 61* 61*   CREATININE 13.2* 13.6* 14.3* CALCIUM 8.8 8.8 9.1     No results for input(s): AST, ALT, BILIDIR, BILITOT, ALKPHOS in the last 72 hours. Recent Labs     06/04/21  0600   INR 1.02     No results for input(s): Tennis Rubia in the last 72 hours. Urinalysis:      Lab Results   Component Value Date    NITRU Negative 05/16/2021    WBCUA 6-9 05/16/2021    BACTERIA 1+ 02/28/2014    RBCUA 0-2 05/16/2021    BLOODU SMALL 05/16/2021    SPECGRAV 1.017 05/16/2021    GLUCOSEU 500 05/16/2021       Radiology:  IR FLUORO GUIDED CVA DEVICE PLMT/REPLACE/REMOVAL   Final Result   Technically successful right-sided HD catheter placement. Catheter is okay   for use. VL DUP CAROTID BILATERAL   Final Result      VL PRE OP VEIN MAPPING   Final Result      CT CHEST WO CONTRAST   Final Result   No acute intrathoracic abnormality         MRI BRAIN WO CONTRAST   Final Result   1. No acute intracranial abnormality. 2. Mild chronic white matter microvascular ischemic changes. 3. Right maxillary sinusitis. XR CHEST PORTABLE   Final Result   Cardiomegaly with stable right pleural effusion                 Assessment/Plan:    Active Hospital Problems    Diagnosis     Severe malnutrition ([address] [E43]     Coronary artery disease of native artery of native heart with stable angina pectoris (HCC) [I25.118]     Near syncope [R55]     Atherosclerosis of native arteries of left leg with ulceration of other part of foot ([address] [I70.245]     Diabetic polyneuropathy associated with type 2 diabetes mellitus ([address] [E11.42]     ESRD (end stage renal disease) ([address] [N18.6]     Diabetes mellitus ([address] [E11.9]        Multivessel CAD  Status post left heart cath 6/3/2021  CTS eval in progress  Plavix is being held for possible procedure medial next week    Presyncope  Secondary to labile BP? Possibly orthostatic, however orthostatic vital signs negative here  Vasovagal?  Autonomic instability in view of diabetes?   Limited cardiac output in view of severe CAD?    ESRD on PD  Nephrology consulted    Hypertension  With labile BP  BP meds adjusting per nephrology    DVT Prophylaxis: Heparin  Diet: ADULT DIET; Regular; 4 carb choices (60 gm/meal); Low Fat/Low Chol/High Fiber/QUINN; Low Phosphorus (Less than 1000 mg)  Adult Oral Nutrition Supplement;  Low Calorie/High Protein Oral Supplement  Code Status: Full Code      Electronically signed by Mindy Sampson MD on 6/4/2021 at 6:05 PM

## 2021-06-05 LAB
ANION GAP SERPL CALCULATED.3IONS-SCNC: 21 MMOL/L (ref 3–16)
BUN BLDV-MCNC: 46 MG/DL (ref 7–20)
CALCIUM SERPL-MCNC: 9.1 MG/DL (ref 8.3–10.6)
CHLORIDE BLD-SCNC: 89 MMOL/L (ref 99–110)
CO2: 24 MMOL/L (ref 21–32)
CREAT SERPL-MCNC: 11.7 MG/DL (ref 0.6–1.1)
GFR AFRICAN AMERICAN: 4
GFR NON-AFRICAN AMERICAN: 3
GLUCOSE BLD-MCNC: 129 MG/DL (ref 70–99)
GLUCOSE BLD-MCNC: 165 MG/DL (ref 70–99)
GLUCOSE BLD-MCNC: 235 MG/DL (ref 70–99)
GLUCOSE BLD-MCNC: 244 MG/DL (ref 70–99)
HCT VFR BLD CALC: 29.5 % (ref 36–48)
HEMOGLOBIN: 9.9 G/DL (ref 12–16)
MCH RBC QN AUTO: 30 PG (ref 26–34)
MCHC RBC AUTO-ENTMCNC: 33.4 G/DL (ref 31–36)
MCV RBC AUTO: 89.8 FL (ref 80–100)
PDW BLD-RTO: 14.1 % (ref 12.4–15.4)
PERFORMED ON: ABNORMAL
PLATELET # BLD: 412 K/UL (ref 135–450)
PMV BLD AUTO: 8.2 FL (ref 5–10.5)
POTASSIUM SERPL-SCNC: 3.5 MMOL/L (ref 3.5–5.1)
RBC # BLD: 3.29 M/UL (ref 4–5.2)
SODIUM BLD-SCNC: 134 MMOL/L (ref 136–145)
WBC # BLD: 11.9 K/UL (ref 4–11)

## 2021-06-05 PROCEDURE — 6370000000 HC RX 637 (ALT 250 FOR IP): Performed by: FAMILY MEDICINE

## 2021-06-05 PROCEDURE — 6370000000 HC RX 637 (ALT 250 FOR IP): Performed by: INTERNAL MEDICINE

## 2021-06-05 PROCEDURE — 2580000003 HC RX 258: Performed by: INTERNAL MEDICINE

## 2021-06-05 PROCEDURE — P9047 ALBUMIN (HUMAN), 25%, 50ML: HCPCS

## 2021-06-05 PROCEDURE — 99233 SBSQ HOSP IP/OBS HIGH 50: CPT | Performed by: HOSPITALIST

## 2021-06-05 PROCEDURE — 80048 BASIC METABOLIC PNL TOTAL CA: CPT

## 2021-06-05 PROCEDURE — 6360000002 HC RX W HCPCS

## 2021-06-05 PROCEDURE — 85027 COMPLETE CBC AUTOMATED: CPT

## 2021-06-05 PROCEDURE — 2580000003 HC RX 258: Performed by: FAMILY MEDICINE

## 2021-06-05 PROCEDURE — 2140000000 HC CCU INTERMEDIATE R&B

## 2021-06-05 PROCEDURE — 90935 HEMODIALYSIS ONE EVALUATION: CPT

## 2021-06-05 RX ORDER — ALBUMIN (HUMAN) 12.5 G/50ML
SOLUTION INTRAVENOUS
Status: COMPLETED
Start: 2021-06-05 | End: 2021-06-05

## 2021-06-05 RX ORDER — HEPARIN SODIUM 1000 [USP'U]/ML
3500 INJECTION, SOLUTION INTRAVENOUS; SUBCUTANEOUS PRN
Status: DISCONTINUED | OUTPATIENT
Start: 2021-06-05 | End: 2021-06-08

## 2021-06-05 RX ORDER — ALBUMIN (HUMAN) 12.5 G/50ML
25 SOLUTION INTRAVENOUS PRN
Status: DISCONTINUED | OUTPATIENT
Start: 2021-06-05 | End: 2021-06-09

## 2021-06-05 RX ADMIN — ACETAMINOPHEN 650 MG: 325 TABLET ORAL at 22:13

## 2021-06-05 RX ADMIN — Medication 5 ML: at 09:00

## 2021-06-05 RX ADMIN — SEVELAMER CARBONATE 800 MG: 800 TABLET, FILM COATED ORAL at 22:13

## 2021-06-05 RX ADMIN — Medication 10 ML: at 21:56

## 2021-06-05 RX ADMIN — METOPROLOL TARTRATE 50 MG: 50 TABLET, FILM COATED ORAL at 21:55

## 2021-06-05 RX ADMIN — ATORVASTATIN CALCIUM 20 MG: 20 TABLET, FILM COATED ORAL at 21:55

## 2021-06-05 RX ADMIN — ALBUMIN (HUMAN) 25 G: 0.25 INJECTION, SOLUTION INTRAVENOUS at 18:30

## 2021-06-05 RX ADMIN — PANTOPRAZOLE SODIUM 40 MG: 40 TABLET, DELAYED RELEASE ORAL at 06:17

## 2021-06-05 RX ADMIN — ASPIRIN 81 MG: 81 TABLET, COATED ORAL at 21:55

## 2021-06-05 RX ADMIN — Medication 10 ML: at 22:19

## 2021-06-05 RX ADMIN — NIFEDIPINE 30 MG: 30 TABLET, FILM COATED, EXTENDED RELEASE ORAL at 21:55

## 2021-06-05 RX ADMIN — SEVELAMER CARBONATE 800 MG: 800 TABLET, FILM COATED ORAL at 08:08

## 2021-06-05 ASSESSMENT — PAIN SCALES - GENERAL
PAINLEVEL_OUTOF10: 0
PAINLEVEL_OUTOF10: 3
PAINLEVEL_OUTOF10: 0
PAINLEVEL_OUTOF10: 0
PAINLEVEL_OUTOF10: 3
PAINLEVEL_OUTOF10: 0
PAINLEVEL_OUTOF10: 0

## 2021-06-05 ASSESSMENT — PAIN DESCRIPTION - FREQUENCY: FREQUENCY: INTERMITTENT

## 2021-06-05 ASSESSMENT — PAIN DESCRIPTION - DESCRIPTORS: DESCRIPTORS: THROBBING

## 2021-06-05 ASSESSMENT — PAIN DESCRIPTION - PROGRESSION: CLINICAL_PROGRESSION: GRADUALLY IMPROVING

## 2021-06-05 ASSESSMENT — PAIN DESCRIPTION - ORIENTATION: ORIENTATION: LEFT

## 2021-06-05 ASSESSMENT — PAIN DESCRIPTION - ONSET: ONSET: ON-GOING

## 2021-06-05 ASSESSMENT — PAIN DESCRIPTION - LOCATION: LOCATION: FOOT

## 2021-06-05 ASSESSMENT — PAIN - FUNCTIONAL ASSESSMENT: PAIN_FUNCTIONAL_ASSESSMENT: PREVENTS OR INTERFERES SOME ACTIVE ACTIVITIES AND ADLS

## 2021-06-05 ASSESSMENT — PAIN DESCRIPTION - PAIN TYPE: TYPE: SURGICAL PAIN

## 2021-06-05 NOTE — PROGRESS NOTES
Hospitalist Progress Note      PCP: Patel Soriano MD    Date of Admission: 5/31/2021    Chief Complaint: [facility] Course: 79-year-old female was past medical history of ESRD on peritoneal dialysis, hypertension, hyperlipidemia, diabetes, peripheral vascular disease status post left toe amputation last week presents with presyncopal episode. She also describes a syncopal episode couple of weeks ago. Patient has recently been undergoing evaluation for possible renal transplant has had stress test performed at [facility] which was abnormal.  Patient is scheduled for outpatient left heart cath on 6/3/2021 . Patient is labile blood pressures, but negative orthostatics. Nephrology is adjusting blood pressure medications. She had left heart cath performed 6/3/2021 which showed multivessel disease. CTS consulted the plan for possible CABG. Her Plavix is being held    Subjective: Patient seen and examined at bedside. She tolerated this procedure well. She has no new complaints. She understands the plan for evaluation for CABG and possible procedure next week.       Medications:  Reviewed    Infusion Medications    sodium chloride      dextrose      sodium chloride       Scheduled Medications    insulin detemir  27 Units Subcutaneous Nightly    insulin aspart  0-18 Units Subcutaneous TID WC    insulin aspart  0-9 Units Subcutaneous Nightly    NIFEdipine  30 mg Oral Nightly    sodium chloride flush  5-40 mL Intravenous 2 times per day    gentamicin   Topical Daily    sevelamer  800 mg Oral TID WC    aspirin  81 mg Oral Daily    atorvastatin  20 mg Oral Nightly    metoprolol tartrate  50 mg Oral BID    torsemide  200 mg Oral Daily    sodium chloride flush  5-40 mL Intravenous 2 times per day    [Held by provider] heparin (porcine)  5,000 Units Subcutaneous 3 times per day    pantoprazole  40 mg Oral QAM AC     PRN Meds: sodium chloride flush, sodium chloride, acetaminophen, CREATININE 13.6* 14.3* 11.7*   CALCIUM 8.8 9.1 9.1     No results for input(s): AST, ALT, BILIDIR, BILITOT, ALKPHOS in the last 72 hours. Recent Labs     06/04/21  0600   INR 1.02     No results for input(s): Myrna Tellez in the last 72 hours. Urinalysis:      Lab Results   Component Value Date    NITRU Negative 05/16/2021    WBCUA 6-9 05/16/2021    BACTERIA 1+ 02/28/2014    RBCUA 0-2 05/16/2021    BLOODU SMALL 05/16/2021    SPECGRAV 1.017 05/16/2021    GLUCOSEU 500 05/16/2021       Radiology:  IR FLUORO GUIDED CVA DEVICE PLMT/REPLACE/REMOVAL   Final Result   Technically successful right-sided HD catheter placement. Catheter is okay   for use. VL DUP CAROTID BILATERAL   Final Result      VL PRE OP VEIN MAPPING   Final Result      CT CHEST WO CONTRAST   Final Result   No acute intrathoracic abnormality         MRI BRAIN WO CONTRAST   Final Result   1. No acute intracranial abnormality. 2. Mild chronic white matter microvascular ischemic changes. 3. Right maxillary sinusitis. XR CHEST PORTABLE   Final Result   Cardiomegaly with stable right pleural effusion                 Assessment/Plan:    Active Hospital Problems    Diagnosis     Severe malnutrition ([address] [E43]     Coronary artery disease of native artery of native heart with stable angina pectoris (HCC) [I25.118]     Near syncope [R55]     Atherosclerosis of native arteries of left leg with ulceration of other part of foot ([address] [I70.245]     Diabetic polyneuropathy associated with type 2 diabetes mellitus ([address] [E11.42]     ESRD (end stage renal disease) ([address] [N18.6]     Diabetes mellitus ([address] [E11.9]        Multivessel CAD  Status post left heart cath 6/3/2021  CTS eval in progress  Plavix is being held for possible procedure medial next week    Presyncope  Secondary to labile BP? Possibly orthostatic, however orthostatic vital signs negative here  Vasovagal?  Autonomic instability in view of diabetes?   Limited cardiac output in view of severe CAD? ESRD on PD  Nephrology consulted    Hypertension  With labile BP  BP meds adjusting per nephrology    DVT Prophylaxis: Heparin  Diet: ADULT DIET; Regular; 4 carb choices (60 gm/meal); Low Fat/Low Chol/High Fiber/QUINN; Low Phosphorus (Less than 1000 mg)  Adult Oral Nutrition Supplement;  Low Calorie/High Protein Oral Supplement  Code Status: Full Code      Electronically signed by Shani Holm MD on 6/5/2021 at 4:43 PM

## 2021-06-05 NOTE — PROGRESS NOTES
Pt feels tired, refused bath help. A&Ox4, VSS, /66 stable, NSR, RA, denies pain. Rt femoral vascath and peritoneal dialysis catheter care given. Bed locked in the lowest position, call light within reach.

## 2021-06-05 NOTE — PROGRESS NOTES
Office : [phone]     Fax :[phone]       Nephrology progress  Note      Patient's Name: Shani Smiley  1:46 PM  2021    Reason for Consult:  ESRD    History of Present Ilness:    Shani Smiley is a 51 y.o. female with severe end-stage renal disease secondary to diabetic nephropathy on peritoneal dialysis, hypertension, peripheral vascular disease, diabetic neuropathy who was admitted after she had a syncopal episode. She had similar episode 2 weeks ago. Recently had angiogram left lower extremity for gangrene left foot second toe. Denies any shortness of breath. Denies any abdominal pain. Denies any nausea vomiting. Interval HX :    BP dropped during HD on   Not in acute distress   Had PD overnight  Denies any chest pain  S/p [facility]   Has multivessel CAD     Plan for CABG on Wednesday    I/O last 3 completed shifts:   In: 0   Out: 12     Past Medical History:   Diagnosis Date    Diabetes mellitus ([address]     End stage kidney disease ([address]     peritoneal dialysis every night at home x 2 years    Hypertension     Neuropathy     Peripheral vascular disease ([address]        Past Surgical History:   Procedure Laterality Date     SECTION      OTHER SURGICAL HISTORY  2018     lap PD cath placement lt side 62 cm    TOE AMPUTATION Left 2021    AMPUTATION OF THE LEFT THIRD TOE performed by Callum Montilla DPM at [address] TOE AMPUTATION Left 2021    AMPUTATION OF HALLUX AND SECOND TOE, LEFT FOOT performed by Callum Montilla DPM at [address] TONSILLECTOMY         Family History   Problem Relation Age of Onset    Coronary Art Dis Mother     High Blood Pressure Mother     Heart Disease Mother     Diabetes Mother     Cancer Father     Coronary Art Dis Father     High Blood Pressure Father     Heart Disease Father     Diabetes Father      Current Medications:    insulin detemir (LEVEMIR) injection pen 27 Units, Nightly  insulin aspart (NOVOLOG) injection pen 0-18 Units-PATIENT SUPPLIED, TID WC  insulin aspart (NOVOLOG) injection pen 0-9 Units-PATIENT SUPPLIED, Nightly  NIFEdipine (PROCARDIA XL) extended release tablet 30 mg, Nightly  sodium chloride flush 0.9 % injection 5-40 mL, 2 times per day  sodium chloride flush 0.9 % injection 5-40 mL, PRN  0.9 % sodium chloride infusion, PRN  acetaminophen (TYLENOL) tablet 650 mg, Q4H PRN  oxyCODONE-acetaminophen (PERCOCET) 5-325 MG per tablet 1 tablet, Q4H PRN   Or  oxyCODONE-acetaminophen (PERCOCET) 5-325 MG per tablet 2 tablet, Q4H PRN  gentamicin (GARAMYCIN) 0.1 % cream, Daily  sevelamer (RENVELA) tablet 800 mg, TID WC  hydrALAZINE (APRESOLINE) injection 10 mg, Q4H PRN  aspirin EC tablet 81 mg, Daily  atorvastatin (LIPITOR) tablet 20 mg, Nightly  metoprolol tartrate (LOPRESSOR) tablet 50 mg, BID  torsemide (DEMADEX) tablet 200 mg, Daily  glucose (GLUTOSE) 40 % oral gel 15 g, PRN  dextrose 50 % IV solution, PRN  glucagon (rDNA) injection 1 mg, PRN  dextrose 5 % solution, PRN  sodium chloride flush 0.9 % injection 5-40 mL, 2 times per day  sodium chloride flush 0.9 % injection 5-40 mL, PRN  0.9 % sodium chloride infusion, PRN  [Held by provider] heparin (porcine) injection 5,000 Units, 3 times per day  promethazine (PHENERGAN) tablet 12.5 mg, Q6H PRN   Or  ondansetron (ZOFRAN) injection 4 mg, Q6H PRN  polyethylene glycol (GLYCOLAX) packet 17 g, Daily PRN  acetaminophen (TYLENOL) tablet 650 mg, Q6H PRN   Or  acetaminophen (TYLENOL) suppository 650 mg, Q6H PRN  pantoprazole (PROTONIX) tablet 40 mg, QAM AC          Physical exam:     Vitals:  BP (!) 158/62   Pulse 80   Temp 97 °F (36.1 °C) (Temporal)   Resp 20   Ht 5' 9\" (1.753 m)   Wt 221 lb 1.9 oz (100.3 kg) Comment: standing scale  LMP 05/12/2021 for use. VL DUP CAROTID BILATERAL   Final Result      VL PRE OP VEIN MAPPING   Final Result      CT CHEST WO CONTRAST   Final Result   No acute intrathoracic abnormality         MRI BRAIN WO CONTRAST   Final Result   1. No acute intracranial abnormality. 2. Mild chronic white matter microvascular ischemic changes. 3. Right maxillary sinusitis. XR CHEST PORTABLE   Final Result   Cardiomegaly with stable right pleural effusion                 Assessment/Plan :      1. ESRD. On peritoneal dialysis. Discussed with her in detail. We will have to switch her temporarily to hemodialysis pre and postop. She has given consent. Will do HD x 2 hrs today on 6/5    Discussed with dialysis nurse    2. HTN. Labile. Check orthostatics statics  Decreased dose of nifedipine XL to 30 mg po daily     3. Anemia of chronic disease. Gets micera at dialysis unit    4. Acid- base disorder. Monitor    5. Electrolytes monitor and replace as needed    6. Syncopal episode. EP following   S/p C   Has Multivessel CAD   Plan for CABG on Wednesday next week      7. Peripheral vascular disease. S/p amputation of left foot toes.  Podiatry following             Thank you for allowing us to participate in care of Obdulio Abernathy         Electronically signed by: Alexander Sotelo MD, 6/5/2021, 1:46 PM      [facility] of [address]  Office : [phone]  Fax :[phone]

## 2021-06-05 NOTE — PROGRESS NOTES
AMPUTATION OF HALLUX AND SECOND TOE, LEFT FOOT performed by Giovana Cristina DPM at [address]  Family History   Problem Relation Age of Onset    Coronary Art Dis Mother     High Blood Pressure Mother     Heart Disease Mother     Diabetes Mother     Cancer Father     Coronary Art Dis Father     High Blood Pressure Father     Heart Disease Father     Diabetes Father        SOCIAL HISTORY  Social History     Tobacco Use    Smoking status: Never Smoker    Smokeless tobacco: Never Used   Vaping Use    Vaping Use: Never used   Substance Use Topics    Alcohol use: No    Drug use: No       ALLERGIES  Allergies   Allergen Reactions    Zetia [Ezetimibe] Shortness Of Breath    Quinolones Nausea Only     Other reaction(s): Vomiting    Humalog [Insulin Lispro]      SOB, Leg swelling, fatigue    Lisinopril Swelling    Lantus [Insulin Glargine] Nausea And Vomiting     Side effect, not an allergy    Victoza [Liraglutide] Nausea And Vomiting     Side effect, not an allergy         MEDICATIONS  No current facility-administered medications on file prior to encounter.      Current Outpatient Medications on File Prior to Encounter   Medication Sig Dispense Refill    fluticasone (FLONASE) 50 MCG/ACT nasal spray 1 spray by Each Nostril route daily as needed for Rhinitis      insulin detemir (LEVEMIR FLEXTOUCH) 100 UNIT/ML injection pen Inject 20 Units into the skin nightly      NIFEdipine (PROCARDIA XL) 60 MG extended release tablet Take 1 tablet by mouth nightly Take 60 mg by mouth nightly 30 tablet 0    atorvastatin (LIPITOR) 20 MG tablet Take 1 tablet by mouth nightly 90 tablet 1    VELPHORO 500 MG CHEW CHEW TWO TABLETS BY MOUTH THREE TIMES A DAY WITH MEALS 180 tablet 0    insulin aspart (NOVOLOG FLEXPEN) 100 UNIT/ML injection pen Inject 10 Units into the skin 3 times daily (before meals) 3 pen 4    aspirin 81 MG EC tablet Take 1 tablet by mouth daily 30 tablet 1    metoprolol tartrate (LOPRESSOR) 50 MG tablet Take 1 tablet by mouth 2 times daily 60 tablet 1    torsemide (DEMADEX) 100 MG tablet Take 2 tablets by mouth daily 60 tablet 1    clopidogrel (PLAVIX) 75 MG tablet Take 1 tablet by mouth daily 30 tablet 1    calcium acetate 667 MG TABS Take 667 mg by mouth 3 times daily (with meals)      Continuous Blood Gluc Sensor (FREESTYLE KOREY 2 SENSOR) MISC Change every 14 days 1 each 0    Continuous Blood Gluc  (FREESTYLE KOREY 2 READER) JOHNNIE Use to check glucose 1 each 0    Continuous Blood Gluc Sensor (FREESTYLE KOREY 14 DAY SENSOR) MISC Check glucose ---change every 14 days 2 each 3    Continuous Blood Gluc  (FREESTYLE KOREY 14 DAY READER) JOHNNIE To test glucose 1 Device 0    Insulin Pen Needle 32G X 6 MM MISC Use with insulin pens 4 times daily 200 each 5    [DISCONTINUED] Multiple Vitamins-Minerals (THERAPEUTIC MULTIVITAMIN-MINERALS) tablet Take 1 tablet by mouth daily.  30 tablet 11       Objective:     LABS    CBC:   Lab Results   Component Value Date    WBC 11.9 06/05/2021    RBC 3.29 06/05/2021    HGB 9.9 06/05/2021    HCT 29.5 06/05/2021    MCV 89.8 06/05/2021    MCH 30.0 06/05/2021    MCHC 33.4 06/05/2021    RDW 14.1 06/05/2021     06/05/2021    MPV 8.2 06/05/2021     CMP:    Lab Results   Component Value Date     06/05/2021    K 3.5 06/05/2021    K 4.2 05/19/2021    CL 89 06/05/2021    CO2 24 06/05/2021    BUN 46 06/05/2021    CREATININE 11.7 06/05/2021    GFRAA 4 06/05/2021    GFRAA >60 04/09/2013    AGRATIO 0.7 05/31/2021    LABGLOM 3 06/05/2021    GLUCOSE 244 06/05/2021    PROT 8.1 05/31/2021    PROT 7.1 12/13/2012    LABALBU 3.2 05/31/2021    CALCIUM 9.1 06/05/2021    BILITOT <0.2 05/31/2021    ALKPHOS 120 05/31/2021    AST 9 05/31/2021    ALT <5 05/31/2021       HgBA1c:    Lab Results   Component Value Date    LABA1C 8.8 05/31/2021       This patient's last creatinine was   Recent Labs     06/05/21  0435   CREATININE 11.7* Recent Blood sugars have been   Lab Results   Component Value Date    POCGLU 235 06/05/2021    POCGLU 309 06/04/2021    POCGLU 135 06/04/2021    POCGLU 152 06/04/2021    POCGLU 244 06/04/2021    POCGLU 246 06/03/2021    POCGLU 162 06/03/2021    POCGLU 165 06/03/2021     Lab Results   Component Value Date    GLUCOSE 244 06/05/2021    GLUCOSE 248 06/04/2021    GLUCOSE 229 06/03/2021    GLUCOSE 253 06/02/2021    GLUCOSE 127 06/01/2021    GLUCOSE 335 05/31/2021            Assessment:     Patient Active Problem List   Diagnosis    Diabetes mellitus [facility]    Obesity    Microalbuminuria    Hyperlipidemia with target LDL less than 100    Hypertension    Diabetes mellitus type 2 in obese ([facility])    Acute renal failure (ARF) ([facility])    ESRD (end stage renal disease) ([facility]    Non-smoker    Elevated C-reactive protein (CRP)    Elevated sed rate    Diabetic polyneuropathy associated with type 2 diabetes mellitus ([facility])    Weight loss counseling, encounter for    Atherosclerosis of native arteries of left leg with ulceration of other part of foot ([facility]    Near syncope    Severe malnutrition ([facility]    Coronary artery disease of native artery of native heart with stable angina pectoris ([facility]       Plan:     Plan of Care:   Admit with CAD  Diabetes Type 2 un-controlled  Lipids-- last HDL 17, LDL 80, trig 500   Treated with: lipitor  Renal- creatinine today 11.7, eGFR 3  ACE/ARB: no  Last eye exam:  Weight trend: increasing  DVT prophylaxis: on heparin  Current DM tx: high correction scale and levemir 27 units nightly  Would recommend continuing with current insulin orders at this time. Current control poor, A1C 8.8  Consult placed to dietitian for further education  Continue to monitor blood sugars to determine adequacy of current dosages  Recommend maintaining a smoke-free lifestyle.       Discharge Plan:  Patient will continue to need insulin at home: suggest full basal bolus insulin therapy  Patient to f/u with PCP and endocrinologist. Te Longoria to log blood sugars and take blood sugar log to her f/u appointment. Sera Iyer RN, BSN, OMS, [address]

## 2021-06-06 LAB
ANION GAP SERPL CALCULATED.3IONS-SCNC: 18 MMOL/L (ref 3–16)
BACTERIA: ABNORMAL /HPF
BILIRUBIN URINE: ABNORMAL
BLOOD, URINE: ABNORMAL
BUN BLDV-MCNC: 34 MG/DL (ref 7–20)
CALCIUM SERPL-MCNC: 8.9 MG/DL (ref 8.3–10.6)
CHLORIDE BLD-SCNC: 91 MMOL/L (ref 99–110)
CLARITY: ABNORMAL
CO2: 25 MMOL/L (ref 21–32)
COLOR: ABNORMAL
COMMENT UA: ABNORMAL
CREAT SERPL-MCNC: 9.5 MG/DL (ref 0.6–1.1)
EPITHELIAL CELLS, UA: 7 /HPF (ref 0–5)
GFR AFRICAN AMERICAN: 5
GFR NON-AFRICAN AMERICAN: 4
GLUCOSE BLD-MCNC: 144 MG/DL (ref 70–99)
GLUCOSE BLD-MCNC: 146 MG/DL (ref 70–99)
GLUCOSE BLD-MCNC: 150 MG/DL (ref 70–99)
GLUCOSE BLD-MCNC: 154 MG/DL (ref 70–99)
GLUCOSE BLD-MCNC: 167 MG/DL (ref 70–99)
GLUCOSE URINE: 100 MG/DL
HCT VFR BLD CALC: 30.4 % (ref 36–48)
HEMOGLOBIN: 9.8 G/DL (ref 12–16)
KETONES, URINE: ABNORMAL MG/DL
LEUKOCYTE ESTERASE, URINE: ABNORMAL
MCH RBC QN AUTO: 29.5 PG (ref 26–34)
MCHC RBC AUTO-ENTMCNC: 32.4 G/DL (ref 31–36)
MCV RBC AUTO: 91.1 FL (ref 80–100)
MICROSCOPIC EXAMINATION: YES
NITRITE, URINE: NEGATIVE
PDW BLD-RTO: 14.1 % (ref 12.4–15.4)
PERFORMED ON: ABNORMAL
PH UA: 5.5 (ref 5–8)
PLATELET # BLD: 346 K/UL (ref 135–450)
PMV BLD AUTO: 7.8 FL (ref 5–10.5)
POTASSIUM SERPL-SCNC: 3.7 MMOL/L (ref 3.5–5.1)
PROTEIN UA: >=300 MG/DL
RBC # BLD: 3.34 M/UL (ref 4–5.2)
RBC UA: 587 /HPF (ref 0–4)
SODIUM BLD-SCNC: 134 MMOL/L (ref 136–145)
SPECIFIC GRAVITY UA: 1.02 (ref 1–1.03)
URINE REFLEX TO CULTURE: YES
URINE TYPE: ABNORMAL
UROBILINOGEN, URINE: 0.2 E.U./DL
WBC # BLD: 12.8 K/UL (ref 4–11)
WBC UA: 36 /HPF (ref 0–5)

## 2021-06-06 PROCEDURE — 99233 SBSQ HOSP IP/OBS HIGH 50: CPT | Performed by: HOSPITALIST

## 2021-06-06 PROCEDURE — 81001 URINALYSIS AUTO W/SCOPE: CPT

## 2021-06-06 PROCEDURE — 2140000000 HC CCU INTERMEDIATE R&B

## 2021-06-06 PROCEDURE — 6370000000 HC RX 637 (ALT 250 FOR IP): Performed by: FAMILY MEDICINE

## 2021-06-06 PROCEDURE — 6360000002 HC RX W HCPCS: Performed by: FAMILY MEDICINE

## 2021-06-06 PROCEDURE — 2580000003 HC RX 258: Performed by: FAMILY MEDICINE

## 2021-06-06 PROCEDURE — 6370000000 HC RX 637 (ALT 250 FOR IP): Performed by: INTERNAL MEDICINE

## 2021-06-06 PROCEDURE — 85027 COMPLETE CBC AUTOMATED: CPT

## 2021-06-06 PROCEDURE — 94760 N-INVAS EAR/PLS OXIMETRY 1: CPT

## 2021-06-06 PROCEDURE — 80048 BASIC METABOLIC PNL TOTAL CA: CPT

## 2021-06-06 PROCEDURE — 87086 URINE CULTURE/COLONY COUNT: CPT

## 2021-06-06 RX ORDER — DIPHENHYDRAMINE HCL 25 MG
12.5 TABLET ORAL EVERY 6 HOURS PRN
Status: DISCONTINUED | OUTPATIENT
Start: 2021-06-06 | End: 2021-06-09

## 2021-06-06 RX ADMIN — TORSEMIDE 200 MG: 100 TABLET ORAL at 08:56

## 2021-06-06 RX ADMIN — METOPROLOL TARTRATE 50 MG: 50 TABLET, FILM COATED ORAL at 08:54

## 2021-06-06 RX ADMIN — PANTOPRAZOLE SODIUM 40 MG: 40 TABLET, DELAYED RELEASE ORAL at 05:11

## 2021-06-06 RX ADMIN — HYDRALAZINE HYDROCHLORIDE 10 MG: 20 INJECTION INTRAMUSCULAR; INTRAVENOUS at 04:40

## 2021-06-06 RX ADMIN — ONDANSETRON 4 MG: 2 INJECTION INTRAMUSCULAR; INTRAVENOUS at 04:40

## 2021-06-06 RX ADMIN — Medication 5 ML: at 08:57

## 2021-06-06 RX ADMIN — SEVELAMER CARBONATE 800 MG: 800 TABLET, FILM COATED ORAL at 08:56

## 2021-06-06 RX ADMIN — METOPROLOL TARTRATE 50 MG: 50 TABLET, FILM COATED ORAL at 20:11

## 2021-06-06 RX ADMIN — ONDANSETRON 4 MG: 2 INJECTION INTRAMUSCULAR; INTRAVENOUS at 08:57

## 2021-06-06 RX ADMIN — SEVELAMER CARBONATE 800 MG: 800 TABLET, FILM COATED ORAL at 16:58

## 2021-06-06 RX ADMIN — ASPIRIN 81 MG: 81 TABLET, COATED ORAL at 08:54

## 2021-06-06 RX ADMIN — NIFEDIPINE 30 MG: 30 TABLET, FILM COATED, EXTENDED RELEASE ORAL at 20:11

## 2021-06-06 RX ADMIN — OXYCODONE HYDROCHLORIDE AND ACETAMINOPHEN 1 TABLET: 5; 325 TABLET ORAL at 08:51

## 2021-06-06 RX ADMIN — Medication 10 ML: at 20:15

## 2021-06-06 RX ADMIN — ATORVASTATIN CALCIUM 20 MG: 20 TABLET, FILM COATED ORAL at 20:11

## 2021-06-06 ASSESSMENT — PAIN SCALES - GENERAL
PAINLEVEL_OUTOF10: 2
PAINLEVEL_OUTOF10: 0
PAINLEVEL_OUTOF10: 5
PAINLEVEL_OUTOF10: 0
PAINLEVEL_OUTOF10: 0

## 2021-06-06 NOTE — PROGRESS NOTES
CC: 3V CAD, ESRD    No c/o  NSR  Tolerated 2 hours HD yesterday  Cr down to 9.5  CTAB RRR  As per CC for HD during plavix washout with plans for CABG Wednesday

## 2021-06-06 NOTE — PROGRESS NOTES
Hospitalist Progress Note      PCP: Soni Tripp MD    Date of Admission: 5/31/2021    Chief Complaint: [facility] Course: 69-year-old female was past medical history of ESRD on peritoneal dialysis, hypertension, hyperlipidemia, diabetes, peripheral vascular disease status post left toe amputation last week presents with presyncopal episode. She also describes a syncopal episode couple of weeks ago. Patient has recently been undergoing evaluation for possible renal transplant has had stress test performed at [facility] which was abnormal.  Patient is scheduled for outpatient left heart cath on 6/3/2021 . Patient is labile blood pressures, but negative orthostatics. Nephrology is adjusting blood pressure medications. She had left heart cath performed 6/3/2021 which showed multivessel disease. CTS consulted the plan for possible CABG. Her Plavix is being held    Subjective: Patient seen and examined at bedside. She tolerated this procedure well. She has no new complaints. She understands the plan for evaluation for CABG and possible procedure next week.       Medications:  Reviewed    Infusion Medications    sodium chloride      dextrose      sodium chloride       Scheduled Medications    insulin detemir  27 Units Subcutaneous Nightly    insulin aspart  0-18 Units Subcutaneous TID WC    insulin aspart  0-9 Units Subcutaneous Nightly    NIFEdipine  30 mg Oral Nightly    sodium chloride flush  5-40 mL Intravenous 2 times per day    gentamicin   Topical Daily    sevelamer  800 mg Oral TID WC    aspirin  81 mg Oral Daily    atorvastatin  20 mg Oral Nightly    metoprolol tartrate  50 mg Oral BID    torsemide  200 mg Oral Daily    sodium chloride flush  5-40 mL Intravenous 2 times per day    [Held by provider] heparin (porcine)  5,000 Units Subcutaneous 3 times per day    pantoprazole  40 mg Oral QAM AC     PRN Meds: diphenhydrAMINE, albumin human, heparin (porcine), sodium chloride flush, sodium chloride, acetaminophen, oxyCODONE-acetaminophen **OR** oxyCODONE-acetaminophen, hydrALAZINE, glucose, dextrose, glucagon (rDNA), dextrose, sodium chloride flush, sodium chloride, promethazine **OR** ondansetron, polyethylene glycol, acetaminophen **OR** acetaminophen      Intake/Output Summary (Last 24 hours) at 6/6/2021 1351  Last data filed at 6/6/2021 0310  Gross per 24 hour   Intake 1680 ml   Output 1000 ml   Net 680 ml       Physical Exam Performed:    BP (!) 149/58   Pulse 84   Temp 97 °F (36.1 °C) (Temporal)   Resp 18   Ht 5' 9\" (1.753 m)   Wt 218 lb 4.1 oz (99 kg)   LMP 05/12/2021 (Approximate)   SpO2 97%   BMI 32.23 kg/m²     General appearance: No apparent distress, appears stated age and cooperative. HEENT: Pupils equal, round, and reactive to light. Conjunctivae/corneas clear. Neck: Supple, with full range of motion. No jugular venous distention. Trachea midline. Respiratory:  Normal respiratory effort. Clear to auscultation, bilaterally without Rales/Wheezes/Rhonchi. Cardiovascular: Regular rate and rhythm with normal S1/S2 without murmurs, rubs or gallops. Abdomen: Soft, non-tender, non-distended with normal bowel sounds. Musculoskeletal: No clubbing, cyanosis or edema bilaterally. Full range of motion without deformity. Skin: Skin color, texture, turgor normal.  No rashes or lesions. Neurologic:  Neurovascularly intact without any focal sensory/motor deficits.  Cranial nerves: II-XII intact, grossly non-focal.  Psychiatric: Alert and oriented, thought content appropriate, normal insight  Capillary Refill: Brisk,< 3 seconds   Peripheral Pulses: +2 palpable, equal bilaterally       Labs:   Recent Labs     06/04/21  0600 06/05/21  0435 06/06/21  0450   WBC 10.6 11.9* 12.8*   HGB 10.9* 9.9* 9.8*   HCT 33.2* 29.5* 30.4*    412 346     Recent Labs     06/04/21  0600 06/05/21  0435 06/06/21  0450   * 134* 134*   K 3.7 3.5 3.7   CL 89* 89* 91*   CO2 22 24 25   BUN 61* 46* 34*   CREATININE 14.3* 11.7* 9.5*   CALCIUM 9.1 9.1 8.9     No results for input(s): AST, ALT, BILIDIR, BILITOT, ALKPHOS in the last 72 hours. Recent Labs     06/04/21  0600   INR 1.02     No results for input(s): Jessica Pack in the last 72 hours. Urinalysis:      Lab Results   Component Value Date    NITRU Negative 05/16/2021    WBCUA 6-9 05/16/2021    BACTERIA 1+ 02/28/2014    RBCUA 0-2 05/16/2021    BLOODU SMALL 05/16/2021    SPECGRAV 1.017 05/16/2021    GLUCOSEU 500 05/16/2021       Radiology:  IR FLUORO GUIDED CVA DEVICE PLMT/REPLACE/REMOVAL   Final Result   Technically successful right-sided HD catheter placement. Catheter is okay   for use. VL DUP CAROTID BILATERAL   Final Result      VL PRE OP VEIN MAPPING   Final Result      CT CHEST WO CONTRAST   Final Result   No acute intrathoracic abnormality         MRI BRAIN WO CONTRAST   Final Result   1. No acute intracranial abnormality. 2. Mild chronic white matter microvascular ischemic changes. 3. Right maxillary sinusitis. XR CHEST PORTABLE   Final Result   Cardiomegaly with stable right pleural effusion                 Assessment/Plan:    Active Hospital Problems    Diagnosis     Severe malnutrition ([address] [E43]     Coronary artery disease of native artery of native heart with stable angina pectoris (HCC) [I25.118]     Near syncope [R55]     Atherosclerosis of native arteries of left leg with ulceration of other part of foot ([address] [I70.245]     Diabetic polyneuropathy associated with type 2 diabetes mellitus ([address] [E11.42]     ESRD (end stage renal disease) ([address] [N18.6]     Diabetes mellitus ([address] [E11.9]        Multivessel CAD  Status post left heart cath 6/3/2021  CTS eval in progress  Plavix is being held for possible procedure medial next week    Presyncope  Secondary to labile BP?   Possibly orthostatic, however orthostatic vital signs negative here  Vasovagal?  Autonomic instability in view of

## 2021-06-06 NOTE — PROGRESS NOTES
Office : [phone]     Fax :[phone]       Nephrology progress  Note      Patient's Name: Leonidas Wright  1:35 PM  2021    Reason for Consult:  ESRD    History of Present Ilness:    Leonidas Wright is a 51 y.o. female with severe end-stage renal disease secondary to diabetic nephropathy on peritoneal dialysis, hypertension, peripheral vascular disease, diabetic neuropathy who was admitted after she had a syncopal episode. She had similar episode 2 weeks ago. Recently had angiogram left lower extremity for gangrene left foot second toe. Denies any shortness of breath. Denies any abdominal pain. Denies any nausea vomiting. Interval HX :    BP dropped during HD on  ---> got IV albumin -----> BP improved while on HD , she completed 2 hr of HD on   Not in acute distress     Denies any chest pain  S/p [facility]   Has multivessel CAD     Plan for CABG on Wednesday    I/O last 3 completed shifts:   In: 46 [P.O.:480]  Out: 1000     Past Medical History:   Diagnosis Date    Diabetes mellitus ([address]     End stage kidney disease ([facility]     peritoneal dialysis every night at home x 2 years    Hypertension     Neuropathy     Peripheral vascular disease ([facility] [address]        Past Surgical History:   Procedure Laterality Date     SECTION      OTHER SURGICAL HISTORY  2018     lap PD cath placement lt side 62 cm    TOE AMPUTATION Left 2021    AMPUTATION OF THE LEFT THIRD TOE performed by Rosita Helton DPM at [address] [facility] [address] TOE AMPUTATION Left 2021    AMPUTATION OF HALLUX AND SECOND TOE, LEFT FOOT performed by Rosita Helton DPM at [facility]         Family History   Problem Relation Age of Onset    Coronary Art Dis Mother     High Blood Pressure Mother  Heart Disease Mother     Diabetes Mother     Cancer Father     Coronary Art Dis Father     High Blood Pressure Father     Heart Disease Father     Diabetes Father      Current Medications:    diphenhydrAMINE (BENADRYL) tablet 12.5 mg, Q6H PRN  insulin detemir (LEVEMIR) injection pen 27 Units, Nightly  albumin human 25 % IV solution 25 g, PRN  heparin (porcine) injection 3,500 Units, PRN  insulin aspart (NOVOLOG) injection pen 0-18 Units-PATIENT SUPPLIED, TID WC  insulin aspart (NOVOLOG) injection pen 0-9 Units-PATIENT SUPPLIED, Nightly  NIFEdipine (PROCARDIA XL) extended release tablet 30 mg, Nightly  sodium chloride flush 0.9 % injection 5-40 mL, 2 times per day  sodium chloride flush 0.9 % injection 5-40 mL, PRN  0.9 % sodium chloride infusion, PRN  acetaminophen (TYLENOL) tablet 650 mg, Q4H PRN  oxyCODONE-acetaminophen (PERCOCET) 5-325 MG per tablet 1 tablet, Q4H PRN   Or  oxyCODONE-acetaminophen (PERCOCET) 5-325 MG per tablet 2 tablet, Q4H PRN  gentamicin (GARAMYCIN) 0.1 % cream, Daily  sevelamer (RENVELA) tablet 800 mg, TID WC  hydrALAZINE (APRESOLINE) injection 10 mg, Q4H PRN  aspirin EC tablet 81 mg, Daily  atorvastatin (LIPITOR) tablet 20 mg, Nightly  metoprolol tartrate (LOPRESSOR) tablet 50 mg, BID  torsemide (DEMADEX) tablet 200 mg, Daily  glucose (GLUTOSE) 40 % oral gel 15 g, PRN  dextrose 50 % IV solution, PRN  glucagon (rDNA) injection 1 mg, PRN  dextrose 5 % solution, PRN  sodium chloride flush 0.9 % injection 5-40 mL, 2 times per day  sodium chloride flush 0.9 % injection 5-40 mL, PRN  0.9 % sodium chloride infusion, PRN  [Held by provider] heparin (porcine) injection 5,000 Units, 3 times per day  promethazine (PHENERGAN) tablet 12.5 mg, Q6H PRN   Or  ondansetron (ZOFRAN) injection 4 mg, Q6H PRN  polyethylene glycol (GLYCOLAX) packet 17 g, Daily PRN  acetaminophen (TYLENOL) tablet 650 mg, Q6H PRN   Or  acetaminophen (TYLENOL) suppository 650 mg, Q6H PRN  pantoprazole (PROTONIX) tablet 40 mg, QAM AC          Physical exam:     Vitals:  BP (!) 149/58   Pulse 84   Temp 97 °F (36.1 °C) (Temporal)   Resp 18   Ht 5' 9\" (1.753 m)   Wt 218 lb 4.1 oz (99 kg)   LMP 05/12/2021 (Approximate)   SpO2 98%   BMI 32.23 kg/m²   Constitutional:  OAA X3 NAD  Skin: no rash, turgor wnl  Heent:  eomi, mmm  Neck: no bruits or jvd noted  Cardiovascular:  S1, S2 without m/r/g  Respiratory: CTA B without w/r/r  Abdomen:  +bs, soft, nt, nd  Ext: no  lower extremity edema  Psychiatric: mood and affect appropriate  Musculoskeletal:  Rom, muscular strength intact    Labs:  CBC:   Recent Labs     06/04/21 0600 06/05/21 0435 06/06/21  0450   WBC 10.6 11.9* 12.8*   HGB 10.9* 9.9* 9.8*    412 346     BMP:    Recent Labs     06/04/21 0600 06/05/21 0435 06/06/21  0450   * 134* 134*   K 3.7 3.5 3.7   CL 89* 89* 91*   CO2 22 24 25   BUN 61* 46* 34*   CREATININE 14.3* 11.7* 9.5*   GLUCOSE 248* 244* 144*     Ca/Mg/Phos:   Recent Labs     06/04/21 0600 06/05/21 0435 06/06/21  0450   CALCIUM 9.1 9.1 8.9     Hepatic:   No results for input(s): AST, ALT, ALB, BILITOT, ALKPHOS in the last 72 hours. Troponin:   No results for input(s): TROPONINI in the last 72 hours. BNP: No results for input(s): BNP in the last 72 hours. Lipids:   Recent Labs     06/04/21  0600   CHOL 189   TRIG 500*   HDL 17*   LDLCALC see below   LABVLDL see below     ABGs:   No results for input(s): PHART, PO2ART, MDO6VSF in the last 72 hours. INR:   Recent Labs     06/04/21  0600   INR 1.02     UA:No results for input(s): Schuylerie Ramming, GLUCOSEU, BILIRUBINUR, Harry Ports, BLOODU, PHUR, PROTEINU, UROBILINOGEN, NITRU, LEUKOCYTESUR, Guille Sally in the last 72 hours. Urine Microscopic: No results for input(s): LABCAST, BACTERIA, COMU, HYALCAST, WBCUA, RBCUA, EPIU in the last 72 hours. Urine Culture: No results for input(s): LABURIN in the last 72 hours.   Urine Chemistry: No results for input(s): Francisca Sosa in the last 72 hours. IMAGING:  IR FLUORO GUIDED CVA DEVICE PLMT/REPLACE/REMOVAL   Final Result   Technically successful right-sided HD catheter placement. Catheter is okay   for use. VL DUP CAROTID BILATERAL   Final Result      VL PRE OP VEIN MAPPING   Final Result      CT CHEST WO CONTRAST   Final Result   No acute intrathoracic abnormality         MRI BRAIN WO CONTRAST   Final Result   1. No acute intracranial abnormality. 2. Mild chronic white matter microvascular ischemic changes. 3. Right maxillary sinusitis. XR CHEST PORTABLE   Final Result   Cardiomegaly with stable right pleural effusion                 Assessment/Plan :      1. ESRD. On peritoneal dialysis. Discussed with her in detail. We will have to switch her temporarily to hemodialysis pre and postop. She has given consent. Will hold HD today on 6/5    Will do HD x 2 hrs today on 6/7   Discussed with dialysis nurse    2. HTN. Labile. Check orthostatics statics  Decreased dose of nifedipine XL to 30 mg po daily     3. Anemia of chronic disease. Gets micera at dialysis unit    4. Acid- base disorder. Monitor    5. Electrolytes monitor and replace as needed    6. Syncopal episode. EP following   S/p [facility]   Has Multivessel CAD   Plan for CABG on Wednesday next week      7. Peripheral vascular disease. S/p amputation of left foot toes.  Podiatry following             Thank you for allowing us to participate in care of [facility]         Electronically signed by: Ava Baptiste MD, 6/6/2021, 1:35 PM      [facility] of [address]  Office : [phone]  Fax :[phone]

## 2021-06-06 NOTE — PROGRESS NOTES
Hydralazine given for elevated /70. Zofran for nausea. Pt feels frustrated with hemodialysis and not having good sleep.

## 2021-06-06 NOTE — PROGRESS NOTES
SBP high 186/66. Metoprolol and Nifedipine given as scheduled. Will recheck BP in one hour. Hydralazine will be given if SBP>160. Tylenol given for left foot pain 3/10. Serum glucose 129,  27 units of Levemir given, will recheck glucose @2am for risk of hypoglycemia. Rt franciscain kiacath and abd pd catheter care delivered. Pt refused bath help. Bed locked in the lowest position, call light within reach.

## 2021-06-07 PROBLEM — E44.1 MILD MALNUTRITION (HCC): Chronic | Status: ACTIVE | Noted: 2021-06-07

## 2021-06-07 PROBLEM — E43 SEVERE MALNUTRITION (HCC): Status: RESOLVED | Noted: 2021-06-03 | Resolved: 2021-06-07

## 2021-06-07 LAB
ANION GAP SERPL CALCULATED.3IONS-SCNC: 17 MMOL/L (ref 3–16)
BUN BLDV-MCNC: 44 MG/DL (ref 7–20)
CALCIUM SERPL-MCNC: 9.1 MG/DL (ref 8.3–10.6)
CHLORIDE BLD-SCNC: 90 MMOL/L (ref 99–110)
CO2: 24 MMOL/L (ref 21–32)
CREAT SERPL-MCNC: 10.9 MG/DL (ref 0.6–1.1)
GFR AFRICAN AMERICAN: 5
GFR NON-AFRICAN AMERICAN: 4
GLUCOSE BLD-MCNC: 127 MG/DL (ref 70–99)
GLUCOSE BLD-MCNC: 128 MG/DL (ref 70–99)
GLUCOSE BLD-MCNC: 136 MG/DL (ref 70–99)
GLUCOSE BLD-MCNC: 137 MG/DL (ref 70–99)
GLUCOSE BLD-MCNC: 98 MG/DL (ref 70–99)
HCT VFR BLD CALC: 30.9 % (ref 36–48)
HEMOGLOBIN: 10.1 G/DL (ref 12–16)
MCH RBC QN AUTO: 29.7 PG (ref 26–34)
MCHC RBC AUTO-ENTMCNC: 32.8 G/DL (ref 31–36)
MCV RBC AUTO: 90.4 FL (ref 80–100)
ORGANISM: ABNORMAL
PDW BLD-RTO: 14 % (ref 12.4–15.4)
PERFORMED ON: ABNORMAL
PERFORMED ON: NORMAL
PLATELET # BLD: 343 K/UL (ref 135–450)
PMV BLD AUTO: 8 FL (ref 5–10.5)
POTASSIUM SERPL-SCNC: 4.1 MMOL/L (ref 3.5–5.1)
RBC # BLD: 3.42 M/UL (ref 4–5.2)
SODIUM BLD-SCNC: 131 MMOL/L (ref 136–145)
URINE CULTURE, ROUTINE: ABNORMAL
WBC # BLD: 11.9 K/UL (ref 4–11)

## 2021-06-07 PROCEDURE — 80048 BASIC METABOLIC PNL TOTAL CA: CPT

## 2021-06-07 PROCEDURE — P9047 ALBUMIN (HUMAN), 25%, 50ML: HCPCS | Performed by: HOSPITALIST

## 2021-06-07 PROCEDURE — 2580000003 HC RX 258: Performed by: INTERNAL MEDICINE

## 2021-06-07 PROCEDURE — 99233 SBSQ HOSP IP/OBS HIGH 50: CPT | Performed by: INTERNAL MEDICINE

## 2021-06-07 PROCEDURE — 6360000002 HC RX W HCPCS: Performed by: INTERNAL MEDICINE

## 2021-06-07 PROCEDURE — APPSS15 APP SPLIT SHARED TIME 0-15 MINUTES: Performed by: NURSE PRACTITIONER

## 2021-06-07 PROCEDURE — 6370000000 HC RX 637 (ALT 250 FOR IP): Performed by: INTERNAL MEDICINE

## 2021-06-07 PROCEDURE — APPNB30 APP NON BILLABLE TIME 0-30 MINS: Performed by: NURSE PRACTITIONER

## 2021-06-07 PROCEDURE — 94760 N-INVAS EAR/PLS OXIMETRY 1: CPT

## 2021-06-07 PROCEDURE — 6360000002 HC RX W HCPCS: Performed by: HOSPITALIST

## 2021-06-07 PROCEDURE — 90935 HEMODIALYSIS ONE EVALUATION: CPT | Performed by: INTERNAL MEDICINE

## 2021-06-07 PROCEDURE — 90935 HEMODIALYSIS ONE EVALUATION: CPT

## 2021-06-07 PROCEDURE — 2140000000 HC CCU INTERMEDIATE R&B

## 2021-06-07 PROCEDURE — 6370000000 HC RX 637 (ALT 250 FOR IP): Performed by: FAMILY MEDICINE

## 2021-06-07 PROCEDURE — 6360000002 HC RX W HCPCS: Performed by: FAMILY MEDICINE

## 2021-06-07 PROCEDURE — 85027 COMPLETE CBC AUTOMATED: CPT

## 2021-06-07 RX ORDER — ALPRAZOLAM 0.25 MG/1
0.25 TABLET ORAL 2 TIMES DAILY PRN
Status: DISCONTINUED | OUTPATIENT
Start: 2021-06-07 | End: 2021-06-09

## 2021-06-07 RX ADMIN — PANTOPRAZOLE SODIUM 40 MG: 40 TABLET, DELAYED RELEASE ORAL at 13:29

## 2021-06-07 RX ADMIN — METOPROLOL TARTRATE 50 MG: 50 TABLET, FILM COATED ORAL at 09:22

## 2021-06-07 RX ADMIN — OXYCODONE HYDROCHLORIDE AND ACETAMINOPHEN 1 TABLET: 5; 325 TABLET ORAL at 09:21

## 2021-06-07 RX ADMIN — Medication 10 ML: at 22:43

## 2021-06-07 RX ADMIN — OXYCODONE HYDROCHLORIDE AND ACETAMINOPHEN 1 TABLET: 5; 325 TABLET ORAL at 13:29

## 2021-06-07 RX ADMIN — ALTEPLASE 2 MG: 2.2 INJECTION, POWDER, LYOPHILIZED, FOR SOLUTION INTRAVENOUS at 09:03

## 2021-06-07 RX ADMIN — TORSEMIDE 200 MG: 100 TABLET ORAL at 13:29

## 2021-06-07 RX ADMIN — ALTEPLASE 2 MG: 2.2 INJECTION, POWDER, LYOPHILIZED, FOR SOLUTION INTRAVENOUS at 12:56

## 2021-06-07 RX ADMIN — NIFEDIPINE 30 MG: 30 TABLET, FILM COATED, EXTENDED RELEASE ORAL at 22:43

## 2021-06-07 RX ADMIN — METOPROLOL TARTRATE 50 MG: 50 TABLET, FILM COATED ORAL at 22:43

## 2021-06-07 RX ADMIN — ALTEPLASE 1 MG: 2.2 INJECTION, POWDER, LYOPHILIZED, FOR SOLUTION INTRAVENOUS at 09:02

## 2021-06-07 RX ADMIN — Medication 10 ML: at 13:37

## 2021-06-07 RX ADMIN — ONDANSETRON 4 MG: 2 INJECTION INTRAMUSCULAR; INTRAVENOUS at 07:33

## 2021-06-07 RX ADMIN — ALTEPLASE 2 MG: 2.2 INJECTION, POWDER, LYOPHILIZED, FOR SOLUTION INTRAVENOUS at 12:55

## 2021-06-07 RX ADMIN — OXYCODONE HYDROCHLORIDE AND ACETAMINOPHEN 1 TABLET: 5; 325 TABLET ORAL at 22:42

## 2021-06-07 RX ADMIN — Medication 1000 MG: at 13:28

## 2021-06-07 RX ADMIN — SEVELAMER CARBONATE 800 MG: 800 TABLET, FILM COATED ORAL at 13:29

## 2021-06-07 RX ADMIN — GENTAMICIN SULFATE: 1 CREAM TOPICAL at 13:36

## 2021-06-07 RX ADMIN — ALBUMIN (HUMAN) 25 G: 0.25 INJECTION, SOLUTION INTRAVENOUS at 09:05

## 2021-06-07 RX ADMIN — ASPIRIN 81 MG: 81 TABLET, COATED ORAL at 13:29

## 2021-06-07 RX ADMIN — HYDRALAZINE HYDROCHLORIDE 10 MG: 20 INJECTION INTRAMUSCULAR; INTRAVENOUS at 08:52

## 2021-06-07 RX ADMIN — ATORVASTATIN CALCIUM 20 MG: 20 TABLET, FILM COATED ORAL at 22:42

## 2021-06-07 RX ADMIN — Medication 10 ML: at 13:35

## 2021-06-07 ASSESSMENT — PAIN DESCRIPTION - PAIN TYPE: TYPE: SURGICAL PAIN

## 2021-06-07 ASSESSMENT — PAIN SCALES - GENERAL
PAINLEVEL_OUTOF10: 0
PAINLEVEL_OUTOF10: 5
PAINLEVEL_OUTOF10: 6
PAINLEVEL_OUTOF10: 0
PAINLEVEL_OUTOF10: 5
PAINLEVEL_OUTOF10: 6

## 2021-06-07 ASSESSMENT — PAIN DESCRIPTION - FREQUENCY: FREQUENCY: INTERMITTENT

## 2021-06-07 ASSESSMENT — PAIN DESCRIPTION - ONSET: ONSET: ON-GOING

## 2021-06-07 ASSESSMENT — PAIN DESCRIPTION - ORIENTATION: ORIENTATION: LEFT

## 2021-06-07 ASSESSMENT — PAIN DESCRIPTION - DESCRIPTORS: DESCRIPTORS: THROBBING

## 2021-06-07 ASSESSMENT — PAIN DESCRIPTION - LOCATION: LOCATION: FOOT

## 2021-06-07 NOTE — PROGRESS NOTES
Comprehensive Nutrition Assessment    Type and Reason for Visit:  Reassess    Nutrition Recommendations/Plan:   Continue Ensure High Protein 1x/day and encourage intake    Nutrition Assessment:  Pt is nutritionally compromised AEB inadequate oral intake and with increased nutrient needs r/t s/p amputation 2nd toe and hallux left foot. Pt continues to report decreased appetite and not taking Ensure High Protein (ordered for 1x/day). She reports some nausea; provided tips on how to avoid onset of nausea during meal times. Encouraged supplement intake. Malnutrition Assessment:  Malnutrition Status:  Mild malnutrition    Context:  Chronic Illness     Findings of the 6 clinical characteristics of malnutrition:  Energy Intake:  75% or less estimated energy requirements for 1 month or longer  Weight Loss:   (21lb/8.5% over 4 months)     Body Fat Loss:  No significant body fat loss     Muscle Mass Loss:  No significant muscle mass loss    Fluid Accumulation:  No significant fluid accumulation     Strength:  Not Performed    Estimated Daily Nutrient Needs:  Energy (kcal):  1089 - 1386; Weight Used for Energy Requirements:  Current (99kg)     Protein (g):  132; Weight Used for Protein Requirements:  Ideal (2g/66kg)        Fluid (ml/day):  1500; minimal    Nutrition Related Findings:  ESRD on HD; glucose 127, BUN 44, Creat 10.9, Na+ 131      Wounds:  Surgical Incision       Current Nutrition Therapies:    ADULT DIET; Regular; 4 carb choices (60 gm/meal); Low Fat/Low Chol/High Fiber/QUINN; Low Phosphorus (Less than 1000 mg)  Adult Oral Nutrition Supplement;  Low Calorie/High Protein Oral Supplement    Anthropometric Measures:  · Height: 5' 9\" (175.3 cm)  · Current Body Weight: 218 lb (98.9 kg)   · Admission Body Weight: 225 lb (102.1 kg)    · Usual Body Weight: 235 lb (106.6 kg)     · Ideal Body Weight: 145 lbs; % Ideal Body Weight 150.3 %   · BMI: 32.2  · Adjusted Body Weight:  ; No Adjustment   · BMI Categories:

## 2021-06-07 NOTE — PROGRESS NOTES
[facility] Daily Progress Note      Admit Date:  5/31/2021    Chief Complaint   Patient presents with    Loss of Consciousness     last week had syncopal episode, cont to feel faint, dizzy while walking. Subjective:  Ms. Rodriguez Mom denies exertional chest pain, SOB/CABA, PND, palpitations, light-headedness, or edema.     Objective:   BP (!) 125/56   Pulse 71   Temp 97.5 °F (36.4 °C) (Temporal)   Resp 16   Ht 5' 9\" (1.753 m)   Wt 218 lb 4.1 oz (99 kg)   LMP 05/12/2021 (Approximate)   SpO2 98%   BMI 32.23 kg/m²       Intake/Output Summary (Last 24 hours) at 6/7/2021 1415  Last data filed at 6/7/2021 1213  Gross per 24 hour   Intake 240 ml   Output --   Net 240 ml       TELEMETRY: Sinus     Physical Exam:  General:  Awake, alert, oriented x 3, NAD  Skin:  Warm and dry  Neck:  JVD flat  Chest:  normal air entry  Cardiovascular:  RRR S1S2, no S3, no mrmr  Abdomen:  Soft, ND, NT, No HSM  Extremities:  No edema    Medications:    cefTRIAXone (ROCEPHIN) IV  1,000 mg Intravenous Q24H    insulin detemir  27 Units Subcutaneous Nightly    insulin aspart  0-18 Units Subcutaneous TID WC    insulin aspart  0-9 Units Subcutaneous Nightly    NIFEdipine  30 mg Oral Nightly    sodium chloride flush  5-40 mL Intravenous 2 times per day    gentamicin   Topical Daily    sevelamer  800 mg Oral TID WC    aspirin  81 mg Oral Daily    atorvastatin  20 mg Oral Nightly    metoprolol tartrate  50 mg Oral BID    torsemide  200 mg Oral Daily    [Held by provider] heparin (porcine)  5,000 Units Subcutaneous 3 times per day    pantoprazole  40 mg Oral QAM AC      sodium chloride      dextrose       ALPRAZolam, diphenhydrAMINE, albumin human, heparin (porcine), sodium chloride flush, sodium chloride, acetaminophen, oxyCODONE-acetaminophen **OR** oxyCODONE-acetaminophen, hydrALAZINE, glucose, dextrose, glucagon (rDNA), dextrose, promethazine **OR** ondansetron, polyethylene glycol, acetaminophen **OR** acetaminophen    Lab Data:  CBC:   Recent Labs     06/05/21  0435 06/06/21  0450 06/07/21  0525   WBC 11.9* 12.8* 11.9*   HGB 9.9* 9.8* 10.1*   HCT 29.5* 30.4* 30.9*   MCV 89.8 91.1 90.4    346 343     BMP:   Recent Labs     06/05/21  0435 06/06/21  0450 06/07/21  0525   * 134* 131*   K 3.5 3.7 4.1   CL 89* 91* 90*   CO2 24 25 24   BUN 46* 34* 44*   CREATININE 11.7* 9.5* 10.9*     LIVER PROFILE: No results for input(s): AST, ALT, LIPASE, BILIDIR, BILITOT, ALKPHOS in the last 72 hours. Invalid input(s): AMYLASE,  ALB  PT/INR:   No results for input(s): PROTIME, INR in the last 72 hours. APTT:   No results for input(s): APTT in the last 72 hours. BNP:  No results for input(s): BNP in the last 72 hours. IMAGING: Cardiac Cath PCI:  Anatomy:   LM-nml   LAD-ostial 90%  Cx-ostial 80%  OM- nml  RCA-prox 20%  RPDA- prox 90%  LVEF- 50%  LVG- nml  LVEDP- 0    Assessment/Plan:  Active Problems:       CAD: severe MVD, severe Diabetes, needs CABG. Was taking plavix for PAD and recent endovascular procedure. Plavix on hold for CABG next week. Cont aspirin statin. Cont HD pre CABG.      HTN: cont meds, well controlled    Justin Juarez MD, MD 6/7/2021 2:15 PM

## 2021-06-07 NOTE — PLAN OF CARE
Nutrition Problem #1: Inadequate protein-energy intake  Intervention: Food and/or Nutrient Delivery: Continue Current Diet, Continue Oral Nutrition Supplement  Nutritional Goals: PO 50% at meals and supp

## 2021-06-07 NOTE — PROGRESS NOTES
acetaminophen, oxyCODONE-acetaminophen **OR** oxyCODONE-acetaminophen, hydrALAZINE, glucose, dextrose, glucagon (rDNA), dextrose, promethazine **OR** ondansetron, polyethylene glycol, acetaminophen **OR** acetaminophen      Intake/Output Summary (Last 24 hours) at 6/7/2021 1247  Last data filed at 6/7/2021 1213  Gross per 24 hour   Intake 240 ml   Output --   Net 240 ml       Physical Exam Performed:    BP (!) 125/56   Pulse 71   Temp 97.5 °F (36.4 °C) (Temporal)   Resp 16   Ht 5' 9\" (1.753 m)   Wt 218 lb 4.1 oz (99 kg)   LMP 05/12/2021 (Approximate)   SpO2 98%   BMI 32.23 kg/m²     General appearance: No apparent distress, appears stated age and cooperative. HEENT: Pupils equal, round, and reactive to light. Conjunctivae/corneas clear. Neck: Supple, with full range of motion. No jugular venous distention. Trachea midline. Respiratory:  Normal respiratory effort. Clear to auscultation, bilaterally without Rales/Wheezes/Rhonchi. Cardiovascular: Regular rate and rhythm with normal S1/S2 without murmurs, rubs or gallops. Abdomen: Soft, non-tender, non-distended with normal bowel sounds. Musculoskeletal: No clubbing, cyanosis or edema bilaterally. Full range of motion without deformity. Skin: Skin color, texture, turgor normal.  No rashes or lesions. Neurologic:  Neurovascularly intact without any focal sensory/motor deficits.  Cranial nerves: II-XII intact, grossly non-focal.  Psychiatric: Alert and oriented, thought content appropriate, normal insight  Capillary Refill: Brisk,< 3 seconds   Peripheral Pulses: +2 palpable, equal bilaterally       Labs:   Recent Labs     06/05/21  0435 06/06/21  0450 06/07/21  0525   WBC 11.9* 12.8* 11.9*   HGB 9.9* 9.8* 10.1*   HCT 29.5* 30.4* 30.9*    346 343     Recent Labs     06/05/21  0435 06/06/21  0450 06/07/21  0525   * 134* 131*   K 3.5 3.7 4.1   CL 89* 91* 90*   CO2 24 25 24   BUN 46* 34* 44*   CREATININE 11.7* 9.5* 10.9*   CALCIUM 9.1 8.9 9.1 No results for input(s): AST, ALT, BILIDIR, BILITOT, ALKPHOS in the last 72 hours. No results for input(s): INR in the last 72 hours. No results for input(s): Rosas Hind in the last 72 hours. Urinalysis:      Lab Results   Component Value Date    NITRU Negative 06/06/2021    WBCUA 36 06/06/2021    BACTERIA RARE 06/06/2021    RBCUA 587 06/06/2021    BLOODU LARGE 06/06/2021    SPECGRAV 1.019 06/06/2021    GLUCOSEU 100 06/06/2021       Radiology:  IR FLUORO GUIDED CVA DEVICE PLMT/REPLACE/REMOVAL   Final Result   Technically successful right-sided HD catheter placement. Catheter is okay   for use. VL DUP CAROTID BILATERAL   Final Result      VL PRE OP VEIN MAPPING   Final Result      CT CHEST WO CONTRAST   Final Result   No acute intrathoracic abnormality         MRI BRAIN WO CONTRAST   Final Result   1. No acute intracranial abnormality. 2. Mild chronic white matter microvascular ischemic changes. 3. Right maxillary sinusitis. XR CHEST PORTABLE   Final Result   Cardiomegaly with stable right pleural effusion                 Assessment/Plan:    Active Hospital Problems    Diagnosis     Mild malnutrition (HCC) [E44.1]     Coronary artery disease of native artery of native heart with stable angina pectoris ([address] [I25.118]     Near syncope [R55]     Atherosclerosis of native arteries of left leg with ulceration of other part of foot ([address] [I70.245]     Diabetic polyneuropathy associated with type 2 diabetes mellitus ([address] [E11.42]     ESRD (end stage renal disease) ([address] [N18.6]     Diabetes mellitus ([address] [E11.9]        Multivessel CAD  Status post left heart cath 6/3/2021  CTS eval in progress  Plavix is being held for CABG on Wednesday    UTI  As per urinalysis  Asymptomatic  We will treat with IV Rocephin in view of upcoming surgery    Presyncope  Secondary to labile BP?   Possibly orthostatic, however orthostatic vital signs negative here  Vasovagal?  Autonomic instability in view of diabetes? Limited cardiac output in view of severe CAD? ESRD on PD  Nephrology consulted    Hypertension  With labile BP  BP meds adjusting per nephrology    DVT Prophylaxis: Heparin  Diet: ADULT DIET; Regular; 4 carb choices (60 gm/meal); Low Fat/Low Chol/High Fiber/QUINN; Low Phosphorus (Less than 1000 mg)  Adult Oral Nutrition Supplement;  Low Calorie/High Protein Oral Supplement  Code Status: Full Code      Electronically signed by Noam Browne MD on 6/7/2021 at 12:47 PM

## 2021-06-07 NOTE — PROGRESS NOTES
Mother     High Blood Pressure Mother     Heart Disease Mother     Diabetes Mother     Cancer Father     Coronary Art Dis Father     High Blood Pressure Father     Heart Disease Father     Diabetes Father      Current Medications:    diphenhydrAMINE (BENADRYL) tablet 12.5 mg, Q6H PRN  insulin detemir (LEVEMIR) injection pen 27 Units, Nightly  albumin human 25 % IV solution 25 g, PRN  heparin (porcine) injection 3,500 Units, PRN  insulin aspart (NOVOLOG) injection pen 0-18 Units-PATIENT SUPPLIED, TID WC  insulin aspart (NOVOLOG) injection pen 0-9 Units-PATIENT SUPPLIED, Nightly  NIFEdipine (PROCARDIA XL) extended release tablet 30 mg, Nightly  sodium chloride flush 0.9 % injection 5-40 mL, 2 times per day  sodium chloride flush 0.9 % injection 5-40 mL, PRN  0.9 % sodium chloride infusion, PRN  acetaminophen (TYLENOL) tablet 650 mg, Q4H PRN  oxyCODONE-acetaminophen (PERCOCET) 5-325 MG per tablet 1 tablet, Q4H PRN   Or  oxyCODONE-acetaminophen (PERCOCET) 5-325 MG per tablet 2 tablet, Q4H PRN  gentamicin (GARAMYCIN) 0.1 % cream, Daily  sevelamer (RENVELA) tablet 800 mg, TID WC  hydrALAZINE (APRESOLINE) injection 10 mg, Q4H PRN  aspirin EC tablet 81 mg, Daily  atorvastatin (LIPITOR) tablet 20 mg, Nightly  metoprolol tartrate (LOPRESSOR) tablet 50 mg, BID  torsemide (DEMADEX) tablet 200 mg, Daily  glucose (GLUTOSE) 40 % oral gel 15 g, PRN  dextrose 50 % IV solution, PRN  glucagon (rDNA) injection 1 mg, PRN  dextrose 5 % solution, PRN  sodium chloride flush 0.9 % injection 5-40 mL, 2 times per day  sodium chloride flush 0.9 % injection 5-40 mL, PRN  0.9 % sodium chloride infusion, PRN  [Held by provider] heparin (porcine) injection 5,000 Units, 3 times per day  promethazine (PHENERGAN) tablet 12.5 mg, Q6H PRN   Or  ondansetron (ZOFRAN) injection 4 mg, Q6H PRN  polyethylene glycol (GLYCOLAX) packet 17 g, Daily PRN  acetaminophen (TYLENOL) tablet 650 mg, Q6H PRN   Or  acetaminophen (TYLENOL) suppository 650 mg, Q6H PRN  pantoprazole (PROTONIX) tablet 40 mg, QAM AC          Physical exam:     Vitals:  BP (!) 135/91   Pulse 86   Temp 97 °F (36.1 °C) (Temporal)   Resp 16   Ht 5' 9\" (1.753 m)   Wt 218 lb 4.1 oz (99 kg)   LMP 05/12/2021 (Approximate)   SpO2 99%   BMI 32.23 kg/m²   Constitutional:  OAA X3 NAD  Skin: no rash, turgor wnl  Heent:  eomi, mmm  Neck: no bruits or jvd noted  Cardiovascular:  S1, S2 without m/r/g  Respiratory: CTA B without w/r/r  Abdomen:  +bs, soft, nt, nd  Ext: no  lower extremity edema  Psychiatric: mood and affect appropriate  Musculoskeletal:  Rom, muscular strength intact    Labs:  CBC:   Recent Labs     06/05/21  0435 06/06/21  0450 06/07/21  0525   WBC 11.9* 12.8* 11.9*   HGB 9.9* 9.8* 10.1*    346 343     BMP:    Recent Labs     06/05/21  0435 06/06/21  0450 06/07/21  0525   * 134* 131*   K 3.5 3.7 4.1   CL 89* 91* 90*   CO2 24 25 24   BUN 46* 34* 44*   CREATININE 11.7* 9.5* 10.9*   GLUCOSE 244* 144* 127*     Ca/Mg/Phos:   Recent Labs     06/05/21  0435 06/06/21  0450 06/07/21  0525   CALCIUM 9.1 8.9 9.1     UA:  Recent Labs     06/06/21  1700   COLORU DK YELLOW   CLARITYU CLOUDY*   GLUCOSEU 100*   BILIRUBINUR SMALL*   KETUA TRACE*   SPECGRAV 1.019   BLOODU LARGE*   PHUR 5.5   PROTEINU >=300*   UROBILINOGEN 0.2   NITRU Negative   LEUKOCYTESUR SMALL*   LABMICR YES   URINETYPE Voided      Urine Microscopic:   Recent Labs     06/06/21  1700   BACTERIA RARE*   COMU see below   WBCUA 36*   RBCUA 587*   EPIU 7*     Urine Culture: No results for input(s): LABURIN in the last 72 hours. Urine Chemistry: No results for input(s): Lamar Amber, PROTEINUR, NAUR in the last 72 hours. IMAGING:  IR FLUORO GUIDED CVA DEVICE PLMT/REPLACE/REMOVAL   Final Result   Technically successful right-sided HD catheter placement. Catheter is okay   for use.          VL DUP CAROTID BILATERAL   Final Result      VL PRE OP VEIN MAPPING   Final Result      CT CHEST WO CONTRAST   Final Result

## 2021-06-07 NOTE — PROGRESS NOTES
Cardiothoracic Surgery Progress Note    CC: multivessel coronary artery disease, ESRD, PAD, uncontrolled DM2, HTN, HLD    Subjective:  Hypertensive, RA, afebrile. Alert and oriented X 3. Patient denying any c/o pain or SOB but experiencing anxiety with upcoming surgery. HD today.     Vital Signs:   BP (!) 175/55   Pulse 85   Temp 97.5 °F (36.4 °C) (Temporal)   Resp 16  SpO2 99%      Physical Exam:   Cardiac:  NSR  Lungs: clear to auscultation  Abdomen:  NA X 4  Vascular:  pulses all palpable   Extremities: no edema   :  HD  Wound(s):    Left Foot - amputation left toes 1 & 2, ace wrap dry and intact    Labs:   CBC:   Recent Labs     06/06/21  0450 06/07/21  0525   WBC 12.8* 11.9*   HGB 9.8* 10.1*   HCT 30.4* 30.9*    343     BMP:   Recent Labs     06/06/21  0450 06/07/21  0525   K 3.7 4.1   CREATININE 9.5* 10.9*   CALCIUM 8.9 9.1       Assessment/Plan:  As per CC:     Plan:   Plan for CABG/NYA tentatively Wednesday  Pre-op testing reviewed  Hypertension, CRF- per nephrology      Electronically signed by RICHARD Redmond CNP on 6/7/2021 at 8:50 AM

## 2021-06-07 NOTE — PLAN OF CARE
Problem: Falls - Risk of:  Goal: Will remain free from falls  Description: Will remain free from falls  Outcome: Ongoing     Problem: Pain:  Goal: Pain level will decrease  Description: Pain level will decrease  Outcome: Ongoing     Problem: Nutrition  Goal: Optimal nutrition therapy  Outcome: Ongoing

## 2021-06-07 NOTE — CARE COORDINATION
Chart reviewed for discharge planning. Barrier(s) to discharge-patient requires CABG, she remains inpatient for Plavix metabolism to complete and is on the OR schedule for tomorrow. Patient usually does PD at [place] [id number], she has transitioned to a temporary tunneled access for HD in the intraoperative time frame, CM will follow for further discharge planning needs    Tentative discharge plan- anticipate home with home health, pending therapy recommendations    Tentative discharge date-when medically stable    *Case management will continue to follow progress and update discharge plan as needed.   SERJIO MylesN, CCM, RN  [facility]  [phone]

## 2021-06-08 LAB
ABO/RH: NORMAL
ANION GAP SERPL CALCULATED.3IONS-SCNC: 20 MMOL/L (ref 3–16)
ANTIBODY SCREEN: NORMAL
BUN BLDV-MCNC: 37 MG/DL (ref 7–20)
CALCIUM SERPL-MCNC: 9.1 MG/DL (ref 8.3–10.6)
CHLORIDE BLD-SCNC: 92 MMOL/L (ref 99–110)
CO2: 22 MMOL/L (ref 21–32)
CREAT SERPL-MCNC: 9.8 MG/DL (ref 0.6–1.1)
GFR AFRICAN AMERICAN: 5
GFR NON-AFRICAN AMERICAN: 4
GLUCOSE BLD-MCNC: 108 MG/DL (ref 70–99)
GLUCOSE BLD-MCNC: 112 MG/DL (ref 70–99)
GLUCOSE BLD-MCNC: 116 MG/DL (ref 70–99)
GLUCOSE BLD-MCNC: 158 MG/DL (ref 70–99)
GLUCOSE BLD-MCNC: 99 MG/DL (ref 70–99)
HCT VFR BLD CALC: 28.5 % (ref 36–48)
HEMOGLOBIN: 9.6 G/DL (ref 12–16)
MCH RBC QN AUTO: 30.6 PG (ref 26–34)
MCHC RBC AUTO-ENTMCNC: 33.8 G/DL (ref 31–36)
MCV RBC AUTO: 90.6 FL (ref 80–100)
PDW BLD-RTO: 13.9 % (ref 12.4–15.4)
PERFORMED ON: ABNORMAL
PERFORMED ON: NORMAL
PLATELET # BLD: 291 K/UL (ref 135–450)
PMV BLD AUTO: 8.3 FL (ref 5–10.5)
POTASSIUM SERPL-SCNC: 4 MMOL/L (ref 3.5–5.1)
RBC # BLD: 3.14 M/UL (ref 4–5.2)
SARS-COV-2, NAAT: NOT DETECTED
SODIUM BLD-SCNC: 134 MMOL/L (ref 136–145)
WBC # BLD: 10.9 K/UL (ref 4–11)

## 2021-06-08 PROCEDURE — P9047 ALBUMIN (HUMAN), 25%, 50ML: HCPCS | Performed by: HOSPITALIST

## 2021-06-08 PROCEDURE — 94150 VITAL CAPACITY TEST: CPT

## 2021-06-08 PROCEDURE — 90935 HEMODIALYSIS ONE EVALUATION: CPT | Performed by: INTERNAL MEDICINE

## 2021-06-08 PROCEDURE — 6370000000 HC RX 637 (ALT 250 FOR IP): Performed by: NURSE PRACTITIONER

## 2021-06-08 PROCEDURE — 87635 SARS-COV-2 COVID-19 AMP PRB: CPT

## 2021-06-08 PROCEDURE — 86923 COMPATIBILITY TEST ELECTRIC: CPT

## 2021-06-08 PROCEDURE — 86901 BLOOD TYPING SEROLOGIC RH(D): CPT

## 2021-06-08 PROCEDURE — 2580000003 HC RX 258: Performed by: INTERNAL MEDICINE

## 2021-06-08 PROCEDURE — 85027 COMPLETE CBC AUTOMATED: CPT

## 2021-06-08 PROCEDURE — 6360000002 HC RX W HCPCS: Performed by: INTERNAL MEDICINE

## 2021-06-08 PROCEDURE — 6370000000 HC RX 637 (ALT 250 FOR IP): Performed by: INTERNAL MEDICINE

## 2021-06-08 PROCEDURE — 94760 N-INVAS EAR/PLS OXIMETRY 1: CPT

## 2021-06-08 PROCEDURE — P9016 RBC LEUKOCYTES REDUCED: HCPCS

## 2021-06-08 PROCEDURE — 6360000002 HC RX W HCPCS: Performed by: HOSPITALIST

## 2021-06-08 PROCEDURE — APPNB30 APP NON BILLABLE TIME 0-30 MINS: Performed by: NURSE PRACTITIONER

## 2021-06-08 PROCEDURE — 2140000000 HC CCU INTERMEDIATE R&B

## 2021-06-08 PROCEDURE — 6360000002 HC RX W HCPCS: Performed by: FAMILY MEDICINE

## 2021-06-08 PROCEDURE — 99233 SBSQ HOSP IP/OBS HIGH 50: CPT | Performed by: INTERNAL MEDICINE

## 2021-06-08 PROCEDURE — 6370000000 HC RX 637 (ALT 250 FOR IP): Performed by: FAMILY MEDICINE

## 2021-06-08 PROCEDURE — 90935 HEMODIALYSIS ONE EVALUATION: CPT

## 2021-06-08 PROCEDURE — 86900 BLOOD TYPING SEROLOGIC ABO: CPT

## 2021-06-08 PROCEDURE — 86850 RBC ANTIBODY SCREEN: CPT

## 2021-06-08 PROCEDURE — 80048 BASIC METABOLIC PNL TOTAL CA: CPT

## 2021-06-08 PROCEDURE — APPSS15 APP SPLIT SHARED TIME 0-15 MINUTES: Performed by: NURSE PRACTITIONER

## 2021-06-08 RX ORDER — SODIUM CHLORIDE, SODIUM LACTATE, POTASSIUM CHLORIDE, CALCIUM CHLORIDE 600; 310; 30; 20 MG/100ML; MG/100ML; MG/100ML; MG/100ML
INJECTION, SOLUTION INTRAVENOUS CONTINUOUS
Status: DISCONTINUED | OUTPATIENT
Start: 2021-06-09 | End: 2021-06-09

## 2021-06-08 RX ORDER — CHLORHEXIDINE GLUCONATE 4 G/100ML
SOLUTION TOPICAL SEE ADMIN INSTRUCTIONS
Status: DISCONTINUED | OUTPATIENT
Start: 2021-06-08 | End: 2021-06-09 | Stop reason: HOSPADM

## 2021-06-08 RX ORDER — ALPRAZOLAM 0.25 MG/1
0.25 TABLET ORAL
Status: COMPLETED | OUTPATIENT
Start: 2021-06-08 | End: 2021-06-08

## 2021-06-08 RX ORDER — PANTOPRAZOLE SODIUM 40 MG/10ML
40 INJECTION, POWDER, LYOPHILIZED, FOR SOLUTION INTRAVENOUS
Status: COMPLETED | OUTPATIENT
Start: 2021-06-09 | End: 2021-06-09

## 2021-06-08 RX ADMIN — ALTEPLASE 2 MG: 2.2 INJECTION, POWDER, LYOPHILIZED, FOR SOLUTION INTRAVENOUS at 10:35

## 2021-06-08 RX ADMIN — Medication 10 ML: at 20:22

## 2021-06-08 RX ADMIN — Medication 10 ML: at 09:22

## 2021-06-08 RX ADMIN — OXYCODONE HYDROCHLORIDE AND ACETAMINOPHEN 1 TABLET: 5; 325 TABLET ORAL at 23:48

## 2021-06-08 RX ADMIN — OXYCODONE HYDROCHLORIDE AND ACETAMINOPHEN 2 TABLET: 5; 325 TABLET ORAL at 10:03

## 2021-06-08 RX ADMIN — MUPIROCIN: 20 OINTMENT TOPICAL at 20:11

## 2021-06-08 RX ADMIN — NIFEDIPINE 30 MG: 30 TABLET, FILM COATED, EXTENDED RELEASE ORAL at 20:11

## 2021-06-08 RX ADMIN — PANTOPRAZOLE SODIUM 40 MG: 40 TABLET, DELAYED RELEASE ORAL at 06:36

## 2021-06-08 RX ADMIN — OXYCODONE HYDROCHLORIDE AND ACETAMINOPHEN 1 TABLET: 5; 325 TABLET ORAL at 06:36

## 2021-06-08 RX ADMIN — Medication 1000 MG: at 09:59

## 2021-06-08 RX ADMIN — METOPROLOL TARTRATE 50 MG: 50 TABLET, FILM COATED ORAL at 11:26

## 2021-06-08 RX ADMIN — Medication 10 ML: at 09:59

## 2021-06-08 RX ADMIN — ATORVASTATIN CALCIUM 20 MG: 20 TABLET, FILM COATED ORAL at 20:11

## 2021-06-08 RX ADMIN — METOPROLOL TARTRATE 50 MG: 50 TABLET, FILM COATED ORAL at 20:11

## 2021-06-08 RX ADMIN — ALBUMIN (HUMAN) 25 G: 0.25 INJECTION, SOLUTION INTRAVENOUS at 07:37

## 2021-06-08 RX ADMIN — ALPRAZOLAM 0.25 MG: 0.25 TABLET ORAL at 04:35

## 2021-06-08 RX ADMIN — ASPIRIN 81 MG: 81 TABLET, COATED ORAL at 11:26

## 2021-06-08 RX ADMIN — ALPRAZOLAM 0.25 MG: 0.25 TABLET ORAL at 20:55

## 2021-06-08 RX ADMIN — ONDANSETRON 4 MG: 2 INJECTION INTRAMUSCULAR; INTRAVENOUS at 18:26

## 2021-06-08 RX ADMIN — SEVELAMER CARBONATE 800 MG: 800 TABLET, FILM COATED ORAL at 16:04

## 2021-06-08 RX ADMIN — HYDRALAZINE HYDROCHLORIDE 10 MG: 20 INJECTION INTRAMUSCULAR; INTRAVENOUS at 04:35

## 2021-06-08 RX ADMIN — TORSEMIDE 200 MG: 100 TABLET ORAL at 11:27

## 2021-06-08 ASSESSMENT — PAIN SCALES - GENERAL
PAINLEVEL_OUTOF10: 6
PAINLEVEL_OUTOF10: 7
PAINLEVEL_OUTOF10: 4
PAINLEVEL_OUTOF10: 0
PAINLEVEL_OUTOF10: 5
PAINLEVEL_OUTOF10: 0
PAINLEVEL_OUTOF10: 0
PAINLEVEL_OUTOF10: 3
PAINLEVEL_OUTOF10: 0
PAINLEVEL_OUTOF10: 2

## 2021-06-08 ASSESSMENT — PAIN DESCRIPTION - DESCRIPTORS: DESCRIPTORS: DISCOMFORT;ACHING

## 2021-06-08 ASSESSMENT — PAIN DESCRIPTION - PAIN TYPE: TYPE: ACUTE PAIN;SURGICAL PAIN

## 2021-06-08 ASSESSMENT — PAIN DESCRIPTION - PROGRESSION: CLINICAL_PROGRESSION: NOT CHANGED

## 2021-06-08 ASSESSMENT — PAIN DESCRIPTION - FREQUENCY: FREQUENCY: INTERMITTENT

## 2021-06-08 ASSESSMENT — LIFESTYLE VARIABLES: SMOKING_STATUS: 0

## 2021-06-08 ASSESSMENT — PAIN DESCRIPTION - ORIENTATION: ORIENTATION: LEFT

## 2021-06-08 ASSESSMENT — PAIN DESCRIPTION - ONSET: ONSET: ON-GOING

## 2021-06-08 ASSESSMENT — PAIN DESCRIPTION - LOCATION: LOCATION: FOOT

## 2021-06-08 ASSESSMENT — PAIN - FUNCTIONAL ASSESSMENT: PAIN_FUNCTIONAL_ASSESSMENT: PREVENTS OR INTERFERES SOME ACTIVE ACTIVITIES AND ADLS

## 2021-06-08 NOTE — PROGRESS NOTES
[facility] Daily Progress Note      Admit Date:  5/31/2021    Chief Complaint   Patient presents with    Loss of Consciousness     last week had syncopal episode, cont to feel faint, dizzy while walking. Subjective:  Ms. Brody Gallardo denies exertional chest pain, SOB/CABA, PND, palpitations, light-headedness, or edema. On HD today, tolerating well.     Objective:   BP (!) 142/99   Pulse 90   Temp 97.8 °F (36.6 °C) (Temporal)   Resp 18   Ht 5' 9\" (1.753 m)   Wt 218 lb 14.7 oz (99.3 kg)   LMP 05/12/2021 (Approximate)   SpO2 96%   BMI 32.33 kg/m²       Intake/Output Summary (Last 24 hours) at 6/8/2021 1347  Last data filed at 6/8/2021 1016  Gross per 24 hour   Intake 980 ml   Output 1900 ml   Net -920 ml       TELEMETRY: Sinus     Physical Exam:  General:  Awake, alert, oriented x 3, NAD  Skin:  Warm and dry  Neck:  JVD flat  Chest:  normal air entry  Cardiovascular:  RRR S1S2, no S3, no mrmr  Abdomen:  Soft, ND, NT, No HSM  Extremities:  No edema    Medications:    cefTRIAXone (ROCEPHIN) IV  1,000 mg Intravenous Q24H    insulin detemir  27 Units Subcutaneous Nightly    insulin aspart  0-18 Units Subcutaneous TID WC    insulin aspart  0-9 Units Subcutaneous Nightly    NIFEdipine  30 mg Oral Nightly    sodium chloride flush  5-40 mL Intravenous 2 times per day    gentamicin   Topical Daily    sevelamer  800 mg Oral TID WC    aspirin  81 mg Oral Daily    atorvastatin  20 mg Oral Nightly    metoprolol tartrate  50 mg Oral BID    torsemide  200 mg Oral Daily    [Held by provider] heparin (porcine)  5,000 Units Subcutaneous 3 times per day    pantoprazole  40 mg Oral QAM AC      sodium chloride      dextrose       alteplase, alteplase, ALPRAZolam, diphenhydrAMINE, albumin human, sodium chloride flush, sodium chloride, acetaminophen, oxyCODONE-acetaminophen **OR** oxyCODONE-acetaminophen, hydrALAZINE, glucose, dextrose, glucagon (rDNA), dextrose, promethazine **OR** ondansetron, polyethylene glycol, acetaminophen **OR** acetaminophen    Lab Data:  CBC:   Recent Labs     06/06/21  0450 06/07/21  0525 06/08/21  0445   WBC 12.8* 11.9* 10.9   HGB 9.8* 10.1* 9.6*   HCT 30.4* 30.9* 28.5*   MCV 91.1 90.4 90.6    343 291     BMP:   Recent Labs     06/06/21  0450 06/07/21  0525 06/08/21  0445   * 131* 134*   K 3.7 4.1 4.0   CL 91* 90* 92*   CO2 25 24 22   BUN 34* 44* 37*   CREATININE 9.5* 10.9* 9.8*     LIVER PROFILE: No results for input(s): AST, ALT, LIPASE, BILIDIR, BILITOT, ALKPHOS in the last 72 hours. Invalid input(s): AMYLASE,  ALB  PT/INR:   No results for input(s): PROTIME, INR in the last 72 hours. APTT:   No results for input(s): APTT in the last 72 hours. BNP:  No results for input(s): BNP in the last 72 hours. IMAGING: Cardiac Cath PCI:  Anatomy:   LM-nml   LAD-ostial 90%  Cx-ostial 80%  OM- nml  RCA-prox 20%  RPDA- prox 90%  LVEF- 50%  LVG- nml  LVEDP- 0    Assessment/Plan:  Active Problems:       CAD: severe MVD, severe Diabetes, needs CABG. Was taking plavix for PAD and recent endovascular procedure. Plavix on hold for CABG tomorrow. Cont aspirin statin. Cont HD pre CABG.      HTN: cont meds, well controlled  ESRD on HD    Caleb Thomas MD, MD 6/8/2021 1:47 PM

## 2021-06-08 NOTE — PROGRESS NOTES
Office : [phone]     Fax :[phone]       Nephrology progress  Note      Patient's Name: Reinier García  9:10 AM  2021    Reason for Consult:  ESRD    History of Present Ilness:    Reinier García is a 51 y.o. female with severe end-stage renal disease secondary to diabetic nephropathy on peritoneal dialysis, hypertension, peripheral vascular disease, diabetic neuropathy who was admitted after she had a syncopal episode. She had similar episode 2 weeks ago. Recently had angiogram left lower extremity for gangrene left foot second toe. Denies any shortness of breath. Denies any abdominal pain. Denies any nausea vomiting. Interval HX :      Not in acute distress   Seen on HD today   Able to tolerate   Rt groin femoral catheter   Denies any chest pain  No chest pain       S/p [facility]   Has multivessel CAD   Plan for CABG on Wednesday    I/O last 3 completed shifts:   In: 36 [P.O.:720]  Out: 0 [Urine:100]    Past Medical History:   Diagnosis Date    Diabetes mellitus ([address]     End stage kidney disease ([address]     peritoneal dialysis every night at home x 2 years    Hypertension     Neuropathy     Peripheral vascular disease ([address]        Past Surgical History:   Procedure Laterality Date     SECTION      OTHER SURGICAL HISTORY  2018     lap PD cath placement lt side 62 cm    TOE AMPUTATION Left 2021    AMPUTATION OF THE LEFT THIRD TOE performed by Maria Dolores Brand DPM at [id number] [facility] [address] TOE AMPUTATION Left 2021    AMPUTATION OF HALLUX AND SECOND TOE, LEFT FOOT performed by Maria Dolores Brand DPM at [facility]         Family History   Problem Relation Age of Onset    Coronary Art Dis Mother     High Blood Pressure Mother     Heart Disease Mother     Diabetes Mother     Cancer Father     Coronary Art Dis Father     High Blood Pressure Father     Heart Disease Father     Diabetes Father      Current Medications:    alteplase (CATHFLO) injection 2 mg, PRN  alteplase (CATHFLO) injection 2 mg, PRN  cefTRIAXone (ROCEPHIN) 1000 mg in sterile water 10 mL IV syringe, Q24H  ALPRAZolam (XANAX) tablet 0.25 mg, BID PRN  diphenhydrAMINE (BENADRYL) tablet 12.5 mg, Q6H PRN  insulin detemir (LEVEMIR) injection pen 27 Units, Nightly  albumin human 25 % IV solution 25 g, PRN  insulin aspart (NOVOLOG) injection pen 0-18 Units-PATIENT SUPPLIED, TID WC  insulin aspart (NOVOLOG) injection pen 0-9 Units-PATIENT SUPPLIED, Nightly  NIFEdipine (PROCARDIA XL) extended release tablet 30 mg, Nightly  sodium chloride flush 0.9 % injection 5-40 mL, 2 times per day  sodium chloride flush 0.9 % injection 5-40 mL, PRN  0.9 % sodium chloride infusion, PRN  acetaminophen (TYLENOL) tablet 650 mg, Q4H PRN  oxyCODONE-acetaminophen (PERCOCET) 5-325 MG per tablet 1 tablet, Q4H PRN   Or  oxyCODONE-acetaminophen (PERCOCET) 5-325 MG per tablet 2 tablet, Q4H PRN  gentamicin (GARAMYCIN) 0.1 % cream, Daily  sevelamer (RENVELA) tablet 800 mg, TID WC  hydrALAZINE (APRESOLINE) injection 10 mg, Q4H PRN  aspirin EC tablet 81 mg, Daily  atorvastatin (LIPITOR) tablet 20 mg, Nightly  metoprolol tartrate (LOPRESSOR) tablet 50 mg, BID  torsemide (DEMADEX) tablet 200 mg, Daily  glucose (GLUTOSE) 40 % oral gel 15 g, PRN  dextrose 50 % IV solution, PRN  glucagon (rDNA) injection 1 mg, PRN  dextrose 5 % solution, PRN  [Held by provider] heparin (porcine) injection 5,000 Units, 3 times per day  promethazine (PHENERGAN) tablet 12.5 mg, Q6H PRN   Or  ondansetron (ZOFRAN) injection 4 mg, Q6H PRN  polyethylene glycol (GLYCOLAX) packet 17 g, Daily PRN  acetaminophen (TYLENOL) tablet 650 mg, Q6H PRN   Or  acetaminophen (TYLENOL) suppository 650 mg, Q6H PRN  pantoprazole (PROTONIX) tablet 40 mg, QAM AC          Physical exam:     Vitals:  BP (!) 187/98   Pulse 74   Temp 96.9 °F (36.1 °C) (Temporal)   Resp 16   Ht 5' 9\" (1.753 m)   Wt 218 lb 14.7 oz (99.3 kg)   LMP 05/12/2021 (Approximate)   SpO2 96%   BMI 32.33 kg/m²   Constitutional:  OAA X3 NAD  Skin: no rash, turgor wnl  Heent:  eomi, mmm  Neck: no bruits or jvd noted  Cardiovascular:  S1, S2 without m/r/g  Respiratory: CTA B without w/r/r  Abdomen:  +bs, soft, nt, nd  Ext: no  lower extremity edema  Psychiatric: mood and affect appropriate  Musculoskeletal:  Rom, muscular strength intact    Labs:  CBC:   Recent Labs     06/06/21  0450 06/07/21  0525 06/08/21  0445   WBC 12.8* 11.9* 10.9   HGB 9.8* 10.1* 9.6*    343 291     BMP:    Recent Labs     06/06/21  0450 06/07/21  0525 06/08/21  0445   * 131* 134*   K 3.7 4.1 4.0   CL 91* 90* 92*   CO2 25 24 22   BUN 34* 44* 37*   CREATININE 9.5* 10.9* 9.8*   GLUCOSE 144* 127* 112*     Ca/Mg/Phos:   Recent Labs     06/06/21  0450 06/07/21  0525 06/08/21  0445   CALCIUM 8.9 9.1 9.1     UA:  Recent Labs     06/06/21  1700   COLORU DK YELLOW   CLARITYU CLOUDY*   GLUCOSEU 100*   BILIRUBINUR SMALL*   KETUA TRACE*   SPECGRAV 1.019   BLOODU LARGE*   PHUR 5.5   PROTEINU >=300*   UROBILINOGEN 0.2   NITRU Negative   LEUKOCYTESUR SMALL*   LABMICR YES   URINETYPE Voided      Urine Microscopic:   Recent Labs     06/06/21  1700   BACTERIA RARE*   COMU see below   WBCUA 36*   RBCUA 587*   EPIU 7*     Urine Culture:   Recent Labs     06/06/21  [address] <10,000 CFU/ml  No further workup       Urine Chemistry: No results for input(s): CLUR, LABCREA, PROTEINUR, NAUR in the last 72 hours. IMAGING:  IR FLUORO GUIDED CVA DEVICE PLMT/REPLACE/REMOVAL   Final Result   Technically successful right-sided HD catheter placement. Catheter is okay   for use.          VL DUP CAROTID BILATERAL   Final Result      VL PRE OP VEIN MAPPING   Final Result      CT CHEST WO CONTRAST   Final Result   No acute intrathoracic abnormality         MRI BRAIN WO CONTRAST   Final Result   1. No acute intracranial abnormality. 2. Mild chronic white matter microvascular ischemic changes. 3. Right maxillary sinusitis. XR CHEST PORTABLE   Final Result   Cardiomegaly with stable right pleural effusion                 Assessment/Plan :      1. ESRD. Was On peritoneal dialysis. Switched  her temporarily to hemodialysis pre and postop. HD today   Planned for CABG tomorrow   Will keep on HD post op for few days and will transition to PD after that       2. HTN. Labile. Check orthostatics statics  Decreased dose of nifedipine XL to 30 mg po daily     3. Anemia of chronic disease. Gets micera at dialysis unit    4. Acid- base disorder. Monitor    5. Electrolytes monitor and replace as needed    6. CAD/Syncopal episode. S/p [facility]   Has Multivessel CAD   Plan for CABG on Wednesday       7. Peripheral vascular disease. S/p amputation of left foot toes.  Podiatry following             Thank you for allowing us to participate in care of Linsey García         Electronically signed by: Bertram Day MD, 6/8/2021, 9:10 AM      [facility] [address]  Office : [phone]  Fax :[phone]

## 2021-06-08 NOTE — ANESTHESIA PRE PROCEDURE
2 READER) JOHNNIE Use to check glucose 5/11/21   Kay Dubon MD   Continuous Blood Gluc Sensor (FREESTYLE KOREY 14 DAY SENSOR) [facility] ---change every 14 days 5/10/21   Kay Dubon MD   Continuous Blood Gluc  (FREESTYLE KOREY 14 DAY READER) JOHNNIE To test glucose 5/10/21   Kay Dubon MD   Insulin Pen Needle 32G X 6 MM MISC Use with insulin pens 4 times daily 2/18/21   Antoine Garcia MD   Multiple Vitamins-Minerals (THERAPEUTIC MULTIVITAMIN-MINERALS) tablet Take 1 tablet by mouth daily.  1/30/14 11/13/14  Antoine Garcia MD       Current medications:    Current Facility-Administered Medications   Medication Dose Route Frequency Provider Last Rate Last Admin    alteplase (CATHFLO) injection 2 mg  2 mg Intracatheter PRN Alexis Tubbs MD   2 mg at 06/08/21 1035    alteplase (CATHFLO) injection 2 mg  2 mg Intracatheter PRN Alexis Tubbs MD   2 mg at 06/08/21 1035    cefTRIAXone (ROCEPHIN) 1000 mg in sterile water 10 mL IV syringe  1,000 mg Intravenous Q24H Sarbjit BARNAHRT MD   1,000 mg at 06/08/21 0959    ALPRAZolam (XANAX) tablet 0.25 mg  0.25 mg Oral BID PRN RICHARD Pearl - CNP   0.25 mg at 06/08/21 0435    diphenhydrAMINE (BENADRYL) tablet 12.5 mg  12.5 mg Oral Q6H PRN Sarbjit Greene MD        insulin detemir (LEVEMIR) injection pen 27 Units  27 Units Subcutaneous Nightly Sarbjit BARNHART MD   27 Units at 06/07/21 2249    albumin human 25 % IV solution 25 g  25 g Intravenous PRN Patric Dunham MD   Stopped at 06/08/21 0959    insulin aspart (NOVOLOG) injection pen 0-18 Units-PATIENT SUPPLIED  0-18 Units Subcutaneous TID WC Sarbjit BARNHART MD   3 Units at 06/08/21 1559    insulin aspart (NOVOLOG) injection pen 0-9 Units-PATIENT SUPPLIED  0-9 Units Subcutaneous Nightly Sarbjit BARNHART MD   2 Units at 06/06/21 2012    NIFEdipine (PROCARDIA XL) extended release tablet 30 mg  30 mg Oral Nightly Alexis Tubbs MD   30 mg at 06/07/21 9490    sodium chloride flush 0.9 injection 4 mg  4 mg Intravenous Q6H PRN Amber Romero MD   4 mg at 21 0733    polyethylene glycol (GLYCOLAX) packet 17 g  17 g Oral Daily PRN Amber Romero MD        acetaminophen (TYLENOL) suppository 650 mg  650 mg Rectal Q6H PRN Amber Romero MD        pantoprazole (PROTONIX) tablet 40 mg  40 mg Oral QAM AC Amber Romero MD   40 mg at 21 0636       Allergies:     Allergies   Allergen Reactions    Zetia [Ezetimibe] Shortness Of Breath    Quinolones Nausea Only     Other reaction(s): Vomiting    Humalog [Insulin Lispro]      SOB, Leg swelling, fatigue    Lisinopril Swelling    Lantus [Insulin Glargine] Nausea And Vomiting     Side effect, not an allergy    Victoza [Liraglutide] Nausea And Vomiting     Side effect, not an allergy         Problem List:    Patient Active Problem List   Diagnosis Code    [facility] E11.9    Obesity E66.9    Microalbuminuria R80.9    Hyperlipidemia with target LDL less than 100 E78.5    Hypertension I10    Diabetes mellitus type 2 in obese ([facility]) E11.69, E66.9    Acute renal failure (ARF) ([facility]) N17.9    ESRD (end stage renal disease) ([facility]) N18.6    Non-smoker Z78.9    Elevated C-reactive protein (CRP) R79.82    Elevated sed rate R70.0    Diabetic polyneuropathy associated with type 2 diabetes mellitus ([facility]) E11.42    Weight loss counseling, encounter for Z71.3    Atherosclerosis of native arteries of left leg with ulceration of other part of foot ([facility]) I70.245    Near syncope R55    Coronary artery disease of native artery of native heart with stable angina pectoris ([facility]) I25.118    Mild malnutrition ([facility]) E44.1       Past Medical History:        Diagnosis Date    [facility]     End stage kidney disease ([facility]     peritoneal dialysis every night at home x 2 years    Hypertension     Neuropathy     Peripheral vascular disease ([facility]        Past Surgical History:        Procedure Laterality Date     SECTION      OTHER SURGICAL HISTORY  08/16/2018     lap PD cath placement lt side 62 cm    TOE AMPUTATION Left 2/26/2021    AMPUTATION OF THE LEFT THIRD TOE performed by Ham Copeland DPM at [address] TOE AMPUTATION Left 5/26/2021    AMPUTATION OF HALLUX AND SECOND TOE, LEFT FOOT performed by Ham Copeland DPM at [facility]         Social History:    Social History     Tobacco Use    Smoking status: Never Smoker    Smokeless tobacco: Never Used   Substance Use Topics    Alcohol use: No                                Counseling given: Not Answered      Vital Signs (Current):   Vitals:    06/08/21 1015 06/08/21 1030 06/08/21 1045 06/08/21 1602   BP: (!) 142/99   135/60   Pulse: 88 86 90    Resp:    18   Temp:    96.7 °F (35.9 °C)   TempSrc:    Temporal   SpO2:       Weight:       Height:                                                  BP Readings from Last 3 Encounters:   06/08/21 135/60   05/26/21 (!) 153/74   05/26/21 (!) 117/55       NPO Status:                                                                                 BMI:   Wt Readings from Last 3 Encounters:   06/08/21 218 lb 14.7 oz (99.3 kg)   05/27/21 229 lb (103.9 kg)   05/26/21 226 lb (102.5 kg)     Body mass index is 32.33 kg/m².     CBC:   Lab Results   Component Value Date    WBC 10.9 06/08/2021    RBC 3.14 06/08/2021    HGB 9.6 06/08/2021    HCT 28.5 06/08/2021    MCV 90.6 06/08/2021    RDW 13.9 06/08/2021     06/08/2021       CMP:   Lab Results   Component Value Date     06/08/2021    K 4.0 06/08/2021    K 4.2 05/19/2021    CL 92 06/08/2021    CO2 22 06/08/2021    BUN 37 06/08/2021    CREATININE 9.8 06/08/2021    GFRAA 5 06/08/2021    GFRAA >60 04/09/2013    AGRATIO 0.7 05/31/2021    LABGLOM 4 06/08/2021    GLUCOSE 112 06/08/2021    PROT 8.1 05/31/2021    PROT 7.1 12/13/2012    CALCIUM 9.1 06/08/2021    BILITOT <0.2 05/31/2021    ALKPHOS 120 05/31/2021    AST 9 05/31/2021    ALT <5 05/31/2021       POC Tests: Recent Labs     06/08/21  1558   POCGLU 158*       Coags:   Lab Results   Component Value Date    PROTIME 11.8 06/04/2021    INR 1.02 06/04/2021    APTT 30.1 06/04/2021       HCG (If Applicable):   Lab Results   Component Value Date    PREGTESTUR Negative 05/26/2021        ABGs:   Lab Results   Component Value Date    PHART 7.406 06/03/2021    PO2ART 88.0 06/03/2021    RRY4HLH 38.9 06/03/2021    XQA2IIH 24.4 06/03/2021    BEART -0.2 06/03/2021    I3KEVJSJ 97.0 06/03/2021        Type & Screen (If Applicable):  No results found for: LABABO, LABRH    Drug/Infectious Status (If Applicable):  No results found for: HIV, HEPCAB    COVID-19 Screening (If Applicable):   Lab Results   Component Value Date    COVID19 Not Detected 06/08/2021    COVID19 Not Detected 05/24/2021           Anesthesia Evaluation  Patient summary reviewed no history of anesthetic complications:   Airway: Mallampati: III  TM distance: >3 FB   Neck ROM: full  Mouth opening: < 3 FB Dental:      Comment: MULTIPLE BROKEN TEETH. POOR DENTITION    Pulmonary:Negative Pulmonary ROS   (+) decreased breath sounds,      (-) not a current smoker                           Cardiovascular:  Exercise tolerance: poor (<4 METS),   (+) hypertension:, CAD:,         Rhythm: regular  Rate: normal                    Neuro/Psych:   Negative Neuro/Psych ROS              GI/Hepatic/Renal:   (+) renal disease: ESRD,          ROS comment: ON PD X 2.5 YRS    CURRENTLY HD IN HOSPITAL. (GROIN VAS CATH). Endo/Other:    (+) DiabetesType II DM, using insulin, . Abdominal:           Vascular: negative vascular ROS. CATH 5/21/2021:   TRIPLE VESSEL DISEASE (LCX, PDA, RCA)  LVEF 50%         Anesthesia Plan      general     ASA 4 - emergent       Induction: intravenous. arterial line, central line, CVP, PA catheter and ANA    Anesthetic plan and risks discussed with patient. Use of blood products discussed with patient whom. aMrce Vences MD   6/8/2021

## 2021-06-08 NOTE — PROGRESS NOTES
Xanax given for anxiety and Hydralazine for elevated /87. Covid Rapid test specimen, Type and screen specimen were collected and sent. Pt is A&Ox4, VSS, NSR w/multiform PVCs, SpO2 97% on RA.

## 2021-06-08 NOTE — PROGRESS NOTES
Hospitalist Progress Note      PCP: Mike Kraft MD    Date of Admission: 5/31/2021    Chief Complaint: [facility] Course: 48-year-old female was past medical history of ESRD on peritoneal dialysis, hypertension, hyperlipidemia, diabetes, peripheral vascular disease status post left toe amputation last week presents with presyncopal episode. She also describes a syncopal episode couple of weeks ago. Patient has recently been undergoing evaluation for possible renal transplant has had stress test performed at [facility] which was abnormal.  Patient is scheduled for outpatient left heart cath on 6/3/2021 . Patient is labile blood pressures, but negative orthostatics. Nephrology is adjusting blood pressure medications. She had left heart cath performed 6/3/2021 which showed multivessel disease. CTS consulted the plan for possible CABG.  Her Plavix is being held    Subjective: lying in bed no chest pain sob fever or chill nausea or vomitting    Medications:  Reviewed    Infusion Medications    sodium chloride      dextrose       Scheduled Medications    cefTRIAXone (ROCEPHIN) IV  1,000 mg Intravenous Q24H    insulin detemir  27 Units Subcutaneous Nightly    insulin aspart  0-18 Units Subcutaneous TID WC    insulin aspart  0-9 Units Subcutaneous Nightly    NIFEdipine  30 mg Oral Nightly    sodium chloride flush  5-40 mL Intravenous 2 times per day    gentamicin   Topical Daily    sevelamer  800 mg Oral TID WC    aspirin  81 mg Oral Daily    atorvastatin  20 mg Oral Nightly    metoprolol tartrate  50 mg Oral BID    torsemide  200 mg Oral Daily    [Held by provider] heparin (porcine)  5,000 Units Subcutaneous 3 times per day    pantoprazole  40 mg Oral QAM AC     PRN Meds: alteplase, alteplase, ALPRAZolam, diphenhydrAMINE, albumin human, sodium chloride flush, sodium chloride, acetaminophen, oxyCODONE-acetaminophen **OR** oxyCODONE-acetaminophen, hydrALAZINE, glucose, dextrose, glucagon (rDNA), dextrose, promethazine **OR** ondansetron, polyethylene glycol, acetaminophen **OR** acetaminophen      Intake/Output Summary (Last 24 hours) at 6/8/2021 1154  Last data filed at 6/8/2021 0454  Gross per 24 hour   Intake 1320 ml   Output 1600 ml   Net -280 ml       Physical Exam Performed:    BP (!) 142/99   Pulse 90   Temp 97.8 °F (36.6 °C) (Temporal)   Resp 18   Ht 5' 9\" (1.753 m)   Wt 218 lb 14.7 oz (99.3 kg)   LMP 05/12/2021 (Approximate)   SpO2 96%   BMI 32.33 kg/m²     General appearance: aaox3  HEENT: Pupils equal, round, and reactive to light. Conjunctivae/corneas clear. Neck: Supple, with full range of motion. No jugular venous distention. Respiratory:  ctab no wheezing  Cardiovascular: Regular rate and rhythm with normal S1/S2 . Abdomen: Soft, non-tender, non-distended with normal bowel sounds. Skin: Skin color, texture, turgor normal.  No rashes or lesions. Neurologic: no gross deficits  Psychiatric: Alert and oriented, thought content appropriate, normal insight        Labs:   Recent Labs     06/06/21  0450 06/07/21  0525 06/08/21  0445   WBC 12.8* 11.9* 10.9   HGB 9.8* 10.1* 9.6*   HCT 30.4* 30.9* 28.5*    343 291     Recent Labs     06/06/21  0450 06/07/21  0525 06/08/21  0445   * 131* 134*   K 3.7 4.1 4.0   CL 91* 90* 92*   CO2 25 24 22   BUN 34* 44* 37*   CREATININE 9.5* 10.9* 9.8*   CALCIUM 8.9 9.1 9.1     No results for input(s): AST, ALT, BILIDIR, BILITOT, ALKPHOS in the last 72 hours. No results for input(s): INR in the last 72 hours. No results for input(s): Charley Loth in the last 72 hours.     Urinalysis:      Lab Results   Component Value Date    NITRU Negative 06/06/2021    WBCUA 36 06/06/2021    BACTERIA RARE 06/06/2021    RBCUA 587 06/06/2021    BLOODU LARGE 06/06/2021    SPECGRAV 1.019 06/06/2021    GLUCOSEU 100 06/06/2021       Radiology:  IR FLUORO GUIDED CVA DEVICE PLMT/REPLACE/REMOVAL   Final Result   Technically successful right-sided HD catheter placement. Catheter is okay   for use. VL DUP CAROTID BILATERAL   Final Result      VL PRE OP VEIN MAPPING   Final Result      CT CHEST WO CONTRAST   Final Result   No acute intrathoracic abnormality         MRI BRAIN WO CONTRAST   Final Result   1. No acute intracranial abnormality. 2. Mild chronic white matter microvascular ischemic changes. 3. Right maxillary sinusitis. XR CHEST PORTABLE   Final Result   Cardiomegaly with stable right pleural effusion                 Assessment/Plan:    Active Hospital Problems    Diagnosis     Mild malnutrition (HCC) [E44.1]     Coronary artery disease of native artery of native heart with stable angina pectoris ([address] [I25.118]     Near syncope [R55]     Atherosclerosis of native arteries of left leg with ulceration of other part of foot ([address] [I70.245]     Diabetic polyneuropathy associated with type 2 diabetes mellitus ([address] [E11.42]     ESRD (end stage renal disease) ([address] [N18.6]     Diabetes mellitus ([address] [E11.9]        Multivessel CAD  Status post left heart cath 6/3/2021  Plavix is being held for CABG on Wednesday    UTI  As per urinalysis  Asymptomatic  -iv rocephin      ESRD on PD  Nephrology consulted    Hypertension  With labile BP  BP meds adjusting per nephrology    DVT Prophylaxis: Heparin  Diet: ADULT DIET; Regular; 4 carb choices (60 gm/meal); Low Fat/Low Chol/High Fiber/QUINN; Low Phosphorus (Less than 1000 mg)  Adult Oral Nutrition Supplement;  Low Calorie/High Protein Oral Supplement  Code Status: Full Code      Electronically signed by Harriett Rivera MD on 6/8/2021 at 11:54 AM

## 2021-06-09 ENCOUNTER — ANESTHESIA (OUTPATIENT)
Dept: OPERATING ROOM | Age: 46
DRG: 233 | End: 2021-06-09
Payer: MEDICARE

## 2021-06-09 ENCOUNTER — APPOINTMENT (OUTPATIENT)
Dept: GENERAL RADIOLOGY | Age: 46
DRG: 233 | End: 2021-06-09
Payer: MEDICARE

## 2021-06-09 VITALS — TEMPERATURE: 99 F | RESPIRATION RATE: 1 BRPM | OXYGEN SATURATION: 96 %

## 2021-06-09 PROBLEM — Z98.890 S/P LEFT ATRIAL APPENDAGE LIGATION: Status: ACTIVE | Noted: 2021-06-09

## 2021-06-09 PROBLEM — Z95.1 S/P CORONARY ARTERY BYPASS GRAFT X 3: Status: ACTIVE | Noted: 2021-06-09

## 2021-06-09 LAB
ACTIVATED CLOTTING TIME: 114 SEC (ref 99–130)
ACTIVATED CLOTTING TIME: 114 SEC (ref 99–130)
ACTIVATED CLOTTING TIME: 451 SEC (ref 99–130)
ACTIVATED CLOTTING TIME: 486 SEC (ref 99–130)
ACTIVATED CLOTTING TIME: 500 SEC (ref 99–130)
ACTIVATED CLOTTING TIME: 531 SEC (ref 99–130)
ACTIVATED CLOTTING TIME: 93 SEC (ref 99–130)
ANION GAP SERPL CALCULATED.3IONS-SCNC: 15 MMOL/L (ref 3–16)
BASE EXCESS ARTERIAL: -1 (ref -3–3)
BASE EXCESS ARTERIAL: -2 (ref -3–3)
BASE EXCESS ARTERIAL: -3 (ref -3–3)
BASE EXCESS ARTERIAL: -4 (ref -3–3)
BASE EXCESS ARTERIAL: -5 (ref -3–3)
BASE EXCESS ARTERIAL: -5 (ref -3–3)
BASE EXCESS ARTERIAL: -6 (ref -3–3)
BASE EXCESS ARTERIAL: -6 (ref -3–3)
BASE EXCESS ARTERIAL: -8 (ref -3–3)
BASE EXCESS ARTERIAL: -8 (ref -3–3)
BASE EXCESS ARTERIAL: 0 (ref -3–3)
BASE EXCESS VENOUS: -3 (ref -3–3)
BLOOD BANK DISPENSE STATUS: NORMAL
BLOOD BANK DISPENSE STATUS: NORMAL
BLOOD BANK PRODUCT CODE: NORMAL
BLOOD BANK PRODUCT CODE: NORMAL
BPU ID: NORMAL
BPU ID: NORMAL
BUN BLDV-MCNC: 25 MG/DL (ref 7–20)
CALCIUM IONIZED: 1.06 MMOL/L (ref 1.12–1.32)
CALCIUM IONIZED: 1.07 MMOL/L (ref 1.12–1.32)
CALCIUM IONIZED: 1.08 MMOL/L (ref 1.12–1.32)
CALCIUM IONIZED: 1.08 MMOL/L (ref 1.12–1.32)
CALCIUM IONIZED: 1.09 MMOL/L (ref 1.12–1.32)
CALCIUM IONIZED: 1.14 MMOL/L (ref 1.12–1.32)
CALCIUM IONIZED: 1.15 MMOL/L (ref 1.12–1.32)
CALCIUM IONIZED: 1.16 MMOL/L (ref 1.12–1.32)
CALCIUM IONIZED: 1.25 MMOL/L (ref 1.12–1.32)
CALCIUM SERPL-MCNC: 8.7 MG/DL (ref 8.3–10.6)
CHLORIDE BLD-SCNC: 105 MMOL/L (ref 99–110)
CO2: 19 MMOL/L (ref 21–32)
CREAT SERPL-MCNC: 6.8 MG/DL (ref 0.6–1.1)
DESCRIPTION BLOOD BANK: NORMAL
DESCRIPTION BLOOD BANK: NORMAL
GFR AFRICAN AMERICAN: 8
GFR NON-AFRICAN AMERICAN: 7
GLUCOSE BLD-MCNC: 100 MG/DL (ref 70–99)
GLUCOSE BLD-MCNC: 109 MG/DL (ref 70–99)
GLUCOSE BLD-MCNC: 110 MG/DL (ref 70–99)
GLUCOSE BLD-MCNC: 110 MG/DL (ref 70–99)
GLUCOSE BLD-MCNC: 111 MG/DL (ref 70–99)
GLUCOSE BLD-MCNC: 115 MG/DL (ref 70–99)
GLUCOSE BLD-MCNC: 126 MG/DL (ref 70–99)
GLUCOSE BLD-MCNC: 128 MG/DL (ref 70–99)
GLUCOSE BLD-MCNC: 129 MG/DL (ref 70–99)
GLUCOSE BLD-MCNC: 131 MG/DL (ref 70–99)
GLUCOSE BLD-MCNC: 137 MG/DL (ref 70–99)
GLUCOSE BLD-MCNC: 141 MG/DL (ref 70–99)
GLUCOSE BLD-MCNC: 157 MG/DL (ref 70–99)
GLUCOSE BLD-MCNC: 158 MG/DL (ref 70–99)
GLUCOSE BLD-MCNC: 164 MG/DL (ref 70–99)
GLUCOSE BLD-MCNC: 169 MG/DL (ref 70–99)
GLUCOSE BLD-MCNC: 179 MG/DL (ref 70–99)
HCG QUALITATIVE: NEGATIVE
HCO3 ARTERIAL: 18.3 MMOL/L (ref 21–29)
HCO3 ARTERIAL: 18.7 MMOL/L (ref 21–29)
HCO3 ARTERIAL: 18.9 MMOL/L (ref 21–29)
HCO3 ARTERIAL: 19.2 MMOL/L (ref 21–29)
HCO3 ARTERIAL: 19.5 MMOL/L (ref 21–29)
HCO3 ARTERIAL: 20.4 MMOL/L (ref 21–29)
HCO3 ARTERIAL: 20.4 MMOL/L (ref 21–29)
HCO3 ARTERIAL: 21.7 MMOL/L (ref 21–29)
HCO3 ARTERIAL: 22.8 MMOL/L (ref 21–29)
HCO3 ARTERIAL: 23.8 MMOL/L (ref 21–29)
HCO3 ARTERIAL: 23.9 MMOL/L (ref 21–29)
HCO3 VENOUS: 22.8 MMOL/L (ref 23–29)
HCT VFR BLD CALC: 22.9 % (ref 36–48)
HEMOGLOBIN: 5.5 GM/DL (ref 12–16)
HEMOGLOBIN: 6.2 GM/DL (ref 12–16)
HEMOGLOBIN: 6.5 GM/DL (ref 12–16)
HEMOGLOBIN: 6.8 GM/DL (ref 12–16)
HEMOGLOBIN: 6.9 GM/DL (ref 12–16)
HEMOGLOBIN: 7.2 GM/DL (ref 12–16)
HEMOGLOBIN: 7.3 G/DL (ref 12–16)
HEMOGLOBIN: 7.3 GM/DL (ref 12–16)
HEMOGLOBIN: 8.3 GM/DL (ref 12–16)
HEMOGLOBIN: 9.1 GM/DL (ref 12–16)
LACTATE: 1.16 MMOL/L (ref 0.4–2)
LACTATE: 1.18 MMOL/L (ref 0.4–2)
LACTATE: 1.45 MMOL/L (ref 0.4–2)
LACTATE: 1.5 MMOL/L (ref 0.4–2)
LACTATE: 1.55 MMOL/L (ref 0.4–2)
LACTATE: 2.14 MMOL/L (ref 0.4–2)
LACTATE: 2.58 MMOL/L (ref 0.4–2)
MAGNESIUM: 3.4 MG/DL (ref 1.8–2.4)
MCH RBC QN AUTO: 29 PG (ref 26–34)
MCHC RBC AUTO-ENTMCNC: 32 G/DL (ref 31–36)
MCV RBC AUTO: 90.7 FL (ref 80–100)
O2 SAT, ARTERIAL: 100 % (ref 93–100)
O2 SAT, ARTERIAL: 99 % (ref 93–100)
O2 SAT, VEN: 98 %
PCO2 ARTERIAL: 28 MM HG (ref 35–45)
PCO2 ARTERIAL: 32.6 MM HG (ref 35–45)
PCO2 ARTERIAL: 33.5 MM HG (ref 35–45)
PCO2 ARTERIAL: 33.9 MM HG (ref 35–45)
PCO2 ARTERIAL: 34.4 MM HG (ref 35–45)
PCO2 ARTERIAL: 35.5 MM HG (ref 35–45)
PCO2 ARTERIAL: 36.4 MM HG (ref 35–45)
PCO2 ARTERIAL: 36.6 MM HG (ref 35–45)
PCO2 ARTERIAL: 36.6 MM HG (ref 35–45)
PCO2 ARTERIAL: 36.7 MM HG (ref 35–45)
PCO2 ARTERIAL: 41.5 MM HG (ref 35–45)
PCO2, VEN: 41.1 MM HG (ref 40–50)
PDW BLD-RTO: 15.9 % (ref 12.4–15.4)
PERFORMED ON: ABNORMAL
PH ARTERIAL: 7.32 (ref 7.35–7.45)
PH ARTERIAL: 7.32 (ref 7.35–7.45)
PH ARTERIAL: 7.35 (ref 7.35–7.45)
PH ARTERIAL: 7.35 (ref 7.35–7.45)
PH ARTERIAL: 7.37 (ref 7.35–7.45)
PH ARTERIAL: 7.37 (ref 7.35–7.45)
PH ARTERIAL: 7.38 (ref 7.35–7.45)
PH ARTERIAL: 7.4 (ref 7.35–7.45)
PH ARTERIAL: 7.4 (ref 7.35–7.45)
PH ARTERIAL: 7.44 (ref 7.35–7.45)
PH ARTERIAL: 7.45 (ref 7.35–7.45)
PH VENOUS: 7.35 (ref 7.35–7.45)
PLATELET # BLD: 161 K/UL (ref 135–450)
PMV BLD AUTO: 8.2 FL (ref 5–10.5)
PO2 ARTERIAL: 126.3 MM HG (ref 75–108)
PO2 ARTERIAL: 136.4 MM HG (ref 75–108)
PO2 ARTERIAL: 137.1 MM HG (ref 75–108)
PO2 ARTERIAL: 214 MM HG (ref 75–108)
PO2 ARTERIAL: 369.9 MM HG (ref 75–108)
PO2 ARTERIAL: 376.9 MM HG (ref 75–108)
PO2 ARTERIAL: 433.6 MM HG (ref 75–108)
PO2 ARTERIAL: 500.3 MM HG (ref 75–108)
PO2 ARTERIAL: 505.8 MM HG (ref 75–108)
PO2 ARTERIAL: 551.6 MM HG (ref 75–108)
PO2 ARTERIAL: 554.8 MM HG (ref 75–108)
PO2, VEN: 119 MM HG
POC HEMATOCRIT: 16 % (ref 36–48)
POC HEMATOCRIT: 18 % (ref 36–48)
POC HEMATOCRIT: 19 % (ref 36–48)
POC HEMATOCRIT: 20 % (ref 36–48)
POC HEMATOCRIT: 20 % (ref 36–48)
POC HEMATOCRIT: 21 % (ref 36–48)
POC HEMATOCRIT: 22 % (ref 36–48)
POC HEMATOCRIT: 24 % (ref 36–48)
POC HEMATOCRIT: 27 % (ref 36–48)
POC PATIENT TEMP: 98
POC PATIENT TEMP: 99.1
POC PATIENT TEMP: 99.2
POC POTASSIUM: 3.8 MMOL/L (ref 3.5–5.1)
POC POTASSIUM: 3.9 MMOL/L (ref 3.5–5.1)
POC POTASSIUM: 3.9 MMOL/L (ref 3.5–5.1)
POC POTASSIUM: 4 MMOL/L (ref 3.5–5.1)
POC POTASSIUM: 4.2 MMOL/L (ref 3.5–5.1)
POC POTASSIUM: 4.4 MMOL/L (ref 3.5–5.1)
POC POTASSIUM: 4.4 MMOL/L (ref 3.5–5.1)
POC POTASSIUM: 4.5 MMOL/L (ref 3.5–5.1)
POC POTASSIUM: 4.6 MMOL/L (ref 3.5–5.1)
POC POTASSIUM: 4.8 MMOL/L (ref 3.5–5.1)
POC SAMPLE TYPE: ABNORMAL
POC SODIUM: 131 MMOL/L (ref 136–145)
POC SODIUM: 132 MMOL/L (ref 136–145)
POC SODIUM: 133 MMOL/L (ref 136–145)
POC SODIUM: 134 MMOL/L (ref 136–145)
POC SODIUM: 136 MMOL/L (ref 136–145)
POC SODIUM: 136 MMOL/L (ref 136–145)
POC SODIUM: 142 MMOL/L (ref 136–145)
POTASSIUM SERPL-SCNC: 4.8 MMOL/L (ref 3.5–5.1)
RBC # BLD: 2.52 M/UL (ref 4–5.2)
SODIUM BLD-SCNC: 139 MMOL/L (ref 136–145)
TCO2 ARTERIAL: 19 MMOL/L
TCO2 ARTERIAL: 20 MMOL/L
TCO2 ARTERIAL: 21 MMOL/L
TCO2 ARTERIAL: 21 MMOL/L
TCO2 ARTERIAL: 22 MMOL/L
TCO2 ARTERIAL: 23 MMOL/L
TCO2 ARTERIAL: 24 MMOL/L
TCO2 ARTERIAL: 25 MMOL/L
TCO2 ARTERIAL: 25 MMOL/L
TCO2 CALC VENOUS: 24 MMOL/L
WBC # BLD: 17.4 K/UL (ref 4–11)

## 2021-06-09 PROCEDURE — 85027 COMPLETE CBC AUTOMATED: CPT

## 2021-06-09 PROCEDURE — 6360000002 HC RX W HCPCS: Performed by: ANESTHESIOLOGY

## 2021-06-09 PROCEDURE — 2580000003 HC RX 258: Performed by: THORACIC SURGERY (CARDIOTHORACIC VASCULAR SURGERY)

## 2021-06-09 PROCEDURE — 6360000002 HC RX W HCPCS: Performed by: THORACIC SURGERY (CARDIOTHORACIC VASCULAR SURGERY)

## 2021-06-09 PROCEDURE — 6360000002 HC RX W HCPCS: Performed by: NURSE PRACTITIONER

## 2021-06-09 PROCEDURE — 0211093 BYPASS CORONARY ARTERY, TWO ARTERIES FROM CORONARY ARTERY WITH AUTOLOGOUS VENOUS TISSUE, OPEN APPROACH: ICD-10-PCS | Performed by: THORACIC SURGERY (CARDIOTHORACIC VASCULAR SURGERY)

## 2021-06-09 PROCEDURE — 6370000000 HC RX 637 (ALT 250 FOR IP): Performed by: THORACIC SURGERY (CARDIOTHORACIC VASCULAR SURGERY)

## 2021-06-09 PROCEDURE — C1729 CATH, DRAINAGE: HCPCS | Performed by: THORACIC SURGERY (CARDIOTHORACIC VASCULAR SURGERY)

## 2021-06-09 PROCEDURE — 33508 ENDOSCOPIC VEIN HARVEST: CPT | Performed by: THORACIC SURGERY (CARDIOTHORACIC VASCULAR SURGERY)

## 2021-06-09 PROCEDURE — 7100000011 HC PHASE II RECOVERY - ADDTL 15 MIN

## 2021-06-09 PROCEDURE — 85347 COAGULATION TIME ACTIVATED: CPT

## 2021-06-09 PROCEDURE — 33518 CABG ARTERY-VEIN TWO: CPT | Performed by: PHYSICIAN ASSISTANT

## 2021-06-09 PROCEDURE — C9290 INJ, BUPIVACAINE LIPOSOME: HCPCS | Performed by: THORACIC SURGERY (CARDIOTHORACIC VASCULAR SURGERY)

## 2021-06-09 PROCEDURE — 33518 CABG ARTERY-VEIN TWO: CPT | Performed by: THORACIC SURGERY (CARDIOTHORACIC VASCULAR SURGERY)

## 2021-06-09 PROCEDURE — 83605 ASSAY OF LACTIC ACID: CPT

## 2021-06-09 PROCEDURE — C9113 INJ PANTOPRAZOLE SODIUM, VIA: HCPCS | Performed by: NURSE PRACTITIONER

## 2021-06-09 PROCEDURE — 84295 ASSAY OF SERUM SODIUM: CPT

## 2021-06-09 PROCEDURE — 6370000000 HC RX 637 (ALT 250 FOR IP)

## 2021-06-09 PROCEDURE — P9045 ALBUMIN (HUMAN), 5%, 250 ML: HCPCS | Performed by: ANESTHESIOLOGY

## 2021-06-09 PROCEDURE — 2500000003 HC RX 250 WO HCPCS: Performed by: THORACIC SURGERY (CARDIOTHORACIC VASCULAR SURGERY)

## 2021-06-09 PROCEDURE — 3700000000 HC ANESTHESIA ATTENDED CARE: Performed by: THORACIC SURGERY (CARDIOTHORACIC VASCULAR SURGERY)

## 2021-06-09 PROCEDURE — 3600000008 HC SURGERY OHS BASE: Performed by: THORACIC SURGERY (CARDIOTHORACIC VASCULAR SURGERY)

## 2021-06-09 PROCEDURE — 2700000000 HC OXYGEN THERAPY PER DAY

## 2021-06-09 PROCEDURE — 94761 N-INVAS EAR/PLS OXIMETRY MLT: CPT

## 2021-06-09 PROCEDURE — 71045 X-RAY EXAM CHEST 1 VIEW: CPT

## 2021-06-09 PROCEDURE — 99233 SBSQ HOSP IP/OBS HIGH 50: CPT | Performed by: INTERNAL MEDICINE

## 2021-06-09 PROCEDURE — 82803 BLOOD GASES ANY COMBINATION: CPT

## 2021-06-09 PROCEDURE — 7100000010 HC PHASE II RECOVERY - FIRST 15 MIN

## 2021-06-09 PROCEDURE — 82330 ASSAY OF CALCIUM: CPT

## 2021-06-09 PROCEDURE — C1889 IMPLANT/INSERT DEVICE, NOC: HCPCS | Performed by: THORACIC SURGERY (CARDIOTHORACIC VASCULAR SURGERY)

## 2021-06-09 PROCEDURE — 2580000003 HC RX 258: Performed by: ANESTHESIOLOGY

## 2021-06-09 PROCEDURE — 2500000003 HC RX 250 WO HCPCS: Performed by: ANESTHESIOLOGY

## 2021-06-09 PROCEDURE — 3600000018 HC SURGERY OHS ADDTL 15MIN: Performed by: THORACIC SURGERY (CARDIOTHORACIC VASCULAR SURGERY)

## 2021-06-09 PROCEDURE — 94799 UNLISTED PULMONARY SVC/PX: CPT

## 2021-06-09 PROCEDURE — C1751 CATH, INF, PER/CENT/MIDLINE: HCPCS | Performed by: THORACIC SURGERY (CARDIOTHORACIC VASCULAR SURGERY)

## 2021-06-09 PROCEDURE — 2720000010 HC SURG SUPPLY STERILE: Performed by: THORACIC SURGERY (CARDIOTHORACIC VASCULAR SURGERY)

## 2021-06-09 PROCEDURE — 2140000000 HC CCU INTERMEDIATE R&B

## 2021-06-09 PROCEDURE — 84132 ASSAY OF SERUM POTASSIUM: CPT

## 2021-06-09 PROCEDURE — 83735 ASSAY OF MAGNESIUM: CPT

## 2021-06-09 PROCEDURE — 80048 BASIC METABOLIC PNL TOTAL CA: CPT

## 2021-06-09 PROCEDURE — 33508 ENDOSCOPIC VEIN HARVEST: CPT | Performed by: PHYSICIAN ASSISTANT

## 2021-06-09 PROCEDURE — 82947 ASSAY GLUCOSE BLOOD QUANT: CPT

## 2021-06-09 PROCEDURE — 06BP0ZZ EXCISION OF RIGHT SAPHENOUS VEIN, OPEN APPROACH: ICD-10-PCS | Performed by: THORACIC SURGERY (CARDIOTHORACIC VASCULAR SURGERY)

## 2021-06-09 PROCEDURE — 33533 CABG ARTERIAL SINGLE: CPT | Performed by: THORACIC SURGERY (CARDIOTHORACIC VASCULAR SURGERY)

## 2021-06-09 PROCEDURE — 94002 VENT MGMT INPAT INIT DAY: CPT

## 2021-06-09 PROCEDURE — 84703 CHORIONIC GONADOTROPIN ASSAY: CPT

## 2021-06-09 PROCEDURE — 3700000001 HC ADD 15 MINUTES (ANESTHESIA): Performed by: THORACIC SURGERY (CARDIOTHORACIC VASCULAR SURGERY)

## 2021-06-09 PROCEDURE — 5A1221Z PERFORMANCE OF CARDIAC OUTPUT, CONTINUOUS: ICD-10-PCS | Performed by: THORACIC SURGERY (CARDIOTHORACIC VASCULAR SURGERY)

## 2021-06-09 PROCEDURE — 2580000003 HC RX 258: Performed by: NURSE PRACTITIONER

## 2021-06-09 PROCEDURE — 85014 HEMATOCRIT: CPT

## 2021-06-09 PROCEDURE — 0210099 BYPASS CORONARY ARTERY, ONE ARTERY FROM LEFT INTERNAL MAMMARY WITH AUTOLOGOUS VENOUS TISSUE, OPEN APPROACH: ICD-10-PCS | Performed by: THORACIC SURGERY (CARDIOTHORACIC VASCULAR SURGERY)

## 2021-06-09 PROCEDURE — 2709999900 HC NON-CHARGEABLE SUPPLY: Performed by: THORACIC SURGERY (CARDIOTHORACIC VASCULAR SURGERY)

## 2021-06-09 PROCEDURE — 33533 CABG ARTERIAL SINGLE: CPT | Performed by: PHYSICIAN ASSISTANT

## 2021-06-09 PROCEDURE — 36430 TRANSFUSION BLD/BLD COMPNT: CPT

## 2021-06-09 DEVICE — Z INACTIVE USE 2124449 DEVICE OCCL CLP L35MM SM FOOTPRINT 1 HND APPL SUTURELESS W/: Type: IMPLANTABLE DEVICE | Status: FUNCTIONAL

## 2021-06-09 RX ORDER — SODIUM CHLORIDE 0.9 % (FLUSH) 0.9 %
10 SYRINGE (ML) INJECTION EVERY 12 HOURS SCHEDULED
Status: DISCONTINUED | OUTPATIENT
Start: 2021-06-09 | End: 2021-06-16 | Stop reason: HOSPADM

## 2021-06-09 RX ORDER — CALCIUM CHLORIDE 100 MG/ML
INJECTION INTRAVENOUS; INTRAVENTRICULAR PRN
Status: DISCONTINUED | OUTPATIENT
Start: 2021-06-09 | End: 2021-06-09 | Stop reason: SDUPTHER

## 2021-06-09 RX ORDER — SODIUM CHLORIDE 9 MG/ML
INJECTION, SOLUTION INTRAVENOUS CONTINUOUS
Status: DISCONTINUED | OUTPATIENT
Start: 2021-06-09 | End: 2021-06-10

## 2021-06-09 RX ORDER — FENTANYL CITRATE 50 UG/ML
100 INJECTION, SOLUTION INTRAMUSCULAR; INTRAVENOUS ONCE
Status: COMPLETED | OUTPATIENT
Start: 2021-06-09 | End: 2021-06-09

## 2021-06-09 RX ORDER — METOCLOPRAMIDE HYDROCHLORIDE 5 MG/ML
10 INJECTION INTRAMUSCULAR; INTRAVENOUS EVERY 6 HOURS PRN
Status: DISCONTINUED | OUTPATIENT
Start: 2021-06-09 | End: 2021-06-16 | Stop reason: HOSPADM

## 2021-06-09 RX ORDER — MORPHINE SULFATE 2 MG/ML
2 INJECTION, SOLUTION INTRAMUSCULAR; INTRAVENOUS
Status: DISCONTINUED | OUTPATIENT
Start: 2021-06-09 | End: 2021-06-16 | Stop reason: HOSPADM

## 2021-06-09 RX ORDER — MAGNESIUM HYDROXIDE 1200 MG/15ML
LIQUID ORAL CONTINUOUS PRN
Status: COMPLETED | OUTPATIENT
Start: 2021-06-09 | End: 2021-06-09

## 2021-06-09 RX ORDER — ONDANSETRON 2 MG/ML
INJECTION INTRAMUSCULAR; INTRAVENOUS PRN
Status: DISCONTINUED | OUTPATIENT
Start: 2021-06-09 | End: 2021-06-09 | Stop reason: SDUPTHER

## 2021-06-09 RX ORDER — CISATRACURIUM BESYLATE 2 MG/ML
INJECTION, SOLUTION INTRAVENOUS PRN
Status: DISCONTINUED | OUTPATIENT
Start: 2021-06-09 | End: 2021-06-09 | Stop reason: SDUPTHER

## 2021-06-09 RX ORDER — SUCCINYLCHOLINE/SOD CL,ISO/PF 200MG/10ML
SYRINGE (ML) INTRAVENOUS PRN
Status: DISCONTINUED | OUTPATIENT
Start: 2021-06-09 | End: 2021-06-09 | Stop reason: SDUPTHER

## 2021-06-09 RX ORDER — POLYETHYLENE GLYCOL 3350 17 G/17G
17 POWDER, FOR SOLUTION ORAL DAILY PRN
Status: DISCONTINUED | OUTPATIENT
Start: 2021-06-09 | End: 2021-06-16 | Stop reason: HOSPADM

## 2021-06-09 RX ORDER — PANTOPRAZOLE SODIUM 40 MG/10ML
40 INJECTION, POWDER, LYOPHILIZED, FOR SOLUTION INTRAVENOUS DAILY
Status: DISCONTINUED | OUTPATIENT
Start: 2021-06-09 | End: 2021-06-10

## 2021-06-09 RX ORDER — 0.9 % SODIUM CHLORIDE 0.9 %
500 INTRAVENOUS SOLUTION INTRAVENOUS CONTINUOUS PRN
Status: DISCONTINUED | OUTPATIENT
Start: 2021-06-09 | End: 2021-06-10

## 2021-06-09 RX ORDER — FENTANYL CITRATE 50 UG/ML
INJECTION, SOLUTION INTRAMUSCULAR; INTRAVENOUS PRN
Status: DISCONTINUED | OUTPATIENT
Start: 2021-06-09 | End: 2021-06-09 | Stop reason: SDUPTHER

## 2021-06-09 RX ORDER — ALBUTEROL SULFATE 90 UG/1
2 AEROSOL, METERED RESPIRATORY (INHALATION) EVERY 6 HOURS PRN
Status: DISCONTINUED | OUTPATIENT
Start: 2021-06-09 | End: 2021-06-16 | Stop reason: HOSPADM

## 2021-06-09 RX ORDER — MIDAZOLAM HYDROCHLORIDE 5 MG/ML
INJECTION INTRAMUSCULAR; INTRAVENOUS PRN
Status: DISCONTINUED | OUTPATIENT
Start: 2021-06-09 | End: 2021-06-09 | Stop reason: SDUPTHER

## 2021-06-09 RX ORDER — NITROGLYCERIN 20 MG/100ML
10 INJECTION INTRAVENOUS CONTINUOUS PRN
Status: DISCONTINUED | OUTPATIENT
Start: 2021-06-09 | End: 2021-06-10

## 2021-06-09 RX ORDER — DOPAMINE HYDROCHLORIDE 160 MG/100ML
2 INJECTION, SOLUTION INTRAVENOUS CONTINUOUS PRN
Status: DISCONTINUED | OUTPATIENT
Start: 2021-06-09 | End: 2021-06-10

## 2021-06-09 RX ORDER — MILRINONE LACTATE 0.2 MG/ML
0.38 INJECTION, SOLUTION INTRAVENOUS CONTINUOUS PRN
Status: DISCONTINUED | OUTPATIENT
Start: 2021-06-09 | End: 2021-06-10

## 2021-06-09 RX ORDER — NITROGLYCERIN 40 MG/1
1 PATCH TRANSDERMAL DAILY
Status: DISCONTINUED | OUTPATIENT
Start: 2021-06-10 | End: 2021-06-12

## 2021-06-09 RX ORDER — DIPHENHYDRAMINE HYDROCHLORIDE 50 MG/ML
INJECTION INTRAMUSCULAR; INTRAVENOUS PRN
Status: DISCONTINUED | OUTPATIENT
Start: 2021-06-09 | End: 2021-06-09 | Stop reason: SDUPTHER

## 2021-06-09 RX ORDER — SENNA AND DOCUSATE SODIUM 50; 8.6 MG/1; MG/1
1 TABLET, FILM COATED ORAL 2 TIMES DAILY
Status: DISCONTINUED | OUTPATIENT
Start: 2021-06-09 | End: 2021-06-16 | Stop reason: HOSPADM

## 2021-06-09 RX ORDER — TRAMADOL HYDROCHLORIDE 50 MG/1
50 TABLET ORAL EVERY 6 HOURS PRN
Status: DISCONTINUED | OUTPATIENT
Start: 2021-06-09 | End: 2021-06-16 | Stop reason: HOSPADM

## 2021-06-09 RX ORDER — ASPIRIN 300 MG/1
300 SUPPOSITORY RECTAL ONCE
Status: DISCONTINUED | OUTPATIENT
Start: 2021-06-09 | End: 2021-06-10

## 2021-06-09 RX ORDER — METOCLOPRAMIDE HYDROCHLORIDE 5 MG/ML
INJECTION INTRAMUSCULAR; INTRAVENOUS PRN
Status: DISCONTINUED | OUTPATIENT
Start: 2021-06-09 | End: 2021-06-09 | Stop reason: SDUPTHER

## 2021-06-09 RX ORDER — HEPARIN SODIUM 1000 [USP'U]/ML
INJECTION, SOLUTION INTRAVENOUS; SUBCUTANEOUS PRN
Status: DISCONTINUED | OUTPATIENT
Start: 2021-06-09 | End: 2021-06-09 | Stop reason: SDUPTHER

## 2021-06-09 RX ORDER — DIPHENHYDRAMINE HCL 25 MG
25 TABLET ORAL NIGHTLY PRN
Status: DISCONTINUED | OUTPATIENT
Start: 2021-06-10 | End: 2021-06-16 | Stop reason: HOSPADM

## 2021-06-09 RX ORDER — ALBUMIN, HUMAN INJ 5% 5 %
SOLUTION INTRAVENOUS PRN
Status: DISCONTINUED | OUTPATIENT
Start: 2021-06-09 | End: 2021-06-09 | Stop reason: SDUPTHER

## 2021-06-09 RX ORDER — DEXTROSE MONOHYDRATE 25 G/50ML
12.5 INJECTION, SOLUTION INTRAVENOUS PRN
Status: DISCONTINUED | OUTPATIENT
Start: 2021-06-09 | End: 2021-06-16 | Stop reason: HOSPADM

## 2021-06-09 RX ORDER — SODIUM CHLORIDE 9 MG/ML
10 INJECTION INTRAVENOUS DAILY
Status: DISCONTINUED | OUTPATIENT
Start: 2021-06-09 | End: 2021-06-10

## 2021-06-09 RX ORDER — PROTAMINE SULFATE 10 MG/ML
50 INJECTION, SOLUTION INTRAVENOUS
Status: ACTIVE | OUTPATIENT
Start: 2021-06-09 | End: 2021-06-09

## 2021-06-09 RX ORDER — ESMOLOL HYDROCHLORIDE 10 MG/ML
5 INJECTION INTRAVENOUS ONCE
Status: COMPLETED | OUTPATIENT
Start: 2021-06-09 | End: 2021-06-09

## 2021-06-09 RX ORDER — SODIUM CHLORIDE 9 MG/ML
INJECTION, SOLUTION INTRAVENOUS CONTINUOUS PRN
Status: DISCONTINUED | OUTPATIENT
Start: 2021-06-09 | End: 2021-06-09 | Stop reason: SDUPTHER

## 2021-06-09 RX ORDER — SODIUM CHLORIDE 9 MG/ML
25 INJECTION, SOLUTION INTRAVENOUS PRN
Status: DISCONTINUED | OUTPATIENT
Start: 2021-06-09 | End: 2021-06-16 | Stop reason: HOSPADM

## 2021-06-09 RX ORDER — SODIUM CHLORIDE 9 MG/ML
INJECTION INTRAVENOUS
Status: COMPLETED | OUTPATIENT
Start: 2021-06-09 | End: 2021-06-09

## 2021-06-09 RX ORDER — POTASSIUM CHLORIDE 29.8 MG/ML
20 INJECTION INTRAVENOUS PRN
Status: DISCONTINUED | OUTPATIENT
Start: 2021-06-09 | End: 2021-06-16 | Stop reason: HOSPADM

## 2021-06-09 RX ORDER — MAGNESIUM SULFATE IN WATER 40 MG/ML
2000 INJECTION, SOLUTION INTRAVENOUS PRN
Status: DISCONTINUED | OUTPATIENT
Start: 2021-06-09 | End: 2021-06-16 | Stop reason: HOSPADM

## 2021-06-09 RX ORDER — TRAMADOL HYDROCHLORIDE 50 MG/1
100 TABLET ORAL EVERY 6 HOURS PRN
Status: DISCONTINUED | OUTPATIENT
Start: 2021-06-09 | End: 2021-06-16 | Stop reason: HOSPADM

## 2021-06-09 RX ORDER — PANTOPRAZOLE SODIUM 40 MG/1
40 TABLET, DELAYED RELEASE ORAL DAILY
Status: DISCONTINUED | OUTPATIENT
Start: 2021-06-10 | End: 2021-06-16 | Stop reason: HOSPADM

## 2021-06-09 RX ORDER — SODIUM CHLORIDE 0.9 % (FLUSH) 0.9 %
10 SYRINGE (ML) INJECTION PRN
Status: DISCONTINUED | OUTPATIENT
Start: 2021-06-09 | End: 2021-06-16 | Stop reason: HOSPADM

## 2021-06-09 RX ORDER — DOBUTAMINE HYDROCHLORIDE 200 MG/100ML
2 INJECTION INTRAVENOUS CONTINUOUS PRN
Status: DISCONTINUED | OUTPATIENT
Start: 2021-06-09 | End: 2021-06-10

## 2021-06-09 RX ORDER — DEXTROSE MONOHYDRATE 50 MG/ML
100 INJECTION, SOLUTION INTRAVENOUS PRN
Status: DISCONTINUED | OUTPATIENT
Start: 2021-06-09 | End: 2021-06-16 | Stop reason: HOSPADM

## 2021-06-09 RX ORDER — CALCIUM CHLORIDE 100 MG/ML
INJECTION INTRAVENOUS; INTRAVENTRICULAR
Status: COMPLETED | OUTPATIENT
Start: 2021-06-09 | End: 2021-06-09

## 2021-06-09 RX ORDER — VANCOMYCIN HYDROCHLORIDE 1 G/20ML
INJECTION, POWDER, LYOPHILIZED, FOR SOLUTION INTRAVENOUS PRN
Status: DISCONTINUED | OUTPATIENT
Start: 2021-06-09 | End: 2021-06-09 | Stop reason: SDUPTHER

## 2021-06-09 RX ORDER — ONDANSETRON 2 MG/ML
4 INJECTION INTRAMUSCULAR; INTRAVENOUS EVERY 6 HOURS PRN
Status: DISCONTINUED | OUTPATIENT
Start: 2021-06-09 | End: 2021-06-16 | Stop reason: HOSPADM

## 2021-06-09 RX ORDER — PROTAMINE SULFATE 10 MG/ML
INJECTION, SOLUTION INTRAVENOUS PRN
Status: DISCONTINUED | OUTPATIENT
Start: 2021-06-09 | End: 2021-06-09 | Stop reason: SDUPTHER

## 2021-06-09 RX ORDER — NITROGLYCERIN 20 MG/100ML
INJECTION INTRAVENOUS CONTINUOUS PRN
Status: DISCONTINUED | OUTPATIENT
Start: 2021-06-09 | End: 2021-06-09 | Stop reason: SDUPTHER

## 2021-06-09 RX ORDER — ZOLPIDEM TARTRATE 5 MG/1
5 TABLET ORAL NIGHTLY PRN
Status: DISCONTINUED | OUTPATIENT
Start: 2021-06-10 | End: 2021-06-16 | Stop reason: HOSPADM

## 2021-06-09 RX ORDER — NICOTINE POLACRILEX 4 MG
15 LOZENGE BUCCAL PRN
Status: DISCONTINUED | OUTPATIENT
Start: 2021-06-09 | End: 2021-06-16 | Stop reason: HOSPADM

## 2021-06-09 RX ORDER — ACETAMINOPHEN 500 MG
1000 TABLET ORAL EVERY 6 HOURS
Status: DISCONTINUED | OUTPATIENT
Start: 2021-06-09 | End: 2021-06-16 | Stop reason: HOSPADM

## 2021-06-09 RX ORDER — EPHEDRINE SULFATE/0.9% NACL/PF 50 MG/5 ML
SYRINGE (ML) INTRAVENOUS PRN
Status: DISCONTINUED | OUTPATIENT
Start: 2021-06-09 | End: 2021-06-09 | Stop reason: SDUPTHER

## 2021-06-09 RX ORDER — ALBUMIN, HUMAN INJ 5% 5 %
25 SOLUTION INTRAVENOUS PRN
Status: DISCONTINUED | OUTPATIENT
Start: 2021-06-09 | End: 2021-06-10

## 2021-06-09 RX ORDER — MEPERIDINE HYDROCHLORIDE 25 MG/ML
25 INJECTION INTRAMUSCULAR; INTRAVENOUS; SUBCUTANEOUS
Status: ACTIVE | OUTPATIENT
Start: 2021-06-09 | End: 2021-06-09

## 2021-06-09 RX ORDER — ETOMIDATE 2 MG/ML
INJECTION INTRAVENOUS PRN
Status: DISCONTINUED | OUTPATIENT
Start: 2021-06-09 | End: 2021-06-09 | Stop reason: SDUPTHER

## 2021-06-09 RX ORDER — FUROSEMIDE 10 MG/ML
20 INJECTION INTRAMUSCULAR; INTRAVENOUS PRN
Status: DISCONTINUED | OUTPATIENT
Start: 2021-06-09 | End: 2021-06-16 | Stop reason: HOSPADM

## 2021-06-09 RX ORDER — MIDAZOLAM HYDROCHLORIDE 2 MG/2ML
2 INJECTION, SOLUTION INTRAMUSCULAR; INTRAVENOUS ONCE
Status: COMPLETED | OUTPATIENT
Start: 2021-06-09 | End: 2021-06-09

## 2021-06-09 RX ORDER — PROPOFOL 10 MG/ML
10 INJECTION, EMULSION INTRAVENOUS
Status: DISCONTINUED | OUTPATIENT
Start: 2021-06-09 | End: 2021-06-10

## 2021-06-09 RX ADMIN — CISATRACURIUM BESYLATE 20 MG: 2 INJECTION INTRAVENOUS at 11:20

## 2021-06-09 RX ADMIN — NITROGLYCERIN 25 MCG/MIN: 20 INJECTION INTRAVENOUS at 16:40

## 2021-06-09 RX ADMIN — SODIUM BICARBONATE 50 MEQ: 84 INJECTION INTRAVENOUS at 19:00

## 2021-06-09 RX ADMIN — SODIUM CHLORIDE 1 UNITS/HR: 9 INJECTION, SOLUTION INTRAVENOUS at 11:20

## 2021-06-09 RX ADMIN — CEFAZOLIN 2000 MG: 10 INJECTION, POWDER, FOR SOLUTION INTRAVENOUS at 11:02

## 2021-06-09 RX ADMIN — ONDANSETRON 4 MG: 2 INJECTION INTRAMUSCULAR; INTRAVENOUS at 15:49

## 2021-06-09 RX ADMIN — MIDAZOLAM 2 MG: 5 INJECTION INTRAMUSCULAR; INTRAVENOUS at 16:00

## 2021-06-09 RX ADMIN — SODIUM BICARBONATE 50 MEQ: 84 INJECTION INTRAVENOUS at 19:08

## 2021-06-09 RX ADMIN — ESMOLOL HYDROCHLORIDE 5 MG: 10 INJECTION, SOLUTION INTRAVENOUS at 10:56

## 2021-06-09 RX ADMIN — ASPIRIN 325 MG: 325 TABLET, COATED ORAL at 21:20

## 2021-06-09 RX ADMIN — Medication 2 MCG/MIN: at 17:15

## 2021-06-09 RX ADMIN — HEPARIN SODIUM 5000 UNITS: 1000 INJECTION INTRAVENOUS; SUBCUTANEOUS at 12:05

## 2021-06-09 RX ADMIN — ETOMIDATE 20 MG: 20 INJECTION, SOLUTION INTRAVENOUS at 11:12

## 2021-06-09 RX ADMIN — SODIUM BICARBONATE 50 MEQ: 84 INJECTION INTRAVENOUS at 17:35

## 2021-06-09 RX ADMIN — FAMOTIDINE 20 MG: 10 INJECTION INTRAVENOUS at 11:37

## 2021-06-09 RX ADMIN — SODIUM CHLORIDE: 9 INJECTION, SOLUTION INTRAVENOUS at 11:00

## 2021-06-09 RX ADMIN — CISATRACURIUM BESYLATE 10 MG: 2 INJECTION INTRAVENOUS at 11:58

## 2021-06-09 RX ADMIN — MORPHINE SULFATE 2 MG: 2 INJECTION, SOLUTION INTRAMUSCULAR; INTRAVENOUS at 19:21

## 2021-06-09 RX ADMIN — FENTANYL CITRATE 250 MCG: 50 INJECTION, SOLUTION INTRAMUSCULAR; INTRAVENOUS at 15:07

## 2021-06-09 RX ADMIN — FENTANYL CITRATE 250 MCG: 50 INJECTION, SOLUTION INTRAMUSCULAR; INTRAVENOUS at 13:02

## 2021-06-09 RX ADMIN — PANTOPRAZOLE SODIUM 40 MG: 40 INJECTION, POWDER, FOR SOLUTION INTRAVENOUS at 06:12

## 2021-06-09 RX ADMIN — HEPARIN SODIUM 36000 UNITS: 1000 INJECTION INTRAVENOUS; SUBCUTANEOUS at 12:52

## 2021-06-09 RX ADMIN — FENTANYL CITRATE 100 MCG: 50 INJECTION, SOLUTION INTRAMUSCULAR; INTRAVENOUS at 08:40

## 2021-06-09 RX ADMIN — CALCIUM CHLORIDE 1 G: 100 INJECTION INTRAVENOUS; INTRAVENTRICULAR at 11:07

## 2021-06-09 RX ADMIN — DIPHENHYDRAMINE HYDROCHLORIDE 50 MG: 50 INJECTION, SOLUTION INTRAMUSCULAR; INTRAVENOUS at 14:57

## 2021-06-09 RX ADMIN — FENTANYL CITRATE 250 MCG: 50 INJECTION, SOLUTION INTRAMUSCULAR; INTRAVENOUS at 11:56

## 2021-06-09 RX ADMIN — SODIUM BICARBONATE 50 MEQ: 84 INJECTION INTRAVENOUS at 16:40

## 2021-06-09 RX ADMIN — NITROGLYCERIN 100 MCG/MIN: 20 INJECTION INTRAVENOUS at 15:00

## 2021-06-09 RX ADMIN — TRAMADOL HYDROCHLORIDE 100 MG: 50 TABLET, FILM COATED ORAL at 23:25

## 2021-06-09 RX ADMIN — VANCOMYCIN HYDROCHLORIDE 1500 MG: 1 INJECTION, POWDER, LYOPHILIZED, FOR SOLUTION INTRAVENOUS at 10:53

## 2021-06-09 RX ADMIN — CISATRACURIUM BESYLATE 10 MG: 2 INJECTION INTRAVENOUS at 13:18

## 2021-06-09 RX ADMIN — NOREPINEPHRINE BITARTRATE 5 MCG/MIN: 1 INJECTION, SOLUTION, CONCENTRATE INTRAVENOUS at 14:44

## 2021-06-09 RX ADMIN — Medication 10 ML: at 19:22

## 2021-06-09 RX ADMIN — VANCOMYCIN HYDROCHLORIDE 1500 MG: 10 INJECTION, POWDER, LYOPHILIZED, FOR SOLUTION INTRAVENOUS at 10:53

## 2021-06-09 RX ADMIN — MIDAZOLAM 2 MG: 5 INJECTION INTRAMUSCULAR; INTRAVENOUS at 15:08

## 2021-06-09 RX ADMIN — MIDAZOLAM 2 MG: 5 INJECTION INTRAMUSCULAR; INTRAVENOUS at 11:42

## 2021-06-09 RX ADMIN — SODIUM CHLORIDE: 9 INJECTION, SOLUTION INTRAVENOUS at 16:43

## 2021-06-09 RX ADMIN — MIDAZOLAM 2 MG: 5 INJECTION INTRAMUSCULAR; INTRAVENOUS at 11:12

## 2021-06-09 RX ADMIN — SODIUM BICARBONATE 50 MEQ: 84 INJECTION INTRAVENOUS at 17:34

## 2021-06-09 RX ADMIN — AMINOCAPROIC ACID 5000 MG: 250 INJECTION, SOLUTION INTRAVENOUS at 14:29

## 2021-06-09 RX ADMIN — CEFAZOLIN 1000 MG: 10 INJECTION, POWDER, FOR SOLUTION INTRAVENOUS at 15:02

## 2021-06-09 RX ADMIN — METOCLOPRAMIDE 10 MG: 5 INJECTION, SOLUTION INTRAMUSCULAR; INTRAVENOUS at 11:37

## 2021-06-09 RX ADMIN — SODIUM CHLORIDE: 9 INJECTION, SOLUTION INTRAVENOUS at 13:03

## 2021-06-09 RX ADMIN — ACETAMINOPHEN 1000 MG: 500 TABLET ORAL at 21:19

## 2021-06-09 RX ADMIN — CEFAZOLIN 1000 MG: 1 INJECTION, POWDER, FOR SOLUTION INTRAVENOUS at 23:04

## 2021-06-09 RX ADMIN — SODIUM CHLORIDE 2.31 UNITS/HR: 9 INJECTION, SOLUTION INTRAVENOUS at 17:05

## 2021-06-09 RX ADMIN — MUPIROCIN: 20 OINTMENT TOPICAL at 20:40

## 2021-06-09 RX ADMIN — MIDAZOLAM 2 MG: 1 INJECTION INTRAMUSCULAR; INTRAVENOUS at 08:38

## 2021-06-09 RX ADMIN — PROTAMINE SULFATE 350 MG: 10 INJECTION, SOLUTION INTRAVENOUS at 14:57

## 2021-06-09 RX ADMIN — Medication 10 ML: at 20:40

## 2021-06-09 RX ADMIN — AMINOCAPROIC ACID 5000 MG: 250 INJECTION, SOLUTION INTRAVENOUS at 11:20

## 2021-06-09 RX ADMIN — SODIUM CHLORIDE: 9 INJECTION, SOLUTION INTRAVENOUS at 15:03

## 2021-06-09 RX ADMIN — FENTANYL CITRATE 250 MCG: 50 INJECTION, SOLUTION INTRAMUSCULAR; INTRAVENOUS at 11:12

## 2021-06-09 RX ADMIN — CALCIUM CHLORIDE 1000 MG: 100 INJECTION, SOLUTION INTRAVENOUS at 20:50

## 2021-06-09 RX ADMIN — Medication 10 MG: at 11:22

## 2021-06-09 RX ADMIN — ALBUMIN (HUMAN) 25 G: 12.5 INJECTION, SOLUTION INTRAVENOUS at 15:24

## 2021-06-09 RX ADMIN — MIDAZOLAM 2 MG: 5 INJECTION INTRAMUSCULAR; INTRAVENOUS at 14:29

## 2021-06-09 RX ADMIN — CISATRACURIUM BESYLATE 10 MG: 2 INJECTION INTRAVENOUS at 15:01

## 2021-06-09 RX ADMIN — PROTAMINE SULFATE 50 MG: 10 INJECTION, SOLUTION INTRAVENOUS at 15:35

## 2021-06-09 RX ADMIN — Medication 160 MG: at 11:12

## 2021-06-09 ASSESSMENT — PULMONARY FUNCTION TESTS
PIF_VALUE: 21
PIF_VALUE: 0
PIF_VALUE: 2
PIF_VALUE: 1
PIF_VALUE: 22
PIF_VALUE: 2
PIF_VALUE: 15
PIF_VALUE: 0
PIF_VALUE: 21
PIF_VALUE: 16
PIF_VALUE: 0
PIF_VALUE: 14
PIF_VALUE: 14
PIF_VALUE: 17
PIF_VALUE: 15
PIF_VALUE: 15
PIF_VALUE: 0
PIF_VALUE: 13
PIF_VALUE: 16
PIF_VALUE: 0
PIF_VALUE: 16
PIF_VALUE: 18
PIF_VALUE: 16
PIF_VALUE: 14
PIF_VALUE: 0
PIF_VALUE: 15
PIF_VALUE: 0
PIF_VALUE: 0
PIF_VALUE: 16
PIF_VALUE: 15
PIF_VALUE: 0
PIF_VALUE: 16
PIF_VALUE: 20
PIF_VALUE: 21
PIF_VALUE: 15
PIF_VALUE: 18
PIF_VALUE: 2
PIF_VALUE: 16
PIF_VALUE: 1
PIF_VALUE: 19
PIF_VALUE: 1
PIF_VALUE: 0
PIF_VALUE: 0
PIF_VALUE: 15
PIF_VALUE: 14
PIF_VALUE: 0
PIF_VALUE: 16
PIF_VALUE: 1
PIF_VALUE: 0
PIF_VALUE: 19
PIF_VALUE: 14
PIF_VALUE: 15
PIF_VALUE: 16
PIF_VALUE: 0
PIF_VALUE: 0
PIF_VALUE: 15
PIF_VALUE: 0
PIF_VALUE: 21
PIF_VALUE: 0
PIF_VALUE: 16
PIF_VALUE: 14
PIF_VALUE: 17
PIF_VALUE: 0
PIF_VALUE: 21
PIF_VALUE: 16
PIF_VALUE: 14
PIF_VALUE: 0
PIF_VALUE: 1
PIF_VALUE: 15
PIF_VALUE: 22
PIF_VALUE: 16
PIF_VALUE: 0
PIF_VALUE: 16
PIF_VALUE: 0
PIF_VALUE: 16
PIF_VALUE: 16
PIF_VALUE: 15
PIF_VALUE: 15
PIF_VALUE: 21
PIF_VALUE: 15
PIF_VALUE: 16
PIF_VALUE: 14
PIF_VALUE: 0
PIF_VALUE: 0
PIF_VALUE: 16
PIF_VALUE: 17
PIF_VALUE: 1
PIF_VALUE: 0
PIF_VALUE: 14
PIF_VALUE: 15
PIF_VALUE: 21
PIF_VALUE: 2
PIF_VALUE: 20
PIF_VALUE: 15
PIF_VALUE: 0
PIF_VALUE: 2
PIF_VALUE: 15
PIF_VALUE: 2
PIF_VALUE: 0
PIF_VALUE: 21
PIF_VALUE: 15
PIF_VALUE: 0
PIF_VALUE: 16
PIF_VALUE: 14
PIF_VALUE: 16
PIF_VALUE: 0
PIF_VALUE: 16
PIF_VALUE: 1
PIF_VALUE: 0
PIF_VALUE: 16
PIF_VALUE: 23
PIF_VALUE: 15
PIF_VALUE: 16
PIF_VALUE: 15
PIF_VALUE: 16
PIF_VALUE: 18
PIF_VALUE: 15
PIF_VALUE: 0
PIF_VALUE: 0
PIF_VALUE: 16
PIF_VALUE: 17
PIF_VALUE: 14
PIF_VALUE: 0
PIF_VALUE: 16
PIF_VALUE: 14
PIF_VALUE: 20
PIF_VALUE: 22
PIF_VALUE: 0
PIF_VALUE: 0
PIF_VALUE: 15
PIF_VALUE: 0
PIF_VALUE: 15
PIF_VALUE: 16
PIF_VALUE: 0
PIF_VALUE: 22
PIF_VALUE: 0
PIF_VALUE: 20
PIF_VALUE: 0
PIF_VALUE: 15
PIF_VALUE: 0
PIF_VALUE: 15
PIF_VALUE: 0
PIF_VALUE: 21
PIF_VALUE: 21
PIF_VALUE: 15
PIF_VALUE: 15
PIF_VALUE: 16
PIF_VALUE: 1
PIF_VALUE: 15
PIF_VALUE: 16
PIF_VALUE: 17
PIF_VALUE: 22
PIF_VALUE: 14
PIF_VALUE: 15
PIF_VALUE: 0
PIF_VALUE: 0
PIF_VALUE: 1
PIF_VALUE: 1
PIF_VALUE: 15
PIF_VALUE: 15
PIF_VALUE: 16
PIF_VALUE: 0
PIF_VALUE: 14
PIF_VALUE: 15
PIF_VALUE: 0
PIF_VALUE: 0
PIF_VALUE: 15
PIF_VALUE: 15
PIF_VALUE: 1
PIF_VALUE: 16
PIF_VALUE: 1
PIF_VALUE: 0
PIF_VALUE: 16
PIF_VALUE: 0
PIF_VALUE: 36
PIF_VALUE: 16
PIF_VALUE: 15
PIF_VALUE: 0
PIF_VALUE: 2
PIF_VALUE: 21
PIF_VALUE: 15
PIF_VALUE: 22
PIF_VALUE: 15
PIF_VALUE: 14
PIF_VALUE: 1
PIF_VALUE: 0
PIF_VALUE: 16
PIF_VALUE: 0
PIF_VALUE: 18
PIF_VALUE: 16
PIF_VALUE: 15
PIF_VALUE: 0
PIF_VALUE: 16
PIF_VALUE: 1
PIF_VALUE: 16
PIF_VALUE: 15
PIF_VALUE: 16
PIF_VALUE: 17
PIF_VALUE: 2
PIF_VALUE: 14
PIF_VALUE: 0
PIF_VALUE: 15
PIF_VALUE: 15
PIF_VALUE: 0
PIF_VALUE: 15
PIF_VALUE: 1
PIF_VALUE: 16
PIF_VALUE: 16
PIF_VALUE: 21
PIF_VALUE: 16
PIF_VALUE: 0
PIF_VALUE: 14
PIF_VALUE: 15
PIF_VALUE: 0
PIF_VALUE: 21
PIF_VALUE: 16
PIF_VALUE: 15
PIF_VALUE: 0
PIF_VALUE: 15
PIF_VALUE: 0
PIF_VALUE: 0
PIF_VALUE: 15
PIF_VALUE: 16
PIF_VALUE: 15
PIF_VALUE: 16
PIF_VALUE: 15
PIF_VALUE: 17
PIF_VALUE: 14
PIF_VALUE: 14
PIF_VALUE: 16
PIF_VALUE: 2
PIF_VALUE: 15
PIF_VALUE: 0
PIF_VALUE: 14
PIF_VALUE: 16
PIF_VALUE: 15
PIF_VALUE: 16
PIF_VALUE: 1
PIF_VALUE: 16
PIF_VALUE: 16
PIF_VALUE: 17
PIF_VALUE: 0
PIF_VALUE: 15
PIF_VALUE: 0
PIF_VALUE: 15
PIF_VALUE: 21
PIF_VALUE: 15
PIF_VALUE: 16
PIF_VALUE: 17
PIF_VALUE: 15
PIF_VALUE: 0
PIF_VALUE: 0
PIF_VALUE: 21
PIF_VALUE: 21
PIF_VALUE: 0
PIF_VALUE: 14
PIF_VALUE: 16
PIF_VALUE: 15
PIF_VALUE: 15
PIF_VALUE: 16
PIF_VALUE: 0
PIF_VALUE: 1
PIF_VALUE: 0
PIF_VALUE: 1
PIF_VALUE: 0
PIF_VALUE: 18
PIF_VALUE: 1
PIF_VALUE: 0
PIF_VALUE: 16
PIF_VALUE: 0
PIF_VALUE: 15
PIF_VALUE: 0
PIF_VALUE: 16
PIF_VALUE: 15
PIF_VALUE: 15
PIF_VALUE: 16
PIF_VALUE: 16
PIF_VALUE: 15
PIF_VALUE: 15
PIF_VALUE: 16
PIF_VALUE: 0
PIF_VALUE: 16
PIF_VALUE: 1
PIF_VALUE: 1
PIF_VALUE: 14
PIF_VALUE: 0
PIF_VALUE: 14
PIF_VALUE: 15
PIF_VALUE: 1
PIF_VALUE: 1
PIF_VALUE: 16
PIF_VALUE: 17
PIF_VALUE: 0
PIF_VALUE: 16
PIF_VALUE: 1
PIF_VALUE: 0
PIF_VALUE: 15
PIF_VALUE: 16
PIF_VALUE: 0
PIF_VALUE: 1
PIF_VALUE: 0
PIF_VALUE: 16
PIF_VALUE: 17
PIF_VALUE: 15
PIF_VALUE: 1
PIF_VALUE: 2
PIF_VALUE: 0
PIF_VALUE: 15
PIF_VALUE: 15
PIF_VALUE: 14
PIF_VALUE: 15
PIF_VALUE: 22
PIF_VALUE: 16
PIF_VALUE: 17
PIF_VALUE: 16
PIF_VALUE: 15
PIF_VALUE: 1
PIF_VALUE: 17
PIF_VALUE: 16
PIF_VALUE: 16
PIF_VALUE: 0

## 2021-06-09 ASSESSMENT — PAIN SCALES - GENERAL
PAINLEVEL_OUTOF10: 0
PAINLEVEL_OUTOF10: 2
PAINLEVEL_OUTOF10: 0
PAINLEVEL_OUTOF10: 7
PAINLEVEL_OUTOF10: 8
PAINLEVEL_OUTOF10: 2
PAINLEVEL_OUTOF10: 5
PAINLEVEL_OUTOF10: 0

## 2021-06-09 ASSESSMENT — PAIN DESCRIPTION - PAIN TYPE
TYPE: SURGICAL PAIN

## 2021-06-09 ASSESSMENT — PAIN DESCRIPTION - LOCATION
LOCATION: RIB CAGE
LOCATION: CHEST
LOCATION: CHEST

## 2021-06-09 ASSESSMENT — PAIN DESCRIPTION - ORIENTATION
ORIENTATION: LEFT
ORIENTATION: MID
ORIENTATION: MID

## 2021-06-09 ASSESSMENT — PAIN - FUNCTIONAL ASSESSMENT: PAIN_FUNCTIONAL_ASSESSMENT: 0-10

## 2021-06-09 NOTE — PROGRESS NOTES
Pt verified information regarding surgery, name, birth date, surgeon, procedure and allergies: zetia, humalog, quinolones, lininopril, Lantus, victoza. . Patient transferred to [address] [place] [address] for surgery. Appropriate antibiotics ordered: Ancef 2G, Vanco 1.5G. Beta blocker: metoprolol. Lab work within normal limits, 2 units of RBC's on call to OR, vital signs stable, Mepilex sacral border applied and 2% chlorhexidine gluconate skin prep given. Patient and Family educated about surgery and pain management. Arterial line placed in Left brachial and TLC introducer in RIJ with PA line by Dr. Wyatt Lindsey. To surgery per bed at 1057.

## 2021-06-09 NOTE — PROGRESS NOTES
Spoke Dr. Ru Jordan will not be doing dialysis today unless pt get to fluid overloaded to attempt to give meds if potassium goes to high and ok with doing HD tonight if Dr Torin Ramirez request it.

## 2021-06-09 NOTE — PROGRESS NOTES
06/09/21 1637   Vent Patient Data   Plateau Pressure 20 [id number]   Static Compliance 38.7 mL/cmH2O   Dynamic Compliance 36.3 mL/cmH2O

## 2021-06-09 NOTE — PROGRESS NOTES
active drainage noted. No erythema noted.      MUSCULOSKELETAL: Muscle strength is 5/5 for all pedal groups tested. Amputation of toes 1-3 noted to the left. ASSESSMENT/PLAN  1. Gangrene of left foot - s/p amputation of toes 1 and 2 on the left (5/26/21)  2. Peripheral arterial disease - s/p left popliteal, anterior tibial, and dorsalis pedis angioplasty by Dr. Maurice Palumbo on 5/27/21  3. Diabetes mellitus with peripheral neuropathy  4. End stage renal disease on daily PD     - Patient was seen and examined at bedside. Continued (expected) ischemic/necrotic changes appreciated to the incision site  - No indication for imaging at this time  - No signs of infection noted. Site is dry. No need for antibiotics  - Will continue to allow necrotic changes to demarcate to the surgical site. If they progress, patient may require outpatient revision. If they remain stable, will likely be able to treat with wound care. - Dressing placed consisting of betadine paint, adaptic, gauze, kerlix, and ace. Dressing is to remain clean, dry, and intact  - WBAT in a surgical shoe     DISPO: Amputation site is stable with expected stable necrotic changes. No signs of infection noted. Ambulating in a surgical shoe. Dressing is to stay clean, dry, and intact. Will plan to remove sutures next week. She will follow up in my office within one week of discharge.      Yarely Lomas DPM  [phone]

## 2021-06-09 NOTE — PROGRESS NOTES
Office : [phone]     Fax :[phone]       Nephrology progress  Note      Patient's Name: Scarlett Srinivasan  6:40 PM  2021    Reason for Consult:  ESRD    History of Present Ilness:    Scarlett Srinivasan is a 51 y.o. female with severe end-stage renal disease secondary to diabetic nephropathy on peritoneal dialysis, hypertension, peripheral vascular disease, diabetic neuropathy who was admitted after she had a syncopal episode. She had similar episode 2 weeks ago. Recently had angiogram left lower extremity for gangrene left foot second toe. Denies any shortness of breath. Denies any abdominal pain. Denies any nausea vomiting. Interval HX :      Hemodynamically stable. For  CABG today . Had  HD yesterday. I/O last 3 completed shifts: In: 8628 [P.O.:240;  I.V.:1000; IV Piggyback:250]  Out: 48 [Urine:50]    Past Medical History:   Diagnosis Date    Diabetes mellitus ([facility]     End stage kidney disease ([address]     peritoneal dialysis every night at home x 2 years    Hypertension     Neuropathy     Peripheral vascular disease ([address]        Past Surgical History:   Procedure Laterality Date     SECTION      OTHER SURGICAL HISTORY  2018     lap PD cath placement lt side 62 cm    TOE AMPUTATION Left 2021    AMPUTATION OF THE LEFT THIRD TOE performed by Maribell Florian DPM at [address] TOE AMPUTATION Left 2021    AMPUTATION OF HALLUX AND SECOND TOE, LEFT FOOT performed by Maribell Florian DPM at [address] TONSILLECTOMY         Family History   Problem Relation Age of Onset    Coronary Art Dis Mother     High Blood Pressure Mother     Heart Disease Mother     Diabetes Mother     Cancer Father     Coronary Art Dis Father     High Blood Pressure Father     Heart Disease Father     Diabetes Father      Current Medications:    protamine injection 50 mg, Once PRN  sodium chloride flush 0.9 % injection 10 mL, 2 times per day  sodium chloride flush 0.9 % injection 10 mL, PRN  0.9 % sodium chloride infusion, PRN  ondansetron (ZOFRAN) injection 4 mg, Q6H PRN  metoclopramide (REGLAN) injection 10 mg, Q6H PRN  aspirin EC tablet 325 mg, Daily  aspirin suppository 300 mg, Once  acetaminophen (TYLENOL) tablet 1,000 mg, Q6H  traMADol (ULTRAM) tablet 50 mg, Q6H PRN   Or  traMADol (ULTRAM) tablet 100 mg, Q6H PRN  morphine (PF) injection 2 mg, Q2H PRN  meperidine (DEMEROL) injection 25 mg, Once PRN  mupirocin (BACTROBAN) 2 % ointment, BID  [START ON 6/10/2021] nitroGLYCERIN (NITRODUR) 0.2 MG/HR 1 patch, Daily  propofol injection, Titrated  sodium bicarbonate 8.4 % injection 50 mEq, Q30 Min PRN  [START ON 6/10/2021] diphenhydrAMINE (BENADRYL) tablet 25 mg, Nightly PRN  [START ON 6/10/2021] zolpidem (AMBIEN) tablet 5 mg, Nightly PRN  sennosides-docusate sodium (SENOKOT-S) 8.6-50 MG tablet 1 tablet, BID  magnesium hydroxide (MILK OF MAGNESIA) 400 MG/5ML suspension 30 mL, Daily PRN  polyethylene glycol (GLYCOLAX) packet 17 g, Daily PRN  [START ON 6/10/2021] metoprolol tartrate (LOPRESSOR) tablet 12.5 mg, BID  [START ON 6/10/2021] pantoprazole (PROTONIX) tablet 40 mg, Daily  pantoprazole (PROTONIX) injection 40 mg, Daily   And  sodium chloride (PF) 0.9 % injection 10 mL, Daily  potassium chloride 20 mEq/50 mL IVPB (Central Line), PRN  magnesium sulfate 2000 mg in 50 mL IVPB premix, PRN  calcium chloride 1,000 mg in sodium chloride 0.9 % 100 mL IVPB, PRN  calcium chloride 2,000 mg in sodium chloride 0.9 % 100 mL IVPB, PRN  albuterol sulfate  (90 Base) MCG/ACT inhaler 2 puff, Q6H PRN  albumin human 5 % IV solution 25 g, PRN  DOPamine (INTROPIN) 400 mg in dextrose 5 % 250 mL infusion, Continuous PRN  DOBUTamine (DOBUTREX) 500 mg in dextrose 5 % 250 mL infusion, Continuous PRN  phenylephrine (SAUD-SYNEPHRINE) 50 mg in dextrose 5 % 250 mL infusion, Continuous PRN  furosemide (LASIX) injection 20 mg, PRN  milrinone (PRIMACOR) 20 mg in dextrose 5 % 100 mL infusion, Continuous PRN  norepinephrine (LEVOPHED) 16 mg in dextrose 5% 250 mL infusion, Continuous PRN  nitroGLYCERIN 50 mg in dextrose 5% 250 mL infusion, Continuous PRN  clevidipine (CLEVIPREX) infusion, Continuous PRN  insulin regular (HUMULIN R;NOVOLIN R) 100 Units in sodium chloride 0.9 % 100 mL infusion, Continuous  glucose (GLUTOSE) 40 % oral gel 15 g, PRN  dextrose 50 % IV solution, PRN  glucagon (rDNA) injection 1 mg, PRN  dextrose 5 % solution, PRN  0.9 % sodium chloride infusion, Continuous  ceFAZolin (ANCEF) 1,000 mg in dextrose 5 % 50 mL IVPB (mini-bag), Q8H  0.9 % sodium chloride bolus, Continuous PRN  [START ON 6/10/2021] insulin detemir (LEVEMIR) injection pen 20 Units, Nightly  [START ON 6/11/2021] insulin aspart (NOVOLOG) injection pen 10 Units PATIENT SUPPLIED, TID AC  gentamicin (GARAMYCIN) 0.1 % cream, Daily  sevelamer (RENVELA) tablet 800 mg, TID WC  atorvastatin (LIPITOR) tablet 20 mg, Nightly  torsemide (DEMADEX) tablet 200 mg, Daily          Physical exam:     Vitals:  BP (!) 107/40   Pulse 91   Temp 98.8 °F (37.1 °C) (Core)   Resp 22   Ht 5' 9\" (1.753 m)   Wt 219 lb 5.7 oz (99.5 kg)   LMP 05/12/2021 (Approximate)   SpO2 100%   Breastfeeding No   BMI 32.39 kg/m²   Constitutional:  OAA X3 NAD  Skin: no rash, turgor wnl  Heent:  eomi, mmm  Neck: no bruits or jvd noted  Cardiovascular:  S1, S2 without m/r/g  Respiratory: CTA B without w/r/r  Abdomen:  +bs, soft, nt, nd  Ext: no  lower extremity edema  Psychiatric: mood and affect appropriate  Musculoskeletal:  Rom, muscular strength intact    Labs:  CBC:   Recent Labs     06/07/21  0525 06/08/21  0445 06/09/21  1540 06/09/21  1627 06/09/21  1630   WBC 11.9* 10.9  --   --  17.4*   HGB 10.1* 9.6* 6.8* 7.3* 7.3*    291  --   --  161 BMP:    Recent Labs     06/07/21  0525 06/08/21  0445 06/09/21  1630   * 134* 139   K 4.1 4.0 4.8   CL 90* 92* 105   CO2 24 22 19*   BUN 44* 37* 25*   CREATININE 10.9* 9.8* 6.8*   GLUCOSE 127* 112* 128*     Ca/Mg/Phos:   Recent Labs     06/07/21  0525 06/08/21  0445 06/09/21  1630   CALCIUM 9.1 9.1 8.7   MG  --   --  3.40*     UA:  No results for input(s): Deloria Newfane, GLUCOSEU, BILIRUBINUR, KETUA, SPECGRAV, BLOODU, PHUR, PROTEINU, UROBILINOGEN, NITRU, LEUKOCYTESUR, Rollo Doom in the last 72 hours. Urine Microscopic:   No results for input(s): LABCAST, BACTERIA, COMU, HYALCAST, WBCUA, RBCUA, EPIU in the last 72 hours. Urine Culture:   No results for input(s): LABURIN in the last 72 hours. Urine Chemistry: No results for input(s): Mallie Dys, PROTEINUR, NAUR in the last 72 hours. IMAGING:  XR CHEST PORTABLE   Final Result   Status post open heart surgery. Support tubes and [place] catheter, as   above. Right basilar atelectasis and small effusion. IR FLUORO GUIDED CVA DEVICE PLMT/REPLACE/REMOVAL   Final Result   Technically successful right-sided HD catheter placement. Catheter is okay   for use. VL DUP CAROTID BILATERAL   Final Result      VL PRE OP VEIN MAPPING   Final Result      CT CHEST WO CONTRAST   Final Result   No acute intrathoracic abnormality         MRI BRAIN WO CONTRAST   Final Result   1. No acute intracranial abnormality. 2. Mild chronic white matter microvascular ischemic changes. 3. Right maxillary sinusitis. XR CHEST PORTABLE   Final Result   Cardiomegaly with stable right pleural effusion                 Assessment/Plan :      1. ESRD. Was On peritoneal dialysis. Switched  her temporarily to hemodialysis pre and postop. HD today   Planned for CABG   Will keep on HD post op for few days and will transition to PD after that       2. HTN. Labile.   Check orthostatics statics  Decreased dose of nifedipine XL to 30 mg po daily     3. Anemia of chronic disease. Gets micera at dialysis unit    4. Acid- base disorder. Monitor    5. Electrolytes monitor and replace as needed    6. CAD/Syncopal episode. S/p [facility]   Has Multivessel CAD   Plan for CABG       7. Peripheral vascular disease. S/p amputation of left foot toes.  Podiatry following             Thank you for allowing us to participate in care of Jacob Galeas         Electronically signed by: Anjel Solomon MD, 6/9/2021, 6:40 PM      [facility] [address]  Office : [phone]  Fax :[phone]

## 2021-06-09 NOTE — OP NOTE
Operative Note  ______________________________________________________________    Patient: Linsey García  YOB: 1975  MRN: [id number]  Date of Procedure: 6/9/2021    Pre-Op Diagnosis: CORONARY ARTERY DISEASE, ACP, poorly controlled DM, HTN, HLD, ESRD on dialysis    Post-Op Diagnosis: Same       Procedure(s):  URGENT CABG CORONARY ARTERY BYPASS GRAFTS X3. VEIN TO PDA X1, VEIN TO OM X1, LEFT INTERNAL MAMMARY ARTERY TO LAD X1. ENDOSCOPIC HARVEST RIGHT LEG SAPHENOUS VEIN. LIGATION NYA USING 35MM ATRICLIP. EPI AORTIC ULTRASOUND. TOTAL CARDIOPULMONARY BYPASS. DOPPLER GRAFT FLOW VERIFICATION. BILATERAL INITERCOSTAL NERVE BLOCK USING 1.3% EXPAREL. Anesthesia: GETA by Dr. Ayesha Fletcher): Curt Mireles MD    Assistants: Georgette Basurto first asst [facility], [facility] and first asst -- both present for closure and the entire case    Perfusionist: Ayesha Haley    Pump Time: 97   Cross-clamp Time: 78    Drains: Left pleural and mediastinal 24 Fr. Blakes   Pacing Wires: Bipolar ventricular    Estimated Blood Loss (mL): 100 -- transfused 1 unit PRBC    Complications: None    Specimens:   * No specimens in log *    Implants:  Implant Name Type Inv. Item Serial No.  Lot No. LRB No. Used Action   DEVICE OCCL CLP L35MM SM FOOTPRINT 1 HND APPL SUTURELESS W/  DEVICE OCCL CLP L35MM SM FOOTPRINT 1 HND APPL SUTURELESS W/  [employer]WD [id number] N/A 1 Implanted         Drains:   Chest Tube 2 Anterior Mediastinal;Pleural 24 Vincentian (Active)       Urethral Catheter Temperature probe;Non-latex 16 fr (Active)       Findings: Excellent LIMA and good SV. There was an area in distal ascending aorta free of calcification that was appropriate to cross clamp.  >2mm LAD. 1.5mm OM and PDA. Excellent doppler signals in all grafts post-CPB. EF 50% pre- and post-op with LVH noted.     Details of Procedure:    After placement of appropriate monitors, induction of general endotracheal anesthesia and infusion of appropriate antibiotics, the patient was sterilely prepped and draped. Saphenous vein was endoscopically harvested from the right leg and thigh. These wounds were irrigated and closed in layers after achieving meticulous hemostasis. Concurrent to vein harvest, a median sternotomy was performed and epi-aortic ultrasound was performed. The distal ascending aorta was free of calcium and could be cross-clamped. The left side of the arch was appropriate for cannulation and there was space on the right side of the proximal aorta for proximal venous anastomoses. The left internal mammary artery was taken down from the chest wall. The patient was given a systemic dose of heparin. The internal mammary artery was ligated distally and divided. It was noted to have excellent flow. It was injected with papaverine, clamped and set aside. The patient was then cannulated in standard fashion for antegrade cardioplegia and cardiopulmonary bypass. After reaching an appropriate ACT, the patient went on cardiopulmonary bypass without difficulty. The aorta was cross-clamped and warm antegrade cardioplegia was given for 300cc or cardiac standstill, and then cold blood cardioplegia was given to complete induction and intermittently throughout the case as needed. First, the vein graft to the PDA was performed in running end-to-side fashion. Next, a punch aortotomy was performed and the proximal anastomosis was performed in running end-to-side fashion. Next, the vein graft to the obtuse mariginal was performed in running end-to-side fashion. At this point, the left atrial appendage was sized and found to be appropriate for a 35mm ACHV AtriClip which was then placed at the base of the appendage. Next, a punch aortotomy was performed and the proximal anastomosis was performed in running end-to-side fashion.   The left internal mammary artery was brought a hole in the pericardium anterior to the phrenic nerve and anastomosed to the LAD in running end to side fashion. The patient was given a hot shot of cardioplegia. De-airing maneuvers were performed. The aortic cross-clamp was removed and the patient allowed to re-perfuse. During this time, two 24 Fr Robb drains were brought through a separate stab incision inferiorly. A bipolar ventricular pacing wire was placed. The patient was weaned from cardiopulmonary bypass without difficulty and was decannulated and heparin reversed. The grafts were interrogated with doppler ultrasound and noted to have excellent signals. The wound was checked for hemostasis, which was meticulously achieved. Findings were as above. The pericardium and thymic fat was loosely re-approximated in the midline using interrupted 0 Ethibond sutures. The sternal edges were treated with platelet gel. The median sternotomy was then closed in standard fashion, irrigating between each layer. Bilateral parasternal intercostal nerve blocks were performed with 1/4% marcaine prior to closing the fascia. The patient tolerated the procedure well without apparent complications and was taken in critical, but stable condition to the CVICU. Sponge and needle count were reported as correct at the end of the procedure.     Minerva Horton MD  Date: 6/9/2021  Time: 3:54 PM

## 2021-06-09 NOTE — ANESTHESIA POSTPROCEDURE EVALUATION
Department of Anesthesiology  Postprocedure Note    Patient: Obdulio Abernathy  MRN: [id number]  YOB: 1975  Date of evaluation: 6/9/2021  Time:  4:32 PM     Procedure Summary     Date: 06/09/21 Room / Location: [address]    Anesthesia Start: 1100 Anesthesia Stop:     Procedure: URGENT CABG CORONARY ARTERY BYPASS GRAFTS X3. VEIN TO PDA X1, VEIN TO OM X1, LEFT INTERNAL MAMMARY ARTERY TO LAD X1. ENDOSCOPIC HARVEST RIGHT LEG SAPHENOUS VEIN. LIGATION NYA USING 35MM ATRICLIP. EPI AORTIC ULTRASOUND. TOTAL CARDIOPULMONARY BYPASS. DOPPLER GRAFT FLOW VERIFICATION. BILATERAL INITERCOSTAL NERVE BLOCK USING 1.3% EXPAREL. (N/A ) Diagnosis: (CORONARY ARTERY DISEASE)    Surgeons: Day Merchant MD Responsible Provider: Sedrick Owens MD    Anesthesia Type: general ASA Status: 4 - Emergent          Anesthesia Type: No value filed. Ngoc Phase I: Ngoc Score: 10    Ngoc Phase II:      Last vitals: Reviewed and per EMR flowsheets. Anesthesia Post Evaluation    Patient location during evaluation: ICU  Patient participation: complete - patient participated  Level of consciousness: sedated and ventilated  Pain score: 0  Airway patency: patent  Nausea & Vomiting: no nausea and no vomiting  Complications: no  Cardiovascular status: blood pressure returned to baseline  Respiratory status: intubated  Hydration status: euvolemic  Comments: PATIENT TRANSPORTED TO CVICU WITH MONITORS. REPORT GIVEN TO CVICU RN.

## 2021-06-09 NOTE — PROGRESS NOTES
Incentive Spirometry education and demonstration completed by Respiratory Therapy Yes      Response to education: Excellent     Teaching Time: 5 minutes    Minimum Predicted Vital Capacity - 662 mL. Patient's Actual Vital Capacity - 1000 mL. Turning over to Nursing for routine follow-up Yes.     Electronically signed by Claudette Clinton RCP on 6/8/2021 at 10:54 PM

## 2021-06-10 LAB
ANION GAP SERPL CALCULATED.3IONS-SCNC: 14 MMOL/L (ref 3–16)
BUN BLDV-MCNC: 25 MG/DL (ref 7–20)
CALCIUM IONIZED: 1.2 MMOL/L (ref 1.12–1.32)
CALCIUM SERPL-MCNC: 9.1 MG/DL (ref 8.3–10.6)
CHLORIDE BLD-SCNC: 105 MMOL/L (ref 99–110)
CO2: 24 MMOL/L (ref 21–32)
CREAT SERPL-MCNC: 7 MG/DL (ref 0.6–1.1)
GFR AFRICAN AMERICAN: 8
GFR NON-AFRICAN AMERICAN: 6
GLUCOSE BLD-MCNC: 100 MG/DL (ref 70–99)
GLUCOSE BLD-MCNC: 104 MG/DL (ref 70–99)
GLUCOSE BLD-MCNC: 104 MG/DL (ref 70–99)
GLUCOSE BLD-MCNC: 106 MG/DL (ref 70–99)
GLUCOSE BLD-MCNC: 111 MG/DL (ref 70–99)
GLUCOSE BLD-MCNC: 112 MG/DL (ref 70–99)
GLUCOSE BLD-MCNC: 118 MG/DL (ref 70–99)
GLUCOSE BLD-MCNC: 121 MG/DL (ref 70–99)
GLUCOSE BLD-MCNC: 129 MG/DL (ref 70–99)
GLUCOSE BLD-MCNC: 130 MG/DL (ref 70–99)
GLUCOSE BLD-MCNC: 131 MG/DL (ref 70–99)
GLUCOSE BLD-MCNC: 69 MG/DL (ref 70–99)
GLUCOSE BLD-MCNC: 75 MG/DL (ref 70–99)
GLUCOSE BLD-MCNC: 76 MG/DL (ref 70–99)
GLUCOSE BLD-MCNC: 76 MG/DL (ref 70–99)
GLUCOSE BLD-MCNC: 86 MG/DL (ref 70–99)
GLUCOSE BLD-MCNC: 89 MG/DL (ref 70–99)
GLUCOSE BLD-MCNC: 92 MG/DL (ref 70–99)
GLUCOSE BLD-MCNC: 96 MG/DL (ref 70–99)
GLUCOSE BLD-MCNC: 98 MG/DL (ref 70–99)
GLUCOSE BLD-MCNC: 98 MG/DL (ref 70–99)
HCT VFR BLD CALC: 24.6 % (ref 36–48)
HEMOGLOBIN: 7.6 GM/DL (ref 12–16)
HEMOGLOBIN: 8.1 G/DL (ref 12–16)
MAGNESIUM: 3 MG/DL (ref 1.8–2.4)
MCH RBC QN AUTO: 28.5 PG (ref 26–34)
MCHC RBC AUTO-ENTMCNC: 32.8 G/DL (ref 31–36)
MCV RBC AUTO: 86.8 FL (ref 80–100)
PDW BLD-RTO: 18.4 % (ref 12.4–15.4)
PERFORMED ON: ABNORMAL
PERFORMED ON: NORMAL
PLATELET # BLD: 141 K/UL (ref 135–450)
PMV BLD AUTO: 8.5 FL (ref 5–10.5)
POC HEMATOCRIT: 22 % (ref 36–48)
POC PATIENT TEMP: 98
POC POTASSIUM: 3.6 MMOL/L (ref 3.5–5.1)
POC SAMPLE TYPE: ABNORMAL
POC SODIUM: 141 MMOL/L (ref 136–145)
POTASSIUM SERPL-SCNC: 3.7 MMOL/L (ref 3.5–5.1)
RBC # BLD: 2.83 M/UL (ref 4–5.2)
SODIUM BLD-SCNC: 143 MMOL/L (ref 136–145)
WBC # BLD: 17.3 K/UL (ref 4–11)

## 2021-06-10 PROCEDURE — 6370000000 HC RX 637 (ALT 250 FOR IP): Performed by: THORACIC SURGERY (CARDIOTHORACIC VASCULAR SURGERY)

## 2021-06-10 PROCEDURE — 2100000000 HC CCU R&B

## 2021-06-10 PROCEDURE — 2700000000 HC OXYGEN THERAPY PER DAY

## 2021-06-10 PROCEDURE — 83735 ASSAY OF MAGNESIUM: CPT

## 2021-06-10 PROCEDURE — 6370000000 HC RX 637 (ALT 250 FOR IP): Performed by: NURSE PRACTITIONER

## 2021-06-10 PROCEDURE — APPNB30 APP NON BILLABLE TIME 0-30 MINS: Performed by: NURSE PRACTITIONER

## 2021-06-10 PROCEDURE — 2500000003 HC RX 250 WO HCPCS: Performed by: THORACIC SURGERY (CARDIOTHORACIC VASCULAR SURGERY)

## 2021-06-10 PROCEDURE — 82947 ASSAY GLUCOSE BLOOD QUANT: CPT

## 2021-06-10 PROCEDURE — 6360000002 HC RX W HCPCS: Performed by: THORACIC SURGERY (CARDIOTHORACIC VASCULAR SURGERY)

## 2021-06-10 PROCEDURE — 99233 SBSQ HOSP IP/OBS HIGH 50: CPT | Performed by: INTERNAL MEDICINE

## 2021-06-10 PROCEDURE — 80048 BASIC METABOLIC PNL TOTAL CA: CPT

## 2021-06-10 PROCEDURE — 94761 N-INVAS EAR/PLS OXIMETRY MLT: CPT

## 2021-06-10 PROCEDURE — 90935 HEMODIALYSIS ONE EVALUATION: CPT

## 2021-06-10 PROCEDURE — 85014 HEMATOCRIT: CPT

## 2021-06-10 PROCEDURE — 36592 COLLECT BLOOD FROM PICC: CPT

## 2021-06-10 PROCEDURE — 84295 ASSAY OF SERUM SODIUM: CPT

## 2021-06-10 PROCEDURE — 82330 ASSAY OF CALCIUM: CPT

## 2021-06-10 PROCEDURE — 85027 COMPLETE CBC AUTOMATED: CPT

## 2021-06-10 PROCEDURE — 84132 ASSAY OF SERUM POTASSIUM: CPT

## 2021-06-10 PROCEDURE — 2580000003 HC RX 258: Performed by: THORACIC SURGERY (CARDIOTHORACIC VASCULAR SURGERY)

## 2021-06-10 PROCEDURE — APPSS15 APP SPLIT SHARED TIME 0-15 MINUTES: Performed by: NURSE PRACTITIONER

## 2021-06-10 PROCEDURE — 6360000002 HC RX W HCPCS: Performed by: INTERNAL MEDICINE

## 2021-06-10 PROCEDURE — C9113 INJ PANTOPRAZOLE SODIUM, VIA: HCPCS | Performed by: THORACIC SURGERY (CARDIOTHORACIC VASCULAR SURGERY)

## 2021-06-10 RX ORDER — HEPARIN SODIUM 1000 [USP'U]/ML
3400 INJECTION, SOLUTION INTRAVENOUS; SUBCUTANEOUS PRN
Status: DISCONTINUED | OUTPATIENT
Start: 2021-06-10 | End: 2021-06-16 | Stop reason: HOSPADM

## 2021-06-10 RX ORDER — HEPARIN SODIUM 1000 [USP'U]/ML
2400 INJECTION, SOLUTION INTRAVENOUS; SUBCUTANEOUS PRN
Status: DISCONTINUED | OUTPATIENT
Start: 2021-06-10 | End: 2021-06-10

## 2021-06-10 RX ORDER — SODIUM CHLORIDE 9 MG/ML
INJECTION, SOLUTION INTRAVENOUS
Status: DISPENSED
Start: 2021-06-10 | End: 2021-06-10

## 2021-06-10 RX ADMIN — STANDARDIZED SENNA CONCENTRATE AND DOCUSATE SODIUM 1 TABLET: 8.6; 5 TABLET ORAL at 09:33

## 2021-06-10 RX ADMIN — TRAMADOL HYDROCHLORIDE 100 MG: 50 TABLET, FILM COATED ORAL at 23:09

## 2021-06-10 RX ADMIN — METOPROLOL TARTRATE 12.5 MG: 25 TABLET, FILM COATED ORAL at 20:13

## 2021-06-10 RX ADMIN — HEPARIN SODIUM 3400 UNITS: 1000 INJECTION INTRAVENOUS; SUBCUTANEOUS at 16:23

## 2021-06-10 RX ADMIN — MUPIROCIN: 20 OINTMENT TOPICAL at 20:14

## 2021-06-10 RX ADMIN — SEVELAMER CARBONATE 800 MG: 800 TABLET, FILM COATED ORAL at 18:06

## 2021-06-10 RX ADMIN — Medication 10 ML: at 20:14

## 2021-06-10 RX ADMIN — INSULIN DETEMIR 20 UNITS: 100 INJECTION, SOLUTION SUBCUTANEOUS at 20:21

## 2021-06-10 RX ADMIN — TORSEMIDE 200 MG: 100 TABLET ORAL at 09:34

## 2021-06-10 RX ADMIN — MUPIROCIN: 20 OINTMENT TOPICAL at 10:24

## 2021-06-10 RX ADMIN — SEVELAMER CARBONATE 800 MG: 800 TABLET, FILM COATED ORAL at 09:32

## 2021-06-10 RX ADMIN — METOPROLOL TARTRATE 12.5 MG: 25 TABLET, FILM COATED ORAL at 09:16

## 2021-06-10 RX ADMIN — CEFAZOLIN 1000 MG: 1 INJECTION, POWDER, FOR SOLUTION INTRAVENOUS at 16:45

## 2021-06-10 RX ADMIN — PANTOPRAZOLE SODIUM 40 MG: 40 INJECTION, POWDER, FOR SOLUTION INTRAVENOUS at 09:00

## 2021-06-10 RX ADMIN — NITROGLYCERIN 150 MCG/MIN: 20 INJECTION INTRAVENOUS at 01:22

## 2021-06-10 RX ADMIN — ACETAMINOPHEN 1000 MG: 500 TABLET ORAL at 11:39

## 2021-06-10 RX ADMIN — TRAMADOL HYDROCHLORIDE 50 MG: 50 TABLET, FILM COATED ORAL at 14:27

## 2021-06-10 RX ADMIN — CEFAZOLIN 1000 MG: 1 INJECTION, POWDER, FOR SOLUTION INTRAVENOUS at 06:13

## 2021-06-10 RX ADMIN — SODIUM CHLORIDE 7.7 UNITS/HR: 9 INJECTION, SOLUTION INTRAVENOUS at 01:21

## 2021-06-10 RX ADMIN — ACETAMINOPHEN 1000 MG: 500 TABLET ORAL at 17:25

## 2021-06-10 RX ADMIN — STANDARDIZED SENNA CONCENTRATE AND DOCUSATE SODIUM 1 TABLET: 8.6; 5 TABLET ORAL at 20:13

## 2021-06-10 RX ADMIN — SEVELAMER CARBONATE 800 MG: 800 TABLET, FILM COATED ORAL at 14:23

## 2021-06-10 RX ADMIN — CEFAZOLIN 1000 MG: 1 INJECTION, POWDER, FOR SOLUTION INTRAVENOUS at 23:09

## 2021-06-10 RX ADMIN — ACETAMINOPHEN 1000 MG: 500 TABLET ORAL at 23:08

## 2021-06-10 RX ADMIN — PANTOPRAZOLE SODIUM 40 MG: 40 TABLET, DELAYED RELEASE ORAL at 06:12

## 2021-06-10 RX ADMIN — ATORVASTATIN CALCIUM 20 MG: 20 TABLET, FILM COATED ORAL at 20:14

## 2021-06-10 RX ADMIN — ACETAMINOPHEN 1000 MG: 500 TABLET ORAL at 05:10

## 2021-06-10 RX ADMIN — GENTAMICIN SULFATE: 1 CREAM TOPICAL at 09:01

## 2021-06-10 RX ADMIN — DIPHENHYDRAMINE HYDROCHLORIDE 25 MG: 25 TABLET ORAL at 00:20

## 2021-06-10 RX ADMIN — ASPIRIN 325 MG: 325 TABLET, COATED ORAL at 09:34

## 2021-06-10 RX ADMIN — TRAMADOL HYDROCHLORIDE 100 MG: 50 TABLET, FILM COATED ORAL at 05:10

## 2021-06-10 RX ADMIN — MORPHINE SULFATE 2 MG: 2 INJECTION, SOLUTION INTRAMUSCULAR; INTRAVENOUS at 01:23

## 2021-06-10 ASSESSMENT — PAIN DESCRIPTION - PAIN TYPE
TYPE: SURGICAL PAIN

## 2021-06-10 ASSESSMENT — PAIN SCALES - GENERAL
PAINLEVEL_OUTOF10: 1
PAINLEVEL_OUTOF10: 3
PAINLEVEL_OUTOF10: 3
PAINLEVEL_OUTOF10: 1
PAINLEVEL_OUTOF10: 7
PAINLEVEL_OUTOF10: 1
PAINLEVEL_OUTOF10: 1
PAINLEVEL_OUTOF10: 7
PAINLEVEL_OUTOF10: 1
PAINLEVEL_OUTOF10: 2
PAINLEVEL_OUTOF10: 0
PAINLEVEL_OUTOF10: 4
PAINLEVEL_OUTOF10: 0
PAINLEVEL_OUTOF10: 1
PAINLEVEL_OUTOF10: 7
PAINLEVEL_OUTOF10: 7
PAINLEVEL_OUTOF10: 3
PAINLEVEL_OUTOF10: 7

## 2021-06-10 ASSESSMENT — PAIN - FUNCTIONAL ASSESSMENT
PAIN_FUNCTIONAL_ASSESSMENT: PREVENTS OR INTERFERES SOME ACTIVE ACTIVITIES AND ADLS

## 2021-06-10 ASSESSMENT — PAIN DESCRIPTION - ONSET
ONSET: ON-GOING

## 2021-06-10 ASSESSMENT — PAIN DESCRIPTION - ORIENTATION
ORIENTATION: MID
ORIENTATION: RIGHT;LEFT
ORIENTATION: MID
ORIENTATION: MID

## 2021-06-10 ASSESSMENT — PAIN DESCRIPTION - LOCATION
LOCATION: CHEST
LOCATION: LEG;FOOT
LOCATION: CHEST

## 2021-06-10 ASSESSMENT — PAIN DESCRIPTION - FREQUENCY
FREQUENCY: INTERMITTENT

## 2021-06-10 ASSESSMENT — PAIN DESCRIPTION - DESCRIPTORS
DESCRIPTORS: ACHING
DESCRIPTORS: NUMBNESS
DESCRIPTORS: DISCOMFORT
DESCRIPTORS: ACHING;NAGGING
DESCRIPTORS: NAGGING
DESCRIPTORS: ACHING;NAGGING
DESCRIPTORS: NAGGING
DESCRIPTORS: ACHING

## 2021-06-10 ASSESSMENT — PAIN DESCRIPTION - PROGRESSION
CLINICAL_PROGRESSION: NOT CHANGED
CLINICAL_PROGRESSION: GRADUALLY WORSENING
CLINICAL_PROGRESSION: GRADUALLY IMPROVING
CLINICAL_PROGRESSION: NOT CHANGED
CLINICAL_PROGRESSION: NOT CHANGED

## 2021-06-10 NOTE — PROGRESS NOTES
Patient awake and following commands. Patient placed on CPAP per open heart protocol. ABG drawn 4 hours later and wnl. Respiratory called for weaning parameters. NIF - 37. Patient suctioned and extubated to 4 liters nasal cannula    Patient tolerated well. Oxygen saturation 100%  on 2 liters nasal cannula. Patient alert and oriented X 3.      WCM//MHM RN    RECOVERY PERIOD ENDS 2115

## 2021-06-10 NOTE — PROGRESS NOTES
Hemodialysis nurse here. Patient reassessed see flow sheet. VS stable at present. Right IJ intact, infusing insulin without difficulty. Sternal incision and chest tube dressing with serosanguinous drainage noted. Changes and reinforced earlier. Russ patent draining scant amount of urine. Taking po well, tolerating well. Son bedside visiting. Denies pain at present. Call light in reach, side rails up x3.  O2 increased to 2L related to O2 sat 85%, tolerating well Bebe Noriega RN

## 2021-06-10 NOTE — PROGRESS NOTES
Patient met criteria per open heart protocol to transition to stepdown level of care. Procedures explained to patient. RIJ  [place] discontinued and obturator inserted. Patient tolerated well and minimal ectopy on monitor. Left Brachial arterial line remains in per Dr. Leela Sin.     M

## 2021-06-10 NOTE — PROGRESS NOTES
Stable today. HD today 200cc fluid removed. A-line, pacing wires and angeles removed per orders. Tele on NSR with occas. PVC's. Up to chair today and ambulated x2 today. Did not tolerate well, still having dizziness. Remains on [email or web address], tolerating well. CT patent draining serosanguinous fluid, dressing changed. Medicated x1 for leg pain. Right leg ace wrapped. Left foot ace wrapped. Denies needs at present. Call light in reach.  Sons bedside visiting Onelia Lock RN

## 2021-06-10 NOTE — PROGRESS NOTES
Criteria met per clinical pathway to remove epicardial pacing wires & MD order received. Procedure explained to patient. INR is less than 2.0 (if on coumadin). Pacing wire site within normal limits. Cleansed site with Chloroprep. Ventricular pacing wires removed per policy without difficulty. Dry sterile dressing applied. Vital signs will be monitored and recorded per open heart protocol (every 15 minutes X 2, every 30 minutes X 1, and every hour X 1). Patient tolerated procedure well, heart tones easily auscultated, oxygen saturation within normal limits. Signs/symtoms for cardiac tamponade will be monitored closely.  Katiuska Simmons RN

## 2021-06-10 NOTE — PLAN OF CARE
Problem: Falls - Risk of:  Goal: Will remain free from falls  Description: Will remain free from falls  Outcome: Ongoing  Note: Patient will use walker, gait belt and call for assistance when getting up     Problem: Pain:  Goal: Control of acute pain  Description: Control of acute pain  Outcome: Ongoing  Note: Patient's pain will be controlled by pain meds and positioning       Problem: Nutrition  Goal: Optimal nutrition therapy  Outcome: Ongoing  Note: Patient's intake will continue to improve     Problem: Skin Integrity:  Goal: Absence of new skin breakdown  Description: Absence of new skin breakdown  Outcome: Ongoing  Note: Patient's skin will be checked each shift for breakdown     Problem: Preoperative Routine:  Goal: Will comply with preparation for surgery or procedure  Description: Will comply with preparation for surgery or procedure  Outcome: Completed

## 2021-06-10 NOTE — PROGRESS NOTES
Patient sitting up in chair. Right IJ intact, infusing without difficulty, dressing dry & intact. Tele on NSR at present. Sternal dressing dry & intact. Left brachial art line, dressing dry & intact, blood return noted, flushes easily. Chest tubes patent. Russ patent small amount of urine noted. Tolerating po fluid well. Denies needs at present. Call light in reach.  Dago Meadows RN

## 2021-06-10 NOTE — PROGRESS NOTES
Office : [phone]     Fax :[phone]       Nephrology progress  Note      Patient's Name: Nanci Fox  9:16 AM  6/10/2021    Reason for Consult:  ESRD    History of Present Ilness:    Nanci Fox is a 51 y.o. female with severe end-stage renal disease secondary to diabetic nephropathy on peritoneal dialysis, hypertension, peripheral vascular disease, diabetic neuropathy who was admitted after she had a syncopal episode. She had similar episode 2 weeks ago. Recently had angiogram left lower extremity for gangrene left foot second toe. Denies any shortness of breath. Denies any abdominal pain. Denies any nausea vomiting. Interval HX :      Hemodynamically stable. S/p CABG on 2010  Doing well         I/O last 3 completed shifts: In: 4474.7 [P.O.:720; I.V.:3026.6; Blood:300; IV Piggyback:428.1]  Out: 1055 [Urine:5; Chest Tube:1050]    Past Medical History:   Diagnosis Date    Diabetes mellitus [facility]     End stage kidney disease ([address]     peritoneal dialysis every night at home x 2 years    Hypertension     Neuropathy     Peripheral vascular disease ([facility]        Past Surgical History:   Procedure Laterality Date     SECTION      CORONARY ARTERY BYPASS GRAFT N/A 2021    URGENT CABG CORONARY ARTERY BYPASS GRAFTS X3. VEIN TO PDA X1, VEIN TO OM X1, LEFT INTERNAL MAMMARY ARTERY TO LAD X1. ENDOSCOPIC HARVEST RIGHT LEG SAPHENOUS VEIN. LIGATION NYA USING 35MM ATRICLIP. EPI AORTIC ULTRASOUND. TOTAL CARDIOPULMONARY BYPASS. DOPPLER GRAFT FLOW VERIFICATION. BILATERAL INITERCOSTAL NERVE BLOCK USING 1.3% EXPAREL.  performed by Dwight Ring MD at [address] [employer]  2018     lap PD cath placement lt side 62 cm    TOE AMPUTATION Left 2021 AMPUTATION OF THE LEFT THIRD TOE performed by Marcie Miller DPM at [address] [address] [place] TOE AMPUTATION Left 5/26/2021    AMPUTATION OF HALLUX AND SECOND TOE, LEFT FOOT performed by Marcie Miller DPM at [facility]         Family History   Problem Relation Age of Onset    Coronary Art Dis Mother     High Blood Pressure Mother     Heart Disease Mother     Diabetes Mother     Cancer Father     Coronary Art Dis Father     High Blood Pressure Father     Heart Disease Father     Diabetes Father      Current Medications:    sodium chloride 0.9 % infusion,   sodium chloride flush 0.9 % injection 10 mL, 2 times per day  sodium chloride flush 0.9 % injection 10 mL, PRN  0.9 % sodium chloride infusion, PRN  ondansetron (ZOFRAN) injection 4 mg, Q6H PRN  metoclopramide (REGLAN) injection 10 mg, Q6H PRN  aspirin EC tablet 325 mg, Daily  aspirin suppository 300 mg, Once  acetaminophen (TYLENOL) tablet 1,000 mg, Q6H  traMADol (ULTRAM) tablet 50 mg, Q6H PRN   Or  traMADol (ULTRAM) tablet 100 mg, Q6H PRN  morphine (PF) injection 2 mg, Q2H PRN  mupirocin (BACTROBAN) 2 % ointment, BID  nitroGLYCERIN (NITRODUR) 0.2 MG/HR 1 patch, Daily  propofol injection, Titrated  diphenhydrAMINE (BENADRYL) tablet 25 mg, Nightly PRN  zolpidem (AMBIEN) tablet 5 mg, Nightly PRN  sennosides-docusate sodium (SENOKOT-S) 8.6-50 MG tablet 1 tablet, BID  magnesium hydroxide (MILK OF MAGNESIA) 400 MG/5ML suspension 30 mL, Daily PRN  polyethylene glycol (GLYCOLAX) packet 17 g, Daily PRN  metoprolol tartrate (LOPRESSOR) tablet 12.5 mg, BID  pantoprazole (PROTONIX) tablet 40 mg, Daily  pantoprazole (PROTONIX) injection 40 mg, Daily   And  sodium chloride (PF) 0.9 % injection 10 mL, Daily  potassium chloride 20 mEq/50 mL IVPB (Central Line), PRN  magnesium sulfate 2000 mg in 50 mL IVPB premix, PRN  calcium chloride 1,000 mg in sodium chloride 0.9 % 100 mL IVPB, PRN  calcium chloride 2,000 mg in sodium chloride 0.9 % 100 mL IVPB, PRN  albuterol sulfate  (90 Base) MCG/ACT inhaler 2 puff, Q6H PRN  albumin human 5 % IV solution 25 g, PRN  DOPamine (INTROPIN) 400 mg in dextrose 5 % 250 mL infusion, Continuous PRN  DOBUTamine (DOBUTREX) 500 mg in dextrose 5 % 250 mL infusion, Continuous PRN  phenylephrine (SAUD-SYNEPHRINE) 50 mg in dextrose 5 % 250 mL infusion, Continuous PRN  furosemide (LASIX) injection 20 mg, PRN  milrinone (PRIMACOR) 20 mg in dextrose 5 % 100 mL infusion, Continuous PRN  norepinephrine (LEVOPHED) 16 mg in dextrose 5% 250 mL infusion, Continuous PRN  nitroGLYCERIN 50 mg in dextrose 5% 250 mL infusion, Continuous PRN  clevidipine (CLEVIPREX) infusion, Continuous PRN  insulin regular (HUMULIN R;NOVOLIN R) 100 Units in sodium chloride 0.9 % 100 mL infusion, Continuous  glucose (GLUTOSE) 40 % oral gel 15 g, PRN  dextrose 50 % IV solution, PRN  glucagon (rDNA) injection 1 mg, PRN  dextrose 5 % solution, PRN  0.9 % sodium chloride infusion, Continuous  ceFAZolin (ANCEF) 1,000 mg in dextrose 5 % 50 mL IVPB (mini-bag), Q8H  0.9 % sodium chloride bolus, Continuous PRN  insulin detemir (LEVEMIR) injection pen 20 Units, Nightly  [START ON 6/11/2021] insulin aspart (NOVOLOG) injection pen 10 Units PATIENT SUPPLIED, TID AC  gentamicin (GARAMYCIN) 0.1 % cream, Daily  sevelamer (RENVELA) tablet 800 mg, TID WC  atorvastatin (LIPITOR) tablet 20 mg, Nightly  torsemide (DEMADEX) tablet 200 mg, Daily          Physical exam:     Vitals:  BP (!) 150/62   Pulse 76   Temp 96.5 °F (35.8 °C)   Resp 16   Ht 5' 9\" (1.753 m)   Wt 231 lb 0.7 oz (104.8 kg)   LMP 05/12/2021 (Approximate)   SpO2 100%   Breastfeeding No   BMI 34.12 kg/m²   Constitutional:  OAA X3 NAD  Skin: no rash, turgor wnl  Heent:  eomi, mmm  Neck: no bruits or jvd noted  Cardiovascular:  S1, S2 without m/r/g  Respiratory: CTA B without w/r/r  Abdomen:  +bs, soft, nt, nd  Ext: no  lower extremity edema      Labs:  CBC:   Recent Labs     06/08/21  0445 06/09/21  1630 06/09/21  2002 06/10/21  0013 06/10/21  0437   WBC 10.9 17.4*  --   --  17.3*   HGB 9.6* 7.3* 9.1* 7.6* 8.1*    161  --   --  141     BMP:    Recent Labs     06/08/21 0445 06/09/21  1630 06/10/21  0437   * 139 143   K 4.0 4.8 3.7   CL 92* 105 105   CO2 22 19* 24   BUN 37* 25* 25*   CREATININE 9.8* 6.8* 7.0*   GLUCOSE 112* 128* 111*     Ca/Mg/Phos:   Recent Labs     06/08/21  0445 06/09/21  1630 06/10/21  0437   CALCIUM 9.1 8.7 9.1   MG  --  3.40* 3.00*     UA:  No results for input(s): COLORU, CLARITYU, GLUCOSEU, BILIRUBINUR, KETUA, SPECGRAV, BLOODU, PHUR, PROTEINU, UROBILINOGEN, NITRU, LEUKOCYTESUR, Edythe Speaks in the last 72 hours. Urine Microscopic:   No results for input(s): LABCAST, BACTERIA, COMU, HYALCAST, WBCUA, RBCUA, EPIU in the last 72 hours. Urine Culture:   No results for input(s): LABURIN in the last 72 hours. Urine Chemistry: No results for input(s): Avel Balm, PROTEINUR, NAUR in the last 72 hours. IMAGING:  XR CHEST PORTABLE   Final Result   Status post open heart surgery. Support tubes and [place]-Ebonie catheter, as   above. Right basilar atelectasis and small effusion. IR FLUORO GUIDED CVA DEVICE PLMT/REPLACE/REMOVAL   Final Result   Technically successful right-sided HD catheter placement. Catheter is okay   for use. VL DUP CAROTID BILATERAL   Final Result      VL PRE OP VEIN MAPPING   Final Result      CT CHEST WO CONTRAST   Final Result   No acute intrathoracic abnormality         MRI BRAIN WO CONTRAST   Final Result   1. No acute intracranial abnormality. 2. Mild chronic white matter microvascular ischemic changes. 3. Right maxillary sinusitis. XR CHEST PORTABLE   Final Result   Cardiomegaly with stable right pleural effusion                 Assessment/Plan :      1. ESRD. Was On peritoneal dialysis. Switched  her temporarily to hemodialysis .  Had CABG yesterday   HD today   Will keep on HD post op for few days and will transition to PD after that   Based on clinical recovery     2. HTN. Controlled     3. Anemia of chronic disease. Gets micera at dialysis unit  Will give EPOGEN    4. Acid- base disorder. Monitor    5. Electrolytes monitor and replace as needed    6. CAD/Syncopal episode. S/p [facility]   Has Multivessel CAD   S/p CABG       7. Peripheral vascular disease. S/p amputation of left foot toes.  Podiatry following       Will plan HD today         Thank you for allowing us to participate in care of Jacinda Dacosta         Electronically signed by: Emelia Heart MD, 6/10/2021, 9:16 AM      [facility] [address]  Office : [phone]  Fax :[phone]

## 2021-06-10 NOTE — PLAN OF CARE
Pt alert and oriented. Pt able to communicate present pain and use the pain scale appropriately. Nonpharmacological pain reducers and pain medication offered as needed. Will cont to monitor. Pt remains free of falls. Fall risk protocol in place. Bed locked in lowest position. Call light in reach. Pt instructed to call for assistance, verbalizes understanding. Will continue to monitor.

## 2021-06-10 NOTE — PROGRESS NOTES
Spoke with Dr. Stephanie Batista informed of patients status, per Dr. Stephanie Batista ok to pull [place] in am but to leave A-line in until nitro is off.  Also to not transfuse unless hematocrit drops below 21

## 2021-06-10 NOTE — PROGRESS NOTES
Hospitalist Progress Note      PCP: Janace Epley, MD    Date of Admission: 5/31/2021    Chief Complaint: [facility] Course: 48-year-old female was past medical history of ESRD on peritoneal dialysis, hypertension, hyperlipidemia, diabetes, peripheral vascular disease status post left toe amputation last week presents with presyncopal episode. She also describes a syncopal episode couple of weeks ago. Patient has recently been undergoing evaluation for possible renal transplant has had stress test performed at [facility] which was abnormal.  Patient is scheduled for outpatient left heart cath on 6/3/2021 . Patient is labile blood pressures, but negative orthostatics. Nephrology is adjusting blood pressure medications. She had left heart cath performed 6/3/2021 which showed multivessel disease. CTS consulted the plan for possible CABG. s/p CABG 6/09    Subjective: Patient sitting in chair chest pain under control no nausea vomiting    Medications:  Reviewed    Infusion Medications    sodium chloride      sodium chloride      propofol      DOPamine      DOBUTamine      phenylephrine (SAUD-SYNEPHRINE) 50mg/250mL infusion      milrinone      norepinephrine Stopped (06/09/21 1911)    nitroGLYCERIN 20 mcg/min (06/10/21 0908)    clevidipine      insulin 1.9 Units/hr (06/10/21 0759)    dextrose      sodium chloride 50 mL/hr at 06/10/21 0613    sodium chloride       Scheduled Medications    sodium chloride flush  10 mL Intravenous 2 times per day    aspirin  325 mg Oral Daily    aspirin  300 mg Rectal Once    acetaminophen  1,000 mg Oral Q6H    mupirocin   Nasal BID    nitroGLYCERIN  1 patch Transdermal Daily    sennosides-docusate sodium  1 tablet Oral BID    metoprolol tartrate  12.5 mg Oral BID    pantoprazole  40 mg Oral Daily    pantoprazole  40 mg Intravenous Daily    And    sodium chloride (PF)  10 mL Intravenous Daily    ceFAZolin (ANCEF) IVPB  1,000 mg Intravenous Q8H  insulin detemir  20 Units Subcutaneous Nightly    [START ON 6/11/2021] insulin aspart  10 Units Subcutaneous TID AC    gentamicin   Topical Daily    sevelamer  800 mg Oral TID WC    atorvastatin  20 mg Oral Nightly    torsemide  200 mg Oral Daily     PRN Meds: sodium chloride flush, sodium chloride, ondansetron, metoclopramide, traMADol **OR** traMADol, morphine, diphenhydrAMINE, zolpidem, magnesium hydroxide, polyethylene glycol, potassium chloride, magnesium sulfate, calcium chloride IVPB, calcium chloride IVPB, albuterol sulfate HFA, albumin human, DOPamine, DOBUTamine, phenylephrine (SAUD-SYNEPHRINE) 50mg/250mL infusion, furosemide, milrinone, norepinephrine, nitroGLYCERIN, clevidipine, glucose, dextrose, glucagon (rDNA), dextrose, sodium chloride      Intake/Output Summary (Last 24 hours) at 6/10/2021 0909  Last data filed at 6/10/2021 0759  Gross per 24 hour   Intake 4474.7 ml   Output 1090 ml   Net 3384.7 ml       Physical Exam Performed:    BP (!) 150/62   Pulse 76   Temp 96.5 °F (35.8 °C)   Resp 16   Ht 5' 9\" (1.753 m)   Wt 231 lb 0.7 oz (104.8 kg)   LMP 05/12/2021 (Approximate)   SpO2 100%   Breastfeeding No   BMI 34.12 kg/m²     General appearance: aaox3  HEENT: Pupils equal, round, and reactive to light. Conjunctivae/corneas clear. Neck: Supple, with full range of motion. No jugular venous distention. Respiratory:  ctab no wheezing  Cardiovascular: Regular rate and rhythm with normal S1/S2 ., chest dressing clean and dry  Abdomen: Soft, non-tender, non-distended with normal bowel sounds. Skin: Skin color, texture, turgor normal.  No rashes or lesions.   Neurologic: no gross deficits  Psychiatric: Alert and oriented, thought content appropriate, normal insight        Labs:   Recent Labs     06/08/21  0445 06/09/21  1630 06/09/21  2002 06/10/21  0013 06/10/21  0437   WBC 10.9 17.4*  --   --  17.3*   HGB 9.6* 7.3* 9.1* 7.6* 8.1*   HCT 28.5* 22.9*  --   --  24.6*    161  --   -- 141     Recent Labs     06/08/21  0445 06/09/21  1630 06/10/21  0437   * 139 143   K 4.0 4.8 3.7   CL 92* 105 105   CO2 22 19* 24   BUN 37* 25* 25*   CREATININE 9.8* 6.8* 7.0*   CALCIUM 9.1 8.7 9.1     No results for input(s): AST, ALT, BILIDIR, BILITOT, ALKPHOS in the last 72 hours. No results for input(s): INR in the last 72 hours. No results for input(s): Fidencio Ang in the last 72 hours. Urinalysis:      Lab Results   Component Value Date    NITRU Negative 06/06/2021    WBCUA 36 06/06/2021    BACTERIA RARE 06/06/2021    RBCUA 587 06/06/2021    BLOODU LARGE 06/06/2021    SPECGRAV 1.019 06/06/2021    GLUCOSEU 100 06/06/2021       Radiology:  XR CHEST PORTABLE   Final Result   Status post open heart surgery. Support tubes and [place]-Ebonie catheter, as   above. Right basilar atelectasis and small effusion. IR FLUORO GUIDED CVA DEVICE PLMT/REPLACE/REMOVAL   Final Result   Technically successful right-sided HD catheter placement. Catheter is okay   for use. VL DUP CAROTID BILATERAL   Final Result      VL PRE OP VEIN MAPPING   Final Result      CT CHEST WO CONTRAST   Final Result   No acute intrathoracic abnormality         MRI BRAIN WO CONTRAST   Final Result   1. No acute intracranial abnormality. 2. Mild chronic white matter microvascular ischemic changes. 3. Right maxillary sinusitis.          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 of foot ([facility] [I70.245]     Diabetic polyneuropathy associated with type 2 diabetes mellitus ([facility] [E11.42]     ESRD (end stage renal disease) [facility] [N18.6]     Diabetes mellitus ([facility] [E11.9]        Multivessel CAD  Status post left heart cath 6/3/2021  S/p CABG 6/09  - per ctvs    UTI  As per urinalysis  Asymptomatic  -s/p rocpehin      ESRD on PD  Nephrology consulted    Hypertension  With labile BP  BP meds adjusting per nephrology    DM: insulin gtt for now    Gangrene of left foot s/p amputation of 1 and 2 toes 5/26  - podiatry on board    PAD ss/p left popliteal, anterior tibial, and dorsalis pedis angioplasty by Dr. Paris Blackman on 5/27/21  DVT Prophylaxis: Heparin  Diet: ADULT DIET; Regular; 5 carb choices (75 gm/meal);  Low Fat/Low Chol/High Fiber/2 gm Na; 1800 ml  Code Status: Full Code      Electronically signed by Sarah Stevenson MD on 6/10/2021 at 9:09 AM

## 2021-06-10 NOTE — PROGRESS NOTES
Cardiothoracic Surgery Progress Note    CC: s/p CABG X 3, NYA clip  POD# 1    Subjective:  Hemodynamically stable, 2 liters NC, afebrile. Alert and oriented X 3. Patient sitting up in chair eating breakfast- no complaints of pain. Sons at bedside. WT:104.8 kg   pre-op 99.5 kg    Vital Signs:   BP (!) 112/50   Pulse 73   Temp 96.5 °F (35.8 °C)   Resp 16    SpO2 100%      Gtts: Insulin     Physical Exam:   Cardiac:  NSR  Lungs: clear to auscultation, decreased bases bilaterally  Abdomen:  No BM  Vascular:  pulses all palpable   Extremities: generalized, non-pitting edema 1+  :  HD patient  Chest Tube(s) & Incision(s):    Chest Tubes: 500/550   No airleak No crepitus  -20cm sx  Sternum: stable, Edges approximated, no drainage  Chest tube site: C/D/I    Right leg incision:  Ace wrap C/D/I  No drainage    Labs:   CBC:   Recent Labs     06/09/21  1630 06/10/21  0437   WBC 17.4* 17.3*   HGB 7.3* 8.1*   HCT 22.9* 24.6*    141     BMP:   Recent Labs     06/09/21  1630 06/10/21  0437   K 4.8 3.7   CREATININE 6.8* 7.0*   CALCIUM 8.7 9.1   MG 3.40* 3.00*     Assessment/Plan:  As per CC:     Plan:   D/C pacing wires  D/C arterial line  Follow CVTS CT removal protocol  D/C bridgette   ESRD- nephrology on board, Possible HD today - will discuss with CVTS surgeon  Aggressive IS  Wean oxygen   Ambulate      Disp:  Plan for home with home care       Electronically signed by RICHARD Morocho CNP on 6/10/2021 at 10:40 AM     Attending Supervising Corby Moss  The patient met the criteria for indirect supervision. I discussed the findings and plans with the nurse practitioner and agree as documented in her note .     Electronically signed by Breanna Bejarano MD on 6/10/21 at 11:37 AM EDT

## 2021-06-10 NOTE — PROGRESS NOTES
Order received for arterial line removal. Left brachial arterial line discontinued per student nurse and instructor. Manual pressure held for 5 minutes and tegaderm on site. No hematoma, no oozing noted. Patient tolerated well.  Zoraida Gasca RN

## 2021-06-11 LAB
ANION GAP SERPL CALCULATED.3IONS-SCNC: 10 MMOL/L (ref 3–16)
BUN BLDV-MCNC: 20 MG/DL (ref 7–20)
CALCIUM SERPL-MCNC: 8.1 MG/DL (ref 8.3–10.6)
CHLORIDE BLD-SCNC: 97 MMOL/L (ref 99–110)
CO2: 28 MMOL/L (ref 21–32)
CREAT SERPL-MCNC: 5.3 MG/DL (ref 0.6–1.1)
GFR AFRICAN AMERICAN: 10
GFR NON-AFRICAN AMERICAN: 9
GLUCOSE BLD-MCNC: 100 MG/DL (ref 70–99)
GLUCOSE BLD-MCNC: 108 MG/DL (ref 70–99)
GLUCOSE BLD-MCNC: 124 MG/DL (ref 70–99)
GLUCOSE BLD-MCNC: 139 MG/DL (ref 70–99)
GLUCOSE BLD-MCNC: 155 MG/DL (ref 70–99)
GLUCOSE BLD-MCNC: 158 MG/DL (ref 70–99)
GLUCOSE BLD-MCNC: 168 MG/DL (ref 70–99)
GLUCOSE BLD-MCNC: 212 MG/DL (ref 70–99)
GLUCOSE BLD-MCNC: 90 MG/DL (ref 70–99)
GLUCOSE BLD-MCNC: 94 MG/DL (ref 70–99)
GLUCOSE BLD-MCNC: 95 MG/DL (ref 70–99)
GLUCOSE BLD-MCNC: 96 MG/DL (ref 70–99)
GLUCOSE BLD-MCNC: 97 MG/DL (ref 70–99)
HCT VFR BLD CALC: 26.2 % (ref 36–48)
HEMOGLOBIN: 8.4 G/DL (ref 12–16)
MAGNESIUM: 2.2 MG/DL (ref 1.8–2.4)
MCH RBC QN AUTO: 28.2 PG (ref 26–34)
MCHC RBC AUTO-ENTMCNC: 31.9 G/DL (ref 31–36)
MCV RBC AUTO: 88.2 FL (ref 80–100)
PDW BLD-RTO: 18.3 % (ref 12.4–15.4)
PERFORMED ON: ABNORMAL
PERFORMED ON: NORMAL
PLATELET # BLD: 157 K/UL (ref 135–450)
PMV BLD AUTO: 9.1 FL (ref 5–10.5)
POTASSIUM SERPL-SCNC: 4.5 MMOL/L (ref 3.5–5.1)
RBC # BLD: 2.97 M/UL (ref 4–5.2)
SODIUM BLD-SCNC: 135 MMOL/L (ref 136–145)
WBC # BLD: 25.1 K/UL (ref 4–11)

## 2021-06-11 PROCEDURE — 80048 BASIC METABOLIC PNL TOTAL CA: CPT

## 2021-06-11 PROCEDURE — 99233 SBSQ HOSP IP/OBS HIGH 50: CPT | Performed by: INTERNAL MEDICINE

## 2021-06-11 PROCEDURE — 6370000000 HC RX 637 (ALT 250 FOR IP): Performed by: THORACIC SURGERY (CARDIOTHORACIC VASCULAR SURGERY)

## 2021-06-11 PROCEDURE — APPNB30 APP NON BILLABLE TIME 0-30 MINS: Performed by: NURSE PRACTITIONER

## 2021-06-11 PROCEDURE — 6370000000 HC RX 637 (ALT 250 FOR IP): Performed by: NURSE PRACTITIONER

## 2021-06-11 PROCEDURE — 36592 COLLECT BLOOD FROM PICC: CPT

## 2021-06-11 PROCEDURE — 2100000000 HC CCU R&B

## 2021-06-11 PROCEDURE — 6360000002 HC RX W HCPCS: Performed by: THORACIC SURGERY (CARDIOTHORACIC VASCULAR SURGERY)

## 2021-06-11 PROCEDURE — 94761 N-INVAS EAR/PLS OXIMETRY MLT: CPT

## 2021-06-11 PROCEDURE — 2580000003 HC RX 258: Performed by: THORACIC SURGERY (CARDIOTHORACIC VASCULAR SURGERY)

## 2021-06-11 PROCEDURE — 85027 COMPLETE CBC AUTOMATED: CPT

## 2021-06-11 PROCEDURE — APPSS15 APP SPLIT SHARED TIME 0-15 MINUTES: Performed by: NURSE PRACTITIONER

## 2021-06-11 PROCEDURE — 83735 ASSAY OF MAGNESIUM: CPT

## 2021-06-11 PROCEDURE — 2700000000 HC OXYGEN THERAPY PER DAY

## 2021-06-11 RX ORDER — NIFEDIPINE 30 MG/1
30 TABLET, EXTENDED RELEASE ORAL DAILY
Status: DISCONTINUED | OUTPATIENT
Start: 2021-06-11 | End: 2021-06-13

## 2021-06-11 RX ADMIN — MUPIROCIN: 20 OINTMENT TOPICAL at 20:14

## 2021-06-11 RX ADMIN — ASPIRIN 325 MG: 325 TABLET, COATED ORAL at 08:23

## 2021-06-11 RX ADMIN — METOPROLOL TARTRATE 12.5 MG: 25 TABLET, FILM COATED ORAL at 05:17

## 2021-06-11 RX ADMIN — Medication 10 ML: at 08:27

## 2021-06-11 RX ADMIN — NIFEDIPINE 30 MG: 30 TABLET, FILM COATED, EXTENDED RELEASE ORAL at 18:08

## 2021-06-11 RX ADMIN — STANDARDIZED SENNA CONCENTRATE AND DOCUSATE SODIUM 1 TABLET: 8.6; 5 TABLET ORAL at 08:23

## 2021-06-11 RX ADMIN — SEVELAMER CARBONATE 800 MG: 800 TABLET, FILM COATED ORAL at 16:35

## 2021-06-11 RX ADMIN — STANDARDIZED SENNA CONCENTRATE AND DOCUSATE SODIUM 1 TABLET: 8.6; 5 TABLET ORAL at 20:20

## 2021-06-11 RX ADMIN — ATORVASTATIN CALCIUM 20 MG: 20 TABLET, FILM COATED ORAL at 20:21

## 2021-06-11 RX ADMIN — METOPROLOL TARTRATE 25 MG: 25 TABLET, FILM COATED ORAL at 20:20

## 2021-06-11 RX ADMIN — Medication 10 ML: at 20:20

## 2021-06-11 RX ADMIN — SEVELAMER CARBONATE 800 MG: 800 TABLET, FILM COATED ORAL at 08:23

## 2021-06-11 RX ADMIN — GENTAMICIN SULFATE: 1 CREAM TOPICAL at 08:28

## 2021-06-11 RX ADMIN — SEVELAMER CARBONATE 800 MG: 800 TABLET, FILM COATED ORAL at 12:49

## 2021-06-11 RX ADMIN — ACETAMINOPHEN 1000 MG: 500 TABLET ORAL at 05:18

## 2021-06-11 RX ADMIN — MUPIROCIN: 20 OINTMENT TOPICAL at 08:23

## 2021-06-11 RX ADMIN — ACETAMINOPHEN 1000 MG: 500 TABLET ORAL at 16:33

## 2021-06-11 RX ADMIN — TORSEMIDE 200 MG: 100 TABLET ORAL at 12:31

## 2021-06-11 RX ADMIN — ACETAMINOPHEN 1000 MG: 500 TABLET ORAL at 14:21

## 2021-06-11 RX ADMIN — PANTOPRAZOLE SODIUM 40 MG: 40 TABLET, DELAYED RELEASE ORAL at 06:05

## 2021-06-11 RX ADMIN — CEFAZOLIN 1000 MG: 1 INJECTION, POWDER, FOR SOLUTION INTRAVENOUS at 06:05

## 2021-06-11 RX ADMIN — INSULIN DETEMIR 20 UNITS: 100 INJECTION, SOLUTION SUBCUTANEOUS at 20:27

## 2021-06-11 RX ADMIN — ACETAMINOPHEN 1000 MG: 500 TABLET ORAL at 23:22

## 2021-06-11 ASSESSMENT — PAIN SCALES - GENERAL
PAINLEVEL_OUTOF10: 0
PAINLEVEL_OUTOF10: 6
PAINLEVEL_OUTOF10: 0
PAINLEVEL_OUTOF10: 5
PAINLEVEL_OUTOF10: 3
PAINLEVEL_OUTOF10: 0
PAINLEVEL_OUTOF10: 7
PAINLEVEL_OUTOF10: 0

## 2021-06-11 ASSESSMENT — PAIN DESCRIPTION - PAIN TYPE
TYPE: SURGICAL PAIN
TYPE: SURGICAL PAIN

## 2021-06-11 ASSESSMENT — PAIN DESCRIPTION - LOCATION
LOCATION: CHEST
LOCATION: CHEST

## 2021-06-11 NOTE — PROGRESS NOTES
Cardiothoracic Surgery Progress Note    CC: s/p CABG X 3, NYA clip  POD# 2     Subjective:  Hemodynamically stable, 1 liters NC, afebrile. Alert and oriented X 3. No complaints of pain. Sons and daughter in law at bedside. Weight down from yesterday. WT:107.1 kg/108.2   pre-op 99.5 kg    Vital Signs:   BP (!) 140/66   Pulse 72   Temp 97 °F (36.1 °C)   Resp 20   SpO2 (!) 88%      Physical Exam:   Cardiac:  NSR  Lungs: clear to auscultation, decreased bases bilaterally  Abdomen:  No BM  Vascular:  pulses all palpable   Extremities: generalized, non-pitting edema 1+  :  HD patient  Chest Tube(s) & Incision(s):    Chest Tubes: 100/910/340   No airleak No crepitus  -20cm sx  Sternum: stable, Edges approximated, no drainage  Chest tube site: purse string suture  Right leg incision: edges well approximated       Labs:   CBC:   Recent Labs     06/10/21  0437 06/11/21  0430   WBC 17.3* 25.1*   HGB 8.1* 8.4*   HCT 24.6* 26.2*    157     BMP:   Recent Labs     06/10/21  0437 06/11/21  0430   K 3.7 4.5   CREATININE 7.0* 5.3*   CALCIUM 9.1 8.1*   MG 3.00* 2.20     PT/INR: No results for input(s): PROTIME, INR in the last 72 hours. Assessment/Plan:  As per CC:     Plan:   -Follow CVTS CT removal protocol   -Hypertension- Increase lopressor to 25 mg BID   -Uncontrolled DM2- AIC 8.8. Resumed home insulin dosages.    -ESRD- nephrology on board, HD yesterday  -Aggressive IS  -Wean oxygen  -Ambulate    Disp:  Plan for home with home care       Electronically signed by RICHARD Santo - CNP on 6/11/2021 at 12:08 PM

## 2021-06-11 NOTE — PROGRESS NOTES
BP a tad high this am on both left and right arm. Will give am dose of Lopressor early and consult with MD in am   about resuming patients home BP meds. Room air trial started with 02 sat at 85/88%, will place patient on 02 at 2L again  and [facility] [place].

## 2021-06-11 NOTE — PROGRESS NOTES
Hospitalist Progress Note      PCP: Rodolfo Cavazos MD    Date of Admission: 5/31/2021    Chief Complaint: [facility] Course: 30-year-old female was past medical history of ESRD on peritoneal dialysis, hypertension, hyperlipidemia, diabetes, peripheral vascular disease status post left toe amputation last week presents with presyncopal episode. She also describes a syncopal episode couple of weeks ago. Patient has recently been undergoing evaluation for possible renal transplant has had stress test performed at [facility] which was abnormal.  Patient is scheduled for outpatient left heart cath on 6/3/2021 . Patient is labile blood pressures, but negative orthostatics. Nephrology is adjusting blood pressure medications. She had left heart cath performed 6/3/2021 which showed multivessel disease. CTS consulted the plan for possible CABG. s/p CABG 6/09    Subjective: Sitting in chair eating breakfast chest pain under control nausea vomiting fevers    Medications:  Reviewed    Infusion Medications    sodium chloride      insulin Stopped (06/11/21 0600)    dextrose       Scheduled Medications    sodium chloride flush  10 mL Intravenous 2 times per day    aspirin  325 mg Oral Daily    acetaminophen  1,000 mg Oral Q6H    mupirocin   Nasal BID    nitroGLYCERIN  1 patch Transdermal Daily    sennosides-docusate sodium  1 tablet Oral BID    metoprolol tartrate  12.5 mg Oral BID    pantoprazole  40 mg Oral Daily    insulin detemir  20 Units Subcutaneous Nightly    insulin aspart  10 Units Subcutaneous TID AC    gentamicin   Topical Daily    sevelamer  800 mg Oral TID WC    atorvastatin  20 mg Oral Nightly    torsemide  200 mg Oral Daily     PRN Meds: heparin (porcine), sodium chloride flush, sodium chloride, ondansetron, metoclopramide, traMADol **OR** traMADol, morphine, diphenhydrAMINE, zolpidem, magnesium hydroxide, polyethylene glycol, potassium chloride, magnesium sulfate, calcium chloride IVPB, calcium chloride IVPB, albuterol sulfate HFA, furosemide, glucose, dextrose, glucagon (rDNA), dextrose      Intake/Output Summary (Last 24 hours) at 6/11/2021 0938  Last data filed at 6/11/2021 0600  Gross per 24 hour   Intake 2780.54 ml   Output 2665 ml   Net 115.54 ml       Physical Exam Performed:    BP (!) 140/66   Pulse 72   Temp 97 °F (36.1 °C)   Resp 20   Ht 5' 9\" (1.753 m)   Wt 236 lb 1.8 oz (107.1 kg)   LMP 05/12/2021 (Approximate)   SpO2 (!) 88%   Breastfeeding No   BMI 34.87 kg/m²     General appearance: aaox3  HEENT: Pupils equal, round, and reactive to light. Conjunctivae/corneas clear. Neck: Supple, with full range of motion. No jugular venous distention. Respiratory:  ctab no wheezing  Cardiovascular: Regular rate and rhythm with normal S1/S2 ., chest dressing clean and dry  Abdomen: Soft, non-tender, non-distended with normal bowel sounds. Skin: Skin color, texture, turgor normal.  No rashes or lesions. Neurologic: no gross deficits  Psychiatric: Alert and oriented, thought content appropriate, normal insight        Labs:   Recent Labs     06/09/21  1630 06/10/21  0013 06/10/21  0437 06/11/21  0430   WBC 17.4*  --  17.3* 25.1*   HGB 7.3* 7.6* 8.1* 8.4*   HCT 22.9*  --  24.6* 26.2*     --  141 157     Recent Labs     06/09/21  1630 06/10/21  0437 06/11/21  0430    143 135*   K 4.8 3.7 4.5    105 97*   CO2 19* 24 28   BUN 25* 25* 20   CREATININE 6.8* 7.0* 5.3*   CALCIUM 8.7 9.1 8.1*     No results for input(s): AST, ALT, BILIDIR, BILITOT, ALKPHOS in the last 72 hours. No results for input(s): INR in the last 72 hours. No results for input(s): [place] in the last 72 hours.     Urinalysis:      Lab Results   Component Value Date    NITRU Negative 06/06/2021    WBCUA 36 06/06/2021    BACTERIA RARE 06/06/2021    RBCUA 587 06/06/2021    BLOODU LARGE 06/06/2021    SPECGRAV 1.019 06/06/2021    GLUCOSEU 100 06/06/2021       Radiology:  XR CHEST PORTABLE   Final Result   Status post open heart surgery. Support tubes and [place]bonie catheter, as   above. Right basilar atelectasis and small effusion. IR FLUORO GUIDED CVA DEVICE PLMT/REPLACE/REMOVAL   Final Result   Technically successful right-sided HD catheter placement. Catheter is okay   for use. VL DUP CAROTID BILATERAL   Final Result      VL PRE OP VEIN MAPPING   Final Result      CT CHEST WO CONTRAST   Final Result   No acute intrathoracic abnormality         MRI BRAIN WO CONTRAST   Final Result   1. No acute intracranial abnormality. 2. Mild chronic white matter microvascular ischemic changes. 3. Right maxillary sinusitis. XR CHEST PORTABLE   Final Result   Cardiomegaly with stable right pleural effusion                 Assessment/Plan:    Active Hospital Problems    Diagnosis     S/P coronary artery bypass graft x 3 [Z95.1]     S/P left atrial appendage ligation [Z98.890]     Mild malnutrition (HCC) [E44.1]     Coronary artery disease of native artery of native heart with stable angina pectoris ([address] [I25.118]     Near syncope [R55]     Atherosclerosis of native arteries of left leg with ulceration of other part of foot ([address] [I70.245]     Diabetic polyneuropathy associated with type 2 diabetes mellitus ([address] [E11.42]     ESRD (end stage renal disease) ([address] [N18.6]     Diabetes mellitus ([address] [E11.9]        Multivessel CAD  Status post left heart cath 6/3/2021  S/p CABG 6/09  - per ctvs    UTI  As per urinalysis  Asymptomatic  -s/p rocpehin      ESRD on PD  Nephrology consulted    Hypertension  With labile BP  BP meds adjusting per nephrology    DM: levemir and lispro    Gangrene of left foot s/p amputation of 1 and 2 toes 5/26  - podiatry on board    PAD ss/p left popliteal, anterior tibial, and dorsalis pedis angioplasty by Dr. Marily Monsivais on 5/27/21  DVT Prophylaxis: Heparin  Diet: ADULT DIET; Regular; 5 carb choices (75 gm/meal);  Low Fat/Low Chol/High Fiber/2 gm Na; 1800 ml  Code Status: Full Code      Electronically signed by Stefani Patel MD on 6/11/2021 at 9:38 AM

## 2021-06-11 NOTE — PROGRESS NOTES
Comprehensive Nutrition Assessment    Type and Reason for Visit:  Reassess    Nutrition Recommendations/Plan:   Ensure High Protein BID    Nutrition Assessment:  Pt with compromised nutrition status r/t inadequate oral intake s/p cabg on 6/9. Pt reports poor appetite and states she has been getting oral nutrition supplment and not drining it. Supplement appears to have been d/c'd on 6/9 when pt went NPO. Pt agreed to receive and try Ensure High Protein. Pt's family attended discharge class today. Malnutrition Assessment:  Malnutrition Status:  Mild malnutrition    Context:  Chronic Illness     Findings of the 6 clinical characteristics of malnutrition:  Energy Intake:  7 - 75% or less estimated energy requirements for 1 month or longer  Weight Loss:   (21lb/8.5% over 4 months)     Body Fat Loss:  No significant body fat loss     Muscle Mass Loss:  No significant muscle mass loss    Fluid Accumulation:  No significant fluid accumulation     Strength:  Not Performed    Estimated Daily Nutrient Needs:  Energy (kcal):  1177 - 1498; Weight Used for Energy Requirements:  Current (107 kg)     Protein (g):  132; Weight Used for Protein Requirements:  Ideal (2g/66kg)        Fluid (ml/day):  1500; minimal; Method Used for Fluid Requirements:  1 ml/kcal      Nutrition Related Findings:  Creat 5.3; Na+ 135; active BS      Wounds:  Surgical Incision       Current Nutrition Therapies:    ADULT DIET; Regular; 5 carb choices (75 gm/meal); Low Fat/Low Chol/High Fiber/2 gm Na; 1800 ml    Anthropometric Measures:  · Height: 5' 9\" (175.3 cm)  · Current Body Weight: 236 lb (107 kg)   · Admission Body Weight: 225 lb (102.1 kg)    · Usual Body Weight: 235 lb (106.6 kg)     · Ideal Body Weight: 145 lbs; % Ideal Body Weight 162.8 %   · BMI: 34.8  · BMI Categories: Obese Class 1 (BMI 30.0-34. 9)       Nutrition Diagnosis:   · Inadequate oral intake related to inadequate protein-energy intake as evidenced by intake 0-25%    · Increased nutrient needs related to increase demand for energy/nutrients as evidenced by wounds      Nutrition Interventions:   Food and/or Nutrient Delivery:  Continue Current Diet, Start Oral Nutrition Supplement  Nutrition Education/Counseling:  Education completed (6/11 CVU class with family)   Coordination of Nutrition Care:  Continue to monitor while inpatient    Goals:  PO 50% at meals and supp       Nutrition Monitoring and Evaluation:   Behavioral-Environmental Outcomes:  None Identified   Food/Nutrient Intake Outcomes:  Food and Nutrient Intake, Supplement Intake  Physical Signs/Symptoms Outcomes:  Weight     Discharge Planning:    No discharge needs at this time     Electronically signed by Vimal Moe RD, VERNA on 6/11/21 at 12:18 PM EDT  Contact: [phone]

## 2021-06-11 NOTE — CARE COORDINATION
Patient does not offer any preferences in home health agency. Referral to [facility]) , liaison notified. Patient states she will either go to her brother's home in [place] or her son's in [place] at discharge, CM requested that patient firm up this decision and provide the address/ phone number for home care follow up. Patient states she will make decision later.     Alexis President, BSN, CCM, RN  [facility]  [phone]

## 2021-06-11 NOTE — PROGRESS NOTES
Office : [phone]     Fax :[phone]       Nephrology progress  Note      Patient's Name: Shanta Mireles  11:50 AM  2021    Reason for Consult:  ESRD    History of Present Ilness:    Shanta Mireles is a 51 y.o. female with severe end-stage renal disease secondary to diabetic nephropathy on peritoneal dialysis, hypertension, peripheral vascular disease, diabetic neuropathy who was admitted after she had a syncopal episode. She had similar episode 2 weeks ago. Recently had angiogram left lower extremity for gangrene left foot second toe. Denies any shortness of breath. Denies any abdominal pain. Denies any nausea vomiting. Interval HX :      Hemodynamically stable. S/p CABG on 2010  Doing well         I/O last 3 completed shifts: In: 2780.5 [P.O.:1200; I.V.:32.5; IV Piggyback:48]  Out: 80 [Urine:50; Chest Tube:1350]    Past Medical History:   Diagnosis Date    Diabetes mellitus ([address]     End stage kidney disease ([address]     peritoneal dialysis every night at home x 2 years    Hypertension     Neuropathy     Peripheral vascular disease ([address]        Past Surgical History:   Procedure Laterality Date     SECTION      CORONARY ARTERY BYPASS GRAFT N/A 2021    URGENT CABG CORONARY ARTERY BYPASS GRAFTS X3. VEIN TO PDA X1, VEIN TO OM X1, LEFT INTERNAL MAMMARY ARTERY TO LAD X1. ENDOSCOPIC HARVEST RIGHT LEG SAPHENOUS VEIN. LIGATION NYA USING 35MM ATRICLIP. EPI AORTIC ULTRASOUND. TOTAL CARDIOPULMONARY BYPASS. DOPPLER GRAFT FLOW VERIFICATION. BILATERAL INITERCOSTAL NERVE BLOCK USING 1.3% EXPAREL.  performed by Eric Tejeda MD at [place] [address]  2018     lap PD cath placement lt side 62 cm    TOE AMPUTATION Left 2021    AMPUTATION OF THE LEFT THIRD TOE performed by Kelsie Brewer DPM at [address] [facility] [address] TOE AMPUTATION Left 5/26/2021    AMPUTATION OF HALLUX AND SECOND TOE, LEFT FOOT performed by Kelsie Brewer DPM at [place] [address]         Family History   Problem Relation Age of Onset    Coronary Art Dis Mother     High Blood Pressure Mother     Heart Disease Mother     Diabetes Mother     Cancer Father     Coronary Art Dis Father     High Blood Pressure Father     Heart Disease Father     Diabetes Father      Current Medications:    heparin (porcine) injection 3,400 Units, PRN  sodium chloride flush 0.9 % injection 10 mL, 2 times per day  sodium chloride flush 0.9 % injection 10 mL, PRN  0.9 % sodium chloride infusion, PRN  ondansetron (ZOFRAN) injection 4 mg, Q6H PRN  metoclopramide (REGLAN) injection 10 mg, Q6H PRN  aspirin EC tablet 325 mg, Daily  acetaminophen (TYLENOL) tablet 1,000 mg, Q6H  traMADol (ULTRAM) tablet 50 mg, Q6H PRN   Or  traMADol (ULTRAM) tablet 100 mg, Q6H PRN  morphine (PF) injection 2 mg, Q2H PRN  mupirocin (BACTROBAN) 2 % ointment, BID  nitroGLYCERIN (NITRODUR) 0.2 MG/HR 1 patch, Daily  diphenhydrAMINE (BENADRYL) tablet 25 mg, Nightly PRN  zolpidem (AMBIEN) tablet 5 mg, Nightly PRN  sennosides-docusate sodium (SENOKOT-S) 8.6-50 MG tablet 1 tablet, BID  magnesium hydroxide (MILK OF MAGNESIA) 400 MG/5ML suspension 30 mL, Daily PRN  polyethylene glycol (GLYCOLAX) packet 17 g, Daily PRN  metoprolol tartrate (LOPRESSOR) tablet 12.5 mg, BID  pantoprazole (PROTONIX) tablet 40 mg, Daily  potassium chloride 20 mEq/50 mL IVPB (Central Line), PRN  magnesium sulfate 2000 mg in 50 mL IVPB premix, PRN  calcium chloride 1,000 mg in sodium chloride 0.9 % 100 mL IVPB, PRN  calcium chloride 2,000 mg in sodium chloride 0.9 % 100 mL IVPB, PRN  albuterol sulfate  (90 Base) MCG/ACT inhaler 2 puff, Q6H PRN  furosemide (LASIX) injection 20 mg, PRN  insulin regular (HUMULIN R;NOVOLIN R) 100 Units in sodium chloride 0.9 % 100 mL infusion, Continuous  glucose (GLUTOSE) 40 % oral gel 15 g, PRN  dextrose 50 % IV solution, PRN  glucagon (rDNA) injection 1 mg, PRN  dextrose 5 % solution, PRN  insulin detemir (LEVEMIR) injection pen 20 Units, Nightly  insulin aspart (NOVOLOG) injection pen 10 Units PATIENT SUPPLIED, TID AC  gentamicin (GARAMYCIN) 0.1 % cream, Daily  sevelamer (RENVELA) tablet 800 mg, TID WC  atorvastatin (LIPITOR) tablet 20 mg, Nightly  torsemide (DEMADEX) tablet 200 mg, Daily          Physical exam:     Vitals:  BP (!) 140/66   Pulse 72   Temp 97 °F (36.1 °C)   Resp 20   Ht 5' 9\" (1.753 m)   Wt 236 lb 1.8 oz (107.1 kg)   LMP 05/12/2021 (Approximate)   SpO2 (!) 88%   Breastfeeding No   BMI 34.87 kg/m²   Constitutional:  OAA X3 NAD  Skin: no rash, turgor wnl  Heent:  eomi, mmm  Neck: no bruits or jvd noted  Cardiovascular:  S1, S2 without m/r/g  Respiratory: CTA B without w/r/r  Abdomen:  +bs, soft, nt, nd  Ext: no  lower extremity edema      Labs:  CBC:   Recent Labs     06/09/21  1630 06/10/21  0013 06/10/21  0437 06/11/21  0430   WBC 17.4*  --  17.3* 25.1*   HGB 7.3* 7.6* 8.1* 8.4*     --  141 157     BMP:    Recent Labs     06/09/21  1630 06/10/21  0437 06/11/21  0430    143 135*   K 4.8 3.7 4.5    105 97*   CO2 19* 24 28   BUN 25* 25* 20   CREATININE 6.8* 7.0* 5.3*   GLUCOSE 128* 111* 97     Ca/Mg/Phos:   Recent Labs     06/09/21  1630 06/10/21  0437 06/11/21  0430   CALCIUM 8.7 9.1 8.1*   MG 3.40* 3.00* 2.20     UA:  No results for input(s): Duane Boys, GLUCOSEU, BILIRUBINUR, KETUA, SPECGRAV, BLOODU, PHUR, PROTEINU, UROBILINOGEN, NITRU, LEUKOCYTESUR, Sharolyn Rasher in the last 72 hours. Urine Microscopic:   No results for input(s): LABCAST, BACTERIA, COMU, HYALCAST, WBCUA, RBCUA, EPIU in the last 72 hours. Urine Culture:   No results for input(s): LABURIN in the last 72 hours.   Urine Chemistry: No results for input(s): Remona Marchi in the last 72 hours. IMAGING:  XR CHEST PORTABLE   Final Result   Status post open heart surgery. Support tubes and [place]bonie catheter, as   above. Right basilar atelectasis and small effusion. IR FLUORO GUIDED CVA DEVICE PLMT/REPLACE/REMOVAL   Final Result   Technically successful right-sided HD catheter placement. Catheter is okay   for use. VL DUP CAROTID BILATERAL   Final Result      VL PRE OP VEIN MAPPING   Final Result      CT CHEST WO CONTRAST   Final Result   No acute intrathoracic abnormality         MRI BRAIN WO CONTRAST   Final Result   1. No acute intracranial abnormality. 2. Mild chronic white matter microvascular ischemic changes. 3. Right maxillary sinusitis. XR CHEST PORTABLE   Final Result   Cardiomegaly with stable right pleural effusion                 Assessment/Plan :      1. ESRD. Was On peritoneal dialysis. Switched  her temporarily to hemodialysis . Had CABG yesterday   HD today   Will keep on HD post op for few days and will transition to PD after that   based on clinical recovery . Will do HD till chest tube in place       2. HTN. Controlled     3. Anemia of chronic disease. Gets micera at dialysis unit  Will give EPOGEN    4. Acid- base disorder. Monitor    5. Electrolytes monitor and replace as needed    6. CAD/Syncopal episode. S/p [facility]   Has Multivessel CAD   S/p CABG       7. Peripheral vascular disease. S/p amputation of left foot toes. Podiatry following       Will plan HD tomorrow.          Thank you for allowing us to participate in care of Baldev Angelo         Electronically signed by: Brayan Tubbs MD, 6/11/2021, 11:50 AM      [facility] [address]  Office : [phone]  Fax :[phone]

## 2021-06-11 NOTE — PLAN OF CARE
Nutrition Problem #1: Inadequate oral intake  Intervention: Food and/or Nutrient Delivery: Continue Current Diet, Start Oral Nutrition Supplement  Nutritional Goals: PO 50% at meals and supp

## 2021-06-11 NOTE — PROGRESS NOTES
Occupational Therapy/Physical Therapy  Leonidas Wright    Per discussion with RN, will hold OT/PT evaluation as pt requesting increased time to rest secondary to lack of sleep overnight. Will follow up for OT/PT assessment as time/schedule allows.     Thank you,    Micaela Atkins, RASHAD OTR/L [id number]  Jocelyn Mcguire, PT, DPT, [id number]

## 2021-06-11 NOTE — PROGRESS NOTES
POD2 cabg x 3 NYA  Admission wt 103kg  todays wt 107.1  Awake alert  BP 170s-200 syst nite shift  On procardia xl 60mgs, metoprolol 50 mgs  Home med  Pacing wires out  RIJ,CT intact  CT I. 340 ccs drainage past 8hrs  02 1L spo2 95  sats 87 on rm air  Pain controlled  Off ins gtt.   HD 6/10  On home PD

## 2021-06-11 NOTE — PROGRESS NOTES
Physician Progress Note      PATIENTJoy Prudent  CSN #:                  [id number]  :                       1975  ADMIT DATE:       2021 11:53 AM  DISCH DATE:  Danish Boggs  PROVIDER #:        Joyce Thompson MD          QUERY TEXT:    Dear Dr. Artur Brady,    Patient admitted with lightheadedness worsening. Noted to have Multivessel CAD   on LHC. If possible, please document in progress notes and discharge summary   if you are evaluating and/or treating any of the following:    Risk Factors: CAD, ESRD, DM uncontrolled  Clinical Indicators: Patient with complaints of persistent lightheadedness   associated with chest pressure. Cardiology consult of 21 notes syncope   possible vasovagal syncope. Left Heart Catheterization of 21 with severe   MVD with plans for CABG. Treatment: Telemetry, EKG, LHC, Echo, Cardiology consult, EPS consult,   Nephrology consult, Imaging, Event monitor. CTS consult. Thank you. [email or web address]  Options provided:  -- Syncope due to multivessel CAD  -- Syncope due to other hypotension  -- Other - I will add my own diagnosis  -- Disagree - Not applicable / Not valid  -- Disagree - Clinically unable to determine / Unknown  -- Refer to Clinical Documentation Reviewer    PROVIDER RESPONSE TEXT:    The syncope is due to multivessel CAD.     Query created by: Jorge Crowley on 6/10/2021 1:32 PM      Electronically signed by:  Joyce Thompson MD 2021 9:37 AM

## 2021-06-12 LAB
ANION GAP SERPL CALCULATED.3IONS-SCNC: 13 MMOL/L (ref 3–16)
BUN BLDV-MCNC: 43 MG/DL (ref 7–20)
CALCIUM SERPL-MCNC: 7.9 MG/DL (ref 8.3–10.6)
CHLORIDE BLD-SCNC: 94 MMOL/L (ref 99–110)
CO2: 24 MMOL/L (ref 21–32)
CREAT SERPL-MCNC: 6.4 MG/DL (ref 0.6–1.1)
GFR AFRICAN AMERICAN: 8
GFR NON-AFRICAN AMERICAN: 7
GLUCOSE BLD-MCNC: 135 MG/DL (ref 70–99)
GLUCOSE BLD-MCNC: 167 MG/DL (ref 70–99)
GLUCOSE BLD-MCNC: 169 MG/DL (ref 70–99)
GLUCOSE BLD-MCNC: 189 MG/DL (ref 70–99)
HCT VFR BLD CALC: 26.7 % (ref 36–48)
HEMOGLOBIN: 8.5 G/DL (ref 12–16)
MAGNESIUM: 2.4 MG/DL (ref 1.8–2.4)
MCH RBC QN AUTO: 28.4 PG (ref 26–34)
MCHC RBC AUTO-ENTMCNC: 31.7 G/DL (ref 31–36)
MCV RBC AUTO: 89.7 FL (ref 80–100)
PDW BLD-RTO: 18.4 % (ref 12.4–15.4)
PERFORMED ON: ABNORMAL
PLATELET # BLD: 176 K/UL (ref 135–450)
PMV BLD AUTO: 8.9 FL (ref 5–10.5)
POTASSIUM SERPL-SCNC: 5.6 MMOL/L (ref 3.5–5.1)
RBC # BLD: 2.98 M/UL (ref 4–5.2)
SODIUM BLD-SCNC: 131 MMOL/L (ref 136–145)
WBC # BLD: 24.3 K/UL (ref 4–11)

## 2021-06-12 PROCEDURE — 99024 POSTOP FOLLOW-UP VISIT: CPT | Performed by: THORACIC SURGERY (CARDIOTHORACIC VASCULAR SURGERY)

## 2021-06-12 PROCEDURE — 90935 HEMODIALYSIS ONE EVALUATION: CPT

## 2021-06-12 PROCEDURE — 6360000002 HC RX W HCPCS: Performed by: INTERNAL MEDICINE

## 2021-06-12 PROCEDURE — 99233 SBSQ HOSP IP/OBS HIGH 50: CPT | Performed by: INTERNAL MEDICINE

## 2021-06-12 PROCEDURE — 80048 BASIC METABOLIC PNL TOTAL CA: CPT

## 2021-06-12 PROCEDURE — 2100000000 HC CCU R&B

## 2021-06-12 PROCEDURE — 6370000000 HC RX 637 (ALT 250 FOR IP): Performed by: THORACIC SURGERY (CARDIOTHORACIC VASCULAR SURGERY)

## 2021-06-12 PROCEDURE — 2580000003 HC RX 258: Performed by: THORACIC SURGERY (CARDIOTHORACIC VASCULAR SURGERY)

## 2021-06-12 PROCEDURE — 94760 N-INVAS EAR/PLS OXIMETRY 1: CPT

## 2021-06-12 PROCEDURE — 36592 COLLECT BLOOD FROM PICC: CPT

## 2021-06-12 PROCEDURE — 83735 ASSAY OF MAGNESIUM: CPT

## 2021-06-12 PROCEDURE — 2700000000 HC OXYGEN THERAPY PER DAY

## 2021-06-12 PROCEDURE — 6370000000 HC RX 637 (ALT 250 FOR IP): Performed by: NURSE PRACTITIONER

## 2021-06-12 PROCEDURE — 85027 COMPLETE CBC AUTOMATED: CPT

## 2021-06-12 RX ORDER — SODIUM CHLORIDE 0.9 % (FLUSH) 0.9 %
5-40 SYRINGE (ML) INJECTION PRN
Status: DISCONTINUED | OUTPATIENT
Start: 2021-06-12 | End: 2021-06-16 | Stop reason: HOSPADM

## 2021-06-12 RX ORDER — SODIUM CHLORIDE 0.9 % (FLUSH) 0.9 %
5-40 SYRINGE (ML) INJECTION EVERY 12 HOURS SCHEDULED
Status: DISCONTINUED | OUTPATIENT
Start: 2021-06-12 | End: 2021-06-16 | Stop reason: HOSPADM

## 2021-06-12 RX ORDER — CLOPIDOGREL BISULFATE 75 MG/1
75 TABLET ORAL DAILY
Status: DISCONTINUED | OUTPATIENT
Start: 2021-06-12 | End: 2021-06-16

## 2021-06-12 RX ORDER — SODIUM CHLORIDE 9 MG/ML
INJECTION, SOLUTION INTRAVENOUS CONTINUOUS
Status: DISCONTINUED | OUTPATIENT
Start: 2021-06-12 | End: 2021-06-14

## 2021-06-12 RX ORDER — SODIUM CHLORIDE 9 MG/ML
25 INJECTION, SOLUTION INTRAVENOUS PRN
Status: DISCONTINUED | OUTPATIENT
Start: 2021-06-12 | End: 2021-06-13 | Stop reason: SDUPTHER

## 2021-06-12 RX ADMIN — PANTOPRAZOLE SODIUM 40 MG: 40 TABLET, DELAYED RELEASE ORAL at 06:44

## 2021-06-12 RX ADMIN — ALTEPLASE 2 MG: 2.2 INJECTION, POWDER, LYOPHILIZED, FOR SOLUTION INTRAVENOUS at 06:30

## 2021-06-12 RX ADMIN — SEVELAMER CARBONATE 800 MG: 800 TABLET, FILM COATED ORAL at 16:50

## 2021-06-12 RX ADMIN — ALTEPLASE 2 MG: 2.2 INJECTION, POWDER, LYOPHILIZED, FOR SOLUTION INTRAVENOUS at 08:41

## 2021-06-12 RX ADMIN — MUPIROCIN: 20 OINTMENT TOPICAL at 09:00

## 2021-06-12 RX ADMIN — Medication 10 ML: at 09:30

## 2021-06-12 RX ADMIN — CLOPIDOGREL BISULFATE 75 MG: 75 TABLET ORAL at 14:17

## 2021-06-12 RX ADMIN — TORSEMIDE 200 MG: 100 TABLET ORAL at 09:00

## 2021-06-12 RX ADMIN — GENTAMICIN SULFATE: 1 CREAM TOPICAL at 08:00

## 2021-06-12 RX ADMIN — ACETAMINOPHEN 1000 MG: 500 TABLET ORAL at 04:40

## 2021-06-12 RX ADMIN — STANDARDIZED SENNA CONCENTRATE AND DOCUSATE SODIUM 1 TABLET: 8.6; 5 TABLET ORAL at 21:58

## 2021-06-12 RX ADMIN — SEVELAMER CARBONATE 800 MG: 800 TABLET, FILM COATED ORAL at 08:00

## 2021-06-12 RX ADMIN — ALTEPLASE 2 MG: 2.2 INJECTION, POWDER, LYOPHILIZED, FOR SOLUTION INTRAVENOUS at 08:42

## 2021-06-12 RX ADMIN — TRAMADOL HYDROCHLORIDE 50 MG: 50 TABLET, FILM COATED ORAL at 14:46

## 2021-06-12 RX ADMIN — METOPROLOL TARTRATE 25 MG: 25 TABLET, FILM COATED ORAL at 09:00

## 2021-06-12 RX ADMIN — SEVELAMER CARBONATE 800 MG: 800 TABLET, FILM COATED ORAL at 11:59

## 2021-06-12 RX ADMIN — METOPROLOL TARTRATE 25 MG: 25 TABLET, FILM COATED ORAL at 21:58

## 2021-06-12 RX ADMIN — STANDARDIZED SENNA CONCENTRATE AND DOCUSATE SODIUM 1 TABLET: 8.6; 5 TABLET ORAL at 09:00

## 2021-06-12 RX ADMIN — ATORVASTATIN CALCIUM 20 MG: 20 TABLET, FILM COATED ORAL at 22:24

## 2021-06-12 RX ADMIN — INSULIN DETEMIR 20 UNITS: 100 INJECTION, SOLUTION SUBCUTANEOUS at 22:29

## 2021-06-12 RX ADMIN — MUPIROCIN: 20 OINTMENT TOPICAL at 21:58

## 2021-06-12 RX ADMIN — NIFEDIPINE 30 MG: 30 TABLET, FILM COATED, EXTENDED RELEASE ORAL at 09:00

## 2021-06-12 RX ADMIN — ACETAMINOPHEN 1000 MG: 500 TABLET ORAL at 16:50

## 2021-06-12 RX ADMIN — ASPIRIN 325 MG: 325 TABLET, COATED ORAL at 09:00

## 2021-06-12 RX ADMIN — ACETAMINOPHEN 1000 MG: 500 TABLET ORAL at 11:00

## 2021-06-12 RX ADMIN — ACETAMINOPHEN 1000 MG: 500 TABLET ORAL at 22:22

## 2021-06-12 RX ADMIN — TRAMADOL HYDROCHLORIDE 100 MG: 50 TABLET, FILM COATED ORAL at 22:23

## 2021-06-12 ASSESSMENT — PAIN DESCRIPTION - PAIN TYPE: TYPE: SURGICAL PAIN

## 2021-06-12 ASSESSMENT — PAIN SCALES - GENERAL
PAINLEVEL_OUTOF10: 7
PAINLEVEL_OUTOF10: 0
PAINLEVEL_OUTOF10: 2
PAINLEVEL_OUTOF10: 0
PAINLEVEL_OUTOF10: 5
PAINLEVEL_OUTOF10: 3
PAINLEVEL_OUTOF10: 0
PAINLEVEL_OUTOF10: 7
PAINLEVEL_OUTOF10: 0

## 2021-06-12 ASSESSMENT — PAIN DESCRIPTION - LOCATION: LOCATION: CHEST

## 2021-06-12 NOTE — PLAN OF CARE
Problem: Falls - Risk of:  Goal: Will remain free from falls  Description: Will remain free from falls  Outcome: Ongoing  Goal: Absence of physical injury  Description: Absence of physical injury  Outcome: Ongoing     Problem: Pain:  Goal: Pain level will decrease  Description: Pain level will decrease  Outcome: Ongoing  Goal: Control of acute pain  Description: Control of acute pain  Outcome: Ongoing  Goal: Control of chronic pain  Description: Control of chronic pain  Outcome: Ongoing     Problem: Nutrition  Goal: Optimal nutrition therapy  6/12/2021 0056 by Orlin Dorado RN  Outcome: Ongoing  6/11/2021 1217 by Kingsley Olmedo RD, LD  Outcome: Ongoing     Problem: Skin Integrity:  Goal: Will show no infection signs and symptoms  Description: Will show no infection signs and symptoms  Outcome: Ongoing  Goal: Absence of new skin breakdown  Description: Absence of new skin breakdown  Outcome: Ongoing

## 2021-06-12 NOTE — PROGRESS NOTES
Office : [phone]     Fax :[phone]       Nephrology progress  Note      Patient's Name: Nanci Fox  2:01 PM  2021    Reason for Consult:  ESRD    History of Present Ilness:    Nanci Fox is a 51 y.o. female with severe end-stage renal disease secondary to diabetic nephropathy on peritoneal dialysis, hypertension, peripheral vascular disease, diabetic neuropathy who was admitted after she had a syncopal episode. She had similar episode 2 weeks ago. Recently had angiogram left lower extremity for gangrene left foot second toe. Denies any shortness of breath. Denies any abdominal pain. Denies any nausea vomiting. Interval HX :      Hemodynamically stable. S/p CABG on 2010  Doing well         I/O last 3 completed shifts: In: 12 [P.O.:960]  Out: 0 [Chest Tube:650]    Past Medical History:   Diagnosis Date    Diabetes mellitus ([address]     End stage kidney disease ([address]     peritoneal dialysis every night at home x 2 years    Hypertension     Neuropathy     Peripheral vascular disease ([address]        Past Surgical History:   Procedure Laterality Date     SECTION      CORONARY ARTERY BYPASS GRAFT N/A 2021    URGENT CABG CORONARY ARTERY BYPASS GRAFTS X3. VEIN TO PDA X1, VEIN TO OM X1, LEFT INTERNAL MAMMARY ARTERY TO LAD X1. ENDOSCOPIC HARVEST RIGHT LEG SAPHENOUS VEIN. LIGATION NYA USING 35MM ATRICLIP. EPI AORTIC ULTRASOUND. TOTAL CARDIOPULMONARY BYPASS. DOPPLER GRAFT FLOW VERIFICATION. BILATERAL INITERCOSTAL NERVE BLOCK USING 1.3% EXPAREL.  performed by Dwight Ring MD at [place] [address]  2018     lap PD cath placement lt side 62 cm    TOE AMPUTATION Left 2021    AMPUTATION OF THE LEFT THIRD TOE performed by Shayla SCHERER Brown Muniz DPM at [id number] [place] [address] [address] TOE AMPUTATION Left 5/26/2021    AMPUTATION OF HALLUX AND SECOND TOE, LEFT FOOT performed by Giovana Cristina DPM at [place] [address]         Family History   Problem Relation Age of Onset    Coronary Art Dis Mother     High Blood Pressure Mother     Heart Disease Mother     Diabetes Mother     Cancer Father     Coronary Art Dis Father     High Blood Pressure Father     Heart Disease Father     Diabetes Father      Current Medications:    clopidogrel (PLAVIX) tablet 75 mg, Daily  metoprolol tartrate (LOPRESSOR) tablet 25 mg, BID  NIFEdipine (PROCARDIA XL) extended release tablet 30 mg, Daily  heparin (porcine) injection 3,400 Units, PRN  sodium chloride flush 0.9 % injection 10 mL, 2 times per day  sodium chloride flush 0.9 % injection 10 mL, PRN  0.9 % sodium chloride infusion, PRN  ondansetron (ZOFRAN) injection 4 mg, Q6H PRN  metoclopramide (REGLAN) injection 10 mg, Q6H PRN  aspirin EC tablet 325 mg, Daily  acetaminophen (TYLENOL) tablet 1,000 mg, Q6H  traMADol (ULTRAM) tablet 50 mg, Q6H PRN   Or  traMADol (ULTRAM) tablet 100 mg, Q6H PRN  morphine (PF) injection 2 mg, Q2H PRN  mupirocin (BACTROBAN) 2 % ointment, BID  diphenhydrAMINE (BENADRYL) tablet 25 mg, Nightly PRN  zolpidem (AMBIEN) tablet 5 mg, Nightly PRN  sennosides-docusate sodium (SENOKOT-S) 8.6-50 MG tablet 1 tablet, BID  magnesium hydroxide (MILK OF MAGNESIA) 400 MG/5ML suspension 30 mL, Daily PRN  polyethylene glycol (GLYCOLAX) packet 17 g, Daily PRN  pantoprazole (PROTONIX) tablet 40 mg, Daily  potassium chloride 20 mEq/50 mL IVPB (Central Line), PRN  magnesium sulfate 2000 mg in 50 mL IVPB premix, PRN  calcium chloride 1,000 mg in sodium chloride 0.9 % 100 mL IVPB, PRN  calcium chloride 2,000 mg in sodium chloride 0.9 % 100 mL IVPB, PRN  albuterol sulfate  (90 Base) MCG/ACT inhaler 2 puff, Q6H PRN  furosemide (LASIX) injection 20 mg, PRN  glucose (GLUTOSE) 40 % oral gel 15 g, PRN  dextrose 50

## 2021-06-12 NOTE — PROGRESS NOTES
Hospitalist Progress Note      PCP: Terrye Litten, MD    Date of Admission: 5/31/2021    Chief Complaint: [facility] Course: 50-year-old female was past medical history of ESRD on peritoneal dialysis, hypertension, hyperlipidemia, diabetes, peripheral vascular disease status post left toe amputation last week presents with presyncopal episode. She also describes a syncopal episode couple of weeks ago. Patient has recently been undergoing evaluation for possible renal transplant has had stress test performed at [facility] which was abnormal.  Patient is scheduled for outpatient left heart cath on 6/3/2021 . Patient is labile blood pressures, but negative orthostatics. Nephrology is adjusting blood pressure medications. She had left heart cath performed 6/3/2021 which showed multivessel disease. CTS consulted the plan for possible CABG. s/p CABG 6/09    Subjective: Patient lying in bed no chest pain no cough no shortness of breath nausea vomiting    Medications:  Reviewed    Infusion Medications    sodium chloride      dextrose       Scheduled Medications    clopidogrel  75 mg Oral Daily    metoprolol tartrate  25 mg Oral BID    NIFEdipine  30 mg Oral Daily    sodium chloride flush  10 mL Intravenous 2 times per day    aspirin  325 mg Oral Daily    acetaminophen  1,000 mg Oral Q6H    mupirocin   Nasal BID    sennosides-docusate sodium  1 tablet Oral BID    pantoprazole  40 mg Oral Daily    insulin detemir  20 Units Subcutaneous Nightly    insulin aspart  10 Units Subcutaneous TID AC    gentamicin   Topical Daily    sevelamer  800 mg Oral TID WC    atorvastatin  20 mg Oral Nightly    torsemide  200 mg Oral Daily     PRN Meds: heparin (porcine), sodium chloride flush, sodium chloride, ondansetron, metoclopramide, traMADol **OR** traMADol, morphine, diphenhydrAMINE, zolpidem, magnesium hydroxide, polyethylene glycol, potassium chloride, magnesium sulfate, calcium chloride IVPB, calcium chloride IVPB, albuterol sulfate HFA, furosemide, glucose, dextrose, glucagon (rDNA), dextrose      Intake/Output Summary (Last 24 hours) at 6/12/2021 1351  Last data filed at 6/12/2021 1058  Gross per 24 hour   Intake 780 ml   Output 925 ml   Net -145 ml       Physical Exam Performed:    BP (!) 123/53   Pulse 80   Temp 97 °F (36.1 °C) (Temporal)   Resp 18   Ht 5' 9\" (1.753 m)   Wt 232 lb 12.9 oz (105.6 kg)   SpO2 92%   Breastfeeding No   BMI 34.38 kg/m²     General appearance: aaox3  HEENT: Pupils equal, round, and reactive to light. Conjunctivae/corneas clear. Neck: Supple, with full range of motion. No jugular venous distention. Respiratory:  ctab no wheezing  Cardiovascular: Regular rate and rhythm with normal S1/S2 ., chest dressing clean and dry  Abdomen: Soft, non-tender, non-distended with normal bowel sounds. Skin: Skin color, texture, turgor normal.  No rashes or lesions. Neurologic: no gross deficits  Psychiatric: Alert and oriented, thought content appropriate, normal insight        Labs:   Recent Labs     06/10/21  0437 06/11/21  0430 06/12/21  0315   WBC 17.3* 25.1* 24.3*   HGB 8.1* 8.4* 8.5*   HCT 24.6* 26.2* 26.7*    157 176     Recent Labs     06/10/21  0437 06/11/21  0430 06/12/21  0315    135* 131*   K 3.7 4.5 5.6*    97* 94*   CO2 24 28 24   BUN 25* 20 43*   CREATININE 7.0* 5.3* 6.4*   CALCIUM 9.1 8.1* 7.9*     No results for input(s): AST, ALT, BILIDIR, BILITOT, ALKPHOS in the last 72 hours. No results for input(s): INR in the last 72 hours. No results for input(s): [place] in the last 72 hours. Urinalysis:      Lab Results   Component Value Date    NITRU Negative 06/06/2021    WBCUA 36 06/06/2021    BACTERIA RARE 06/06/2021    RBCUA 587 06/06/2021    BLOODU LARGE 06/06/2021    SPECGRAV 1.019 06/06/2021    GLUCOSEU 100 06/06/2021       Radiology:  XR CHEST PORTABLE   Final Result   Status post open heart surgery.   Support tubes and [place]-Ebonie catheter, as   above. Right basilar atelectasis and small effusion. IR FLUORO GUIDED CVA DEVICE PLMT/REPLACE/REMOVAL   Final Result   Technically successful right-sided HD catheter placement. Catheter is okay   for use. VL DUP CAROTID BILATERAL   Final Result      VL PRE OP VEIN MAPPING   Final Result      CT CHEST WO CONTRAST   Final Result   No acute intrathoracic abnormality         MRI BRAIN WO CONTRAST   Final Result   1. No acute intracranial abnormality. 2. Mild chronic white matter microvascular ischemic changes. 3. Right maxillary sinusitis. XR CHEST PORTABLE   Final Result   Cardiomegaly with stable right pleural effusion                 Assessment/Plan:    Active Hospital Problems    Diagnosis     S/P coronary artery bypass graft x 3 [Z95.1]     S/P left atrial appendage ligation [Z98.890]     Mild malnutrition (HCC) [E44.1]     Coronary artery disease of native artery of native heart with stable angina pectoris ([address] [I25.118]     Near syncope [R55]     Atherosclerosis of native arteries of left leg with ulceration of other part of foot ([address] [I70.245]     Diabetic polyneuropathy associated with type 2 diabetes mellitus ([address] [E11.42]     ESRD (end stage renal disease) ([address] [N18.6]     Diabetes mellitus ([address] [E11.9]        Multivessel CAD  Status post left heart cath 6/3/2021  S/p CABG 6/09  - per ctvs    Leukocytosis: likely reactive monitor no fevers    UTI  As per urinalysis  Asymptomatic  -s/p rocpehin       ESRD on PD  Nephrology consulted    Hypertension  With labile BP  BP meds adjusting per nephrology    DM: levemir and lispro    Gangrene of left foot s/p amputation of 1 and 2 toes 5/26  - podiatry on board    PAD ss/p left popliteal, anterior tibial, and dorsalis pedis angioplasty by Dr. Sandra Ace on 5/27/21  DVT Prophylaxis: Heparin  Diet: ADULT DIET; Regular; 5 carb choices (75 gm/meal);  Low Fat/Low Chol/High Fiber/2 gm Na; 1800 ml  Code Status: Full Code      Electronically signed by Stefani Patel MD on 6/12/2021 at 1:51 PM

## 2021-06-12 NOTE — PROGRESS NOTES
POD3 cabg x3 NYA clip  HD 1.5 hr treatment this am  Wt pre op 103 kg  Today 105 kg  RIJ, Chest tubes intact  Drained 550 serous drainage from CT last 12 hrs.   PD cath, Rvasc cat R groin intact  -140 syst  On metoprolol,procardia po  Pain controlled

## 2021-06-13 ENCOUNTER — APPOINTMENT (OUTPATIENT)
Dept: GENERAL RADIOLOGY | Age: 46
DRG: 233 | End: 2021-06-13
Payer: MEDICARE

## 2021-06-13 LAB
ANION GAP SERPL CALCULATED.3IONS-SCNC: 17 MMOL/L (ref 3–16)
BUN BLDV-MCNC: 53 MG/DL (ref 7–20)
CALCIUM SERPL-MCNC: 7.9 MG/DL (ref 8.3–10.6)
CHLORIDE BLD-SCNC: 92 MMOL/L (ref 99–110)
CO2: 23 MMOL/L (ref 21–32)
CREAT SERPL-MCNC: 7 MG/DL (ref 0.6–1.1)
GFR AFRICAN AMERICAN: 8
GFR NON-AFRICAN AMERICAN: 6
GLUCOSE BLD-MCNC: 109 MG/DL (ref 70–99)
GLUCOSE BLD-MCNC: 116 MG/DL (ref 70–99)
GLUCOSE BLD-MCNC: 116 MG/DL (ref 70–99)
GLUCOSE BLD-MCNC: 118 MG/DL (ref 70–99)
GLUCOSE BLD-MCNC: 129 MG/DL (ref 70–99)
HCT VFR BLD CALC: 25.7 % (ref 36–48)
HEMOGLOBIN: 8.1 G/DL (ref 12–16)
MCH RBC QN AUTO: 28.2 PG (ref 26–34)
MCHC RBC AUTO-ENTMCNC: 31.6 G/DL (ref 31–36)
MCV RBC AUTO: 89.3 FL (ref 80–100)
PDW BLD-RTO: 18.5 % (ref 12.4–15.4)
PERFORMED ON: ABNORMAL
PLATELET # BLD: 224 K/UL (ref 135–450)
PMV BLD AUTO: 8.9 FL (ref 5–10.5)
POTASSIUM REFLEX MAGNESIUM: 5.2 MMOL/L (ref 3.5–5.1)
RBC # BLD: 2.88 M/UL (ref 4–5.2)
SODIUM BLD-SCNC: 132 MMOL/L (ref 136–145)
WBC # BLD: 25.1 K/UL (ref 4–11)

## 2021-06-13 PROCEDURE — 99024 POSTOP FOLLOW-UP VISIT: CPT | Performed by: THORACIC SURGERY (CARDIOTHORACIC VASCULAR SURGERY)

## 2021-06-13 PROCEDURE — 6360000002 HC RX W HCPCS: Performed by: THORACIC SURGERY (CARDIOTHORACIC VASCULAR SURGERY)

## 2021-06-13 PROCEDURE — 6370000000 HC RX 637 (ALT 250 FOR IP): Performed by: NURSE PRACTITIONER

## 2021-06-13 PROCEDURE — 6370000000 HC RX 637 (ALT 250 FOR IP): Performed by: THORACIC SURGERY (CARDIOTHORACIC VASCULAR SURGERY)

## 2021-06-13 PROCEDURE — 99233 SBSQ HOSP IP/OBS HIGH 50: CPT | Performed by: INTERNAL MEDICINE

## 2021-06-13 PROCEDURE — 2580000003 HC RX 258: Performed by: THORACIC SURGERY (CARDIOTHORACIC VASCULAR SURGERY)

## 2021-06-13 PROCEDURE — 80048 BASIC METABOLIC PNL TOTAL CA: CPT

## 2021-06-13 PROCEDURE — 36592 COLLECT BLOOD FROM PICC: CPT

## 2021-06-13 PROCEDURE — 2100000000 HC CCU R&B

## 2021-06-13 PROCEDURE — 74018 RADEX ABDOMEN 1 VIEW: CPT

## 2021-06-13 PROCEDURE — 6370000000 HC RX 637 (ALT 250 FOR IP): Performed by: INTERNAL MEDICINE

## 2021-06-13 PROCEDURE — 2580000003 HC RX 258: Performed by: NURSE PRACTITIONER

## 2021-06-13 PROCEDURE — 85027 COMPLETE CBC AUTOMATED: CPT

## 2021-06-13 RX ORDER — METOPROLOL TARTRATE 50 MG/1
50 TABLET, FILM COATED ORAL 2 TIMES DAILY
Status: DISCONTINUED | OUTPATIENT
Start: 2021-06-13 | End: 2021-06-15

## 2021-06-13 RX ORDER — NIFEDIPINE 30 MG/1
30 TABLET, EXTENDED RELEASE ORAL ONCE
Status: COMPLETED | OUTPATIENT
Start: 2021-06-13 | End: 2021-06-13

## 2021-06-13 RX ORDER — ASPIRIN 81 MG/1
81 TABLET ORAL DAILY
Status: DISCONTINUED | OUTPATIENT
Start: 2021-06-14 | End: 2021-06-16

## 2021-06-13 RX ORDER — NIFEDIPINE 30 MG/1
60 TABLET, EXTENDED RELEASE ORAL DAILY
Status: DISCONTINUED | OUTPATIENT
Start: 2021-06-14 | End: 2021-06-15

## 2021-06-13 RX ORDER — HYDRALAZINE HYDROCHLORIDE 20 MG/ML
10 INJECTION INTRAMUSCULAR; INTRAVENOUS EVERY 6 HOURS PRN
Status: DISCONTINUED | OUTPATIENT
Start: 2021-06-13 | End: 2021-06-16 | Stop reason: HOSPADM

## 2021-06-13 RX ADMIN — CLOPIDOGREL BISULFATE 75 MG: 75 TABLET ORAL at 09:30

## 2021-06-13 RX ADMIN — INSULIN DETEMIR 20 UNITS: 100 INJECTION, SOLUTION SUBCUTANEOUS at 20:26

## 2021-06-13 RX ADMIN — SEVELAMER CARBONATE 800 MG: 800 TABLET, FILM COATED ORAL at 16:40

## 2021-06-13 RX ADMIN — GENTAMICIN SULFATE: 1 CREAM TOPICAL at 09:33

## 2021-06-13 RX ADMIN — METOPROLOL TARTRATE 50 MG: 50 TABLET, FILM COATED ORAL at 20:24

## 2021-06-13 RX ADMIN — NIFEDIPINE 30 MG: 30 TABLET, FILM COATED, EXTENDED RELEASE ORAL at 22:14

## 2021-06-13 RX ADMIN — METOPROLOL TARTRATE 25 MG: 25 TABLET, FILM COATED ORAL at 15:10

## 2021-06-13 RX ADMIN — TRAMADOL HYDROCHLORIDE 50 MG: 50 TABLET, FILM COATED ORAL at 10:05

## 2021-06-13 RX ADMIN — SEVELAMER CARBONATE 800 MG: 800 TABLET, FILM COATED ORAL at 08:20

## 2021-06-13 RX ADMIN — ACETAMINOPHEN 1000 MG: 500 TABLET ORAL at 11:42

## 2021-06-13 RX ADMIN — SEVELAMER CARBONATE 800 MG: 800 TABLET, FILM COATED ORAL at 11:00

## 2021-06-13 RX ADMIN — Medication 10 ML: at 09:34

## 2021-06-13 RX ADMIN — METOPROLOL TARTRATE 25 MG: 25 TABLET, FILM COATED ORAL at 09:27

## 2021-06-13 RX ADMIN — ACETAMINOPHEN 1000 MG: 500 TABLET ORAL at 04:59

## 2021-06-13 RX ADMIN — ONDANSETRON 4 MG: 2 INJECTION INTRAMUSCULAR; INTRAVENOUS at 12:30

## 2021-06-13 RX ADMIN — TORSEMIDE 200 MG: 100 TABLET ORAL at 11:42

## 2021-06-13 RX ADMIN — ONDANSETRON 4 MG: 2 INJECTION INTRAMUSCULAR; INTRAVENOUS at 18:35

## 2021-06-13 RX ADMIN — NIFEDIPINE 30 MG: 30 TABLET, FILM COATED, EXTENDED RELEASE ORAL at 15:10

## 2021-06-13 RX ADMIN — MAGNESIUM HYDROXIDE 30 ML: 400 SUSPENSION ORAL at 18:35

## 2021-06-13 RX ADMIN — NIFEDIPINE 30 MG: 30 TABLET, FILM COATED, EXTENDED RELEASE ORAL at 09:27

## 2021-06-13 RX ADMIN — MUPIROCIN: 20 OINTMENT TOPICAL at 20:17

## 2021-06-13 RX ADMIN — ASPIRIN 325 MG: 325 TABLET, COATED ORAL at 09:27

## 2021-06-13 RX ADMIN — HYDRALAZINE HYDROCHLORIDE 10 MG: 20 INJECTION INTRAMUSCULAR; INTRAVENOUS at 22:15

## 2021-06-13 RX ADMIN — METOCLOPRAMIDE HYDROCHLORIDE 10 MG: 5 INJECTION INTRAMUSCULAR; INTRAVENOUS at 23:32

## 2021-06-13 RX ADMIN — STANDARDIZED SENNA CONCENTRATE AND DOCUSATE SODIUM 1 TABLET: 8.6; 5 TABLET ORAL at 09:27

## 2021-06-13 RX ADMIN — ACETAMINOPHEN 1000 MG: 500 TABLET ORAL at 16:39

## 2021-06-13 RX ADMIN — PANTOPRAZOLE SODIUM 40 MG: 40 TABLET, DELAYED RELEASE ORAL at 09:33

## 2021-06-13 RX ADMIN — MUPIROCIN: 20 OINTMENT TOPICAL at 09:33

## 2021-06-13 ASSESSMENT — PAIN SCALES - GENERAL
PAINLEVEL_OUTOF10: 3
PAINLEVEL_OUTOF10: 6
PAINLEVEL_OUTOF10: 4
PAINLEVEL_OUTOF10: 0
PAINLEVEL_OUTOF10: 1
PAINLEVEL_OUTOF10: 0
PAINLEVEL_OUTOF10: 5

## 2021-06-13 ASSESSMENT — PAIN DESCRIPTION - PAIN TYPE: TYPE: SURGICAL PAIN

## 2021-06-13 NOTE — PROGRESS NOTES
Office : [phone]     Fax :[phone]       Nephrology progress  Note      Patient's Name: Calixto Tyler  1:16 PM  2021    Reason for Consult:  ESRD    History of Present Ilness:    Calixto Tyler is a 51 y.o. female with severe end-stage renal disease secondary to diabetic nephropathy on peritoneal dialysis, hypertension, peripheral vascular disease, diabetic neuropathy who was admitted after she had a syncopal episode. She had similar episode 2 weeks ago. Recently had angiogram left lower extremity for gangrene left foot second toe. Denies any shortness of breath. Denies any abdominal pain. Denies any nausea vomiting. Interval HX :      Hemodynamically stable. S/p CABG on 2010  Doing well         I/O last 3 completed shifts: In: 46 [P.O.:840]  Out: 0 [Chest Tube:400]    Past Medical History:   Diagnosis Date    Diabetes mellitus ([address]     End stage kidney disease ([address]     peritoneal dialysis every night at home x 2 years    Hypertension     Neuropathy     Peripheral vascular disease [facility]        Past Surgical History:   Procedure Laterality Date     SECTION      CORONARY ARTERY BYPASS GRAFT N/A 2021    URGENT CABG CORONARY ARTERY BYPASS GRAFTS X3. VEIN TO PDA X1, VEIN TO OM X1, LEFT INTERNAL MAMMARY ARTERY TO LAD X1. ENDOSCOPIC HARVEST RIGHT LEG SAPHENOUS VEIN. LIGATION NYA USING 35MM ATRICLIP. EPI AORTIC ULTRASOUND. TOTAL CARDIOPULMONARY BYPASS. DOPPLER GRAFT FLOW VERIFICATION. BILATERAL INITERCOSTAL NERVE BLOCK USING 1.3% EXPAREL.  performed by Ras Hogan MD at [place] [address]  2018     lap PD cath placement lt side 62 cm    TOE AMPUTATION Left 2021    AMPUTATION OF THE LEFT THIRD TOE performed by Janie SCHERER Melody [place] DPM at [address] [facility] [address] TOE AMPUTATION Left 5/26/2021    AMPUTATION OF HALLUX AND SECOND TOE, LEFT FOOT performed by Clover Magaña DPM at [place] [address]         Family History   Problem Relation Age of Onset    Coronary Art Dis Mother     High Blood Pressure Mother     Heart Disease Mother     Diabetes Mother     Cancer Father     Coronary Art Dis Father     High Blood Pressure Father     Heart Disease Father     Diabetes Father      Current Medications:    [START ON 6/14/2021] aspirin EC tablet 81 mg, Daily  metoprolol tartrate (LOPRESSOR) tablet 50 mg, BID  [START ON 6/14/2021] NIFEdipine (PROCARDIA XL) extended release tablet 60 mg, Daily  clopidogrel (PLAVIX) tablet 75 mg, Daily  0.9 % sodium chloride infusion, Continuous  0.9 % sodium chloride infusion, PRN  sodium chloride flush 0.9 % injection 5-40 mL, 2 times per day  sodium chloride flush 0.9 % injection 5-40 mL, PRN  heparin (porcine) injection 3,400 Units, PRN  sodium chloride flush 0.9 % injection 10 mL, 2 times per day  sodium chloride flush 0.9 % injection 10 mL, PRN  0.9 % sodium chloride infusion, PRN  ondansetron (ZOFRAN) injection 4 mg, Q6H PRN  metoclopramide (REGLAN) injection 10 mg, Q6H PRN  acetaminophen (TYLENOL) tablet 1,000 mg, Q6H  traMADol (ULTRAM) tablet 50 mg, Q6H PRN   Or  traMADol (ULTRAM) tablet 100 mg, Q6H PRN  morphine (PF) injection 2 mg, Q2H PRN  mupirocin (BACTROBAN) 2 % ointment, BID  diphenhydrAMINE (BENADRYL) tablet 25 mg, Nightly PRN  zolpidem (AMBIEN) tablet 5 mg, Nightly PRN  sennosides-docusate sodium (SENOKOT-S) 8.6-50 MG tablet 1 tablet, BID  magnesium hydroxide (MILK OF MAGNESIA) 400 MG/5ML suspension 30 mL, Daily PRN  polyethylene glycol (GLYCOLAX) packet 17 g, Daily PRN  pantoprazole (PROTONIX) tablet 40 mg, Daily  potassium chloride 20 mEq/50 mL IVPB (Central Line), PRN  magnesium sulfate 2000 mg in 50 mL IVPB premix, PRN  calcium chloride 1,000 mg in sodium chloride 0.9 % 100 mL IVPB, PRN  calcium chloride 2,000 mg in sodium chloride 0.9 % 100 mL IVPB, PRN  albuterol sulfate  (90 Base) MCG/ACT inhaler 2 puff, Q6H PRN  furosemide (LASIX) injection 20 mg, PRN  glucose (GLUTOSE) 40 % oral gel 15 g, PRN  dextrose 50 % IV solution, PRN  glucagon (rDNA) injection 1 mg, PRN  dextrose 5 % solution, PRN  insulin detemir (LEVEMIR) injection pen 20 Units, Nightly  insulin aspart (NOVOLOG) injection pen 10 Units PATIENT SUPPLIED, TID AC  gentamicin (GARAMYCIN) 0.1 % cream, Daily  sevelamer (RENVELA) tablet 800 mg, TID WC  atorvastatin (LIPITOR) tablet 20 mg, Nightly  torsemide (DEMADEX) tablet 200 mg, Daily          Physical exam:     Vitals:  BP (!) 141/66   Pulse 80   Temp 97 °F (36.1 °C) (Temporal)   Resp 18   Ht 5' 9\" (1.753 m)   Wt 229 lb 0.9 oz (103.9 kg)   SpO2 (!) 87%   Breastfeeding No   BMI 33.83 kg/m²   Constitutional:  OAA X3 NAD  Skin: no rash, turgor wnl  Heent:  eomi, mmm  Neck: no bruits or jvd noted  Cardiovascular:  S1, S2 without m/r/g  Respiratory: CTA B without w/r/r  Abdomen:  +bs, soft, nt, nd  Ext: no  lower extremity edema      Labs:  CBC:   Recent Labs     06/11/21 0430 06/12/21 0315 06/13/21  0445   WBC 25.1* 24.3* 25.1*   HGB 8.4* 8.5* 8.1*    176 224     BMP:    Recent Labs     06/11/21 0430 06/12/21 0315 06/13/21  0445   * 131* 132*   K 4.5 5.6* 5.2*   CL 97* 94* 92*   CO2 28 24 23   BUN 20 43* 53*   CREATININE 5.3* 6.4* 7.0*   GLUCOSE 97 189* 129*     Ca/Mg/Phos:   Recent Labs     06/11/21 0430 06/12/21 0315 06/13/21  0445   CALCIUM 8.1* 7.9* 7.9*   MG 2.20 2.40  --      UA:  No results for input(s): COLORU, CLARITYU, GLUCOSEU, BILIRUBINUR, KETUA, SPECGRAV, BLOODU, PHUR, PROTEINU, UROBILINOGEN, NITRU, LEUKOCYTESUR, LABMICR, URINETYPE in the last 72 hours. Urine Microscopic:   No results for input(s): LABCAST, BACTERIA, COMU, HYALCAST, WBCUA, RBCUA, EPIU in the last 72 hours.   Urine Culture:   No results for input(s): LABURIN in the last 72 hours.  Urine Chemistry: No results for input(s): CLUR, LABCREA, PROTEINUR, NAUR in the last 72 hours. IMAGING:  XR CHEST PORTABLE   Final Result   Status post open heart surgery. Support tubes and [place]-Ebonie catheter, as   above. Right basilar atelectasis and small effusion. IR FLUORO GUIDED CVA DEVICE PLMT/REPLACE/REMOVAL   Final Result   Technically successful right-sided HD catheter placement. Catheter is okay   for use. VL DUP CAROTID BILATERAL   Final Result      VL PRE OP VEIN MAPPING   Final Result      CT CHEST WO CONTRAST   Final Result   No acute intrathoracic abnormality         MRI BRAIN WO CONTRAST   Final Result   1. No acute intracranial abnormality. 2. Mild chronic white matter microvascular ischemic changes. 3. Right maxillary sinusitis. XR CHEST PORTABLE   Final Result   Cardiomegaly with stable right pleural effusion         XR ABDOMEN (KUB) (SINGLE AP VIEW)    (Results Pending)           Assessment/Plan :      1. ESRD. Was On peritoneal dialysis. Switched  her temporarily to hemodialysis . Will transition to OD once chest tube is removed   No HD today       2. HTN. Controlled     3. Anemia of chronic disease. Gets micera at dialysis unit  Will give EPOGEN    4. Acid- base disorder. Monitor    5. Electrolytes monitor and replace as needed    6. CAD/Syncopal episode. S/p [facility]   Has Multivessel CAD   S/p CABG       7. Peripheral vascular disease. S/p amputation of left foot toes. Podiatry following     8.  Left lower abdominal pain   Passing some flatus  Will do xray KUB to r/o severe constipation/ flatus         Thank you for allowing us to participate in care of Talita Melton         Electronically signed by: Kimberley Dancer, MD, 6/13/2021, 1:16 PM      [facility] [address]  Office : [phone]  Fax :[phone]

## 2021-06-13 NOTE — PROGRESS NOTES
Hospitalist Progress Note      PCP: Corin Guy MD    Date of Admission: 5/31/2021    Chief Complaint: [facility] Course: 78-year-old female was past medical history of ESRD on peritoneal dialysis, hypertension, hyperlipidemia, diabetes, peripheral vascular disease status post left toe amputation last week presents with presyncopal episode. She also describes a syncopal episode couple of weeks ago. Patient has recently been undergoing evaluation for possible renal transplant has had stress test performed at [facility] which was abnormal.  Patient is scheduled for outpatient left heart cath on 6/3/2021 . Patient is labile blood pressures, but negative orthostatics. Nephrology is adjusting blood pressure medications. She had left heart cath performed 6/3/2021 which showed multivessel disease. CTS consulted the plan for possible CABG. s/p CABG 6/09    Subjective: Lying in bed did have some flank pain today no chest pain no nausea vomiting fevers    Medications:  Reviewed    Infusion Medications    sodium chloride      sodium chloride      sodium chloride      dextrose       Scheduled Medications    [START ON 6/14/2021] aspirin  81 mg Oral Daily    metoprolol tartrate  50 mg Oral BID    [START ON 6/14/2021] NIFEdipine  60 mg Oral Daily    clopidogrel  75 mg Oral Daily    sodium chloride flush  5-40 mL Intravenous 2 times per day    sodium chloride flush  10 mL Intravenous 2 times per day    acetaminophen  1,000 mg Oral Q6H    mupirocin   Nasal BID    sennosides-docusate sodium  1 tablet Oral BID    pantoprazole  40 mg Oral Daily    insulin detemir  20 Units Subcutaneous Nightly    insulin aspart  10 Units Subcutaneous TID AC    gentamicin   Topical Daily    sevelamer  800 mg Oral TID WC    atorvastatin  20 mg Oral Nightly    torsemide  200 mg Oral Daily     PRN Meds: sodium chloride, sodium chloride flush, heparin (porcine), sodium chloride flush, sodium chloride, ondansetron, metoclopramide, traMADol **OR** traMADol, morphine, diphenhydrAMINE, zolpidem, magnesium hydroxide, polyethylene glycol, potassium chloride, magnesium sulfate, calcium chloride IVPB, calcium chloride IVPB, albuterol sulfate HFA, furosemide, glucose, dextrose, glucagon (rDNA), dextrose      Intake/Output Summary (Last 24 hours) at 6/13/2021 1206  Last data filed at 6/13/2021 0500  Gross per 24 hour   Intake 840 ml   Output 150 ml   Net 690 ml       Physical Exam Performed:    BP (!) 141/66   Pulse 80   Temp 97 °F (36.1 °C) (Temporal)   Resp 18   Ht 5' 9\" (1.753 m)   Wt 229 lb 0.9 oz (103.9 kg)   SpO2 (!) 87%   Breastfeeding No   BMI 33.83 kg/m²     General appearance: aaox3  HEENT: Pupils equal, round, and reactive to light. Conjunctivae/corneas clear. Neck: Supple, with full range of motion. No jugular venous distention. Respiratory:  ctab no wheezing  Cardiovascular: Regular rate and rhythm with normal S1/S2 ., chest dressing clean and dry  Abdomen: Soft, non-tender, non-distended with normal bowel sounds. Skin: Skin color, texture, turgor normal.  No rashes or lesions. Neurologic: no gross deficits  Psychiatric: Alert and oriented, thought content appropriate, normal insight        Labs:   Recent Labs     06/11/21  0430 06/12/21  0315 06/13/21  0445   WBC 25.1* 24.3* 25.1*   HGB 8.4* 8.5* 8.1*   HCT 26.2* 26.7* 25.7*    176 224     Recent Labs     06/11/21  0430 06/12/21  0315 06/13/21  0445   * 131* 132*   K 4.5 5.6* 5.2*   CL 97* 94* 92*   CO2 28 24 23   BUN 20 43* 53*   CREATININE 5.3* 6.4* 7.0*   CALCIUM 8.1* 7.9* 7.9*     No results for input(s): AST, ALT, BILIDIR, BILITOT, ALKPHOS in the last 72 hours. No results for input(s): INR in the last 72 hours. No results for input(s): [place] in the last 72 hours.     Urinalysis:      Lab Results   Component Value Date    NITRU Negative 06/06/2021    WBCUA 36 06/06/2021    BACTERIA RARE 06/06/2021    RBCUA 587 06/06/2021    BLOODU LARGE 06/06/2021    SPECGRAV 1.019 06/06/2021    GLUCOSEU 100 06/06/2021       Radiology:  XR CHEST PORTABLE   Final Result   Status post open heart surgery. Support tubes and [place]-Ebonie catheter, as   above. Right basilar atelectasis and small effusion. IR FLUORO GUIDED CVA DEVICE PLMT/REPLACE/REMOVAL   Final Result   Technically successful right-sided HD catheter placement. Catheter is okay   for use. VL DUP CAROTID BILATERAL   Final Result      VL PRE OP VEIN MAPPING   Final Result      CT CHEST WO CONTRAST   Final Result   No acute intrathoracic abnormality         MRI BRAIN WO CONTRAST   Final Result   1. No acute intracranial abnormality. 2. Mild chronic white matter microvascular ischemic changes. 3. Right maxillary sinusitis.          XR CHEST PORTABLE   Final Result   Cardiomegaly with stable right pleural effusion                 Assessment/Plan:    Active Hospital Problems    Diagnosis     S/P coronary artery bypass graft x 3 [Z95.1]     S/P left atrial appendage ligation [Z98.890]     Mild malnutrition (HCC) [E44.1]     Coronary artery disease of native artery of native heart with stable angina pectoris ([address] [I25.118]     Near syncope [R55]     Atherosclerosis of native arteries of left leg with ulceration of other part of foot ([address] [I70.245]     Diabetic polyneuropathy associated with type 2 diabetes mellitus ([address] [E11.42]     ESRD (end stage renal disease) ([address] [N18.6]     Diabetes mellitus ([address] [E11.9]        Multivessel CAD  Status post left heart cath 6/3/2021  S/p CABG 6/09  - per ctvs    Leukocytosis: likely reactive monitor no fevers    UTI  As per urinalysis  Asymptomatic  -s/p rocpehin       ESRD on PD  Nephrology consulted    Hypertension  With labile BP  BP meds adjusting per nephrology    DM: levemir and lispro    Gangrene of left foot s/p amputation of 1 and 2 toes 5/26  - podiatry on board    PAD ss/p left popliteal, anterior tibial, and dorsalis pedis angioplasty by Dr. Stephanie Morales on 5/27/21  DVT Prophylaxis: Heparin  Diet: Diet NPO Time Specified  Code Status: Full Code      Electronically signed by Stefani Patel MD on 6/13/2021 at 12:06 PM

## 2021-06-13 NOTE — PROGRESS NOTES
Rt groin with small amount of dry blood, oozed to rula area, cleansed with soap and water while bathing pt, chloraprep, tegaderm applied to incision, small hematoma noted, wrapped with ace/dressing, will monitor.

## 2021-06-13 NOTE — PROGRESS NOTES
POD4 cabg x3,NYA  Wt pre op 103kg  todays wt 103.9 kg  Ambulated outside room w walker  02 1-2L  CT drainage as noted  KUB mild ileus  MOM given

## 2021-06-13 NOTE — PROGRESS NOTES
Chief Complaint:  Post op follow-up    Procedure:   6/9 CABG    Subj:   6/12  Resting comfortably in bed. No complaints    Obj:    Blood pressure (!) 141/66, pulse 80, temperature 97 °F (36.1 °C), temperature source Temporal, resp. rate 18, height 5' 9\" (1.753 m), weight 229 lb 0.9 oz (103.9 kg), SpO2 (!) 87 %, not currently breastfeeding. Lungs clear   Abdomen soft              Cor RRR   Sternal incision CDI   Chest tube with 250-100-50 ml/shift drainage in last 24 hours/no airleak              UOP  0 ml/shift.   HD    Diagnostics:     Recent Labs     06/11/21 0430 06/12/21 0315 06/13/21  0445   WBC 25.1* 24.3* 25.1*   HGB 8.4* 8.5* 8.1*   HCT 26.2* 26.7* 25.7*    176 224                                                                  Recent Labs     06/11/21 0430 06/12/21 0315 06/13/21  0445   * 131* 132*   K 4.5 5.6* 5.2*   CL 97* 94* 92*   CO2 28 24 23   BUN 20 43* 53*   CREATININE 5.3* 6.4* 7.0*   GLUCOSE 97 189* 129*            Recent Labs     06/11/21 0430 06/12/21  0315   MG 2.20 2.40        Assess/Plan:   POD #4 stable  Ambulate, IS  HD  Chest tube close to being ready to remove    Renaldo Lomas MD, MD  FACS  6/13/2021  9:28 AM

## 2021-06-14 ENCOUNTER — APPOINTMENT (OUTPATIENT)
Dept: GENERAL RADIOLOGY | Age: 46
DRG: 233 | End: 2021-06-14
Payer: MEDICARE

## 2021-06-14 LAB
ANION GAP SERPL CALCULATED.3IONS-SCNC: 14 MMOL/L (ref 3–16)
BUN BLDV-MCNC: 50 MG/DL (ref 7–20)
CALCIUM SERPL-MCNC: 7.9 MG/DL (ref 8.3–10.6)
CHLORIDE BLD-SCNC: 95 MMOL/L (ref 99–110)
CO2: 24 MMOL/L (ref 21–32)
CREAT SERPL-MCNC: 5.8 MG/DL (ref 0.6–1.1)
GFR AFRICAN AMERICAN: 9
GFR NON-AFRICAN AMERICAN: 8
GLUCOSE BLD-MCNC: 106 MG/DL (ref 70–99)
GLUCOSE BLD-MCNC: 108 MG/DL (ref 70–99)
GLUCOSE BLD-MCNC: 59 MG/DL (ref 70–99)
GLUCOSE BLD-MCNC: 70 MG/DL (ref 70–99)
GLUCOSE BLD-MCNC: 75 MG/DL (ref 70–99)
GLUCOSE BLD-MCNC: 86 MG/DL (ref 70–99)
GLUCOSE BLD-MCNC: 91 MG/DL (ref 70–99)
HCT VFR BLD CALC: 24.6 % (ref 36–48)
HEMOGLOBIN: 8 G/DL (ref 12–16)
MCH RBC QN AUTO: 28.6 PG (ref 26–34)
MCHC RBC AUTO-ENTMCNC: 32.4 G/DL (ref 31–36)
MCV RBC AUTO: 88.2 FL (ref 80–100)
PDW BLD-RTO: 18.1 % (ref 12.4–15.4)
PERFORMED ON: ABNORMAL
PERFORMED ON: NORMAL
PHOSPHORUS: 4.5 MG/DL (ref 2.5–4.9)
PLATELET # BLD: 217 K/UL (ref 135–450)
PMV BLD AUTO: 8.6 FL (ref 5–10.5)
POTASSIUM SERPL-SCNC: 4.3 MMOL/L (ref 3.5–5.1)
RBC # BLD: 2.79 M/UL (ref 4–5.2)
SODIUM BLD-SCNC: 133 MMOL/L (ref 136–145)
WBC # BLD: 23 K/UL (ref 4–11)

## 2021-06-14 PROCEDURE — 6370000000 HC RX 637 (ALT 250 FOR IP): Performed by: THORACIC SURGERY (CARDIOTHORACIC VASCULAR SURGERY)

## 2021-06-14 PROCEDURE — 97166 OT EVAL MOD COMPLEX 45 MIN: CPT

## 2021-06-14 PROCEDURE — 97530 THERAPEUTIC ACTIVITIES: CPT

## 2021-06-14 PROCEDURE — 85027 COMPLETE CBC AUTOMATED: CPT

## 2021-06-14 PROCEDURE — 2000000000 HC ICU R&B

## 2021-06-14 PROCEDURE — 99024 POSTOP FOLLOW-UP VISIT: CPT | Performed by: THORACIC SURGERY (CARDIOTHORACIC VASCULAR SURGERY)

## 2021-06-14 PROCEDURE — 6370000000 HC RX 637 (ALT 250 FOR IP): Performed by: NURSE PRACTITIONER

## 2021-06-14 PROCEDURE — 2580000003 HC RX 258: Performed by: THORACIC SURGERY (CARDIOTHORACIC VASCULAR SURGERY)

## 2021-06-14 PROCEDURE — 6360000002 HC RX W HCPCS: Performed by: THORACIC SURGERY (CARDIOTHORACIC VASCULAR SURGERY)

## 2021-06-14 PROCEDURE — 97162 PT EVAL MOD COMPLEX 30 MIN: CPT

## 2021-06-14 PROCEDURE — 84100 ASSAY OF PHOSPHORUS: CPT

## 2021-06-14 PROCEDURE — 71045 X-RAY EXAM CHEST 1 VIEW: CPT

## 2021-06-14 PROCEDURE — 6360000002 HC RX W HCPCS: Performed by: INTERNAL MEDICINE

## 2021-06-14 PROCEDURE — 36592 COLLECT BLOOD FROM PICC: CPT

## 2021-06-14 PROCEDURE — 90935 HEMODIALYSIS ONE EVALUATION: CPT

## 2021-06-14 PROCEDURE — 80048 BASIC METABOLIC PNL TOTAL CA: CPT

## 2021-06-14 PROCEDURE — 99233 SBSQ HOSP IP/OBS HIGH 50: CPT | Performed by: INTERNAL MEDICINE

## 2021-06-14 PROCEDURE — 2580000003 HC RX 258: Performed by: NURSE PRACTITIONER

## 2021-06-14 RX ORDER — CLONIDINE HYDROCHLORIDE 0.1 MG/1
0.2 TABLET ORAL 3 TIMES DAILY
Status: DISCONTINUED | OUTPATIENT
Start: 2021-06-14 | End: 2021-06-15

## 2021-06-14 RX ORDER — BISACODYL 10 MG
10 SUPPOSITORY, RECTAL RECTAL DAILY PRN
Status: DISCONTINUED | OUTPATIENT
Start: 2021-06-14 | End: 2021-06-16 | Stop reason: HOSPADM

## 2021-06-14 RX ADMIN — GENTAMICIN SULFATE: 1 CREAM TOPICAL at 09:01

## 2021-06-14 RX ADMIN — Medication 10 ML: at 20:21

## 2021-06-14 RX ADMIN — CLONIDINE HYDROCHLORIDE 0.2 MG: 0.1 TABLET ORAL at 14:00

## 2021-06-14 RX ADMIN — TORSEMIDE 200 MG: 100 TABLET ORAL at 11:31

## 2021-06-14 RX ADMIN — METOPROLOL TARTRATE 50 MG: 50 TABLET, FILM COATED ORAL at 09:30

## 2021-06-14 RX ADMIN — SEVELAMER CARBONATE 800 MG: 800 TABLET, FILM COATED ORAL at 17:30

## 2021-06-14 RX ADMIN — SEVELAMER CARBONATE 800 MG: 800 TABLET, FILM COATED ORAL at 12:40

## 2021-06-14 RX ADMIN — ASPIRIN 81 MG: 81 TABLET, COATED ORAL at 10:49

## 2021-06-14 RX ADMIN — INSULIN DETEMIR 20 UNITS: 100 INJECTION, SOLUTION SUBCUTANEOUS at 20:39

## 2021-06-14 RX ADMIN — MUPIROCIN: 20 OINTMENT TOPICAL at 09:30

## 2021-06-14 RX ADMIN — STANDARDIZED SENNA CONCENTRATE AND DOCUSATE SODIUM 1 TABLET: 8.6; 5 TABLET ORAL at 20:17

## 2021-06-14 RX ADMIN — Medication 10 ML: at 09:30

## 2021-06-14 RX ADMIN — CLONIDINE HYDROCHLORIDE 0.2 MG: 0.1 TABLET ORAL at 20:17

## 2021-06-14 RX ADMIN — MORPHINE SULFATE 2 MG: 2 INJECTION, SOLUTION INTRAMUSCULAR; INTRAVENOUS at 04:16

## 2021-06-14 RX ADMIN — HEPARIN SODIUM 3400 UNITS: 1000 INJECTION INTRAVENOUS; SUBCUTANEOUS at 02:01

## 2021-06-14 RX ADMIN — CLONIDINE HYDROCHLORIDE 0.2 MG: 0.1 TABLET ORAL at 09:30

## 2021-06-14 RX ADMIN — Medication 10 ML: at 11:07

## 2021-06-14 RX ADMIN — METOPROLOL TARTRATE 50 MG: 50 TABLET, FILM COATED ORAL at 20:17

## 2021-06-14 RX ADMIN — NIFEDIPINE 60 MG: 30 TABLET, FILM COATED, EXTENDED RELEASE ORAL at 10:49

## 2021-06-14 RX ADMIN — ATORVASTATIN CALCIUM 20 MG: 20 TABLET, FILM COATED ORAL at 20:17

## 2021-06-14 RX ADMIN — ACETAMINOPHEN 1000 MG: 500 TABLET ORAL at 17:30

## 2021-06-14 RX ADMIN — MORPHINE SULFATE 2 MG: 2 INJECTION, SOLUTION INTRAMUSCULAR; INTRAVENOUS at 06:10

## 2021-06-14 RX ADMIN — HYDRALAZINE HYDROCHLORIDE 10 MG: 20 INJECTION INTRAMUSCULAR; INTRAVENOUS at 04:13

## 2021-06-14 RX ADMIN — ONDANSETRON 4 MG: 2 INJECTION INTRAMUSCULAR; INTRAVENOUS at 01:10

## 2021-06-14 RX ADMIN — CLONIDINE HYDROCHLORIDE 0.2 MG: 0.1 TABLET ORAL at 05:14

## 2021-06-14 RX ADMIN — SEVELAMER CARBONATE 800 MG: 800 TABLET, FILM COATED ORAL at 08:30

## 2021-06-14 RX ADMIN — CLOPIDOGREL BISULFATE 75 MG: 75 TABLET ORAL at 10:46

## 2021-06-14 RX ADMIN — BISACODYL 5 MG: 5 TABLET, COATED ORAL at 18:57

## 2021-06-14 RX ADMIN — TRAMADOL HYDROCHLORIDE 100 MG: 50 TABLET, FILM COATED ORAL at 01:10

## 2021-06-14 RX ADMIN — STANDARDIZED SENNA CONCENTRATE AND DOCUSATE SODIUM 1 TABLET: 8.6; 5 TABLET ORAL at 10:49

## 2021-06-14 ASSESSMENT — PAIN SCALES - GENERAL
PAINLEVEL_OUTOF10: 0
PAINLEVEL_OUTOF10: 2
PAINLEVEL_OUTOF10: 0
PAINLEVEL_OUTOF10: 7
PAINLEVEL_OUTOF10: 0
PAINLEVEL_OUTOF10: 0
PAINLEVEL_OUTOF10: 7
PAINLEVEL_OUTOF10: 9
PAINLEVEL_OUTOF10: 5

## 2021-06-14 NOTE — CARE COORDINATION
CM spoke with patient regarding discharge planning, she vacillates between wanting to go home with home care and going to rehab. A PMR consult was placed for [address]. CM spoke with patient's son Keya Vieyra, discussed plans. He is concerned about disposition but could not state what the family preference is. CM advised if the family wishes to have patient go to one of their homes that we need the name, phone number, and address of residence for home care to follow up. Plan: ARU when patient moves her bowels, she has a slight ileus.     Nirmala Nevarez, SERJION, CCM, RN  [facility]  [phone]

## 2021-06-14 NOTE — PROGRESS NOTES
Spoke with Dr Teresa Merlos 3 times over night concerning patient blood pressure. Additional procardia ordered, hydralazine prn, and clonidine ordered. These medications did not bring the blood pressure down much. BP would remain high even when patient was sleeping. Also tried to see if multiple pain medications would help. HD was ran in the middle of the night and that temporarily helped the BP, but it was up after HD was over. Pt was not able to tolerate entire run of HD.

## 2021-06-14 NOTE — PROGRESS NOTES
Occupational Therapy   Occupational Therapy Initial Assessment  Date: 2021   Patient Name: Shani Smiley  MRN: [id number]     : 1975    Date of Service: 2021    Discharge Recommendations:    Shani Smiley scored a 15/24 on the AM-PAC ADL Inpatient form. Current research shows that an AM-PAC score of 17 or less is typically not associated with a discharge to the patient's home setting. Based on the patient's AM-PAC score and their current ADL deficits, it is recommended that the patient have 5-7 sessions per week of Occupational Therapy at d/c to increase the patient's independence. At this time, this patient demonstrates the endurance, and/or tolerance for 3 hours of therapy each day, with a treatment frequency of 5-7x/wk. Please see assessment section for further patient specific details. If patient discharges prior to next session this note will serve as a discharge summary. Please see below for the latest assessment towards goals. OT Equipment Recommendations  Other: owns shower chair    Assessment   Performance deficits / Impairments: Decreased functional mobility ; Decreased ADL status; Decreased strength;Decreased endurance;Decreased sensation;Decreased high-level IADLs;Decreased coordination  Assessment: pt below baseline function and would benefit from continued skilled servicse - requires assist of two persons for sit to stand at this time  Treatment Diagnosis: decreased ADL/IADL/mobility/activity tolerance  Prognosis: Good  Decision Making: Medium Complexity  OT Education: OT Role;Plan of Care;Precautions; ADL Adaptive Strategies;Transfer Training  Patient Education: continue to reinforce for carryover  REQUIRES OT FOLLOW UP: Yes  Activity Tolerance  Activity Tolerance: Patient Tolerated treatment well;Patient limited by fatigue  Activity Tolerance: 2L 02 throughout, 02 saturation 93%, mild SOB  Safety Devices  Safety Devices in place: Yes  Type of devices: Call light within reach; Left in bed;Nurse notified           Patient Diagnosis(es): The primary encounter diagnosis was Near syncope. Diagnoses of Abnormal EKG, ESRD on peritoneal dialysis ([facility] [address] and Hyponatremia were also pertinent to this visit. has a past medical history of Diabetes mellitus ([facility] [address] End stage kidney disease ([facility] [address] Hypertension, Neuropathy, and Peripheral vascular disease ([facility] [address] has a past surgical history that includes Tonsillectomy;  section; other surgical history (2018); Toe amputation (Left, 2021); Toe amputation (Left, 2021); and Coronary artery bypass graft (N/A, 2021).     Treatment Diagnosis: decreased ADL/IADL/mobility/activity tolerance      Restrictions  Restrictions/Precautions  Restrictions/Precautions: Fall Risk, Weight Bearing  Required Braces or Orthoses?: Yes  Lower Extremity Weight Bearing Restrictions  Left Lower Extremity Weight Bearing: Weight Bearing As Tolerated (with surgical shoe)  Required Braces or Orthoses  Left Lower Extremity Brace: Boot  LLE Brace Type: Surgical shoe  Position Activity Restriction  Sternal Precautions: No Pushing, No Pulling, 5# Lifting Restrictions  Other position/activity restrictions: s/p CABG x 3     Subjective   General  Chart Reviewed: Yes  Patient assessed for rehabilitation services?: Yes  Additional Pertinent Hx: DM - peritoneal dialysis  Response to previous treatment: Patient with no complaints from previous session  Family / Caregiver Present: No  Diagnosis: CABG x 3   Subjective  Subjective: pt in bed on arrival - states she did not sleep very well last night due to dialysis  Patient Currently in Pain: No  Vital Signs  Patient Currently in Pain: No  Social/Functional History  Social/Functional History  Lives With: Son (plans to stay with son (info below based on son's home))  Type of Home: House  Home Layout: Two level, Able to Live on Main level with bedroom/bathroom  Home Access: Stairs to enter without rails  Entrance Stairs intact  Initiation: Requires cues for some  Sequencing: Requires cues for some                    LUE Strength  LUE Strength Comment: not formally tested due to sternal precautions  RUE Strength  RUE Strength Comment: not formally tested due to sternal precautions                   Plan   Plan  Times per week: 3-5x per week  Times per day: Daily  Current Treatment Recommendations: Strengthening, ROM, Balance Training, Functional Mobility Training, Endurance Training, Safety Education & Training, Patient/Caregiver Education & Training, Equipment Evaluation, Education, & procurement, Positioning, Self-Care / ADL    G-Code     OutComes Score                                                  AM-PAC Score        AM-PAC Inpatient Daily Activity Raw Score: 15 (06/14/21 1107)  AM-PAC Inpatient ADL T-Scale Score : 34.69 (06/14/21 1107)  ADL Inpatient CMS 0-100% Score: 56.46 (06/14/21 1107)  ADL Inpatient CMS G-Code Modifier : CK (06/14/21 1107)    Goals  Short term goals  Time Frame for Short term goals: LTG=STG  Short term goal 1: SBA functional mobility with rollator walker  Short term goal 2: SBA bed mobility with adherence to sternal precations  Short term goal 3: SBA UB bathing/dressing  Short term goal 4: min A LB bathing/dressing  Short term goal 5: min A functional transfers with adherence to sternal precations  Patient Goals   Patient goals : to return home       Therapy Time   Individual Concurrent Group Co-treatment   Time In 0933         Time Out 1002         Minutes 29         Timed Code Treatment Minutes: 19 Minutes   Total minutes MORALES Cadena/DILCIA [id number], 6/14/2021, 11:09 AM

## 2021-06-14 NOTE — PROGRESS NOTES
Physical Therapy    Facility/Department: [facility]  Initial Assessment    NAME: Maybell Simmonds  : 1975  MRN: [id number]    Date of Service: 2021    Discharge Recommendations:  Maybell Simmonds scored a 13/24 on the AM-PAC short mobility form. Current research shows that an AM-PAC score of 17 or less is typically not associated with a discharge to the patient's home setting. Based on the patient's AM-PAC score and their current functional mobility deficits, it is recommended that the patient have 5-7 sessions per week of Physical Therapy at d/c to increase the patient's independence. At this time, this patient demonstrates the endurance, and/or tolerance for 3 hours of therapy each day, with a treatment frequency of 5-7x/wk. Please see assessment section for further patient specific details. If patient discharges prior to next session this note will serve as a discharge summary. Please see below for the latest assessment towards goals. PT Equipment Recommendations  Equipment Needed: Yes  Mobility Devices: Roselyn Riding: Rollator (4 Wheeled)  Other: TBD with progress but likely with need rollator    Assessment   Body structures, Functions, Activity limitations: Decreased functional mobility ; Decreased ADL status; Decreased strength;Decreased balance;Decreased endurance  Assessment: Pt presenting with the above deficits following CABG and would benefit from skilled PT services to promote return towards PLOF. Pt with very limited endurance and requiring 2 person assistance for STS transfers. Prognosis: Good  Decision Making: Medium Complexity  PT Education: Goals;PT Role;Plan of Care;Precautions  Patient Education: verbalized understanding of sternal precautions  Barriers to Learning: none  REQUIRES PT FOLLOW UP: Yes  Activity Tolerance  Activity Tolerance: Patient limited by fatigue;Patient limited by endurance       Patient Diagnosis(es): The primary encounter diagnosis was Near syncope.  Diagnoses of Abnormal EKG, ESRD on peritoneal dialysis ([facility] [address] and Hyponatremia were also pertinent to this visit. has a past medical history of Diabetes mellitus ([facility] [address] End stage kidney disease ([facility] [address] Hypertension, Neuropathy, and Peripheral vascular disease ([facility] [address] has a past surgical history that includes Tonsillectomy;  section; other surgical history (2018); Toe amputation (Left, 2021); Toe amputation (Left, 2021); and Coronary artery bypass graft (N/A, 2021).     Restrictions  Restrictions/Precautions  Restrictions/Precautions: Fall Risk, Weight Bearing  Required Braces or Orthoses?: Yes  Lower Extremity Weight Bearing Restrictions  Left Lower Extremity Weight Bearing: Weight Bearing As Tolerated (with surgical shoe)  Required Braces or Orthoses  Left Lower Extremity Brace: Boot  LLE Brace Type: Surgical shoe  Position Activity Restriction  Sternal Precautions: No Pushing, No Pulling, 5# Lifting Restrictions  Other position/activity restrictions: s/p CABG x 3   Vision/Hearing  Vision: Impaired  Vision Exceptions: Wears glasses at all times  Hearing: Within functional limits     Subjective  General  Chart Reviewed: Yes  Diagnosis: s/p CABG   General Comment  Comments: supine in bed, on 2L O2 93% at rest  Subjective  Subjective: agreed to evaluation  Pain Screening  Patient Currently in Pain: No  Vital Signs  Patient Currently in Pain: No       Orientation  Orientation  Overall Orientation Status: Within Functional Limits  Social/Functional History  Social/Functional History  Lives With: Son (plans to stay with son (info below based on son's home))  Type of Home: House  Home Layout: Two level, Able to Live on Main level with bedroom/bathroom  Home Access: Stairs to enter without rails  Entrance Stairs - Number of Steps: 2  Bathroom Shower/Tub: Tub/Shower unit  Bathroom Toilet: Standard  Bathroom Equipment: Shower chair, Hand-held shower  ADL Assistance: Independent  Homemaking Assistance: Independent  Homemaking Responsibilities: Yes  Ambulation Assistance: Independent  Transfer Assistance: Independent  Active : Yes  Occupation: On disability  Additional Comments: Reports 2 falls due to passing out. Has been independent since your toe amputation on 5/26. Plans to stay with son at discharge (info above based on son's home)  Cognition        Objective     Observation/Palpation  Posture: Fair    AROM RLE (degrees)  RLE AROM: WFL  AROM LLE (degrees)  LLE AROM : WFL  Strength RLE  Comment: weakness as above with difficulty with transfers  Strength LLE  Comment: weakness as above with difficulty with transfers        Bed mobility  Supine to Sit: Minimal assistance (with HOB elevated)  Sit to Supine: 2 Person assistance (max A of 2)  Scooting: Minimal assistance  Transfers  Sit to Stand: 2 Person Assistance (mod to max A of 2 from EOB and chair level x 3 reps total)  Stand to sit: Moderate Assistance (decreased eccentric control for lowering)  Ambulation  Ambulation?: Yes  Ambulation 1  Surface: level tile  Device: Rollator  Other Apparatus: O2 (2L, surgical shoe/boot)  Assistance: Minimal assistance  Quality of Gait: antalgic due to boot causing leg length discrepancy  Gait Deviations: Slow Jyoti;Decreased step length;Decreased step height  Distance: 30 ft, 10 ft, and 40 ft  Comments: Limited endurance, feeling slighty dizzy and SOB;  O2 93%  Stairs/Curb  Stairs?: No     Balance  Posture: Fair  Sitting - Static: Good  Sitting - Dynamic: Fair  Standing - Static: Fair  Standing - Dynamic: [address]  Times per week: 3-5x/wk  Current Treatment Recommendations: Strengthening, Balance Training, Functional Mobility Training, Transfer Training, Endurance Training, Gait Training, Stair training  Safety Devices  Type of devices:  All fall risk precautions in place, Call light within reach, Bed alarm in place, Left in bed, Nurse notified    G-Code       OutComes Score

## 2021-06-14 NOTE — PROGRESS NOTES
Hospitalist Progress Note      PCP: Aravind Cannon MD    Date of Admission: 5/31/2021    Chief Complaint: [facility] Course: 68-year-old female was past medical history of ESRD on peritoneal dialysis, hypertension, hyperlipidemia, diabetes, peripheral vascular disease status post left toe amputation last week presents with presyncopal episode. She also describes a syncopal episode couple of weeks ago. Patient has recently been undergoing evaluation for possible renal transplant has had stress test performed at [facility] which was abnormal.  Patient is scheduled for outpatient left heart cath on 6/3/2021 . Patient is labile blood pressures, but negative orthostatics. Nephrology is adjusting blood pressure medications. She had left heart cath performed 6/3/2021 which showed multivessel disease. CTS consulted the plan for possible CABG. s/p CABG 6/09    Subjective: No chest pain or shortness breath fevers chills nausea vomiting  Medications:  Reviewed    Infusion Medications    sodium chloride      sodium chloride      dextrose       Scheduled Medications    cloNIDine  0.2 mg Oral TID    aspirin  81 mg Oral Daily    metoprolol tartrate  50 mg Oral BID    NIFEdipine  60 mg Oral Daily    epoetin toy-epbx  5,000 Units Subcutaneous Once    clopidogrel  75 mg Oral Daily    sodium chloride flush  5-40 mL Intravenous 2 times per day    sodium chloride flush  10 mL Intravenous 2 times per day    acetaminophen  1,000 mg Oral Q6H    mupirocin   Nasal BID    sennosides-docusate sodium  1 tablet Oral BID    pantoprazole  40 mg Oral Daily    insulin detemir  20 Units Subcutaneous Nightly    insulin aspart  10 Units Subcutaneous TID AC    gentamicin   Topical Daily    sevelamer  800 mg Oral TID WC    atorvastatin  20 mg Oral Nightly    torsemide  200 mg Oral Daily     PRN Meds: hydrALAZINE, sodium chloride flush, heparin (porcine), sodium chloride flush, sodium chloride, ondansetron, metoclopramide, traMADol **OR** traMADol, morphine, diphenhydrAMINE, zolpidem, magnesium hydroxide, polyethylene glycol, potassium chloride, magnesium sulfate, calcium chloride IVPB, calcium chloride IVPB, albuterol sulfate HFA, furosemide, glucose, dextrose, glucagon (rDNA), dextrose      Intake/Output Summary (Last 24 hours) at 6/14/2021 0924  Last data filed at 6/14/2021 0400  Gross per 24 hour   Intake 1180 ml   Output 1896 ml   Net -716 ml       Physical Exam Performed:    BP (!) 174/68   Pulse 81   Temp 97 °F (36.1 °C) (Temporal)   Resp 18   Ht 5' 9\" (1.753 m)   Wt 227 lb 1.2 oz (103 kg)   SpO2 96%   Breastfeeding No   BMI 33.53 kg/m²     General appearance: aaox3  HEENT: Pupils equal, round, and reactive to light. Conjunctivae/corneas clear. Neck: Supple, with full range of motion. No jugular venous distention. Respiratory:  ctab no wheezing  Cardiovascular: Regular rate and rhythm with normal S1/S2 ., chest dressing clean and dry  Abdomen: Soft, non-tender, non-distended with normal bowel sounds. Skin: Skin color, texture, turgor normal.  No rashes or lesions. Neurologic: no gross deficits  Psychiatric: Alert and oriented, thought content appropriate, normal insight        Labs:   Recent Labs     06/12/21  0315 06/13/21  0445 06/14/21  0405   WBC 24.3* 25.1* 23.0*   HGB 8.5* 8.1* 8.0*   HCT 26.7* 25.7* 24.6*    224 217     Recent Labs     06/12/21  0315 06/13/21  0445 06/14/21  0405   * 132* 133*   K 5.6* 5.2* 4.3   CL 94* 92* 95*   CO2 24 23 24   BUN 43* 53* 50*   CREATININE 6.4* 7.0* 5.8*   CALCIUM 7.9* 7.9* 7.9*   PHOS  --   --  4.5     No results for input(s): AST, ALT, BILIDIR, BILITOT, ALKPHOS in the last 72 hours. No results for input(s): INR in the last 72 hours. No results for input(s): Earnie Lent in the last 72 hours.     Urinalysis:      Lab Results   Component Value Date    NITRU Negative 06/06/2021    WBCUA 36 06/06/2021    BACTERIA RARE 06/06/2021    RBCUA 587 06/06/2021    BLOODU LARGE 06/06/2021    SPECGRAV 1.019 06/06/2021    GLUCOSEU 100 06/06/2021       Radiology:  XR CHEST PORTABLE   Final Result   No pneumothorax following tube removal.      Cardiomegaly with bibasilar airspace disease and possible small pleural   effusion. Poor inspiration. XR ABDOMEN (KUB) (SINGLE AP VIEW)   Final Result   Nonspecific bowel gas pattern. Mild small bowel and colonic distension   suggesting a mild ileus. No evidence of obstruction. XR CHEST PORTABLE   Final Result   Status post open heart surgery. Support tubes and [place] catheter, as   above. Right basilar atelectasis and small effusion. IR FLUORO GUIDED CVA DEVICE PLMT/REPLACE/REMOVAL   Final Result   Technically successful right-sided HD catheter placement. Catheter is okay   for use. VL DUP CAROTID BILATERAL   Final Result      VL PRE OP VEIN MAPPING   Final Result      CT CHEST WO CONTRAST   Final Result   No acute intrathoracic abnormality         MRI BRAIN WO CONTRAST   Final Result   1. No acute intracranial abnormality. 2. Mild chronic white matter microvascular ischemic changes. 3. Right maxillary sinusitis.          XR CHEST PORTABLE   Final Result   Cardiomegaly with stable right pleural effusion                 Assessment/Plan:    Active Hospital Problems    Diagnosis     S/P coronary artery bypass graft x 3 [Z95.1]     S/P left atrial appendage ligation [Z98.890]     Mild malnutrition (HCC) [E44.1]     Coronary artery disease of native artery of native heart with stable angina pectoris (HCC) [I25.118]     Near syncope [R55]     Atherosclerosis of native arteries of left leg with ulceration of other part of foot ([address] [I70.245]     Diabetic polyneuropathy associated with type 2 diabetes mellitus ([address] [E11.42]     ESRD (end stage renal disease) ([address] [N18.6]     Diabetes mellitus ([address] [E11.9]        Multivessel CAD  Status post left heart cath 6/3/2021  S/p CABG 6/09  - per ctvs    Leukocytosis: likely reactive monitor no fevers    UTI  As per urinalysis  Asymptomatic  -s/p rocpehin       ESRD on PD  Nephrology consulted    Hypertension  With labile BP  BP meds adjusting per nephrology    DM: levemir and lispro    Gangrene of left foot s/p amputation of 1 and 2 toes 5/26  - podiatry on board    PAD ss/p left popliteal, anterior tibial, and dorsalis pedis angioplasty by Dr. John Farr on 5/27/21  DVT Prophylaxis: Heparin  Diet: Adult Oral Nutrition Supplement; Low Calorie/High Protein Oral Supplement  ADULT DIET; Regular; 4 carb choices (60 gm/meal);  Low Fat/Low Chol/High Fiber/QUINN; 2000 ml  Code Status: Full Code      Electronically signed by Evan Ramos MD on 6/14/2021 at 9:24 AM

## 2021-06-14 NOTE — PROGRESS NOTES
Office : [phone]     Fax :[phone]       Nephrology progress  Note      Patient's Name: Perry Shore  10:53 AM  2021    Reason for Consult:  ESRD    History of Present Ilness:    Perry Shore is a 51 y.o. female with severe end-stage renal disease secondary to diabetic nephropathy on peritoneal dialysis, hypertension, peripheral vascular disease, diabetic neuropathy who was admitted after she had a syncopal episode. She had similar episode 2 weeks ago. Recently had angiogram left lower extremity for gangrene left foot second toe. Denies any shortness of breath. Denies any abdominal pain. Denies any nausea vomiting. Interval HX :      Hemodynamically stable. S/p CABG on 2010  Doing well     Had Dialysis early today       I/O last 3 completed shifts: In: 12 [P.O.:480]  Out: Mic Salvador [Urine:100; Chest Tube:185]    Past Medical History:   Diagnosis Date    Diabetes mellitus [facility] [facility]     End stage kidney disease ([facility] [address]     peritoneal dialysis every night at home x 2 years    Hypertension     Neuropathy     Peripheral vascular disease ([facility] [address]        Past Surgical History:   Procedure Laterality Date     SECTION      CORONARY ARTERY BYPASS GRAFT N/A 2021    URGENT CABG CORONARY ARTERY BYPASS GRAFTS X3. VEIN TO PDA X1, VEIN TO OM X1, LEFT INTERNAL MAMMARY ARTERY TO LAD X1. ENDOSCOPIC HARVEST RIGHT LEG SAPHENOUS VEIN. LIGATION NYA USING 35MM ATRICLIP. EPI AORTIC ULTRASOUND. TOTAL CARDIOPULMONARY BYPASS. DOPPLER GRAFT FLOW VERIFICATION. BILATERAL INITERCOSTAL NERVE BLOCK USING 1.3% EXPAREL.  performed by Sukhdev Jiménez MD at [facility] [place]  2018     lap PD cath placement lt side 62 cm    TOE AMPUTATION Left 2021    AMPUTATION OF THE LEFT THIRD TOE performed by Anna Godfrey DPM at [address] [facility] [address] TOE AMPUTATION Left 5/26/2021    AMPUTATION OF HALLUX AND SECOND TOE, LEFT FOOT performed by Anna Godfrey DPM at [place] [address]         Family History   Problem Relation Age of Onset    Coronary Art Dis Mother     High Blood Pressure Mother     Heart Disease Mother     Diabetes Mother     Cancer Father     Coronary Art Dis Father     High Blood Pressure Father     Heart Disease Father     Diabetes Father      Current Medications:    cloNIDine (CATAPRES) tablet 0.2 mg, TID  aspirin EC tablet 81 mg, Daily  metoprolol tartrate (LOPRESSOR) tablet 50 mg, BID  NIFEdipine (PROCARDIA XL) extended release tablet 60 mg, Daily  epoetin toy-epbx (RETACRIT) injection 5,000 Units, Once  hydrALAZINE (APRESOLINE) injection 10 mg, Q6H PRN  clopidogrel (PLAVIX) tablet 75 mg, Daily  0.9 % sodium chloride infusion, Continuous  sodium chloride flush 0.9 % injection 5-40 mL, 2 times per day  sodium chloride flush 0.9 % injection 5-40 mL, PRN  heparin (porcine) injection 3,400 Units, PRN  sodium chloride flush 0.9 % injection 10 mL, 2 times per day  sodium chloride flush 0.9 % injection 10 mL, PRN  0.9 % sodium chloride infusion, PRN  ondansetron (ZOFRAN) injection 4 mg, Q6H PRN  metoclopramide (REGLAN) injection 10 mg, Q6H PRN  acetaminophen (TYLENOL) tablet 1,000 mg, Q6H  traMADol (ULTRAM) tablet 50 mg, Q6H PRN   Or  traMADol (ULTRAM) tablet 100 mg, Q6H PRN  morphine (PF) injection 2 mg, Q2H PRN  mupirocin (BACTROBAN) 2 % ointment, BID  diphenhydrAMINE (BENADRYL) tablet 25 mg, Nightly PRN  zolpidem (AMBIEN) tablet 5 mg, Nightly PRN  sennosides-docusate sodium (SENOKOT-S) 8.6-50 MG tablet 1 tablet, BID  magnesium hydroxide (MILK OF MAGNESIA) 400 MG/5ML suspension 30 mL, Daily PRN  polyethylene glycol (GLYCOLAX) packet 17 g, Daily PRN  pantoprazole (PROTONIX) tablet 40 mg, Daily  potassium chloride 20 mEq/50 mL IVPB [employer]), PRN  magnesium sulfate 2000 mg in 50 mL IVPB premix, PRN  calcium chloride 1,000 mg in sodium chloride 0.9 % 100 mL IVPB, PRN  calcium chloride 2,000 mg in sodium chloride 0.9 % 100 mL IVPB, PRN  albuterol sulfate  (90 Base) MCG/ACT inhaler 2 puff, Q6H PRN  furosemide (LASIX) injection 20 mg, PRN  glucose (GLUTOSE) 40 % oral gel 15 g, PRN  dextrose 50 % IV solution, PRN  glucagon (rDNA) injection 1 mg, PRN  dextrose 5 % solution, PRN  insulin detemir (LEVEMIR) injection pen 20 Units, Nightly  insulin aspart (NOVOLOG) injection pen 10 Units PATIENT SUPPLIED, TID AC  gentamicin (GARAMYCIN) 0.1 % cream, Daily  sevelamer (RENVELA) tablet 800 mg, TID WC  atorvastatin (LIPITOR) tablet 20 mg, Nightly  torsemide (DEMADEX) tablet 200 mg, Daily          Physical exam:     Vitals:  BP (!) 174/68   Pulse 81   Temp 97 °F (36.1 °C) (Temporal)   Resp 18   Ht 5' 9\" (1.753 m)   Wt 227 lb 1.2 oz (103 kg)   SpO2 96%   Breastfeeding No   BMI 33.53 kg/m²   Constitutional:  OAA X3 NAD  Skin: no rash, turgor wnl  Heent:  eomi, mmm  Neck: no bruits or jvd noted  Cardiovascular:  S1, S2 without m/r/g  Respiratory: CTA B without w/r/r  Abdomen:  +bs, soft, nt, nd  Ext: no  lower extremity edema      Labs:  CBC:   Recent Labs     06/12/21 0315 06/13/21 0445 06/14/21  0405   WBC 24.3* 25.1* 23.0*   HGB 8.5* 8.1* 8.0*    224 217     BMP:    Recent Labs     06/12/21 0315 06/13/21 0445 06/14/21  0405   * 132* 133*   K 5.6* 5.2* 4.3   CL 94* 92* 95*   CO2 24 23 24   BUN 43* 53* 50*   CREATININE 6.4* 7.0* 5.8*   GLUCOSE 189* 129* 75     Ca/Mg/Phos:   Recent Labs     06/12/21 0315 06/13/21  0445 06/14/21  0405   CALCIUM 7.9* 7.9* 7.9*   MG 2.40  --   --    PHOS  --   --  4.5     UA:  No results for input(s): COLORU, CLARITYU, GLUCOSEU, BILIRUBINUR, KETUA, SPECGRAV, BLOODU, PHUR, PROTEINU, UROBILINOGEN, NITRU, LEUKOCYTESUR, LABMICR, URINETYPE in the last 72 hours.    Urine Microscopic:   No results for input(s): LABCAST, BACTERIA, COMU, HYALCAST, WBCUA, RBCUA, EPIU in the last 72 hours. Urine Culture:   No results for input(s): LABURIN in the last 72 hours. Urine Chemistry: No results for input(s): Vera Filter, PROTEINUR, NAUR in the last 72 hours. IMAGING:  XR CHEST PORTABLE   Final Result   No pneumothorax following tube removal.      Cardiomegaly with bibasilar airspace disease and possible small pleural   effusion. Poor inspiration. XR ABDOMEN (KUB) (SINGLE AP VIEW)   Final Result   Nonspecific bowel gas pattern. Mild small bowel and colonic distension   suggesting a mild ileus. No evidence of obstruction. XR CHEST PORTABLE   Final Result   Status post open heart surgery. Support tubes and [place] catheter, as   above. Right basilar atelectasis and small effusion. IR FLUORO GUIDED CVA DEVICE PLMT/REPLACE/REMOVAL   Final Result   Technically successful right-sided HD catheter placement. Catheter is okay   for use. VL DUP CAROTID BILATERAL   Final Result      VL PRE OP VEIN MAPPING   Final Result      CT CHEST WO CONTRAST   Final Result   No acute intrathoracic abnormality         MRI BRAIN WO CONTRAST   Final Result   1. No acute intracranial abnormality. 2. Mild chronic white matter microvascular ischemic changes. 3. Right maxillary sinusitis. XR CHEST PORTABLE   Final Result   Cardiomegaly with stable right pleural effusion                 Assessment/Plan :      1. ESRD. Was On peritoneal dialysis. Switched  her temporarily to hemodialysis . Had HD today morning   Chest tube is removed   No SOB   Will start back on PD tomorrow     2. HTN. BP elevated   started on nifedipine XL   On clonidine       3. Anemia of chronic disease. Gets micera at dialysis unit  Will give EPOGEN once BP controlled     4. Acid- base disorder. Monitor    5. Electrolytes monitor and replace as needed    6. CAD/Syncopal episode.    S/p [facility]   Has Multivessel CAD S/p CABG       7. Peripheral vascular disease. S/p amputation of left foot toes. Podiatry following     8. Left lower abdominal pain   Has mild ileus   Passing flatus today.        Thank you for allowing us to participate in care of Linsey García         Electronically signed by: Bertram Day MD, 6/14/2021, 10:53 AM      [facility] [address]  Office : [phone]  Fax :[phone]

## 2021-06-14 NOTE — PROGRESS NOTES
Chest tubes meet criteria to remove per open heart protocol. No air leak and no crepitus noted. Pt instructed on procedure. Pt premedicated with morphine. Dressings removed. Incision within normal limits. Site cleansed and prepped per protocol. Chest tubes X 2 removed without difficulty. Vaseline gauze and dry sterile dressing applied. Bilateral breath sounds audible. O2Sats 96% on room air. Patient tolerated well.

## 2021-06-14 NOTE — PROGRESS NOTES
CC: s/p CABG x 3 and NYA clip; ESRD    No c/o  NSR  3 kg above base weight  CTAB RRR sternum stable  As per CC progressing   D/W Dr. Cruz Bio -- D/C right femoral vein vascath and plan is for PD tomorrow evening  Encouraged activity  Laxative of choice

## 2021-06-15 LAB
ANION GAP SERPL CALCULATED.3IONS-SCNC: 15 MMOL/L (ref 3–16)
BANDED NEUTROPHILS RELATIVE PERCENT: 1 % (ref 0–7)
BASOPHILS ABSOLUTE: 0 K/UL (ref 0–0.2)
BASOPHILS RELATIVE PERCENT: 0 %
BUN BLDV-MCNC: 62 MG/DL (ref 7–20)
CALCIUM SERPL-MCNC: 7.9 MG/DL (ref 8.3–10.6)
CHLORIDE BLD-SCNC: 93 MMOL/L (ref 99–110)
CO2: 23 MMOL/L (ref 21–32)
CREAT SERPL-MCNC: 7.4 MG/DL (ref 0.6–1.1)
EOSINOPHILS ABSOLUTE: 0 K/UL (ref 0–0.6)
EOSINOPHILS RELATIVE PERCENT: 0 %
GFR AFRICAN AMERICAN: 7
GFR NON-AFRICAN AMERICAN: 6
GLUCOSE BLD-MCNC: 123 MG/DL (ref 70–99)
GLUCOSE BLD-MCNC: 142 MG/DL (ref 70–99)
GLUCOSE BLD-MCNC: 156 MG/DL (ref 70–99)
GLUCOSE BLD-MCNC: 159 MG/DL (ref 70–99)
GLUCOSE BLD-MCNC: 165 MG/DL (ref 70–99)
GLUCOSE BLD-MCNC: 244 MG/DL (ref 70–99)
HCT VFR BLD CALC: 23 % (ref 36–48)
HEMOGLOBIN: 7.5 G/DL (ref 12–16)
LYMPHOCYTES ABSOLUTE: 1.1 K/UL (ref 1–5.1)
LYMPHOCYTES RELATIVE PERCENT: 6 %
MAGNESIUM: 2.5 MG/DL (ref 1.8–2.4)
MCH RBC QN AUTO: 29.2 PG (ref 26–34)
MCHC RBC AUTO-ENTMCNC: 32.4 G/DL (ref 31–36)
MCV RBC AUTO: 90.2 FL (ref 80–100)
METAMYELOCYTES RELATIVE PERCENT: 1 %
MONOCYTES ABSOLUTE: 1.1 K/UL (ref 0–1.3)
MONOCYTES RELATIVE PERCENT: 6 %
NEUTROPHILS ABSOLUTE: 16.4 K/UL (ref 1.7–7.7)
NEUTROPHILS RELATIVE PERCENT: 86 %
NUCLEATED RED BLOOD CELLS: 1 /100 WBC
PDW BLD-RTO: 18.4 % (ref 12.4–15.4)
PERFORMED ON: ABNORMAL
PLATELET # BLD: 188 K/UL (ref 135–450)
PLATELET SLIDE REVIEW: ADEQUATE
PMV BLD AUTO: 8.6 FL (ref 5–10.5)
POTASSIUM REFLEX MAGNESIUM: 5.6 MMOL/L (ref 3.5–5.1)
RBC # BLD: 2.55 M/UL (ref 4–5.2)
RBC # BLD: NORMAL 10*6/UL
SLIDE REVIEW: ABNORMAL
SODIUM BLD-SCNC: 131 MMOL/L (ref 136–145)
WBC # BLD: 18.6 K/UL (ref 4–11)

## 2021-06-15 PROCEDURE — 90945 DIALYSIS ONE EVALUATION: CPT

## 2021-06-15 PROCEDURE — 80048 BASIC METABOLIC PNL TOTAL CA: CPT

## 2021-06-15 PROCEDURE — 85025 COMPLETE CBC W/AUTO DIFF WBC: CPT

## 2021-06-15 PROCEDURE — 2000000000 HC ICU R&B

## 2021-06-15 PROCEDURE — 6370000000 HC RX 637 (ALT 250 FOR IP): Performed by: THORACIC SURGERY (CARDIOTHORACIC VASCULAR SURGERY)

## 2021-06-15 PROCEDURE — 2580000003 HC RX 258: Performed by: NURSE PRACTITIONER

## 2021-06-15 PROCEDURE — 83735 ASSAY OF MAGNESIUM: CPT

## 2021-06-15 PROCEDURE — 6360000002 HC RX W HCPCS: Performed by: THORACIC SURGERY (CARDIOTHORACIC VASCULAR SURGERY)

## 2021-06-15 PROCEDURE — 6360000002 HC RX W HCPCS: Performed by: INTERNAL MEDICINE

## 2021-06-15 PROCEDURE — 36592 COLLECT BLOOD FROM PICC: CPT

## 2021-06-15 PROCEDURE — P9047 ALBUMIN (HUMAN), 25%, 50ML: HCPCS | Performed by: INTERNAL MEDICINE

## 2021-06-15 PROCEDURE — 99233 SBSQ HOSP IP/OBS HIGH 50: CPT | Performed by: INTERNAL MEDICINE

## 2021-06-15 PROCEDURE — 99024 POSTOP FOLLOW-UP VISIT: CPT | Performed by: THORACIC SURGERY (CARDIOTHORACIC VASCULAR SURGERY)

## 2021-06-15 RX ORDER — ALBUMIN (HUMAN) 12.5 G/50ML
12.5 SOLUTION INTRAVENOUS ONCE
Status: COMPLETED | OUTPATIENT
Start: 2021-06-15 | End: 2021-06-15

## 2021-06-15 RX ORDER — DOPAMINE HYDROCHLORIDE 160 MG/100ML
2-20 INJECTION, SOLUTION INTRAVENOUS CONTINUOUS
Status: DISCONTINUED | OUTPATIENT
Start: 2021-06-15 | End: 2021-06-16 | Stop reason: HOSPADM

## 2021-06-15 RX ORDER — BISACODYL 10 MG
10 SUPPOSITORY, RECTAL RECTAL DAILY PRN
Status: DISCONTINUED | OUTPATIENT
Start: 2021-06-15 | End: 2021-06-16 | Stop reason: HOSPADM

## 2021-06-15 RX ADMIN — ATORVASTATIN CALCIUM 20 MG: 20 TABLET, FILM COATED ORAL at 19:52

## 2021-06-15 RX ADMIN — PANTOPRAZOLE SODIUM 40 MG: 40 TABLET, DELAYED RELEASE ORAL at 05:20

## 2021-06-15 RX ADMIN — SEVELAMER CARBONATE 800 MG: 800 TABLET, FILM COATED ORAL at 11:34

## 2021-06-15 RX ADMIN — BISACODYL 10 MG: 10 SUPPOSITORY RECTAL at 17:35

## 2021-06-15 RX ADMIN — TRAMADOL HYDROCHLORIDE 50 MG: 50 TABLET, FILM COATED ORAL at 01:47

## 2021-06-15 RX ADMIN — ACETAMINOPHEN 1000 MG: 500 TABLET ORAL at 05:20

## 2021-06-15 RX ADMIN — STANDARDIZED SENNA CONCENTRATE AND DOCUSATE SODIUM 1 TABLET: 8.6; 5 TABLET ORAL at 09:30

## 2021-06-15 RX ADMIN — ACETAMINOPHEN 1000 MG: 500 TABLET ORAL at 11:39

## 2021-06-15 RX ADMIN — GENTAMICIN SULFATE: 1 CREAM TOPICAL at 09:00

## 2021-06-15 RX ADMIN — INSULIN DETEMIR 20 UNITS: 100 INJECTION, SOLUTION SUBCUTANEOUS at 19:47

## 2021-06-15 RX ADMIN — GENTAMICIN SULFATE: 1 CREAM TOPICAL at 18:18

## 2021-06-15 RX ADMIN — ASPIRIN 81 MG: 81 TABLET, COATED ORAL at 13:30

## 2021-06-15 RX ADMIN — CLOPIDOGREL BISULFATE 75 MG: 75 TABLET ORAL at 11:30

## 2021-06-15 RX ADMIN — ALBUMIN (HUMAN) 12.5 G: 0.25 INJECTION, SOLUTION INTRAVENOUS at 09:04

## 2021-06-15 RX ADMIN — Medication 10 ML: at 09:00

## 2021-06-15 RX ADMIN — DIPHENHYDRAMINE HYDROCHLORIDE 25 MG: 25 TABLET ORAL at 19:52

## 2021-06-15 RX ADMIN — ACETAMINOPHEN 1000 MG: 500 TABLET ORAL at 00:04

## 2021-06-15 RX ADMIN — DOPAMINE HYDROCHLORIDE 2 MCG/KG/MIN: 160 INJECTION, SOLUTION INTRAVENOUS at 00:54

## 2021-06-15 RX ADMIN — STANDARDIZED SENNA CONCENTRATE AND DOCUSATE SODIUM 1 TABLET: 8.6; 5 TABLET ORAL at 19:52

## 2021-06-15 RX ADMIN — TRAMADOL HYDROCHLORIDE 100 MG: 50 TABLET, FILM COATED ORAL at 19:52

## 2021-06-15 ASSESSMENT — PAIN SCALES - GENERAL
PAINLEVEL_OUTOF10: 0
PAINLEVEL_OUTOF10: 3
PAINLEVEL_OUTOF10: 0
PAINLEVEL_OUTOF10: 3
PAINLEVEL_OUTOF10: 7

## 2021-06-15 ASSESSMENT — PAIN DESCRIPTION - PAIN TYPE
TYPE: SURGICAL PAIN
TYPE: CHRONIC PAIN

## 2021-06-15 ASSESSMENT — PAIN DESCRIPTION - ORIENTATION: ORIENTATION: LOWER;MID

## 2021-06-15 ASSESSMENT — PAIN DESCRIPTION - LOCATION
LOCATION: CHEST
LOCATION: BACK

## 2021-06-15 ASSESSMENT — PAIN DESCRIPTION - DESCRIPTORS: DESCRIPTORS: PRESSURE

## 2021-06-15 NOTE — PROGRESS NOTES
Physical/Occupational Therapy  Kevin Orta  Attempted PT/OT treatment this date. RN, Catarina Acevedo, requesting PT/OT hold session this AM due to pt having BP issues. Will re-attempt treatment session as schedule allows and pt becomes medically appropriate.    Josselin Phillips PT, DPT [id number]   Kaleigh Sun., OTR/L, RI4322

## 2021-06-15 NOTE — PROGRESS NOTES
POD6 cabg x 3,NYA  Off dopamine gtt  Albumin given as ordered by nephro  Seen by PT/OT  NSR  Pain controlled  kub noted  Dulcolax tabs given prn

## 2021-06-15 NOTE — PROGRESS NOTES
CC: s/p CABG x 3 and NYA clip / ESRD    C/o not sleeping much last night  NSR  CTAB RRR sternum stable  As per CC  D/C planning (encouraged activity, likely rehab candidate)  PD per nephrology  Laxative of choice

## 2021-06-15 NOTE — PROGRESS NOTES
Called in to assess pt PD catheter for patency. Pt to start PD again this evening. Catheter flushed with 5 ml NS without difficulty. Aspiration of saline with ease. Small blood clots noted, pulled out of catheter and re-flushed and aspirated with no complications. Informed RN.

## 2021-06-15 NOTE — PROGRESS NOTES
Pt now states everything feels heavy and her muscles ache. Also states she is having trouble seeing, blurred vision with \"spiderweb\" looking black lines. Labs ordered and sent. awaiting results.  Blood sugar checked- 156

## 2021-06-15 NOTE — PLAN OF CARE
Problem: Falls - Risk of:  Goal: Will remain free from falls  Description: Will remain free from falls  Outcome: Ongoing  Note: Pt has remained free from any fall so far this shift. Bed alarm activated. Non-skid socks on. Bed in lowest and locked position. Belongings within reach. Will continue to monitor. Call light within reach. Problem: Pain:  Description: Pain management should include both nonpharmacologic and pharmacologic interventions. Goal: Pain level will decrease  Description: Pain level will decrease  Outcome: Ongoing  Note: Pt s/p OHS. PRN tramadol, scheduled Tylenol. Pt denies any pain so far this shift. Instructed pt to call out if any pain arises. Pt  V/U. Will continue to monitor. Call light within reach. Problem: Skin Integrity:  Goal: Will show no infection signs and symptoms  Description: Will show no infection signs and symptoms  Outcome: Ongoing  Note: Pt up to bathroom to get bathed. Dial soap used. Incisions cleaned. No signed of infection. Will continue to monitor. Call light within reach.

## 2021-06-15 NOTE — PROGRESS NOTES
EVENTS:POD 6 cabg x 3, NYA  b        Todays wt:104.5  Admission wt:103    NEURO:    CARDIAC:    RESP:    GI:    SKIN:    MUSCUSKELETAL:      LINES      GTTS:      See flowsheets for details, VS, MAR for meds and titration.

## 2021-06-15 NOTE — CONSULTS
CARDIOVASCULAR: negative for chest pain, palpitations, exertional chest pressure/discomfort, edema, syncope. GASTROINTESTINAL: negative for nausea, vomiting, diarrhea, constipation, blood in stool and abdominal pain. GENITOURINARY: negative for frequency, dysuria, urinary incontinence, decreased urine volume, and hematuria. HEMATOLOGIC/LYMPHATIC: negative for easy bruising, bleeding and lymphadenopathy. ALLERGIC/IMMUNOLOGIC: negative for recurrent infections, angioedema, anaphylaxis and drug reactions. ENDOCRINE: negative for weight changes and diabetic symptoms including polyuria, polydipsia and polyphagia. MUSCULOSKELETAL: negative for pain, joint swelling, decreased range of motion and muscle weakness. NEUROLOGICAL: negative for headaches, slurred speech, unilateral weakness. PSYCHIATRIC/BEHAVIORAL: negative for hallucinations, behavioral problems, confusion and agitation. All pertinent positives are noted in the HPI.     Physical Examination:  Vitals:   Patient Vitals for the past 24 hrs:   BP Temp Temp src Pulse Resp SpO2 Weight   06/15/21 0700 117/60 -- -- 75 -- 95 % --   06/15/21 0645 (!) 115/42 -- -- 78 -- 93 % --   06/15/21 0630 (!) 112/49 -- -- 78 -- -- --   06/15/21 0615 (!) 110/47 -- -- 76 -- 99 % --   06/15/21 0600 (!) 106/49 -- -- 75 -- 100 % --   06/15/21 0545 (!) 108/53 -- -- 74 -- 98 % --   06/15/21 0530 (!) 110/46 -- -- 78 -- 93 % --   06/15/21 0528 -- -- -- -- -- 92 % --   06/15/21 0523 -- -- -- -- -- -- 230 lb 6.1 oz (104.5 kg)   06/15/21 0515 (!) 93/45 -- -- 75 -- -- --   06/15/21 0500 102/64 96.2 °F (35.7 °C) Temporal 74 20 -- --   06/15/21 0445 (!) 105/29 -- -- 73 -- -- --   06/15/21 0430 (!) 105/38 -- -- 73 -- -- --   06/15/21 0415 (!) 91/26 -- -- 75 -- 92 % --   06/15/21 0400 (!) 104/29 -- -- 78 -- 90 % --   06/15/21 0345 (!) 95/33 -- -- 78 -- (!) 89 % --   06/15/21 0330 (!) 103/33 -- -- 78 -- 98 % --   06/15/21 0315 (!) 121/39 -- -- 74 -- 96 % --   06/15/21 0300 (!) 103/39 -- -- 76 -- 96 % --   06/15/21 0247 (!) 99/28 -- -- 76 -- 93 % --   06/15/21 0230 (!) 93/51 -- -- 75 -- 95 % --   06/15/21 0219 (!) 96/30 -- -- 73 -- 96 % --   06/15/21 0215 (!) 89/29 -- -- 75 -- 97 % --   06/15/21 0200 96/65 -- -- 75 -- 96 % --   06/15/21 0145 (!) 89/55 -- -- 72 -- 93 % --   06/15/21 0130 (!) 105/49 -- -- 70 -- 92 % --   06/15/21 0115 104/65 -- -- 63 -- 93 % --   06/15/21 0100 101/66 -- -- 68 -- 91 % --   06/15/21 0033 (!) 95/59 -- -- 68 -- 93 % --   06/15/21 0032 (!) 87/51 -- -- 73 -- 93 % --   06/15/21 0030 (!) 87/48 -- -- 68 -- 94 % --   06/15/21 0000 (!) 68/49 -- -- 67 -- -- --   06/14/21 2330 (!) 109/90 -- -- 68 -- -- --   06/14/21 2315 -- -- -- 68 -- -- --   06/14/21 2300 112/81 -- -- 63 -- -- --   06/14/21 2245 -- -- -- 67 -- -- --   06/14/21 2230 103/71 -- -- 68 -- -- --   06/14/21 2215 -- -- -- 67 -- -- --   06/14/21 2200 99/86 -- -- 67 -- -- --   06/14/21 2145 -- -- -- 70 -- -- --   06/14/21 2130 (!) 150/54 -- -- 70 -- -- --   06/14/21 2100 (!) 125/57 97.2 °F (36.2 °C) Temporal 75 20 93 % --   06/14/21 2015 -- -- -- 73 -- 93 % --   06/14/21 2000 (!) 174/59 -- -- 75 -- 93 % --   06/14/21 1945 -- -- -- 77 -- 93 % --   06/14/21 1930 (!) 179/65 -- -- 74 -- -- --   06/14/21 1915 -- -- -- 75 -- -- --   06/14/21 1900 (!) 168/40 -- -- 78 -- -- --   06/14/21 1600 (!) 175/42 97 °F (36.1 °C) Temporal -- 18 -- --   06/14/21 1300 -- 97 °F (36.1 °C) -- -- 16 95 % --     Psych: Stable mood, normal judgement, normal affect. Const: No distress  Eyes: Conjunctiva noninjected, no icterus noted; pupils equal, round. HENT: Atraumatic, normocephalic; Oral mucosa moist  Neck: Trachea midline, neck supple. No thyromegaly noted. CV: No audible murmurs  Resp: No increased WOB, no audible wheezing. Audible upper airway sounds  GI: Nondistended   Neuro: Alert, oriented, appropriate. Skin: No visible abnormalities  MSK: BLE amputations   Ext: No significant edema appreciated. No varicosities.     Lab Results   Component Value Date    WBC 18.6 (H) 06/15/2021    HGB 7.5 (L) 06/15/2021    HCT 23.0 (L) 06/15/2021    MCV 90.2 06/15/2021     06/15/2021     Lab Results   Component Value Date    INR 1.02 06/04/2021    INR 0.95 02/26/2021    INR 0.91 02/11/2021    PROTIME 11.8 06/04/2021    PROTIME 11.0 02/26/2021    PROTIME 10.6 02/11/2021     Lab Results   Component Value Date    CREATININE 7.4 (HH) 06/15/2021    BUN 62 (H) 06/15/2021     (L) 06/15/2021    K 5.6 (H) 06/15/2021    CL 93 (L) 06/15/2021    CO2 23 06/15/2021     Lab Results   Component Value Date    ALT <5 (L) 05/31/2021    AST 9 (L) 05/31/2021    ALKPHOS 120 05/31/2021    BILITOT <0.2 05/31/2021       Most recent echocardiogram revealed   Study Conclusions     - Left ventricle: The cavity size is mildly dilated. Wall thickness is normal. Systolic   function was    normal. The estimated ejection fraction was in the range of 55% to 60%. Wall motion was   normal;    there were no regional wall motion abnormalities. Features are consistent with a pseudonormal   left    ventricular filling pattern, with concomitant abnormal relaxation and increased filling   pressure    (grade 2 diastolic dysfunction). - Aortic valve: Mild calcification. Mild thickening.  - Mitral valve: Mildly calcified annulus.  Mild thickening.  - Right ventricle: Systolic function was normal by objective interpretation    Most recent EKG revealed  Sinus rhythm with occasional Premature ventricular complexes ST & T wave abnormality, consider inferolateral ischemia Prolonged QTConfirmed     Most recent imaging studies revealed   Narrative   EXAMINATION:   ONE XRAY VIEW OF THE CHEST       6/14/2021 6:24 am       COMPARISON:   Prior study(s) most recent 06/09/2021.       HISTORY:   ORDERING SYSTEM PROVIDED HISTORY: CT removal   TECHNOLOGIST PROVIDED HISTORY:   Reason for exam:->CT removal       FINDINGS:   The heart is moderately enlarged.  Pulmonary vessels are upper normal.

## 2021-06-15 NOTE — FLOWSHEET NOTE
06/01/21 1810   Vitals   /65   Temp 98.1 °F (36.7 °C)   Pulse 70   Resp 20   Connected to Cycler per protocol    CCPD order: Total Volume: 80307 ml                       Therapy Time Duration (Hours): 10:00                                           Exchange Volume: 2500 ml                        Number of Exchanges: 5                        No Final Fill    Dwell Time: 01:05  Fill Time: 00:15  Drain Time: 00:40    Pre weight: 101 kg (standing scale)    Effluent clear without fibrin. Last [facility] 6-1-2021  Clinic: [facility]. Exit site care done per protocol. Garamycin . 1% cream to site. DSD/occlusive to site. Report given to Kai Ocampo RN.
06/03/21 0645   Vitals   BP (!) 150/60   Temp 96.6 °F (35.9 °C)   Pulse 66   Resp 15   Weight 211 lb 10.3 oz (96 kg)     Disconnected from CCPD per protocol. Effluent: Clear yellow/pink tinged without fibrin    Total time: 09:55  Total UF:  989 ml. Total Volume:  56557 ml. Dwell time gained:  00:59    Pt Tolerated procedure: without difficulty. Report given to: Kleber Jimenez RN. Last BM 6-2-2021.
06/15/21 0032   Vitals   Pulse 73   BP (!) 87/51   MAP (mmHg) (!) 61   Oxygen Therapy   SpO2 93 %   Pt states she is dizzy with numbness on L side of face with a swooshing sound in L ear. Neuro assessment WNL. Call placed to CVTS. Awaiting call back.
06/15/21 1731   Vital Signs   /60   Temp 97.6 °F (36.4 °C)   Resp 20   Weight 230 lb 6.1 oz (104.5 kg)   Weight Method Actual;Bed scale     Connected to Cycler    CCPD order: Total Volume: 7500 ml                       Therapy Time Duration (Hours): 10:00                        Exchange Volume: 1500 ml                        Number of Exchanges: 5                        No Final Fill    Dwell Time: 01:20  Fill Time: 00:15  Drain Time: 00:25    Treatment time: 10:00    Pre weight: 104.5 kg (bed scale)    Initial drain bypassed, 4 ml   C/o abd discomfort 15 minutes after start of tx, stated needed to sit at edge of bed, became dyspneic, O2 sat 88%, O2 per NC increased per RN, O2 sat improved to 95%. Passed gas when sat at edge of bed, stated felt a little better. Effluent clear yellow/pink tinged without fibrin. Exit site care done per protocol. Garamycin . 1% cream to site. DSD/occlusive to site. Report given to Imelda Rajan RN.
All questions answered concerning OR, will be transferred to preop when ready.
CCPD Order   Exchanges: 5   Exchange Volume: 2500 ml   Total Time: 10 hrs   Dextrose: 2.5%   Last Fill: 0 ml   Total Volume: 50849 ml     Orders verified. Supplies taken to pt's room. Report received. Cycler set up, primed and pre tested. Dressing changed on [facility] Cath site. Gent cream placed. Pt connected to cycler. CCPD initiated without problem. Initial effluent pinked tinged. 06/03/21 1824   Vitals   BP (!) 189/70   Temp 96.8 °F (36 °C)   Temp Source Temporal   Pulse 78   Resp 18   Weight 219 lb 5.7 oz (99.5 kg)   Peritoneal Dialysis Catheter Left lower abdomen   Placement Date/Time: 08/16/18 0818   Inserted by: Apolonia Zafar MD  Catheter Location: Left lower abdomen   Status Accessed   Site Condition No Complications   Dressing Status Clean;Dry; Intact   Dressing Occlusive   Cycler   Verification of Prescription CCPD   Total Volume Programmed 17044 mL   Therapy Time (Hours:Minutes) 10:00   Fill Volume 2500 mL   Last Fill Volume 0 mL   Dextrose Setting Same (Nonextraneal)   Number of Cycles 5   Bag Volume 5000 mL   Number of Bags Used 3   Dianeal Solution Other (Comment)  (Delflex 2.5% in 5000 ml)   I Drain (mL) 25 mL
Disconnected from CCPD per protocol. Effluent: clear yellow/brown, no fibrin noted. Total time: 11 hr 18 min   Total UF:  1410 ml. Total Volume:  76081 ml. Dwell time gained:  0 hr 18 min. Pt Tolerated procedure without difficulty. Report given to: Logan Abdalla RN  Last BM: Pt states approximately Monday since last BM. RN aware. 06/05/21 0449   Vitals   BP (!) 172/75   Temp 97 °F (36.1 °C)   Temp Source Temporal   Pulse 73   Resp 18   SpO2 96 %   Weight 221 lb 1.9 oz (100.3 kg)  (standing scale)   Peritoneal Dialysis Catheter Left lower abdomen   Placement Date/Time: 08/16/18 0818   Inserted by: Joanne Alex MD  Catheter Location: Left lower abdomen   Status Deaccessed   Site Condition No Complications   Dressing Status Clean;Dry; Intact   Dressing Occlusive   Cycler   Ultrafiltration (UF) (mL) 1410 mL
Disconnected from CCPD per protocol. Effluent: pink tinged, last bag clear  Total time: 11 hr 18 min   Total UF:  1082 ml. Total Volume:  21832 ml. Dwell time gained:  0 hr 29 min. Pt Tolerated procedure without difficulty. Report given to: Radha Bland RN       06/04/21 0555   Vitals   BP (!) 153/53   Temp 96.6 °F (35.9 °C)   Temp Source Temporal   Pulse 77   Resp 18   SpO2 96 %   Weight 215 lb 2.7 oz (97.6 kg)   Peritoneal Dialysis Catheter Left lower abdomen   Placement Date/Time: 08/16/18 0818   Inserted by: Elle Mendoza MD  Catheter Location: Left lower abdomen   Status Deaccessed   Site Condition No Complications   Dressing Status Clean;Dry; Intact   Dressing Occlusive   Cycler   I Drain Alarm 0 mL   Ultrafiltration (UF) (mL) 1082 mL
Disconnected from CCPD per protocol. Effluent: rosa  Total time: 10 hr   Total UF:  1360 ml. Total Volume:  23834 ml. Dwell time gained:  1 hr 5 min.   Pt Tolerated procedure: well  Report given to: Nurse Elisabeth De Jesus  Last BM: NPO       06/02/21 0715   Vitals   BP (!) 151/68   Temp 98.3 °F (36.8 °C)   Temp Source Oral   Pulse 75   Resp 18   Cycler   Therapy Time (Hours:Minutes) 11hr 9 min   Fill Volume 2500 mL   Number of Cycles 5   Bag Volume 5000 mL   Number of Bags Used 3   Ultrafiltration (UF) (mL) 1360 mL   Average Dwell Time (Hours:Minutes) 1hr 5 min
PM&R consult noted. Reviewing chart. Will provide more guidance after Dr. Sophie Nunez sees patient. ARU will continue to follow progress and update discharge plan as needed.     Lala Quiroz, SERJION, .[phone]
Pt off floor to MRI
Treatment time: 1 Hr 10 min    Net UF: 0    Pre weight: 105.6 Kg  Post weight: 105.6 Kg  EDW:    Access used: Right femoral NTDC  Access function: Poor    Medications or blood products given: Alteplase to dwell x 2 lumens    Regular outpatient schedule: Home PD    Summary of response to treatment: Right femoral NDTC malfunction. Arterial pressures greater than -240 with  mL/min despite alteplase dwell earlier this am, power flushing, and repositioning patient multiple times. Received orders to terminate HD and dwell CathFlo over night. HD in am.  Orders implemented. [employer] Charge RN notified. Primary ICU RN notified. Copy of dialysis treatment record placed in chart, to be scanned into EMR.     COSME Jara, RN       06/12/21 0445 06/12/21 0600 06/12/21 0800   Vital Signs   Temp 96.4 °F (35.8 °C)  --  96.4 °F (35.8 °C)   Temp Source Temporal  --  Temporal   Pulse 69 75 80   Heart Rate Source Monitor Monitor Monitor   Resp 18 18 18   BP (!) 126/59 (!) 118/27 (!) 123/53   BP Location Right Arm Right Arm Right Arm   MAP (mmHg) 69 (!) 48 68   Patient Position Semi fowlers Semi fowlers Semi fowlers   Level of Consciousness Alert (0)  --  Alert (0)   MEWS Score 1  --  1   Patient Currently in Pain Denies  --  Denies   Pain Assessment   Pain Assessment 0-10 0-10 0-10   Pain Level 0 0 0   Patient's Stated Pain Goal  --  No pain  --    Oxygen Therapy   SpO2 93 % 91 % 92 %   Pulse Oximeter Device Mode Continuous Continuous Continuous   Pulse Oximeter Device Location Finger Finger Finger   O2 Device Nasal cannula Nasal cannula Nasal cannula   Skin Assessment Clean, dry, & intact Clean, dry, & intact Clean, dry, & intact   O2 Flow Rate (L/min) 2 L/min 2 L/min 2 L/min   Height and Weight   Weight 232 lb 12.9 oz (105.6 kg)  --  232 lb 12.9 oz (105.6 kg)   Weight Method Bed scale  --   --    BMI (Calculated) 34.5  --  34.5
Treatment time: 2 hours  Net UF: -200 ml     Pre weight: 91.3 kg   Post weight: 91.2kg  EDW: 106.8 (stated TW with PD)     Access used: R fem TDC  Access function: Postional with  ml/min     Medications or blood products given: Albumin     Regular outpatient schedule: TBD, home PD x7 days a week     Summary of response to treatment: Tolerated tx fair. Patient had an episode of near syncope about 25min into HD tx. Patient c/o not feeling well. Ringing in her ears , tingling in arms, and feels like she is going to pass out. Blood rinsed back with 400ml. Patient felt better after blood returned. HD nurse spoke with Dr. Michelle Ferguson regarding episode and tx was resumed with the addition of albumin PRN with HD. Patient tolerated remainder of tx without complications. Note that venous chamber was nearly completely clotted when tx ended.  May consider notifying MD frequent flushes of heparin infusion with next HD tx.      Copy of dialysis treatment record placed in chart, to be scanned into EMR.       06/05/21 1735 06/05/21 2000   Treatment   Time On  --  1740   Time Off  --  1952   Vital Signs   BP (!) 152/69 (!) 204/46   Temp 97.6 °F (36.4 °C) 96.8 °F (36 °C)   Pulse 84 88   Resp 18 18   Dialysis Bath   K+ (Potassium) 4  --    Ca+ (Calcium) 2.25  --    Na+ (Sodium) 140  --    HCO3 (Bicarb) 35  --
Treatment time: 3    Net UF: 1.3 kg     Pre weight: 98.3 kg   Post weight: 97.0 kg   EDW: TBD     Access used: R Fem CVC   Access function: Well     Medications or blood products given: Albumin; Activase     Regular outpatient schedule: HS PD     Summary of response to treatment: Pt tolerated tx well. Fem CVC locked with ACTIVASE DO NOT FLUSH!  Dwell till next HD     Copy of dialysis treatment record placed in chart, to be scanned into EMR.       06/08/21 1016   Post-Hemodialysis Assessment   Rinseback Volume (ml) 400 ml   Total Liters Processed (l/min) 33.6 l/min   Dialyzer Clearance Heavily streaked   Duration of Treatment (minutes) 180 minutes   Hemodialysis Intake (ml) 500 ml   Hemodialysis Output (ml) 1800 ml   NET Removed (ml) 1300 ml   Tolerated Treatment Good
Total Liters Processed (l/min)  --  22.8 l/min   Duration of Treatment (minutes)  --  110 minutes   NET Removed (ml)  --  911 ml   Tolerated Treatment  --  Poor

## 2021-06-15 NOTE — PLAN OF CARE
Nutrition Problem #1: Inadequate energy intake  Intervention: Food and/or Nutrient Delivery: Continue Current Diet, Continue Oral Nutrition Supplement  Nutritional Goals: New goal- po greater than 75% at meals/supp

## 2021-06-15 NOTE — PROGRESS NOTES
Comprehensive Nutrition Assessment    Type and Reason for Visit:  Reassess    Nutrition Recommendations/Plan:   1. Continue current diet  2. Continue supplement  3. Encourage po    Nutrition Assessment:  Pt continues to have compromised nutrition status d/t poor intake after CABG. Pt po is consistently 50% at meals, however pt is drinking 51-75% of Ensure HP. Pt denies any n/v. Pt is open to continuing supplement. RD to continue supplement. Malnutrition Assessment:  Malnutrition Status:  Mild malnutrition    Context:  Chronic Illness     Findings of the 6 clinical characteristics of malnutrition:  Energy Intake:  7 - 75% or less estimated energy requirements for 1 month or longer  Weight Loss:   (21lb/8.5% over 4 months)     Body Fat Loss:  No significant body fat loss     Muscle Mass Loss:  No significant muscle mass loss    Fluid Accumulation:  No significant fluid accumulation     Strength:  Not Performed    Estimated Daily Nutrient Needs:  Energy (kcal):  1940-9089 cals (15-18 cals/14kg); Weight Used for Energy Requirements:  Current     Protein (g):   gms (1.2-2.0 gms/IBW 66kg); Weight Used for Protein Requirements:  Ideal        Fluid (ml/day):  1500; minimal; Method Used for Fluid Requirements:  1 ml/kcal      Nutrition Related Findings:  Na 131L, K 5.6H, Gluc 123H, BUN 62H. I/O's: -1.4L      Wounds:  Surgical Incision       Current Nutrition Therapies:    Adult Oral Nutrition Supplement; Low Calorie/High Protein Oral Supplement  ADULT DIET; Regular; 4 carb choices (60 gm/meal); Low Fat/Low Chol/High Fiber/QUINN; 2000 ml    Anthropometric Measures:  · Height: 5' 9\" (175.3 cm)  · Current Body Weight: 230 lb (104.3 kg)   · Admission Body Weight: 225 lb (102.1 kg)    · Usual Body Weight: 235 lb (106.6 kg)     · Ideal Body Weight: 145 lbs; % Ideal Body Weight 162.8 %   · BMI: 33.9  · BMI Categories: Obese Class 1 (BMI 30.0-34. 9)       Nutrition Diagnosis:   · Inadequate energy intake related to inadequate protein-energy intake as evidenced by intake 26-50%    · Increased nutrient needs related to increase demand for energy/nutrients as evidenced by wounds      Nutrition Interventions:   Food and/or Nutrient Delivery:  Continue Current Diet, Continue Oral Nutrition Supplement  Nutrition Education/Counseling:  Education completed   Coordination of Nutrition Care:  Continue to monitor while inpatient    Goals:  New goal- po greater than 75% at meals/supp       Nutrition Monitoring and Evaluation:   Behavioral-Environmental Outcomes:  None Identified   Food/Nutrient Intake Outcomes:  Food and Nutrient Intake, Supplement Intake  Physical Signs/Symptoms Outcomes:  Weight, Skin     Discharge Planning:     Too soon to determine     Electronically signed by Natali Russell RD, [address] , LD on 6/15/21 at 10:24 AM EDT    Contact: [phone]

## 2021-06-15 NOTE — PROGRESS NOTES
Hospitalist Progress Note      PCP: Adalberto Waldron MD    Date of Admission: 5/31/2021    Chief Complaint: [facility] Course: 41-year-old female was past medical history of ESRD on peritoneal dialysis, hypertension, hyperlipidemia, diabetes, peripheral vascular disease status post left toe amputation last week presents with presyncopal episode. She also describes a syncopal episode couple of weeks ago. Patient has recently been undergoing evaluation for possible renal transplant has had stress test performed at [facility] which was abnormal.  Patient is scheduled for outpatient left heart cath on 6/3/2021 . Patient is labile blood pressures, but negative orthostatics. Nephrology is adjusting blood pressure medications. She had left heart cath performed 6/3/2021 which showed multivessel disease. CTS consulted the plan for possible CABG. s/p CABG 6/09    Subjective: Potential overnight required dopamine drip has been weaned down now no chest pain no nausea vomiting did have some blurred vision and tingling in her ears.   Medications:  Reviewed    Infusion Medications    DOPamine 5 mcg/kg/min (06/15/21 0746)    sodium chloride      dextrose       Scheduled Medications    albumin human  12.5 g Intravenous Once    aspirin  81 mg Oral Daily    [Held by provider] metoprolol tartrate  50 mg Oral BID    [Held by provider] NIFEdipine  60 mg Oral Daily    clopidogrel  75 mg Oral Daily    sodium chloride flush  5-40 mL Intravenous 2 times per day    sodium chloride flush  10 mL Intravenous 2 times per day    acetaminophen  1,000 mg Oral Q6H    sennosides-docusate sodium  1 tablet Oral BID    pantoprazole  40 mg Oral Daily    insulin detemir  20 Units Subcutaneous Nightly    insulin aspart  10 Units Subcutaneous TID AC    gentamicin   Topical Daily    sevelamer  800 mg Oral TID WC    atorvastatin  20 mg Oral Nightly    [Held by provider] torsemide  200 mg Oral Daily     PRN Meds: bisacodyl, bisacodyl, hydrALAZINE, sodium chloride flush, heparin (porcine), sodium chloride flush, sodium chloride, ondansetron, metoclopramide, traMADol **OR** traMADol, morphine, diphenhydrAMINE, zolpidem, magnesium hydroxide, polyethylene glycol, potassium chloride, magnesium sulfate, calcium chloride IVPB, calcium chloride IVPB, albuterol sulfate HFA, furosemide, glucose, dextrose, glucagon (rDNA), dextrose      Intake/Output Summary (Last 24 hours) at 6/15/2021 0912  Last data filed at 6/15/2021 0706  Gross per 24 hour   Intake 493.7 ml   Output 300 ml   Net 193.7 ml       Physical Exam Performed:    /60   Pulse 75   Temp 96.2 °F (35.7 °C) (Temporal)   Resp 20   Ht 5' 9\" (1.753 m)   Wt 230 lb 6.1 oz (104.5 kg)   SpO2 95%   Breastfeeding No   BMI 34.02 kg/m²     General appearance: aaox3  HEENT: Pupils equal, round, and reactive to light. Conjunctivae/corneas clear. Neck: Supple, with full range of motion. No jugular venous distention. Respiratory:  ctab no wheezing  Cardiovascular: Regular rate and rhythm with normal S1/S2 ., chest dressing clean and dry  Abdomen: Soft, non-tender, non-distended with normal bowel sounds. Skin: Skin color, texture, turgor normal.  No rashes or lesions. Neurologic: no gross deficits  Psychiatric: Alert and oriented, thought content appropriate, normal insight        Labs:   Recent Labs     06/13/21 0445 06/14/21  0405 06/15/21  0129   WBC 25.1* 23.0* 18.6*   HGB 8.1* 8.0* 7.5*   HCT 25.7* 24.6* 23.0*    217 188     Recent Labs     06/13/21 0445 06/14/21  0405 06/15/21  0129   * 133* 131*   K 5.2* 4.3 5.6*   CL 92* 95* 93*   CO2 23 24 23   BUN 53* 50* 62*   CREATININE 7.0* 5.8* 7.4*   CALCIUM 7.9* 7.9* 7.9*   PHOS  --  4.5  --      No results for input(s): AST, ALT, BILIDIR, BILITOT, ALKPHOS in the last 72 hours. No results for input(s): INR in the last 72 hours. No results for input(s): Lele Cobble in the last 72 hours.     Urinalysis:      Lab Results   Component Value Date    NITRU Negative 06/06/2021    WBCUA 36 06/06/2021    BACTERIA RARE 06/06/2021    RBCUA 587 06/06/2021    BLOODU LARGE 06/06/2021    SPECGRAV 1.019 06/06/2021    GLUCOSEU 100 06/06/2021       Radiology:  XR CHEST PORTABLE   Final Result   No pneumothorax following tube removal.      Cardiomegaly with bibasilar airspace disease and possible small pleural   effusion. Poor inspiration. XR ABDOMEN (KUB) (SINGLE AP VIEW)   Final Result   Nonspecific bowel gas pattern. Mild small bowel and colonic distension   suggesting a mild ileus. No evidence of obstruction. XR CHEST PORTABLE   Final Result   Status post open heart surgery. Support tubes and Bentley-Ebonie catheter, as   above. Right basilar atelectasis and small effusion. IR FLUORO GUIDED CVA DEVICE PLMT/REPLACE/REMOVAL   Final Result   Technically successful right-sided HD catheter placement. Catheter is okay   for use. VL DUP CAROTID BILATERAL   Final Result      VL PRE OP VEIN MAPPING   Final Result      CT CHEST WO CONTRAST   Final Result   No acute intrathoracic abnormality         MRI BRAIN WO CONTRAST   Final Result   1. No acute intracranial abnormality. 2. Mild chronic white matter microvascular ischemic changes. 3. Right maxillary sinusitis.          XR CHEST PORTABLE   Final Result   Cardiomegaly with stable right pleural effusion                 Assessment/Plan:    Active Hospital Problems    Diagnosis     S/P coronary artery bypass graft x 3 [Z95.1]     S/P left atrial appendage ligation [Z98.890]     Mild malnutrition (HCC) [E44.1]     Coronary artery disease of native artery of native heart with stable angina pectoris (HCC) [I25.118]     Near syncope [R55]     Atherosclerosis of native arteries of left leg with ulceration of other part of foot ([address] [I70.245]     Diabetic polyneuropathy associated with type 2 diabetes mellitus ([address] [E11.42]     ESRD (end stage renal disease) [facility] [address] [N18.6]     Diabetes mellitus ([facility] [address] [E11.9]        Multivessel CAD  Status post left heart cath 6/3/2021  S/p CABG 6/09  - per ctvs    Leukocytosis: likely reactive monitor no fevers trending down    UTI  As per urinalysis  Asymptomatic  -s/p rocpehin       ESRD on PD  Nephrology consulted    Hypertension  - hypotension overnight likely secondary to increased bp meds, hold meds for now wean of dopamine gtt will discuss with nephro    DM: levemir and lispro    Gangrene of left foot s/p amputation of 1 and 2 toes 5/26  - podiatry on board    PAD ss/p left popliteal, anterior tibial, and dorsalis pedis angioplasty by Dr. Eva Hardy on 5/27/21  DVT Prophylaxis: Heparin  Diet: Adult Oral Nutrition Supplement; Low Calorie/High Protein Oral Supplement  ADULT DIET; Regular; 4 carb choices (60 gm/meal);  Low Fat/Low Chol/High Fiber/QUINN; 2000 ml  Code Status: Full Code      Electronically signed by Jr Boyd MD on 6/15/2021 at 9:12 AM

## 2021-06-15 NOTE — PROGRESS NOTES
glycol (GLYCOLAX) packet 17 g, Daily PRN  pantoprazole (PROTONIX) tablet 40 mg, Daily  potassium chloride 20 mEq/50 mL IVPB (Central Line), PRN  magnesium sulfate 2000 mg in 50 mL IVPB premix, PRN  calcium chloride 1,000 mg in sodium chloride 0.9 % 100 mL IVPB, PRN  calcium chloride 2,000 mg in sodium chloride 0.9 % 100 mL IVPB, PRN  albuterol sulfate  (90 Base) MCG/ACT inhaler 2 puff, Q6H PRN  furosemide (LASIX) injection 20 mg, PRN  glucose (GLUTOSE) 40 % oral gel 15 g, PRN  dextrose 50 % IV solution, PRN  glucagon (rDNA) injection 1 mg, PRN  dextrose 5 % solution, PRN  insulin detemir (LEVEMIR) injection pen 20 Units, Nightly  insulin aspart (NOVOLOG) injection pen 10 Units PATIENT SUPPLIED, TID AC  gentamicin (GARAMYCIN) 0.1 % cream, Daily  sevelamer (RENVELA) tablet 800 mg, TID WC  atorvastatin (LIPITOR) tablet 20 mg, Nightly  [Held by provider] torsemide (DEMADEX) tablet 200 mg, Daily          Physical exam:     Vitals:  /60   Pulse 75   Temp 96.2 °F (35.7 °C) (Temporal)   Resp 20   Ht 5' 9\" (1.753 m)   Wt 230 lb 6.1 oz (104.5 kg)   SpO2 95%   Breastfeeding No   BMI 34.02 kg/m²   Constitutional:  OAA X3 NAD  Skin: no rash, turgor wnl  Heent:  eomi, mmm  Neck: no bruits or jvd noted  Cardiovascular:  S1, S2 without m/r/g  Respiratory: CTA B without w/r/r  Abdomen:  +bs, soft, nt, nd  Ext: no  lower extremity edema      Labs:  CBC:   Recent Labs     06/13/21  0445 06/14/21  0405 06/15/21  0129   WBC 25.1* 23.0* 18.6*   HGB 8.1* 8.0* 7.5*    217 188     BMP:    Recent Labs     06/13/21 0445 06/14/21  0405 06/15/21  0129   * 133* 131*   K 5.2* 4.3 5.6*   CL 92* 95* 93*   CO2 23 24 23   BUN 53* 50* 62*   CREATININE 7.0* 5.8* 7.4*   GLUCOSE 129* 75 123*     Ca/Mg/Phos:   Recent Labs     06/13/21  0445 06/14/21  0405 06/15/21  0129   CALCIUM 7.9* 7.9* 7.9*   MG  --   --  2.50*   PHOS  --  4.5  --      UA:  No results for input(s): COLORU, CLARITYU, GLUCOSEU, BILIRUBINUR, KETUA, Maria Luisa Pellet, UROBILINOGEN, NITRU, LEUKOCYTESUR, Mariama Bookbinder in the last 72 hours. Urine Microscopic:   No results for input(s): LABCAST, BACTERIA, COMU, HYALCAST, WBCUA, RBCUA, EPIU in the last 72 hours. Urine Culture:   No results for input(s): LABURIN in the last 72 hours. Urine Chemistry: No results for input(s): Canary Alonzo, PROTEINUR, NAUR in the last 72 hours. IMAGING:  XR CHEST PORTABLE   Final Result   No pneumothorax following tube removal.      Cardiomegaly with bibasilar airspace disease and possible small pleural   effusion. Poor inspiration. XR ABDOMEN (KUB) (SINGLE AP VIEW)   Final Result   Nonspecific bowel gas pattern. Mild small bowel and colonic distension   suggesting a mild ileus. No evidence of obstruction. XR CHEST PORTABLE   Final Result   Status post open heart surgery. Support tubes and Rutledge-Ebonie catheter, as   above. Right basilar atelectasis and small effusion. IR FLUORO GUIDED CVA DEVICE PLMT/REPLACE/REMOVAL   Final Result   Technically successful right-sided HD catheter placement. Catheter is okay   for use. VL DUP CAROTID BILATERAL   Final Result      VL PRE OP VEIN MAPPING   Final Result      CT CHEST WO CONTRAST   Final Result   No acute intrathoracic abnormality         MRI BRAIN WO CONTRAST   Final Result   1. No acute intracranial abnormality. 2. Mild chronic white matter microvascular ischemic changes. 3. Right maxillary sinusitis. XR CHEST PORTABLE   Final Result   Cardiomegaly with stable right pleural effusion                 Assessment/Plan :      1. ESRD. Was On peritoneal dialysis. Switched  her temporarily to hemodialysis . Chest tube is removed   No SOB   We will start cycler/CCPD today. Will use 1500 mL as well volume    2. HTN. BP labile   Yesterday was high last night dropped. Has significant side effects from clonidine.   Will discontinue  Hold metoprolol and nifedipine for now. She is on dopamine drip at this time. We will give 1 amp of albumin 25 g  Try to taper off dopamine  If systolic blood pressure goes above then slowly resume back on metoprolol and nifedipine    3. Anemia of chronic disease. Gets micera at dialysis unit  Will give EPOGEN once BP controlled     4. Acid- base disorder. Monitor    5. Electrolytes monitor and replace as needed    6. CAD/Syncopal episode. S/p [facility]   Has Multivessel CAD   S/p CABG       7. Peripheral vascular disease. S/p amputation of left foot toes. Podiatry following     8. Left lower abdominal pain. Resolved  Has mild ileus   Passing flatus today.        Thank you for allowing us to participate in care of Baldev Angelo         Electronically signed by: Brayan Tubbs MD, 6/15/2021, 10:01 AM      [facility] [address]  Office : [phone]  Fax :[phone]

## 2021-06-16 ENCOUNTER — HOSPITAL ENCOUNTER (INPATIENT)
Age: 46
LOS: 15 days | Discharge: ANOTHER HOSPITAL | DRG: 949 | End: 2021-07-01
Attending: PHYSICAL MEDICINE & REHABILITATION | Admitting: PHYSICAL MEDICINE & REHABILITATION
Payer: MEDICARE

## 2021-06-16 VITALS
BODY MASS INDEX: 33.67 KG/M2 | RESPIRATION RATE: 18 BRPM | HEART RATE: 79 BPM | SYSTOLIC BLOOD PRESSURE: 146 MMHG | WEIGHT: 227.29 LBS | TEMPERATURE: 96 F | DIASTOLIC BLOOD PRESSURE: 74 MMHG | HEIGHT: 69 IN | OXYGEN SATURATION: 93 %

## 2021-06-16 PROBLEM — R53.1 GENERALIZED WEAKNESS: Status: ACTIVE | Noted: 2021-06-16

## 2021-06-16 LAB
ANION GAP SERPL CALCULATED.3IONS-SCNC: 18 MMOL/L (ref 3–16)
BUN BLDV-MCNC: 59 MG/DL (ref 7–20)
CALCIUM SERPL-MCNC: 7.7 MG/DL (ref 8.3–10.6)
CHLORIDE BLD-SCNC: 91 MMOL/L (ref 99–110)
CO2: 22 MMOL/L (ref 21–32)
CREAT SERPL-MCNC: 6.8 MG/DL (ref 0.6–1.1)
GFR AFRICAN AMERICAN: 8
GFR NON-AFRICAN AMERICAN: 7
GLUCOSE BLD-MCNC: 114 MG/DL (ref 70–99)
GLUCOSE BLD-MCNC: 164 MG/DL (ref 70–99)
GLUCOSE BLD-MCNC: 236 MG/DL (ref 70–99)
GLUCOSE BLD-MCNC: 345 MG/DL (ref 70–99)
GLUCOSE BLD-MCNC: 466 MG/DL (ref 70–99)
GLUCOSE BLD-MCNC: 474 MG/DL (ref 70–99)
GLUCOSE BLD-MCNC: 476 MG/DL (ref 70–99)
HCT VFR BLD CALC: 22.7 % (ref 36–48)
HEMOGLOBIN: 7.3 G/DL (ref 12–16)
MCH RBC QN AUTO: 28.9 PG (ref 26–34)
MCHC RBC AUTO-ENTMCNC: 32.3 G/DL (ref 31–36)
MCV RBC AUTO: 89.5 FL (ref 80–100)
PDW BLD-RTO: 19.3 % (ref 12.4–15.4)
PERFORMED ON: ABNORMAL
PLATELET # BLD: 226 K/UL (ref 135–450)
PMV BLD AUTO: 9.2 FL (ref 5–10.5)
POTASSIUM SERPL-SCNC: 4.2 MMOL/L (ref 3.5–5.1)
RBC # BLD: 2.54 M/UL (ref 4–5.2)
SODIUM BLD-SCNC: 131 MMOL/L (ref 136–145)
WBC # BLD: 14.5 K/UL (ref 4–11)

## 2021-06-16 PROCEDURE — APPSS15 APP SPLIT SHARED TIME 0-15 MINUTES: Performed by: NURSE PRACTITIONER

## 2021-06-16 PROCEDURE — 6370000000 HC RX 637 (ALT 250 FOR IP): Performed by: PHYSICAL MEDICINE & REHABILITATION

## 2021-06-16 PROCEDURE — 6370000000 HC RX 637 (ALT 250 FOR IP): Performed by: THORACIC SURGERY (CARDIOTHORACIC VASCULAR SURGERY)

## 2021-06-16 PROCEDURE — 2580000003 HC RX 258: Performed by: THORACIC SURGERY (CARDIOTHORACIC VASCULAR SURGERY)

## 2021-06-16 PROCEDURE — 1280000000 HC REHAB R&B

## 2021-06-16 PROCEDURE — 94760 N-INVAS EAR/PLS OXIMETRY 1: CPT

## 2021-06-16 PROCEDURE — 2700000000 HC OXYGEN THERAPY PER DAY

## 2021-06-16 PROCEDURE — 2580000003 HC RX 258: Performed by: NURSE PRACTITIONER

## 2021-06-16 PROCEDURE — 6370000000 HC RX 637 (ALT 250 FOR IP): Performed by: INTERNAL MEDICINE

## 2021-06-16 PROCEDURE — 36592 COLLECT BLOOD FROM PICC: CPT

## 2021-06-16 PROCEDURE — 80048 BASIC METABOLIC PNL TOTAL CA: CPT

## 2021-06-16 PROCEDURE — 99232 SBSQ HOSP IP/OBS MODERATE 35: CPT | Performed by: HOSPITALIST

## 2021-06-16 PROCEDURE — 6370000000 HC RX 637 (ALT 250 FOR IP): Performed by: NURSE PRACTITIONER

## 2021-06-16 PROCEDURE — 85027 COMPLETE CBC AUTOMATED: CPT

## 2021-06-16 PROCEDURE — 2500000003 HC RX 250 WO HCPCS: Performed by: PHYSICAL MEDICINE & REHABILITATION

## 2021-06-16 PROCEDURE — 3E1M39Z IRRIGATION OF PERITONEAL CAVITY USING DIALYSATE, PERCUTANEOUS APPROACH: ICD-10-PCS | Performed by: HOSPITALIST

## 2021-06-16 PROCEDURE — APPNB30 APP NON BILLABLE TIME 0-30 MINS: Performed by: NURSE PRACTITIONER

## 2021-06-16 RX ORDER — SENNA AND DOCUSATE SODIUM 50; 8.6 MG/1; MG/1
1 TABLET, FILM COATED ORAL 2 TIMES DAILY
Status: DISCONTINUED | OUTPATIENT
Start: 2021-06-16 | End: 2021-07-01 | Stop reason: HOSPADM

## 2021-06-16 RX ORDER — INSULIN DETEMIR 100 [IU]/ML
25 INJECTION, SOLUTION SUBCUTANEOUS NIGHTLY
Qty: 5 PEN | Refills: 3 | Status: ON HOLD | OUTPATIENT
Start: 2021-06-16 | End: 2021-07-14 | Stop reason: SDUPTHER

## 2021-06-16 RX ORDER — SENNA AND DOCUSATE SODIUM 50; 8.6 MG/1; MG/1
1 TABLET, FILM COATED ORAL 2 TIMES DAILY
Status: CANCELLED | OUTPATIENT
Start: 2021-06-16

## 2021-06-16 RX ORDER — ACETAMINOPHEN 500 MG
1000 TABLET ORAL EVERY 8 HOURS SCHEDULED
Status: CANCELLED | OUTPATIENT
Start: 2021-06-16

## 2021-06-16 RX ORDER — TRAMADOL HYDROCHLORIDE 50 MG/1
50 TABLET ORAL EVERY 6 HOURS PRN
Status: CANCELLED | OUTPATIENT
Start: 2021-06-16

## 2021-06-16 RX ORDER — HYDRALAZINE HYDROCHLORIDE 25 MG/1
50 TABLET, FILM COATED ORAL EVERY 8 HOURS PRN
Status: DISCONTINUED | OUTPATIENT
Start: 2021-06-16 | End: 2021-07-01 | Stop reason: HOSPADM

## 2021-06-16 RX ORDER — SODIUM CHLORIDE 0.9 % (FLUSH) 0.9 %
5-40 SYRINGE (ML) INJECTION EVERY 12 HOURS SCHEDULED
Status: CANCELLED | OUTPATIENT
Start: 2021-06-16

## 2021-06-16 RX ORDER — POLYETHYLENE GLYCOL 3350 17 G/17G
17 POWDER, FOR SOLUTION ORAL DAILY PRN
Status: DISCONTINUED | OUTPATIENT
Start: 2021-06-16 | End: 2021-07-01 | Stop reason: HOSPADM

## 2021-06-16 RX ORDER — SEVELAMER CARBONATE 800 MG/1
800 TABLET, FILM COATED ORAL
Qty: 90 TABLET | Refills: 3
Start: 2021-06-16

## 2021-06-16 RX ORDER — INSULIN LISPRO 100 [IU]/ML
0-18 INJECTION, SOLUTION INTRAVENOUS; SUBCUTANEOUS
Status: DISCONTINUED | OUTPATIENT
Start: 2021-06-16 | End: 2021-06-16

## 2021-06-16 RX ORDER — INSULIN LISPRO 100 [IU]/ML
0-9 INJECTION, SOLUTION INTRAVENOUS; SUBCUTANEOUS NIGHTLY
Status: DISCONTINUED | OUTPATIENT
Start: 2021-06-16 | End: 2021-06-16

## 2021-06-16 RX ORDER — HEPARIN SODIUM 1000 [USP'U]/ML
3400 INJECTION, SOLUTION INTRAVENOUS; SUBCUTANEOUS PRN
Status: DISCONTINUED | OUTPATIENT
Start: 2021-06-16 | End: 2021-07-01 | Stop reason: HOSPADM

## 2021-06-16 RX ORDER — TRAMADOL HYDROCHLORIDE 50 MG/1
100 TABLET ORAL EVERY 6 HOURS PRN
Status: CANCELLED | OUTPATIENT
Start: 2021-06-16

## 2021-06-16 RX ORDER — GENTAMICIN SULFATE 1 MG/G
CREAM TOPICAL DAILY
Status: CANCELLED | OUTPATIENT
Start: 2021-06-17

## 2021-06-16 RX ORDER — ONDANSETRON 4 MG/1
4 TABLET, ORALLY DISINTEGRATING ORAL EVERY 8 HOURS PRN
Status: DISCONTINUED | OUTPATIENT
Start: 2021-06-16 | End: 2021-07-01 | Stop reason: HOSPADM

## 2021-06-16 RX ORDER — ZOLPIDEM TARTRATE 5 MG/1
5 TABLET ORAL NIGHTLY PRN
Status: DISCONTINUED | OUTPATIENT
Start: 2021-06-16 | End: 2021-07-01 | Stop reason: HOSPADM

## 2021-06-16 RX ORDER — PROMETHAZINE HYDROCHLORIDE 25 MG/1
25 TABLET ORAL EVERY 6 HOURS PRN
Qty: 20 TABLET | Refills: 0 | Status: SHIPPED | OUTPATIENT
Start: 2021-06-16 | End: 2021-06-23

## 2021-06-16 RX ORDER — TRAMADOL HYDROCHLORIDE 50 MG/1
50 TABLET ORAL EVERY 6 HOURS PRN
Status: DISCONTINUED | OUTPATIENT
Start: 2021-06-16 | End: 2021-07-01 | Stop reason: HOSPADM

## 2021-06-16 RX ORDER — ZOLPIDEM TARTRATE 5 MG/1
5 TABLET ORAL NIGHTLY PRN
Qty: 10 TABLET | Refills: 0
Start: 2021-06-16 | End: 2021-06-30

## 2021-06-16 RX ORDER — ONDANSETRON 4 MG/1
4 TABLET, ORALLY DISINTEGRATING ORAL EVERY 8 HOURS PRN
Status: CANCELLED | OUTPATIENT
Start: 2021-06-16

## 2021-06-16 RX ORDER — ALBUTEROL SULFATE 90 UG/1
2 AEROSOL, METERED RESPIRATORY (INHALATION) EVERY 6 HOURS PRN
Qty: 1 INHALER | Refills: 3
Start: 2021-06-16

## 2021-06-16 RX ORDER — ZOLPIDEM TARTRATE 5 MG/1
5 TABLET ORAL NIGHTLY PRN
Status: CANCELLED | OUTPATIENT
Start: 2021-06-16

## 2021-06-16 RX ORDER — ACETAMINOPHEN 500 MG
1000 TABLET ORAL EVERY 8 HOURS SCHEDULED
Status: DISCONTINUED | OUTPATIENT
Start: 2021-06-16 | End: 2021-06-30

## 2021-06-16 RX ORDER — DIPHENHYDRAMINE HCL 25 MG
25 TABLET ORAL EVERY 6 HOURS PRN
Status: CANCELLED | OUTPATIENT
Start: 2021-06-16

## 2021-06-16 RX ORDER — ALBUTEROL SULFATE 90 UG/1
2 AEROSOL, METERED RESPIRATORY (INHALATION) EVERY 6 HOURS PRN
Status: DISCONTINUED | OUTPATIENT
Start: 2021-06-16 | End: 2021-07-01 | Stop reason: HOSPADM

## 2021-06-16 RX ORDER — ATORVASTATIN CALCIUM 40 MG/1
40 TABLET, FILM COATED ORAL NIGHTLY
Qty: 30 TABLET | Refills: 3 | Status: SHIPPED | OUTPATIENT
Start: 2021-06-16

## 2021-06-16 RX ORDER — HYDRALAZINE HYDROCHLORIDE 25 MG/1
50 TABLET, FILM COATED ORAL EVERY 8 HOURS PRN
Status: CANCELLED | OUTPATIENT
Start: 2021-06-16

## 2021-06-16 RX ORDER — NICOTINE POLACRILEX 4 MG
15 LOZENGE BUCCAL PRN
Status: CANCELLED | OUTPATIENT
Start: 2021-06-16

## 2021-06-16 RX ORDER — BISACODYL 10 MG
10 SUPPOSITORY, RECTAL RECTAL DAILY PRN
Qty: 7 SUPPOSITORY | Refills: 0
Start: 2021-06-16 | End: 2021-07-16

## 2021-06-16 RX ORDER — SEVELAMER CARBONATE 800 MG/1
800 TABLET, FILM COATED ORAL
Status: DISCONTINUED | OUTPATIENT
Start: 2021-06-17 | End: 2021-07-01 | Stop reason: HOSPADM

## 2021-06-16 RX ORDER — DEXTROSE MONOHYDRATE 25 G/50ML
12.5 INJECTION, SOLUTION INTRAVENOUS PRN
Status: CANCELLED | OUTPATIENT
Start: 2021-06-16

## 2021-06-16 RX ORDER — PANTOPRAZOLE SODIUM 40 MG/1
40 TABLET, DELAYED RELEASE ORAL DAILY
Qty: 30 TABLET | Refills: 0
Start: 2021-06-17 | End: 2021-07-17

## 2021-06-16 RX ORDER — SODIUM CHLORIDE 0.9 % (FLUSH) 0.9 %
5-40 SYRINGE (ML) INJECTION EVERY 12 HOURS SCHEDULED
Status: DISCONTINUED | OUTPATIENT
Start: 2021-06-16 | End: 2021-06-16 | Stop reason: ALTCHOICE

## 2021-06-16 RX ORDER — DIPHENHYDRAMINE HCL 25 MG
25 TABLET ORAL EVERY 6 HOURS PRN
Status: DISCONTINUED | OUTPATIENT
Start: 2021-06-16 | End: 2021-07-01 | Stop reason: HOSPADM

## 2021-06-16 RX ORDER — TRAMADOL HYDROCHLORIDE 50 MG/1
50 TABLET ORAL EVERY 6 HOURS PRN
Qty: 28 TABLET | Refills: 0
Start: 2021-06-16 | End: 2021-06-23

## 2021-06-16 RX ORDER — ATORVASTATIN CALCIUM 20 MG/1
20 TABLET, FILM COATED ORAL NIGHTLY
Status: CANCELLED | OUTPATIENT
Start: 2021-06-16

## 2021-06-16 RX ORDER — DEXTROSE MONOHYDRATE 50 MG/ML
100 INJECTION, SOLUTION INTRAVENOUS PRN
Status: CANCELLED | OUTPATIENT
Start: 2021-06-16

## 2021-06-16 RX ORDER — NICOTINE POLACRILEX 4 MG
15 LOZENGE BUCCAL PRN
Status: DISCONTINUED | OUTPATIENT
Start: 2021-06-16 | End: 2021-07-01 | Stop reason: HOSPADM

## 2021-06-16 RX ORDER — ATORVASTATIN CALCIUM 20 MG/1
20 TABLET, FILM COATED ORAL NIGHTLY
Status: DISCONTINUED | OUTPATIENT
Start: 2021-06-16 | End: 2021-07-01 | Stop reason: HOSPADM

## 2021-06-16 RX ORDER — DEXTROSE MONOHYDRATE 25 G/50ML
12.5 INJECTION, SOLUTION INTRAVENOUS PRN
Status: DISCONTINUED | OUTPATIENT
Start: 2021-06-16 | End: 2021-07-01 | Stop reason: HOSPADM

## 2021-06-16 RX ORDER — TRAMADOL HYDROCHLORIDE 50 MG/1
100 TABLET ORAL EVERY 6 HOURS PRN
Status: DISCONTINUED | OUTPATIENT
Start: 2021-06-16 | End: 2021-07-01 | Stop reason: HOSPADM

## 2021-06-16 RX ORDER — DEXTROSE MONOHYDRATE 50 MG/ML
100 INJECTION, SOLUTION INTRAVENOUS PRN
Status: DISCONTINUED | OUTPATIENT
Start: 2021-06-16 | End: 2021-07-01 | Stop reason: HOSPADM

## 2021-06-16 RX ORDER — DIPHENHYDRAMINE HCL 25 MG
25 TABLET ORAL NIGHTLY PRN
Status: DISCONTINUED | OUTPATIENT
Start: 2021-06-16 | End: 2021-06-17

## 2021-06-16 RX ORDER — HEPARIN SODIUM 1000 [USP'U]/ML
3400 INJECTION, SOLUTION INTRAVENOUS; SUBCUTANEOUS PRN
Status: CANCELLED | OUTPATIENT
Start: 2021-06-16

## 2021-06-16 RX ORDER — SENNA AND DOCUSATE SODIUM 50; 8.6 MG/1; MG/1
1 TABLET, FILM COATED ORAL 2 TIMES DAILY
Qty: 60 TABLET | Refills: 0
Start: 2021-06-16 | End: 2021-07-16

## 2021-06-16 RX ORDER — POLYETHYLENE GLYCOL 3350 17 G/17G
17 POWDER, FOR SOLUTION ORAL DAILY PRN
Status: CANCELLED | OUTPATIENT
Start: 2021-06-16

## 2021-06-16 RX ORDER — PANTOPRAZOLE SODIUM 40 MG/1
40 TABLET, DELAYED RELEASE ORAL DAILY
Status: DISCONTINUED | OUTPATIENT
Start: 2021-06-17 | End: 2021-07-01 | Stop reason: HOSPADM

## 2021-06-16 RX ORDER — GENTAMICIN SULFATE 1 MG/G
CREAM TOPICAL DAILY
Status: DISCONTINUED | OUTPATIENT
Start: 2021-06-17 | End: 2021-06-20

## 2021-06-16 RX ORDER — DIPHENHYDRAMINE HCL 25 MG
25 TABLET ORAL NIGHTLY PRN
Status: CANCELLED | OUTPATIENT
Start: 2021-06-16

## 2021-06-16 RX ORDER — PANTOPRAZOLE SODIUM 40 MG/1
40 TABLET, DELAYED RELEASE ORAL DAILY
Status: CANCELLED | OUTPATIENT
Start: 2021-06-17

## 2021-06-16 RX ORDER — SEVELAMER CARBONATE 800 MG/1
800 TABLET, FILM COATED ORAL
Status: CANCELLED | OUTPATIENT
Start: 2021-06-16

## 2021-06-16 RX ORDER — POLYETHYLENE GLYCOL 3350 17 G/17G
17 POWDER, FOR SOLUTION ORAL DAILY PRN
Qty: 527 G | Refills: 1
Start: 2021-06-16 | End: 2021-07-16

## 2021-06-16 RX ORDER — ALBUTEROL SULFATE 90 UG/1
2 AEROSOL, METERED RESPIRATORY (INHALATION) EVERY 6 HOURS PRN
Status: CANCELLED | OUTPATIENT
Start: 2021-06-16

## 2021-06-16 RX ORDER — ACETAMINOPHEN 500 MG
1000 TABLET ORAL EVERY 6 HOURS
Qty: 120 TABLET | Refills: 3
Start: 2021-06-16

## 2021-06-16 RX ADMIN — INSULIN DETEMIR 25 UNITS: 100 INJECTION, SOLUTION SUBCUTANEOUS at 21:26

## 2021-06-16 RX ADMIN — STANDARDIZED SENNA CONCENTRATE AND DOCUSATE SODIUM 1 TABLET: 8.6; 5 TABLET ORAL at 21:21

## 2021-06-16 RX ADMIN — ASPIRIN 81 MG: 81 TABLET, COATED ORAL at 08:58

## 2021-06-16 RX ADMIN — Medication 10 ML: at 08:59

## 2021-06-16 RX ADMIN — Medication 10 ML: at 13:18

## 2021-06-16 RX ADMIN — SEVELAMER CARBONATE 800 MG: 800 TABLET, FILM COATED ORAL at 16:46

## 2021-06-16 RX ADMIN — ACETAMINOPHEN 1000 MG: 325 TABLET ORAL at 21:21

## 2021-06-16 RX ADMIN — MICONAZOLE NITRATE: 20 POWDER TOPICAL at 23:30

## 2021-06-16 RX ADMIN — METOPROLOL TARTRATE 25 MG: 25 TABLET, FILM COATED ORAL at 21:21

## 2021-06-16 RX ADMIN — PANTOPRAZOLE SODIUM 40 MG: 40 TABLET, DELAYED RELEASE ORAL at 05:37

## 2021-06-16 RX ADMIN — TRAMADOL HYDROCHLORIDE 100 MG: 50 TABLET, FILM COATED ORAL at 08:58

## 2021-06-16 RX ADMIN — ACETAMINOPHEN 1000 MG: 500 TABLET ORAL at 13:17

## 2021-06-16 RX ADMIN — ATORVASTATIN CALCIUM 20 MG: 20 TABLET, FILM COATED ORAL at 21:21

## 2021-06-16 RX ADMIN — NYSTATIN 500000 UNITS: 100000 SUSPENSION ORAL at 13:17

## 2021-06-16 RX ADMIN — STANDARDIZED SENNA CONCENTRATE AND DOCUSATE SODIUM 1 TABLET: 8.6; 5 TABLET ORAL at 08:58

## 2021-06-16 RX ADMIN — NYSTATIN 500000 UNITS: 100000 SUSPENSION ORAL at 21:20

## 2021-06-16 RX ADMIN — GENTAMICIN SULFATE: 1 CREAM TOPICAL at 09:01

## 2021-06-16 RX ADMIN — TRAMADOL HYDROCHLORIDE 100 MG: 50 TABLET, FILM COATED ORAL at 02:26

## 2021-06-16 RX ADMIN — METOPROLOL TARTRATE 25 MG: 25 TABLET, FILM COATED ORAL at 08:58

## 2021-06-16 RX ADMIN — NYSTATIN 500000 UNITS: 100000 SUSPENSION ORAL at 16:46

## 2021-06-16 ASSESSMENT — PAIN DESCRIPTION - ORIENTATION: ORIENTATION: MID

## 2021-06-16 ASSESSMENT — PAIN DESCRIPTION - LOCATION
LOCATION: BACK
LOCATION: BACK
LOCATION: CHEST

## 2021-06-16 ASSESSMENT — PAIN SCALES - GENERAL
PAINLEVEL_OUTOF10: 2
PAINLEVEL_OUTOF10: 2
PAINLEVEL_OUTOF10: 0
PAINLEVEL_OUTOF10: 2
PAINLEVEL_OUTOF10: 0
PAINLEVEL_OUTOF10: 4
PAINLEVEL_OUTOF10: 7
PAINLEVEL_OUTOF10: 0
PAINLEVEL_OUTOF10: 2
PAINLEVEL_OUTOF10: 2
PAINLEVEL_OUTOF10: 4
PAINLEVEL_OUTOF10: 0

## 2021-06-16 ASSESSMENT — PAIN DESCRIPTION - DESCRIPTORS: DESCRIPTORS: ACHING

## 2021-06-16 ASSESSMENT — PAIN DESCRIPTION - PAIN TYPE
TYPE: SURGICAL PAIN
TYPE: SURGICAL PAIN
TYPE: CHRONIC PAIN
TYPE: CHRONIC PAIN

## 2021-06-16 ASSESSMENT — PAIN DESCRIPTION - FREQUENCY: FREQUENCY: INTERMITTENT

## 2021-06-16 ASSESSMENT — PAIN DESCRIPTION - PROGRESSION: CLINICAL_PROGRESSION: NOT CHANGED

## 2021-06-16 ASSESSMENT — PAIN - FUNCTIONAL ASSESSMENT: PAIN_FUNCTIONAL_ASSESSMENT: ACTIVITIES ARE NOT PREVENTED

## 2021-06-16 ASSESSMENT — PAIN DESCRIPTION - ONSET: ONSET: ON-GOING

## 2021-06-16 NOTE — PROGRESS NOTES
Patient admitted to room 4414 per w/c. Oriented to room, call light, phone, TV, thermostat, bed controls, bathroom, emergency cord, white board, therapy times, unit routine, visiting hours, and meal times. Patient verbalized understanding. States bowel routine is every other day. Call light and personal items in reach, alarms active and in place.

## 2021-06-16 NOTE — PROGRESS NOTES
Disconnected from CCPD per protocol. Effluent: Clear Yellow, Small amt. fibrin  Total time: 11 hr 2 min   Total UF:  -123 ml. Total Volume:  18474 ml. Dwell time gained:  0 hr 35 min.   Pt Tolerated procedure:  well  Report given to:  Yulissa Golden RN  Last BM:  unknown

## 2021-06-16 NOTE — PROGRESS NOTES
Office : [phone]     Fax :[phone]       Nephrology progress  Note      Patient's Name: Lazaro Ramirez  2:28 PM  2021    Reason for Consult:  ESRD    History of Present Ilness:    Lazaro Ramirez is a 51 y.o. female with severe end-stage renal disease secondary to diabetic nephropathy on peritoneal dialysis, hypertension, peripheral vascular disease, diabetic neuropathy who was admitted after she had a syncopal episode. She had similar episode 2 weeks ago. Recently had angiogram left lower extremity for gangrene left foot second toe. Denies any shortness of breath. Denies any abdominal pain. Denies any nausea vomiting. Interval HX :    Off dopamine drip  Sitting in chair , not in acute distress    Wants to decrease dwell /fill volume a little bit as she feels tight in chest     Today complaining of weakness, ringing sensation in left ear  Oral intake is poor      S/p CABG on 2010          I/O last 3 completed shifts: In: 663.7 [P.O.:540; I.V.:123.7]  Out: -119     Past Medical History:   Diagnosis Date    Diabetes mellitus ([address]     End stage kidney disease ([address]     peritoneal dialysis every night at home x 2 years    Hypertension     Neuropathy     Peripheral vascular disease ([facility]        Past Surgical History:   Procedure Laterality Date     SECTION      CORONARY ARTERY BYPASS GRAFT N/A 2021    URGENT CABG CORONARY ARTERY BYPASS GRAFTS X3. VEIN TO PDA X1, VEIN TO OM X1, LEFT INTERNAL MAMMARY ARTERY TO LAD X1. ENDOSCOPIC HARVEST RIGHT LEG SAPHENOUS VEIN. LIGATION NYA USING 35MM ATRICLIP. EPI AORTIC ULTRASOUND. TOTAL CARDIOPULMONARY BYPASS. DOPPLER GRAFT FLOW VERIFICATION. BILATERAL INITERCOSTAL NERVE BLOCK USING 1.3% EXPAREL.  performed by Gladys Husbands (BENADRYL) tablet 25 mg, Nightly PRN  zolpidem (AMBIEN) tablet 5 mg, Nightly PRN  sennosides-docusate sodium (SENOKOT-S) 8.6-50 MG tablet 1 tablet, BID  magnesium hydroxide (MILK OF MAGNESIA) 400 MG/5ML suspension 30 mL, Daily PRN  polyethylene glycol (GLYCOLAX) packet 17 g, Daily PRN  pantoprazole (PROTONIX) tablet 40 mg, Daily  potassium chloride 20 mEq/50 mL IVPB (Central Line), PRN  magnesium sulfate 2000 mg in 50 mL IVPB premix, PRN  calcium chloride 1,000 mg in sodium chloride 0.9 % 100 mL IVPB, PRN  calcium chloride 2,000 mg in sodium chloride 0.9 % 100 mL IVPB, PRN  albuterol sulfate  (90 Base) MCG/ACT inhaler 2 puff, Q6H PRN  furosemide (LASIX) injection 20 mg, PRN  glucose (GLUTOSE) 40 % oral gel 15 g, PRN  dextrose 50 % IV solution, PRN  glucagon (rDNA) injection 1 mg, PRN  dextrose 5 % solution, PRN  insulin aspart (NOVOLOG) injection pen 10 Units PATIENT SUPPLIED, TID AC  gentamicin (GARAMYCIN) 0.1 % cream, Daily  sevelamer (RENVELA) tablet 800 mg, TID WC  atorvastatin (LIPITOR) tablet 20 mg, Nightly  [Held by provider] torsemide (DEMADEX) tablet 200 mg, Daily          Physical exam:     Vitals:  /67   Pulse 82   Temp 96 °F (35.6 °C) (Temporal)   Resp 18   Ht 5' 9\" (1.753 m)   Wt 227 lb 4.7 oz (103.1 kg)   SpO2 95%   Breastfeeding No   BMI 33.57 kg/m²   Constitutional:  OAA X3 NAD  Skin: no rash, turgor wnl  Heent:  eomi, mmm  Neck: no bruits or jvd noted  Cardiovascular:  S1, S2 without m/r/g  Respiratory: CTA B without w/r/r  Abdomen:  +bs, soft, nt, nd  Ext: no  lower extremity edema      Labs:  CBC:   Recent Labs     06/14/21  0405 06/15/21  0129 06/16/21  0555   WBC 23.0* 18.6* 14.5*   HGB 8.0* 7.5* 7.3*    188 226     BMP:    Recent Labs     06/14/21  0405 06/15/21  0129 06/16/21  0555   * 131* 131*   K 4.3 5.6* 4.2   CL 95* 93* 91*   CO2 24 23 22   BUN 50* 62* 59*   CREATININE 5.8* 7.4* 6.8*   GLUCOSE 75 123* 466*     Ca/Mg/Phos:   Recent Labs     06/14/21  0405 today. Wants to decrease dwell /fill volume a little bit as she feels tight in chest       Will use 1400 mL as well volume    2. HTN. BP labile   Yesterday was high last night dropped. Has significant side effects from clonidine. Will discontinue  Hold metoprolol and nifedipine for now. She is on dopamine drip at this time. We will give 1 amp of albumin 25 g  Try to taper off dopamine  If systolic blood pressure goes above then slowly resume back on metoprolol and nifedipine    3. Anemia of chronic disease. Gets micera at dialysis unit  Will give EPOGEN once BP controlled     4. Acid- base disorder. Monitor    5. Electrolytes monitor and replace as needed    6. CAD/Syncopal episode. S/p [facility]   Has Multivessel CAD   S/p CABG       7. Peripheral vascular disease. S/p amputation of left foot toes. Podiatry following     8. Left lower abdominal pain.   Resolved  Has mild ileus         Thank you for allowing us to participate in care of Curt Monroe         Electronically signed by: Raghu Naranjo MD, 6/16/2021, 2:28 PM      [facility] [address]  Office : [phone]  Fax :[phone]

## 2021-06-16 NOTE — PROGRESS NOTES
[address] [facility]  6/16/2021  [id number]    Chief Complaint: <principal problem not specified>    Subjective:   Improved breathing today. Able to ambulate into [facility] with staff. SOB currently due to exertion. ROS: No CP. Positive SOB, dyspnea with exertion. Objective:  Patient Vitals for the past 24 hrs:   BP Temp Temp src Pulse Resp SpO2 Weight   06/16/21 0753 125/65 96.9 °F (36.1 °C) Temporal 82 16 95 % --   06/16/21 0400 137/86 98.2 °F (36.8 °C) Temporal 75 18 100 % 227 lb 4.7 oz (103.1 kg)   06/16/21 0200 121/62 -- -- 68 -- 100 % --   06/16/21 0000 121/60 97.5 °F (36.4 °C) Temporal 68 18 100 % --   06/15/21 2200 115/60 -- -- 68 -- 100 % --   06/15/21 2025 (!) 102/57 98.1 °F (36.7 °C) Temporal 70 18 99 % --   06/15/21 2020 (!) 81/32 -- -- 71 -- 98 % --   06/15/21 2000 (!) 72/13 -- -- 71 -- 98 % --   06/15/21 1800 (!) 121/91 -- -- 66 20 94 % --   06/15/21 1731 115/60 97.6 °F (36.4 °C) -- -- 20 -- 230 lb 6.1 oz (104.5 kg)   06/15/21 1730 115/60 -- -- 66 -- 93 % --   06/15/21 1615 -- -- -- 61 -- 94 % --   06/15/21 1600 125/62 96 °F (35.6 °C) Temporal 63 18 93 % --   06/15/21 1545 -- -- -- 64 -- 93 % --   06/15/21 1530 137/63 -- -- 61 -- 95 % --   06/15/21 1515 -- -- -- 60 -- 95 % --   06/15/21 1500 -- 97 °F (36.1 °C) Temporal -- 18 -- --   06/15/21 1200 113/66 97 °F (36.1 °C) Temporal 64 -- -- --     Gen: No distress, pleasant. Resting in bed  HEENT: Normocephalic, atraumatic. CV: No audible murmurs, well perfused extremities  Resp: No respiratory distress. Mild increased WOB  Abd: Soft, nontender nondistended  Ext: No edema. LLE in boot  Neuro: Alert, oriented, appropriately interactive. Laboratory data: Available via EMR.      Therapy progress:  PT  Required Braces or Orthoses  Left Lower Extremity Brace: Boot  LLE Brace Type: Surgical shoe  Position Activity Restriction  Sternal Precautions: No Pushing, No Pulling, 5# Lifting Restrictions  Other position/activity restrictions: s/p CABG x 3 6/9  Objective Sit to Stand: 2 Person Assistance (mod to max A of 2 from EOB and chair level x 3 reps total)  Stand to sit: Moderate Assistance (decreased eccentric control for lowering)  Device: Rollator  Other Apparatus: O2 (2L, surgical shoe/boot)  Assistance: Minimal assistance  Distance: 30 ft, 10 ft, and 40 ft  OT  PT Equipment Recommendations  Equipment Needed: Yes  Mobility Devices: Danyance Rider: Rollator (4 Wheeled)  Other: TBD with progress but likely with need rollator     Assessment        SLP                Body mass index is 33.57 kg/m². Assessment:  Patient Active Problem List   Diagnosis    Diabetes mellitus ([facility]    Obesity    Microalbuminuria    Hyperlipidemia with target LDL less than 100    Hypertension    Diabetes mellitus type 2 in obese ([facility]    Acute renal failure (ARF) ([address]    ESRD (end stage renal disease) ([facility]    Non-smoker    Elevated C-reactive protein (CRP)    Elevated sed rate    Diabetic polyneuropathy associated with type 2 diabetes mellitus (HCC)    Weight loss counseling, encounter for    Atherosclerosis of native arteries of left leg with ulceration of other part of foot ([facility]    Near syncope    Coronary artery disease of native artery of native heart with stable angina pectoris (HCC)    Mild malnutrition ([address]    S/P coronary artery bypass graft x 3    S/P left atrial appendage ligation       Plan:   S/P CABG X3 with NYA Clip  S/P Amputation of toes 1 and 2 on the left (5/26/21)  ESRD, on PD r/t diabetic nephropahty  PAD  PVD  DM II  HTN  HLD     Dispo: Patient is an appropriate ARU candidate. Able to tolerate the required 3 hours of therapy in an ARU setting. Able to admit when approved by primary team. Anticipate today or tomorrow. Homero Hoyos MD 6/16/2021, 11:38 AM    * This document was created using dictation software. While all precautions were taken to ensure accuracy, errors may have occurred. Please disregard any typographical errors.

## 2021-06-16 NOTE — PROGRESS NOTES
Resting in bed assisted to chair with 2 person assist. Assessment completed denies needs will monitor.

## 2021-06-16 NOTE — CARE COORDINATION
Patient to discharge to [facility] this afternoon. Patient's son, Cornelius Jaimes updated.     Alexis President, BSN, CCM, RN  [facility]  [phone]

## 2021-06-16 NOTE — FLOWSHEET NOTE
CCPD Order              Exchanges: 2              Exchange Volume: 1400ml              Total Time: 10hrs              Dextrose: Delflex 2.5% in 5L bags x2              Last Fill: N/A              Total Volume: 7000ml     Orders verified. Supplies taken to pt's room. Report received. Cycler set up, primed and pre tested. Dressing changed on [facility] Cath site. Initial effluent clear yellow. 06/16/21 1815   Vitals   /77   Temp 97.3 °F (36.3 °C)   Temp Source Oral   Pulse 91   Resp 18   SpO2 96 %   Height 5' 9\" (1.753 m)   Weight 236 lb 11.2 oz (107.4 kg)   Peritoneal Dialysis Catheter Left lower abdomen   Placement Date/Time: 08/16/18 0818   Inserted by: Natasha Mcfarlane MD  Catheter Location: Left lower abdomen   Status Accessed   Site Condition No Complications   Dressing Status Changed;Clean;Dry; Intact   Dressing Occlusive   Cycler   Verification of Prescription CCPD   Total Volume Programmed 7000 mL   Therapy Time (Hours:Minutes) 1000   Fill Volume 1400 mL   Last Fill Volume 0 mL   Dextrose Setting Same (Nonextraneal)   I Drain Alarm 0 mL   Number of Cycles 5   Bag Volume 5000 mL   Number of Bags Used 2   Dianeal Solution Other (Comment)  (Delflex 2.5% in 5L bags x2)   I Drain (mL) 152 mL

## 2021-06-16 NOTE — PROGRESS NOTES
4 Eyes Skin Assessment     NAME:  Pooja Allen  YOB: 1975  MEDICAL RECORD NUMBER:  [id number]    The patient is being assess for  Admission    I agree that 2 RN's have performed a thorough Head to Toe Skin Assessment on the patient. ALL assessment sites listed below have been assessed. Areas assessed by both nurses:    Head, Face, Ears, Shoulders, Back, Chest, Arms, Elbows, Hands, Sacrum. Buttock, Coccyx, Ischium and Legs. Feet and Heels        Does the Patient have a Wound? Yes wound(s) were present on assessment.  LDA wound assessment was Initiated and completed        Anthony Prevention initiated:  Yes   Wound Care Orders initiated:  Yes    Pressure Injury (Stage 3,4, Unstageable, DTI, NWPT, and Complex wounds) if present place consult order under [de-identified] No    New and Established Ostomies if present place consult order under : No      Nurse 1 eSignature: Electronically signed by Nino Hansen RN on 6/16/21 at 7:06 PM EDT    **SHARE this note so that the co-signing nurse is able to place an eSignature**    Nurse 2 eSignature: Electronically signed by Suleman Jiménez RN on 6/26/21 at 5:59 PM EDT

## 2021-06-16 NOTE — PROGRESS NOTES
diphenhydrAMINE, zolpidem, magnesium hydroxide, polyethylene glycol, potassium chloride, magnesium sulfate, calcium chloride IVPB, calcium chloride IVPB, albuterol sulfate HFA, furosemide, glucose, dextrose, glucagon (rDNA), dextrose      Intake/Output Summary (Last 24 hours) at 6/16/2021 0944  Last data filed at 6/16/2021 0649  Gross per 24 hour   Intake 540 ml   Output -119 ml   Net 659 ml       Physical Exam Performed:    /65   Pulse 82   Temp 96.9 °F (36.1 °C) (Temporal)   Resp 16   Ht 5' 9\" (1.753 m)   Wt 227 lb 4.7 oz (103.1 kg)   SpO2 95%   Breastfeeding No   BMI 33.57 kg/m²     General appearance: aaox3  HEENT: Pupils equal, round, and reactive to light. Conjunctivae/corneas clear. Neck: Supple, with full range of motion. No jugular venous distention. Respiratory:  ctab no wheezing  Cardiovascular: Regular rate and rhythm with normal S1/S2 ., chest dressing clean and dry  Abdomen: Soft, non-tender, non-distended with normal bowel sounds. Skin: Skin color, texture, turgor normal.  No rashes or lesions. Neurologic: no gross deficits  Psychiatric: Alert and oriented, thought content appropriate, normal insight        Labs:   Recent Labs     06/14/21  0405 06/15/21  0129 06/16/21  0555   WBC 23.0* 18.6* 14.5*   HGB 8.0* 7.5* 7.3*   HCT 24.6* 23.0* 22.7*    188 226     Recent Labs     06/14/21  0405 06/15/21  0129 06/16/21  0555   * 131* 131*   K 4.3 5.6* 4.2   CL 95* 93* 91*   CO2 24 23 22   BUN 50* 62* 59*   CREATININE 5.8* 7.4* 6.8*   CALCIUM 7.9* 7.9* 7.7*   PHOS 4.5  --   --      No results for input(s): AST, ALT, BILIDIR, BILITOT, ALKPHOS in the last 72 hours. No results for input(s): INR in the last 72 hours. No results for input(s): Paralee Fontan in the last 72 hours.     Urinalysis:      Lab Results   Component Value Date    NITRU Negative 06/06/2021    WBCUA 36 06/06/2021    BACTERIA RARE 06/06/2021    RBCUA 587 06/06/2021    BLOODU LARGE 06/06/2021    SPECGRAV 1.019 06/06/2021    GLUCOSEU 100 06/06/2021       Radiology:  XR CHEST PORTABLE   Final Result   No pneumothorax following tube removal.      Cardiomegaly with bibasilar airspace disease and possible small pleural   effusion. Poor inspiration. XR ABDOMEN (KUB) (SINGLE AP VIEW)   Final Result   Nonspecific bowel gas pattern. Mild small bowel and colonic distension   suggesting a mild ileus. No evidence of obstruction. XR CHEST PORTABLE   Final Result   Status post open heart surgery. Support tubes and Paterson-Ebonie catheter, as   above. Right basilar atelectasis and small effusion. IR FLUORO GUIDED CVA DEVICE PLMT/REPLACE/REMOVAL   Final Result   Technically successful right-sided HD catheter placement. Catheter is okay   for use. VL DUP CAROTID BILATERAL   Final Result      VL PRE OP VEIN MAPPING   Final Result      CT CHEST WO CONTRAST   Final Result   No acute intrathoracic abnormality         MRI BRAIN WO CONTRAST   Final Result   1. No acute intracranial abnormality. 2. Mild chronic white matter microvascular ischemic changes. 3. Right maxillary sinusitis.          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

## 2021-06-17 LAB
ANION GAP SERPL CALCULATED.3IONS-SCNC: 16 MMOL/L (ref 3–16)
BUN BLDV-MCNC: 59 MG/DL (ref 7–20)
CALCIUM SERPL-MCNC: 8.3 MG/DL (ref 8.3–10.6)
CHLORIDE BLD-SCNC: 92 MMOL/L (ref 99–110)
CO2: 25 MMOL/L (ref 21–32)
CREAT SERPL-MCNC: 7 MG/DL (ref 0.6–1.1)
GFR AFRICAN AMERICAN: 8
GFR NON-AFRICAN AMERICAN: 6
GLUCOSE BLD-MCNC: 169 MG/DL (ref 70–99)
GLUCOSE BLD-MCNC: 252 MG/DL (ref 70–99)
GLUCOSE BLD-MCNC: 265 MG/DL (ref 70–99)
GLUCOSE BLD-MCNC: 49 MG/DL (ref 70–99)
GLUCOSE BLD-MCNC: 54 MG/DL (ref 70–99)
GLUCOSE BLD-MCNC: 81 MG/DL (ref 70–99)
GLUCOSE BLD-MCNC: 84 MG/DL (ref 70–99)
HCT VFR BLD CALC: 23 % (ref 36–48)
HEMOGLOBIN: 7.4 G/DL (ref 12–16)
MCH RBC QN AUTO: 29.1 PG (ref 26–34)
MCHC RBC AUTO-ENTMCNC: 32.2 G/DL (ref 31–36)
MCV RBC AUTO: 90.2 FL (ref 80–100)
PDW BLD-RTO: 19.8 % (ref 12.4–15.4)
PERFORMED ON: ABNORMAL
PERFORMED ON: NORMAL
PERFORMED ON: NORMAL
PLATELET # BLD: 233 K/UL (ref 135–450)
PMV BLD AUTO: 8.9 FL (ref 5–10.5)
POTASSIUM SERPL-SCNC: 4.4 MMOL/L (ref 3.5–5.1)
RBC # BLD: 2.55 M/UL (ref 4–5.2)
SODIUM BLD-SCNC: 133 MMOL/L (ref 136–145)
WBC # BLD: 16.9 K/UL (ref 4–11)

## 2021-06-17 PROCEDURE — 97530 THERAPEUTIC ACTIVITIES: CPT

## 2021-06-17 PROCEDURE — 36415 COLL VENOUS BLD VENIPUNCTURE: CPT

## 2021-06-17 PROCEDURE — 6370000000 HC RX 637 (ALT 250 FOR IP): Performed by: PHYSICAL MEDICINE & REHABILITATION

## 2021-06-17 PROCEDURE — 2500000003 HC RX 250 WO HCPCS: Performed by: PHYSICAL MEDICINE & REHABILITATION

## 2021-06-17 PROCEDURE — 90945 DIALYSIS ONE EVALUATION: CPT

## 2021-06-17 PROCEDURE — 97162 PT EVAL MOD COMPLEX 30 MIN: CPT

## 2021-06-17 PROCEDURE — 99232 SBSQ HOSP IP/OBS MODERATE 35: CPT | Performed by: HOSPITALIST

## 2021-06-17 PROCEDURE — 5A1D70Z PERFORMANCE OF URINARY FILTRATION, INTERMITTENT, LESS THAN 6 HOURS PER DAY: ICD-10-PCS | Performed by: HOSPITALIST

## 2021-06-17 PROCEDURE — 6360000002 HC RX W HCPCS: Performed by: PHYSICAL MEDICINE & REHABILITATION

## 2021-06-17 PROCEDURE — 97166 OT EVAL MOD COMPLEX 45 MIN: CPT

## 2021-06-17 PROCEDURE — 80048 BASIC METABOLIC PNL TOTAL CA: CPT

## 2021-06-17 PROCEDURE — 1280000000 HC REHAB R&B

## 2021-06-17 PROCEDURE — 97535 SELF CARE MNGMENT TRAINING: CPT

## 2021-06-17 PROCEDURE — 85027 COMPLETE CBC AUTOMATED: CPT

## 2021-06-17 RX ORDER — HEPARIN SODIUM 5000 [USP'U]/ML
5000 INJECTION, SOLUTION INTRAVENOUS; SUBCUTANEOUS EVERY 8 HOURS SCHEDULED
Status: DISCONTINUED | OUTPATIENT
Start: 2021-06-17 | End: 2021-07-01 | Stop reason: HOSPADM

## 2021-06-17 RX ADMIN — ACETAMINOPHEN 1000 MG: 325 TABLET ORAL at 21:45

## 2021-06-17 RX ADMIN — ACETAMINOPHEN 1000 MG: 325 TABLET ORAL at 06:20

## 2021-06-17 RX ADMIN — MICONAZOLE NITRATE: 20 POWDER TOPICAL at 08:19

## 2021-06-17 RX ADMIN — NYSTATIN 500000 UNITS: 100000 SUSPENSION ORAL at 12:33

## 2021-06-17 RX ADMIN — SEVELAMER CARBONATE 800 MG: 800 TABLET, FILM COATED ORAL at 17:02

## 2021-06-17 RX ADMIN — GENTAMICIN SULFATE: 1 CREAM TOPICAL at 12:41

## 2021-06-17 RX ADMIN — TRAMADOL HYDROCHLORIDE 50 MG: 50 TABLET, FILM COATED ORAL at 20:06

## 2021-06-17 RX ADMIN — NYSTATIN 500000 UNITS: 100000 SUSPENSION ORAL at 20:07

## 2021-06-17 RX ADMIN — NYSTATIN 500000 UNITS: 100000 SUSPENSION ORAL at 08:18

## 2021-06-17 RX ADMIN — INSULIN DETEMIR 25 UNITS: 100 INJECTION, SOLUTION SUBCUTANEOUS at 20:13

## 2021-06-17 RX ADMIN — PANTOPRAZOLE SODIUM 40 MG: 40 TABLET, DELAYED RELEASE ORAL at 06:21

## 2021-06-17 RX ADMIN — SEVELAMER CARBONATE 800 MG: 800 TABLET, FILM COATED ORAL at 12:33

## 2021-06-17 RX ADMIN — METOPROLOL TARTRATE 25 MG: 25 TABLET, FILM COATED ORAL at 08:18

## 2021-06-17 RX ADMIN — HEPARIN SODIUM 5000 UNITS: 5000 INJECTION INTRAVENOUS; SUBCUTANEOUS at 12:32

## 2021-06-17 RX ADMIN — STANDARDIZED SENNA CONCENTRATE AND DOCUSATE SODIUM 1 TABLET: 8.6; 5 TABLET ORAL at 08:18

## 2021-06-17 RX ADMIN — MICONAZOLE NITRATE: 20 POWDER TOPICAL at 20:07

## 2021-06-17 RX ADMIN — METOPROLOL TARTRATE 25 MG: 25 TABLET, FILM COATED ORAL at 20:07

## 2021-06-17 RX ADMIN — NYSTATIN 500000 UNITS: 100000 SUSPENSION ORAL at 17:02

## 2021-06-17 RX ADMIN — ATORVASTATIN CALCIUM 20 MG: 20 TABLET, FILM COATED ORAL at 20:07

## 2021-06-17 RX ADMIN — SEVELAMER CARBONATE 800 MG: 800 TABLET, FILM COATED ORAL at 08:18

## 2021-06-17 RX ADMIN — STANDARDIZED SENNA CONCENTRATE AND DOCUSATE SODIUM 1 TABLET: 8.6; 5 TABLET ORAL at 20:06

## 2021-06-17 RX ADMIN — ASPIRIN 325 MG: 325 TABLET, COATED ORAL at 08:18

## 2021-06-17 RX ADMIN — ACETAMINOPHEN 1000 MG: 325 TABLET ORAL at 12:33

## 2021-06-17 RX ADMIN — HEPARIN SODIUM 5000 UNITS: 5000 INJECTION INTRAVENOUS; SUBCUTANEOUS at 21:45

## 2021-06-17 ASSESSMENT — PAIN SCALES - GENERAL
PAINLEVEL_OUTOF10: 5
PAINLEVEL_OUTOF10: 0
PAINLEVEL_OUTOF10: 1
PAINLEVEL_OUTOF10: 2
PAINLEVEL_OUTOF10: 2
PAINLEVEL_OUTOF10: 0

## 2021-06-17 ASSESSMENT — PAIN DESCRIPTION - LOCATION
LOCATION: CHEST
LOCATION: FLANK;BACK

## 2021-06-17 ASSESSMENT — PAIN DESCRIPTION - DESCRIPTORS
DESCRIPTORS: ACHING
DESCRIPTORS: PATIENT UNABLE TO DESCRIBE

## 2021-06-17 ASSESSMENT — PAIN DESCRIPTION - PAIN TYPE: TYPE: ACUTE PAIN

## 2021-06-17 ASSESSMENT — PAIN DESCRIPTION - ORIENTATION: ORIENTATION: LEFT

## 2021-06-17 NOTE — PLAN OF CARE
Nutrition Problem #1: Increased nutrient needs  Intervention: Food and/or Nutrient Delivery: Continue Current Diet, Continue Oral Nutrition Supplement  Nutritional Goals: po intake greater than 50% of meals/supplements

## 2021-06-17 NOTE — PROGRESS NOTES
Physical Therapy    Facility/Department: [facility]  Initial Assessment    NAME: Janis Vo  : 1975  MRN: [id number]    Date of Service: 2021    Discharge Recommendations:  Home with assist PRN S Level 3   PT Equipment Recommendations  Equipment Needed: Yes  Theador Corpus: Rollator (4 Wheeled)  Other: TBD with progress but likely with need rollator    Assessment   Body structures, Functions, Activity limitations: Decreased functional mobility ; Decreased ADL status; Decreased strength;Decreased balance;Decreased endurance  Assessment: Pt presents with functional deficits requiring 2 person assistance to mobilize at this time, presents with decreased activity tolerance and hyoptension limiting activity progression. Pt will continue to benefit from skilled  therapy services to improve independence and assist in returning to [facility]. Treatment Diagnosis: imparied functional mobility, impaired strength  Decision Making: Medium Complexity  History: see above  Exam: bed mobility, transfers, balance  Clinical Presentation: evolving  PT Education: Goals;PT Role;Plan of Care;Precautions  Patient Education: verbalized understanding of sternal precautions  Barriers to Learning: none  REQUIRES PT FOLLOW UP: Yes  Activity Tolerance  Activity Tolerance: Patient limited by fatigue;Patient limited by endurance       Patient Diagnosis(es): CABG   has a past medical history of Diabetes mellitus ([facility] [address] End stage kidney disease ([facility] [address] Hypertension, Neuropathy, and Peripheral vascular disease ([facility] [address] has a past surgical history that includes Tonsillectomy;  section; other surgical history (2018); Toe amputation (Left, 2021); Toe amputation (Left, 2021); and Coronary artery bypass graft (N/A, 2021).     Restrictions  Restrictions/Precautions  Restrictions/Precautions: Fall Risk, Weight Bearing  Required Braces or Orthoses?: Yes  Lower Extremity Weight Bearing Restrictions  Left Lower Extremity Weight Bearing: Weight Bearing As Tolerated (with surgical shoe)  Required Braces or Orthoses  Left Lower Extremity Brace: Boot  LLE Brace Type: Surgical shoe  Position Activity Restriction  Sternal Precautions: No Pushing, No Pulling, 5# Lifting Restrictions  Other position/activity restrictions: s/p CABG x 3 6/9  Vision/Hearing  Vision: Impaired  Vision Exceptions: Wears glasses at all times  Hearing: Within functional limits     Subjective  General  Chart Reviewed: Yes  Patient assessed for rehabilitation services?: Yes  Response To Previous Treatment: Not applicable  Diagnosis: s/p CABG 6/9  Follows Commands: Within Functional Limits  General Comment  Comments: Pt supine in bed upon arrival on 2.5L O2,  Pain Screening  Patient Currently in Pain: Yes  Pain Assessment  Pain Assessment: 0-10  Pain Level: 2  Patient's Stated Pain Goal: No pain  Pain Location: Chest  Pain Descriptors: Aching  Non-Pharmaceutical Pain Intervention(s): Repositioned  Response to Pain Intervention: Patient Satisfied  Vital Signs  Patient Currently in Pain: Yes       Orientation  Orientation  Overall Orientation Status: Within Functional Limits  Social/Functional History  Social/Functional History  Lives With: Son (plans to stay with son upon d/c)  Type of Home: House  Home Layout: Two level, Able to Live on Main level with bedroom/bathroom  Home Access: Stairs to enter without rails  Entrance Stairs - Number of Steps: 2  Bathroom Shower/Tub: Tub/Shower unit  Bathroom Toilet: Standard  Bathroom Equipment: Hand-held shower (shower stool)  ADL Assistance: Independent  Homemaking Assistance: Independent  Homemaking Responsibilities: Yes  Ambulation Assistance: Independent  Transfer Assistance: Independent  Active : Yes  Occupation: On disability  Additional Comments: Reports 2 falls due to passing out. Has been independent since your toe amputation on 5/26.   Plans to stay with son at discharge (info above based on son's home)  Cognition   Cognition  Overall Cognitive Status: [facility]    Objective           Strength RLE  Strength RLE: Exception  Comment: weakness as above with difficulty with transfers  Strength LLE  Strength LLE: Exception  Comment: weakness as above with difficulty with transfers     Sensation  Overall Sensation Status: [facility]  Bed mobility  Rolling to Left: Minimal assistance  Rolling to Right: Minimal assistance  Supine to Sit: Moderate assistance (Assistance for trunk)  Sit to Supine: Unable to assess  Scooting: Minimal assistance  Transfers  Sit to Stand: Dependent/Total (mod A x 2)  Stand to sit: Moderate Assistance (decreased eccentric control when lowering)  Bed to Chair: Minimal assistance (RW)  Stand Pivot Transfers: Minimal Assistance (RW)  Ambulation  Ambulation?: No (pt unable to tolerate due to dizziness and hypotension with mobility)     Balance  Posture: Fair  Sitting - Static: Good (supervision)  Sitting - Dynamic: Good (SBA)  Standing - Static: Fair (CGA)  Standing - Dynamic: Fair (min A)      Vitals:   Blood Pressure:  Seated EOB: 121/78   S/p standin/34 unclear on accuracy, pt moving while reading was being taken  S/p seated EOB x 3 minutes: 108/63    Plan   Plan  Times per week: 90 minutes per day, 5-7x/wk  Times per day: Daily  Current Treatment Recommendations: Strengthening, Balance Training, Functional Mobility Training, Transfer Training, Endurance Training, Gait Training, Stair training, Patient/Caregiver Education & Training  Safety Devices  Type of devices: All fall risk precautions in place, Call light within reach, Nurse notified, Left in chair, Chair alarm in place    2nd session:   Pt seated in recliner chair upon PT/OT arrival, denies pain and is agreeable to tx session. Pt continues to c/o fatigue and dizziness with mobility throughout duration of session. Pt completes STS transfers throughout session with mod A x 2 from w/c. Pt completes toilet transfer with mod A x 2.  Pt completes car transfer with mod A x 2. Pt unable to tolerate ambulation at this time due to c/o dizziness when standing for prolonged periods of time. Juhi Mendez requires extended time for rest breaks between activities due to decreased activity tolerance. Upon return to room, pt remains up in recliner with chair alarm and call light within reach. No new complaints following session.     Goals  Short term goals  Time Frame for Short term goals: to be met in 14 days  Short term goal 1: pt will complete bed mobility with mod I  Short term goal 2: pt will complete STS transfers with mod I  Short term goal 3: pt will complete stand step transfers with mod I  Short term goal 4: pt will ambulate x 150' with mod I of LRAD  Short term goal 5: pt will ascend/descend 2 stairs with mod I  Patient Goals   Patient goals : to return home and regain independence       Therapy Time   Individual Concurrent Group Co-treatment   Time In       0830   Time Out       0930   Minutes       60         Second Session Therapy Time:   Individual Concurrent Group Co-treatment   Time In       1030   Time Out       1100   Minutes       30     Timed Code Treatment Minutes:  75 minutes (15 minute evaluation)    Total Treatment Minutes: 90 minutes     Mevelyn Fleischer, PT       Mevelyn Fleischer PT, DPT [id number]

## 2021-06-17 NOTE — H&P
Patient: Tramaine Vera  [id number]  Date: 2021      Chief Complaint: Syncope     History of Present Illness/Hospital Course:  Patient is a 51year old female with a history of ESRD on PD, HTN, DM, HLD, and PVD. On 21 she had amputation of toes 1 and 2 on the left foot. On 21 she underwent a left popliteal, anterior tibial and dorsalis pedis angioplasty with Dr. Jose Guadalupe Vu. She presented to the ER on 21 with a syncopal episode. She has been experiencing dizziness and syncopal episodes X2 weeks prior to this visit. She underwent a Left Heart Catheterization on 21 and was found to have severe coronary microvascular disease. On 21 she underwent a CABG X3 with NYA clip. She was evaluated by therapy and suggested to continue in an inpatient setting prior to returning home. She reports concerns for lower extremity weakness and dizziness.     Prior Level of Function:  Independent     Current Level of Function:  Total assist     has a past medical history of Diabetes mellitus ([address] End stage kidney disease ([address] Hypertension, Neuropathy, and Peripheral vascular disease ([address] has a past surgical history that includes Tonsillectomy;  section; other surgical history (2018); Toe amputation (Left, 2021); Toe amputation (Left, 2021); and Coronary artery bypass graft (N/A, 2021). reports that she has never smoked. She has never used smokeless tobacco. She reports that she does not drink alcohol and does not use drugs. family history includes Cancer in her father; Coronary Art Dis in her father and mother; Diabetes in her father and mother; Heart Disease in her father and mother; High Blood Pressure in her father and mother.     Allergies: Zetia [ezetimibe], Quinolones, Humalog [insulin lispro], Lisinopril, Lantus [insulin glargine], and Victoza [liraglutide]    Current Facility-Administered Medications   Medication Dose Route Frequency Provider Last Rate Last Admin    acetaminophen (TYLENOL) tablet 1,000 mg  1,000 mg Oral 3 times per day Karon Bradshaw MD   1,000 mg at 06/17/21 0620    albuterol sulfate  (90 Base) MCG/ACT inhaler 2 puff  2 puff Inhalation Q6H PRN Karon Bradshaw MD        aspirin EC tablet 325 mg  325 mg Oral Daily Karon Bradshaw MD   325 mg at 06/17/21 0818    diphenhydrAMINE (BENADRYL) tablet 25 mg  25 mg Oral Nightly PRN Karon Bradshaw MD        magnesium hydroxide (MILK OF MAGNESIA) 400 MG/5ML suspension 30 mL  30 mL Oral Daily PRN Karon Bradshaw MD        metoprolol tartrate (LOPRESSOR) tablet 25 mg  25 mg Oral BID Karon Bradshaw MD   25 mg at 06/17/21 0818    pantoprazole (PROTONIX) tablet 40 mg  40 mg Oral Daily Karon Bradshaw MD   40 mg at 06/17/21 9590    polyethylene glycol (GLYCOLAX) packet 17 g  17 g Oral Daily PRN Karon Bradshaw MD        sennosides-docusate sodium (SENOKOT-S) 8.6-50 MG tablet 1 tablet  1 tablet Oral BID Karon Bradshaw MD   1 tablet at 06/17/21 0818    traMADol (ULTRAM) tablet 50 mg  50 mg Oral Q6H PRN Karon Bradshaw MD        Or    traMADol Ligia Barren) tablet 100 mg  100 mg Oral Q6H PRN Karon Bradshaw MD        zolpidem (AMBIEN) tablet 5 mg  5 mg Oral Nightly PRN Karon Bradshaw MD        dextrose 5 % solution  100 mL/hr Intravenous PRN Karon Bradshaw MD        dextrose 50 % IV solution  12.5 g Intravenous PRN Karon Bradshaw MD        glucagon (rDNA) injection 1 mg  1 mg Intramuscular PRN Karon Bradshaw MD        glucose (GLUTOSE) 40 % oral gel 15 g  15 g Oral PRN Karon Bradshaw MD        gentamicin (GARAMYCIN) 0.1 % cream   Topical Daily Karon Bradshaw MD        atorvastatin (LIPITOR) tablet 20 mg  20 mg Oral Nightly Karon Bradshaw MD   20 mg at 06/16/21 2121    bisacodyl (DULCOLAX) EC tablet 5 mg  5 mg Oral Daily PRN Karon Bradshaw MD        diphenhydrAMINE (BENADRYL) tablet 25 mg  25 mg Oral Q6H PRN Karon Bradshaw MD        heparin (porcine) injection 3,400 Units 3,400 Units Intracatheter PRN Merly Díaz MD        hydrALAZINE (APRESOLINE) tablet 50 mg  50 mg Oral Q8H PRN Merly Díaz MD        insulin aspart (NOVOLOG) injection pen 10 Units PATIENT SUPPLIED  10 Units Subcutaneous TID  Merly Díaz MD        insulin aspart (NOVOLOG) injection vial 0-18 Units PATIENT SUPPLIED  0-18 Units Subcutaneous TID SUNIL Díaz MD   9 Units at 06/17/21 0821    insulin aspart (NOVOLOG) injection vial 0-9 Units PATIENT SUPPLIED  0-9 Units Subcutaneous Nightly Merly Díaz MD   3 Units at 06/16/21 2124    insulin detemir (LEVEMIR) injection pen 25 Units  25 Units Subcutaneous Nightly Merly Díaz MD   25 Units at 06/16/21 2126    nystatin (MYCOSTATIN) 062877 UNIT/ML suspension 500,000 Units  5 mL Oral 4x Daily Merly Díaz MD   500,000 Units at 06/17/21 0818    ondansetron (ZOFRAN-ODT) disintegrating tablet 4 mg  4 mg Oral Q8H PRN Merly Díaz MD        sevelamer (RENVELA) tablet 800 mg  800 mg Oral TID  Merly Díaz MD   800 mg at 06/17/21 0818    miconazole (MICOTIN) 2 % powder   Topical BID Merly Díaz MD   Given at 06/17/21 8553       REVIEW OF SYSTEMS:   CONSTITUTIONAL: negative for fevers, chills, diaphoresis, appetite change, night sweats and unexpected weight change. HEENT: negative for hearing loss, tinnitus, ear drainage, sinus pressure, nasal congestion, epistaxis and snoring. RESPIRATORY: Negative for hemoptysis, cough, sputum production. CARDIOVASCULAR: negative for chest pain, palpitations, exertional chest pressure/discomfort, edema, syncope. GASTROINTESTINAL: negative for nausea, vomiting, diarrhea, constipation, blood in stool and abdominal pain. GENITOURINARY: negative for frequency, dysuria, urinary incontinence, decreased urine volume, and hematuria. HEMATOLOGIC/LYMPHATIC: negative for easy bruising, bleeding and lymphadenopathy.    ALLERGIC/IMMUNOLOGIC: negative for recurrent infections, angioedema, anaphylaxis and drug reactions. ENDOCRINE: negative for weight changes and diabetic symptoms including polyuria, polydipsia and polyphagia. MUSCULOSKELETAL: negative for pain, joint swelling, decreased range of motion and muscle weakness. NEUROLOGICAL: negative for headaches, slurred speech, unilateral weakness. PSYCHIATRIC/BEHAVIORAL: negative for hallucinations, behavioral problems, confusion and agitation.      All pertinent positives are noted in the HPI. Physical Examination:  Vitals:   Patient Vitals for the past 24 hrs:   BP Temp Temp src Pulse Resp SpO2 Height Weight   06/17/21 0818 (!) 143/87 -- -- 80 -- -- -- --   06/16/21 2115 126/63 98.7 °F (37.1 °C) Oral 81 18 94 % -- --   06/16/21 1815 100/77 97.3 °F (36.3 °C) Oral 91 18 96 % 5' 9\" (1.753 m) 236 lb 11.2 oz (107.4 kg)     Psych: Stable mood, normal judgement, normal affect   Const: No distress  Eyes: Conjunctiva noninjected, no icterus noted; pupils equal, round. HENT: Atraumatic, normocephalic; Oral mucosa moist  Neck: Trachea midline, neck supple. No thyromegaly noted. R neck dressing appropriate at IJ site  CV: Regular rate and rhythm, no murmur rub or gallop noted. Sternal incision well approximated. Pursestring in place  Resp: Lungs clear to auscultation bilaterally, no rales wheezes or ronchi, no retractions. Respirations unlabored. GI: Soft, nontender, nondistended. Normal bowel sounds. No palpable masses. Neuro: Alert, oriented, appropriate. No cranial nerve deficits appreciated. Sensation intact to light touch. Motor examination reveals: BUE 4/5 BLE 2/5 HF 3/5 KE 4-/5 DF/PF. Reflexes normal and symmetric. Skin: Normal temperature and turgor  MSK: No joint abnormalities noted. Ext: 1-2+ edema appreciated in BLE. No varicosities.     Lab Results   Component Value Date    WBC 16.9 (H) 06/17/2021    HGB 7.4 (L) 06/17/2021    HCT 23.0 (L) 06/17/2021    MCV 90.2 06/17/2021     06/17/2021     Lab Results   Component Value Date    INR 1.02 06/04/2021    INR 0.95 02/26/2021    INR 0.91 02/11/2021    PROTIME 11.8 06/04/2021    PROTIME 11.0 02/26/2021    PROTIME 10.6 02/11/2021     Lab Results   Component Value Date    CREATININE 7.0 (HH) 06/17/2021    BUN 59 (H) 06/17/2021     (L) 06/17/2021    K 4.4 06/17/2021    CL 92 (L) 06/17/2021    CO2 25 06/17/2021     Lab Results   Component Value Date    ALT <5 (L) 05/31/2021    AST 9 (L) 05/31/2021    ALKPHOS 120 05/31/2021    BILITOT <0.2 05/31/2021       Most recent imaging studies revealed   EXAMINATION:   ONE XRAY VIEW OF THE CHEST       6/14/2021 6:24 am       COMPARISON:   Prior study(s) most recent 06/09/2021.       HISTORY:   ORDERING SYSTEM PROVIDED HISTORY: CT removal   TECHNOLOGIST PROVIDED HISTORY:   Reason for exam:->CT removal       FINDINGS:   The heart is moderately enlarged.  Pulmonary vessels are upper normal.  There   is patchy bilateral airspace disease.  Lung volume is diminished.  The   endotracheal tube, chest tubes and swan catheter have been removed.  A right   jugular sheath remains.  There is no pneumothorax.  Suspect small pleural   effusion more so on the right.           Impression   No pneumothorax following tube removal.       Cardiomegaly with bibasilar airspace disease and possible small pleural   effusion.  Poor inspiration. The above laboratory data have been reviewed. The above imaging data have been reviewed. The above medical testing have been reviewed. Body mass index is 34.95 kg/m². Barriers to Discharge: Decreased endurance, decreased safety, dizziness, ADLs    POST ADMISSION PHYSICIAN EVALUATION  The patient has agreed to being admitted to our comprehensive inpatient  rehabilitation facility consisting of at least 180 minutes of therapy a day,  5 out of 7 days a week. The patient/family has a good understanding of our discharge process.  The  patient has potential to make improvement and is in need of at least two of  the following multidisciplinary therapies including but not limited to  physical, occupational, respiratory, and speech, nutritional services, wound care, and prosthetics and orthotics. Given the patients complex condition  and risk of further medical complications, rehabilitation services cannot be  safely provided at a lower level of care such as a skilled nursing facility. I have compared the patients medical and functional status at the time of the  preadmission screening and the same on this date, and there are no significant changes except as documented below in the assessment and plan. By signing this document, I acknowledge that I have personally performed a  full physical examination on this patient within 24 hours of admission to  this inpatient rehabilitation facility and have determined the patient to be  able to tolerate the above course of treatment at an intensive level for a  reasonable period of time. I will be completing a detailed individualized  Plan of Care for this patient by day four of the patients stay based upon the  Preadmission Screen, this Post-Admission Evaluation, and the therapy  evaluations. Assessment and Plan:  Generalized weakness: PT/OT    CAD: S/P CABG X3 with NYA Clip (6/9). Sternal precautions. ASA, statin    PAD: S/P Amputation of toes 1 and 2 on the left (5/26/21). Routine wound care    ESRD: on PD 2/2 diabetic nephropathy. Appreciate nephro assistance    DM II: Levemir 25, prandial 10, SSI high. Glucoses labile    HTN: Lopressor 25    HLD: Lipitor 20    Oral candidiasis: Nystatin    Bowels: Per protocol  Bladder: Per protocol   Sleep: Ambien provided prn. Pain: Tylenol scheduled, tramadol as needed  DVT PPx: Heparin   ELOS: 14-21    Zoe Warner MD 6/17/2021, 9:42 AM     * This document was created using dictation software. While all precautions were taken to ensure accuracy, errors may have occurred. Please disregard any typographical errors.

## 2021-06-17 NOTE — PROGRESS NOTES
Disconnected from [facility] per protocol. Effluent: Clear; some fibrin noted    Total time: 10 hr 18 min   Total UF:  330 ml. Total Volume:  7013 ml. Dwell time gained:  0 hr 44 min. Pt Tolerated procedure:  Well   Report given to: Antonia Sky

## 2021-06-17 NOTE — PROGRESS NOTES
Admission Period/Goal QM Codes for Doreen Fonseca. QM Admit Code Goal Code   Eating 6 6   Oral Hygiene 5 -   Toileting Hygiene 2 -   Shower/Bathing 1 6   UB Dressing 4 6   LB Dressing 1 6   Putting on/off Footwear 1 6   Rolling Left and Right 3 6   Sit To Lying 3 6   Lying to Sitting on Bedside 3 6   Sit to Stand 1 6   Chair/Bed to Chair Transfer 3 6   Toilet Transfers 1 6   Car Transfers 1 6   Walk 10 Feet 88 6   Walk 50 Feet with Two Turns 88 6   Walk 150 Feet 88 6   Walk 10 Feet on Uneven Surfaces 88 6   1 Step (Curb) 88 6   4 Steps 88 6   12 Steps 80 6   Picking up Object from Floor 88 6   Wheel 50 Feet with 2 Turns 9 -   Type - -   Wheel 150 Feet 9 -   Type - -       Following discussion at the Quality Measure Huddle, the above codes were determined to be the patient's usual performance at admission.     OT:  Steffany Stewart, OTR/L #[id number], 6/22/2021, [phone]     PT:  Aly Morrow PT, DPT [id number]    6/22/2021  0730     RN:  Marie Bhagat RN BSN 6/23/21 1141     :  Martinez Garcia PT, DPT [id number]  6/23/21  2:57 PM

## 2021-06-17 NOTE — PROGRESS NOTES
Comprehensive Nutrition Assessment    Type and Reason for Visit:  Initial, Consult (new ARU admission)    Nutrition Recommendations/Plan:   Vanilla Ensure High Protein BID    Nutrition Assessment:  Pt is nutritionally compromised AEB report of poor appetite s/p CABG. PO intake usually 50% or less @ meals. Pt states she is taking about half of Ensure HP BID. Discussed importance of adequate po intake with good sources of protein to promote healing. No further nutrition concerns expressed @ this time. Malnutrition Assessment:  Malnutrition Status: At risk for malnutrition (Comment)    Context:  Acute Illness     Findings of the 6 clinical characteristics of malnutrition:  Energy Intake:  1 - 75% or less of estimated energy requirements for 7 or more days  Weight Loss:  No significant weight loss     Body Fat Loss:  No significant body fat loss     Muscle Mass Loss:  No significant muscle mass loss    Fluid Accumulation:  No significant fluid accumulation     Strength:  Not Performed    Estimated Daily Nutrient Needs:  Energy (kcal):  1605- 1926 (15-18 kcal/107kg); Weight Used for Energy Requirements:  Current     Protein (g):  79- 132 (1.2-2.0g/66kg); Weight Used for Protein Requirements:  Ideal        Fluid (ml/day):  2000 ml/d; Method Used for Fluid Requirements:  1 ml/kcal      Nutrition Related Findings:  LBM 6/15; no edema noted      Wounds:  Surgical Incision, Skin Tears       Current Nutrition Therapies:    ADULT DIET; Regular; 4 carb choices (60 gm/meal); 2000 ml  Adult Oral Nutrition Supplement; Low Calorie/High Protein Oral Supplement    Anthropometric Measures:  · Height: 5' 9\" (175.3 cm)  · Current Body Weight: 236 lb (107 kg)   · Admission Body Weight: 225 lb (102.1 kg)    · Usual Body Weight: 227 lb (103 kg) (x 1 month ago)     · Ideal Body Weight: 145 lbs; % Ideal Body Weight 162.8 %   · BMI: 34.8  · Adjusted Body Weight:  ; No Adjustment   · BMI Categories: Obese Class 1 (BMI 30.0-34. 9) Nutrition Diagnosis:   · Increased nutrient needs related to increase demand for energy/nutrients as evidenced by other (comment) (increased needs to promote healing s/p surgery)    · Inadequate oral intake related to inadequate protein-energy intake as evidenced by intake 0-25%, intake 26-50%      Nutrition Interventions:   Food and/or Nutrient Delivery:  Continue Current Diet, Continue Oral Nutrition Supplement  Nutrition Education/Counseling:  Education completed (6/15-CVU class)   Coordination of Nutrition Care:  Continue to monitor while inpatient    Goals:  po intake greater than 50% of meals/supplements       Nutrition Monitoring and Evaluation:   Behavioral-Environmental Outcomes:  None Identified   Food/Nutrient Intake Outcomes:  Food and Nutrient Intake, Supplement Intake  Physical Signs/Symptoms Outcomes:  Skin, Weight     Discharge Planning:     Too soon to determine     Electronically signed by Jaron Carter RD, LD on 6/17/21 at 12:45 PM EDT    Contact: [phone]

## 2021-06-17 NOTE — PROGRESS NOTES
Occupational Therapy   Occupational Therapy Initial Assessment/Treatment   Date: 2021   Patient Name: Yvonne Hamilton  MRN: [id number]     : 1975    Date of Service: 2021    Discharge Recommendations:  S Level 3, Home with Home health OT, Home with assist PRN  OT Equipment Recommendations  Other: CTA pending progress; May benefit from AE for dressing    Assessment   Performance deficits / Impairments: Decreased functional mobility ; Decreased ADL status; Decreased strength;Decreased endurance;Decreased sensation;Decreased high-level IADLs;Decreased coordination;Decreased balance  Assessment: Pt is a 53yr old female s/p CABG x 3 . Pt reports PLOF as independent with ADLs and IADLs. Pt currently functiong below baseline due to deficits listed above. Pt currently requires assistance x2 for functional transfers. Anticipate pt will make good progress with goals and be safe to d/c home with PRN assist and HHOT. Treatment Diagnosis: decreased ADL/IADL/mobility/activity tolerance due to CABG  Prognosis: Good  Decision Making: Medium Complexity  OT Education: OT Role;Plan of Care;Precautions; ADL Adaptive Strategies;Transfer Training;Energy Conservation;Orientation  Patient Education: continue to reinforce for carryover  REQUIRES OT FOLLOW UP: Yes  Activity Tolerance  Activity Tolerance: Patient limited by fatigue;Treatment limited secondary to medical complications (free text)  Activity Tolerance: Pt reporting dizziness with standing. Pt BP seated at /78. Pt able to stand for ~30 sec with BP 54/34 (unsure of accuracy of this due to time of BP reading). Pt seated at EOB after stand 108/63  Safety Devices  Safety Devices in place: Yes  Type of devices: Call light within reach;Nurse notified; Patient at risk for falls; Bed alarm in place; Chair alarm in place; Left in chair; All fall risk precautions in place           Patient Diagnosis(es): There were no encounter diagnoses.      has a past medical history of Diabetes mellitus ([facility] [address] End stage kidney disease ([facility] [address] Hypertension, Neuropathy, and Peripheral vascular disease ([facility] [address] has a past surgical history that includes Tonsillectomy;  section; other surgical history (2018); Toe amputation (Left, 2021); Toe amputation (Left, 2021); and Coronary artery bypass graft (N/A, 2021). Treatment Diagnosis: decreased ADL/IADL/mobility/activity tolerance due to CABG      Restrictions  Restrictions/Precautions  Restrictions/Precautions: Fall Risk, Weight Bearing  Required Braces or Orthoses?: Yes  Lower Extremity Weight Bearing Restrictions  Left Lower Extremity Weight Bearing: Weight Bearing As Tolerated (with surgical shoe)  Required Braces or Orthoses  Left Lower Extremity Brace: Boot  LLE Brace Type: Surgical shoe  Position Activity Restriction  Sternal Precautions: No Pushing, No Pulling, 5# Lifting Restrictions  Other position/activity restrictions: s/p CABG x 3     Subjective   General  Chart Reviewed: Yes, Orders, Progress Notes, Imaging  Patient assessed for rehabilitation services?: Yes  Additional Pertinent Hx: DM - peritoneal dialysis; s/p amputation of the 1st and 2nd toes of the left foot (21)  Family / Caregiver Present: Yes (son at end of session)  Referring Practitioner: Nica Gutierrez MD  Diagnosis: CABG x 3   Subjective  Subjective: Pt in bed upon arrival; Agreeable to OT/PT cotx and evaluation.   Pain Assessment  Pain Level: 2  Height and Weight  Height: 5' 9\" (175.3 cm)  Social/Functional History  Social/Functional History  Lives With: Son (plans to stay with son upon d/c)  Type of Home: House  Home Layout: Two level, Able to Live on Main level with bedroom/bathroom  Home Access: Stairs to enter without rails  Entrance Stairs - Number of Steps: 2  Bathroom Shower/Tub: Tub/Shower unit  Bathroom Toilet: Standard  Bathroom Equipment: Hand-held shower (shower stool)  ADL Assistance: [address] [place] [address] Avenue: Independent  Homemaking Responsibilities: Yes  Ambulation Assistance: Independent  Transfer Assistance: Independent  Active : Yes  Occupation: On disability  Additional Comments: Reports 2 falls due to passing out. Has been independent since your toe amputation on 5/26. Plans to stay with son at discharge (info above based on son's home)       Objective        Orientation  Overall Orientation Status: Within Functional Limits  Observation/Palpation  Posture: Fair  Body Mechanics: rounded shoulder;  Balance  Sitting Balance: Supervision  Standing Balance: Minimal assistance (up to RW)  Standing Balance  Time: ~1 min, 30 sec  Activity: spong bathing, LB dressing, stand step transfer  Functional Mobility  Functional Mobility Comments: Unable to assess functional mobility this date due to low BP; Pt able to tolerate only stand step transfers  ADL  Feeding: Independent  Grooming: Supervision;Stand by assistance (washing face; hand hygiene)  UE Bathing: Supervision; Increased time to complete;Verbal cueing;Stand by assistance (sponge bathing at side of bed)  LE Bathing: Dependent/Total (x2 assist to  order for pt wash buttocks and rula-area; Pt able to assist with top of legs)  LE Dressing: Dependent/Total (X2 assist to stand to don pants and underwear; Pt assisting with managing pants over hips)  Additional Comments: Pt limited in ADLs due to reports of dizziness with standing;  Unable to tolerate functional mobility to bathroom for shower; agreeable to sponge bath  Tone RUE  RUE Tone: Normotonic  Tone LUE  LUE Tone: Normotonic  Coordination  Movements Are Fluid And Coordinated: Yes     Bed mobility  Supine to Sit: Moderate assistance (Assistance for trunk)  Sit to Supine: Unable to assess  Scooting: Minimal assistance  Transfers  Stand Step Transfers: 2 Person assistance;Dependent/Total;Moderate assistance  Sit to stand: 2 Person assistance;Dependent/Total;Moderate assistance  Stand to sit: Moderate assistance;2 Person assistance;Dependent/Total  Transfer Comments: up to RW     Cognition  Overall Cognitive Status: WFL        Sensation  Overall Sensation Status: WFL        LUE AROM (degrees)  LUE AROM : WFL  LUE General AROM: limited due to surgical precautions  RUE AROM (degrees)  RUE General AROM: limited to 90 degrees due to surigcal precautions  LUE Strength  LUE Strength Comment: Not formally assessed due to surgical precautions  RUE Strength  RUE Strength Comment: Not formally assessed due to surgical precautions             Second Session:  Pt seated in w/c at entry, agreeable to cotx session, denied pain. Pt on 3L O2 throughout session. Toilet transfer completed with [address] of 2 w/c<>BSC over toilet, cues to sequence/hand placement for sternal precautions. Car transfer completed to/from w/c with ModA of 2, assist for LEs. Pt required extensive time for rest breaks throughout session. Pt left seated in w/c, chair alarm on, call light and needs within reach.            Plan   Plan  Times per week: 90 minutes; 5-6x/wk  Times per day: Daily  Current Treatment Recommendations: Strengthening, ROM, Balance Training, Functional Mobility Training, Endurance Training, Safety Education & Training, Patient/Caregiver Education & Training, Equipment Evaluation, Education, & procurement, Positioning, Self-Care / ADL, Pain Management, Home Management Training      Goals  Short term goals  Time Frame for Short term goals: By 2 wks  Short term goal 1: Pt will complete UB/LB dressing with mod I and use fo AE as needed  Short term goal 2: Pt will complete bathing with mod I  Short term goal 3: Pt will complete toileting with mod I  Short term goal 4: Pt will complete functional transfers with mod I  Short term goal 5: Pt will tolerate x10 mins of standing IADLs with SpO2 above 90% in order to increase activity tolerance for d/c home  Long term goals  Time Frame for Long term goals : LTG=STG       Therapy Time   Individual Concurrent Group Co-treatment   Time In       0830   Time Out       0930   Minutes       60   Timed Code Treatment Minutes: 45 Minutes (15 minute evaluation)       Yudith Ny OTR/L     . Second Session Therapy Time:   Individual Concurrent Group Co-treatment   Time In       1030   Time Out       1100   Minutes       30     Timed Code Treatment Minutes:  60 + 30     Total Treatment Minutes:  14[address] CHINA Butler OTR/L #[id number] (documentation and tx for 2nd session)

## 2021-06-17 NOTE — PLAN OF CARE
Problem: Pain:  Goal: Pain level will decrease  Outcome: Ongoing  Goal: Control of acute pain  Outcome: Ongoing  Goal: Control of chronic pain  Outcome: Ongoing     Problem: Skin Integrity:  Goal: Will show no infection signs and symptoms  Outcome: Ongoing  Goal: Absence of new skin breakdown  Outcome: Ongoing     Problem: Falls - Risk of:  Goal: Will remain free from falls  Outcome: Ongoing  Goal: Absence of physical injury  Outcome: Ongoing     Problem: IP BLADDER/VOIDING  Goal: STG - Patient demonstrates ability to take care of indwelling catheter  Outcome: Ongoing  Goal: STG - Patient demonstrates no accidents  Outcome: Ongoing     Problem: IP BREATHING  Goal: STG - patient will utilize incentive spirometer  Outcome: Ongoing  Goal: STG - Patient performs or directs assisted coughing  Outcome: Ongoing     Problem: NUTRITION  Goal: Patient maintains weight  Outcome: Ongoing     Problem: SAFETY  Goal: LTG - Patient will demonstrate safety requirements appropriate to situation/environment  Outcome: Ongoing     Problem: SKIN INTEGRITY  Goal: LTG - Patient will be free from infection  Outcome: Ongoing     Problem: PAIN  Goal: LTG - Patient will manage pain with the appropriate technique/Intervention  Outcome: Ongoing  Goal: STG - pain is manageable through therapies  Outcome: Ongoing

## 2021-06-17 NOTE — PROGRESS NOTES
PODIATRY DAILY PROGRESS NOTE  Michelet Simmonds  Subjective :   Patient seen and examined this pm at beside. She is s/p amputation of the 1st and 2nd toes of the left foot (5/26/21). She states that her dressing was changed last night. She denies any pain in the foot. Patient denies N/V/F/C/SOB. Patient denies calf pain, thigh pain, chest pain. Review of Systems: A 12 point review of symptoms is unremarkable with the exception of the chief complaint. Patient specifically denies nausea, fever, vomiting, chills, shortness of breath, chest pain, abdominal pain, constipation or difficulty urinating. Objective     BP (!) 143/87   Pulse 74   Temp 97.8 °F (36.6 °C) (Oral)   Resp 16   Ht 5' 9\" (1.753 m)   Wt 236 lb 11.2 oz (107.4 kg)   SpO2 94%   BMI 34.95 kg/m²      I/O:    Intake/Output Summary (Last 24 hours) at 6/17/2021 1227  Last data filed at 6/16/2021 1815  Gross per 24 hour   Intake --   Output 152 ml   Net -152 ml              Wt Readings from Last 3 Encounters:   06/16/21 236 lb 11.2 oz (107.4 kg)   06/16/21 227 lb 4.7 oz (103.1 kg)   05/27/21 229 lb (103.9 kg)       LABS:    Recent Labs     06/16/21  0555 06/17/21  0607   WBC 14.5* 16.9*   HGB 7.3* 7.4*   HCT 22.7* 23.0*    233        Recent Labs     06/17/21  0607   *   K 4.4   CL 92*   CO2 25   BUN 59*   CREATININE 7.0*      No results for input(s): PROT, INR, APTT in the last 72 hours. LOWER EXTREMITY EXAMINATION  VASCULAR: DP and PT pulses are nonpalpable b/l. CFT is sluggish to the digits of the foot b/l. No pain with calf compression b/l.     NEUROLOGIC: Gross and epicritic sensation is diminished b/l     DERMATOLOGIC: Incision site noted to the distal aspect of the 1st through 3rd metatarsals on left foot. Skin edges are well-coapted and all sutures are intact. Necrotic changes are appreciated with evidence of ischemic/deep tissue injury to the plantar 5th metatarsal head. Incision is dry with no active drainage noted. No erythema noted.      MUSCULOSKELETAL: Muscle strength is 5/5 for all pedal groups tested. Amputation of toes 1-3 noted to the left. ASSESSMENT/PLAN  1. Gangrene of left foot - s/p amputation of toes 1 and 2 on the left (5/26/21)  2. Peripheral arterial disease - s/p left popliteal, anterior tibial, and dorsalis pedis angioplasty by Dr. Oxana Leahy on 5/27/21  3. Diabetes mellitus with peripheral neuropathy  4. End stage renal disease on daily PD     - Patient was seen and examined at bedside. Continued (expected) ischemic/necrotic changes appreciated to the incision site  - No indication for imaging at this time  - No signs of infection noted. Site is dry. No need for antibiotics  - Will continue to allow necrotic changes to demarcate to the surgical site. If they progress, patient will require outpatient revision.  - Dressing placed consisting of betadine paint, adaptic, gauze, kerlix, and ace. Dressing changes ordered  - WBAT in a surgical shoe. Patient prefers surgical shoe over boot. Order for surgical shoe placed.      DISPO: continued necrotic changes noted to the left foot. Will continue betadine wet-to-dry daily changes to the foot until discharge and allow necrotic areas to demarcate prior to revision surgery. I will come once a week to follow the patient.     Tanja Juárez DPM  [phone]

## 2021-06-17 NOTE — PLAN OF CARE
Problem: Pain:  Goal: Pain level will decrease  Description: Pain level will decrease  6/17/2021 1019 by Cathleen Kaur RN  Outcome: Ongoing  6/17/2021 0130 by Rylie Osman RN  Outcome: Ongoing  Goal: Control of acute pain  Description: Control of acute pain  6/17/2021 1019 by Cathleen Kaur RN  Outcome: Ongoing  6/17/2021 0130 by Rylie Osman RN  Outcome: Ongoing  Goal: Control of chronic pain  Description: Control of chronic pain  6/17/2021 1019 by Cathleen Kaur RN  Outcome: Ongoing  6/17/2021 0130 by Rylie Osman RN  Outcome: Ongoing     Problem: Skin Integrity:  Goal: Will show no infection signs and symptoms  Description: Will show no infection signs and symptoms  Outcome: Ongoing  Goal: Absence of new skin breakdown  Description: Absence of new skin breakdown  Outcome: Ongoing     Problem: Falls - Risk of:  Goal: Will remain free from falls  Description: Will remain free from falls  Outcome: Ongoing  Goal: Absence of physical injury  Description: Absence of physical injury  Outcome: Ongoing     Problem: IP BLADDER/VOIDING  Goal: STG - Patient demonstrates ability to take care of indwelling catheter  Outcome: Ongoing  Goal: STG - Patient demonstrates no accidents  Outcome: Ongoing     Problem: IP BREATHING  Goal: STG - patient will utilize incentive spirometer  Outcome: Ongoing  Goal: STG - Patient performs or directs assisted coughing  Outcome: Ongoing     Problem: NUTRITION  Goal: Patient maintains weight  Outcome: Ongoing     Problem: SAFETY  Goal: LTG - Patient will demonstrate safety requirements appropriate to situation/environment  Outcome: Ongoing     Problem: SKIN INTEGRITY  Goal: LTG - Patient will be free from infection  Outcome: Ongoing     Problem: PAIN  Goal: LTG - Patient will manage pain with the appropriate technique/Intervention  Outcome: Ongoing  Goal: STG - pain is manageable through therapies  Outcome: Ongoing

## 2021-06-17 NOTE — PLAN OF CARE
Problem: Falls - Risk of:  Goal: Will remain free from falls  6/16/2021 2008 by Arnold Armstrong RN  Outcome: Ongoing  Note: Education Provided as followed:  \"Family/Patient made aware of the tailored interventions falls plan using the TIPS TOOL.    6/16/2021 2006 by Arnold Armstrong, RN  Outcome: Ongoing

## 2021-06-17 NOTE — PROGRESS NOTES
CCPD Order   Exchanges: 5   Exchange Volume: 1400 ml   Total Time: 10 hrs   Dextrose: 2.5%   Last Fill: 0 ml   Total Volume: 7,000 ml     Orders verified. Supplies taken to pt's room. Report received. Cycler set up, primed and pre tested. Dressing changed on [facility] Cath site. Pt connected to cycler. CCPD initiated without problem. Initial effluent clear.      Report given to Surya Khan RN

## 2021-06-17 NOTE — PROGRESS NOTES
Office : [phone]     Fax :[phone]       Nephrology progress  Note      Patient's Name: Tena Medley  1:19 PM  2021    Reason for Consult:  ESRD    History of Present Ilness:    Tena Medley is a 51 y.o. female with severe end-stage renal disease secondary to diabetic nephropathy on peritoneal dialysis, hypertension, peripheral vascular disease, diabetic neuropathy who was admitted after she had a syncopal episode. She had similar episode 2 weeks ago. Recently had angiogram left lower extremity for gangrene left foot second toe. Denies any shortness of breath. Denies any abdominal pain. Denies any nausea vomiting. Interval HX :    Tolerated PD procedure well overnight  Fill volume reduced to 1400 ml  No chest pain  Sitting in chair , not in acute distress    Wants to decrease dwell /fill volume a little bit as she feels tight in chest     Today complaining of weakness, ringing sensation in left ear  Oral intake is poor      S/p CABG on 2010          I/O last 3 completed shifts:  In: -   Out: 152     Past Medical History:   Diagnosis Date    Diabetes mellitus ([address]     End stage kidney disease ([address]     peritoneal dialysis every night at home x 2 years    Hypertension     Neuropathy     Peripheral vascular disease ([address]        Past Surgical History:   Procedure Laterality Date     SECTION      CORONARY ARTERY BYPASS GRAFT N/A 2021    URGENT CABG CORONARY ARTERY BYPASS GRAFTS X3. VEIN TO PDA X1, VEIN TO OM X1, LEFT INTERNAL MAMMARY ARTERY TO LAD X1. ENDOSCOPIC HARVEST RIGHT LEG SAPHENOUS VEIN. LIGATION NYA USING 35MM ATRICLIP. EPI AORTIC ULTRASOUND. TOTAL CARDIOPULMONARY BYPASS. DOPPLER GRAFT FLOW VERIFICATION.  BILATERAL INITERCOSTAL NERVE BLOCK   insulin aspart (NOVOLOG) injection vial 0-9 Units PATIENT SUPPLIED, Nightly  insulin detemir (LEVEMIR) injection pen 25 Units, Nightly  nystatin (MYCOSTATIN) 172633 UNIT/ML suspension 500,000 Units, 4x Daily  ondansetron (ZOFRAN-ODT) disintegrating tablet 4 mg, Q8H PRN  sevelamer (RENVELA) tablet 800 mg, TID WC  miconazole (MICOTIN) 2 % powder, BID          Physical exam:     Vitals:  BP (!) 143/87   Pulse 74   Temp 97.8 °F (36.6 °C) (Oral)   Resp 16   Ht 5' 9\" (1.753 m)   Wt 236 lb 11.2 oz (107.4 kg)   SpO2 94%   BMI 34.95 kg/m²   Constitutional:  OAA X3 NAD  Skin: no rash, turgor wnl  Heent:  eomi, mmm  Neck: no bruits or jvd noted  Cardiovascular:  S1, S2 without m/r/g  Respiratory: CTA B without w/r/r  Abdomen:  +bs, soft, nt, nd  Ext: no  lower extremity edema      Labs:  CBC:   Recent Labs     06/15/21  0129 06/16/21  0555 06/17/21  0607   WBC 18.6* 14.5* 16.9*   HGB 7.5* 7.3* 7.4*    226 233     BMP:    Recent Labs     06/15/21  0129 06/16/21  0555 06/17/21  0607   * 131* 133*   K 5.6* 4.2 4.4   CL 93* 91* 92*   CO2 23 22 25   BUN 62* 59* 59*   CREATININE 7.4* 6.8* 7.0*   GLUCOSE 123* 466* 265*     Ca/Mg/Phos:   Recent Labs     06/15/21  0129 06/16/21  0555 06/17/21  0607   CALCIUM 7.9* 7.7* 8.3   MG 2.50*  --   --      UA:  No results for input(s): COLORU, CLARITYU, GLUCOSEU, BILIRUBINUR, KETUA, SPECGRAV, BLOODU, PHUR, PROTEINU, UROBILINOGEN, NITRU, LEUKOCYTESUR, Melinda Gabby in the last 72 hours. Urine Microscopic:   No results for input(s): LABCAST, BACTERIA, COMU, HYALCAST, WBCUA, RBCUA, EPIU in the last 72 hours. Urine Culture:   No results for input(s): LABURIN in the last 72 hours. Urine Chemistry: No results for input(s): [place], PROTEINUR, NAUR in the last 72 hours. IMAGING:  No orders to display           Assessment/Plan :      1. ESRD. Was On peritoneal dialysis. Switched  her temporarily to hemodialysis .    Chest tube is removed   No SOB We continue cycler/CCPD today. Wants to decrease dwell /fill volume a little bit as she feels tight in chest       Will use 1400 mL as well volume    2. HTN. BP labile   Yesterday was high last night dropped. Has significant side effects from clonidine. Will discontinue  Hold metoprolol and nifedipine for now. She is on dopamine drip at this time. We will give 1 amp of albumin 25 g  Try to taper off dopamine  If systolic blood pressure goes above then slowly resume back on metoprolol and nifedipine    3. Anemia of chronic disease. Gets micera at dialysis unit  Will give EPOGEN once BP controlled     4. Acid- base disorder. Monitor    5. Electrolytes monitor and replace as needed    6. CAD/Syncopal episode. S/p [facility]   Has Multivessel CAD   S/p CABG       7. Peripheral vascular disease. S/p amputation of left foot toes. Podiatry following     8. Left lower abdominal pain.   Resolved  Has mild ileus         Thank you for allowing us to participate in care of Janis Vo         Electronically signed by: Gareth Howell MD, 6/17/2021, 1:19 PM      [facility] [address]  Office : [phone]  Fax :[phone]

## 2021-06-17 NOTE — PLAN OF CARE
ARU PATIENT TREATMENT PLAN  [facility]   [address] [facility], [address] [place] [address] Drive  [phone]      Doreen Fonseca    : 1975  Acct #: [de-identified]  MRN: [id number]  PHYSICIAN:  Radha Cordero MD  Primary Problem    Patient Active Problem List   Diagnosis    Diabetes mellitus ([facility]    Obesity    Microalbuminuria    Hyperlipidemia with target LDL less than 100    Hypertension    Diabetes mellitus type 2 in obese (HCC)    Acute renal failure (ARF) ([facility]    ESRD (end stage renal disease) ([facility]    Non-smoker    Elevated C-reactive protein (CRP)    Elevated sed rate    Diabetic polyneuropathy associated with type 2 diabetes mellitus ([facility]    Weight loss counseling, encounter for    Atherosclerosis of native arteries of left leg with ulceration of other part of foot [facility]    Near syncope    Coronary artery disease of native artery of native heart with stable angina pectoris (HCC)    Mild malnutrition ([facility]    S/P coronary artery bypass graft x 3    S/P left atrial appendage ligation    Generalized weakness       Rehabilitation Diagnosis:     CAD: S/P CABG X3 with NYA Clip (). Sternal precautions. ASA, statin    Generalized weakness: PT/OT     PAD: S/P Amputation of toes 1 and 2 on the left (21). Routine wound care     ESRD: on PD 2/2 diabetic nephropathy. Appreciate nephro assistance     DM II: Levemir 25, prandial 10, SSI high. Glucoses labile     HTN: Lopressor 25     HLD: Lipitor 20     Oral candidiasis: Nystatin    ADMIT DATE:2021    Patient Goals: To return home and regain independence. Admitting Impairments: Cardiac  Activities: Impaired Eating, Hygiene, Toileting, Bathing, Dressing, Bed Mobility, Transfers, Ambulation, Stairs, and Endurance  Participation: Prior to admission, patient was living at home with son, was independent with all mobility and activities, was an active .      CARE PLAN     NURSING:  Doreen Fonseca while on this unit will:  [] Be continent of bowel and bladder     [x] Have an adequate number of bowel movements  [] Urinate with no urinary retention >300ml in bladder  [] Complete bladder protocol with angeles removal  [] Maintain O2 SATs at ___%  [x] Have pain managed while on ARU       [] Be pain free by discharge   [x] Have no skin breakdown while on ARU  [x] Have improved skin integrity via wound measurements  [x] Have no signs/symptoms of infection at the wound site  [x] Be free from injury during hospitalization   [x] Complete education with patient/family with understanding demonstrated for:  [] Adjustment   [] Other:     Nursing Interventions will include:  [] bowel/bladder training   [x] education for medical assistive devices   [x] medication education   [x] O2 saturation management   [x] energy conservation   [] stress management techniques   [x] fall prevention   [x] alarms protocol   [x] seating and positioning   [x] skin/wound care   [x] pressure relief instruction   [x] dressing changes     [x] infection protection   [x] DVT prophylaxis  [] assistance with in room safety with transfers to bed, toilet, wheelchair, shower   [x] bathroom activities and hygiene  [x] Other:    Patient/Caregiver Education for:  [x] Disease/sustained injury/management     [x] Medication Use  [] Surgical intervention  [x] Safety  [] Body mechanics and or joint protection  [x] Health maintenance     [] Other:     PHYSICAL THERAPY:  Goals:                  Short term goals  Time Frame for Short term goals: to be met in 14 days  Short term goal 1: pt will complete bed mobility with mod I  Short term goal 2: pt will complete STS transfers with mod I  Short term goal 3: pt will complete stand step transfers with mod I  Short term goal 4: pt will ambulate x 150' with mod I of LRAD  Short term goal 5: pt will ascend/descend 2 stairs with mod I               These goals were reviewed with this patient at the time of assessment and Rula Angeliqueing is in agreement. and coordination with insurance during ARU stay. Susanne Guo will be seen a minimum of 3 hours of therapy per day, a minimum of 5 out of 7 days per week  (please see above for specific treatment plan per PT/OT/SLP). [] In this rare instance due to the nature of this patient's medical involvement, this patient will be seen 15 hours per week (900 minutes within a 7 day period). In addition, dietician/nutritionist may monitor calorie count as well as intake and collaboratively work with SLP on dietary upgrades. Neuropsychology/Psychology may evaluate and provide necessary support. Medical issues being managed closely and that require 24 hour availability of a physician:  [] Swallowing Precautions  [x] Bowel/Bladder Fx  [] Weight bearing precautions  [x] Wound Care    [x] Pain Mgmt   [] Infection Protection  [x] DVT Prophylaxis   [x] Fall Precautions  [x] Fluid/Electrolyte/Nutrition Balance  [] Voice Protection   [] Respiratory  [] Other:    Medical Prognosis: [] Good  [x] Fair    [] Guarded   Total expected IRF days 14  Anticipated discharge destination:Home  [x] Home Independently   [] Home with supervision    []SNF     [] Other                                           Physician anticipated functional outcomes:  By discharge, patient will be independent with all mobility and activities. IPOC brief synthesis: 51year old female with a history of ESRD on PD, HTN, DM, HLD, and PVD. On 5/26/21 she had amputation of toes 1 and 2 on the left foot. On 5/27/21 she underwent a left popliteal, anterior tibial and dorsalis pedis angioplasty with Dr. George High. She presented to the ER on 5/31/21 with a syncopal episode. She has been experiencing dizziness and syncopal episodes X2 weeks prior to this visit. She underwent a Left Heart Catheterization on 6/2/21 and was found to have severe coronary microvascular disease. On 6/9/21 she underwent a CABG X3 with NYA clip.  She was evaluated by therapy and suggested to continue in an inpatient setting prior to returning home. He was admitted with the above goal.      I have reviewed this initial plan of care and agree with its contents:    Title   Name    Date    Time    Physician: Electronically signed by Marta Dudley MD on 6/18/2021 at 10:50 PM    Case Mgmt: Oneida Coffey MSW, LSW, [email or web address].     OT:  Electronically signed by LILIANA Monroy on 6/17/2021 at 12:57 PM    PT: Sapna Mabry PT, DPT 6/17/21 1512    RN: Toney Menchaca BSN, RN  6/16/2021 2015    :  Stephanie Sofia PT, DPT [id number]  6/18/21  3:16 PM

## 2021-06-17 NOTE — PLAN OF CARE
Problem: Pain:  Goal: Pain level will decrease  Description: Pain level will decrease  6/17/2021 0130 by Armand Sosa RN  Outcome: Ongoing     Problem: Pain:  Goal: Control of acute pain  Description: Control of acute pain  6/17/2021 0130 by Armand Sosa RN  Outcome: Ongoing     Problem: Pain:  Goal: Control of chronic pain  Description: Control of chronic pain  6/17/2021 0130 by Armand Sosa RN  Outcome: Ongoing   Education Provided as followed:  \"Family/Patient made aware of the tailored interventions falls plan using the TIPS TOOL.

## 2021-06-17 NOTE — CARE COORDINATION
Social Work Admission Assessment    Objective:  Met with pt to complete initial assessment and review role of  in rehab process. Pt oriented to unit. Pt states understanding of this. Current Home Situation:  Patient lives in private home alone       Pt's plans re:  Return to work/school/volunteer:  No    Accessibility to community resources/transportation:  Yes- pt lives in [place] but plans to stay with son in [place] upon discharge    Has pt experienced a recent loss or signigicant life event that would impact their care or ability to participate?  x__No  __Yes - Explain    Has pt ever been treated for emotional disorders? _x_No  __Yes--How does that affect current situation:    How does pt and family cope with stressful events and this hospitalization? Pt states she stays distracted to not get upset    Special Problem Areas:    None  Discharge Plan: To home with son and needed supports. Impression/Plan:  Pt is 50 y/o female with post CABG. SW introduced self and provided information. SW to follow for discharge planning needs.     Carmelo Blankenship MSW, [address]

## 2021-06-18 LAB
ANION GAP SERPL CALCULATED.3IONS-SCNC: 14 MMOL/L (ref 3–16)
BUN BLDV-MCNC: 63 MG/DL (ref 7–20)
CALCIUM SERPL-MCNC: 8.1 MG/DL (ref 8.3–10.6)
CHLORIDE BLD-SCNC: 87 MMOL/L (ref 99–110)
CO2: 26 MMOL/L (ref 21–32)
CREAT SERPL-MCNC: 7.3 MG/DL (ref 0.6–1.1)
GFR AFRICAN AMERICAN: 7
GFR NON-AFRICAN AMERICAN: 6
GLUCOSE BLD-MCNC: 119 MG/DL (ref 70–99)
GLUCOSE BLD-MCNC: 200 MG/DL (ref 70–99)
GLUCOSE BLD-MCNC: 247 MG/DL (ref 70–99)
GLUCOSE BLD-MCNC: 268 MG/DL (ref 70–99)
GLUCOSE BLD-MCNC: 313 MG/DL (ref 70–99)
GLUCOSE BLD-MCNC: 317 MG/DL (ref 70–99)
HCT VFR BLD CALC: 22.6 % (ref 36–48)
HEMOGLOBIN: 7.2 G/DL (ref 12–16)
MCH RBC QN AUTO: 28.6 PG (ref 26–34)
MCHC RBC AUTO-ENTMCNC: 31.6 G/DL (ref 31–36)
MCV RBC AUTO: 90.5 FL (ref 80–100)
PDW BLD-RTO: 19.4 % (ref 12.4–15.4)
PERFORMED ON: ABNORMAL
PLATELET # BLD: 222 K/UL (ref 135–450)
PMV BLD AUTO: 8.5 FL (ref 5–10.5)
POTASSIUM SERPL-SCNC: 4.3 MMOL/L (ref 3.5–5.1)
RBC # BLD: 2.5 M/UL (ref 4–5.2)
SODIUM BLD-SCNC: 127 MMOL/L (ref 136–145)
WBC # BLD: 17.7 K/UL (ref 4–11)

## 2021-06-18 PROCEDURE — 6370000000 HC RX 637 (ALT 250 FOR IP): Performed by: PHYSICAL MEDICINE & REHABILITATION

## 2021-06-18 PROCEDURE — 36415 COLL VENOUS BLD VENIPUNCTURE: CPT

## 2021-06-18 PROCEDURE — 85027 COMPLETE CBC AUTOMATED: CPT

## 2021-06-18 PROCEDURE — 97535 SELF CARE MNGMENT TRAINING: CPT

## 2021-06-18 PROCEDURE — 97116 GAIT TRAINING THERAPY: CPT

## 2021-06-18 PROCEDURE — 80048 BASIC METABOLIC PNL TOTAL CA: CPT

## 2021-06-18 PROCEDURE — 1280000000 HC REHAB R&B

## 2021-06-18 PROCEDURE — 6360000002 HC RX W HCPCS: Performed by: PHYSICAL MEDICINE & REHABILITATION

## 2021-06-18 PROCEDURE — 99232 SBSQ HOSP IP/OBS MODERATE 35: CPT | Performed by: HOSPITALIST

## 2021-06-18 PROCEDURE — 2500000003 HC RX 250 WO HCPCS: Performed by: PHYSICAL MEDICINE & REHABILITATION

## 2021-06-18 PROCEDURE — 97530 THERAPEUTIC ACTIVITIES: CPT

## 2021-06-18 RX ADMIN — HEPARIN SODIUM 5000 UNITS: 5000 INJECTION INTRAVENOUS; SUBCUTANEOUS at 21:01

## 2021-06-18 RX ADMIN — ACETAMINOPHEN 1000 MG: 325 TABLET ORAL at 20:59

## 2021-06-18 RX ADMIN — SEVELAMER CARBONATE 800 MG: 800 TABLET, FILM COATED ORAL at 12:58

## 2021-06-18 RX ADMIN — GENTAMICIN SULFATE: 1 CREAM TOPICAL at 08:09

## 2021-06-18 RX ADMIN — DIPHENHYDRAMINE HYDROCHLORIDE 25 MG: 25 TABLET ORAL at 21:00

## 2021-06-18 RX ADMIN — TRAMADOL HYDROCHLORIDE 50 MG: 50 TABLET, FILM COATED ORAL at 03:00

## 2021-06-18 RX ADMIN — STANDARDIZED SENNA CONCENTRATE AND DOCUSATE SODIUM 1 TABLET: 8.6; 5 TABLET ORAL at 20:59

## 2021-06-18 RX ADMIN — METOPROLOL TARTRATE 25 MG: 25 TABLET, FILM COATED ORAL at 21:00

## 2021-06-18 RX ADMIN — INSULIN DETEMIR 25 UNITS: 100 INJECTION, SOLUTION SUBCUTANEOUS at 20:44

## 2021-06-18 RX ADMIN — ATORVASTATIN CALCIUM 20 MG: 20 TABLET, FILM COATED ORAL at 20:59

## 2021-06-18 RX ADMIN — NYSTATIN 500000 UNITS: 100000 SUSPENSION ORAL at 08:03

## 2021-06-18 RX ADMIN — STANDARDIZED SENNA CONCENTRATE AND DOCUSATE SODIUM 1 TABLET: 8.6; 5 TABLET ORAL at 08:03

## 2021-06-18 RX ADMIN — METOPROLOL TARTRATE 25 MG: 25 TABLET, FILM COATED ORAL at 08:03

## 2021-06-18 RX ADMIN — TRAMADOL HYDROCHLORIDE 100 MG: 50 TABLET, FILM COATED ORAL at 17:52

## 2021-06-18 RX ADMIN — SEVELAMER CARBONATE 800 MG: 800 TABLET, FILM COATED ORAL at 08:03

## 2021-06-18 RX ADMIN — ACETAMINOPHEN 1000 MG: 325 TABLET ORAL at 13:08

## 2021-06-18 RX ADMIN — HEPARIN SODIUM 5000 UNITS: 5000 INJECTION INTRAVENOUS; SUBCUTANEOUS at 05:32

## 2021-06-18 RX ADMIN — HEPARIN SODIUM 5000 UNITS: 5000 INJECTION INTRAVENOUS; SUBCUTANEOUS at 13:11

## 2021-06-18 RX ADMIN — PANTOPRAZOLE SODIUM 40 MG: 40 TABLET, DELAYED RELEASE ORAL at 05:31

## 2021-06-18 RX ADMIN — NYSTATIN 500000 UNITS: 100000 SUSPENSION ORAL at 21:00

## 2021-06-18 RX ADMIN — ACETAMINOPHEN 1000 MG: 325 TABLET ORAL at 05:32

## 2021-06-18 RX ADMIN — NYSTATIN 500000 UNITS: 100000 SUSPENSION ORAL at 12:58

## 2021-06-18 RX ADMIN — NYSTATIN 500000 UNITS: 100000 SUSPENSION ORAL at 17:49

## 2021-06-18 RX ADMIN — MICONAZOLE NITRATE: 20 POWDER TOPICAL at 08:04

## 2021-06-18 RX ADMIN — SEVELAMER CARBONATE 800 MG: 800 TABLET, FILM COATED ORAL at 17:48

## 2021-06-18 RX ADMIN — ASPIRIN 325 MG: 325 TABLET, COATED ORAL at 08:04

## 2021-06-18 ASSESSMENT — PAIN SCALES - GENERAL
PAINLEVEL_OUTOF10: 3
PAINLEVEL_OUTOF10: 3
PAINLEVEL_OUTOF10: 0
PAINLEVEL_OUTOF10: 5
PAINLEVEL_OUTOF10: 0
PAINLEVEL_OUTOF10: 3
PAINLEVEL_OUTOF10: 3
PAINLEVEL_OUTOF10: 5
PAINLEVEL_OUTOF10: 0

## 2021-06-18 ASSESSMENT — PAIN DESCRIPTION - PAIN TYPE
TYPE: CHRONIC PAIN
TYPE: ACUTE PAIN

## 2021-06-18 ASSESSMENT — PAIN DESCRIPTION - LOCATION
LOCATION: ABDOMEN
LOCATION: ABDOMEN
LOCATION: FLANK
LOCATION: FLANK
LOCATION: ABDOMEN

## 2021-06-18 ASSESSMENT — PAIN DESCRIPTION - ORIENTATION
ORIENTATION: LEFT
ORIENTATION: LEFT

## 2021-06-18 ASSESSMENT — PAIN DESCRIPTION - DESCRIPTORS
DESCRIPTORS: PATIENT UNABLE TO DESCRIBE
DESCRIPTORS: PATIENT UNABLE TO DESCRIBE

## 2021-06-18 ASSESSMENT — PAIN DESCRIPTION - FREQUENCY: FREQUENCY: INTERMITTENT

## 2021-06-18 NOTE — PROGRESS NOTES
1.3% EXPAREL.  performed by Renuka Skinner MD at [facility]  08/16/2018     lap PD cath placement lt side 62 cm    TOE AMPUTATION Left 2/26/2021    AMPUTATION OF THE LEFT THIRD TOE performed by Santo Martel DPM at [address] TOE AMPUTATION Left 5/26/2021    AMPUTATION OF HALLUX AND SECOND TOE, LEFT FOOT performed by Santo Martel DPM at [facility]         Family History   Problem Relation Age of Onset    Coronary Art Dis Mother     High Blood Pressure Mother     Heart Disease Mother     Diabetes Mother     Cancer Father     Coronary Art Dis Father     High Blood Pressure Father     Heart Disease Father     Diabetes Father      Current Medications:    heparin (porcine) injection 5,000 Units, 3 times per day  acetaminophen (TYLENOL) tablet 1,000 mg, 3 times per day  albuterol sulfate  (90 Base) MCG/ACT inhaler 2 puff, Q6H PRN  aspirin EC tablet 325 mg, Daily  magnesium hydroxide (MILK OF MAGNESIA) 400 MG/5ML suspension 30 mL, Daily PRN  metoprolol tartrate (LOPRESSOR) tablet 25 mg, BID  pantoprazole (PROTONIX) tablet 40 mg, Daily  polyethylene glycol (GLYCOLAX) packet 17 g, Daily PRN  sennosides-docusate sodium (SENOKOT-S) 8.6-50 MG tablet 1 tablet, BID  traMADol (ULTRAM) tablet 50 mg, Q6H PRN   Or  traMADol (ULTRAM) tablet 100 mg, Q6H PRN  zolpidem (AMBIEN) tablet 5 mg, Nightly PRN  dextrose 5 % solution, PRN  dextrose 50 % IV solution, PRN  glucagon (rDNA) injection 1 mg, PRN  glucose (GLUTOSE) 40 % oral gel 15 g, PRN  gentamicin (GARAMYCIN) 0.1 % cream, Daily  atorvastatin (LIPITOR) tablet 20 mg, Nightly  bisacodyl (DULCOLAX) EC tablet 5 mg, Daily PRN  diphenhydrAMINE (BENADRYL) tablet 25 mg, Q6H PRN  heparin (porcine) injection 3,400 Units, PRN  hydrALAZINE (APRESOLINE) tablet 50 mg, Q8H PRN  insulin aspart (NOVOLOG) injection vial 0-18 Units PATIENT SUPPLIED, TID WC  insulin aspart (NOVOLOG) injection vial 0-9 Units PATIENT SUPPLIED, Nightly  insulin detemir (LEVEMIR) injection pen 25 Units, Nightly  nystatin (MYCOSTATIN) 641225 UNIT/ML suspension 500,000 Units, 4x Daily  ondansetron (ZOFRAN-ODT) disintegrating tablet 4 mg, Q8H PRN  sevelamer (RENVELA) tablet 800 mg, TID WC  miconazole (MICOTIN) 2 % powder, BID          Physical exam:     Vitals:  BP (!) 160/64   Pulse 71   Temp 97.9 °F (36.6 °C) (Oral)   Resp 16   Ht 5' 9\" (1.753 m)   Wt 235 lb 14.3 oz (107 kg)   SpO2 94%   BMI 34.84 kg/m²   Constitutional:  OAA X3 NAD  Skin: no rash, turgor wnl  Heent:  eomi, mmm  Neck: no bruits or jvd noted  Cardiovascular:  S1, S2 without m/r/g  Respiratory: CTA B without w/r/r  Abdomen:  +bs, soft, nt, nd  Ext: no  lower extremity edema      Labs:  CBC:   Recent Labs     06/16/21  0555 06/17/21  0607 06/18/21  0548   WBC 14.5* 16.9* 17.7*   HGB 7.3* 7.4* 7.2*    233 222     BMP:    Recent Labs     06/16/21  0555 06/17/21  0607 06/18/21  0548   * 133* 127*   K 4.2 4.4 4.3   CL 91* 92* 87*   CO2 22 25 26   BUN 59* 59* 63*   CREATININE 6.8* 7.0* 7.3*   GLUCOSE 466* 265* 313*     Ca/Mg/Phos:   Recent Labs     06/16/21  0555 06/17/21  0607 06/18/21  0548   CALCIUM 7.7* 8.3 8.1*     UA:  No results for input(s): Jewelene Harm, GLUCOSEU, BILIRUBINUR, KETUA, SPECGRAV, BLOODU, PHUR, PROTEINU, UROBILINOGEN, NITRU, LEUKOCYTESUR, Tommy Lulas in the last 72 hours. Urine Microscopic:   No results for input(s): LABCAST, BACTERIA, COMU, HYALCAST, WBCUA, RBCUA, EPIU in the last 72 hours. Urine Culture:   No results for input(s): LABURIN in the last 72 hours. Urine Chemistry: No results for input(s): Caroline High, PROTEINUR, NAUR in the last 72 hours. IMAGING:  No orders to display           Assessment/Plan :      1. ESRD. Was On peritoneal dialysis. Switched  her temporarily to hemodialysis . Chest tube is removed   No SOB       We continue cycler/CCPD today. Will use 1400 mL as well volume    2. HTN.   BP labile

## 2021-06-18 NOTE — FLOWSHEET NOTE
CCPD disconnected via aseptic technique via protocol. Pt was connected to manual bag for drain, due to cycler being turned off by floor nurse. 800 mls clear effluent in manual bag.  Pt post VSS.      06/18/21 0850   Vitals   BP (!) 161/69   Temp 97.6 °F (36.4 °C)   Pulse 71   Weight 240 lb 4.8 oz (109 kg)

## 2021-06-18 NOTE — PROGRESS NOTES
Assessment and med pass complete. Meds passed whole with water. Medicated for pain as well per request. Resting in bed watching TV. Peritoneal dialysis engaged. Got apple juice per request. Denies other needs, call light in reach, bed alarm engaged.

## 2021-06-18 NOTE — PROGRESS NOTES
Peritoneal dialysis machine kept messaging that drainage tube was clogged. Called 800 number on machine to get advice to unclog tube. Was not successful after following instructions given and after rebooting machine twice. Was advised to turn off machine and allow PD nurse to disconnect patient when they come in.

## 2021-06-18 NOTE — PROGRESS NOTES
[facility]  6/18/2021  [id number]    Chief Complaint: Generalized weakness    Subjective:   Resting in bed. Difficulty with PD overnight. Glucoses continued to be uncontrolled. ROS: No CP, SOB, dyspnea    Objective:  Patient Vitals for the past 24 hrs:   BP Temp Temp src Pulse Resp SpO2 Height Weight   06/18/21 0803 (!) 160/64 -- -- 71 -- -- -- --   06/18/21 0800 (!) 160/64 97.9 °F (36.6 °C) Oral 71 16 -- -- --   06/17/21 1948 (!) 164/81 97.8 °F (36.6 °C) Oral 75 12 94 % -- --   06/17/21 1910 (!) 143/87 97.8 °F (36.6 °C) -- 76 18 92 % -- 235 lb 14.3 oz (107 kg)   06/17/21 1233 -- -- -- -- -- -- 5' 9\" (1.753 m) --     Gen: No distress, pleasant. HEENT: Normocephalic, atraumatic. CV: Regular rate and rhythm. No MRG   Resp: No respiratory distress. CTAB   Abd: Soft, nontender, PD cath in place  Ext: +1 edema noted in BLE. Neuro: Alert, oriented, appropriately interactive. Laboratory data: Available via EMR. Therapy progress:  PT  Required Braces or Orthoses  Left Lower Extremity Brace: Boot  LLE Brace Type: Surgical shoe  Position Activity Restriction  Sternal Precautions: No Pushing, No Pulling, 5# Lifting Restrictions  Other position/activity restrictions: s/p CABG x 3 6/9  Objective     Sit to Stand: Dependent/Total (mod A x 2)  Stand to sit: Contact guard assistance (cues for controlled descent)  Bed to Chair: Contact guard assistance (via stand step with RW)  Device: Rolling Walker  Other Apparatus: O2 (3L O2)  Assistance: Contact guard assistance  Distance: 46'  OT  PT Equipment Recommendations  Equipment Needed: Yes  Mona Covert: Rollator (4 Wheeled)  Other: TBD with progress but likely with need rollator     Assessment        SLP                Body mass index is 34.84 kg/m².     Assessment:  Patient Active Problem List   Diagnosis    Diabetes mellitus ([address]    Obesity    Microalbuminuria    Hyperlipidemia with target LDL less than 100    Hypertension    Diabetes mellitus type 2 in obese ([address] Acute renal failure (ARF) (HCC)    ESRD (end stage renal disease) (HCC)    Non-smoker    Elevated C-reactive protein (CRP)    Elevated sed rate    Diabetic polyneuropathy associated with type 2 diabetes mellitus (HCC)    Weight loss counseling, encounter for    Atherosclerosis of native arteries of left leg with ulceration of other part of foot ([facility]    Near syncope    Coronary artery disease of native artery of native heart with stable angina pectoris ([facility])    Mild malnutrition ([facility])    S/P coronary artery bypass graft x 3    S/P left atrial appendage ligation    Generalized weakness       Plan:   Generalized weakness: PT/OT     CAD: S/P CABG X3 with NYA Clip (6/9). Sternal precautions. ASA, statin     PAD: S/P Amputation of toes 1 and 2 on the left (5/26/21). Routine wound care     ESRD: on PD 2/2 diabetic nephropathy. Appreciate nephro assistance     DM II: Levemir 25, prandial 10, SSI high. Glucoses labile --> Will dc prandial.      HTN: Lopressor 25     HLD: Lipitor 20     Oral candidiasis: Nystatin     Bowels: Per protocol  Bladder: Per protocol   Sleep: Ambien provided prn. Pain: Tylenol scheduled, tramadol as needed  DVT PPx: Heparin   ELOS: 14-21    Electronically signed by Huel Cockayne, APRN - CNP on 6/18/2021 at 9:04 AM    * This document was created using dictation software. While all precautions were taken to ensure accuracy, errors may have occurred. Please disregard any typographical errors. This patient has been seen, examined, and discussed with the nurse practitioner. This note has been altered to reflect my own examination findings, impression, and recommendations. Patient seen and examined on the above date as reflected in the note.     Electronically signed by Aquilino Alves MD on 6/21/2021 at 7:30 AM

## 2021-06-18 NOTE — PROGRESS NOTES
Physical Therapy  Facility/Department: [facility] ACUTE REHAB UNIT  Daily Treatment Note  NAME: Bolivar Harris  : 1975  MRN: [id number]    Date of Service: 2021    Discharge Recommendations:  Home with assist PRN, S Level 3   PT Equipment Recommendations  Equipment Needed: Yes  Mobility Devices: [place]: Rollator (4 Wheeled)  Other: TBD with progress but likely with need rollator    Assessment   Body structures, Functions, Activity limitations: Decreased functional mobility ; Decreased ADL status; Decreased strength;Decreased balance;Decreased endurance  Assessment: Pt tolerates treatment session well this date, continues to present with decreased functional strength requiring two person assistance. Pt ambulates x 46' with CGA and use of RW. Pt will continue to benefit from skilled therapy services to improve independence with mobility and activity tolerance. Treatment session limited this date due to medical complications, session concluded early as RN is present to assist in draining peritoneal fluid and pt is returned to bed. PT Education: Goals;PT Role;Plan of Care;Precautions  Patient Education: verbalized understanding of sternal precautions through unable to intially recall during session  REQUIRES PT FOLLOW UP: Yes  Activity Tolerance  Activity Tolerance: Patient limited by fatigue;Patient limited by endurance     Patient Diagnosis(es): CABG   has a past medical history of Diabetes mellitus ([facility] [address] End stage kidney disease ([address] Hypertension, Neuropathy, and Peripheral vascular disease ([facility] [address] has a past surgical history that includes Tonsillectomy;  section; other surgical history (2018); Toe amputation (Left, 2021); Toe amputation (Left, 2021); and Coronary artery bypass graft (N/A, 2021).     Restrictions  Restrictions/Precautions  Restrictions/Precautions: Fall Risk, Weight Bearing  Required Braces or Orthoses?: Yes  Lower Extremity Weight Bearing Restrictions  Left Lower Extremity Weight Bearing: Weight Bearing As Tolerated (with surgical shoe)  Required Braces or Orthoses  Left Lower Extremity Brace: Boot  LLE Brace Type: Surgical shoe  Position Activity Restriction  Sternal Precautions: No Pushing, No Pulling, 5# Lifting Restrictions  Other position/activity restrictions: s/p CABG x 3 6/9  Subjective   General  Response To Previous Treatment: Patient with no complaints from previous session.   General Comment  Comments: Pt supine in bed upon arrival on 2.5L O2, notes discomfort in the abdomen 3/10 pain due to excess fluid and is agreeable to therapy session  Pain Screening  Patient Currently in Pain: Yes  Pain Assessment  Pain Assessment: 0-10  Pain Level: 3  Patient's Stated Pain Goal: No pain  Pain Type: Acute pain  Pain Location: Abdomen  Vital Signs  Patient Currently in Pain: Yes       Cognition   Cognition  Cognition Comment: Pt unable to recall sternal precautions  Objective   Bed mobility  Supine to Sit: Stand by assistance (with HOB to 30 degrees)  Sit to Supine: Contact guard assistance (with HOB flat)  Scooting: Contact guard assistance  Transfers  Sit to Stand: Dependent/Total (mod A x 2)  Stand to sit: Contact guard assistance (cues for controlled descent)  Bed to Chair: Contact guard assistance (via stand step with RW)  Stand Pivot Transfers: Contact guard assistance (from w/c->bed)  Ambulation  Ambulation?: Yes  Ambulation 1  Surface: level tile  Device: Rolling Walker  Other Apparatus: O2 (3L O2)  Assistance: Contact guard assistance  Quality of Gait: antalgic due to boot, step to pattern, no postural sway, decreased stance time L foot  Gait Deviations: Slow Jyoti;Decreased step length;Decreased step height  Distance: 51'  Stairs/Curb  Stairs?: No     Balance  Posture: Fair  Sitting - Static: Good (supervision)  Sitting - Dynamic: Good (supervision)  Standing - Static: Good (SBA with RW)  Standing - Dynamic: Fair (CGA with RW)       Second session with Myriam Butt PT to make up missed minutes from earlier session: Pt agreeable to cotx with OT. Pt supine in bed, requiring cues for technique for logroll with pillow, min assist for trunk for sidelying to sit. Pt mod assist of 2 to stand maintaining sternal precautions. Once up on RW pt requiring CGA. Pt ambulating 101' and 46' with RW with CGA for safety and O2 tank management, w/c follow performed by another. Pt requiring cues for upright head during gait, no buckling or LOB noted. Pt O2 sats ab 95-98% during tx on 3 L initially, increased to 4 per pt request. Pt returned to room, staying up in w/c per pt request. Encouraged pt to stay up in w/c for lunch and PM therapy session. 3rd session: Pt seated in recliner chair upon PT/OT arrival. Pt denies pain and is agreeable to PT tx session. Pt on 3L O2 throughout duration of session, requires intermittent rest breaks due to fatigue. Pt requires mod A x 2 to complete STS transfers throughout duration of session x ~4 reps. Pt requires max A x 2 for toilet transfer. Pt requires CGA for standing balance while completing sponge bathing and grooming tasks. Overall pt stands ~8 minutes throughout duration of session. Upon return to room, pt remains up in recliner with chair alarm and call light within reach. No new complaints following session.               Goals  Short term goals  Time Frame for Short term goals: to be met in 14 days  Short term goal 1: pt will complete bed mobility with mod I  Short term goal 2: pt will complete STS transfers with mod I  Short term goal 3: pt will complete stand step transfers with mod I  Short term goal 4: pt will ambulate x 150' with mod I of LRAD  Short term goal 5: pt will ascend/descend 2 stairs with mod I  Patient Goals   Patient goals : to return home and regain independence    Plan    Plan  Times per week: 90 minutes per day, 5-7x/wk  Times per day: Daily  Current Treatment Recommendations: Strengthening, Balance Training, Functional Mobility Training, Transfer Training, Endurance Training, Gait Training, Stair training, Patient/Caregiver Education & Training  Safety Devices  Type of devices:  All fall risk precautions in place, Call light within reach, Nurse notified, Left in bed (left in bed with nursing staff present to complete dialysis)     Therapy Time   Individual Concurrent Group Co-treatment   Time In       0820   Time Out       0846   Minutes       26        Second Session Therapy Time:   Individual Concurrent Group Co-treatment   Time In       1030   Time Out       1056   Minutes       26     Third Session Therapy Time:   Individual Concurrent Group Co-treatment   Time In       1248   Time Out       1330   Minutes       45     Timed Code Treatment Minutes: 35+12+82 =97 minutes     Total Treatment Minutes: 97 minutes     1st session completed by: Jose Dill PT, DPT [id number]  [id number]   Third session completed by: Jose Dill PT, DPT [id number]

## 2021-06-18 NOTE — PLAN OF CARE
Problem: Pain:  Goal: Pain level will decrease  Description: Pain level will decrease  Outcome: Ongoing  Goal: Control of acute pain  Description: Control of acute pain  Outcome: Ongoing  Goal: Control of chronic pain  Description: Control of chronic pain  Outcome: Ongoing     Problem: Skin Integrity:  Goal: Will show no infection signs and symptoms  Description: Will show no infection signs and symptoms  Outcome: Ongoing  Goal: Absence of new skin breakdown  Description: Absence of new skin breakdown  Outcome: Ongoing     Problem: Falls - Risk of:  Goal: Will remain free from falls  Description: Will remain free from falls  6/18/2021 1145 by Antonella Brar RN  Outcome: Ongoing  6/17/2021 2255 by Day Montgomery RN  Outcome: Ongoing  Goal: Absence of physical injury  Description: Absence of physical injury  Outcome: Ongoing     Problem: IP BLADDER/VOIDING  Goal: STG - Patient demonstrates ability to take care of indwelling catheter  Outcome: Ongoing  Goal: STG - Patient demonstrates no accidents  Outcome: Ongoing     Problem: IP BREATHING  Goal: STG - patient will utilize incentive spirometer  Outcome: Ongoing  Goal: STG - Patient performs or directs assisted coughing  Outcome: Ongoing     Problem: NUTRITION  Goal: Patient maintains weight  Outcome: Ongoing     Problem: SAFETY  Goal: LTG - Patient will demonstrate safety requirements appropriate to situation/environment  Outcome: Ongoing     Problem: SKIN INTEGRITY  Goal: LTG - Patient will be free from infection  Outcome: Ongoing     Problem: PAIN  Goal: LTG - Patient will manage pain with the appropriate technique/Intervention  Outcome: Ongoing  Goal: STG - pain is manageable through therapies  Outcome: Ongoing     Problem: Nutrition  Goal: Optimal nutrition therapy  Outcome: Ongoing

## 2021-06-18 NOTE — PROGRESS NOTES
Occupational Therapy  Facility/Department: [facility] ACUTE REHAB UNIT  Daily Treatment Note  NAME: Rachel Garcia  : 1975  MRN: [id number]    Date of Service: 2021    Discharge Recommendations:  S Level 3, Home with Home health OT, Home with assist PRN  OT Equipment Recommendations  Other: CTA pending progress; May benefit from AE for dressing    Assessment   Performance deficits / Impairments: Decreased functional mobility ; Decreased ADL status; Decreased strength;Decreased endurance;Decreased sensation;Decreased high-level IADLs;Decreased coordination;Decreased balance  Assessment: Pt ambulated first session at [place] [id number], requiring Mod A x 2 for sit to stand. Pt limited by needing to finish perineal dialysis, causing abdominal discomfort/pain. Pt Dependent for footwear. Anticipate pt to progress well with d/c home. Pt continues to benefit greatly from intensive inpt therapy to maximize functional independence. Continue with POC  Treatment Diagnosis: decreased ADL/IADL/mobility/activity tolerance due to CABG  Prognosis: Good  OT Education: OT Role;Plan of Care;Precautions;Transfer Training  Patient Education: continue to reinforce for carryover  REQUIRES OT FOLLOW UP: Yes  Activity Tolerance  Activity Tolerance: Patient Tolerated treatment well;Treatment limited secondary to medical complications (free text)  Activity Tolerance: needed to finish perineal dialysis causing abdominal discomfort/pain  Safety Devices  Safety Devices in place: Yes  Type of devices: Call light within reach;Nurse notified; Patient at risk for falls; Bed alarm in place; All fall risk precautions in place; Left in bed         Patient Diagnosis(es): There were no encounter diagnoses. has a past medical history of Diabetes mellitus ([facility] [address] End stage kidney disease ([facility] [address] Hypertension, Neuropathy, and Peripheral vascular disease ([facility] [address] has a past surgical history that includes Tonsillectomy;   section; other surgical history (08/16/2018); Toe amputation (Left, 2/26/2021); Toe amputation (Left, 5/26/2021); and Coronary artery bypass graft (N/A, 6/9/2021). Restrictions  Restrictions/Precautions  Restrictions/Precautions: Fall Risk, Weight Bearing  Required Braces or Orthoses?: Yes  Lower Extremity Weight Bearing Restrictions  Left Lower Extremity Weight Bearing: Weight Bearing As Tolerated (with surgical shoe)  Required Braces or Orthoses  Left Lower Extremity Brace: Boot  LLE Brace Type: Surgical shoe  Position Activity Restriction  Sternal Precautions: No Pushing, No Pulling, 5# Lifting Restrictions  Other position/activity restrictions: s/p CABG x 3 6/9  Subjective   General  Chart Reviewed: Yes  Patient assessed for rehabilitation services?: Yes  Additional Pertinent Hx: DM - peritoneal dialysis; s/p amputation of the 1st and 2nd toes of the left foot (5/26/21)  Response to previous treatment: Patient with no complaints from previous session  Family / Caregiver Present: No  Referring Practitioner: Gena Lundberg MD  Diagnosis: CABG x 3 6/9  Subjective  Subjective: Pt (3L O2) supine in bed upon entry, pleasant and agreeable to therapy session. General Comment  Comments: Pt with HOB elevated supine to sit SBA. Pt sit EOB Supervision. Pt Dependent for footwear. Reviewed sternal precautions with pt, pt unable to recall. Pt sit to stand Mod A x 2, pt stand step to wc with rw CGA and stand to sit CGA in wc. Pt wheeled to hallway. Pt sit to stand Mod A x 2, pt ambulated with rw CGA in hallway 51 ft at [facility], pt stand to sit CGA. Pt wheeled back to room per RN request to finish perineal dialysis. Pt sit to stand Mod A x 2, pt stand pivot CGA to EOB, stand to sit CGA. Pt sit to supine CGA. Call light in reach and bed alarm on.   Pain Assessment  Pain Level: 3  Pain Type: Acute pain  Pain Location: Abdomen  Pain Frequency: Intermittent  Pre Treatment Pain Screening  Intervention List: Patient able to continue with treatment  Comments / Details: Pt to finish dialysis to drain fluid  Vital Signs  Patient Currently in Pain: Yes   Orientation  Orientation  Overall Orientation Status: Within Functional Limits  Objective    ADL  LE Dressing: Dependent/Total (footwear only)        Balance  Sitting Balance: Supervision  Standing Balance: Contact guard assistance  Standing Balance  Time: ~8 min  Activity: stand pivot, stand step, ambulation hallway  Functional Mobility  Functional - Mobility Device: Rolling Walker  Activity: Other  Assist Level: Contact guard assistance  Bed mobility  Supine to Sit: Stand by assistance (HOB raised)  Sit to Supine: Contact guard assistance (flat bed)  Scooting: Contact guard assistance  Transfers  Sit to stand: 2 Person assistance (Mod A x 2)  Stand to sit: Contact guard assistance                       Cognition  Overall Cognitive Status: [facility]  Cognition Comment: Pt unable to recall sternal precautions         ADDENDUM [id number]      Pt (3L O2) supine in bed upon entry, pleasant and agreeable to therapy session. Pt reports feeling better and no abdominal pain. With bed flat pt Min A at trunk for supine to sit. Pt sit EOB Supervision. Pt Dependent for footwear. Pt sit to stand Mod A x 2, pt stand step to wc with rw CGA and stand to sit CGA in wc. Pt wheeled to hallway. Pt sit to stand Mod A x 2, pt ambulated with rw CGA in hallway 101 ft at [place] [address], pt stand to sit CGA. Pt reporting SOB and requesting more O2, up to 4L O2 and pt at 98%. Pt sit to stand Mod A x 2, pt ambulated 51 ft back to room with rw at [facility]. Pt stand to sit CGA. Pt with 10 glute squeezes, call light in reach, chair alarm on.             Third session - pt in chair on arrival - on 3L 02 throughout session - states she continues to feel better than she did in AM - requesting to get cleaned up but declines showering - asking to wash hair in sink   Oral care - SBA in sitting  Washing hair - SBA in sitting   Pt requesting to toilet - mod A of two to stand from W/C - min A while completing clothing management   Pt able to complete pericare with SBA in sitting and min A for balance in standing with extra time   UB bathing - set up and extra time seated in commode   LB bathing - min A in standing for pericare, max A for legs/feet in sitting   UB dressing - set up for pk/doff t-shirt   LB - mod A threading underwear and pants, max/dep for R shoe and L walking boot   Toilet transfer - max A of 2 persons to stand from [facility] atop toilet   Once standing, pt able to ambulate 10 feet from bathroom to EOB with min A of 1 person with RW  Mod A of 1 person sit to supine   In bed with alarm in place and needs in reach end of session     Patient requires cues to maintain sternal precautions during transfers  Fatigues easily and requires frequent rest breaks       Plan   Plan  Times per week: 90 minutes; 5-6x/wk  Times per day: Daily  Current Treatment Recommendations: Strengthening, ROM, Balance Training, Functional Mobility Training, Endurance Training, Safety Education & Training, Patient/Caregiver Education & Training, Equipment Evaluation, Education, & procurement, Positioning, Self-Care / ADL, Pain Management, Home Management Training       Goals  Short term goals  Time Frame for Short term goals: By 2 wks  Short term goal 1: Pt will complete UB/LB dressing with mod I and use fo AE as needed  Short term goal 2: Pt will complete bathing with mod I  Short term goal 3: Pt will complete toileting with mod I  Short term goal 4: Pt will complete functional transfers with mod I  Short term goal 5: Pt will tolerate x10 mins of standing IADLs with SpO2 above 90% in order to increase activity tolerance for d/c home  Long term goals  Time Frame for Long term goals : LTG=STG  Patient Goals   Patient goals : to return home       Therapy Time   Individual Concurrent Group Co-treatment   Time In       0820   Time Out       0846   Minutes       26   Timed Code Treatment Minutes: 26 Minutes     Therapy Time   Individual Concurrent Group Co-treatment   Time In       1030   Time Out       1056   Minutes       26   Timed Code Treatment Minutes: [address] [id number] (Treatment and documentation for 1st and 2nd therapy sessions)    Second Session Therapy Time:   Individual Concurrent Group Co-treatment   Time In      1482   Time Out      1330   Minutes      45     Timed Code Treatment Minutes:  45    Total Treatment Minutes:  86+49+40    Jf SANTOS THOMAS/DILCIA [id number], 6/18/2021, 2:50 PM  Last session and co-sign for first two sessions

## 2021-06-19 LAB
ANION GAP SERPL CALCULATED.3IONS-SCNC: 14 MMOL/L (ref 3–16)
BUN BLDV-MCNC: 62 MG/DL (ref 7–20)
CALCIUM SERPL-MCNC: 8.2 MG/DL (ref 8.3–10.6)
CHLORIDE BLD-SCNC: 87 MMOL/L (ref 99–110)
CO2: 26 MMOL/L (ref 21–32)
CREAT SERPL-MCNC: 7.6 MG/DL (ref 0.6–1.1)
GFR AFRICAN AMERICAN: 7
GFR NON-AFRICAN AMERICAN: 6
GLUCOSE BLD-MCNC: 114 MG/DL (ref 70–99)
GLUCOSE BLD-MCNC: 125 MG/DL (ref 70–99)
GLUCOSE BLD-MCNC: 132 MG/DL (ref 70–99)
GLUCOSE BLD-MCNC: 150 MG/DL (ref 70–99)
GLUCOSE BLD-MCNC: 180 MG/DL (ref 70–99)
HCT VFR BLD CALC: 24.1 % (ref 36–48)
HEMOGLOBIN: 7.7 G/DL (ref 12–16)
MCH RBC QN AUTO: 29.1 PG (ref 26–34)
MCHC RBC AUTO-ENTMCNC: 31.9 G/DL (ref 31–36)
MCV RBC AUTO: 91.1 FL (ref 80–100)
PDW BLD-RTO: 19.3 % (ref 12.4–15.4)
PERFORMED ON: ABNORMAL
PLATELET # BLD: 218 K/UL (ref 135–450)
PMV BLD AUTO: 8.8 FL (ref 5–10.5)
POTASSIUM SERPL-SCNC: 3.8 MMOL/L (ref 3.5–5.1)
RBC # BLD: 2.65 M/UL (ref 4–5.2)
SODIUM BLD-SCNC: 127 MMOL/L (ref 136–145)
WBC # BLD: 16 K/UL (ref 4–11)

## 2021-06-19 PROCEDURE — 1280000000 HC REHAB R&B

## 2021-06-19 PROCEDURE — 6370000000 HC RX 637 (ALT 250 FOR IP): Performed by: PHYSICAL MEDICINE & REHABILITATION

## 2021-06-19 PROCEDURE — 97535 SELF CARE MNGMENT TRAINING: CPT

## 2021-06-19 PROCEDURE — 80048 BASIC METABOLIC PNL TOTAL CA: CPT

## 2021-06-19 PROCEDURE — 97110 THERAPEUTIC EXERCISES: CPT

## 2021-06-19 PROCEDURE — 97530 THERAPEUTIC ACTIVITIES: CPT

## 2021-06-19 PROCEDURE — 6360000002 HC RX W HCPCS: Performed by: PHYSICAL MEDICINE & REHABILITATION

## 2021-06-19 PROCEDURE — 85027 COMPLETE CBC AUTOMATED: CPT

## 2021-06-19 PROCEDURE — 99232 SBSQ HOSP IP/OBS MODERATE 35: CPT | Performed by: HOSPITALIST

## 2021-06-19 PROCEDURE — 97116 GAIT TRAINING THERAPY: CPT

## 2021-06-19 PROCEDURE — 2500000003 HC RX 250 WO HCPCS: Performed by: PHYSICAL MEDICINE & REHABILITATION

## 2021-06-19 RX ADMIN — INSULIN DETEMIR 25 UNITS: 100 INJECTION, SOLUTION SUBCUTANEOUS at 20:42

## 2021-06-19 RX ADMIN — ACETAMINOPHEN 1000 MG: 325 TABLET ORAL at 22:15

## 2021-06-19 RX ADMIN — SEVELAMER CARBONATE 800 MG: 800 TABLET, FILM COATED ORAL at 17:32

## 2021-06-19 RX ADMIN — TRAMADOL HYDROCHLORIDE 100 MG: 50 TABLET, FILM COATED ORAL at 00:45

## 2021-06-19 RX ADMIN — NYSTATIN 500000 UNITS: 100000 SUSPENSION ORAL at 20:41

## 2021-06-19 RX ADMIN — STANDARDIZED SENNA CONCENTRATE AND DOCUSATE SODIUM 1 TABLET: 8.6; 5 TABLET ORAL at 20:41

## 2021-06-19 RX ADMIN — PANTOPRAZOLE SODIUM 40 MG: 40 TABLET, DELAYED RELEASE ORAL at 05:25

## 2021-06-19 RX ADMIN — NYSTATIN 500000 UNITS: 100000 SUSPENSION ORAL at 10:16

## 2021-06-19 RX ADMIN — SEVELAMER CARBONATE 800 MG: 800 TABLET, FILM COATED ORAL at 08:25

## 2021-06-19 RX ADMIN — STANDARDIZED SENNA CONCENTRATE AND DOCUSATE SODIUM 1 TABLET: 8.6; 5 TABLET ORAL at 08:24

## 2021-06-19 RX ADMIN — METOPROLOL TARTRATE 25 MG: 25 TABLET, FILM COATED ORAL at 20:41

## 2021-06-19 RX ADMIN — NYSTATIN 500000 UNITS: 100000 SUSPENSION ORAL at 12:28

## 2021-06-19 RX ADMIN — HEPARIN SODIUM 5000 UNITS: 5000 INJECTION INTRAVENOUS; SUBCUTANEOUS at 05:25

## 2021-06-19 RX ADMIN — ACETAMINOPHEN 1000 MG: 325 TABLET ORAL at 15:11

## 2021-06-19 RX ADMIN — NYSTATIN 500000 UNITS: 100000 SUSPENSION ORAL at 17:41

## 2021-06-19 RX ADMIN — METOPROLOL TARTRATE 25 MG: 25 TABLET, FILM COATED ORAL at 08:24

## 2021-06-19 RX ADMIN — ATORVASTATIN CALCIUM 20 MG: 20 TABLET, FILM COATED ORAL at 20:41

## 2021-06-19 RX ADMIN — ASPIRIN 325 MG: 325 TABLET, COATED ORAL at 08:24

## 2021-06-19 RX ADMIN — TRAMADOL HYDROCHLORIDE 100 MG: 50 TABLET, FILM COATED ORAL at 15:11

## 2021-06-19 RX ADMIN — MICONAZOLE NITRATE: 20 POWDER TOPICAL at 08:25

## 2021-06-19 RX ADMIN — HEPARIN SODIUM 5000 UNITS: 5000 INJECTION INTRAVENOUS; SUBCUTANEOUS at 15:11

## 2021-06-19 RX ADMIN — HEPARIN SODIUM 5000 UNITS: 5000 INJECTION INTRAVENOUS; SUBCUTANEOUS at 22:13

## 2021-06-19 RX ADMIN — DIPHENHYDRAMINE HYDROCHLORIDE 25 MG: 25 TABLET ORAL at 22:13

## 2021-06-19 RX ADMIN — GENTAMICIN SULFATE: 1 CREAM TOPICAL at 08:26

## 2021-06-19 RX ADMIN — TRAMADOL HYDROCHLORIDE 50 MG: 50 TABLET, FILM COATED ORAL at 20:41

## 2021-06-19 RX ADMIN — TRAMADOL HYDROCHLORIDE 100 MG: 50 TABLET, FILM COATED ORAL at 09:06

## 2021-06-19 RX ADMIN — ACETAMINOPHEN 1000 MG: 325 TABLET ORAL at 05:24

## 2021-06-19 RX ADMIN — SEVELAMER CARBONATE 800 MG: 800 TABLET, FILM COATED ORAL at 11:50

## 2021-06-19 ASSESSMENT — PAIN DESCRIPTION - PAIN TYPE
TYPE: CHRONIC PAIN

## 2021-06-19 ASSESSMENT — PAIN DESCRIPTION - LOCATION
LOCATION: ABDOMEN

## 2021-06-19 ASSESSMENT — PAIN SCALES - GENERAL
PAINLEVEL_OUTOF10: 4
PAINLEVEL_OUTOF10: 3
PAINLEVEL_OUTOF10: 0
PAINLEVEL_OUTOF10: 5
PAINLEVEL_OUTOF10: 7
PAINLEVEL_OUTOF10: 0
PAINLEVEL_OUTOF10: 5
PAINLEVEL_OUTOF10: 0

## 2021-06-19 ASSESSMENT — PAIN DESCRIPTION - ORIENTATION: ORIENTATION: LEFT

## 2021-06-19 NOTE — PLAN OF CARE
Took tylenol & ultram for abd & generalized pain. No transfer out of bed through the night due to voiding infrequently (P. Dialysis - done by dialysis RN). Although she has had some toes amputated reports having good feeling in extremities. States she had a bm yesterday that was mostly flatulence, but was not hard & denies constipation    Problem: Pain:  Description: Pain management should include both nonpharmacologic and pharmacologic interventions.   Goal: Pain level will decrease  Description: Pain level will decrease  Outcome: Ongoing  Goal: Control of acute pain  Description: Control of acute pain  Outcome: Ongoing  Goal: Control of chronic pain  Description: Control of chronic pain  Outcome: Ongoing     Problem: Skin Integrity:  Goal: Will show no infection signs and symptoms  Description: Will show no infection signs and symptoms  Outcome: Ongoing  Goal: Absence of new skin breakdown  Description: Absence of new skin breakdown  Outcome: Ongoing     Problem: Falls - Risk of:  Goal: Will remain free from falls  Description: Will remain free from falls  Outcome: Ongoing  Goal: Absence of physical injury  Description: Absence of physical injury  Outcome: Ongoing     Problem: IP BLADDER/VOIDING  Goal: STG - Patient demonstrates ability to take care of indwelling catheter  Outcome: Ongoing  Goal: STG - Patient demonstrates no accidents  Outcome: Ongoing     Problem: IP BREATHING  Goal: STG - patient will utilize incentive spirometer  Outcome: Ongoing  Goal: STG - Patient performs or directs assisted coughing  Outcome: Ongoing     Problem: NUTRITION  Description: Altered Nutrition and Hydration  Goal: Patient maintains weight  Outcome: Ongoing     Problem: SAFETY  Goal: LTG - Patient will demonstrate safety requirements appropriate to situation/environment  Outcome: Ongoing     Problem: SKIN INTEGRITY  Goal: LTG - Patient will be free from infection  Outcome: Ongoing     Problem: PAIN  Goal: LTG - Patient will manage pain with the appropriate technique/Intervention  Outcome: Ongoing  Goal: STG - pain is manageable through therapies  Outcome: Ongoing     Problem: Nutrition  Goal: Optimal nutrition therapy  Outcome: Ongoing

## 2021-06-19 NOTE — FLOWSHEET NOTE
Accessed CCPD via aseptic technique per protocol. Pt alert, VSS. 3 mls initial drain. Exit site clean, applied gent and new dressing. 06/19/21 1851   Vitals   /80   Temp 97.8 °F (36.6 °C)   Temp Source Oral   Pulse 73   Weight 237 lb 3.4 oz (107.6 kg)   Peritoneal Dialysis Catheter Left lower abdomen   Placement Date/Time: 08/16/18 0818   Inserted by: Veronica Edwards MD  Catheter Location: Left lower abdomen   Status Accessed   Site Condition No Complications   Dressing Status Clean;Dry; Intact   Dressing Occlusive   Cycler   Verification of Prescription CCPD   Total Volume Programmed 7000 mL   Therapy Time (Hours:Minutes) 10:00   Fill Volume 1400 mL   Dextrose Setting Same (Nonextraneal)   Number of Cycles 5   Bag Volume 5000 mL   Number of Bags Used 2

## 2021-06-19 NOTE — FLOWSHEET NOTE
CCPD treatment completed and patient disconnected from cycler per protocol. Effluent: clear yellow Fibrin (no) Specimen collected and sent to lab (no)  Total time: 10hrs 9min  Total UF:  681 ml. Total Volume:  7013 ml. Dwell time gained:  0 hr 24min. Tolerance of procedure : Tolerated procedure well. No complaints or complications. 06/19/21 0800   Vitals   /73   Temp 97.4 °F (36.3 °C)   Temp Source Oral   Pulse 68   Resp 18   Weight 233 lb 14.5 oz (106.1 kg)   Peritoneal Dialysis Catheter Left lower abdomen   Placement Date/Time: 08/16/18 0818   Inserted by: Anabel Bonilla MD  Catheter Location: Left lower abdomen   Status Deaccessed   Site Condition No Complications   Dressing Status Clean;Dry; Intact   Dressing Occlusive   Cycler   Verification of Prescription CCPD   Total Volume Programmed 7000 mL   Therapy Time (Hours:Minutes) 1000   Fill Volume 1400 mL   Last Fill Volume 0 mL   Dextrose Setting Same (Nonextraneal)   I Drain Alarm 0 mL   Number of Cycles 5   Bag Volume 5000 mL   Number of Bags Used 2   Dianeal Solution   (Delflex 2.5% in 5L bags)   Ultrafiltration (UF) (mL) 681 mL   Average Dwell Time (Hours:Minutes) 95   Lost Dwell Time (Hours:Minutes) 0

## 2021-06-19 NOTE — PROGRESS NOTES
Office : [phone]     Fax :[phone]       Nephrology progress  Note      Patient's Name: Harini Johnston  5:10 PM  2021    Reason for Consult:  ESRD    History of Present Ilness:    Harini Johnston is a 51 y.o. female with severe end-stage renal disease secondary to diabetic nephropathy on peritoneal dialysis, hypertension, peripheral vascular disease, diabetic neuropathy who was admitted after she had a syncopal episode. She had similar episode 2 weeks ago. Recently had angiogram left lower extremity for gangrene left foot second toe. Denies any shortness of breath. Denies any abdominal pain. Denies any nausea vomiting. Interval HX :    No acute issue  Tolerated PD procedure well overnight  Fill volume reduced to 1400 ml  No chest pain  not in acute distress  No sob          S/p CABG on 2010          I/O last 3 completed shifts:  In: -   Out: 56     Past Medical History:   Diagnosis Date    Diabetes mellitus ([address]     End stage kidney disease ([address]     peritoneal dialysis every night at home x 2 years    Hypertension     Neuropathy     Peripheral [facility]        Past Surgical History:   Procedure Laterality Date     SECTION      CORONARY ARTERY BYPASS GRAFT N/A 2021    URGENT CABG CORONARY ARTERY BYPASS GRAFTS X3. VEIN TO PDA X1, VEIN TO OM X1, LEFT INTERNAL MAMMARY ARTERY TO LAD X1. ENDOSCOPIC HARVEST RIGHT LEG SAPHENOUS VEIN. LIGATION NYA USING 35MM ATRICLIP. EPI AORTIC ULTRASOUND. TOTAL CARDIOPULMONARY BYPASS. DOPPLER GRAFT FLOW VERIFICATION. BILATERAL INITERCOSTAL NERVE BLOCK USING 1.3% EXPAREL.  performed by Leyda Borden MD at [place] [address]  2018     lap PD cath placement lt side 62 cm    TOE AMPUTATION Left 2/26/2021    AMPUTATION OF THE LEFT THIRD TOE performed by Dyke Hatchet, DPM at [id number] [place] [address] [address] TOE AMPUTATION Left 5/26/2021    AMPUTATION OF HALLUX AND SECOND TOE, LEFT FOOT performed by Dyke Hatchet, DPM at [facility]         Family History   Problem Relation Age of Onset    Coronary Art Dis Mother     High Blood Pressure Mother     Heart Disease Mother     Diabetes Mother     Cancer Father     Coronary Art Dis Father     High Blood Pressure Father     Heart Disease Father     Diabetes Father      Current Medications:    insulin aspart (NOVOLOG) injection pen 0-18 Units-PATIENT SUPPLIED, TID WC  insulin aspart (NOVOLOG) injection PEN 0-9 Units-Patient supplied, Nightly  heparin (porcine) injection 5,000 Units, 3 times per day  acetaminophen (TYLENOL) tablet 1,000 mg, 3 times per day  albuterol sulfate  (90 Base) MCG/ACT inhaler 2 puff, Q6H PRN  aspirin EC tablet 325 mg, Daily  magnesium hydroxide (MILK OF MAGNESIA) 400 MG/5ML suspension 30 mL, Daily PRN  metoprolol tartrate (LOPRESSOR) tablet 25 mg, BID  pantoprazole (PROTONIX) tablet 40 mg, Daily  polyethylene glycol (GLYCOLAX) packet 17 g, Daily PRN  sennosides-docusate sodium (SENOKOT-S) 8.6-50 MG tablet 1 tablet, BID  traMADol (ULTRAM) tablet 50 mg, Q6H PRN   Or  traMADol (ULTRAM) tablet 100 mg, Q6H PRN  zolpidem (AMBIEN) tablet 5 mg, Nightly PRN  dextrose 5 % solution, PRN  dextrose 50 % IV solution, PRN  glucagon (rDNA) injection 1 mg, PRN  glucose (GLUTOSE) 40 % oral gel 15 g, PRN  gentamicin (GARAMYCIN) 0.1 % cream, Daily  atorvastatin (LIPITOR) tablet 20 mg, Nightly  bisacodyl (DULCOLAX) EC tablet 5 mg, Daily PRN  diphenhydrAMINE (BENADRYL) tablet 25 mg, Q6H PRN  heparin (porcine) injection 3,400 Units, PRN  hydrALAZINE (APRESOLINE) tablet 50 mg, Q8H PRN  insulin detemir (LEVEMIR) injection pen 25 Units, Nightly  nystatin (MYCOSTATIN) 053873 UNIT/ML suspension 500,000 Units, 4x Daily  ondansetron (ZOFRAN-ODT) this time. We will give 1 amp of albumin 25 g  Try to taper off dopamine  If systolic blood pressure goes above then slowly resume back on metoprolol and nifedipine    3. Anemia of chronic disease. Gets micera at dialysis unit  Will give EPOGEN once BP controlled     4. Acid- base disorder. Monitor    5. Electrolytes monitor and replace as needed    6. CAD/Syncopal episode. S/p [facility]   Has Multivessel CAD   S/p CABG       7. Peripheral vascular disease. S/p amputation of left foot toes. Podiatry following     8. Left lower abdominal pain.   Resolved  Has mild ileus         Thank you for allowing us to participate in care of Sri Locke         Electronically signed by: Himanshu Webber MD, 6/19/2021, 5:10 PM      [facility] [address]  Office : [phone]  Fax :[phone]

## 2021-06-19 NOTE — PLAN OF CARE
Problem: Pain:  Goal: Pain level will decrease  Description: Pain level will decrease  6/19/2021 0959 by Jordyn Main RN  Outcome: Ongoing  6/19/2021 0149 by Javad Joya RN  Outcome: Ongoing  Goal: Control of acute pain  Description: Control of acute pain  6/19/2021 0959 by Jordyn Main RN  Outcome: Ongoing  6/19/2021 0149 by Javad Joya RN  Outcome: Ongoing  Goal: Control of chronic pain  Description: Control of chronic pain  6/19/2021 0959 by Jordyn Main RN  Outcome: Ongoing  6/19/2021 0149 by Javad Joya RN  Outcome: Ongoing     Problem: Skin Integrity:  Goal: Will show no infection signs and symptoms  Description: Will show no infection signs and symptoms  6/19/2021 0959 by Jordyn Main RN  Outcome: Ongoing  6/19/2021 0149 by Javad Joya RN  Outcome: Ongoing  Goal: Absence of new skin breakdown  Description: Absence of new skin breakdown  6/19/2021 0959 by Jordyn Main RN  Outcome: Ongoing  6/19/2021 0149 by Javad Joya RN  Outcome: Ongoing     Problem: Falls - Risk of:  Goal: Will remain free from falls  Description: Will remain free from falls  6/19/2021 0959 by Jordyn Main RN  Outcome: Ongoing  6/19/2021 0149 by Javad Joya RN  Outcome: Ongoing  Goal: Absence of physical injury  Description: Absence of physical injury  6/19/2021 0959 by Jordyn Main RN  Outcome: Ongoing  6/19/2021 0149 by Javad Joya RN  Outcome: Ongoing     Problem: IP BLADDER/VOIDING  Goal: STG - Patient demonstrates ability to take care of indwelling catheter  6/19/2021 0959 by Jordyn Main RN  Outcome: Ongoing  6/19/2021 0149 by Javad Joya RN  Outcome: Ongoing  Goal: STG - Patient demonstrates no accidents  6/19/2021 0959 by Jordyn Main RN  Outcome: Ongoing  6/19/2021 0149 by Javad Joya RN  Outcome: Ongoing     Problem: IP BREATHING  Goal: STG - patient will utilize incentive spirometer  6/19/2021 0959 by Jordyn Main RN  Outcome: Ongoing  6/19/2021 0149 by Valentine Apley, RN  Outcome: Ongoing  Goal: STG - Patient performs or directs assisted coughing  6/19/2021 0959 by Constance Ornelas RN  Outcome: Ongoing  6/19/2021 0149 by Valentine Apley, RN  Outcome: Ongoing     Problem: NUTRITION  Goal: Patient maintains weight  6/19/2021 0959 by Constance Ornelas RN  Outcome: Ongoing  6/19/2021 0149 by Valentine Apley, RN  Outcome: Ongoing     Problem: SAFETY  Goal: LTG - Patient will demonstrate safety requirements appropriate to situation/environment  6/19/2021 0959 by Constance Ornelas RN  Outcome: Ongoing  6/19/2021 0149 by Valentine Apley, RN  Outcome: Ongoing     Problem: SKIN INTEGRITY  Goal: LTG - Patient will be free from infection  6/19/2021 0959 by Constance Ornelas RN  Outcome: Ongoing  6/19/2021 0149 by Valentine Apley, RN  Outcome: Ongoing     Problem: PAIN  Goal: LTG - Patient will manage pain with the appropriate technique/Intervention  6/19/2021 0959 by Constance Ornelas RN  Outcome: Ongoing  6/19/2021 0149 by Valentine Apley, RN  Outcome: Ongoing  Goal: STG - pain is manageable through therapies  6/19/2021 0959 by Constance Ornelas RN  Outcome: Ongoing  6/19/2021 0149 by Valentine Apley, RN  Outcome: Ongoing     Problem: Nutrition  Goal: Optimal nutrition therapy  6/19/2021 0959 by Constance Ornelas RN  Outcome: Ongoing  6/19/2021 0149 by Valentine Apley, RN  Outcome: Ongoing

## 2021-06-19 NOTE — PROGRESS NOTES
Physical Therapy  Facility/Department: [facility] ACUTE REHAB UNIT  Daily Treatment Note  NAME: Jayde Pisano  : 1975  MRN: [id number]    Date of Service: 2021    Discharge Recommendations:  Home with assist PRN S Level 3   PT Equipment Recommendations  Equipment Needed: Yes  Mobility Devices: Vanmarte Collinses: Rollator (4 Wheeled)  Other: TBD with progress but likely with need rollator    Assessment   Body structures, Functions, Activity limitations: Decreased functional mobility ; Decreased ADL status; Decreased strength;Decreased balance;Decreased endurance  Assessment: Pt continues to have the most difficulty with STS transfers even from elevated surfaces. Increasing endurance for ambulation using RW (up to 90 ft during session today); though static standing endurance limited to 1.5 minutes. Continued skilled PT to promote return towards PLOF. Treatment Diagnosis: imparied functional mobility, impaired strength  Prognosis: Good  PT Education: Goals;PT Role;Plan of Care;Precautions  Patient Education: verbalized understanding of sternal precautions but needs reinforcement during functional activities  Barriers to Learning: none  REQUIRES PT FOLLOW UP: Yes  Activity Tolerance  Activity Tolerance: Patient limited by endurance; Patient limited by fatigue     Patient Diagnosis(es): There were no encounter diagnoses. has a past medical history of Diabetes mellitus ([facility] End stage kidney disease ([address] Hypertension, Neuropathy, and Peripheral vascular disease ([address] has a past surgical history that includes Tonsillectomy;  section; other surgical history (2018); Toe amputation (Left, 2021); Toe amputation (Left, 2021); and Coronary artery bypass graft (N/A, 2021).     Restrictions  Restrictions/Precautions  Restrictions/Precautions: Fall Risk, Weight Bearing  Required Braces or Orthoses?: Yes  Lower Extremity Weight Bearing Restrictions  Left Lower Extremity Weight Bearing: Weight Bearing As Tolerated  Required Braces or Orthoses  Left Lower Extremity Brace: Boot  LLE Brace Type: Surgical shoe  Position Activity Restriction  Sternal Precautions: No Pushing, No Pulling, 5# Lifting Restrictions  Other position/activity restrictions: s/p CABG x 3 6/9  Subjective   General  Chart Reviewed: Yes  Response To Previous Treatment: Patient with no complaints from previous session. Subjective  Subjective: agreed to therapy session  General Comment  Comments: in bed at arrival, on 4L O2  Pain Screening  Patient Currently in Pain: Denies  Vital Signs  Patient Currently in Pain: Denies       Orientation     Cognition      Objective   Bed mobility  Supine to Sit: Stand by assistance  Scooting: Stand by assistance  Comment: HOB elevated, use of heart pillow  Transfers  Sit to Stand: 2 Person Assistance (max A of 2 from EOB, toilet, and wc level)  Stand to sit: Contact guard assistance (vc of sternal precautions/hand placement on pillow)  Ambulation  Ambulation?: Yes  Ambulation 1  Surface: level tile  Device: Rolling Walker  Other Apparatus: O2 (4 L and boot on L foot)  Assistance: Contact guard assistance  Quality of Gait: antalgic due to boot, step to pattern, decreased stance time L foot  Gait Deviations: Slow Jyoti;Decreased step length;Decreased step height  Distance: 12 ft to bathroom and 90 ft  Comments: Reporting light dizziness at end of walk. O2 in upper 90s at rest.  Stairs/Curb  Stairs?: No     Balance  Posture: Fair  Sitting - Static: Good  Sitting - Dynamic: Good  Standing - Static: Good  Standing - Dynamic: Fair            Comment: Cotx with OT to maximize functional mobility and safety. 1st session:  see functional mobility details above. OT/PT assisted with donning shoe and boot once seated EOB. CGA to min A for balance while managing clothing for toileting. Increased dificulty with toilet transfer and unsuccessful during 1st attempt.   Unable to tolerate standing for grooming tasks due to fatigue. Pt sat in wc to wash hands, etc.  Repeat STS transfers from elevated surface (24inch height) x 3 reps with mod A of 2 rocking fwd 3 times prior to stand. Pt left in wc with needs in reach. 2nd session: seated in  at arrival, reporting fatigue. Still on 4L O2. STS throughout session from  and elevated chair (22inch height) with mod to max A of 2. Standing duration activities 2x1.5 minutes and 2x1 minute while engaging in UE and cognitive tasks; CGA/SBA. Limited by endurance.  mobility using LEs only 65 ft with several brief rests due to fatigue. 10 additional feet of ambulation with RW to EOB. Sit to supine with mod A.  Bed alarm on and needs in reach. Goals-- not met  Short term goals  Time Frame for Short term goals: to be met in 14 days  Short term goal 1: pt will complete bed mobility with mod I  Short term goal 2: pt will complete STS transfers with mod I  Short term goal 3: pt will complete stand step transfers with mod I  Short term goal 4: pt will ambulate x 150' with mod I of LRAD  Short term goal 5: pt will ascend/descend 2 stairs with mod I  Patient Goals   Patient goals : to return home and regain independence    Plan    Plan  Times per week: 90 minutes per day, 5-7x/wk  Times per day: Daily  Current Treatment Recommendations: Strengthening, Balance Training, Functional Mobility Training, Transfer Training, Endurance Training, Gait Training, Stair training, Patient/Caregiver Education & Training  Safety Devices  Type of devices:  All fall risk precautions in place, Call light within reach, Nurse notified, Chair alarm in place, Left in chair  Restraints  Initially in place: No     Therapy Time   Individual Concurrent Group Co-treatment   Time In       1015   Time Out       1100   Minutes       45   Timed Code Treatment Minutes: [address] Time:   Individual Concurrent Group Co-treatment   Time In       1330   Time Out       1415   Minutes 45     Timed Code Treatment Minutes:  90    Total Treatment Minutes:  [place], [id number]

## 2021-06-19 NOTE — PROGRESS NOTES
Occupational Therapy  Facility/Department: [facility] ACUTE REHAB UNIT  Daily Treatment Note  NAME: Corinne Hall  : 1975  MRN: [id number]    Date of Service: 2021    Discharge Recommendations:  S Level 3, Home with Home health OT, Home with assist PRN  OT Equipment Recommendations  Other: CTA pending progress; May benefit from AE for dressing    Assessment   Performance deficits / Impairments: Decreased functional mobility ; Decreased ADL status; Decreased strength;Decreased endurance;Decreased sensation;Decreased high-level IADLs;Decreased coordination;Decreased balance  Assessment: Pt ambulated first session at [place] [address], requiring Mod A x 2 for sit to stand. Pt limited by needing to finish perineal dialysis, causing abdominal discomfort/pain. Pt Dependent for footwear. Anticipate pt to progress well with d/c home. Pt continues to benefit greatly from intensive inpt therapy to maximize functional independence. Continue with POC  Treatment Diagnosis: decreased ADL/IADL/mobility/activity tolerance due to CABG  Prognosis: Good  OT Education: OT Role;Plan of Care;Precautions;Transfer Training;ADL Adaptive Strategies  Patient Education: continue to reinforce for carryover  REQUIRES OT FOLLOW UP: Yes  Activity Tolerance  Activity Tolerance: Patient Tolerated treatment well  Activity Tolerance: pt reporting fatigue- O2 at 98% after mobility on 4L. Safety Devices  Safety Devices in place: Yes  Type of devices: All fall risk precautions in place; Left in chair;Chair alarm in place;Call light within reach;Gait belt         Patient Diagnosis(es): There were no encounter diagnoses. has a past medical history of Diabetes mellitus ([facility] [address] End stage kidney disease ([address] Hypertension, Neuropathy, and Peripheral vascular disease ([facility] [address] has a past surgical history that includes Tonsillectomy;  section; other surgical history (2018); Toe amputation (Left, 2021);  Toe amputation (Left, 2021); and Coronary artery bypass graft (N/A, 6/9/2021). Restrictions  Restrictions/Precautions  Restrictions/Precautions: Fall Risk, Weight Bearing  Required Braces or Orthoses?: Yes  Lower Extremity Weight Bearing Restrictions  Left Lower Extremity Weight Bearing: Weight Bearing As Tolerated  Required Braces or Orthoses  Left Lower Extremity Brace: Boot  LLE Brace Type: Surgical shoe  Position Activity Restriction  Sternal Precautions: No Pushing, No Pulling, 5# Lifting Restrictions  Other position/activity restrictions: s/p CABG x 3 6/9  Subjective   General  Chart Reviewed: Yes  Patient assessed for rehabilitation services?: Yes  Additional Pertinent Hx: DM - peritoneal dialysis; s/p amputation of the 1st and 2nd toes of the left foot (5/26/21)  Response to previous treatment: Patient with no complaints from previous session  Family / Caregiver Present: No  Referring Practitioner: Mariana Sanchez MD  Diagnosis: CABG x 3 6/9  Subjective  Subjective: Pt met bedside, pleseant and agreeable to OT treat. General Comment  Comments: Pt with HOB elevated supine to sit SBA. Pt sit EOB Supervision. Pt Dependent for footwear. Reviewed sternal precautions with pt, pt unable to recall. Pt sit to stand Mod A x 2, pt stand step to wc with rw CGA and stand to sit CGA in wc. Pt wheeled to hallway. Pt sit to stand Mod A x 2, pt ambulated with rw CGA in hallway 51 ft at [facility], pt stand to sit CGA. Pt wheeled back to room per RN request to finish perineal dialysis. Pt sit to stand Mod A x 2, pt stand pivot CGA to EOB, stand to sit CGA. Pt sit to supine CGA. Call light in reach and bed alarm on. Vital Signs  Patient Currently in Pain: Denies   Orientation     Objective    ADL  Grooming: Modified independent  (completed seated in w/c)  LE Dressing: Maximum assistance  Toileting: Moderate assistance  Additional Comments: pt amnbulated to bathroom and complete toileting. Max A x2 for sit to stand and assist to manage clothing.  Pt completed grooming in will complete functional transfers with mod I  Short term goal 5: Pt will tolerate x10 mins of standing IADLs with SpO2 above 90% in order to increase activity tolerance for d/c home  Long term goals  Time Frame for Long term goals : LTG=STG  Patient Goals   Patient goals : to return home       Therapy Time   Individual Concurrent Group Co-treatment   Time In       1015, 1330   Time Out       1100, 1415   Minutes       45,45        Timed Code Treatment Minutes:  90 min   Total Treatment Minutes:  90 min      Ginny Leigh, OT

## 2021-06-20 LAB
ANION GAP SERPL CALCULATED.3IONS-SCNC: 13 MMOL/L (ref 3–16)
BUN BLDV-MCNC: 65 MG/DL (ref 7–20)
CALCIUM SERPL-MCNC: 8.1 MG/DL (ref 8.3–10.6)
CHLORIDE BLD-SCNC: 86 MMOL/L (ref 99–110)
CO2: 27 MMOL/L (ref 21–32)
CREAT SERPL-MCNC: 8.5 MG/DL (ref 0.6–1.1)
GFR AFRICAN AMERICAN: 6
GFR NON-AFRICAN AMERICAN: 5
GLUCOSE BLD-MCNC: 103 MG/DL (ref 70–99)
GLUCOSE BLD-MCNC: 151 MG/DL (ref 70–99)
GLUCOSE BLD-MCNC: 157 MG/DL (ref 70–99)
GLUCOSE BLD-MCNC: 171 MG/DL (ref 70–99)
GLUCOSE BLD-MCNC: 74 MG/DL (ref 70–99)
HCT VFR BLD CALC: 24 % (ref 36–48)
HEMOGLOBIN: 7.6 G/DL (ref 12–16)
MCH RBC QN AUTO: 28.7 PG (ref 26–34)
MCHC RBC AUTO-ENTMCNC: 31.6 G/DL (ref 31–36)
MCV RBC AUTO: 90.8 FL (ref 80–100)
PDW BLD-RTO: 19.9 % (ref 12.4–15.4)
PERFORMED ON: ABNORMAL
PERFORMED ON: NORMAL
PLATELET # BLD: 205 K/UL (ref 135–450)
PMV BLD AUTO: 9.2 FL (ref 5–10.5)
POTASSIUM SERPL-SCNC: 3.9 MMOL/L (ref 3.5–5.1)
RBC # BLD: 2.64 M/UL (ref 4–5.2)
SODIUM BLD-SCNC: 126 MMOL/L (ref 136–145)
WBC # BLD: 16.4 K/UL (ref 4–11)

## 2021-06-20 PROCEDURE — 6370000000 HC RX 637 (ALT 250 FOR IP): Performed by: PHYSICAL MEDICINE & REHABILITATION

## 2021-06-20 PROCEDURE — 1280000000 HC REHAB R&B

## 2021-06-20 PROCEDURE — 85027 COMPLETE CBC AUTOMATED: CPT

## 2021-06-20 PROCEDURE — 80048 BASIC METABOLIC PNL TOTAL CA: CPT

## 2021-06-20 PROCEDURE — 6360000002 HC RX W HCPCS: Performed by: PHYSICAL MEDICINE & REHABILITATION

## 2021-06-20 PROCEDURE — 99232 SBSQ HOSP IP/OBS MODERATE 35: CPT | Performed by: HOSPITALIST

## 2021-06-20 PROCEDURE — 36415 COLL VENOUS BLD VENIPUNCTURE: CPT

## 2021-06-20 RX ORDER — GENTAMICIN SULFATE 1 MG/G
CREAM TOPICAL DAILY
Status: DISCONTINUED | OUTPATIENT
Start: 2021-06-20 | End: 2021-07-01 | Stop reason: HOSPADM

## 2021-06-20 RX ADMIN — TRAMADOL HYDROCHLORIDE 50 MG: 50 TABLET, FILM COATED ORAL at 03:36

## 2021-06-20 RX ADMIN — PANTOPRAZOLE SODIUM 40 MG: 40 TABLET, DELAYED RELEASE ORAL at 05:40

## 2021-06-20 RX ADMIN — TRAMADOL HYDROCHLORIDE 100 MG: 50 TABLET, FILM COATED ORAL at 10:05

## 2021-06-20 RX ADMIN — DIPHENHYDRAMINE HYDROCHLORIDE 25 MG: 25 TABLET ORAL at 22:13

## 2021-06-20 RX ADMIN — HEPARIN SODIUM 5000 UNITS: 5000 INJECTION INTRAVENOUS; SUBCUTANEOUS at 05:42

## 2021-06-20 RX ADMIN — TRAMADOL HYDROCHLORIDE 100 MG: 50 TABLET, FILM COATED ORAL at 22:12

## 2021-06-20 RX ADMIN — SEVELAMER CARBONATE 800 MG: 800 TABLET, FILM COATED ORAL at 08:24

## 2021-06-20 RX ADMIN — SEVELAMER CARBONATE 800 MG: 800 TABLET, FILM COATED ORAL at 17:35

## 2021-06-20 RX ADMIN — ASPIRIN 325 MG: 325 TABLET, COATED ORAL at 08:25

## 2021-06-20 RX ADMIN — ACETAMINOPHEN 1000 MG: 325 TABLET ORAL at 14:17

## 2021-06-20 RX ADMIN — NYSTATIN 500000 UNITS: 100000 SUSPENSION ORAL at 08:24

## 2021-06-20 RX ADMIN — INSULIN DETEMIR 25 UNITS: 100 INJECTION, SOLUTION SUBCUTANEOUS at 21:26

## 2021-06-20 RX ADMIN — ATORVASTATIN CALCIUM 20 MG: 20 TABLET, FILM COATED ORAL at 22:13

## 2021-06-20 RX ADMIN — METOPROLOL TARTRATE 25 MG: 25 TABLET, FILM COATED ORAL at 08:25

## 2021-06-20 RX ADMIN — HEPARIN SODIUM 5000 UNITS: 5000 INJECTION INTRAVENOUS; SUBCUTANEOUS at 14:17

## 2021-06-20 RX ADMIN — ACETAMINOPHEN 1000 MG: 325 TABLET ORAL at 22:13

## 2021-06-20 RX ADMIN — STANDARDIZED SENNA CONCENTRATE AND DOCUSATE SODIUM 1 TABLET: 8.6; 5 TABLET ORAL at 08:24

## 2021-06-20 RX ADMIN — GENTAMICIN SULFATE: 1 CREAM TOPICAL at 10:00

## 2021-06-20 RX ADMIN — ACETAMINOPHEN 1000 MG: 325 TABLET ORAL at 05:40

## 2021-06-20 RX ADMIN — HEPARIN SODIUM 5000 UNITS: 5000 INJECTION INTRAVENOUS; SUBCUTANEOUS at 22:13

## 2021-06-20 RX ADMIN — METOPROLOL TARTRATE 25 MG: 25 TABLET, FILM COATED ORAL at 22:13

## 2021-06-20 RX ADMIN — NYSTATIN 500000 UNITS: 100000 SUSPENSION ORAL at 12:30

## 2021-06-20 RX ADMIN — GENTAMICIN SULFATE: 1 CREAM TOPICAL at 17:35

## 2021-06-20 ASSESSMENT — PAIN SCALES - GENERAL
PAINLEVEL_OUTOF10: 4
PAINLEVEL_OUTOF10: 4
PAINLEVEL_OUTOF10: 3
PAINLEVEL_OUTOF10: 1
PAINLEVEL_OUTOF10: 3
PAINLEVEL_OUTOF10: 6
PAINLEVEL_OUTOF10: 0

## 2021-06-20 ASSESSMENT — PAIN DESCRIPTION - ORIENTATION: ORIENTATION: LEFT

## 2021-06-20 ASSESSMENT — PAIN DESCRIPTION - PAIN TYPE: TYPE: ACUTE PAIN

## 2021-06-20 ASSESSMENT — PAIN DESCRIPTION - LOCATION: LOCATION: ABDOMEN

## 2021-06-20 NOTE — PLAN OF CARE
Problem: Pain:  Description: Pain management should include both nonpharmacologic and pharmacologic interventions. Goal: Pain level will decrease  Description: Pain level will decrease  Outcome: Ongoing     Problem: Skin Integrity:  Goal: Will show no infection signs and symptoms  Description: Will show no infection signs and symptoms  Outcome: Ongoing     Problem: Falls - Risk of:  Goal: Will remain free from falls  Description: Will remain free from falls  Outcome: Ongoing  Note: Education Provided as followed:  \"Family/Patient made aware of the tailored interventions falls plan using the TIPS TOOL.       Problem: IP BLADDER/VOIDING  Goal: STG - Patient demonstrates ability to take care of indwelling catheter  Outcome: Ongoing     Problem: IP BREATHING  Goal: STG - patient will utilize incentive spirometer  Outcome: Ongoing     Problem: NUTRITION  Description: Altered Nutrition and Hydration  Goal: Patient maintains weight  Outcome: Ongoing     Problem: SAFETY  Goal: LTG - Patient will demonstrate safety requirements appropriate to situation/environment  Outcome: Ongoing

## 2021-06-20 NOTE — PROGRESS NOTES
Office : [phone]     Fax :[phone]       Nephrology progress  Note      Patient's Name: Alida Galan  12:56 PM  2021    Reason for Consult:  ESRD    History of Present Ilness:    Alida Galan is a 51 y.o. female with severe end-stage renal disease secondary to diabetic nephropathy on peritoneal dialysis, hypertension, peripheral vascular disease, diabetic neuropathy who was admitted after she had a syncopal episode. She had similar episode 2 weeks ago. Recently had angiogram left lower extremity for gangrene left foot second toe. Denies any shortness of breath. Denies any abdominal pain. Denies any nausea vomiting. Interval HX :    No acute issue  Tolerated PD procedure well overnight  Fill volume reduced to 1400 ml  No chest pain  not in acute distress  No sob  S/p CABG on 2010          I/O last 3 completed shifts:  In: -   Out: 56     Past Medical History:   Diagnosis Date    [facility]     End stage kidney disease ([facility]     peritoneal dialysis every night at home x 2 years    Hypertension     Neuropathy     Peripheral vascular disease ([facility]        Past Surgical History:   Procedure Laterality Date     SECTION      CORONARY ARTERY BYPASS GRAFT N/A 2021    URGENT CABG CORONARY ARTERY BYPASS GRAFTS X3. VEIN TO PDA X1, VEIN TO OM X1, LEFT INTERNAL MAMMARY ARTERY TO LAD X1. ENDOSCOPIC HARVEST RIGHT LEG SAPHENOUS VEIN. LIGATION NYA USING 35MM ATRICLIP. EPI AORTIC ULTRASOUND. TOTAL CARDIOPULMONARY BYPASS. DOPPLER GRAFT FLOW VERIFICATION. BILATERAL INITERCOSTAL NERVE BLOCK USING 1.3% EXPAREL.  performed by Carol Still MD at [facility] 6  2018     lap PD cath placement lt side 62 cm    TOE AMPUTATION Left 2/26/2021    AMPUTATION OF THE LEFT THIRD TOE performed by Liane Peñaloza DPM at [id number] [facility] [address] TOE AMPUTATION Left 5/26/2021    AMPUTATION OF HALLUX AND SECOND TOE, LEFT FOOT performed by Liane Peñaloza DPM at [facility] [address]         Family History   Problem Relation Age of Onset    Coronary Art Dis Mother     High Blood Pressure Mother     Heart Disease Mother     Diabetes Mother     Cancer Father     Coronary Art Dis Father     High Blood Pressure Father     Heart Disease Father     Diabetes Father      Current Medications:    insulin aspart (NOVOLOG) injection pen 0-18 Units-PATIENT SUPPLIED, TID WC  insulin aspart (NOVOLOG) injection PEN 0-9 Units-Patient supplied, Nightly  heparin (porcine) injection 5,000 Units, 3 times per day  acetaminophen (TYLENOL) tablet 1,000 mg, 3 times per day  albuterol sulfate  (90 Base) MCG/ACT inhaler 2 puff, Q6H PRN  aspirin EC tablet 325 mg, Daily  magnesium hydroxide (MILK OF MAGNESIA) 400 MG/5ML suspension 30 mL, Daily PRN  metoprolol tartrate (LOPRESSOR) tablet 25 mg, BID  pantoprazole (PROTONIX) tablet 40 mg, Daily  polyethylene glycol (GLYCOLAX) packet 17 g, Daily PRN  sennosides-docusate sodium (SENOKOT-S) 8.6-50 MG tablet 1 tablet, BID  traMADol (ULTRAM) tablet 50 mg, Q6H PRN   Or  traMADol (ULTRAM) tablet 100 mg, Q6H PRN  zolpidem (AMBIEN) tablet 5 mg, Nightly PRN  dextrose 5 % solution, PRN  dextrose 50 % IV solution, PRN  glucagon (rDNA) injection 1 mg, PRN  glucose (GLUTOSE) 40 % oral gel 15 g, PRN  gentamicin (GARAMYCIN) 0.1 % cream, Daily  atorvastatin (LIPITOR) tablet 20 mg, Nightly  bisacodyl (DULCOLAX) EC tablet 5 mg, Daily PRN  diphenhydrAMINE (BENADRYL) tablet 25 mg, Q6H PRN  heparin (porcine) injection 3,400 Units, PRN  hydrALAZINE (APRESOLINE) tablet 50 mg, Q8H PRN  insulin detemir (LEVEMIR) injection pen 25 Units, Nightly  nystatin (MYCOSTATIN) 472071 UNIT/ML suspension 500,000 Units, 4x Daily  ondansetron (ZOFRAN-ODT) disintegrating tablet 4 mg, Q8H PRN  sevelamer (RENVELA) tablet 800 mg, TID WC  miconazole (MICOTIN) 2 % powder, BID          Physical exam:     Vitals:  BP (!) 154/79   Pulse 68   Temp 97.8 °F (36.6 °C) (Oral)   Resp 18   Ht 5' 9\" (1.753 m)   Wt 232 lb 2.3 oz (105.3 kg)   SpO2 99%   BMI 34.28 kg/m²   Constitutional:  OAA X3 NAD  Skin: no rash, turgor wnl  Heent:  eomi, mmm  Neck: no bruits or jvd noted  Cardiovascular:  S1, S2 without m/r/g  Respiratory: CTA B without w/r/r  Abdomen:  +bs, soft, nt, nd  Ext: no  lower extremity edema      Labs:  CBC:   Recent Labs     06/18/21  0548 06/19/21  0558 06/20/21  0619   WBC 17.7* 16.0* 16.4*   HGB 7.2* 7.7* 7.6*    218 205     BMP:    Recent Labs     06/18/21  0548 06/19/21  0558 06/20/21  0620   * 127* 126*   K 4.3 3.8 3.9   CL 87* 87* 86*   CO2 26 26 27   BUN 63* 62* 65*   CREATININE 7.3* 7.6* 8.5*   GLUCOSE 313* 150* 171*     Ca/Mg/Phos:   Recent Labs     06/18/21  0548 06/19/21  0558 06/20/21  0620   CALCIUM 8.1* 8.2* 8.1*     UA:  No results for input(s): COLORU, CLARITYU, GLUCOSEU, BILIRUBINUR, KETUA, SPECGRAV, BLOODU, PHUR, PROTEINU, UROBILINOGEN, NITRU, LEUKOCYTESUR, Randine Friar in the last 72 hours. Urine Microscopic:   No results for input(s): LABCAST, BACTERIA, COMU, HYALCAST, WBCUA, RBCUA, EPIU in the last 72 hours. Urine Culture:   No results for input(s): LABURIN in the last 72 hours. Urine Chemistry: No results for input(s): Reese Meneses, PROTEINUR, NAUR in the last 72 hours. IMAGING:  No orders to display           Assessment/Plan :      1. ESRD. Was On peritoneal dialysis. Switched  her temporarily to hemodialysis . Chest tube is removed   No SOB       We continue cycler/CCPD as per current prescription    Will use 1400 mL as well volume    2. HTN. BP labile   Yesterday was high last night dropped. Has significant side effects from clonidine.   Will discontinue  Hold metoprolol and nifedipine for now.  She is on dopamine drip at this time. We will give 1 amp of albumin 25 g  Try to taper off dopamine  If systolic blood pressure goes above then slowly resume back on metoprolol and nifedipine    3. Anemia of chronic disease. Gets micera at dialysis unit  Will give EPOGEN once BP controlled     4. Acid- base disorder. Monitor    5. Electrolytes monitor and replace as needed    6. CAD/Syncopal episode. S/p [facility]   Has Multivessel CAD   S/p CABG       7. Peripheral vascular disease. S/p amputation of left foot toes. Podiatry following     8. Left lower abdominal pain.   Resolved  Has mild ileus         Thank you for allowing us to participate in care of Alida Galan         Electronically signed by: Tr Rae MD, 6/20/2021, 12:56 PM      [facility] [address]  Office : [phone]  Fax :[phone]

## 2021-06-20 NOTE — FLOWSHEET NOTE
CCPD Order              Exchanges: 5              Exchange Volume: 1400ml              Total Time: 10hrs              Dextrose: Delflex 2.5% in 5L bags x2              Last Fill: N/A              Total Volume: 7000ml     Orders verified. Supplies taken to pt's room. Report received. Cycler set up, primed and pre tested. Dressing changed on [facility] Cath site. Initial effluent clear yellow. 06/20/21 1805   Vitals   BP (!) 155/70   Temp 98 °F (36.7 °C)   Temp Source Oral   Pulse 67   Resp 18   SpO2 99 %   Weight 232 lb 2.3 oz (105.3 kg)   Peritoneal Dialysis Catheter Left lower abdomen   Placement Date/Time: 08/16/18 0818   Inserted by: Leela Rubin MD  Catheter Location: Left lower abdomen   Status Accessed   Site Condition No Complications   Dressing Status Changed;Clean;Dry; Intact   Dressing Occlusive   Cycler   Verification of Prescription CCPD   Total Volume Programmed 7000 mL   Therapy Time (Hours:Minutes) 1000   Fill Volume 1400 mL   Last Fill Volume 0 mL   Dextrose Setting Same (Nonextraneal)   I Drain Alarm 0 mL   Number of Cycles 5   Bag Volume 5000 mL   Number of Bags Used 2   Dianeal Solution Other (Comment)  (Delflex 2.5% in 5L)   I Drain (mL) 58 mL

## 2021-06-20 NOTE — FLOWSHEET NOTE
CCPD treatment completed and patient disconnected from cycler per protocol. Effluent: clear yellow Fibrin (no) Specimen collected and sent to lab (no)  Total time: 10hrs 10min  Total UF:  1145 ml. Total Volume:  7013 ml. Dwell time gained:  21min. Tolerance of procedure : Tolerated tx well. No complaints or complications. 06/20/21 0715   Vitals   BP (!) 154/79   Temp 97.8 °F (36.6 °C)   Temp Source Oral   Pulse 68   Resp 18   SpO2 99 %   Weight 232 lb 2.3 oz (105.3 kg)   Peritoneal Dialysis Catheter Left lower abdomen   Placement Date/Time: 08/16/18 0818   Inserted by: Peterson Villa MD  Catheter Location: Left lower abdomen   Status Deaccessed   Site Condition No Complications   Dressing Status Clean;Dry; Intact   Dressing Occlusive   Cycler   Ultrafiltration (UF) (mL) 1145 mL   Average Dwell Time (Hours:Minutes) 95   Lost Dwell Time (Hours:Minutes) 0

## 2021-06-21 LAB
ANION GAP SERPL CALCULATED.3IONS-SCNC: 17 MMOL/L (ref 3–16)
BUN BLDV-MCNC: 60 MG/DL (ref 7–20)
CALCIUM SERPL-MCNC: 8.1 MG/DL (ref 8.3–10.6)
CHLORIDE BLD-SCNC: 87 MMOL/L (ref 99–110)
CO2: 24 MMOL/L (ref 21–32)
CREAT SERPL-MCNC: 8.4 MG/DL (ref 0.6–1.1)
GFR AFRICAN AMERICAN: 6
GFR NON-AFRICAN AMERICAN: 5
GLUCOSE BLD-MCNC: 103 MG/DL (ref 70–99)
GLUCOSE BLD-MCNC: 109 MG/DL (ref 70–99)
GLUCOSE BLD-MCNC: 137 MG/DL (ref 70–99)
GLUCOSE BLD-MCNC: 163 MG/DL (ref 70–99)
GLUCOSE BLD-MCNC: 168 MG/DL (ref 70–99)
GLUCOSE BLD-MCNC: 183 MG/DL (ref 70–99)
GLUCOSE BLD-MCNC: 51 MG/DL (ref 70–99)
GLUCOSE BLD-MCNC: 60 MG/DL (ref 70–99)
HCT VFR BLD CALC: 22.9 % (ref 36–48)
HEMOGLOBIN: 7.3 G/DL (ref 12–16)
MCH RBC QN AUTO: 28.9 PG (ref 26–34)
MCHC RBC AUTO-ENTMCNC: 31.7 G/DL (ref 31–36)
MCV RBC AUTO: 91 FL (ref 80–100)
PDW BLD-RTO: 19.7 % (ref 12.4–15.4)
PERFORMED ON: ABNORMAL
PLATELET # BLD: 175 K/UL (ref 135–450)
PMV BLD AUTO: 8.5 FL (ref 5–10.5)
POTASSIUM SERPL-SCNC: 4.2 MMOL/L (ref 3.5–5.1)
RBC # BLD: 2.51 M/UL (ref 4–5.2)
SODIUM BLD-SCNC: 128 MMOL/L (ref 136–145)
WBC # BLD: 16.6 K/UL (ref 4–11)

## 2021-06-21 PROCEDURE — 90945 DIALYSIS ONE EVALUATION: CPT | Performed by: INTERNAL MEDICINE

## 2021-06-21 PROCEDURE — 36415 COLL VENOUS BLD VENIPUNCTURE: CPT

## 2021-06-21 PROCEDURE — 97530 THERAPEUTIC ACTIVITIES: CPT

## 2021-06-21 PROCEDURE — 97535 SELF CARE MNGMENT TRAINING: CPT

## 2021-06-21 PROCEDURE — 6370000000 HC RX 637 (ALT 250 FOR IP): Performed by: PHYSICAL MEDICINE & REHABILITATION

## 2021-06-21 PROCEDURE — 6360000002 HC RX W HCPCS: Performed by: PHYSICAL MEDICINE & REHABILITATION

## 2021-06-21 PROCEDURE — 85027 COMPLETE CBC AUTOMATED: CPT

## 2021-06-21 PROCEDURE — 90945 DIALYSIS ONE EVALUATION: CPT

## 2021-06-21 PROCEDURE — 1280000000 HC REHAB R&B

## 2021-06-21 PROCEDURE — 6360000002 HC RX W HCPCS: Performed by: INTERNAL MEDICINE

## 2021-06-21 PROCEDURE — 6370000000 HC RX 637 (ALT 250 FOR IP): Performed by: INTERNAL MEDICINE

## 2021-06-21 PROCEDURE — 80048 BASIC METABOLIC PNL TOTAL CA: CPT

## 2021-06-21 RX ORDER — TORSEMIDE 100 MG/1
100 TABLET ORAL DAILY
Status: DISCONTINUED | OUTPATIENT
Start: 2021-06-21 | End: 2021-07-01 | Stop reason: HOSPADM

## 2021-06-21 RX ADMIN — ATORVASTATIN CALCIUM 20 MG: 20 TABLET, FILM COATED ORAL at 20:38

## 2021-06-21 RX ADMIN — ONDANSETRON 4 MG: 4 TABLET, ORALLY DISINTEGRATING ORAL at 20:23

## 2021-06-21 RX ADMIN — METOPROLOL TARTRATE 25 MG: 25 TABLET, FILM COATED ORAL at 20:39

## 2021-06-21 RX ADMIN — HEPARIN SODIUM 5000 UNITS: 5000 INJECTION INTRAVENOUS; SUBCUTANEOUS at 06:33

## 2021-06-21 RX ADMIN — PANTOPRAZOLE SODIUM 40 MG: 40 TABLET, DELAYED RELEASE ORAL at 06:35

## 2021-06-21 RX ADMIN — ONDANSETRON 4 MG: 4 TABLET, ORALLY DISINTEGRATING ORAL at 11:48

## 2021-06-21 RX ADMIN — Medication 15 G: at 15:39

## 2021-06-21 RX ADMIN — ZOLPIDEM TARTRATE 5 MG: 5 TABLET ORAL at 23:21

## 2021-06-21 RX ADMIN — ASPIRIN 325 MG: 325 TABLET, COATED ORAL at 10:47

## 2021-06-21 RX ADMIN — HEPARIN SODIUM 5000 UNITS: 5000 INJECTION INTRAVENOUS; SUBCUTANEOUS at 20:50

## 2021-06-21 RX ADMIN — HEPARIN SODIUM 5000 UNITS: 5000 INJECTION INTRAVENOUS; SUBCUTANEOUS at 14:39

## 2021-06-21 RX ADMIN — TORSEMIDE 100 MG: 100 TABLET ORAL at 11:47

## 2021-06-21 RX ADMIN — INSULIN DETEMIR 25 UNITS: 100 INJECTION, SOLUTION SUBCUTANEOUS at 20:57

## 2021-06-21 RX ADMIN — MICONAZOLE NITRATE: 20 POWDER TOPICAL at 20:56

## 2021-06-21 RX ADMIN — ACETAMINOPHEN 1000 MG: 325 TABLET ORAL at 06:34

## 2021-06-21 RX ADMIN — TRAMADOL HYDROCHLORIDE 50 MG: 50 TABLET, FILM COATED ORAL at 10:47

## 2021-06-21 RX ADMIN — ACETAMINOPHEN 1000 MG: 325 TABLET ORAL at 14:39

## 2021-06-21 RX ADMIN — DIPHENHYDRAMINE HYDROCHLORIDE 25 MG: 25 TABLET ORAL at 21:06

## 2021-06-21 RX ADMIN — METOPROLOL TARTRATE 25 MG: 25 TABLET, FILM COATED ORAL at 10:47

## 2021-06-21 RX ADMIN — EPOETIN ALFA-EPBX 10000 UNITS: 10000 INJECTION, SOLUTION INTRAVENOUS; SUBCUTANEOUS at 11:48

## 2021-06-21 RX ADMIN — SEVELAMER CARBONATE 800 MG: 800 TABLET, FILM COATED ORAL at 17:13

## 2021-06-21 RX ADMIN — GENTAMICIN SULFATE: 1 CREAM TOPICAL at 19:18

## 2021-06-21 RX ADMIN — MICONAZOLE NITRATE: 20 POWDER TOPICAL at 10:48

## 2021-06-21 RX ADMIN — ACETAMINOPHEN 1000 MG: 325 TABLET ORAL at 20:50

## 2021-06-21 ASSESSMENT — PAIN SCALES - GENERAL
PAINLEVEL_OUTOF10: 3
PAINLEVEL_OUTOF10: 4
PAINLEVEL_OUTOF10: 3
PAINLEVEL_OUTOF10: 3
PAINLEVEL_OUTOF10: 4

## 2021-06-21 ASSESSMENT — PAIN DESCRIPTION - LOCATION
LOCATION: ABDOMEN
LOCATION: ABDOMEN

## 2021-06-21 ASSESSMENT — PAIN DESCRIPTION - ORIENTATION: ORIENTATION: LEFT

## 2021-06-21 ASSESSMENT — PAIN DESCRIPTION - PAIN TYPE
TYPE: ACUTE PAIN

## 2021-06-21 ASSESSMENT — PAIN DESCRIPTION - DESCRIPTORS: DESCRIPTORS: ACHING

## 2021-06-21 NOTE — PROGRESS NOTES
(N/A, 6/9/2021). Restrictions  Restrictions/Precautions  Restrictions/Precautions: Fall Risk, Weight Bearing  Required Braces or Orthoses?: Yes  Lower Extremity Weight Bearing Restrictions  Left Lower Extremity Weight Bearing: Weight Bearing As Tolerated  Required Braces or Orthoses  Left Lower Extremity Brace: Boot  LLE Brace Type: Surgical shoe  Position Activity Restriction  Sternal Precautions: No Pushing, No Pulling, 5# Lifting Restrictions  Other position/activity restrictions: s/p CABG x 3 6/9    Subjective   General  Chart Reviewed: Yes  Patient assessed for rehabilitation services?: Yes  Additional Pertinent Hx: DM - peritoneal dialysis; s/p amputation of the 1st and 2nd toes of the left foot (5/26/21)  Response to previous treatment: Patient with no complaints from previous session  Family / Caregiver Present: No  Referring Practitioner: Aquilino Alves MD  Diagnosis: CABG x 3 6/9  Subjective  Subjective: Pt supine in bed, agreeable to session and shower  General Comment  Comments: Umair Craig Vital Signs  Patient Currently in Pain: Denies       Objective    ADL  UE Bathing: Setup;Stand by assistance;Verbal cueing; Increased time to complete  LE Bathing: Setup;Verbal cueing; Increased time to complete; Moderate assistance (assist to thoroughly dry LEs and posterior rula)  UE Dressing: Setup;Stand by assistance  LE Dressing:  (pt doffed LB clothing with reacher at SBA, CGA/Min for clothing over hips- pt assisted in donning pants/undergarments d/t time at end of shower)  Toileting: Minimal assistance (assist for clothing/balance in stance, increased time and set-up for posterior rula care)  Additional Comments: Pt ambulated to/from bathroom- toileting completed followed by UB/LB bathing/dressing seated on TTB in shower with stances using grab bar for steady assist only- increased time to complete all tasks- cues for safe/proper sternal care           Balance  Sitting Balance: Supervision  Standing Balance: Contact guard assistance  Standing Balance  Time: ~6min total  Activity: functional mobility/transfers, ADL completion    Functional Mobility  Functional - Mobility Device: Rolling Walker  Activity: To/from bathroom  Assist Level: Contact guard assistance  Functional Mobility Comments: ~30ft total during session    Shower Transfers  Shower - Transfer From: Cynthia Dasilva - Transfer Type: To and From  Shower - Transfer To: Transfer tub bench  Shower - Technique: Ambulating       Transfers  Sit to stand: 2 Person assistance  Stand to sit: Minimal assistance  Transfer Comments: variable assist of 2 for sit>stand- Max+ ModA from commode, Max+Kymberly for shower, heart pillow to chest for comfort; 1x assist from high EOB                  Second Session:  Pt seated in w/c at entry, agreeable to cotx session, declined ADL needs and denied pain. MaxA to don R shoe prior to mobility-Pt ambulated ~110ft total during session with RW at [facility] + close w/c follow, increased time and multiple rest breaks required. Functional transfer training completed EOM, pt educated on technique throughout with poor carryover: Max-ModA of 2 for sit>stand 3x from high mat table and 3x from lower, pt completed additional transfers during session from w/c x3 and EOB x1, Kymberly of 1 for all stand>sits with cues to control descent. Pt with intermittent SOB reports and requesting increased O2- pt on 4L upon entry and reduced to 2L by EOS- SpO2 WFL throughout session, pt educated on slowed/controled breathing. Pt requesting to lay in bed at EOS, sit>supine with assist to LEs and increased time. Pt left seated upright support in bed, bed alarm on, call light and needs within reach.               Plan   Plan  Times per week: 90 minutes; 5-6x/wk  Times per day: Daily  Current Treatment Recommendations: Strengthening, ROM, Balance Training, Functional Mobility Training, Endurance Training, Safety Education & Training, Patient/Caregiver Education & Training, Equipment Evaluation, Education, & procurement, Positioning, Self-Care / ADL, Pain Management, Home Management Training          Goals  Short term goals  Time Frame for Short term goals: 1-2weeks  Short term goal 1: Pt will complete UB/LB dressing with mod I and use fo AE as needed  Short term goal 2: Pt will complete bathing with mod I  Short term goal 3: Pt will complete toileting with mod I  Short term goal 4: Pt will complete functional transfers with mod I  Short term goal 5: Pt will tolerate x10 mins of standing IADLs with SpO2 above 90% in order to increase activity tolerance for d/c home  Long term goals  Time Frame for Long term goals : LTG=STG  Patient Goals   Patient goals : to return home       Therapy Time   Individual Concurrent Group Co-treatment   Time In       0830, 1330   Time Out       0920, 1415   Minutes       50, 45        Timed Code Treatment Minutes:  50 + 45   Total Treatment Minutes:  Carolyn Trevino, [place] [id number]

## 2021-06-21 NOTE — PLAN OF CARE
Problem: Pain:  Goal: Control of acute pain  Description: Control of acute pain  Outcome: Ongoing     Problem: Skin Integrity:  Goal: Will show no infection signs and symptoms  Description: Will show no infection signs and symptoms  6/21/2021 0353 by Baltazar Bernal RN  Outcome: Ongoing     Problem: Skin Integrity:  Goal: Absence of new skin breakdown  Description: Absence of new skin breakdown  Outcome: Ongoing     Problem: Falls - Risk of:  Goal: Will remain free from falls  Description: Will remain free from falls  6/21/2021 0353 by Baltazar Bernal RN  Outcome: Ongoing  Note: Education Provided as followed:  \"Family/Patient made aware of the tailored interventions falls plan using the TIPS TOOL.       Problem: Falls - Risk of:  Goal: Absence of physical injury  Description: Absence of physical injury  Outcome: Ongoing     Problem: SKIN INTEGRITY  Goal: LTG - Patient will be free from infection  Outcome: Ongoing

## 2021-06-21 NOTE — PLAN OF CARE
Problem: Pain:  Goal: Control of acute pain  Description: Control of acute pain  6/21/2021 1632 by Anjum Waterman RN  Outcome: Ongoing  Note: Patient complaints of pain. Being medicated appropriately according to numerical pain scale. Problem: Skin Integrity:  Goal: Absence of new skin breakdown  Description: Absence of new skin breakdown  6/21/2021 1632 by Anjum Waterman RN  Outcome: Ongoing  Note: No new skin breakdown identified. Patient skin under breasts are healing, miconazole powder being applied. Problem: Falls - Risk of:  Goal: Will remain free from falls  Description: Will remain free from falls  6/21/2021 1632 by Anjum Waterman RN  Outcome: Ongoing  Note: Pt will be free from falls , pt aware of ARU policy on falls, will call for needs/assistance. Fall risk precautions in place, call light in reach, bed in lowest position, 2/2 bed rails up, bed wheels locked, bed side table within reach, bed alarm on, will continue to monitor.

## 2021-06-21 NOTE — PROGRESS NOTES
[address] [facility]  6/21/2021  [id number]    Chief Complaint: Generalized weakness    Subjective:   No significant weekend events. No current complaints. Labs reviewed. No issues with PD over the weekend. Main concern is a weak voice. Hyponatremia improving. ROS: No CP, SOB, dyspnea    Objective:  Patient Vitals for the past 24 hrs:   BP Temp Temp src Pulse Resp SpO2 Weight   06/21/21 0700 -- -- -- -- -- -- 222 lb 10.6 oz (101 kg)   06/20/21 2200 (!) 185/85 98 °F (36.7 °C) Oral 70 18 -- --   06/20/21 1805 (!) 155/70 98 °F (36.7 °C) Oral 67 18 99 % 232 lb 2.3 oz (105.3 kg)     Gen: No distress, pleasant. Resting in bed  HEENT: Normocephalic, atraumatic. CV: Regular rate and rhythm. No MRG   Resp: No respiratory distress. CTAB   Abd: Soft, nontender, PD cath in place  Ext: +1 edema noted in BLE. LLE in clean dressing  Neuro: Alert, oriented, appropriately interactive. Laboratory data: Available via EMR. Therapy progress:  PT  Required Braces or Orthoses  Left Lower Extremity Brace: Boot  LLE Brace Type: Surgical shoe  Position Activity Restriction  Sternal Precautions: No Pushing, No Pulling, 5# Lifting Restrictions  Other position/activity restrictions: s/p CABG x 3 6/9  Objective     Sit to Stand: 2 Person Assistance (max A of 2 from EOB, toilet, and wc level)  Stand to sit: Contact guard assistance (vc of sternal precautions/hand placement on pillow)  Bed to Chair: Contact guard assistance (via stand step with RW)  Device: Rolling Walker  Other Apparatus: O2 (4 L and boot on L foot)  Assistance: Contact guard assistance  Distance: 12 ft to bathroom and 90 ft  OT  PT Equipment Recommendations  Equipment Needed: Yes  Mobility Devices: Courtneyina Alexi: Rollator (4 Wheeled)  Other: TBD with progress but likely with need rollator     Assessment        SLP                Body mass index is 32.88 kg/m².     Assessment:  Patient Active Problem List   Diagnosis    Diabetes mellitus ([facility]    Obesity    Microalbuminuria    Hyperlipidemia with target LDL less than 100    Hypertension    Diabetes mellitus type 2 in obese (HCC)    [facility]F) [facility]    [facility]D ([facility] stage renal disease) ([facility]    Non-smoker    Elevated C-reactive protein (CRP)    Elevated sed rate    Diabetic polyneuropathy associated with type 2 diabetes mellitus ([facility])    Weight loss counseling, encounter for    Atherosclerosis of native arteries of left leg with ulceration of other part of foot ([facility]    Near syncope    Coronary artery disease of native artery of native heart with stable angina pectoris ([facility])    Mild malnutrition ([facility]    S/P coronary artery bypass graft x 3    S/P left atrial appendage ligation    Generalized weakness       Plan:   Generalized weakness: PT/OT     CAD: S/P CABG X3 with NYA Clip (6/9). Sternal precautions. ASA, statin     PAD: S/P Amputation of toes 1 and 2 on the left (5/26/21). Routine wound care     ESRD: on PD 2/2 diabetic nephropathy. Appreciate nephro assistance     DM II: Levemir 25, prandial d/c'd, SSI high. HTN: Lopressor 25     HLD: Lipitor 20     Oral candidiasis: Nystatin completed     Bowels: Per protocol  Bladder: Per protocol   Sleep: Ambien provided prn  Pain: Tylenol scheduled, tramadol as needed  DVT PPx: Heparin   ELOS: 14-21    Electronically signed by Martha Cedeño MD on 6/21/2021 at 9:03 AM    * This document was created using dictation software. While all precautions were taken to ensure accuracy, errors may have occurred. Please disregard any typographical errors.

## 2021-06-21 NOTE — PROGRESS NOTES
Physical Therapy  Facility/Department: [facility] ACUTE REHAB UNIT  Daily Treatment Note  NAME: Sri Locke  : 1975  MRN: [id number]    Date of Service: 2021    Discharge Recommendations:  Home with assist PRN, S Level 3   PT Equipment Recommendations  Equipment Needed: Yes  Alejandra Randolph: Rollator (4 Wheeled)  Other: TBD with progress but likely with need rollator    Assessment   Body structures, Functions, Activity limitations: Decreased functional mobility ; Decreased ADL status; Decreased strength;Decreased balance;Decreased endurance  Assessment: Patient continues to require assistance of 2 people to complete sit>stand transfers with increased level of assistance from transfers from lower surfaces. Patient requires significantly increased time to complete all functional mobility. Patient would benefit from ongoing skilled physical therapy intervention to progress functional mobility, endurance, strength, and balance in order to maximize independence and return to PLOF. Treatment Diagnosis: imparied functional mobility, impaired strength  PT Education: Goals;Plan of Care;Precautions  Patient Education: Ongoing reinforcement of sternal precautions  REQUIRES PT FOLLOW UP: Yes  Activity Tolerance  Activity Tolerance: Patient limited by endurance; Patient limited by fatigue     Patient Diagnosis(es): Generalized weakness     has a past medical history of Diabetes mellitus ([facility] End stage kidney disease ([facility] Hypertension, Neuropathy, and Peripheral vascular disease ([facility] has a past surgical history that includes Tonsillectomy;  section; other surgical history (2018); Toe amputation (Left, 2021); Toe amputation (Left, 2021); and Coronary artery bypass graft (N/A, 2021).     Restrictions  Restrictions/Precautions  Restrictions/Precautions: Fall Risk, Weight Bearing  Required Braces or Orthoses?: Yes  Lower Extremity Weight Bearing Restrictions  Left Lower Extremity Weight Bearing: Weight Bearing As Tolerated  Required Braces or Orthoses  Left Lower Extremity Brace: Boot  LLE Brace Type: Surgical shoe  Position Activity Restriction  Sternal Precautions: No Pushing, No Pulling, 5# Lifting Restrictions  Other position/activity restrictions: s/p CABG x 3 6/9  Subjective   General  Chart Reviewed: Yes  Response To Previous Treatment: Patient with no complaints from previous session. Family / Caregiver Present: No  Referring Practitioner: Dr. Savannah Mckenzie  Subjective  Subjective: Agreeable to PT treatment session  General Comment  Comments: Supine in bed on arrival, on 4 L NC  Pain Screening  Patient Currently in Pain: Yes  Pain Assessment  Pain Assessment: 0-10  Pain Level:  (Does not formally rate)  Pain Type: Acute pain  Pain Location: Abdomen  Non-Pharmaceutical Pain Intervention(s): Ambulation/Increased Activity;Repositioned  Vital Signs  Patient Currently in Pain: Yes       Orientation  Orientation  Overall Orientation Status: Within Functional Limits  Cognition      Objective   Bed mobility  Supine to Sit: Stand by assistance (HOB elevated to R)  Transfers  Sit to Stand: 2 Person Assistance (Kymberly x2 from EOB, ModA+MaxA from commode, Kymberly+MaxA from shower chair)  Stand to sit: Minimal Assistance (VC for sternal precautions, decreased eccentric control noted)  Ambulation  Ambulation?: Yes  More Ambulation?: Yes  Ambulation 1  Surface: level tile  Device: Rolling Walker  Other Apparatus: O2 (4 L, LLE boot)  Assistance: Contact guard assistance  Quality of Gait: Step to pattern, decreased stance time on RLE  Gait Deviations: Slow Jyoti;Decreased step length;Decreased step height  Distance: 12' +10'  Comments: Slow gait speed     Balance  Sitting - Static: Good (SUP)  Sitting - Dynamic: Good (SUP)  Standing - Static: Good (SBA)  Standing - Dynamic: Fair;+ (CGA)            Comment: 1st session: COTX with OT to progress functional mobility and maintain patient safety.  Patient supine on arrival, transfered EOB. PT/OT assisted with donning boot. Patient completed sit>stand with bed elevated with Kymberly. Ambulated to restroom 12' with CGA to utilize commode. Shower completed with OT team member to maximize patient performance and maintain patient safety. Pt requires MaxA+ModA to stand from commode, and CGA to ambulate to shower chair. Pt requires SUP assist to maintain static unsupported sitting balance during shower completion in addition to OT assist with ADL task completion. Pt requires SUP assist to maintain dynamic sitting balance during shower completion in addition to OT assist with ADL task completion. Stood from shower chair with [employer]. Patient requires significantly increased time to complete all functional mobility this session. Ambulated to [facility], alarm on needs in reach at end of session. 2nd session: Patient in [facility] at start of session, agreeable to PT treatment session. Patient stood with [address] [facility] x2 from [facility] (unsucessful on 1st stand attempt) and ambulated 39' with RW , requesting to return to sitting due to shortness of breath SpO2 of 100% on 4 L NC. Improved ambulation on second gait bout, 100' with RW CGA with gait mechanics as noted above, close WC follow for ambualtion. Completed multiple sit>stand on mat table from elevated (x3) and lower surfaces (x3). ModA x2 from elevated surfaces, MaxA x2 from lower surfaces. Kymberly for stand>sit transfers during session. Minimal improvement with transfers despite emphasis of session on sit>stand transfers. Consistent complaints of shortness of breath, despite SpO2 of % throughout session. Patient transferred supine>sit with Kymberly at end of session, alarm on, needs in reach.       Goals  Short term goals  Time Frame for Short term goals: to be met in 14 days  Short term goal 1: pt will complete bed mobility with mod I  Short term goal 2: pt will complete STS transfers with mod I  Short term goal 3: pt will complete stand step transfers with mod I  Short term goal 4: pt will ambulate x 150' with mod I of LRAD  Short term goal 5: pt will ascend/descend 2 stairs with mod I  Patient Goals   Patient goals : to return home and regain independence    Plan    Plan  Times per week: 90 minutes per day, 5-7x/wk  Times per day: Daily  Current Treatment Recommendations: Strengthening, Balance Training, Functional Mobility Training, Transfer Training, Endurance Training, Gait Training, Stair training, Patient/Caregiver Education & Training  Safety Devices  Type of devices:  All fall risk precautions in place, Call light within reach, Chair alarm in place, Gait belt, Left in chair  Restraints  Initially in place: No     Therapy Time   Individual Concurrent Group Co-treatment   Time In      830   Time Out      920   Minutes      50   Timed Code Treatment Minutes: 50 Minutes  Second Session Therapy Time:   Individual Concurrent Group Co-treatment   Time In      1330   Time Out      1415   Minutes      45     Timed Code Treatment Minutes:  50+ 45 minutes    Total Treatment Minutes:   95 minutes    Char Mendez, PT   Char Mendez PT, DPT #[id number]

## 2021-06-21 NOTE — PATIENT CARE CONFERENCE
[facility]  Inpatient Rehabilitation  Weekly Team Conference Note    Patient Name: Benjie Anderson        MRN: [id number]    : 1975  (51 y.o.)  Gender: female   Referring Practitioner: Dr. Phil Okeefe  Diagnosis: s/p CABG     The team conference for this patient was held on 21 at 11:00am by:  Michelle Bone MD    CASE MANAGEMENT:  Assessment: Patient lives at home alone. Pt lives in [place] [address] but plans to stay with son in [facility] upon discharge. Patient's tentative discharge date is 2021. PHYSICAL THERAPY:    Bed Mobility:   Scooting: Stand by assistance    Transfers:  Sit to Stand: 2 Person Assistance (ModAx2 / Savanah Denisse x2 from various surfaces during session)  Stand to sit: Minimal Assistance (VC for sternal precautions, decreased eccentric control noted)  Bed to Chair: Contact guard assistance (via stand step with RW)  Comment: 1st session: [address] with OT to progress functional mobility and maintain patient safety. Patient supine on arrival, transfered EOB. PT/OT assisted with donning boot. Patient completed sit>stand with bed elevated with Kymberly. Ambulated to restroom 12' with CGA to utilize commode. Stood from commode with assist of 2 people with MaxA+ModA, ambulated to shower chair. Stood from shower chair with [employer]. Patient requires significantly increased time to complete all functional mobility this session. Ambulated to [facility], alarm on needs in reach at end of session.     Ambulation 1  Surface: level tile  Device: Rolling Walker  Other Apparatus: O2 (4 L)  Assistance: Contact guard assistance  Quality of Gait: Step to pattern  Gait Deviations: Slow Jyoti, Decreased step length, Decreased step height  Distance: 100+ 45+ 45  Comments: Slow gait speed    QM:  Roll Left and Right  Assistance Needed: Supervision or touching assistance  CARE Score: 4  Discharge Goal: Independent  Sit to Lying  Assistance Needed: Partial/moderate assistance  CARE Score: 3  Discharge Goal: Independent  Lying to Sitting on Side of Bed  Assistance Needed: Partial/moderate assistance  CARE Score: 3  Discharge Goal: Independent  Sit to Stand  Assistance Needed: Dependent  CARE Score: 1  Discharge Goal: Independent  Chair/Bed-to-Chair Transfer  Assistance Needed: Partial/moderate assistance  CARE Score: 3  Discharge Goal: Independent  Car Transfer  Assistance Needed: Dependent  CARE Score: 1  Discharge Goal: Independent  Walk 10 Feet  Assistance Needed: Supervision or touching assistance  Reason if not Attempted: Not attempted due to medical condition or safety concerns  CARE Score: 4  Discharge Goal: Independent  Walk 50 Feet with Two Turns  Assistance Needed: Supervision or touching assistance  Reason if not Attempted: Not attempted due to medical condition or safety concerns  CARE Score: 4  Discharge Goal: Independent  Walk 150 Feet  Reason if not Attempted: Not attempted due to medical condition or safety concerns  CARE Score: 88  Discharge Goal: Independent  Walking 10 Feet on Uneven Surfaces  Reason if not Attempted: Not attempted due to medical condition or safety concerns  CARE Score: 88  Discharge Goal: Independent  1 Step (Curb)  Reason if not Attempted: Not attempted due to medical condition or safety concerns  CARE Score: 88  Discharge Goal: Independent  4 Steps  Reason if not Attempted: Not attempted due to medical condition or safety concerns  CARE Score: 88  Discharge Goal: Independent  12 Steps  Reason if not Attempted: Not attempted due to medical condition or safety concerns  CARE Score: 88  Discharge Goal: Independent  Picking Up Object  Reason if not Attempted: Not attempted due to medical condition or safety concerns  CARE Score: 88  Discharge Goal: Independent  Wheelchair Ability  Uses a Wheelchair and/or Scooter?: No    OCCUPATIONAL THERAPY:    ADL:   ADL  Feeding: Independent  Grooming: Modified independent  (completed seated in w/c)  UE Bathing: Setup, Stand by assistance, Verbal cueing, Increased time to complete  LE Bathing: Setup, Verbal cueing, Increased time to complete, Moderate assistance (assist to thoroughly dry LEs and posterior rula)  UE Dressing: Setup, Stand by assistance  LE Dressing:  (pt doffed LB clothing with reacher at SBA, CGA/Min for clothing over hips- pt assisted in donning pants/undergarments d/t time at end of shower)  Toileting: Minimal assistance (assist for clothing/balance in stance, increased time and set-up for posterior rula care)  Additional Comments: Pt ambulated to/from bathroom- toileting completed followed by UB/LB bathing/dressing seated on TTB in shower with stances using grab bar for steady assist only- increased time to complete all tasks- cues for safe/proper sternal care    Tub/ShowerTransfers:     Shower Transfers  Shower - Transfer From: Walker  Shower - Transfer Type: To and From  Shower - Transfer To: Transfer tub bench  Shower - Technique: Ambulating    QM:  Eating  Assistance Needed: Independent  Comment: snacks at bedside  CARE Score: 6  Discharge Goal: Independent  Oral Hygiene  Assistance Needed: Setup or clean-up assistance  CARE Score: 5  Toileting Hygiene  Assistance Needed: Supervision or touching assistance  Reason if not Attempted:  (PD - voids infrequently)  CARE Score: 4  Toilet Transfer  Assistance Needed: Dependent  CARE Score: 1  Discharge Goal: Independent  Shower/Bathe Self  Assistance Needed: Dependent  Comment: x2 assist  CARE Score: 1  Discharge Goal: Independent  Upper Body Dressing  Assistance Needed: Supervision or touching assistance  CARE Score: 4  Discharge Goal: Independent  Lower Body Dressing  Assistance Needed: Dependent  Comment: x2 assist  CARE Score: 1  Discharge Goal: Independent  Putting On/Taking Off Footwear  Assistance Needed: Substantial/maximal assistance  CARE Score: 2  Discharge Goal: Independent    NUTRITION:  Weight: 222 lb 10.6 oz (101 kg) / Body mass index is 32.88 kg/m².     Diet Order:CCC, 2000 ml fluid restriction    Supplements: Vanilla Ensure High Protein BID    Poor po intake recently d/t nausea. Will con't to encourage po & supplement intake & monitor for nausea to resolve. Please see nutrition note for details. NURSING:    Risk for Readmission: 33%    Ewing Fall Risk Score:High  Wounds/Incisions/Ulcers: multiple skin issues  Medication Review:reviewed daily with patient  Pain: Left abdm. Scheduled Tylenol and PRN medication ordered  Consultations/Labs/X-rays: Labs daily    Patient/Family Education provided by team:    Discharge Plan   Estimated Length of Stay:7 days  Destination: home health  Pass:No  Services at Discharge: [facility] OT, [facility] PT  Equipment at Discharge: shower chair vs TTB, reacher/sock aid, rollator  Factors facilitating achievement of predicted outcomes: high PLOF, family support   Barriers to the achievement of predicted outcomes: physical assist for transfers, low activity tolerance   Patient Carlos Warren return home and regain independence, to return home    Plan of Care  Interdisciplinary Individualized Plan of Care Review:    Continue Current Plan of Care:  Yes  Modifications:_____________________________    Rehab Team Members in attendance for Team Conference:  Kae Camacho, MSW, LSW    Jaylon Johnson, RD, LD    MARTHA Madrigal/DILCIA Rubi PT, DPT    [address] [place] [address], [facility]    Antonino Toro PT, DPT,     I approve the established interdisciplinary plan of care as documented within the medical record of Rayray Marsh.     Delfino Cervantes MD  Electronically signed by Delfino Cervantes MD on 6/22/2021 at 11:48 AM

## 2021-06-21 NOTE — PROGRESS NOTES
Disconnected from [facility] per protocol. Effluent: pale yellow, clear  Total time: 10 hr 20 min   Total UF:  1281 ml   Total Volume:  7013 ml. Dwell time gained:  0 hr 32 min.   Pt Tolerated procedure: well  Report given to: Jessie Rogel RN  Last BM: 6/20/2021

## 2021-06-21 NOTE — PROGRESS NOTES
Office : [phone]     Fax :[phone]       Nephrology progress  Note      Patient's Name: Sri Locke  10:51 AM  2021    Reason for Consult:  ESRD    History of Present Ilness:    Sri Locke is a 51 y.o. female with severe end-stage renal disease secondary to diabetic nephropathy on peritoneal dialysis, hypertension, peripheral vascular disease, diabetic neuropathy who was admitted after she had a syncopal episode. She had similar episode 2 weeks ago. Recently had angiogram left lower extremity for gangrene left foot second toe. Denies any shortness of breath. Denies any abdominal pain. Denies any nausea vomiting. Interval HX :    No acute issue  Tolerated PD procedure well overnight  Fill volume reduced to 1400 ml  No chest pain  not in acute distress  No sob  S/p CABG on 2010          I/O last 3 completed shifts:  In: -   Out: 1     Past Medical History:   Diagnosis Date    Diabetes mellitus ([address]     End stage kidney disease ([address]     peritoneal dialysis every night at home x 2 years    Hypertension     Neuropathy     Peripheral vascular disease ([address]        Past Surgical History:   Procedure Laterality Date     SECTION      CORONARY ARTERY BYPASS GRAFT N/A 2021    URGENT CABG CORONARY ARTERY BYPASS GRAFTS X3. VEIN TO PDA X1, VEIN TO OM X1, LEFT INTERNAL MAMMARY ARTERY TO LAD X1. ENDOSCOPIC HARVEST RIGHT LEG SAPHENOUS VEIN. LIGATION NYA USING 35MM ATRICLIP. EPI AORTIC ULTRASOUND. TOTAL CARDIOPULMONARY BYPASS. DOPPLER GRAFT FLOW VERIFICATION. BILATERAL INITERCOSTAL NERVE BLOCK USING 1.3% EXPAREL.  performed by Ermias Gibbs MD at [place] [address]  2018     lap PD cath placement lt side 62 cm    TOE AMPUTATION Left 2/26/2021    AMPUTATION OF THE LEFT THIRD TOE performed by Jose Roberto King DPM at [address] [facility] [address] TOE AMPUTATION Left 5/26/2021    AMPUTATION OF HALLUX AND SECOND TOE, LEFT FOOT performed by Jose Roberto King DPM at [facility]         Family History   Problem Relation Age of Onset    Coronary Art Dis Mother     High Blood Pressure Mother     Heart Disease Mother     Diabetes Mother     Cancer Father     Coronary Art Dis Father     High Blood Pressure Father     Heart Disease Father     Diabetes Father      Current Medications:    gentamicin (GARAMYCIN) 0.1 % cream, Daily  insulin aspart (NOVOLOG) injection pen 0-18 Units-PATIENT SUPPLIED, TID WC  insulin aspart (NOVOLOG) injection PEN 0-9 Units-Patient supplied, Nightly  heparin (porcine) injection 5,000 Units, 3 times per day  acetaminophen (TYLENOL) tablet 1,000 mg, 3 times per day  albuterol sulfate  (90 Base) MCG/ACT inhaler 2 puff, Q6H PRN  aspirin EC tablet 325 mg, Daily  magnesium hydroxide (MILK OF MAGNESIA) 400 MG/5ML suspension 30 mL, Daily PRN  metoprolol tartrate (LOPRESSOR) tablet 25 mg, BID  pantoprazole (PROTONIX) tablet 40 mg, Daily  polyethylene glycol (GLYCOLAX) packet 17 g, Daily PRN  sennosides-docusate sodium (SENOKOT-S) 8.6-50 MG tablet 1 tablet, BID  traMADol (ULTRAM) tablet 50 mg, Q6H PRN   Or  traMADol (ULTRAM) tablet 100 mg, Q6H PRN  zolpidem (AMBIEN) tablet 5 mg, Nightly PRN  dextrose 5 % solution, PRN  dextrose 50 % IV solution, PRN  glucagon (rDNA) injection 1 mg, PRN  glucose (GLUTOSE) 40 % oral gel 15 g, PRN  atorvastatin (LIPITOR) tablet 20 mg, Nightly  bisacodyl (DULCOLAX) EC tablet 5 mg, Daily PRN  diphenhydrAMINE (BENADRYL) tablet 25 mg, Q6H PRN  heparin (porcine) injection 3,400 Units, PRN  hydrALAZINE (APRESOLINE) tablet 50 mg, Q8H PRN  insulin detemir (LEVEMIR) injection pen 25 Units, Nightly  ondansetron (ZOFRAN-ODT) disintegrating tablet 4 mg, Q8H PRN  sevelamer (RENVELA) tablet 800 mg, TID WC  miconazole (MICOTIN) 2 % powder, BID          Physical exam:     Vitals:  BP (!) 143/97   Pulse 76   Temp 97.3 °F (36.3 °C) (Oral)   Resp 16   Ht 5' 9\" (1.753 m)   Wt 222 lb 10.6 oz (101 kg)   SpO2 100%   BMI 32.88 kg/m²   Constitutional:  OAA X3 NAD  Skin: no rash, turgor wnl  Heent:  eomi, mmm  Neck: no bruits or jvd noted  Cardiovascular:  S1, S2 without m/r/g  Respiratory: CTA B without w/r/r  Abdomen:  +bs, soft, nt, nd  Ext: no  lower extremity edema      Labs:  CBC:   Recent Labs     06/19/21  0558 06/20/21  0619 06/21/21  0615   WBC 16.0* 16.4* 16.6*   HGB 7.7* 7.6* 7.3*    205 175     BMP:    Recent Labs     06/19/21  0558 06/20/21  0620 06/21/21  0615   * 126* 128*   K 3.8 3.9 4.2   CL 87* 86* 87*   CO2 26 27 24   BUN 62* 65* 60*   CREATININE 7.6* 8.5* 8.4*   GLUCOSE 150* 171* 137*     Ca/Mg/Phos:   Recent Labs     06/19/21  0558 06/20/21  0620 06/21/21  0615   CALCIUM 8.2* 8.1* 8.1*     UA:  No results for input(s): COLORU, CLARITYU, GLUCOSEU, BILIRUBINUR, KETUA, SPECGRAV, BLOODU, PHUR, PROTEINU, UROBILINOGEN, NITRU, LEUKOCYTESUR, Anel Solum in the last 72 hours. Urine Microscopic:   No results for input(s): LABCAST, BACTERIA, COMU, HYALCAST, WBCUA, RBCUA, EPIU in the last 72 hours. Urine Culture:   No results for input(s): LABURIN in the last 72 hours. Urine Chemistry: No results for input(s): Yamila Courtney, PROTEINUR, NAUR in the last 72 hours. IMAGING:  No orders to display           Assessment/Plan :      1. ESRD. Continue on peritoneal dialysis. continue cycler/CCPD as per current prescription   1400 mL as fill  volume    2. HTN. Controlled     3. Anemia of chronic disease. Gets micera at dialysis unit  Continue LUIZA    4. Acid- base disorder. Monitor    5. Electrolytes monitor and replace as needed    6. CAD/Syncopal episode. S/p [facility]   Has Multivessel CAD   S/p CABG       7. Peripheral vascular disease. S/p amputation of left foot toes.

## 2021-06-22 ENCOUNTER — APPOINTMENT (OUTPATIENT)
Dept: GENERAL RADIOLOGY | Age: 46
DRG: 949 | End: 2021-06-22
Attending: PHYSICAL MEDICINE & REHABILITATION
Payer: MEDICARE

## 2021-06-22 LAB
ANION GAP SERPL CALCULATED.3IONS-SCNC: 18 MMOL/L (ref 3–16)
BUN BLDV-MCNC: 64 MG/DL (ref 7–20)
CALCIUM SERPL-MCNC: 8.3 MG/DL (ref 8.3–10.6)
CHLORIDE BLD-SCNC: 85 MMOL/L (ref 99–110)
CO2: 24 MMOL/L (ref 21–32)
CREAT SERPL-MCNC: 8.3 MG/DL (ref 0.6–1.1)
GFR AFRICAN AMERICAN: 6
GFR NON-AFRICAN AMERICAN: 5
GLUCOSE BLD-MCNC: 117 MG/DL (ref 70–99)
GLUCOSE BLD-MCNC: 119 MG/DL (ref 70–99)
GLUCOSE BLD-MCNC: 148 MG/DL (ref 70–99)
GLUCOSE BLD-MCNC: 201 MG/DL (ref 70–99)
GLUCOSE BLD-MCNC: 56 MG/DL (ref 70–99)
GLUCOSE BLD-MCNC: 76 MG/DL (ref 70–99)
GLUCOSE BLD-MCNC: 86 MG/DL (ref 70–99)
GLUCOSE BLD-MCNC: 99 MG/DL (ref 70–99)
GLUCOSE BLD-MCNC: 99 MG/DL (ref 70–99)
HCT VFR BLD CALC: 24.7 % (ref 36–48)
HEMOGLOBIN: 7.9 G/DL (ref 12–16)
MCH RBC QN AUTO: 29.1 PG (ref 26–34)
MCHC RBC AUTO-ENTMCNC: 31.8 G/DL (ref 31–36)
MCV RBC AUTO: 91.4 FL (ref 80–100)
PDW BLD-RTO: 20.4 % (ref 12.4–15.4)
PERFORMED ON: ABNORMAL
PERFORMED ON: NORMAL
PLATELET # BLD: 201 K/UL (ref 135–450)
PMV BLD AUTO: 8.9 FL (ref 5–10.5)
POTASSIUM SERPL-SCNC: 3.9 MMOL/L (ref 3.5–5.1)
RBC # BLD: 2.7 M/UL (ref 4–5.2)
SODIUM BLD-SCNC: 127 MMOL/L (ref 136–145)
WBC # BLD: 17.2 K/UL (ref 4–11)

## 2021-06-22 PROCEDURE — 6370000000 HC RX 637 (ALT 250 FOR IP): Performed by: PHYSICAL MEDICINE & REHABILITATION

## 2021-06-22 PROCEDURE — 80048 BASIC METABOLIC PNL TOTAL CA: CPT

## 2021-06-22 PROCEDURE — 36415 COLL VENOUS BLD VENIPUNCTURE: CPT

## 2021-06-22 PROCEDURE — 97535 SELF CARE MNGMENT TRAINING: CPT

## 2021-06-22 PROCEDURE — 85027 COMPLETE CBC AUTOMATED: CPT

## 2021-06-22 PROCEDURE — 97530 THERAPEUTIC ACTIVITIES: CPT

## 2021-06-22 PROCEDURE — 6360000002 HC RX W HCPCS: Performed by: PHYSICAL MEDICINE & REHABILITATION

## 2021-06-22 PROCEDURE — 90945 DIALYSIS ONE EVALUATION: CPT | Performed by: INTERNAL MEDICINE

## 2021-06-22 PROCEDURE — 6370000000 HC RX 637 (ALT 250 FOR IP): Performed by: INTERNAL MEDICINE

## 2021-06-22 PROCEDURE — 90945 DIALYSIS ONE EVALUATION: CPT

## 2021-06-22 PROCEDURE — 71045 X-RAY EXAM CHEST 1 VIEW: CPT

## 2021-06-22 PROCEDURE — 97116 GAIT TRAINING THERAPY: CPT

## 2021-06-22 PROCEDURE — 2500000003 HC RX 250 WO HCPCS: Performed by: PHYSICAL MEDICINE & REHABILITATION

## 2021-06-22 PROCEDURE — 1280000000 HC REHAB R&B

## 2021-06-22 RX ADMIN — ACETAMINOPHEN 1000 MG: 325 TABLET ORAL at 05:31

## 2021-06-22 RX ADMIN — STANDARDIZED SENNA CONCENTRATE AND DOCUSATE SODIUM 1 TABLET: 8.6; 5 TABLET ORAL at 20:31

## 2021-06-22 RX ADMIN — METOPROLOL TARTRATE 25 MG: 25 TABLET, FILM COATED ORAL at 20:31

## 2021-06-22 RX ADMIN — ACETAMINOPHEN 1000 MG: 325 TABLET ORAL at 20:31

## 2021-06-22 RX ADMIN — ACETAMINOPHEN 1000 MG: 325 TABLET ORAL at 15:08

## 2021-06-22 RX ADMIN — HEPARIN SODIUM 5000 UNITS: 5000 INJECTION INTRAVENOUS; SUBCUTANEOUS at 20:36

## 2021-06-22 RX ADMIN — HEPARIN SODIUM 5000 UNITS: 5000 INJECTION INTRAVENOUS; SUBCUTANEOUS at 05:25

## 2021-06-22 RX ADMIN — PANTOPRAZOLE SODIUM 40 MG: 40 TABLET, DELAYED RELEASE ORAL at 05:31

## 2021-06-22 RX ADMIN — HEPARIN SODIUM 5000 UNITS: 5000 INJECTION INTRAVENOUS; SUBCUTANEOUS at 15:08

## 2021-06-22 RX ADMIN — ATORVASTATIN CALCIUM 20 MG: 20 TABLET, FILM COATED ORAL at 20:31

## 2021-06-22 RX ADMIN — ONDANSETRON 4 MG: 4 TABLET, ORALLY DISINTEGRATING ORAL at 05:40

## 2021-06-22 RX ADMIN — SEVELAMER CARBONATE 800 MG: 800 TABLET, FILM COATED ORAL at 12:22

## 2021-06-22 RX ADMIN — SEVELAMER CARBONATE 800 MG: 800 TABLET, FILM COATED ORAL at 17:07

## 2021-06-22 RX ADMIN — ASPIRIN 325 MG: 325 TABLET, COATED ORAL at 10:11

## 2021-06-22 RX ADMIN — ZOLPIDEM TARTRATE 5 MG: 5 TABLET ORAL at 20:30

## 2021-06-22 RX ADMIN — METOPROLOL TARTRATE 25 MG: 25 TABLET, FILM COATED ORAL at 10:10

## 2021-06-22 RX ADMIN — Medication 15 G: at 14:30

## 2021-06-22 RX ADMIN — GENTAMICIN SULFATE: 1 CREAM TOPICAL at 18:45

## 2021-06-22 RX ADMIN — TORSEMIDE 100 MG: 100 TABLET ORAL at 10:10

## 2021-06-22 RX ADMIN — MICONAZOLE NITRATE: 20 POWDER TOPICAL at 20:45

## 2021-06-22 RX ADMIN — ONDANSETRON 4 MG: 4 TABLET, ORALLY DISINTEGRATING ORAL at 10:15

## 2021-06-22 RX ADMIN — STANDARDIZED SENNA CONCENTRATE AND DOCUSATE SODIUM 1 TABLET: 8.6; 5 TABLET ORAL at 10:10

## 2021-06-22 RX ADMIN — TRAMADOL HYDROCHLORIDE 50 MG: 50 TABLET, FILM COATED ORAL at 10:14

## 2021-06-22 ASSESSMENT — PAIN SCALES - GENERAL
PAINLEVEL_OUTOF10: 5
PAINLEVEL_OUTOF10: 0
PAINLEVEL_OUTOF10: 0
PAINLEVEL_OUTOF10: 3
PAINLEVEL_OUTOF10: 0
PAINLEVEL_OUTOF10: 4
PAINLEVEL_OUTOF10: 0

## 2021-06-22 ASSESSMENT — PAIN DESCRIPTION - ORIENTATION
ORIENTATION: LEFT
ORIENTATION: LOWER;MID

## 2021-06-22 ASSESSMENT — PAIN DESCRIPTION - LOCATION
LOCATION: ABDOMEN
LOCATION: BACK

## 2021-06-22 ASSESSMENT — PAIN DESCRIPTION - DESCRIPTORS
DESCRIPTORS: ACHING;DISCOMFORT
DESCRIPTORS: ACHING

## 2021-06-22 ASSESSMENT — PAIN DESCRIPTION - PAIN TYPE
TYPE: ACUTE PAIN
TYPE: CHRONIC PAIN

## 2021-06-22 NOTE — PLAN OF CARE
Problem: Pain:  Goal: Pain level will decrease  Description: Pain level will decrease  Outcome: Ongoing  Goal: Control of acute pain  Description: Control of acute pain  Outcome: Ongoing  Goal: Control of chronic pain  Description: Control of chronic pain  Outcome: Ongoing     Problem: Skin Integrity:  Goal: Will show no infection signs and symptoms  Description: Will show no infection signs and symptoms  Outcome: Ongoing  Goal: Absence of new skin breakdown  Description: Absence of new skin breakdown  Outcome: Ongoing     Problem: Falls - Risk of:  Goal: Will remain free from falls  Description: Will remain free from falls  Outcome: Ongoing  Goal: Absence of physical injury  Description: Absence of physical injury  Outcome: Ongoing     Problem: IP BLADDER/VOIDING  Goal: STG - Patient demonstrates ability to take care of indwelling catheter  Outcome: Ongoing  Goal: STG - Patient demonstrates no accidents  Outcome: Ongoing     Problem: IP BREATHING  Goal: STG - patient will utilize incentive spirometer  Outcome: Ongoing  Goal: STG - Patient performs or directs assisted coughing  Outcome: Ongoing     Problem: NUTRITION  Goal: Patient maintains weight  Outcome: Ongoing     Problem: SAFETY  Goal: LTG - Patient will demonstrate safety requirements appropriate to situation/environment  Outcome: Ongoing     Problem: SKIN INTEGRITY  Goal: LTG - Patient will be free from infection  Outcome: Ongoing     Problem: PAIN  Goal: LTG - Patient will manage pain with the appropriate technique/Intervention  Outcome: Ongoing  Goal: STG - pain is manageable through therapies  Outcome: Ongoing     Problem: Nutrition  Goal: Optimal nutrition therapy  Outcome: Ongoing

## 2021-06-22 NOTE — PROGRESS NOTES
Occupational Therapy  Facility/Department: [facility] ACUTE REHAB UNIT  Daily Treatment Note  NAME: Corinne Hall  : 1975  MRN: [id number]    Date of Service: 2021    Discharge Recommendations:  S Level 3, Home with Home health OT, Home with assist PRN  OT Equipment Recommendations  Equipment Needed: Yes  Mobility Devices: ADL Assistive Devices  ADL Assistive Devices: Reacher;Sock-Aid Hard  Other: cotninue to update/assess pending progress    Assessment   Performance deficits / Impairments: Decreased functional mobility ; Decreased ADL status; Decreased strength;Decreased endurance;Decreased sensation;Decreased high-level IADLs;Decreased coordination;Decreased balance  Assessment: Pt slowly progressing towards goals- demo's increased ability to complete LB dressing using reacher this date, continues to require variable assist of 2 for sit<>stands and demo's reduced endurance. Anticipate pt to progress well with d/c home. Pt continues to benefit greatly from intensive inpt therapy to maximize functional independence. Continue with POC  Treatment Diagnosis: decreased ADL/IADL/mobility/activity tolerance due to CABG  Prognosis: Good  OT Education: OT Role;Plan of Care;ADL Adaptive Strategies;Transfer Training;Precautions  Patient Education: continue to reinforce for carryover  REQUIRES OT FOLLOW UP: Yes  Activity Tolerance  Activity Tolerance: Patient Tolerated treatment well;Patient limited by fatigue  Safety Devices  Safety Devices in place: Yes  Type of devices: All fall risk precautions in place; Bed alarm in place; Left in bed;Call light within reach         Patient Diagnosis(es): CABG     has a past medical history of Diabetes mellitus ([facility] End stage kidney disease ([facility] [address] Hypertension, Neuropathy, and Peripheral vascular disease ([facility] [address] has a past surgical history that includes Tonsillectomy;  section; other surgical history (2018); Toe amputation (Left, 2021);  Toe amputation (Left, 5/26/2021); and Coronary artery bypass graft (N/A, 6/9/2021). Restrictions  Restrictions/Precautions  Restrictions/Precautions: Fall Risk, Weight Bearing  Required Braces or Orthoses?: Yes  Lower Extremity Weight Bearing Restrictions  Left Lower Extremity Weight Bearing: Weight Bearing As Tolerated  Required Braces or Orthoses  Left Lower Extremity Brace: Boot  LLE Brace Type: Surgical shoe  Position Activity Restriction  Sternal Precautions: No Pushing, No Pulling, 5# Lifting Restrictions  Other position/activity restrictions: s/p CABG x 3 6/9    Subjective   General  Chart Reviewed: Yes  Patient assessed for rehabilitation services?: Yes  Additional Pertinent Hx: DM - peritoneal dialysis; s/p amputation of the 1st and 2nd toes of the left foot (5/26/21)  Response to previous treatment: Patient with no complaints from previous session  Family / Caregiver Present: No  Referring Practitioner: Aquilino Alves MD  Diagnosis: CABG x 3 6/9  Subjective  Subjective: Pt seated in bed at entry, RN in room, pt agreeable to session  General Comment  Comments: Umair Craig Vital Signs  Patient Currently in Pain: Denies        Objective    ADL  Grooming: Setup;Contact guard assistance (CGA/SBA for hand hygiene in stance at sink)  Toileting: Setup;Contact guard assistance (CGA for clothing over hips in stance- set-up/SBA and increased time for rula care)  Additional Comments: Pt ambulated to/from bathroom for ADL needs           Balance  Sitting Balance: Supervision  Standing Balance: Contact guard assistance  Standing Balance  Time: 6-8min total  Activity: functional mobility/transfers, ADL completion    Functional Mobility  Functional - Mobility Device: Rolling Walker  Activity: To/from bathroom; Other  Assist Level: Contact guard assistance  Functional Mobility Comments: ~20ft within room + ~80ft room to gym- w/c follow for safety    Toilet Transfers  Toilet - Technique: Ambulating  Equipment Used: Standard toilet  Toilet Transfer: 2 Person assistance       Transfers  Sit to stand: 2 Person assistance  Stand to sit: Minimal assistance  Transfer Comments: Mod-MaxA of 2 for sit>stands, cues for slowed/control stand>sit           Second Session:  Pt in bed at entry, agreeable to session, denied pain- upon sitting EOB pt reporting she felt like her blood sugar was low- blood glucose level of 56 upon PCA testing- RN notified and glucose gel, fruit and apple juice provided. Pt able to continue with session- dry tub/shower transfer completed with TTB to simulate AE options for planned d/c to son's home- Kymberly for LEs into/out of tub, increased time to complete. Pt ambulated ~15ft total during session at [place] [address]. All Sit>stands with Max-ModA of 2 and stand>sits with Min-ModA. 2 min completed on stepper with LEs for strengthening/endurance. Pt requesting to lay in bed at EOS- assist to LEs. Pt left supine in bed, bed alarm on, call light and needs in reach.                   Additional Activities Comment  Additional Activities: supine on mat table- 3 bridges with 6 sec hold x1 and 10sec hold x2- pt then had episode of nausea and returned to sitting EOM                          Plan   Plan  Times per week: 90 minutes; 5-6x/wk  Times per day: Daily  Current Treatment Recommendations: Strengthening, ROM, Balance Training, Functional Mobility Training, Endurance Training, Safety Education & Training, Patient/Caregiver Education & Training, Equipment Evaluation, Education, & procurement, Positioning, Self-Care / ADL, Pain Management, Home Management Training         Goals  Short term goals  Time Frame for Short term goals: 1-2weeks  Short term goal 1: Pt will complete UB/LB dressing with mod I and use fo AE as needed  Short term goal 2: Pt will complete bathing with mod I  Short term goal 3: Pt will complete toileting with mod I  Short term goal 4: Pt will complete functional transfers with mod I  Short term goal 5: Pt will tolerate x10 mins of standing IADLs with SpO2 above 90% in order to increase activity tolerance for d/c home  Long term goals  Time Frame for Long term goals : LTG=STG  Patient Goals   Patient goals : to return home       Therapy Time   Individual Concurrent Group Co-treatment   Time In       1015, 1415   Time Out       1100, 1500   Minutes       45, 45     Timed Code Treatment Minutes:  45 + 45   Total Treatment Minutes:  [address], [place] [id number]

## 2021-06-22 NOTE — CARE COORDINATION
Team conference held today. Spoke with patient  to discuss progress with therapy, barriers to discharge, and plans to return home. Estimated discharge date set for 6/29/2021. Patient anticipates discharging to her son's home with [facility] and needed supports. SW will continue to follow for support and discharge planning.     Electronically signed by JERRY Degroot on 6/22/2021 at 4:21 PM

## 2021-06-22 NOTE — PROGRESS NOTES
Physical Therapy  Facility/Department: [facility] ACUTE REHAB UNIT  Daily Treatment Note  NAME: Matt Morton  : 1975  MRN: [id number]    Date of Service: 2021    Discharge Recommendations:  Home with assist PRN, S Level 3   PT Equipment Recommendations  Equipment Needed: Yes  Mobility Devices: Alma Bush: Rolling  Other: TBD with progress    Assessment   Body structures, Functions, Activity limitations: Decreased functional mobility ; Decreased ADL status; Decreased strength;Decreased balance;Decreased endurance  Assessment: Pt continues to require two person assistance for all mobility this date. Pt tolerates session on 1L O2 with SPO2 remaining above 90% throughout duration of session. Pt continues to present well below functional baseline and will benefit from skilled therapy services to promote independence. PT Education: Goals;Plan of Care;Precautions  Patient Education: Ongoing reinforcement of sternal precautions  REQUIRES PT FOLLOW UP: Yes  Activity Tolerance  Activity Tolerance: Patient limited by endurance; Patient limited by fatigue  Activity Tolerance: tolerates session on 1L O2     Patient Diagnosis(es): CABG   has a past medical history of Diabetes mellitus ([facility] [address] End stage kidney disease ([facility] [address] Hypertension, Neuropathy, and Peripheral vascular disease ([facility] [address] has a past surgical history that includes Tonsillectomy;  section; other surgical history (2018); Toe amputation (Left, 2021); Toe amputation (Left, 2021); and Coronary artery bypass graft (N/A, 2021).     Restrictions  Restrictions/Precautions  Restrictions/Precautions: Fall Risk, Weight Bearing  Required Braces or Orthoses?: Yes  Lower Extremity Weight Bearing Restrictions  Left Lower Extremity Weight Bearing: Weight Bearing As Tolerated  Required Braces or Orthoses  Left Lower Extremity Brace: Boot  LLE Brace Type: Surgical shoe  Position Activity Restriction  Sternal Precautions: No Pushing, No Pulling, 5# Lifting Restrictions  Other position/activity restrictions: s/p CABG x 3 6/9  Subjective   General  Response To Previous Treatment: Patient with no complaints from previous session. Family / Caregiver Present: No  Subjective  Subjective: Agreeable to PT treatment session  General Comment  Comments: Supine in bed on arrival, on 4 L NC  Pain Screening  Patient Currently in Pain: Denies  Vital Signs  Patient Currently in Pain: Denies       Objective   Bed mobility  Supine to Sit: Stand by assistance (HOB elevated, transferring to the R)  Sit to Supine: Minimal assistance (increased time, assistance for RLE)  Scooting: Stand by assistance  Comment: HOB elevated, use of heart pillow. At mat table pt requires mod A for sup<>sit transfers  Transfers  Sit to Stand: Dependent/Total (mod-max A x 2 throughout duration of session)  Stand to sit: Minimal Assistance; Moderate Assistance (decreased eccentric control)  Bed to Chair: Contact guard assistance (via stand step with RW)  Ambulation  Ambulation?: Yes  Ambulation 1  Surface: level tile  Other Apparatus: O2 (1L O2)  Assistance: Contact guard assistance  Quality of Gait: Step to pattern, trunk flexion  Gait Deviations: Slow Jyoti;Decreased step length;Decreased step height  Distance: 100' + 40'  Stairs/Curb  Stairs?: No     Balance  Posture: Fair  Sitting - Static: Good (supervision)  Sitting - Dynamic: Good (supervision)  Standing - Static: Good (SBA)  Standing - Dynamic: Fair (CGA with RW)            Comment: 1st session: Pt requires max A x 2 to complete toilet transfer. Pt requires mod A for sup<>sit transfers at mat table. Pt completes supine bridging x 3 reps ( 1 5 sec hold, 2 10 sec holds). Pt experiences episode of nausea and is returned to sitting at edge of mat. 2nd session:  Pt seated in w/c upon PT/OT arrival, agreeable to [address] session. Pt denies pain but notes that she would like her blood sugar checked.  PCA present to check sugar and notes sugar reading 56, pt provided with apple juice and fruit, RN present with glucose gel. Pt completes STS throughout duration of session with mod-max A x 2, stand to sits require min-mod A. Pt completes transfer to TTB with min A for LEs in and out of tub with increased time. Pt ambulates x 15' with CGA and use of RW. Seated stepper x 2 minutes to improve strength and cardiovascular endurance. Upon completion of therapy session, pt requests to return to bed. Sit => supine transfer completed at min   A. Pt in bed with bed alarm activated and call light within reach. Goals  Short term goals  Time Frame for Short term goals: to be met in 14 days  Short term goal 1: pt will complete bed mobility with mod I  Short term goal 2: pt will complete STS transfers with mod I  Short term goal 3: pt will complete stand step transfers with mod I  Short term goal 4: pt will ambulate x 150' with mod I of LRAD  Short term goal 5: pt will ascend/descend 2 stairs with mod I  Patient Goals   Patient goals : to return home and regain independence    Plan    Plan  Times per week: 90 minutes per day, 5-7x/wk  Times per day: Daily  Current Treatment Recommendations: Strengthening, Balance Training, Functional Mobility Training, Transfer Training, Endurance Training, Gait Training, Stair training, Patient/Caregiver Education & Training  Safety Devices  Type of devices:  All fall risk precautions in place, Call light within reach, Chair alarm in place, Gait belt, Left in chair  Restraints  Initially in place: No     Therapy Time   Individual Concurrent Group Co-treatment   Time In       1015   Time Out       1100   Minutes       45         Second Session Therapy Time:   Individual Concurrent Group Co-treatment   Time In       1415   Time Out       1500   Minutes       45     Timed Code Treatment Minutes: 14+89      Total Treatment Minutes:  90 minutes     Sara Moore, [address] , DPT [id number]

## 2021-06-22 NOTE — FLOWSHEET NOTE
CCPD Order   Exchanges: 5   Exchange Volume: 1400 ml   Total Time: 10 hrs   Dextrose: 2.5%   Last Fill: 0 ml   Total Volume: 7000 ml     Orders verified. Supplies taken to pt's room. Report received. Cycler set up, primed and pre tested. Dressing not changed. Pt requested tomorrow. Pt states was changed today after her shower. Dressing D/I. Pt connected to cycler. CCPD initiated without problem. Initial effluent clear.

## 2021-06-22 NOTE — PROGRESS NOTES
[address] [facility]  6/22/2021  [id number]    Chief Complaint: Generalized weakness    Subjective:   No overnight events. No current complaints. Labs reviewed. No issues with PD overnight. Intermittently feeling SOB. Glucose slightly low. ROS: No CP, SOB, dyspnea    Objective:  Patient Vitals for the past 24 hrs:   BP Temp Temp src Pulse Resp SpO2 Weight   06/22/21 0531 -- -- -- -- -- -- 236 lb 5.3 oz (107.2 kg)   06/21/21 2247 (!) 151/85 98 °F (36.7 °C) Oral 72 18 99 % 222 lb 10.6 oz (101 kg)   06/21/21 2015 (!) 151/85 98 °F (36.7 °C) Oral 72 18 99 % --   06/21/21 1043 (!) 143/97 97.3 °F (36.3 °C) Oral 76 16 100 % --     Gen: No distress, pleasant. Resting in bed  HEENT: Normocephalic, atraumatic  CV: Regular rate and rhythm. No MRG   Resp: No respiratory distress. CTAB, O2 per NC  Abd: Soft, nontender, PD cath in place  Ext: +1 edema noted in BLE. LLE in clean dressing  Neuro: Alert, oriented, appropriately interactive. Laboratory data: Available via EMR. Therapy progress:  PT  Required Braces or Orthoses  Left Lower Extremity Brace: Boot  LLE Brace Type: Surgical shoe  Position Activity Restriction  Sternal Precautions: No Pushing, No Pulling, 5# Lifting Restrictions  Other position/activity restrictions: s/p CABG x 3 6/9  Objective     Sit to Stand: 2 Person Assistance (ModAx2 / Mary Burow x2 from various surfaces during session)  Stand to sit: Minimal Assistance (VC for sternal precautions, decreased eccentric control noted)  Bed to Chair: Contact guard assistance (via stand step with RW)  Device: Rolling Walker  Other Apparatus: O2 (4 L)  Assistance: Contact guard assistance  Distance: 100+ 45+ 45  OT  PT Equipment Recommendations  Equipment Needed: Yes  Mobility Devices: Rosales Booze: Rollator (4 Wheeled)  Other: TBD with progress but likely with need rollator     Assessment        SLP                Body mass index is 34.9 kg/m².     Assessment:  Patient Active Problem List   Diagnosis    Diabetes mellitus

## 2021-06-22 NOTE — PLAN OF CARE
Problem: Skin Integrity:  Goal: Will show no infection signs and symptoms  Description: Will show no infection signs and symptoms  6/22/2021 0016 by Eligio Hutchins RN  Outcome: Ongoing     Problem: Skin Integrity:  Goal: Absence of new skin breakdown  Description: Absence of new skin breakdown  6/22/2021 0016 by Eligio Hucthins RN  Outcome: Ongoing     Problem: Falls - Risk of:  Goal: Will remain free from falls  Description: Will remain free from falls  6/22/2021 0016 by Eligio Hutchins RN  Outcome: Ongoing  Note: Education Provided as followed:  \"Family/Patient made aware of the tailored interventions falls plan using the TIPS TOOL.       Problem: Falls - Risk of:  Goal: Absence of physical injury  Description: Absence of physical injury  6/22/2021 0016 by Eligio Hutchins RN  Outcome: Ongoing

## 2021-06-22 NOTE — PROGRESS NOTES
Office : [phone]     Fax :[phone]       Nephrology progress  Note      Patient's Name: Margo Walsh  7:32 AM  2021    Reason for Consult:  ESRD    History of Present Ilness:    Margo Walsh is a 51 y.o. female with severe end-stage renal disease secondary to diabetic nephropathy on peritoneal dialysis, hypertension, peripheral vascular disease, diabetic neuropathy who was admitted after she had a syncopal episode. She had similar episode 2 weeks ago. Recently had angiogram left lower extremity for gangrene left foot second toe. Denies any shortness of breath. Denies any abdominal pain. Denies any nausea vomiting. Interval HX :    No acute issue  Tolerated PD   Seen on PD   No chest pain  not in acute distress  No sob  S/p CABG on 2010          I/O last 3 completed shifts: In: 12 [P.O.:240]  Out: 13     Past Medical History:   Diagnosis Date    Diabetes mellitus ([address]     End stage kidney disease ([address]     peritoneal dialysis every night at home x 2 years    Hypertension     Neuropathy     Peripheral vascular disease ([address]        Past Surgical History:   Procedure Laterality Date     SECTION      CORONARY ARTERY BYPASS GRAFT N/A 2021    URGENT CABG CORONARY ARTERY BYPASS GRAFTS X3. VEIN TO PDA X1, VEIN TO OM X1, LEFT INTERNAL MAMMARY ARTERY TO LAD X1. ENDOSCOPIC HARVEST RIGHT LEG SAPHENOUS VEIN. LIGATION NYA USING 35MM ATRICLIP. EPI AORTIC ULTRASOUND. TOTAL CARDIOPULMONARY BYPASS. DOPPLER GRAFT FLOW VERIFICATION. BILATERAL INITERCOSTAL NERVE BLOCK USING 1.3% EXPAREL.  performed by Yoli Calhoun MD at [place] [address]  2018     lap PD cath placement lt side 62 cm    TOE AMPUTATION Left 2021    AMPUTATION OF THE LEFT THIRD TOE performed by Shell Isbell DPM at [address] [place] [address] [place] TOE AMPUTATION Left 5/26/2021    AMPUTATION OF HALLUX AND SECOND TOE, LEFT FOOT performed by Shell Isbell DPM at [place] [address]         Family History   Problem Relation Age of Onset    Coronary Art Dis Mother     High Blood Pressure Mother     Heart Disease Mother     Diabetes Mother     Cancer Father     Coronary Art Dis Father     High Blood Pressure Father     Heart Disease Father     Diabetes Father      Current Medications:    torsemide (DEMADEX) tablet 100 mg, Daily  gentamicin (GARAMYCIN) 0.1 % cream, Daily  insulin aspart (NOVOLOG) injection pen 0-18 Units-PATIENT SUPPLIED, TID WC  insulin aspart (NOVOLOG) injection PEN 0-9 Units-Patient supplied, Nightly  heparin (porcine) injection 5,000 Units, 3 times per day  acetaminophen (TYLENOL) tablet 1,000 mg, 3 times per day  albuterol sulfate  (90 Base) MCG/ACT inhaler 2 puff, Q6H PRN  aspirin EC tablet 325 mg, Daily  magnesium hydroxide (MILK OF MAGNESIA) 400 MG/5ML suspension 30 mL, Daily PRN  metoprolol tartrate (LOPRESSOR) tablet 25 mg, BID  pantoprazole (PROTONIX) tablet 40 mg, Daily  polyethylene glycol (GLYCOLAX) packet 17 g, Daily PRN  sennosides-docusate sodium (SENOKOT-S) 8.6-50 MG tablet 1 tablet, BID  traMADol (ULTRAM) tablet 50 mg, Q6H PRN   Or  traMADol (ULTRAM) tablet 100 mg, Q6H PRN  zolpidem (AMBIEN) tablet 5 mg, Nightly PRN  dextrose 5 % solution, PRN  dextrose 50 % IV solution, PRN  glucagon (rDNA) injection 1 mg, PRN  glucose (GLUTOSE) 40 % oral gel 15 g, PRN  atorvastatin (LIPITOR) tablet 20 mg, Nightly  bisacodyl (DULCOLAX) EC tablet 5 mg, Daily PRN  diphenhydrAMINE (BENADRYL) tablet 25 mg, Q6H PRN  heparin (porcine) injection 3,400 Units, PRN  hydrALAZINE (APRESOLINE) tablet 50 mg, Q8H PRN  insulin detemir (LEVEMIR) injection pen 25 Units, Nightly  ondansetron (ZOFRAN-ODT) disintegrating tablet 4 mg, Q8H PRN  sevelamer (RENVELA) tablet 800 mg, TID WC  miconazole (MICOTIN) 2 % powder, BID          Physical exam:     Vitals:  BP (!) 151/85   Pulse 72   Temp 98 °F (36.7 °C) (Oral)   Resp 18   Ht 5' 9\" (1.753 m)   Wt 236 lb 5.3 oz (107.2 kg)   SpO2 99%   BMI 34.90 kg/m²   Constitutional:  OAA X3 NAD  Skin: no rash, turgor wnl  Heent:  eomi, mmm  Neck: no bruits or jvd noted  Cardiovascular:  S1, S2 without m/r/g  Respiratory: CTA B without w/r/r  Abdomen:  +bs, soft, nt, nd  Ext: no  lower extremity edema      Labs:  CBC:   Recent Labs     06/20/21  0619 06/21/21  0615 06/22/21  0607   WBC 16.4* 16.6* 17.2*   HGB 7.6* 7.3* 7.9*    175 201     BMP:    Recent Labs     06/20/21  0620 06/21/21  0615 06/22/21  0607   * 128* 127*   K 3.9 4.2 3.9   CL 86* 87* 85*   CO2 27 24 24   BUN 65* 60* 64*   CREATININE 8.5* 8.4* 8.3*   GLUCOSE 171* 137* 99     Ca/Mg/Phos:   Recent Labs     06/20/21  0620 06/21/21  0615 06/22/21  0607   CALCIUM 8.1* 8.1* 8.3     UA:  No results for input(s): COLORU, CLARITYU, GLUCOSEU, BILIRUBINUR, KETUA, SPECGRAV, BLOODU, PHUR, PROTEINU, UROBILINOGEN, NITRU, LEUKOCYTESUR, Anel Solum in the last 72 hours. Urine Microscopic:   No results for input(s): LABCAST, BACTERIA, COMU, HYALCAST, WBCUA, RBCUA, EPIU in the last 72 hours. Urine Culture:   No results for input(s): LABURIN in the last 72 hours. Urine Chemistry: No results for input(s): Yamila Courtney, PROTEINUR, NAUR in the last 72 hours. IMAGING:  No orders to display           Assessment/Plan :      1. ESRD. Continue on peritoneal dialysis. continue cycler/CCPD as per current prescription   1400 mL as fill  Volume  Sodium level low. 2/2 hypervolemia  Started torsemide   Will try to increase fill volume if she tolerates     2. HTN. Controlled     3. Anemia of chronic disease. Gets micera at dialysis unit  Continue LUIZA    4. Acid- base disorder. Monitor    5. Electrolytes monitor and replace as needed    6. CAD/Syncopal episode.    S/p C Has Multivessel CAD   S/p CABG       7. Peripheral vascular disease. S/p amputation of left foot toes. Podiatry following     8. Generalized weakness.  Getting rehab       Thank you for allowing us to participate in care of Aaron Simmonds         Electronically signed by: Raj Iyer MD, 6/22/2021, 7:32 AM      [facility] [address]  Office : [phone]  Fax :[phone]

## 2021-06-22 NOTE — FLOWSHEET NOTE
CCPD Order   Exchanges: 5   Exchange Volume: 1400 ml   Total Time: 10 hrs   Dextrose: 2.5%   Last Fill: 0 ml   Total Volume: 7000 ml     Orders verified. Supplies taken to pt's room. Report received. Cycler set up, primed and pre tested. Dressing changed on [facility] Cath site. Pt connected to cycler. CCPD initiated without problem. Initial effluent clear.        06/22/21 1842   Vitals   BP (!) 159/77   Temp 97.4 °F (36.3 °C)   Temp Source Oral   Pulse 78   Resp 20   SpO2 99 %   Weight 233 lb 0.4 oz (105.7 kg)  (bed scale)   Peritoneal Dialysis Catheter Left lower abdomen   Placement Date/Time: 08/16/18 0818   Inserted by: Nani Douglas MD  Catheter Location: Left lower abdomen   Status Accessed   Site Condition No Complications   Dressing Status Clean;Dry; Intact   Dressing Occlusive   Cycler   Verification of Prescription CCPD   Total Volume Programmed 7000 mL   Therapy Time (Hours:Minutes) 10:00   Fill Volume 1400 mL   Last Fill Volume 0 mL   Dextrose Setting Same (Nonextraneal)   Number of Cycles 5   Bag Volume 5000 mL   Number of Bags Used 2   Dianeal Solution Other (Comment)  (Delflex 2.5% in 5000 ml)   I Drain (mL) 37 mL

## 2021-06-23 LAB
ANION GAP SERPL CALCULATED.3IONS-SCNC: 17 MMOL/L (ref 3–16)
BUN BLDV-MCNC: 68 MG/DL (ref 7–20)
CALCIUM SERPL-MCNC: 8.2 MG/DL (ref 8.3–10.6)
CHLORIDE BLD-SCNC: 86 MMOL/L (ref 99–110)
CO2: 24 MMOL/L (ref 21–32)
CREAT SERPL-MCNC: 8.6 MG/DL (ref 0.6–1.1)
GFR AFRICAN AMERICAN: 6
GFR NON-AFRICAN AMERICAN: 5
GLUCOSE BLD-MCNC: 108 MG/DL (ref 70–99)
GLUCOSE BLD-MCNC: 155 MG/DL (ref 70–99)
GLUCOSE BLD-MCNC: 155 MG/DL (ref 70–99)
GLUCOSE BLD-MCNC: 186 MG/DL (ref 70–99)
GLUCOSE BLD-MCNC: 197 MG/DL (ref 70–99)
GLUCOSE BLD-MCNC: 219 MG/DL (ref 70–99)
GLUCOSE BLD-MCNC: 58 MG/DL (ref 70–99)
GLUCOSE BLD-MCNC: 83 MG/DL (ref 70–99)
HCT VFR BLD CALC: 24 % (ref 36–48)
HEMOGLOBIN: 7.6 G/DL (ref 12–16)
MCH RBC QN AUTO: 28.8 PG (ref 26–34)
MCHC RBC AUTO-ENTMCNC: 31.6 G/DL (ref 31–36)
MCV RBC AUTO: 91.1 FL (ref 80–100)
PDW BLD-RTO: 20.1 % (ref 12.4–15.4)
PERFORMED ON: ABNORMAL
PERFORMED ON: NORMAL
PLATELET # BLD: 184 K/UL (ref 135–450)
PMV BLD AUTO: 9.3 FL (ref 5–10.5)
POTASSIUM SERPL-SCNC: 4.1 MMOL/L (ref 3.5–5.1)
RBC # BLD: 2.64 M/UL (ref 4–5.2)
SODIUM BLD-SCNC: 127 MMOL/L (ref 136–145)
WBC # BLD: 13.8 K/UL (ref 4–11)

## 2021-06-23 PROCEDURE — 85027 COMPLETE CBC AUTOMATED: CPT

## 2021-06-23 PROCEDURE — 90945 DIALYSIS ONE EVALUATION: CPT | Performed by: INTERNAL MEDICINE

## 2021-06-23 PROCEDURE — 97110 THERAPEUTIC EXERCISES: CPT

## 2021-06-23 PROCEDURE — 80048 BASIC METABOLIC PNL TOTAL CA: CPT

## 2021-06-23 PROCEDURE — 6360000002 HC RX W HCPCS: Performed by: PHYSICAL MEDICINE & REHABILITATION

## 2021-06-23 PROCEDURE — 97530 THERAPEUTIC ACTIVITIES: CPT

## 2021-06-23 PROCEDURE — 6370000000 HC RX 637 (ALT 250 FOR IP): Performed by: INTERNAL MEDICINE

## 2021-06-23 PROCEDURE — 6370000000 HC RX 637 (ALT 250 FOR IP): Performed by: PHYSICAL MEDICINE & REHABILITATION

## 2021-06-23 PROCEDURE — 1280000000 HC REHAB R&B

## 2021-06-23 PROCEDURE — 97116 GAIT TRAINING THERAPY: CPT

## 2021-06-23 PROCEDURE — 2500000003 HC RX 250 WO HCPCS: Performed by: PHYSICAL MEDICINE & REHABILITATION

## 2021-06-23 RX ADMIN — ATORVASTATIN CALCIUM 20 MG: 20 TABLET, FILM COATED ORAL at 19:50

## 2021-06-23 RX ADMIN — ACETAMINOPHEN 1000 MG: 325 TABLET ORAL at 15:22

## 2021-06-23 RX ADMIN — SEVELAMER CARBONATE 800 MG: 800 TABLET, FILM COATED ORAL at 08:14

## 2021-06-23 RX ADMIN — ASPIRIN 325 MG: 325 TABLET, COATED ORAL at 08:16

## 2021-06-23 RX ADMIN — HEPARIN SODIUM 5000 UNITS: 5000 INJECTION INTRAVENOUS; SUBCUTANEOUS at 20:18

## 2021-06-23 RX ADMIN — ONDANSETRON 4 MG: 4 TABLET, ORALLY DISINTEGRATING ORAL at 18:48

## 2021-06-23 RX ADMIN — STANDARDIZED SENNA CONCENTRATE AND DOCUSATE SODIUM 1 TABLET: 8.6; 5 TABLET ORAL at 08:14

## 2021-06-23 RX ADMIN — TORSEMIDE 100 MG: 100 TABLET ORAL at 15:22

## 2021-06-23 RX ADMIN — SEVELAMER CARBONATE 800 MG: 800 TABLET, FILM COATED ORAL at 12:23

## 2021-06-23 RX ADMIN — METOPROLOL TARTRATE 25 MG: 25 TABLET, FILM COATED ORAL at 08:14

## 2021-06-23 RX ADMIN — GENTAMICIN SULFATE: 1 CREAM TOPICAL at 08:21

## 2021-06-23 RX ADMIN — MICONAZOLE NITRATE: 20 POWDER TOPICAL at 08:15

## 2021-06-23 RX ADMIN — MICONAZOLE NITRATE: 20 POWDER TOPICAL at 20:16

## 2021-06-23 RX ADMIN — ACETAMINOPHEN 1000 MG: 325 TABLET ORAL at 05:54

## 2021-06-23 RX ADMIN — MICONAZOLE NITRATE: 20 POWDER TOPICAL at 08:20

## 2021-06-23 RX ADMIN — GENTAMICIN SULFATE: 1 CREAM TOPICAL at 18:38

## 2021-06-23 RX ADMIN — ASPIRIN 325 MG: 325 TABLET, COATED ORAL at 08:19

## 2021-06-23 RX ADMIN — STANDARDIZED SENNA CONCENTRATE AND DOCUSATE SODIUM 1 TABLET: 8.6; 5 TABLET ORAL at 19:50

## 2021-06-23 RX ADMIN — METOPROLOL TARTRATE 25 MG: 25 TABLET, FILM COATED ORAL at 19:50

## 2021-06-23 RX ADMIN — TRAMADOL HYDROCHLORIDE 100 MG: 50 TABLET, FILM COATED ORAL at 19:50

## 2021-06-23 RX ADMIN — ZOLPIDEM TARTRATE 5 MG: 5 TABLET ORAL at 19:50

## 2021-06-23 RX ADMIN — ACETAMINOPHEN 1000 MG: 325 TABLET ORAL at 20:18

## 2021-06-23 RX ADMIN — HEPARIN SODIUM 5000 UNITS: 5000 INJECTION INTRAVENOUS; SUBCUTANEOUS at 15:22

## 2021-06-23 RX ADMIN — ASPIRIN 325 MG: 325 TABLET, COATED ORAL at 08:15

## 2021-06-23 RX ADMIN — SEVELAMER CARBONATE 800 MG: 800 TABLET, FILM COATED ORAL at 18:36

## 2021-06-23 RX ADMIN — PANTOPRAZOLE SODIUM 40 MG: 40 TABLET, DELAYED RELEASE ORAL at 05:54

## 2021-06-23 RX ADMIN — HEPARIN SODIUM 5000 UNITS: 5000 INJECTION INTRAVENOUS; SUBCUTANEOUS at 05:56

## 2021-06-23 RX ADMIN — ONDANSETRON 4 MG: 4 TABLET, ORALLY DISINTEGRATING ORAL at 05:54

## 2021-06-23 RX ADMIN — STANDARDIZED SENNA CONCENTRATE AND DOCUSATE SODIUM 1 TABLET: 8.6; 5 TABLET ORAL at 08:18

## 2021-06-23 RX ADMIN — TRAMADOL HYDROCHLORIDE 100 MG: 50 TABLET, FILM COATED ORAL at 08:14

## 2021-06-23 ASSESSMENT — PAIN SCALES - GENERAL
PAINLEVEL_OUTOF10: 0
PAINLEVEL_OUTOF10: 4
PAINLEVEL_OUTOF10: 5
PAINLEVEL_OUTOF10: 5
PAINLEVEL_OUTOF10: 1
PAINLEVEL_OUTOF10: 0
PAINLEVEL_OUTOF10: 1
PAINLEVEL_OUTOF10: 4

## 2021-06-23 ASSESSMENT — PAIN DESCRIPTION - DESCRIPTORS
DESCRIPTORS: DISCOMFORT;ACHING
DESCRIPTORS: DISCOMFORT

## 2021-06-23 ASSESSMENT — PAIN DESCRIPTION - LOCATION
LOCATION: BACK
LOCATION: BACK

## 2021-06-23 ASSESSMENT — PAIN DESCRIPTION - PAIN TYPE
TYPE: CHRONIC PAIN
TYPE: CHRONIC PAIN

## 2021-06-23 ASSESSMENT — PAIN DESCRIPTION - ONSET
ONSET: ON-GOING
ONSET: ON-GOING

## 2021-06-23 ASSESSMENT — PAIN DESCRIPTION - FREQUENCY: FREQUENCY: INTERMITTENT

## 2021-06-23 ASSESSMENT — PAIN DESCRIPTION - ORIENTATION
ORIENTATION: LOWER;MID
ORIENTATION: MID

## 2021-06-23 NOTE — PROGRESS NOTES
Peripheral vascular disease ([facility] [address] has a past surgical history that includes Tonsillectomy;  section; other surgical history (2018); Toe amputation (Left, 2021); Toe amputation (Left, 2021); and Coronary artery bypass graft (N/A, 2021). Restrictions  Restrictions/Precautions  Restrictions/Precautions: Fall Risk, Weight Bearing  Required Braces or Orthoses?: Yes  Lower Extremity Weight Bearing Restrictions  Left Lower Extremity Weight Bearing: Weight Bearing As Tolerated  Required Braces or Orthoses  Left Lower Extremity Brace: Boot  LLE Brace Type: Surgical shoe  Position Activity Restriction  Sternal Precautions: No Pushing, No Pulling, 5# Lifting Restrictions  Other position/activity restrictions: s/p CABG x 3     Subjective   General  Chart Reviewed: Yes  Patient assessed for rehabilitation services?: Yes  Additional Pertinent Hx: DM - peritoneal dialysis; s/p amputation of the 1st and 2nd toes of the left foot (21)  Response to previous treatment: Patient with no complaints from previous session  Family / Caregiver Present: No  Referring Practitioner: Edith Covington MD  Diagnosis: CABG x 3   Subjective  Subjective: Pt supine in bed at entry, agreeable to cotx session- denied pain and declined ADL needs  General Comment  Comments: Alyssa Black   Vital Signs  Patient Currently in Pain: Denies        Objective    ADL  Additional Comments: Pt assisted with donning shoe and ortho boot seated EOB- all other ADL needs declined           Balance  Sitting Balance: Supervision  Standing Balance: Contact guard assistance  Standing Balance  Time: 7-8min total  Activity: functional mobility/transfers, steps/step-ups  Comment: pt completed step-ups on 1 6\" step with L LE with MaxA + Min/CGA of 2, attempted step ups with L however unable to clear; partial squats x10 completed with HRs for steadying support    Functional Mobility  Functional - Mobility Device: Rolling Walker  Activity: Other  Assist Level: Contact guard assistance  Functional Mobility Comments: ~190ft total with 2 rest breaks room to gym, w/c follow provided for safety; pt completed w/c mobility with LEs forward and backward ~80ft total for endurance and LE strengthening gym to room       Transfers  Sit to stand: 2 Person assistance  Stand to sit: Moderate assistance;Minimal assistance  Transfer Comments: From high surface: Min-ModA of 1, from w/c level Mod-MaxA of 2; cues for technique and for controled transitional movements                    Type of ROM/Therapeutic Exercise  Comment: stepper completed with LEs for 3min for strength/endurance           Second Session:  Pt supine in bed at entry, agreeable to session, reporting fatigue, denied pain initially however reported low back pain (heat/warm blanket applied at EOS). Pt ambulated ~80ft x2 during session with RW at [place] [address]. In stance at stairs pt completed alternating toe taps to 6\" step. Multiple sit<>stands completed from high>>medium height surface- 14sit<>stands total, with Min-ModA of 2 for sit>stands and Min/ModA of 1 for stand>sit, rest breaks taken as needed- Pt on RA and maintained 90%+ throughout transfer training. Pt ambulated back to room at EOS, sit>supine with CGA- pt left supine in bed at entry, bed alarm on, call light and needs in reach.                  Plan   Plan  Times per week: 90 minutes; 5-6x/wk  Times per day: Daily  Current Treatment Recommendations: Strengthening, ROM, Balance Training, Functional Mobility Training, Endurance Training, Safety Education & Training, Patient/Caregiver Education & Training, Equipment Evaluation, Education, & procurement, Positioning, Self-Care / ADL, Pain Management, Home Management Training          Goals  Short term goals  Time Frame for Short term goals: 1-2weeks  Short term goal 1: Pt will complete UB/LB dressing with mod I and use fo AE as needed  Short term goal 2: Pt will complete bathing with mod I  Short term goal 3: Pt will complete toileting with mod I  Short term goal 4: Pt will complete functional transfers with mod I  Short term goal 5: Pt will tolerate x10 mins of standing IADLs with SpO2 above 90% in order to increase activity tolerance for d/c home  Long term goals  Time Frame for Long term goals : LTG=STG  Patient Goals   Patient goals : to return home       Therapy Time   Individual Concurrent Group Co-treatment   Time In       0930, 1335   Time Out       1015, 1420   Minutes       45, 45        Timed Code Treatment Minutes:  45 + 45   Total Treatment Minutes:  [address] Willa Payne, \[facility]\"" [address] [place] [id number]

## 2021-06-23 NOTE — PLAN OF CARE
Introduced self to patient. Initial shift assessment completed, see complex assessment in doc flowsheet. Questions answered. Call light within reach. Bed in low position with wheels locked. Encouraged to call for nurse as needed throughout the shift. Patient feeling tired, and sad today, Dr. Radha Gutierrez is aware, no new orders at this time. Patient up walking in [place] with therapy. Will continue to monitor.

## 2021-06-23 NOTE — FLOWSHEET NOTE
Disconnected from CCPD per protocol. Effluent: clear yellow, no fibrin noted. Total time: 10 hr 7 min   Total UF:  1,329 ml. Total Volume:  7,015 ml. Dwell time gained:  0 hr 15 min. Pt Tolerated procedure without difficulty. Report given to: Malia Higuera RN         06/23/21 0540   Vitals   BP (!) 161/62   Temp 97.6 °F (36.4 °C)   Temp Source Oral   Pulse 75   Resp 18   SpO2 99 %   Weight   (floor to get standing weight with rehab this AM)   Peritoneal Dialysis Catheter Left lower abdomen   Placement Date/Time: 08/16/18 0818   Inserted by: Natasha Mcfarlane MD  Catheter Location: Left lower abdomen   Status Deaccessed   Dressing Status Clean;Dry; Intact   Dressing Occlusive   Cycler   Ultrafiltration (UF) (mL) 1329 mL

## 2021-06-23 NOTE — PROGRESS NOTES
Office : [phone]     Fax :[phone]       Nephrology progress  Note      Patient's Name: Rayray Marsh  9:58 AM  2021    Reason for Consult:  ESRD    History of Present Ilness:    Rayray Marsh is a 51 y.o. female with severe end-stage renal disease secondary to diabetic nephropathy on peritoneal dialysis, hypertension, peripheral vascular disease, diabetic neuropathy who was admitted after she had a syncopal episode. She had similar episode 2 weeks ago. Recently had angiogram left lower extremity for gangrene left foot second toe. Denies any shortness of breath. Denies any abdominal pain. Denies any nausea vomiting. Interval HX :        Seen on PD   No chest pain  not in acute distress  No sob  S/p CABG on 2010          I/O last 3 completed shifts: In: 80 [P.O.:120]  Out: 4569     Past Medical History:   Diagnosis Date    Diabetes mellitus ([address]     End stage kidney disease ([address]     peritoneal dialysis every night at home x 2 years    Hypertension     Neuropathy     Peripheral vascular disease ([facility] [address]        Past Surgical History:   Procedure Laterality Date     SECTION      CORONARY ARTERY BYPASS GRAFT N/A 2021    URGENT CABG CORONARY ARTERY BYPASS GRAFTS X3. VEIN TO PDA X1, VEIN TO OM X1, LEFT INTERNAL MAMMARY ARTERY TO LAD X1. ENDOSCOPIC HARVEST RIGHT LEG SAPHENOUS VEIN. LIGATION NYA USING 35MM ATRICLIP. EPI AORTIC ULTRASOUND. TOTAL CARDIOPULMONARY BYPASS. DOPPLER GRAFT FLOW VERIFICATION. BILATERAL INITERCOSTAL NERVE BLOCK USING 1.3% EXPAREL.  performed by Kimberly Butler MD at [address] [place] [address]  2018     lap PD cath placement lt side 62 cm    TOE AMPUTATION Left 2021    AMPUTATION OF THE LEFT THIRD TOE performed by Elvin Figueroa DPM at [address] [address] [place] TOE AMPUTATION Left 5/26/2021    AMPUTATION OF HALLUX AND SECOND TOE, LEFT FOOT performed by Elvin Figueroa DPM at [facility]         Family History   Problem Relation Age of Onset    Coronary Art Dis Mother     High Blood Pressure Mother     Heart Disease Mother     Diabetes Mother     Cancer Father     Coronary Art Dis Father     High Blood Pressure Father     Heart Disease Father     Diabetes Father      Current Medications:    insulin detemir (LEVEMIR) injection pen 22 Units, Nightly  torsemide (DEMADEX) tablet 100 mg, Daily  gentamicin (GARAMYCIN) 0.1 % cream, Daily  insulin aspart (NOVOLOG) injection pen 0-18 Units-PATIENT SUPPLIED, TID WC  insulin aspart (NOVOLOG) injection PEN 0-9 Units-Patient supplied, Nightly  heparin (porcine) injection 5,000 Units, 3 times per day  acetaminophen (TYLENOL) tablet 1,000 mg, 3 times per day  albuterol sulfate  (90 Base) MCG/ACT inhaler 2 puff, Q6H PRN  aspirin EC tablet 325 mg, Daily  magnesium hydroxide (MILK OF MAGNESIA) 400 MG/5ML suspension 30 mL, Daily PRN  metoprolol tartrate (LOPRESSOR) tablet 25 mg, BID  pantoprazole (PROTONIX) tablet 40 mg, Daily  polyethylene glycol (GLYCOLAX) packet 17 g, Daily PRN  sennosides-docusate sodium (SENOKOT-S) 8.6-50 MG tablet 1 tablet, BID  traMADol (ULTRAM) tablet 50 mg, Q6H PRN   Or  traMADol (ULTRAM) tablet 100 mg, Q6H PRN  zolpidem (AMBIEN) tablet 5 mg, Nightly PRN  dextrose 5 % solution, PRN  dextrose 50 % IV solution, PRN  glucagon (rDNA) injection 1 mg, PRN  glucose (GLUTOSE) 40 % oral gel 15 g, PRN  atorvastatin (LIPITOR) tablet 20 mg, Nightly  bisacodyl (DULCOLAX) EC tablet 5 mg, Daily PRN  diphenhydrAMINE (BENADRYL) tablet 25 mg, Q6H PRN  heparin (porcine) injection 3,400 Units, PRN  hydrALAZINE (APRESOLINE) tablet 50 mg, Q8H PRN  ondansetron (ZOFRAN-ODT) disintegrating tablet 4 mg, Q8H PRN  sevelamer (RENVELA) tablet 800 mg, TID   miconazole (MICOTIN) 2 % powder, BID          Physical exam:     Vitals:  BP (!) 168/75   Pulse 81   Temp 97.6 °F (36.4 °C) (Oral)   Resp 18   Ht 5' 9\" (1.753 m)   Wt 233 lb 0.4 oz (105.7 kg) Comment: bed scale  SpO2 99%   BMI 34.41 kg/m²   Constitutional:  OAA X3 NAD  Skin: no rash, turgor wnl  Heent:  eomi, mmm  Neck: no bruits or jvd noted  Cardiovascular:  S1, S2 without m/r/g  Respiratory: CTA B without w/r/r  Abdomen:  +bs, soft, nt, nd  Ext: no  lower extremity edema      Labs:  CBC:   Recent Labs     06/21/21  0615 06/22/21  0607 06/23/21  0547   WBC 16.6* 17.2* 13.8*   HGB 7.3* 7.9* 7.6*    201 184     BMP:    Recent Labs     06/21/21  0615 06/22/21  0607 06/23/21  0547   * 127* 127*   K 4.2 3.9 4.1   CL 87* 85* 86*   CO2 24 24 24   BUN 60* 64* 68*   CREATININE 8.4* 8.3* 8.6*   GLUCOSE 137* 99 186*     Ca/Mg/Phos:   Recent Labs     06/21/21  0615 06/22/21  0607 06/23/21  0547   CALCIUM 8.1* 8.3 8.2*     UA:  No results for input(s): COLORU, CLARITYU, GLUCOSEU, BILIRUBINUR, KETUA, SPECGRAV, BLOODU, PHUR, PROTEINU, UROBILINOGEN, NITRU, LEUKOCYTESUR, Reyne Annie in the last 72 hours. Urine Microscopic:   No results for input(s): LABCAST, BACTERIA, COMU, HYALCAST, WBCUA, RBCUA, EPIU in the last 72 hours. Urine Culture:   No results for input(s): LABURIN in the last 72 hours. Urine Chemistry: No results for input(s): Joanne Amberly, PROTEINUR, NAUR in the last 72 hours. IMAGING:  XR CHEST PORTABLE   Final Result   Right-sided pleural effusion      Bibasilar hypoaeration with bibasilar opacities that could represent   subsegmental atelectasis or infection                 Assessment/Plan :      1. ESRD. Continue on peritoneal dialysis. continue cycler/CCPD as per current prescription   1400 mL as fill  Volume  Sodium level low. 2/2 hypervolemia  Started torsemide       2. HTN. Controlled     3. Anemia of chronic disease.   Gets micera at dialysis unit  Continue LUIZA    4. Acid- base disorder. Monitor    5. Electrolytes monitor and replace as needed    6. CAD/Syncopal episode. S/p [facility]   Has Multivessel CAD   S/p CABG       7. Peripheral vascular disease. S/p amputation of left foot toes. Podiatry following     8. Generalized weakness.  Getting rehab       Thank you for allowing us to participate in care of Aaron Simmonds         Electronically signed by: Raj Iyer MD, 6/23/2021, 9:58 AM      [facility] [address]  Office : [phone]  Fax :[phone]

## 2021-06-23 NOTE — PLAN OF CARE
Education Provided as follows:  Patient made aware of the tailored interventions falls plan using the TIPS TOOL.        Problem: Pain:  Goal: Pain level will decrease  Description: Pain level will decrease  6/23/2021 0345 by Arline Almeida RN  Outcome: Ongoing  6/22/2021 1448 by Glendon Cockayne, RN  Outcome: Ongoing  Goal: Control of acute pain  Description: Control of acute pain  6/23/2021 0345 by Arline Almeida RN  Outcome: Ongoing  6/22/2021 1448 by Glendon Cockayne, RN  Outcome: Ongoing  Goal: Control of chronic pain  Description: Control of chronic pain  6/23/2021 0345 by Arline Almeida RN  Outcome: Ongoing  6/22/2021 1448 by Glendon Cockayne, RN  Outcome: Ongoing     Problem: Skin Integrity:  Goal: Will show no infection signs and symptoms  Description: Will show no infection signs and symptoms  6/23/2021 0345 by Arline Almeida RN  Outcome: Ongoing  6/22/2021 1448 by Glendon Cockayne, RN  Outcome: Ongoing  Goal: Absence of new skin breakdown  Description: Absence of new skin breakdown  6/23/2021 0345 by Arline Almeida RN  Outcome: Ongoing  6/22/2021 1448 by Glendon Cockayne, RN  Outcome: Ongoing     Problem: Falls - Risk of:  Goal: Will remain free from falls  Description: Will remain free from falls  6/23/2021 0345 by Arline Almeida RN  Outcome: Ongoing  6/22/2021 1448 by Glendon Cockayne, RN  Outcome: Ongoing  Goal: Absence of physical injury  Description: Absence of physical injury  6/23/2021 0345 by Arline Almeida RN  Outcome: Ongoing  6/22/2021 1448 by Glendon Cockayne, RN  Outcome: Ongoing     Problem: IP BLADDER/VOIDING  Goal: STG - Patient demonstrates ability to take care of indwelling catheter  6/23/2021 0345 by Arline Almeida RN  Outcome: Ongoing  6/22/2021 1448 by Glendon Cockayne, RN  Outcome: Ongoing  Goal: STG - Patient demonstrates no accidents  6/23/2021 0345 by Arline Almeida RN  Outcome: Ongoing  6/22/2021 1448 by Glendon Cockayne, RN  Outcome: Ongoing     Problem: IP BREATHING  Goal: STG - patient will utilize incentive spirometer  6/23/2021 0345 by Jojo Resendez RN  Outcome: Ongoing  6/22/2021 1448 by Mark Gonsalves RN  Outcome: Ongoing  Goal: STG - Patient performs or directs assisted coughing  6/23/2021 0345 by Jojo Resendez RN  Outcome: Ongoing  6/22/2021 1448 by Mark Gonsalves RN  Outcome: Ongoing     Problem: NUTRITION  Goal: Patient maintains weight  6/23/2021 0345 by Jojo Resendez RN  Outcome: Ongoing  6/22/2021 1448 by Mark Gonsalves RN  Outcome: Ongoing     Problem: SAFETY  Goal: LTG - Patient will demonstrate safety requirements appropriate to situation/environment  6/23/2021 0345 by Jojo Resendez RN  Outcome: Ongoing  6/22/2021 1448 by Mark Gonsalves RN  Outcome: Ongoing     Problem: SKIN INTEGRITY  Goal: LTG - Patient will be free from infection  6/23/2021 0345 by Jojo Resendez RN  Outcome: Ongoing  6/22/2021 1448 by Mark Gonsalves RN  Outcome: Ongoing     Problem: PAIN  Goal: LTG - Patient will manage pain with the appropriate technique/Intervention  6/23/2021 0345 by Jojo Resendez RN  Outcome: Ongoing  6/22/2021 1448 by Mark Gonsalves RN  Outcome: Ongoing  Goal: STG - pain is manageable through therapies  6/23/2021 0345 by Jojo Resendez RN  Outcome: Ongoing  6/22/2021 1448 by Mark Gonsalves RN  Outcome: Ongoing     Problem: Nutrition  Goal: Optimal nutrition therapy  6/23/2021 0345 by Jojo Resendez RN  Outcome: Ongoing  6/22/2021 1448 by Mark Gonsalves RN  Outcome: Ongoing

## 2021-06-23 NOTE — PROGRESS NOTES
Body Weight 160.7 %   · BMI: 34.4  · Adjusted Body Weight:  ; No Adjustment   · Adjusted BMI:      · BMI Categories: Obese Class 1 (BMI 30.0-34. 9)       Nutrition Diagnosis:   · Increased nutrient needs related to increase demand for energy/nutrients as evidenced by other (comment) (increased protein needs to promote healing)    · Inadequate oral intake related to inadequate protein-energy intake as evidenced by intake 0-25%, intake 26-50%      Nutrition Interventions:   Food and/or Nutrient Delivery:  Continue Current Diet, Continue Oral Nutrition Supplement  Nutrition Education/Counseling:  Education completed (6/15-CVU class)   Coordination of Nutrition Care:  Continue to monitor while inpatient    Goals:  po intake greater than 50% of meals/supplements       Nutrition Monitoring and Evaluation:   Behavioral-Environmental Outcomes:  None Identified   Food/Nutrient Intake Outcomes:  Food and Nutrient Intake, Supplement Intake  Physical Signs/Symptoms Outcomes:  Weight, Skin     Discharge Planning:     Too soon to determine     Electronically signed by Beau Foster RD, LD on 6/23/21 at 11:58 AM EDT    Contact: [phone]

## 2021-06-23 NOTE — FLOWSHEET NOTE
CCPD Order   Exchanges: 5   Exchange Volume: 1400 ml   Total Time: 10 hrs   Dextrose: 2.5%   Last Fill: 0 ml   Total Volume: 7000 ml     Orders verified. Supplies taken to pt's room. Report received. Cycler set up, primed and pre tested. Dressing changed on [facility] Cath site. Pt connected to cycler. CCPD initiated without problem. Initial effluent clear. Report provided to Damian Houston, [address] [place] [address].

## 2021-06-23 NOTE — PROGRESS NOTES
Physical Therapy  Facility/Department: [facility] ACUTE REHAB UNIT  Daily Treatment Note  NAME: Aaron Simmonds  : 1975  MRN: [id number]    Date of Service: 2021    Discharge Recommendations:  Home with assist PRN, S Level 3   PT Equipment Recommendations  Equipment Needed: Yes  Mobility Devices: Alease Peat: Rolling    Assessment   Body structures, Functions, Activity limitations: Decreased functional mobility ; Decreased ADL status; Decreased strength;Decreased balance;Decreased endurance  Assessment: Pt continues to require assist of 2 people for STS transitions and requires significant assistance for stair negotiation. Pt able to tolerate ambulation on RA. Pt will continue to benefit from skilled PT services to address deficits and promote return to independence. Treatment Diagnosis: imparied functional mobility, impaired strength  Prognosis: Good  PT Education: Goals;Plan of Care;Precautions; Functional Mobility Training  Patient Education: Ongoing reinforcement of sternal precautions  Barriers to Learning: none  REQUIRES PT FOLLOW UP: Yes  Activity Tolerance  Activity Tolerance: Patient limited by endurance; Patient limited by fatigue  Activity Tolerance: tolerates session on RA for ambulation at 92% and stairs at 88% with increase to 1L to recover following stair negotiation. Patient Diagnosis(es): generalized weakness. has a past medical history of Diabetes mellitus ([address] End stage kidney disease ([address] Hypertension, Neuropathy, and Peripheral vascular disease ([address] has a past surgical history that includes Tonsillectomy;  section; other surgical history (2018); Toe amputation (Left, 2021); Toe amputation (Left, 2021); and Coronary artery bypass graft (N/A, 2021).     Restrictions  Restrictions/Precautions  Restrictions/Precautions: Fall Risk, Weight Bearing  Required Braces or Orthoses?: Yes  Lower Extremity Weight Bearing Restrictions  Left Lower Extremity blanket applied at end of session). Pt ambulates ~80' x 2 with RW and CGA. Pt completes alternating toe taps to 6'' step at stairs x 10 reps each. Pt completes multiple STS from high->medium surface (14 STS total) with min->mod A x 2 for STS and min->mod A x 1 for stand to sit. Pt on RA throughout duration of session and maintains SPO2 above 90%. Upon completion of therapy session, pt requests to return to bed. Sit => supine transfer completed at [facility]. Pt in bed with bed alarm activated and call light within reach. Goals  Short term goals  Time Frame for Short term goals: to be met in 14 days  Short term goal 1: pt will complete bed mobility with mod I  Short term goal 2: pt will complete STS transfers with mod I  Short term goal 3: pt will complete stand step transfers with mod I  Short term goal 4: pt will ambulate x 150' with mod I of LRAD  Short term goal 5: pt will ascend/descend 2 stairs with mod I  Patient Goals   Patient goals : to return home and regain independence    Plan    Plan  Times per week: 90 minutes per day, 5-7x/wk  Times per day: Daily  Current Treatment Recommendations: Strengthening, Balance Training, Functional Mobility Training, Transfer Training, Endurance Training, Gait Training, Stair training, Patient/Caregiver Education & Training, Manual Therapy - Soft Tissue Mobilization, Neuromuscular Re-education, Safety Education & Training  Safety Devices  Type of devices: All fall risk precautions in place, Call light within reach, Chair alarm in place, Gait belt, Left in chair  Restraints  Initially in place: No     Therapy Time   Individual Concurrent Group Co-treatment   Time In       0930   Time Out       1015   Minutes       45      1st session completed by:    Tahira Savage, PT, DPT [id number]     Second Session Therapy Time:   Individual Concurrent Group Co-treatment   Time In       1335   Time Out       1420   Minutes       45     Timed Code Treatment Minutes: 45+45 minutes     Total Treatment Minutes:  90 minutes    2nd session completed by: Lourdes GARIBAY, DPT [id number]

## 2021-06-24 LAB
ANION GAP SERPL CALCULATED.3IONS-SCNC: 16 MMOL/L (ref 3–16)
BUN BLDV-MCNC: 68 MG/DL (ref 7–20)
CALCIUM SERPL-MCNC: 8.2 MG/DL (ref 8.3–10.6)
CHLORIDE BLD-SCNC: 87 MMOL/L (ref 99–110)
CO2: 27 MMOL/L (ref 21–32)
CREAT SERPL-MCNC: 8.8 MG/DL (ref 0.6–1.1)
GFR AFRICAN AMERICAN: 6
GFR NON-AFRICAN AMERICAN: 5
GLUCOSE BLD-MCNC: 128 MG/DL (ref 70–99)
GLUCOSE BLD-MCNC: 160 MG/DL (ref 70–99)
GLUCOSE BLD-MCNC: 51 MG/DL (ref 70–99)
GLUCOSE BLD-MCNC: 57 MG/DL (ref 70–99)
GLUCOSE BLD-MCNC: 69 MG/DL (ref 70–99)
GLUCOSE BLD-MCNC: 90 MG/DL (ref 70–99)
GLUCOSE BLD-MCNC: 90 MG/DL (ref 70–99)
GLUCOSE BLD-MCNC: 96 MG/DL (ref 70–99)
HCT VFR BLD CALC: 22.8 % (ref 36–48)
HEMOGLOBIN: 7.1 G/DL (ref 12–16)
MCH RBC QN AUTO: 28.7 PG (ref 26–34)
MCHC RBC AUTO-ENTMCNC: 31.2 G/DL (ref 31–36)
MCV RBC AUTO: 91.9 FL (ref 80–100)
PDW BLD-RTO: 21 % (ref 12.4–15.4)
PERFORMED ON: ABNORMAL
PERFORMED ON: NORMAL
PLATELET # BLD: 167 K/UL (ref 135–450)
PMV BLD AUTO: 8.2 FL (ref 5–10.5)
POTASSIUM SERPL-SCNC: 4.4 MMOL/L (ref 3.5–5.1)
RBC # BLD: 2.48 M/UL (ref 4–5.2)
SODIUM BLD-SCNC: 130 MMOL/L (ref 136–145)
WBC # BLD: 12.2 K/UL (ref 4–11)

## 2021-06-24 PROCEDURE — 6360000002 HC RX W HCPCS: Performed by: PHYSICAL MEDICINE & REHABILITATION

## 2021-06-24 PROCEDURE — 6370000000 HC RX 637 (ALT 250 FOR IP): Performed by: PHYSICAL MEDICINE & REHABILITATION

## 2021-06-24 PROCEDURE — 97535 SELF CARE MNGMENT TRAINING: CPT

## 2021-06-24 PROCEDURE — 1280000000 HC REHAB R&B

## 2021-06-24 PROCEDURE — 6370000000 HC RX 637 (ALT 250 FOR IP): Performed by: INTERNAL MEDICINE

## 2021-06-24 PROCEDURE — 80048 BASIC METABOLIC PNL TOTAL CA: CPT

## 2021-06-24 PROCEDURE — 97116 GAIT TRAINING THERAPY: CPT

## 2021-06-24 PROCEDURE — 97530 THERAPEUTIC ACTIVITIES: CPT

## 2021-06-24 PROCEDURE — 85027 COMPLETE CBC AUTOMATED: CPT

## 2021-06-24 PROCEDURE — 99233 SBSQ HOSP IP/OBS HIGH 50: CPT | Performed by: INTERNAL MEDICINE

## 2021-06-24 PROCEDURE — 36415 COLL VENOUS BLD VENIPUNCTURE: CPT

## 2021-06-24 PROCEDURE — 94760 N-INVAS EAR/PLS OXIMETRY 1: CPT

## 2021-06-24 PROCEDURE — 90945 DIALYSIS ONE EVALUATION: CPT

## 2021-06-24 PROCEDURE — 2500000003 HC RX 250 WO HCPCS: Performed by: PHYSICAL MEDICINE & REHABILITATION

## 2021-06-24 PROCEDURE — 92523 SPEECH SOUND LANG COMPREHEN: CPT

## 2021-06-24 RX ADMIN — PANTOPRAZOLE SODIUM 40 MG: 40 TABLET, DELAYED RELEASE ORAL at 05:42

## 2021-06-24 RX ADMIN — MICONAZOLE NITRATE: 20 POWDER TOPICAL at 21:26

## 2021-06-24 RX ADMIN — ACETAMINOPHEN 1000 MG: 325 TABLET ORAL at 05:42

## 2021-06-24 RX ADMIN — HEPARIN SODIUM 5000 UNITS: 5000 INJECTION INTRAVENOUS; SUBCUTANEOUS at 05:43

## 2021-06-24 RX ADMIN — ASPIRIN 325 MG: 325 TABLET, COATED ORAL at 07:48

## 2021-06-24 RX ADMIN — ONDANSETRON 4 MG: 4 TABLET, ORALLY DISINTEGRATING ORAL at 17:34

## 2021-06-24 RX ADMIN — GENTAMICIN SULFATE: 1 CREAM TOPICAL at 19:18

## 2021-06-24 RX ADMIN — SEVELAMER CARBONATE 800 MG: 800 TABLET, FILM COATED ORAL at 12:12

## 2021-06-24 RX ADMIN — TRAMADOL HYDROCHLORIDE 100 MG: 50 TABLET, FILM COATED ORAL at 21:07

## 2021-06-24 RX ADMIN — METOPROLOL TARTRATE 25 MG: 25 TABLET, FILM COATED ORAL at 21:08

## 2021-06-24 RX ADMIN — GENTAMICIN SULFATE: 1 CREAM TOPICAL at 16:57

## 2021-06-24 RX ADMIN — STANDARDIZED SENNA CONCENTRATE AND DOCUSATE SODIUM 1 TABLET: 8.6; 5 TABLET ORAL at 07:48

## 2021-06-24 RX ADMIN — TORSEMIDE 100 MG: 100 TABLET ORAL at 08:00

## 2021-06-24 RX ADMIN — TRAMADOL HYDROCHLORIDE 100 MG: 50 TABLET, FILM COATED ORAL at 02:31

## 2021-06-24 RX ADMIN — SEVELAMER CARBONATE 800 MG: 800 TABLET, FILM COATED ORAL at 07:48

## 2021-06-24 RX ADMIN — STANDARDIZED SENNA CONCENTRATE AND DOCUSATE SODIUM 1 TABLET: 8.6; 5 TABLET ORAL at 21:08

## 2021-06-24 RX ADMIN — SEVELAMER CARBONATE 800 MG: 800 TABLET, FILM COATED ORAL at 16:56

## 2021-06-24 RX ADMIN — INSULIN DETEMIR 10 UNITS: 100 INJECTION, SOLUTION SUBCUTANEOUS at 21:18

## 2021-06-24 RX ADMIN — ACETAMINOPHEN 1000 MG: 325 TABLET ORAL at 21:01

## 2021-06-24 RX ADMIN — ATORVASTATIN CALCIUM 20 MG: 20 TABLET, FILM COATED ORAL at 21:08

## 2021-06-24 RX ADMIN — HEPARIN SODIUM 5000 UNITS: 5000 INJECTION INTRAVENOUS; SUBCUTANEOUS at 20:59

## 2021-06-24 RX ADMIN — TRAMADOL HYDROCHLORIDE 100 MG: 50 TABLET, FILM COATED ORAL at 15:57

## 2021-06-24 RX ADMIN — DIPHENHYDRAMINE HYDROCHLORIDE 25 MG: 25 TABLET ORAL at 02:37

## 2021-06-24 RX ADMIN — ZOLPIDEM TARTRATE 5 MG: 5 TABLET ORAL at 21:14

## 2021-06-24 RX ADMIN — ACETAMINOPHEN 1000 MG: 325 TABLET ORAL at 13:14

## 2021-06-24 RX ADMIN — HEPARIN SODIUM 5000 UNITS: 5000 INJECTION INTRAVENOUS; SUBCUTANEOUS at 13:13

## 2021-06-24 RX ADMIN — METOPROLOL TARTRATE 25 MG: 25 TABLET, FILM COATED ORAL at 07:48

## 2021-06-24 RX ADMIN — MICONAZOLE NITRATE: 20 POWDER TOPICAL at 07:48

## 2021-06-24 ASSESSMENT — PAIN DESCRIPTION - DESCRIPTORS
DESCRIPTORS: DISCOMFORT
DESCRIPTORS: SORE;DISCOMFORT
DESCRIPTORS: SORE;DISCOMFORT

## 2021-06-24 ASSESSMENT — PAIN DESCRIPTION - LOCATION
LOCATION: CHEST;BACK
LOCATION: ABDOMEN
LOCATION: BACK;CHEST
LOCATION: ABDOMEN

## 2021-06-24 ASSESSMENT — PAIN DESCRIPTION - ORIENTATION
ORIENTATION: LEFT
ORIENTATION: LEFT;OUTER
ORIENTATION: LEFT

## 2021-06-24 ASSESSMENT — PAIN SCALES - GENERAL
PAINLEVEL_OUTOF10: 8
PAINLEVEL_OUTOF10: 7
PAINLEVEL_OUTOF10: 4
PAINLEVEL_OUTOF10: 6
PAINLEVEL_OUTOF10: 6
PAINLEVEL_OUTOF10: 3
PAINLEVEL_OUTOF10: 8
PAINLEVEL_OUTOF10: 5
PAINLEVEL_OUTOF10: 0
PAINLEVEL_OUTOF10: 4

## 2021-06-24 ASSESSMENT — PAIN DESCRIPTION - PAIN TYPE
TYPE: ACUTE PAIN
TYPE: SURGICAL PAIN;CHRONIC PAIN

## 2021-06-24 ASSESSMENT — PAIN DESCRIPTION - FREQUENCY
FREQUENCY: INTERMITTENT
FREQUENCY: CONTINUOUS
FREQUENCY: CONTINUOUS

## 2021-06-24 ASSESSMENT — PAIN DESCRIPTION - ONSET: ONSET: ON-GOING

## 2021-06-24 NOTE — PROGRESS NOTES
Physical Therapy  Facility/Department: [facility] ACUTE REHAB UNIT  Daily Treatment Note  NAME: Sonal Fernandez  : 1975  MRN: [id number]    Date of Service: 2021    Discharge Recommendations:  Home with assist PRN, S Level 3   PT Equipment Recommendations  Equipment Needed: Yes  Mobility Devices: Angle Organ: Rolling  Other: TBD with progress    Assessment   Body structures, Functions, Activity limitations: Decreased functional mobility ; Decreased ADL status; Decreased strength;Decreased balance;Decreased endurance  Assessment: Pt continues to require two person assistance for STS and requires moderate assistance for stair negotiation. Pt presents unable to perform self care in the home independently at this time and will continue to benefit from skilled therapy services to maximize functional independence and decrease burden of care prior to home d/c.  PT Education: Goals;Plan of Care;Precautions; Functional Mobility Training  Patient Education: Ongoing reinforcement of sternal precautions  REQUIRES PT FOLLOW UP: Yes  Activity Tolerance  Activity Tolerance: Patient limited by endurance; Patient limited by fatigue  Activity Tolerance: pt tolerates entire session on RA with SPO2 above 90%, pt requires extended rest breaks with all activities due to poor activity tolerance     Patient Diagnosis(es): CABG   has a past medical history of Diabetes mellitus ([facility] End stage kidney disease ([facility] Hypertension, Neuropathy, and Peripheral vascular disease ([facility] has a past surgical history that includes Tonsillectomy;  section; other surgical history (2018); Toe amputation (Left, 2021); Toe amputation (Left, 2021); and Coronary artery bypass graft (N/A, 2021).     Restrictions  Restrictions/Precautions  Restrictions/Precautions: Fall Risk, Weight Bearing  Required Braces or Orthoses?: Yes  Lower Extremity Weight Bearing Restrictions  Left Lower Extremity Weight Bearing: Weight Bearing As Tolerated  Required Braces or Orthoses  Left Lower Extremity Brace: Boot  LLE Brace Type: Surgical shoe  Position Activity Restriction  Sternal Precautions: No Pushing, No Pulling, 5# Lifting Restrictions  Other position/activity restrictions: s/p CABG x 3 6/9Patient is a 51year old female with a history of ESRD on PD, HTN, DM, HLD, and PVD. On 5/26/21 she had amputation of toes 1 and 2 on the left foot. On 5/27/21 she underwent a left popliteal, anterior tibial and dorsalis pedis angioplasty with Dr. Jose Guadalupe Vu. She presented to the ER on 5/31/21 with a syncopal episode. She has been experiencing dizziness and syncopal episodes X2 weeks prior to this visit. She underwent a Left Heart Catheterization on 6/2/21 and was found to have severe coronary microvascular disease. On 6/9/21 she underwent a CABG X3 with NYA clip    Subjective   General  Response To Previous Treatment: Patient with no complaints from previous session. Subjective  Subjective: Pt in bed on arrival.  Agreeable to therapy session. On 3L O2 on arrival.  Pain Screening  Patient Currently in Pain: Denies  Vital Signs  Patient Currently in Pain: Denies       Objective   Bed mobility  Supine to Sit: Stand by assistance (HOB elevated, pt transferring to R)  Scooting: Stand by assistance  Transfers  Sit to Stand: Dependent/Total (fluctuating assistance levels: mod A x 2->max A x 2)  Stand to sit: Moderate Assistance (decreased eccentric control)  Bed to Chair: Contact guard assistance (via stand step with RW)  Ambulation  Ambulation?: Yes  Ambulation 1  Surface: level tile  Device: Rolling Walker  Other Apparatus: O2 (RA)  Assistance: Contact guard assistance  Quality of Gait: Step to pattern, trunk flexion, short step length  Gait Deviations: Slow Jyoti;Decreased step length;Decreased step height  Distance: 180'+ intermittent short distances  Stairs/Curb  Stairs?: Yes  Stairs  # Steps : 3  Stairs Height: 6\"  Rails: Bilateral  Assistance:  Moderate assistance  Comment: significant difficulty with knee and hip extension     Balance  Posture: Fair  Sitting - Static: Good (supervision)  Sitting - Dynamic: Good (superivison)  Standing - Static: Fair (SBA with RW)  Standing - Dynamic: Fair (CGA with RW)            Comment: 1st session: Pt completes standing toe tapping at stairs x 10 reps FW with BUE support, x 10 reps L/R with BUE support with CGA and VC for foot placement. Pt completes 5 STS from medium elevated mat surface with modx2 progressing to max A x 2 with fatigue. 2nd session: Pt seated in recliner upon PT/OT arrival, reports pain in side but does not rate and is agreeable to tx session. Pt requesting to complete shower for PM session. Pt completes STS from recliner chair with mod A x 2. Pt ambulates x 15' to toilet with RW with CGA and gait quality as listed above. Pt completes stand to sit transfer to toilet with min A. Pt requires dependent  x 3 for STS from toilet. Shower completed with OT team member to maximize patient performance and maintain patient safety. Pt requires CGA to ambulate from toilet to shower chair with RW, requires min A for stand to sit transfer to shower chair. Pt requires supervision assist to maintain static unsupported sitting balance during shower completion in addition to OT assist with ADL task completion. Pt requires supervision assist to maintain dynamic sitting balance during shower completion in addition to OT assist with ADL task completion. Pt requires mod A x 2 to complete STS from shower chair up to RW, pt ambulates to recliner chair x 10' with mod A and RW with heavy M/L postural sway. Pt requires mod A x 2 for STS from recliner chair and min A x 1 for stand step transfer to EOB. Upon completion of therapy session, pt requests to return to bed. Sit => supine transfer completed at mod A for assistance with BLE. Pt in bed with bed alarm activated and call light within reach.         Goals  Short term goals  Time Frame for Short term goals: to be met in 14 days  Short term goal 1: pt will complete bed mobility with mod I  Short term goal 2: pt will complete STS transfers with mod I  Short term goal 3: pt will complete stand step transfers with mod I  Short term goal 4: pt will ambulate x 150' with mod I of LRAD  Short term goal 5: pt will ascend/descend 2 stairs with mod I  Patient Goals   Patient goals : to return home and regain independence    Plan    Plan  Times per week: 90 minutes per day, 5-7x/wk  Times per day: Daily  Current Treatment Recommendations: Strengthening, Balance Training, Functional Mobility Training, Transfer Training, Endurance Training, Gait Training, Stair training, Patient/Caregiver Education & Training, Manual Therapy - Soft Tissue Mobilization, Neuromuscular Re-education, Safety Education & Training  Safety Devices  Type of devices:  All fall risk precautions in place, Call light within reach, Chair alarm in place, Gait belt, Left in chair  Restraints  Initially in place: No     Therapy Time   Individual Concurrent Group Co-treatment   Time In       0830   Time Out       0915   Minutes       39          Second Session Therapy Time:   Individual Concurrent Group Co-treatment   Time In       1330   Time Out       1423   Minutes       53     Timed Code Treatment Minutes: 84+74     Total Treatment Minutes:  98 minutes     Fanny Eis, [address] [facility], T [id number]

## 2021-06-24 NOTE — PROGRESS NOTES
PODIATRY DAILY PROGRESS NOTE  Johnsie Loss  Subjective :   Patient seen and examined this pm at [facility]. She is s/p amputation of the 1st and 2nd toes of the left foot (5/26/21). She denies any pain in the foot. She thinks that there is a possibility that she is discharged next week. Patient denies N/V/F/C/SOB. Patient denies calf pain, thigh pain, chest pain. Review of Systems: A 12 point review of symptoms is unremarkable with the exception of the chief complaint. Patient specifically denies nausea, fever, vomiting, chills, shortness of breath, chest pain, abdominal pain, constipation or difficulty urinating. Objective     BP (!) 132/96   Pulse 68   Temp 98.1 °F (36.7 °C) (Oral)   Resp 17   Ht 5' 9\" (1.753 m)   Wt 235 lb 7.2 oz (106.8 kg)   SpO2 99%   BMI 34.77 kg/m²      I/O:    Intake/Output Summary (Last 24 hours) at 6/24/2021 0622  Last data filed at 6/23/2021 1711  Gross per 24 hour   Intake 240 ml   Output 15 ml   Net 225 ml              Wt Readings from Last 3 Encounters:   06/23/21 235 lb 7.2 oz (106.8 kg)   06/16/21 227 lb 4.7 oz (103.1 kg)   05/27/21 229 lb (103.9 kg)       LABS:    Recent Labs     06/22/21  0607 06/23/21  0547   WBC 17.2* 13.8*   HGB 7.9* 7.6*   HCT 24.7* 24.0*    184        Recent Labs     06/23/21  0547   *   K 4.1   CL 86*   CO2 24   BUN 68*   CREATININE 8.6*      No results for input(s): PROT, INR, APTT in the last 72 hours. LOWER EXTREMITY EXAMINATION  VASCULAR: DP and PT pulses are nonpalpable b/l. CFT is sluggish to the digits of the foot b/l. No pain with calf compression b/l.     NEUROLOGIC: Gross and epicritic sensation is diminished b/l     DERMATOLOGIC: Incision site noted to the distal aspect of the 1st through 3rd metatarsals on left foot. Necrotic changes are appreciated with evidence of ischemic/deep tissue injury to the plantar 5th metatarsal head. Incision is dry with no active drainage noted. No erythema noted.    MUSCULOSKELETAL: Muscle strength is 5/5 for all pedal groups tested. Amputation of toes 1-3 noted to the left. ASSESSMENT/PLAN  1. Gangrene of left foot - s/p amputation of toes 1 and 2 on the left (5/26/21)  2. Peripheral arterial disease - s/p left popliteal, anterior tibial, and dorsalis pedis angioplasty by Dr. Felix Sebastian on 5/27/21  3. Diabetes mellitus with peripheral neuropathy  4. End stage renal disease on daily PD     - Patient was seen and examined at bedside. Continued (expected) ischemic/necrotic changes appreciated to the incision site  - No indication for imaging at this time  - No signs of infection noted. Site is dry. No need for antibiotics  - Will continue to allow necrotic changes to demarcate to the surgical site. If they progress, patient will require outpatient revision.  - Dressing placed consisting of betadine paint, adaptic, gauze, kerlix, and ace. Dressing changes ordered  - WBAT in a surgical shoe. Patient prefers surgical shoe over boot. Order for surgical shoe placed.      DISPO: continued necrotic changes noted to the left foot. Will continue betadine wet-to-dry daily changes to the foot until discharge and allow necrotic areas to demarcate prior to revision surgery. I will come once a week to follow the patient.     Niki Morton DPM  [phone]

## 2021-06-24 NOTE — FLOWSHEET NOTE
06/24/21 0618   Vitals   BP (!) 183/70   Temp 97.7 °F (36.5 °C)   Pulse 69   Resp 20   Weight 228 lb 2.8 oz (103.5 kg)     Disconnected from CCPD per protocol. Effluent: Clear yellow without fibrin  Total time: 10:06  Total UF:  1295 ml. Total Volume:  7000 ml.   Dwell time gained:  00:20  Pt Tolerated procedure: No problems  Report given to: Jaylon Valero RN  Last BM: 1-

## 2021-06-24 NOTE — PLAN OF CARE
Education Provided as followed:  \"Family/Patient made aware of the tailored interventions falls plan using the TIPS TOOL. Problem: Pain:  Description: Pain management should include both nonpharmacologic and pharmacologic interventions.   Goal: Pain level will decrease  Description: Pain level will decrease  6/23/2021 2013 by Julio Romo RN  Outcome: Ongoing  6/23/2021 1159 by Nita Thompson RN  Outcome: Ongoing  6/23/2021 1159 by Nita Thompson RN  Outcome: Ongoing  Goal: Control of acute pain  Description: Control of acute pain  6/23/2021 2013 by Julio Romo RN  Outcome: Ongoing  6/23/2021 1159 by Nita Thompson RN  Outcome: Ongoing  6/23/2021 1159 by Nita Thopmson RN  Outcome: Ongoing  Goal: Control of chronic pain  Description: Control of chronic pain  6/23/2021 2013 by Julio Romo RN  Outcome: Ongoing  6/23/2021 1159 by Nita Thompson RN  Outcome: Ongoing  6/23/2021 1159 by Nita Thompson RN  Outcome: Ongoing     Problem: Skin Integrity:  Goal: Will show no infection signs and symptoms  Description: Will show no infection signs and symptoms  6/23/2021 2013 by Julio Romo RN  Outcome: Ongoing  6/23/2021 1159 by Nita Thompson RN  Outcome: Ongoing  6/23/2021 1159 by Nita Thompson RN  Outcome: Ongoing  Goal: Absence of new skin breakdown  Description: Absence of new skin breakdown  6/23/2021 2013 by Julio Romo RN  Outcome: Ongoing  6/23/2021 1159 by Nita Thompson RN  Outcome: Ongoing  6/23/2021 1159 by Nita Thompson RN  Outcome: Ongoing     Problem: Falls - Risk of:  Goal: Will remain free from falls  Description: Will remain free from falls  6/23/2021 2013 by Julio Romo RN  Outcome: Ongoing  6/23/2021 1159 by Nita Thompson RN  Outcome: Ongoing  6/23/2021 1159 by Nita Thompson RN  Outcome: Ongoing  Goal: Absence of physical injury  Description: Absence of physical injury  6/23/2021 2013 by Julio Romo RN  Outcome: Ongoing  6/23/2021 1159 by Nita Thompson RN  Outcome: Ongoing  6/23/2021 1159 by Trav Lua RN  Outcome: Ongoing     Problem: IP BLADDER/VOIDING  Goal: STG - Patient demonstrates ability to take care of indwelling catheter  6/23/2021 2013 by Law Watts RN  Outcome: Ongoing  6/23/2021 1159 by Trav Lua RN  Outcome: Ongoing  6/23/2021 1159 by Trav Lua RN  Outcome: Ongoing  Goal: STG - Patient demonstrates no accidents  6/23/2021 2013 by Law Watts RN  Outcome: Ongoing  6/23/2021 1159 by Trav Lua RN  Outcome: Ongoing  6/23/2021 1159 by Trav Lua RN  Outcome: Ongoing     Problem: IP BREATHING  Goal: STG - patient will utilize incentive spirometer  6/23/2021 2013 by Law Watts RN  Outcome: Ongoing  6/23/2021 1159 by Trav Lua RN  Outcome: Ongoing  6/23/2021 1159 by Trav Lua RN  Outcome: Ongoing  Goal: STG - Patient performs or directs assisted coughing  6/23/2021 2013 by Law Watts RN  Outcome: Ongoing  6/23/2021 1159 by Trav Lua RN  Outcome: Ongoing  6/23/2021 1159 by Trav Lua RN  Outcome: Ongoing     Problem: NUTRITION  Description: Altered Nutrition and Hydration  Goal: Patient maintains weight  6/23/2021 2013 by Law Watts RN  Outcome: Ongoing  6/23/2021 1159 by Trav Lua RN  Outcome: Ongoing  6/23/2021 1159 by Trav Lua RN  Outcome: Ongoing     Problem: SAFETY  Goal: LTG - Patient will demonstrate safety requirements appropriate to situation/environment  6/23/2021 2013 by Law Watts RN  Outcome: Ongoing  6/23/2021 1159 by Trav Lua RN  Outcome: Ongoing  6/23/2021 1159 by Trav Lua RN  Outcome: Ongoing     Problem: SKIN INTEGRITY  Goal: LTG - Patient will be free from infection  6/23/2021 2013 by Law Watts RN  Outcome: Ongoing  6/23/2021 1159 by Trav Lua RN  Outcome: Ongoing  6/23/2021 1159 by Trav Lua RN  Outcome: Ongoing     Problem: PAIN  Goal: LTG - Patient will manage pain with the appropriate

## 2021-06-24 NOTE — PROGRESS NOTES
[address] [facility]  6/24/2021  [id number]    Chief Complaint: Generalized weakness    Subjective:   No overnight events. Still frustrated by her voice strength. Anemia progressing. Having hypoglycemic episodes in AM and high in afternoons. ROS: No CP, SOB, dyspnea    Objective:  Patient Vitals for the past 24 hrs:   BP Temp Temp src Pulse Resp Height Weight   06/24/21 0748 (!) 128/59 -- -- 65 -- -- --   06/24/21 0618 (!) 183/70 97.7 °F (36.5 °C) Oral 69 20 -- 228 lb 2.8 oz (103.5 kg)   06/23/21 1950 (!) 132/96 98.1 °F (36.7 °C) Oral 68 17 -- --   06/23/21 1711 (!) 146/50 98.4 °F (36.9 °C) -- 70 18 -- 235 lb 7.2 oz (106.8 kg)   06/23/21 1148 -- -- -- -- -- 5' 9\" (1.753 m) --     Gen: No distress, pleasant. Resting in WC  HEENT: Normocephalic, atraumatic  CV: Regular rate and rhythm. No MRG   Resp: No respiratory distress. CTAB, O2 per NC  Abd: Soft, nontender, PD cath in place  Ext: +1 edema noted in BLE. LLE in clean dressing  Neuro: Alert, oriented, appropriately interactive. Laboratory data: Available via EMR.      Therapy progress:  PT  Required Braces or Orthoses  Left Lower Extremity Brace: Boot  LLE Brace Type: Surgical shoe  Position Activity Restriction  Sternal Precautions: No Pushing, No Pulling, 5# Lifting Restrictions  Other position/activity restrictions: s/p CABG x 3 6/9  Objective     Sit to Stand: Dependent/Total (Aidan of 2 from high surface, modA of 2 from normal height surface)  Stand to sit: Minimal Assistance, Moderate Assistance (decreased eccentric control)  Bed to Chair: Contact guard assistance (via stand step with RW)  Device: Rolling Walker  Other Apparatus: O2 (1L O2 decreasing to RA)  Assistance: Contact guard assistance  Distance: 37'+52'+80'  OT  PT Equipment Recommendations  Equipment Needed: Yes  Mobility Devices: Deshaun Nailer: Rolling  Other: TBD with progress  Toilet - Technique: Ambulating  Equipment Used: Standard toilet  Assessment        SLP                Body mass index is 33.7 kg/m². Assessment:  Patient Active Problem List   Diagnosis    Diabetes mellitus ([facility]    Obesity    Microalbuminuria    Hyperlipidemia with target LDL less than 100    Hypertension    [facility] mellitus type 2 in obese [facility]    Acute renal failure (ARF) ([facility]    ESRD (end stage renal disease) ([facility]    Non-smoker    Elevated C-reactive protein (CRP)    Elevated sed rate    Diabetic polyneuropathy associated with type 2 diabetes mellitus (HCC)    Weight loss counseling, encounter for    Atherosclerosis of native arteries of left leg with ulceration of other part of foot ([facility]    Near syncope    Coronary artery disease of native artery of native heart with stable angina pectoris (HCC)    Mild malnutrition ([facility]    S/P coronary artery bypass graft x 3    S/P left atrial appendage ligation    Generalized weakness       Plan:   Generalized weakness: PT/OT     CAD: S/P CABG X3 with NYA Clip (6/9). Sternal precautions. ASA, statin     PAD: S/P Amputation of toes 1 and 2 on the left (5/26/21). Routine wound care     ESRD: on PD 2/2 diabetic nephropathy. Appreciate nephro assistance    Hypoxic respiratory failure: O2 supplementation PRN, stress IS     DM II: Levemir decreased to 22 -> 10 BID, prandial d/c'd, SSI high. HTN: Lopressor 25     HLD: Lipitor 20     Oral candidiasis: Nystatin completed    Hyponatremia: per nephro and PD    Hypophonia: - SLP consult. Suspect pulmonary function related. Bowels: Per protocol  Bladder: Per protocol   Sleep: Ambien provided prn  Pain: Tylenol scheduled, tramadol as needed  DVT PPx: Heparin     Electronically signed by Kevin Alvarenga MD on 6/24/2021 at 8:14 AM    * This document was created using dictation software. While all precautions were taken to ensure accuracy, errors may have occurred. Please disregard any typographical errors.

## 2021-06-24 NOTE — PROGRESS NOTES
Office : [phone]     Fax :[phone]       Nephrology progress  Note      Patient's Name: Brian Harris  8:57 AM  2021    Reason for Consult:  ESRD    History of Present Ilness:    Brian Harrsi is a 51 y.o. female with severe end-stage renal disease secondary to diabetic nephropathy on peritoneal dialysis, hypertension, peripheral vascular disease, diabetic neuropathy who was admitted after she had a syncopal episode. She had similar episode 2 weeks ago. Recently had angiogram left lower extremity for gangrene left foot second toe. Denies any shortness of breath. Denies any abdominal pain. Denies any nausea vomiting. Interval HX :        Seen on PD   No chest pain  not in acute distress  No sob  S/p CABG on 2010  Overall strength improving with PT        I/O last 3 completed shifts: In: 240 [P.O.:240]  Out: 0     Past Medical History:   Diagnosis Date    Diabetes mellitus ([facility]     End stage kidney disease ([facility]     peritoneal dialysis every night at home x 2 years    Hypertension     Neuropathy     Peripheral vascular disease ([facility]        Past Surgical History:   Procedure Laterality Date     SECTION      CORONARY ARTERY BYPASS GRAFT N/A 2021    URGENT CABG CORONARY ARTERY BYPASS GRAFTS X3. VEIN TO PDA X1, VEIN TO OM X1, LEFT INTERNAL MAMMARY ARTERY TO LAD X1. ENDOSCOPIC HARVEST RIGHT LEG SAPHENOUS VEIN. LIGATION NYA USING 35MM ATRICLIP. EPI AORTIC ULTRASOUND. TOTAL CARDIOPULMONARY BYPASS. DOPPLER GRAFT FLOW VERIFICATION. BILATERAL INITERCOSTAL NERVE BLOCK USING 1.3% EXPAREL.  performed by Lito Brown MD at [address] [place] [address]  2018     lap PD cath placement lt side 62 cm    TOE AMPUTATION Left 2021    AMPUTATION OF THE LEFT THIRD TOE performed by Hari Valle DPM at [address] [place] [address] [place] TOE AMPUTATION Left 5/26/2021    AMPUTATION OF HALLUX AND SECOND TOE, LEFT FOOT performed by Hari Valle DPM at [place] [address]         Family History   Problem Relation Age of Onset    Coronary Art Dis Mother     High Blood Pressure Mother     Heart Disease Mother     Diabetes Mother     Cancer Father     Coronary Art Dis Father     High Blood Pressure Father     Heart Disease Father     Diabetes Father      Current Medications:    insulin detemir (LEVEMIR) injection pen 10 Units, BID  torsemide (DEMADEX) tablet 100 mg, Daily  gentamicin (GARAMYCIN) 0.1 % cream, Daily  insulin aspart (NOVOLOG) injection pen 0-18 Units-PATIENT SUPPLIED, TID WC  insulin aspart (NOVOLOG) injection PEN 0-9 Units-Patient supplied, Nightly  heparin (porcine) injection 5,000 Units, 3 times per day  acetaminophen (TYLENOL) tablet 1,000 mg, 3 times per day  albuterol sulfate  (90 Base) MCG/ACT inhaler 2 puff, Q6H PRN  aspirin EC tablet 325 mg, Daily  magnesium hydroxide (MILK OF MAGNESIA) 400 MG/5ML suspension 30 mL, Daily PRN  metoprolol tartrate (LOPRESSOR) tablet 25 mg, BID  pantoprazole (PROTONIX) tablet 40 mg, Daily  polyethylene glycol (GLYCOLAX) packet 17 g, Daily PRN  sennosides-docusate sodium (SENOKOT-S) 8.6-50 MG tablet 1 tablet, BID  traMADol (ULTRAM) tablet 50 mg, Q6H PRN   Or  traMADol (ULTRAM) tablet 100 mg, Q6H PRN  zolpidem (AMBIEN) tablet 5 mg, Nightly PRN  dextrose 5 % solution, PRN  dextrose 50 % IV solution, PRN  glucagon (rDNA) injection 1 mg, PRN  glucose (GLUTOSE) 40 % oral gel 15 g, PRN  atorvastatin (LIPITOR) tablet 20 mg, Nightly  bisacodyl (DULCOLAX) EC tablet 5 mg, Daily PRN  diphenhydrAMINE (BENADRYL) tablet 25 mg, Q6H PRN  heparin (porcine) injection 3,400 Units, PRN  hydrALAZINE (APRESOLINE) tablet 50 mg, Q8H PRN  ondansetron (ZOFRAN-ODT) disintegrating tablet 4 mg, Q8H PRN  sevelamer (RENVELA) tablet 800 mg, TID WC  miconazole (MICOTIN) 2 % powder, BID          Physical exam:     Vitals:  BP (!) 128/59   Pulse 65   Temp 97.5 °F (36.4 °C) (Oral)   Resp 16   Ht 5' 9\" (1.753 m)   Wt 228 lb 2.8 oz (103.5 kg)   SpO2 99%   BMI 33.70 kg/m²   Constitutional:  OAA X3 NAD  Skin: no rash, turgor wnl  Heent:  eomi, mmm  Neck: no bruits or jvd noted  Cardiovascular:  S1, S2 without m/r/g  Respiratory: CTA B without w/r/r  Abdomen:  +bs, soft, nt, nd  Ext: no  lower extremity edema      Labs:  CBC:   Recent Labs     06/22/21  0607 06/23/21  0547 06/24/21  0632   WBC 17.2* 13.8* 12.2*   HGB 7.9* 7.6* 7.1*    184 167     BMP:    Recent Labs     06/22/21  0607 06/23/21  0547 06/24/21  0633   * 127* 130*   K 3.9 4.1 4.4   CL 85* 86* 87*   CO2 24 24 27   BUN 64* 68* 68*   CREATININE 8.3* 8.6* 8.8*   GLUCOSE 99 186* 57*     Ca/Mg/Phos:   Recent Labs     06/22/21  0607 06/23/21  0547 06/24/21  0633   CALCIUM 8.3 8.2* 8.2*     UA:  No results for input(s): COLORU, CLARITYU, GLUCOSEU, BILIRUBINUR, KETUA, SPECGRAV, BLOODU, PHUR, PROTEINU, UROBILINOGEN, NITRU, LEUKOCYTESUR, Bimal Blind in the last 72 hours. Urine Microscopic:   No results for input(s): LABCAST, BACTERIA, COMU, HYALCAST, WBCUA, RBCUA, EPIU in the last 72 hours. Urine Culture:   No results for input(s): LABURIN in the last 72 hours. Urine Chemistry: No results for input(s): Jorge Carton, PROTEINUR, NAUR in the last 72 hours. IMAGING:  XR CHEST PORTABLE   Final Result   Right-sided pleural effusion      Bibasilar hypoaeration with bibasilar opacities that could represent   subsegmental atelectasis or infection                 Assessment/Plan :      1. ESRD. Continue on peritoneal dialysis. continue cycler/CCPD as per current prescription   1400 mL as fill  Volume  Sodium level low. 2/2 hypervolemia  Sodium level improved to 130     Continue  torsemide       2. HTN. Controlled     3. Anemia of chronic disease.   Gets micera at dialysis unit  Continue LUIZA    4. Acid- base disorder. Monitor    5. Electrolytes monitor and replace as needed    6. CAD/Syncopal episode. S/p [facility]   Has Multivessel CAD   S/p CABG       7. Peripheral vascular disease. S/p amputation of left foot toes. Podiatry following     8. Generalized weakness. Getting rehab     9. Constipation .  Mild   Daily stool softner       Thank you for allowing us to participate in care of Bolivar Harris         Electronically signed by: Lona Harvey MD, 6/24/2021, 8:57 AM      [facility] [address]  Office : [phone]  Fax :[phone]

## 2021-06-24 NOTE — PROGRESS NOTES
Facility/Department: [facility] ACUTE REHAB UNIT  Initial Assessment    Patient: Areli Vu   : 1975   MRN: [id number]      Evaluation Date: 2021      Medical Diagnosis Information:  has Diabetes mellitus ([address] Obesity; Microalbuminuria; Hyperlipidemia with target LDL less than 100; Hypertension; Diabetes mellitus type 2 in obese ([address] Acute renal failure (ARF) ([address] ESRD (end stage renal disease) ([address] Non-smoker; Elevated C-reactive protein (CRP); Elevated sed rate; Diabetic polyneuropathy associated with type 2 diabetes mellitus ([address] Weight loss counseling, encounter for; Atherosclerosis of native arteries of left leg with ulceration of other part of foot ([address] Near syncope; Coronary artery disease of native artery of native heart with stable angina pectoris ([address] Mild malnutrition ([facility] S/P coronary artery bypass graft x 3; S/P left atrial appendage ligation; and Generalized weakness on their problem list.                                      ARU H&P: \"Patient is a 51year old female with a history of ESRD on PD, HTN, DM, HLD, and PVD. On 21 she had amputation of toes 1 and 2 on the left foot. On 21 she underwent a left popliteal, anterior tibial and dorsalis pedis angioplasty with Dr. Mary Anne Navarro. She presented to the ER on 21 with a syncopal episode. She has been experiencing dizziness and syncopal episodes X2 weeks prior to this visit. She underwent a Left Heart Catheterization on 21 and was found to have severe coronary microvascular disease. On 21 she underwent a CABG X3 with NYA clip. She was evaluated by therapy and suggested to continue in an inpatient setting prior to returning home. She reports concerns for lower extremity weakness and dizziness. \"    Recent Chest xray/Chest CT:  21  Impression   Right-sided pleural effusion       Bibasilar hypoaeration with bibasilar opacities that could represent   subsegmental atelectasis or infection       Recent MRI Brain: 6/1/21  Impression   1. No acute intracranial abnormality. 2. Mild chronic white matter microvascular ischemic changes. 3. Right maxillary sinusitis. History/Prior Level of Function:   Living Status: Pt lives at home with her son Mundo Santos, 21)  Occupation/School: Pt does not work. She completed high school. Medication Management: :  [x]Primary   []Secondary []No  Finance Management: []Primary   [x]Secondary (older son, Paulette Adams, assists) []No  Active :   [x]Yes         []No  H[facility]:    [facility]x] [facility]       []Hard of hearing   []Hearing aids  R/L  Vision:    [] WFL       []Visual deficits      [x] Glasses     PAIN:  Pain Scale: denied   Type: []Constant   []Intermittent  []Radiating []Localized []other:  Pain Intervention: n/a    Patient Positioning: Upright in chair    Respiratory Status:   [x]Room Air (pt recently has been on 1L O2 via NC but is being weaned to room air at this time, per chart)   []O2 via nasal cannula   []Other:    Dentition:  []Adequate  []Dentures Top  []Dentures Bottom  [x]Missing Many Teeth  []Edentulous  []Other:    Baseline Vocal Quality:  []Normal  []Dysphonic   [x]Aphonic   [x]Hoarse  []Wet  [x]Weak  []Other:    SPEECH/VOICE ASSESSMENT:     Patient complaint: Pt complains of weak, breathy, hoarse voice and having to whisper all the time. She also complains of fatigue from speaking and reduced breath support. Oral Mechanism Exam:  [x]WFL []Mild   [] Moderate  []Severe  []To be assessed   []Labial ROM   []Labial Symmetry   []Labial Strength   []Labial Sensation   []Labial Coordination    []Lingual ROM  []Lingual Symmetry  []Lingual Strength    []Lingual Sensation    []Lingual Coordination    []Velum    []Mandible  []Gag        MOTOR SPEECH  Motor Speech: [x]WFL []Mild   [] Moderate  []Severe  []To be assessed   []Apraxia   []Dysarthria   []Intelligibility:   Dysarthria is not apparent at this time.  Pt's voice (aphonia) and breath support appear to be contributing to voice therapy. Treatment Diagnosis:  Speech-Language  []Expressive Aphasia  []Mild   [] Moderate  []Severe    []Receptive Aphasia  []Mild   [] Moderate  []Severe    []Word Finding Difficulty []Mild   [] Moderate  []Severe    []Dysarthria   []Mild   [] Moderate  []Severe    []Apraxia of Speech  []Mild   [] Moderate  []Severe   []Dysfluency   []Mild   [] Moderate  []Severe   []Other:   []Mild   [] Moderate  []Severe    Voice  []Dysphonia   []Mild   [] Moderate  []Severe    [x]Aphonia   []Mild   [] Moderate  [x]Severe    []Hypophonia   []Mild   [] Moderate  []Severe    []Other:   []Mild   [] Moderate  []Severe    Cognitive-Linguistic  []Cognitive-Linguistic Deficits []Mild   [] Moderate  []Severe     []Suspected; unable to fully assess at this time   Dysphagia  []Oral Dysphagia  []Mild   [] Moderate  []Severe    []Pharyngeal Dysphagia []Mild   [] Moderate  []Severe    []Other:   []Mild   [] Moderate  []Severe      Prognosis/Rehab Potential:      []Excellent   [x]Good    []Fair   []Poor    Tolerance of evaluation/treatment:    []Excellent   [x]Good    []Fair   []Poor    PLAN:    SLP treatment warranted:    [x] Yes  [] No      Frequency/Duration:  Up to 30 minutes/day and up 5 days per week, as tolerated, until goals met, or discharged from ARU, and pending results following ENT consultation. Barriers to/and or personal factors that will affect rehab potential:              []Age              []Motivation/Lack of Motivation                        []Co-Morbidities              []Cognitive Function, education/learning barriers              []Environmental, home barriers              []Physical limitations   []past ST/medical experience  []Pain   [x]other: vocal pathology?     Discharge Recommendations:   [] Home  [] Home with 24/7 supervision/assistance  [] Home with intermittent supervision   [] Troy Lawton   [x] JUAN     EDUCATION:   Provided education regarding role of SLP, results of assessment, recommendations and general speech pathology plan of care. [x] Pt verbalized understanding and agreement   [] Pt requires ongoing learning   [] No evidence of comprehension     GOALS:  Patient stated goal: Pt would like for her voice to improve. Short Term Speech/Language/Cognitive Goals:   Short-term Goals  Goal 1: Patient will participate in further assessment of breath support and voicing as indicated and able to tolerate. If patient discharges prior to next visit, this note will serve as discharge. Time:   Time In 9030   Time Out  1015   Time Code 0 minutes   Total Minutes 45 minutes       Electronically signed by:     Liz Villela M.S. CCC-SLP  Speech-Language Pathologist  6/24/2021 1:11 PM

## 2021-06-24 NOTE — PLAN OF CARE
Problem: Pain:  Goal: Pain level will decrease  Description: Pain level will decrease  6/24/2021 1007 by Constance Ornelas RN  Outcome: Ongoing  6/23/2021 2013 by Royston Dakins, RN  Outcome: Ongoing  Goal: Control of acute pain  Description: Control of acute pain  6/24/2021 1007 by Constance Ornelas RN  Outcome: Ongoing  6/23/2021 2013 by Royston Dakins, RN  Outcome: Ongoing  Goal: Control of chronic pain  Description: Control of chronic pain  6/24/2021 1007 by Constance Ornelas RN  Outcome: Ongoing  6/23/2021 2013 by Royston Dakins, RN  Outcome: Ongoing

## 2021-06-24 NOTE — FLOWSHEET NOTE
06/24/21 1916   Vitals   BP (!) 165/64   Temp 98.1 °F (36.7 °C)   Pulse 73   Resp 20   Weight 231 lb 0.7 oz (104.8 kg)     Connected to Cycler per protocol. CCPD order:                       Therapy Time Duration (Hours): 10:00                       Total Volume: 7000 ml                                            Exchange Volume: 1400                        Number of Exchanges: 5                        No Final Fill    Dwell Time: 01:30  Fill Time: 00:10  Drain Time: 00:20     4ml initial drain, initial drain bypassed. Effluent clear without fibrin. Exit site care done per protocol. Ulcerated area developing at exit site, tubing rotated away from pressure point, site red without drainage. Dr. Farida Watts notified. Garamycin . 1% cream to site. DSD/occlusive to site.      Report given to James Weaver RN

## 2021-06-25 LAB
ANION GAP SERPL CALCULATED.3IONS-SCNC: 14 MMOL/L (ref 3–16)
BUN BLDV-MCNC: 68 MG/DL (ref 7–20)
CALCIUM SERPL-MCNC: 8.1 MG/DL (ref 8.3–10.6)
CHLORIDE BLD-SCNC: 87 MMOL/L (ref 99–110)
CO2: 26 MMOL/L (ref 21–32)
CREAT SERPL-MCNC: 9.4 MG/DL (ref 0.6–1.1)
GFR AFRICAN AMERICAN: 5
GFR NON-AFRICAN AMERICAN: 4
GLUCOSE BLD-MCNC: 137 MG/DL (ref 70–99)
GLUCOSE BLD-MCNC: 140 MG/DL (ref 70–99)
GLUCOSE BLD-MCNC: 162 MG/DL (ref 70–99)
GLUCOSE BLD-MCNC: 211 MG/DL (ref 70–99)
GLUCOSE BLD-MCNC: 57 MG/DL (ref 70–99)
GLUCOSE BLD-MCNC: 61 MG/DL (ref 70–99)
GLUCOSE BLD-MCNC: 82 MG/DL (ref 70–99)
GLUCOSE BLD-MCNC: 96 MG/DL (ref 70–99)
HCT VFR BLD CALC: 23.1 % (ref 36–48)
HEMOGLOBIN: 7.4 G/DL (ref 12–16)
MCH RBC QN AUTO: 29.5 PG (ref 26–34)
MCHC RBC AUTO-ENTMCNC: 32 G/DL (ref 31–36)
MCV RBC AUTO: 92.4 FL (ref 80–100)
PDW BLD-RTO: 21.4 % (ref 12.4–15.4)
PERFORMED ON: ABNORMAL
PERFORMED ON: NORMAL
PERFORMED ON: NORMAL
PLATELET # BLD: 185 K/UL (ref 135–450)
PMV BLD AUTO: 9 FL (ref 5–10.5)
POTASSIUM SERPL-SCNC: 4.6 MMOL/L (ref 3.5–5.1)
RBC # BLD: 2.5 M/UL (ref 4–5.2)
SODIUM BLD-SCNC: 127 MMOL/L (ref 136–145)
WBC # BLD: 11.8 K/UL (ref 4–11)

## 2021-06-25 PROCEDURE — 97530 THERAPEUTIC ACTIVITIES: CPT

## 2021-06-25 PROCEDURE — 99221 1ST HOSP IP/OBS SF/LOW 40: CPT | Performed by: OTOLARYNGOLOGY

## 2021-06-25 PROCEDURE — 92507 TX SP LANG VOICE COMM INDIV: CPT

## 2021-06-25 PROCEDURE — 6360000002 HC RX W HCPCS: Performed by: PHYSICAL MEDICINE & REHABILITATION

## 2021-06-25 PROCEDURE — 36415 COLL VENOUS BLD VENIPUNCTURE: CPT

## 2021-06-25 PROCEDURE — 97110 THERAPEUTIC EXERCISES: CPT

## 2021-06-25 PROCEDURE — 2500000003 HC RX 250 WO HCPCS: Performed by: PHYSICAL MEDICINE & REHABILITATION

## 2021-06-25 PROCEDURE — 80048 BASIC METABOLIC PNL TOTAL CA: CPT

## 2021-06-25 PROCEDURE — 90945 DIALYSIS ONE EVALUATION: CPT

## 2021-06-25 PROCEDURE — 0CJS8ZZ INSPECTION OF LARYNX, VIA NATURAL OR ARTIFICIAL OPENING ENDOSCOPIC: ICD-10-PCS | Performed by: OTOLARYNGOLOGY

## 2021-06-25 PROCEDURE — 6370000000 HC RX 637 (ALT 250 FOR IP): Performed by: PHYSICAL MEDICINE & REHABILITATION

## 2021-06-25 PROCEDURE — 85027 COMPLETE CBC AUTOMATED: CPT

## 2021-06-25 PROCEDURE — 90945 DIALYSIS ONE EVALUATION: CPT | Performed by: INTERNAL MEDICINE

## 2021-06-25 PROCEDURE — 1280000000 HC REHAB R&B

## 2021-06-25 PROCEDURE — 6370000000 HC RX 637 (ALT 250 FOR IP): Performed by: INTERNAL MEDICINE

## 2021-06-25 PROCEDURE — 97116 GAIT TRAINING THERAPY: CPT

## 2021-06-25 RX ADMIN — MICONAZOLE NITRATE: 20 POWDER TOPICAL at 20:35

## 2021-06-25 RX ADMIN — SEVELAMER CARBONATE 800 MG: 800 TABLET, FILM COATED ORAL at 11:41

## 2021-06-25 RX ADMIN — ATORVASTATIN CALCIUM 20 MG: 20 TABLET, FILM COATED ORAL at 20:34

## 2021-06-25 RX ADMIN — ACETAMINOPHEN 1000 MG: 325 TABLET ORAL at 20:34

## 2021-06-25 RX ADMIN — TRAMADOL HYDROCHLORIDE 100 MG: 50 TABLET, FILM COATED ORAL at 15:14

## 2021-06-25 RX ADMIN — INSULIN DETEMIR 10 UNITS: 100 INJECTION, SOLUTION SUBCUTANEOUS at 20:37

## 2021-06-25 RX ADMIN — TORSEMIDE 100 MG: 100 TABLET ORAL at 09:18

## 2021-06-25 RX ADMIN — STANDARDIZED SENNA CONCENTRATE AND DOCUSATE SODIUM 1 TABLET: 8.6; 5 TABLET ORAL at 20:34

## 2021-06-25 RX ADMIN — SEVELAMER CARBONATE 800 MG: 800 TABLET, FILM COATED ORAL at 17:03

## 2021-06-25 RX ADMIN — HEPARIN SODIUM 5000 UNITS: 5000 INJECTION INTRAVENOUS; SUBCUTANEOUS at 06:18

## 2021-06-25 RX ADMIN — TRAMADOL HYDROCHLORIDE 100 MG: 50 TABLET, FILM COATED ORAL at 09:22

## 2021-06-25 RX ADMIN — TRAMADOL HYDROCHLORIDE 100 MG: 50 TABLET, FILM COATED ORAL at 20:34

## 2021-06-25 RX ADMIN — METOPROLOL TARTRATE 25 MG: 25 TABLET, FILM COATED ORAL at 09:19

## 2021-06-25 RX ADMIN — ASPIRIN 325 MG: 325 TABLET, COATED ORAL at 09:19

## 2021-06-25 RX ADMIN — SEVELAMER CARBONATE 800 MG: 800 TABLET, FILM COATED ORAL at 09:18

## 2021-06-25 RX ADMIN — GENTAMICIN SULFATE: 1 CREAM TOPICAL at 17:08

## 2021-06-25 RX ADMIN — HEPARIN SODIUM 5000 UNITS: 5000 INJECTION INTRAVENOUS; SUBCUTANEOUS at 15:14

## 2021-06-25 RX ADMIN — ACETAMINOPHEN 1000 MG: 325 TABLET ORAL at 14:00

## 2021-06-25 RX ADMIN — ONDANSETRON 4 MG: 4 TABLET, ORALLY DISINTEGRATING ORAL at 17:05

## 2021-06-25 RX ADMIN — HEPARIN SODIUM 5000 UNITS: 5000 INJECTION INTRAVENOUS; SUBCUTANEOUS at 20:38

## 2021-06-25 RX ADMIN — ACETAMINOPHEN 1000 MG: 325 TABLET ORAL at 06:18

## 2021-06-25 RX ADMIN — STANDARDIZED SENNA CONCENTRATE AND DOCUSATE SODIUM 1 TABLET: 8.6; 5 TABLET ORAL at 09:18

## 2021-06-25 RX ADMIN — INSULIN DETEMIR 10 UNITS: 100 INJECTION, SOLUTION SUBCUTANEOUS at 09:23

## 2021-06-25 RX ADMIN — ZOLPIDEM TARTRATE 5 MG: 5 TABLET ORAL at 20:34

## 2021-06-25 RX ADMIN — PANTOPRAZOLE SODIUM 40 MG: 40 TABLET, DELAYED RELEASE ORAL at 06:18

## 2021-06-25 ASSESSMENT — PAIN SCALES - GENERAL
PAINLEVEL_OUTOF10: 6
PAINLEVEL_OUTOF10: 7
PAINLEVEL_OUTOF10: 1
PAINLEVEL_OUTOF10: 6
PAINLEVEL_OUTOF10: 4
PAINLEVEL_OUTOF10: 3

## 2021-06-25 NOTE — PROGRESS NOTES
Roberta Melton  6/25/2021  [id number]    Chief Complaint: Generalized weakness    Subjective:   Resting in bed this am. No overnight events. Still frustrated by her voice strength, awaiting ENT consult. Anemia progressing. Hypoglycemic events improving. ROS: No CP, SOB, dyspnea    Objective:  Patient Vitals for the past 24 hrs:   BP Temp Temp src Pulse Resp SpO2 Weight   06/25/21 0618 -- -- -- -- -- -- 223 lb 15.8 oz (101.6 kg)   06/25/21 0610 (!) 154/60 98 °F (36.7 °C) -- 68 16 -- 223 lb 12.3 oz (101.5 kg)   06/24/21 2107 (!) 162/62 98.2 °F (36.8 °C) Oral 73 18 98 % --   06/24/21 1916 (!) 165/64 98.1 °F (36.7 °C) Oral 73 20 -- 231 lb 0.7 oz (104.8 kg)   06/24/21 1054 -- -- -- -- 16 92 % --     Gen: No distress, pleasant. Resting in WC  HEENT: Normocephalic, atraumatic  CV: Regular rate and rhythm. No MRG   Resp: No respiratory distress. CTAB, O2 per NC  Abd: Soft, nontender, PD cath in place  Ext: +1 edema noted in BLE. LLE in clean dressing  Neuro: Alert, oriented, appropriately interactive. Laboratory data: Available via EMR. Therapy progress:  PT  Required Braces or Orthoses  Left Lower Extremity Brace: Boot  LLE Brace Type: Surgical shoe  Position Activity Restriction  Sternal Precautions: No Pushing, No Pulling, 5# Lifting Restrictions  Other position/activity restrictions: s/p CABG x 3 6/9Patient is a 51year old female with a history of ESRD on PD, HTN, DM, HLD, and PVD. On 5/26/21 she had amputation of toes 1 and 2 on the left foot. On 5/27/21 she underwent a left popliteal, anterior tibial and dorsalis pedis angioplasty with Dr. Serena Collazo. She presented to the ER on 5/31/21 with a syncopal episode. She has been experiencing dizziness and syncopal episodes X2 weeks prior to this visit. She underwent a Left Heart Catheterization on 6/2/21 and was found to have severe coronary microvascular disease.  On 6/9/21 she underwent a CABG X3 with NYA clip  Objective     Sit to Stand: Dependent/Total (fluctuating assistance levels: mod A x 2->max A x 2)  Stand to sit: Moderate Assistance (decreased eccentric control)  Bed to Chair: Contact guard assistance (via stand step with RW)  Device: Rolling Walker  Other Apparatus: O2 (RA)  Assistance: Contact guard assistance  Distance: 180'+ intermittent short distances  OT  PT Equipment Recommendations  Equipment Needed: Yes  Mobility Devices: Huddleston Ganser: Rolling  Other: TBD with progress  Toilet - Technique: Ambulating  Equipment Used: Standard toilet  Assessment        SLP                Body mass index is 33.08 kg/m². Assessment:  Patient Active Problem List   Diagnosis    Diabetes mellitus ([facility]    Obesity    Microalbuminuria    Hyperlipidemia with target LDL less than 100    Hypertension    Diabetes mellitus type 2 in obese ([facility]    Acute renal failure (ARF) ([facility]    ESRD (end stage renal disease) ([facility]    Non-smoker    Elevated C-reactive protein (CRP)    Elevated sed rate    Diabetic polyneuropathy associated with type 2 diabetes mellitus (HCC)    Weight loss counseling, encounter for    Atherosclerosis of native arteries of left leg with ulceration of other part of foot ([facility]    Near syncope    Coronary artery disease of native artery of native heart with stable angina pectoris ([facility])    Mild malnutrition ([facility]    S/P coronary artery bypass graft x 3    S/P left atrial appendage ligation    Generalized weakness       Plan:   Generalized weakness: PT/OT     CAD: S/P CABG X3 with NYA Clip (6/9). Sternal precautions. ASA, statin     PAD: S/P Amputation of toes 1 and 2 on the left (5/26/21). Routine wound care     ESRD: on PD 2/2 diabetic nephropathy. Appreciate nephro assistance    Hypoxic respiratory failure: O2 supplementation PRN, stress IS     DM II: Levemir decreased to 10 BID, prandial d/c'd, SSI high.      HTN: Lopressor 25     HLD: Lipitor 20     Oral candidiasis: Nystatin completed    Hyponatremia: per nephro and PD    Hypophonia: SLP consulted. Suspect pulmonary function related. - ENT consult       Bowels: Per protocol  Bladder: Per protocol   Sleep: Ambien provided prn  Pain: Tylenol scheduled, tramadol as needed  DVT PPx: Heparin     Electronically signed by RICHARD Zuniga CNP on 6/25/2021 at 8:11 AM    * This document was created using dictation software. While all precautions were taken to ensure accuracy, errors may have occurred. Please disregard any typographical errors. This patient has been seen, examined, and discussed with the nurse practitioner. I performed a face to face encounter with this patient on the above date. This note has been altered to reflect my own examination findings, impression, and recommendations.      Electronically signed by Kevin Woods MD on 6/25/2021 at 3:23 PM

## 2021-06-25 NOTE — PROGRESS NOTES
Occupational Therapy  Facility/Department: [facility] ACUTE REHAB UNIT  Daily Treatment Note  NAME: Rula Pierce  : 1975  MRN: [id number]    Date of Service: 2021    Discharge Recommendations:  S Level 3, Home with Home health OT, Home with assist PRN  OT Equipment Recommendations  Equipment Needed: Yes  Mobility Devices: ADL Assistive Devices  ADL Assistive Devices: Reacher;Sock-Aid Hard;Transfer Tub Bench; Toileting - Raised Toilet Seat without arms  Other: cotninue to update/assess pending progress    Assessment   Performance deficits / Impairments: Decreased functional mobility ; Decreased ADL status; Decreased strength;Decreased endurance;Decreased sensation;Decreased high-level IADLs;Decreased coordination;Decreased balance  Assessment: Pt presented with the above deficits impacting his occupational peprformance. Pt is slowly porgressing towards goals- still requiring extensive assist for LB dressing and set-up/SBA for UB. Pt's activity tolerance is slowly improving, able to ambulate with RW at [address] however requiring 2x assist for sit>stands. Pt's O2 needs are trending down and pt continues to participate in sessions. Pt would benefit from ongoing intensive therapy services and extended stay in order to maximize her independence and safety with all occupational pursuits. Continue POC  Treatment Diagnosis: decreased ADL/IADL/mobility/activity tolerance due to CABG  Prognosis: Good  OT Education: OT Role;Plan of Care;ADL Adaptive Strategies;Transfer Training;Precautions  Patient Education: continue to reinforce for carryover  REQUIRES OT FOLLOW UP: Yes  Activity Tolerance  Activity Tolerance: Patient limited by fatigue;Patient limited by pain  Activity Tolerance: encouragement for tasks, pt self-limiting  Safety Devices  Safety Devices in place: Yes  Type of devices: Left in chair; All fall risk precautions in place;Call light within reach; Chair alarm in place         Patient Diagnosis(es): Debility      has a past medical history of Diabetes mellitus ([facility] [address] End stage kidney disease ([facility] [address] Hypertension, Neuropathy, and Peripheral vascular disease ([facility] [address] has a past surgical history that includes Tonsillectomy;  section; other surgical history (2018); Toe amputation (Left, 2021); Toe amputation (Left, 2021); and Coronary artery bypass graft (N/A, 2021). Restrictions  Restrictions/Precautions  Restrictions/Precautions: Fall Risk, Weight Bearing  Required Braces or Orthoses?: Yes  Lower Extremity Weight Bearing Restrictions  Left Lower Extremity Weight Bearing: Weight Bearing As Tolerated  Required Braces or Orthoses  Left Lower Extremity Brace: Boot  LLE Brace Type: Surgical shoe  Position Activity Restriction  Sternal Precautions: No Pushing, No Pulling, 5# Lifting Restrictions  Other position/activity restrictions: s/p CABG x 3 Patient is a 51year old female with a history of ESRD on PD, HTN, DM, HLD, and PVD. On 21 she had amputation of toes 1 and 2 on the left foot. On 21 she underwent a left popliteal, anterior tibial and dorsalis pedis angioplasty with Dr. Dixie Perry. She presented to the ER on 21 with a syncopal episode. She has been experiencing dizziness and syncopal episodes X2 weeks prior to this visit. She underwent a Left Heart Catheterization on 21 and was found to have severe coronary microvascular disease.  On 21 she underwent a CABG X3 with NYA clip    Subjective   General  Chart Reviewed: Yes  Patient assessed for rehabilitation services?: Yes  Additional Pertinent Hx: DM - peritoneal dialysis; s/p amputation of the 1st and 2nd toes of the left foot (21)  Response to previous treatment: Patient with no complaints from previous session  Family / Caregiver Present: No  Referring Practitioner: Gaye Gupta MD  Diagnosis: CABG x 3   Subjective  Subjective: Pt seated on EOB upon entry, agreeable to cotx session, encouragement required for tasks throughout session  General Comment  Comments: . Pain Assessment  Non-Pharmaceutical Pain Intervention(s): Rest;Repositioned; Heat applied (heat to lower back at EOS)  Vital Signs  Patient Currently in Pain: Yes (not rated- soreness in chest reported, able to participate)          Objective    ADL  Additional Comments: Declined all ADL needs- TD for shoes/ortho boot seated EOB           Balance  Sitting Balance: Supervision  Standing Balance: Minimal assistance (CGA to Kymber[place])  Standing Balance  Time: 7-8 min total  Activity: functional mobility/transfers, TE in stance    Functional Mobility  Functional - Mobility Device: Rolling Walker  Assist Level: Contact guard assistance  Functional Mobility Comments: ~190ft with 1 seated rest break, w/c follow provided for safety        Transfers  Sit to stand: 2 Person assistance  Stand to sit: Moderate assistance;Minimal assistance  Transfer Comments: Mod-MaxA of 2 for sit>stands                    Type of ROM/Therapeutic Exercise  Comment: UE TE in stance for UE strength, balance, and activity tolerance- 2x assist with 1x for balance (CGA-Kymberly) and 1x for facilitation: 10 reps x2 of chest press and shoulder flexion to 90 holding small swiss ball, ~10-15reps x1 of rows and shoulder extension with light resistance band, rest breaks required between sets; pt propeled w/c with LEs gym>room for LE strengthening, CGA/Kymberly to steer with 2 rest breaks required              Second Session: Pt seated EOB at entry, agreeable to session, encouragement required throughout, denied pain and declined ADL needs. Pt ambulated ~80ft x2 with RW at [facility] and w/c follow to/from gym.  Dynamic standing activities completed with focus on core, balance, and extension- in stance to stair rails pt completed 15 arm glides bilaterally in stagger stance with 1 foot on step, reaching outside CHIDI to grasp and stack 6 cones across midline horizontally and diagonally 4 reps total. Pt then completed 4 min on stepper with LEs for strength/endurnace. Multiple long rest breaks required throughout session. Pt requesting to lay in bed at EOS- sit>supine with assist to LEs. Once in bed pt requesting O2 \"since she had it off all day\"- pt's SpO2 93% on room air bed level- pt educated on reducing O2 use to return to baseline- pt not appearing receptive to education- RN notified. Pt left supine in bed, bed alarm on, call light and needs in reach.               Plan   Plan  Times per week: 90 minutes; 5-6x/wk  Times per day: Daily  Current Treatment Recommendations: Strengthening, ROM, Balance Training, Functional Mobility Training, Endurance Training, Safety Education & Training, Patient/Caregiver Education & Training, Equipment Evaluation, Education, & procurement, Positioning, Self-Care / ADL, Pain Management, Home Management Training         Goals  Short term goals  Time Frame for Short term goals: 1-2weeks  Short term goal 1: Pt will complete UB/LB dressing with mod I and use fo AE as needed  Short term goal 2: Pt will complete bathing with mod I  Short term goal 3: Pt will complete toileting with mod I  Short term goal 4: Pt will complete functional transfers with mod I  Short term goal 5: Pt will tolerate x10 mins of standing IADLs with SpO2 above 90% in order to increase activity tolerance for d/c home  Long term goals  Time Frame for Long term goals : LTG=STG  Patient Goals   Patient goals : to return home       Therapy Time   Individual Concurrent Group Co-treatment   Time In       0830, 1345   Time Out       0915, 1430   Minutes       45, 45     Timed Code Treatment Minutes:  45 + 45   Total Treatment Minutes: [address] [place] [address], [place] [address]

## 2021-06-25 NOTE — PROGRESS NOTES
CCPD Order   Exchanges: 5   Exchange Volume: 1400 ml   Total Time: 10 hrs   Dextrose: 2.5%   Last Fill: 0 ml   Total Volume: 7000 ml     Orders verified. Supplies taken to pt's room. Report received. Cycler set up, primed and pre tested. Dressing changed on [facility] Cath site. Pt connected to cycler. CCPD initiated without problem. Initial effluent clear.

## 2021-06-25 NOTE — PLAN OF CARE
Problem: Pain:  Goal: Control of acute pain  Description: Control of acute pain  Outcome: Ongoing     Problem: Skin Integrity:  Goal: Will show no infection signs and symptoms  Description: Will show no infection signs and symptoms  Outcome: Ongoing  Goal: Absence of new skin breakdown  Description: Absence of new skin breakdown  Outcome: Ongoing     Problem: Falls - Risk of:  Goal: Will remain free from falls  Description: Will remain free from falls  Outcome: Ongoing  Note: Education Provided as followed:  \"Family/Patient made aware of the tailored interventions falls plan using the TIPS TOOL.    Goal: Absence of physical injury  Description: Absence of physical injury  Outcome: Ongoing

## 2021-06-25 NOTE — PLAN OF CARE
Education Provided as followed:  \"Family/Patient made aware of the tailored interventions falls plan using the TIPS TOOL. Problem: Pain:  Description: Pain management should include both nonpharmacologic and pharmacologic interventions. Goal: Pain level will decrease  Description: Pain level will decrease  Outcome: Ongoing  Goal: Control of acute pain  Description: Control of acute pain  6/25/2021 1031 by Tc Carrera RN  Outcome: Ongoing  6/25/2021 0153 by Radha Diaz RN  Outcome: Ongoing  Goal: Control of chronic pain  Description: Control of chronic pain  Outcome: Ongoing     Problem: Skin Integrity:  Goal: Will show no infection signs and symptoms  Description: Will show no infection signs and symptoms  6/25/2021 1031 by Tc Carrera RN  Outcome: Ongoing  6/25/2021 0153 by Radha Diaz RN  Outcome: Ongoing  Goal: Absence of new skin breakdown  Description: Absence of new skin breakdown  6/25/2021 1031 by Tc Carrera RN  Outcome: Ongoing  6/25/2021 0153 by Radha Diaz RN  Outcome: Ongoing     Problem: Falls - Risk of:  Goal: Will remain free from falls  Description: Will remain free from falls  6/25/2021 1031 by Tc Carrera RN  Outcome: Ongoing  6/25/2021 0153 by Radha Diaz RN  Outcome: Ongoing  Note: Education Provided as followed:  \"Family/Patient made aware of the tailored interventions falls plan using the TIPS TOOL.    Goal: Absence of physical injury  Description: Absence of physical injury  6/25/2021 1031 by Tc Carrera RN  Outcome: Ongoing  6/25/2021 0153 by Radha Diaz RN  Outcome: Ongoing     Problem: IP BLADDER/VOIDING  Goal: STG - Patient demonstrates ability to take care of indwelling catheter  Outcome: Ongoing  Goal: STG - Patient demonstrates no accidents  Outcome: Ongoing     Problem: IP BREATHING  Goal: STG - patient will utilize incentive spirometer  Outcome: Ongoing  Goal: STG - Patient performs or directs assisted coughing  Outcome: Ongoing Problem: NUTRITION  Description: Altered Nutrition and Hydration  Goal: Patient maintains weight  Outcome: Ongoing     Problem: SAFETY  Goal: LTG - Patient will demonstrate safety requirements appropriate to situation/environment  Outcome: Ongoing     Problem: SKIN INTEGRITY  Goal: LTG - Patient will be free from infection  Outcome: Ongoing     Problem: PAIN  Goal: LTG - Patient will manage pain with the appropriate technique/Intervention  Outcome: Ongoing  Goal: STG - pain is manageable through therapies  Outcome: Ongoing     Problem: Nutrition  Goal: Optimal nutrition therapy  Outcome: Ongoing

## 2021-06-25 NOTE — PROGRESS NOTES
Office : [phone]     Fax :[phone]       Nephrology progress  Note      Patient's Name: Tena Medley  10:55 AM  2021    Reason for Consult:  ESRD    History of Present Ilness:    Tena Medley is a 51 y.o. female with severe end-stage renal disease secondary to diabetic nephropathy on peritoneal dialysis, hypertension, peripheral vascular disease, diabetic neuropathy who was admitted after she had a syncopal episode. She had similar episode 2 weeks ago. Recently had angiogram left lower extremity for gangrene left foot second toe. Denies any shortness of breath. Denies any abdominal pain. Denies any nausea vomiting. Interval HX :        Seen on PD   No chest pain  not in acute distress  No sob  S/p CABG on 2010  Overall strength improving with PT        I/O last 3 completed shifts: In: Pamela Ya  Out: 5275 [Other:8384]    Past Medical History:   Diagnosis Date    Diabetes mellitus ([facility] [address]     End stage kidney disease ([address]     peritoneal dialysis every night at home x 2 years    Hypertension     Neuropathy     Peripheral vascular disease ([facility] [address]        Past Surgical History:   Procedure Laterality Date     SECTION      CORONARY ARTERY BYPASS GRAFT N/A 2021    URGENT CABG CORONARY ARTERY BYPASS GRAFTS X3. VEIN TO PDA X1, VEIN TO OM X1, LEFT INTERNAL MAMMARY ARTERY TO LAD X1. ENDOSCOPIC HARVEST RIGHT LEG SAPHENOUS VEIN. LIGATION NYA USING 35MM ATRICLIP. EPI AORTIC ULTRASOUND. TOTAL CARDIOPULMONARY BYPASS. DOPPLER GRAFT FLOW VERIFICATION. BILATERAL INITERCOSTAL NERVE BLOCK USING 1.3% EXPAREL.  performed by Andrew Fournier MD at [place] [address]  2018     lap PD cath placement lt side 62 cm    TOE AMPUTATION Left 2021 AMPUTATION OF THE LEFT THIRD TOE performed by Liv Clark DPM at [address] [address] [place] TOE AMPUTATION Left 5/26/2021    AMPUTATION OF HALLUX AND SECOND TOE, LEFT FOOT performed by Liv Clark DPM at [facility]         Family History   Problem Relation Age of Onset    Coronary Art Dis Mother     High Blood Pressure Mother     Heart Disease Mother     Diabetes Mother     Cancer Father     Coronary Art Dis Father     High Blood Pressure Father     Heart Disease Father     Diabetes Father      Current Medications:    insulin detemir (LEVEMIR) injection pen 10 Units, BID  torsemide (DEMADEX) tablet 100 mg, Daily  gentamicin (GARAMYCIN) 0.1 % cream, Daily  insulin aspart (NOVOLOG) injection pen 0-18 Units-PATIENT SUPPLIED, TID WC  insulin aspart (NOVOLOG) injection PEN 0-9 Units-Patient supplied, Nightly  heparin (porcine) injection 5,000 Units, 3 times per day  acetaminophen (TYLENOL) tablet 1,000 mg, 3 times per day  albuterol sulfate  (90 Base) MCG/ACT inhaler 2 puff, Q6H PRN  aspirin EC tablet 325 mg, Daily  magnesium hydroxide (MILK OF MAGNESIA) 400 MG/5ML suspension 30 mL, Daily PRN  metoprolol tartrate (LOPRESSOR) tablet 25 mg, BID  pantoprazole (PROTONIX) tablet 40 mg, Daily  polyethylene glycol (GLYCOLAX) packet 17 g, Daily PRN  sennosides-docusate sodium (SENOKOT-S) 8.6-50 MG tablet 1 tablet, BID  traMADol (ULTRAM) tablet 50 mg, Q6H PRN   Or  traMADol (ULTRAM) tablet 100 mg, Q6H PRN  zolpidem (AMBIEN) tablet 5 mg, Nightly PRN  dextrose 5 % solution, PRN  dextrose 50 % IV solution, PRN  glucagon (rDNA) injection 1 mg, PRN  glucose (GLUTOSE) 40 % oral gel 15 g, PRN  atorvastatin (LIPITOR) tablet 20 mg, Nightly  bisacodyl (DULCOLAX) EC tablet 5 mg, Daily PRN  diphenhydrAMINE (BENADRYL) tablet 25 mg, Q6H PRN  heparin (porcine) injection 3,400 Units, PRN  hydrALAZINE (APRESOLINE) tablet 50 mg, Q8H PRN  ondansetron (ZOFRAN-ODT) disintegrating tablet 4 mg, Q8H PRN  sevelamer (RENVELA) tablet 800 mg, TID WC  miconazole (MICOTIN) 2 % powder, BID          Physical exam:     Vitals:  BP (!) 163/85   Pulse 88   Temp 98 °F (36.7 °C) (Oral)   Resp 17   Ht 5' 9\" (1.753 m)   Wt 223 lb 15.8 oz (101.6 kg)   SpO2 98%   BMI 33.08 kg/m²   Constitutional:  OAA X3 NAD  Skin: no rash, turgor wnl  Heent:  eomi, mmm  Neck: no bruits or jvd noted  Cardiovascular:  S1, S2 without m/r/g  Respiratory: CTA B without w/r/r  Abdomen:  +bs, soft, nt, nd  Ext: no  lower extremity edema      Labs:  CBC:   Recent Labs     06/23/21  0547 06/24/21  0632 06/25/21  0812   WBC 13.8* 12.2* 11.8*   HGB 7.6* 7.1* 7.4*    167 185     BMP:    Recent Labs     06/23/21  0547 06/24/21  0633 06/25/21  0812   * 130* 127*   K 4.1 4.4 4.6   CL 86* 87* 87*   CO2 24 27 26   BUN 68* 68* 68*   CREATININE 8.6* 8.8* 9.4*   GLUCOSE 186* 57* 137*     Ca/Mg/Phos:   Recent Labs     06/23/21  0547 06/24/21  0633 06/25/21  0812   CALCIUM 8.2* 8.2* 8.1*     UA:  No results for input(s): COLORU, CLARITYU, GLUCOSEU, BILIRUBINUR, KETUA, SPECGRAV, BLOODU, PHUR, PROTEINU, UROBILINOGEN, NITRU, LEUKOCYTESUR, Shady Gianotti in the last 72 hours. Urine Microscopic:   No results for input(s): LABCAST, BACTERIA, COMU, HYALCAST, WBCUA, RBCUA, EPIU in the last 72 hours. Urine Culture:   No results for input(s): LABURIN in the last 72 hours. Urine Chemistry: No results for input(s): Kavon Miracle, PROTEINUR, NAUR in the last 72 hours. IMAGING:  XR CHEST PORTABLE   Final Result   Right-sided pleural effusion      Bibasilar hypoaeration with bibasilar opacities that could represent   subsegmental atelectasis or infection                 Assessment/Plan :      1. ESRD. Continue on peritoneal dialysis. continue cycler/CCPD as per current prescription   1400 mL as fill  Volume  Sodium level low. 2/2 hypervolemia  Sodium level 127 today   Limit water intake     Continue  torsemide     PD site clean and no discharge.

## 2021-06-25 NOTE — PROGRESS NOTES
Physical Therapy  Facility/Department: [facility] ACUTE REHAB UNIT  Daily Treatment Note  NAME: Matt Morton  : 1975  MRN: [id number]    Date of Service: 2021    Discharge Recommendations:  Home with assist PRN, S Level 3, 24 hour supervision or assist   PT Equipment Recommendations  Equipment Needed: Yes  Mobility Devices: Alma Hospers: Rolling    Assessment   Body structures, Functions, Activity limitations: Decreased functional mobility ; Decreased ADL status; Decreased strength;Decreased balance;Decreased endurance  Assessment: Pt remains with decreased extensor strength as well as decreased endurance. Pt also continues to require assist of 2 people to get out of chairs or bed. Pt will continue to benefit from skilled PT services to address remaining deficits and return to PLOF. Treatment Diagnosis: imparied functional mobility, impaired strength  Prognosis: Good  PT Education: Goals;Plan of Care;Precautions; Functional Mobility Training;Transfer Training;Gait Training  Patient Education: Ongoing reinforcement of sternal precautions  Barriers to Learning: none  REQUIRES PT FOLLOW UP: Yes  Activity Tolerance  Activity Tolerance: Patient limited by endurance; Patient limited by fatigue  Activity Tolerance: pt tolerates entire session on RA with SPO2 above 90%, pt requires extended rest breaks with all activities due to poor activity tolerance     Patient Diagnosis(es): Generalized weakness. has a past medical history of Diabetes mellitus ([address] End stage kidney disease ([address] Hypertension, Neuropathy, and Peripheral vascular disease ([address] has a past surgical history that includes Tonsillectomy;  section; other surgical history (2018); Toe amputation (Left, 2021); Toe amputation (Left, 2021); and Coronary artery bypass graft (N/A, 2021).     Restrictions  Restrictions/Precautions  Restrictions/Precautions: Fall Risk, Weight Bearing  Required Braces or Orthoses?: Yes  Lower Extremity Weight Bearing Restrictions  Left Lower Extremity Weight Bearing: Weight Bearing As Tolerated  Required Braces or Orthoses  Left Lower Extremity Brace: Boot  LLE Brace Type: Surgical shoe  Position Activity Restriction  Sternal Precautions: No Pushing, No Pulling, 5# Lifting Restrictions  Other position/activity restrictions: s/p CABG x 3 6/9Patient is a 51year old female with a history of ESRD on PD, HTN, DM, HLD, and PVD. On 5/26/21 she had amputation of toes 1 and 2 on the left foot. On 5/27/21 she underwent a left popliteal, anterior tibial and dorsalis pedis angioplasty with Dr. Manuel Melton. She presented to the ER on 5/31/21 with a syncopal episode. She has been experiencing dizziness and syncopal episodes X2 weeks prior to this visit. She underwent a Left Heart Catheterization on 6/2/21 and was found to have severe coronary microvascular disease. On 6/9/21 she underwent a CABG X3 with NYA clip  Subjective   General  Chart Reviewed: Yes  Additional Pertinent Hx: ESRD, DM  Response To Previous Treatment: Patient with no complaints from previous session. Family / Caregiver Present: No  Subjective  Subjective: Pt sitting EOB on arrival.  Agreeable to therapy session. On RA.   Pain Screening  Patient Currently in Pain: Yes (not rated-pt reported chest pain, but declined intervention)  Vital Signs  Patient Currently in Pain: Yes (not rated-pt reported chest pain, but declined intervention)       Orientation     Cognition      Objective   Bed mobility  Sit to Supine: Minimal assistance  Transfers  Sit to Stand: Dependent/Total (Aidan of 2 - maxA of 2)  Stand to sit: Moderate Assistance;Minimal Assistance (decreased eccentric control)  Stand Pivot Transfers: Contact guard assistance (with RW)  Ambulation  Ambulation?: Yes  Ambulation 1  Surface: level tile  Device: Rolling Walker  Assistance: Contact guard assistance  Quality of Gait: Step to pattern, trunk flexion, short step length  Distance: 80'+113' Balance  Posture: Fair  Sitting - Static: Good  Sitting - Dynamic: Good  Standing - Static: Fair;-  Standing - Dynamic: Fair;-            Comment: 1st session:  Performed ambulation as documented above. Performed standing ball waist to shoulder lifts X 6+10, chest press X 5+10. Pt required encouragement to attempt increased reps. Performed standing rows with yellow band X 10 with tc for technique due to decreased scapular activation and B shoulder extension with yellow band X 10. Pt propelled w/c forwards with BLE X 180'. Pt remained in w/c with alarm on and all needs in reach. 2nd session:  Pt sitting EOB on arrival.  Pt ambulated [de-identified]' with RW and same gait pattern as above. Performed seated stepper from w/c level X 4 minutes. Performed arm slides on stairs within sternal precautions in stagger stance X 10 B wtih cues to increase scapular activation. Performed standing cone reaching with decreased weight shift B and performed in cross body pattern with improved rotation with increased reps. Pt ambulated back to room and returned to bed with alarm on and all needs in reach.               G-Code     OutComes Score                                                     AM-PAC Score             Goals  Short term goals  Time Frame for Short term goals: to be met in 14 days  Short term goal 1: pt will complete bed mobility with mod I  Short term goal 2: pt will complete STS transfers with mod I  Short term goal 3: pt will complete stand step transfers with mod I  Short term goal 4: pt will ambulate x 150' with mod I of LRAD  Short term goal 5: pt will ascend/descend 2 stairs with mod I  Patient Goals   Patient goals : to return home and regain independence    Plan    Plan  Times per week: 90 minutes per day, 5-7x/wk  Times per day: Daily  Current Treatment Recommendations: Strengthening, Balance Training, Functional Mobility Training, Transfer Training, Endurance Training, Gait Training, Stair training, Patient/Caregiver Education & Training, Manual Therapy - Soft Tissue Mobilization, Neuromuscular Re-education, Safety Education & Training  Safety Devices  Type of devices:  All fall risk precautions in place, Call light within reach, Chair alarm in place, Gait belt, Left in chair  Restraints  Initially in place: No     Therapy Time   Individual Concurrent Group Co-treatment   Time In       0830   Time Out       0915   Minutes       39        Second Session Therapy Time:   Individual Concurrent Group Co-treatment   Time In       1345   Time Out       1430   Minutes       45     Timed Code Treatment Minutes:  20+80    Total Treatment Minutes:  [address], [place] [address]

## 2021-06-25 NOTE — PROGRESS NOTES
No reply from Dr. Shahram Huitron in perfect serve as noted he ended the conversion. Another perfect serve placed to Dr. Seymour David.

## 2021-06-25 NOTE — PROGRESS NOTES
Disconnected from CCPD per protocol. Effluent: clear pale yellow  Total time: 10 hr 24 min   Total UF:  1371 ml. Total Volume:  7013 ml. Dwell time gained:  0 hr 21 min.   Pt Tolerated procedure: well  Report given to:Nadia Smyth RN  Last BM: 6/23

## 2021-06-25 NOTE — PROGRESS NOTES
ACUTE REHAB UNIT  SPEECH/LANGUAGE PATHOLOGY      [x] Daily  [] Weekly Care Conference Note  [] Discharge    Patient:Abida French     :1975  POQ:[id number]  Room #: FIU-8124/0707-70   Rehab Dx/Hx: Generalized weakness [R53.1]  Date of Admit: 2021      Precautions: falls and cardiac  Home situation: Pt lives at home with her son (22 y/o). She was independent with medication management and her older son assists with finances. Pt is an active . ST Dx: aphonia     Daily Treatment Info:   Date: 2021     Tx session 1   Pain Denied    Subjective     Pt seen sitting upright in chair, alert and cooperative. Pt remains aphonic in conversation. Objective:  Goals    Patient will participate in further assessment of breath support and voicing as indicated and able to tolerate. Stabilized breath stream (sustained exhalation): pt able to sustain exhalation for average of 16.3 seconds over 3 trials (18, 15, 16 seconds). Pt remains with largely aphonic voice with all speech tasks. Attempted head positioning (to right and left) and digital palpation maneuvers on R and L sides both with and without head turns. No combinations of digital palpation and/or use of head turn to one side were successful in achieving voicing. Discussed general vocal hygiene recommendations and provided handout. At this time, continue to recommend ENT consult due to recent CABG and relatively short duration of intubation, in order to visualize vocal folds and to rule out pathology prior to initiation of voice therapy. Recommend holding voice therapy at this time until pt is seen by ENT with further recommendations. Pt is currently denying difficulty swallowing and/or coughing/choking with meals. Will monitor need for bedside swallow evaluation. Other areas targeted:    Education:   Educated pt re: role of SLP, vocal hygiene, recommendation for ENT consult prior to initiation of voice therapy, and plan of care. Pt verbalized understanding    Safety Devices: [x] Call light within reach  [x] Chair alarm activated  [] Bed alarm activated  [] Other:     Assessment:   Speech Therapy Diagnosis  Speech Diagnosis: Pt presents with severe voice impairment following intubation (~3 days?). Pt presents with generally aphonic voice. Pt able to achieve very minimal, inconsistent voicing with single phonemes demonstrating breathy, weak, and hoarse vocal quality; although, she is mostly aphonic and speaks at a whisper. Pt also demonstrates reduced breath support for longer phrases/sentences and conversational speech. Pt is able to phonate ~4-5 syllables per breath and reports fatigue with effortful speech. Current Diet Order: ADULT DIET; Regular; 4 carb choices (60 gm/meal); 2000 ml  Adult Oral Nutrition Supplement; Low Calorie/High Protein Oral Supplement            Plan:     Frequency:  Up to 5days/week   Up to 30 minutes/day--at this time (as of 6/25/21), recommend holding voice therapy until ENT consult is completed with further recommendations. Will resume as appropriate.     Discharge Recommendations:   Barriers: Communication deficit and Medication managment  Discharge Recommendations:  [] Home independently  [] Home with assistance []  24 hour supervision  [] SNF [x] Other: TBD  Continued SLP Treatment:  [] Yes [] No [x] TBD based on progress while on ARU [] Vital Stim indicated [] Other:   Estimated discharge date: 7/7/21    Type of Total Treatment Minutes   Session 1     Time In 1000   Time Out 1021   Timed Code Minutes  0   Individual Treatment Minutes  21   Co-Treatment Minutes     Group Treatment Minutes     Concurrent Treatment Minutes       TOTAL DAILY MINUTES:  21    Electronically Signed by     GILA Krueger Pathologist  6/25/2021 10:43 AM

## 2021-06-26 LAB
ANION GAP SERPL CALCULATED.3IONS-SCNC: 16 MMOL/L (ref 3–16)
BUN BLDV-MCNC: 62 MG/DL (ref 7–20)
CALCIUM SERPL-MCNC: 8.1 MG/DL (ref 8.3–10.6)
CHLORIDE BLD-SCNC: 85 MMOL/L (ref 99–110)
CO2: 24 MMOL/L (ref 21–32)
CREAT SERPL-MCNC: 8.7 MG/DL (ref 0.6–1.1)
GFR AFRICAN AMERICAN: 6
GFR NON-AFRICAN AMERICAN: 5
GLUCOSE BLD-MCNC: 155 MG/DL (ref 70–99)
GLUCOSE BLD-MCNC: 158 MG/DL (ref 70–99)
GLUCOSE BLD-MCNC: 167 MG/DL (ref 70–99)
GLUCOSE BLD-MCNC: 168 MG/DL (ref 70–99)
GLUCOSE BLD-MCNC: 193 MG/DL (ref 70–99)
GLUCOSE BLD-MCNC: 200 MG/DL (ref 70–99)
GLUCOSE BLD-MCNC: 61 MG/DL (ref 70–99)
GLUCOSE BLD-MCNC: 63 MG/DL (ref 70–99)
HCT VFR BLD CALC: 22.6 % (ref 36–48)
HEMOGLOBIN: 7.4 G/DL (ref 12–16)
MCH RBC QN AUTO: 29.9 PG (ref 26–34)
MCHC RBC AUTO-ENTMCNC: 32.7 G/DL (ref 31–36)
MCV RBC AUTO: 91.5 FL (ref 80–100)
PDW BLD-RTO: 21 % (ref 12.4–15.4)
PERFORMED ON: ABNORMAL
PLATELET # BLD: 197 K/UL (ref 135–450)
PMV BLD AUTO: 9.4 FL (ref 5–10.5)
POTASSIUM SERPL-SCNC: 4.5 MMOL/L (ref 3.5–5.1)
RBC # BLD: 2.47 M/UL (ref 4–5.2)
SODIUM BLD-SCNC: 125 MMOL/L (ref 136–145)
WBC # BLD: 10.6 K/UL (ref 4–11)

## 2021-06-26 PROCEDURE — 6360000002 HC RX W HCPCS: Performed by: PHYSICAL MEDICINE & REHABILITATION

## 2021-06-26 PROCEDURE — 1280000000 HC REHAB R&B

## 2021-06-26 PROCEDURE — 2500000003 HC RX 250 WO HCPCS: Performed by: PHYSICAL MEDICINE & REHABILITATION

## 2021-06-26 PROCEDURE — 90945 DIALYSIS ONE EVALUATION: CPT

## 2021-06-26 PROCEDURE — 36415 COLL VENOUS BLD VENIPUNCTURE: CPT

## 2021-06-26 PROCEDURE — 85027 COMPLETE CBC AUTOMATED: CPT

## 2021-06-26 PROCEDURE — 6370000000 HC RX 637 (ALT 250 FOR IP): Performed by: PHYSICAL MEDICINE & REHABILITATION

## 2021-06-26 PROCEDURE — 80048 BASIC METABOLIC PNL TOTAL CA: CPT

## 2021-06-26 PROCEDURE — 99233 SBSQ HOSP IP/OBS HIGH 50: CPT | Performed by: INTERNAL MEDICINE

## 2021-06-26 PROCEDURE — 6370000000 HC RX 637 (ALT 250 FOR IP): Performed by: INTERNAL MEDICINE

## 2021-06-26 RX ORDER — SODIUM CHLORIDE 1000 MG
1 TABLET, SOLUBLE MISCELLANEOUS ONCE
Status: COMPLETED | OUTPATIENT
Start: 2021-06-26 | End: 2021-06-26

## 2021-06-26 RX ADMIN — MICONAZOLE NITRATE: 20 POWDER TOPICAL at 08:37

## 2021-06-26 RX ADMIN — ZOLPIDEM TARTRATE 5 MG: 5 TABLET ORAL at 20:56

## 2021-06-26 RX ADMIN — ATORVASTATIN CALCIUM 20 MG: 20 TABLET, FILM COATED ORAL at 20:56

## 2021-06-26 RX ADMIN — METOPROLOL TARTRATE 25 MG: 25 TABLET, FILM COATED ORAL at 20:56

## 2021-06-26 RX ADMIN — HEPARIN SODIUM 5000 UNITS: 5000 INJECTION INTRAVENOUS; SUBCUTANEOUS at 05:17

## 2021-06-26 RX ADMIN — HEPARIN SODIUM 5000 UNITS: 5000 INJECTION INTRAVENOUS; SUBCUTANEOUS at 13:18

## 2021-06-26 RX ADMIN — TRAMADOL HYDROCHLORIDE 100 MG: 50 TABLET, FILM COATED ORAL at 08:13

## 2021-06-26 RX ADMIN — STANDARDIZED SENNA CONCENTRATE AND DOCUSATE SODIUM 1 TABLET: 8.6; 5 TABLET ORAL at 20:56

## 2021-06-26 RX ADMIN — TRAMADOL HYDROCHLORIDE 100 MG: 50 TABLET, FILM COATED ORAL at 17:25

## 2021-06-26 RX ADMIN — GENTAMICIN SULFATE: 1 CREAM TOPICAL at 17:28

## 2021-06-26 RX ADMIN — TRAMADOL HYDROCHLORIDE 100 MG: 50 TABLET, FILM COATED ORAL at 02:30

## 2021-06-26 RX ADMIN — METOPROLOL TARTRATE 25 MG: 25 TABLET, FILM COATED ORAL at 08:09

## 2021-06-26 RX ADMIN — ACETAMINOPHEN 1000 MG: 325 TABLET ORAL at 05:16

## 2021-06-26 RX ADMIN — ACETAMINOPHEN 1000 MG: 325 TABLET ORAL at 20:56

## 2021-06-26 RX ADMIN — HEPARIN SODIUM 5000 UNITS: 5000 INJECTION INTRAVENOUS; SUBCUTANEOUS at 20:59

## 2021-06-26 RX ADMIN — SEVELAMER CARBONATE 800 MG: 800 TABLET, FILM COATED ORAL at 17:25

## 2021-06-26 RX ADMIN — PANTOPRAZOLE SODIUM 40 MG: 40 TABLET, DELAYED RELEASE ORAL at 05:16

## 2021-06-26 RX ADMIN — SEVELAMER CARBONATE 800 MG: 800 TABLET, FILM COATED ORAL at 08:09

## 2021-06-26 RX ADMIN — TORSEMIDE 100 MG: 100 TABLET ORAL at 08:37

## 2021-06-26 RX ADMIN — ONDANSETRON 4 MG: 4 TABLET, ORALLY DISINTEGRATING ORAL at 05:23

## 2021-06-26 RX ADMIN — STANDARDIZED SENNA CONCENTRATE AND DOCUSATE SODIUM 1 TABLET: 8.6; 5 TABLET ORAL at 08:13

## 2021-06-26 RX ADMIN — INSULIN DETEMIR 10 UNITS: 100 INJECTION, SOLUTION SUBCUTANEOUS at 08:38

## 2021-06-26 RX ADMIN — SEVELAMER CARBONATE 800 MG: 800 TABLET, FILM COATED ORAL at 11:54

## 2021-06-26 RX ADMIN — MICONAZOLE NITRATE: 20 POWDER TOPICAL at 20:56

## 2021-06-26 RX ADMIN — Medication 1 G: at 11:54

## 2021-06-26 RX ADMIN — ASPIRIN 325 MG: 325 TABLET, COATED ORAL at 08:13

## 2021-06-26 RX ADMIN — ONDANSETRON 4 MG: 4 TABLET, ORALLY DISINTEGRATING ORAL at 13:18

## 2021-06-26 ASSESSMENT — PAIN DESCRIPTION - PAIN TYPE
TYPE: ACUTE PAIN
TYPE: CHRONIC PAIN

## 2021-06-26 ASSESSMENT — PAIN SCALES - GENERAL
PAINLEVEL_OUTOF10: 5
PAINLEVEL_OUTOF10: 5
PAINLEVEL_OUTOF10: 0
PAINLEVEL_OUTOF10: 5
PAINLEVEL_OUTOF10: 0
PAINLEVEL_OUTOF10: 7
PAINLEVEL_OUTOF10: 4
PAINLEVEL_OUTOF10: 0
PAINLEVEL_OUTOF10: 0
PAINLEVEL_OUTOF10: 4

## 2021-06-26 ASSESSMENT — PAIN DESCRIPTION - DESCRIPTORS: DESCRIPTORS: SORE

## 2021-06-26 ASSESSMENT — PAIN DESCRIPTION - LOCATION: LOCATION: BACK

## 2021-06-26 NOTE — PLAN OF CARE
Changed L foot dressing, patient states the surgeon has already made her aware it will require a revision. Besides having an excess of necrotic tissue it is not even approximated. Requested ultram & trazadone at bedtime. Continued Kpad to L side. Up to bsc x1, voided small amount. Declined snack, so checked blood sugar overnight. Removed purse string stuture. Removed L  AC IV (catheter was bent, & was not being used). Seen by ENTearler & they were unable to determine why she is whispering     Problem: Pain:  Description: Pain management should include both nonpharmacologic and pharmacologic interventions. Goal: Pain level will decrease  Description: Pain level will decrease  Outcome: Ongoing  Goal: Control of acute pain  Description: Control of acute pain  Outcome: Ongoing  Goal: Control of chronic pain  Description: Control of chronic pain  Outcome: Ongoing     Problem: Skin Integrity:  Goal: Will show no infection signs and symptoms  Description: Will show no infection signs and symptoms  Outcome: Ongoing  Goal: Absence of new skin breakdown  Description: Absence of new skin breakdown  Outcome: Ongoing     Problem: Falls - Risk of:  Goal: Will remain free from falls  Description: Will remain free from falls  Outcome: Ongoing  Note: Education Provided as followed:  Family/Patient made aware of the tailored interventions falls plan using the TIPS TOOL.   Goal: Absence of physical injury  Description: Absence of physical injury  Outcome: Ongoing     Problem: IP BLADDER/VOIDING  Goal: STG - Patient demonstrates ability to take care of indwelling catheter  Outcome: Ongoing  Goal: STG - Patient demonstrates no accidents  Outcome: Ongoing     Problem: IP BREATHING  Goal: STG - patient will utilize incentive spirometer  Outcome: Ongoing  Goal: STG - Patient performs or directs assisted coughing  Outcome: Ongoing     Problem: NUTRITION  Description: Altered Nutrition and Hydration  Goal: Patient maintains weight  Outcome: Ongoing     Problem: SAFETY  Goal: LTG - Patient will demonstrate safety requirements appropriate to situation/environment  Outcome: Ongoing     Problem: SKIN INTEGRITY  Goal: LTG - Patient will be free from infection  Outcome: Ongoing     Problem: PAIN  Goal: LTG - Patient will manage pain with the appropriate technique/Intervention  Outcome: Ongoing  Goal: STG - pain is manageable through therapies  Outcome: Ongoing     Problem: Nutrition  Goal: Optimal nutrition therapy  Outcome: Ongoing

## 2021-06-26 NOTE — PLAN OF CARE
Problem: Pain:  Goal: Pain level will decrease  Description: Pain level will decrease  6/26/2021 1135 by Liz Abarca RN  Outcome: Ongoing  6/26/2021 0504 by Pepe Iqbal RN  Outcome: Ongoing  Goal: Control of acute pain  Description: Control of acute pain  6/26/2021 1135 by Liz Abarca RN  Outcome: Ongoing  6/26/2021 0504 by Pepe Iqbal RN  Outcome: Ongoing  Goal: Control of chronic pain  Description: Control of chronic pain  6/26/2021 1135 by Liz Abarca RN  Outcome: Ongoing  6/26/2021 0504 by Pepe Iqbal RN  Outcome: Ongoing

## 2021-06-26 NOTE — FLOWSHEET NOTE
06/26/21 0628   Vitals   BP (!) 158/62   Temp 97.8 °F (36.6 °C)   Temp Source Oral   Pulse 73   Resp 18   Weight 108 lb 0.4 oz (49 kg)     Disconnected from CCPD per protocol. Effluent: clear yellow without fibrin  Total time: 11:03  Total UF:  1258 ml. Total Volume:  7000 ml. Dwell time gained: 00:25  Pt Tolerated procedure: Without difficulty. Report given to: Lisa Chowdary RN  Last BM: 6-.

## 2021-06-26 NOTE — PROGRESS NOTES
Office : [phone]     Fax :[phone]       Nephrology progress  Note      Patient's Name: Pooja Allen  11:13 AM  2021    Reason for Consult:  ESRD    History of Present Ilness:    Pooja Allen is a 51 y.o. female with severe end-stage renal disease secondary to diabetic nephropathy on peritoneal dialysis, hypertension, peripheral vascular disease, diabetic neuropathy who was admitted after she had a syncopal episode. She had similar episode 2 weeks ago. Recently had angiogram left lower extremity for gangrene left foot second toe. Denies any shortness of breath. Denies any abdominal pain. Denies any nausea vomiting. Interval HX :        Seen on PD   No chest pain  not in acute distress  No sob  S/p CABG on 2010  Overall strength improving with PT        I/O last 3 completed shifts: In: 26 [P.O.:900]  Out: 0     Past Medical History:   Diagnosis Date    [facility] [facility]     End stage kidney disease ([facility] [address] [facility]     peritoneal dialysis every night at home x 2 years    Hypertension     Neuropathy     Peripheral vascular disease ([facility] [address] [facility]        Past Surgical History:   Procedure Laterality Date     SECTION      CORONARY ARTERY BYPASS GRAFT N/A 2021    URGENT CABG CORONARY ARTERY BYPASS GRAFTS X3. VEIN TO PDA X1, VEIN TO OM X1, LEFT INTERNAL MAMMARY ARTERY TO LAD X1. ENDOSCOPIC HARVEST RIGHT LEG SAPHENOUS VEIN. LIGATION NYA USING 35MM ATRICLIP. EPI AORTIC ULTRASOUND. TOTAL CARDIOPULMONARY BYPASS. DOPPLER GRAFT FLOW VERIFICATION. BILATERAL INITERCOSTAL NERVE BLOCK USING 1.3% EXPAREL.  performed by Holger Miguel MD at [facility]  2018     lap PD cath placement lt side 62 cm    TOE AMPUTATION Left 2021    AMPUTATION OF THE LEFT THIRD TOE performed by Daryl Darden DPM at [address] [facility] [address] TOE AMPUTATION Left 5/26/2021    AMPUTATION OF HALLUX AND SECOND TOE, LEFT FOOT performed by Daryl Darden DPM at [place] [address]         Family History   Problem Relation Age of Onset    Coronary Art Dis Mother     High Blood Pressure Mother     Heart Disease Mother     Diabetes Mother     Cancer Father     Coronary Art Dis Father     High Blood Pressure Father     Heart Disease Father     Diabetes Father      Current Medications:    insulin detemir (LEVEMIR) injection pen 10 Units, BID  torsemide (DEMADEX) tablet 100 mg, Daily  gentamicin (GARAMYCIN) 0.1 % cream, Daily  insulin aspart (NOVOLOG) injection pen 0-18 Units-PATIENT SUPPLIED, TID WC  insulin aspart (NOVOLOG) injection PEN 0-9 Units-Patient supplied, Nightly  heparin (porcine) injection 5,000 Units, 3 times per day  acetaminophen (TYLENOL) tablet 1,000 mg, 3 times per day  albuterol sulfate  (90 Base) MCG/ACT inhaler 2 puff, Q6H PRN  aspirin EC tablet 325 mg, Daily  magnesium hydroxide (MILK OF MAGNESIA) 400 MG/5ML suspension 30 mL, Daily PRN  metoprolol tartrate (LOPRESSOR) tablet 25 mg, BID  pantoprazole (PROTONIX) tablet 40 mg, Daily  polyethylene glycol (GLYCOLAX) packet 17 g, Daily PRN  sennosides-docusate sodium (SENOKOT-S) 8.6-50 MG tablet 1 tablet, BID  traMADol (ULTRAM) tablet 50 mg, Q6H PRN   Or  traMADol (ULTRAM) tablet 100 mg, Q6H PRN  zolpidem (AMBIEN) tablet 5 mg, Nightly PRN  dextrose 5 % solution, PRN  dextrose 50 % IV solution, PRN  glucagon (rDNA) injection 1 mg, PRN  glucose (GLUTOSE) 40 % oral gel 15 g, PRN  atorvastatin (LIPITOR) tablet 20 mg, Nightly  bisacodyl (DULCOLAX) EC tablet 5 mg, Daily PRN  diphenhydrAMINE (BENADRYL) tablet 25 mg, Q6H PRN  heparin (porcine) injection 3,400 Units, PRN  hydrALAZINE (APRESOLINE) tablet 50 mg, Q8H PRN  ondansetron (ZOFRAN-ODT) disintegrating tablet 4 mg, Q8H PRN  sevelamer (RENVELA) tablet 800 mg, TID WC  miconazole (MICOTIN) 2 % powder, BID          Physical exam:     Vitals:  BP (!) 162/90   Pulse 75   Temp 98 °F (36.7 °C) (Oral)   Resp 16   Ht 5' 9\" (1.753 m)   Wt 108 lb 0.4 oz (49 kg)   SpO2 97%   BMI 15.95 kg/m²   Constitutional:  OAA X3 NAD  Skin: no rash, turgor wnl  Heent:  eomi, mmm  Neck: no bruits or jvd noted  Cardiovascular:  S1, S2 without m/r/g  Respiratory: CTA B without w/r/r  Abdomen:  +bs, soft, nt, nd  Ext: no  lower extremity edema      Labs:  CBC:   Recent Labs     06/24/21  0632 06/25/21  0812 06/26/21  0847   WBC 12.2* 11.8* 10.6   HGB 7.1* 7.4* 7.4*    185 197     BMP:    Recent Labs     06/24/21  0633 06/25/21  0812 06/26/21  0847   * 127* 125*   K 4.4 4.6 4.5   CL 87* 87* 85*   CO2 27 26 24   BUN 68* 68* 62*   CREATININE 8.8* 9.4* 8.7*   GLUCOSE 57* 137* 167*     Ca/Mg/Phos:   Recent Labs     06/24/21  0633 06/25/21  0812 06/26/21  0847   CALCIUM 8.2* 8.1* 8.1*     UA:  No results for input(s): COLORU, CLARITYU, GLUCOSEU, BILIRUBINUR, KETUA, SPECGRAV, BLOODU, PHUR, PROTEINU, UROBILINOGEN, NITRU, LEUKOCYTESUR, Melinda Gabby in the last 72 hours. Urine Microscopic:   No results for input(s): LABCAST, BACTERIA, COMU, HYALCAST, WBCUA, RBCUA, EPIU in the last 72 hours. Urine Culture:   No results for input(s): LABURIN in the last 72 hours. Urine Chemistry: No results for input(s): [place] Gobble, PROTEINUR, NAUR in the last 72 hours. IMAGING:  XR CHEST PORTABLE   Final Result   Right-sided pleural effusion      Bibasilar hypoaeration with bibasilar opacities that could represent   subsegmental atelectasis or infection                 Assessment/Plan :      1. ESRD. Continue on peritoneal dialysis. continue cycler/CCPD as per current prescription   1400 mL as fill  Volume  Sodium level low. 2/2 hypervolemia  Sodium level 125 today   Limit water intake to 1200 ml    Continue  torsemide     PD site clean and no discharge. 2. HTN. Controlled     3. Anemia of chronic disease. Gets micera at dialysis unit  Continue LUIZA    4. Acid- base disorder. Monitor    5. Electrolytes monitor and replace as needed    6. CAD/Syncopal episode. S/p [facility]   Has Multivessel CAD   S/p CABG       7. Peripheral vascular disease. S/p amputation of left foot toes. Podiatry following     8. Generalized weakness. Getting rehab     9. Constipation .  Resolved   Daily stool softner       Thank you for allowing us to participate in care of Alida Galan         Electronically signed by: Prasanna Jaquez MD, 6/26/2021, 11:13 AM      [facility] [address]  Office : [phone]  Fax :[phone]

## 2021-06-26 NOTE — FLOWSHEET NOTE
CCPD Order   Exchanges: 5   Exchange Volume: 1400 ml   Total Time: 10 hrs   Dextrose: 2.5%   Last Fill: 0 ml   Total Volume: 7000 ml     Orders verified. Supplies taken to pt's room. Report received. Cycler set up, primed and pre tested. Dressing changed earlier by pt with MD at bedside per Pt, refusing another change. Dressing D/I. Alyssa Black Pt connected to cycler. CCPD initiated without problem. Pt n/v increasing with weakness and decrease in appetite over last few days. Per Rehab RN Danish Wright, pt with left abdominal pain. Secure message sent to Dr. Deyanira Jenkins regarding pt issues and requesting to send effluent to lab in AM. Awaiting response.

## 2021-06-27 LAB
ANION GAP SERPL CALCULATED.3IONS-SCNC: 16 MMOL/L (ref 3–16)
APPEARANCE FLUID: CLEAR
BUN BLDV-MCNC: 61 MG/DL (ref 7–20)
CALCIUM SERPL-MCNC: 8 MG/DL (ref 8.3–10.6)
CELL COUNT FLUID TYPE: NORMAL
CHLORIDE BLD-SCNC: 85 MMOL/L (ref 99–110)
CLOT EVALUATION: NORMAL
CO2: 24 MMOL/L (ref 21–32)
COLOR FLUID: COLORLESS
CREAT SERPL-MCNC: 9 MG/DL (ref 0.6–1.1)
GFR AFRICAN AMERICAN: 6
GFR NON-AFRICAN AMERICAN: 5
GLUCOSE BLD-MCNC: 159 MG/DL (ref 70–99)
GLUCOSE BLD-MCNC: 160 MG/DL (ref 70–99)
GLUCOSE BLD-MCNC: 185 MG/DL (ref 70–99)
GLUCOSE BLD-MCNC: 259 MG/DL (ref 70–99)
GLUCOSE BLD-MCNC: 290 MG/DL (ref 70–99)
GLUCOSE BLD-MCNC: 320 MG/DL (ref 70–99)
HCT VFR BLD CALC: 23.5 % (ref 36–48)
HEMOGLOBIN: 7.4 G/DL (ref 12–16)
LYMPHOCYTES, BODY FLUID: 11 %
MCH RBC QN AUTO: 28.9 PG (ref 26–34)
MCHC RBC AUTO-ENTMCNC: 31.4 G/DL (ref 31–36)
MCV RBC AUTO: 92.3 FL (ref 80–100)
MESOTHELIAL FLUID: 3 %
MONOCYTE, FLUID: 32 %
NEUTROPHIL, FLUID: 54 %
NUCLEATED CELLS FLUID: 22 /CUMM
NUMBER OF CELLS COUNTED FLUID: 100
PDW BLD-RTO: 21 % (ref 12.4–15.4)
PERFORMED ON: ABNORMAL
PLATELET # BLD: 209 K/UL (ref 135–450)
PMV BLD AUTO: 8.7 FL (ref 5–10.5)
POTASSIUM SERPL-SCNC: 4.7 MMOL/L (ref 3.5–5.1)
RBC # BLD: 2.55 M/UL (ref 4–5.2)
RBC FLUID: <1000 /CUMM
SODIUM BLD-SCNC: 125 MMOL/L (ref 136–145)
WBC # BLD: 16.8 K/UL (ref 4–11)

## 2021-06-27 PROCEDURE — 90935 HEMODIALYSIS ONE EVALUATION: CPT

## 2021-06-27 PROCEDURE — 87070 CULTURE OTHR SPECIMN AEROBIC: CPT

## 2021-06-27 PROCEDURE — 87040 BLOOD CULTURE FOR BACTERIA: CPT

## 2021-06-27 PROCEDURE — 36415 COLL VENOUS BLD VENIPUNCTURE: CPT

## 2021-06-27 PROCEDURE — 2500000003 HC RX 250 WO HCPCS: Performed by: PHYSICAL MEDICINE & REHABILITATION

## 2021-06-27 PROCEDURE — 80048 BASIC METABOLIC PNL TOTAL CA: CPT

## 2021-06-27 PROCEDURE — 6360000002 HC RX W HCPCS: Performed by: PHYSICAL MEDICINE & REHABILITATION

## 2021-06-27 PROCEDURE — 6370000000 HC RX 637 (ALT 250 FOR IP): Performed by: PHYSICAL MEDICINE & REHABILITATION

## 2021-06-27 PROCEDURE — 89051 BODY FLUID CELL COUNT: CPT

## 2021-06-27 PROCEDURE — 87205 SMEAR GRAM STAIN: CPT

## 2021-06-27 PROCEDURE — 90945 DIALYSIS ONE EVALUATION: CPT | Performed by: INTERNAL MEDICINE

## 2021-06-27 PROCEDURE — 85027 COMPLETE CBC AUTOMATED: CPT

## 2021-06-27 PROCEDURE — 1280000000 HC REHAB R&B

## 2021-06-27 RX ADMIN — GENTAMICIN SULFATE: 1 CREAM TOPICAL at 16:59

## 2021-06-27 RX ADMIN — METOPROLOL TARTRATE 25 MG: 25 TABLET, FILM COATED ORAL at 20:31

## 2021-06-27 RX ADMIN — ACETAMINOPHEN 1000 MG: 325 TABLET ORAL at 05:34

## 2021-06-27 RX ADMIN — ASPIRIN 325 MG: 325 TABLET, COATED ORAL at 07:58

## 2021-06-27 RX ADMIN — STANDARDIZED SENNA CONCENTRATE AND DOCUSATE SODIUM 1 TABLET: 8.6; 5 TABLET ORAL at 20:32

## 2021-06-27 RX ADMIN — SEVELAMER CARBONATE 800 MG: 800 TABLET, FILM COATED ORAL at 11:49

## 2021-06-27 RX ADMIN — ATORVASTATIN CALCIUM 20 MG: 20 TABLET, FILM COATED ORAL at 20:31

## 2021-06-27 RX ADMIN — HEPARIN SODIUM 5000 UNITS: 5000 INJECTION INTRAVENOUS; SUBCUTANEOUS at 14:51

## 2021-06-27 RX ADMIN — MICONAZOLE NITRATE: 20 POWDER TOPICAL at 20:34

## 2021-06-27 RX ADMIN — GENTAMICIN SULFATE: 1 CREAM TOPICAL at 18:30

## 2021-06-27 RX ADMIN — HEPARIN SODIUM 5000 UNITS: 5000 INJECTION INTRAVENOUS; SUBCUTANEOUS at 05:34

## 2021-06-27 RX ADMIN — PANTOPRAZOLE SODIUM 40 MG: 40 TABLET, DELAYED RELEASE ORAL at 05:34

## 2021-06-27 RX ADMIN — METOPROLOL TARTRATE 25 MG: 25 TABLET, FILM COATED ORAL at 07:56

## 2021-06-27 RX ADMIN — ACETAMINOPHEN 1000 MG: 325 TABLET ORAL at 20:32

## 2021-06-27 RX ADMIN — SEVELAMER CARBONATE 800 MG: 800 TABLET, FILM COATED ORAL at 07:56

## 2021-06-27 RX ADMIN — TRAMADOL HYDROCHLORIDE 100 MG: 50 TABLET, FILM COATED ORAL at 02:55

## 2021-06-27 RX ADMIN — ZOLPIDEM TARTRATE 5 MG: 5 TABLET ORAL at 20:32

## 2021-06-27 RX ADMIN — GENTAMICIN SULFATE: 1 CREAM TOPICAL at 16:43

## 2021-06-27 RX ADMIN — TRAMADOL HYDROCHLORIDE 50 MG: 50 TABLET, FILM COATED ORAL at 20:32

## 2021-06-27 RX ADMIN — STANDARDIZED SENNA CONCENTRATE AND DOCUSATE SODIUM 1 TABLET: 8.6; 5 TABLET ORAL at 07:55

## 2021-06-27 RX ADMIN — MICONAZOLE NITRATE: 20 POWDER TOPICAL at 07:57

## 2021-06-27 RX ADMIN — ACETAMINOPHEN 1000 MG: 325 TABLET ORAL at 14:50

## 2021-06-27 RX ADMIN — SEVELAMER CARBONATE 800 MG: 800 TABLET, FILM COATED ORAL at 16:58

## 2021-06-27 RX ADMIN — TRAMADOL HYDROCHLORIDE 100 MG: 50 TABLET, FILM COATED ORAL at 07:56

## 2021-06-27 RX ADMIN — HEPARIN SODIUM 5000 UNITS: 5000 INJECTION INTRAVENOUS; SUBCUTANEOUS at 20:36

## 2021-06-27 ASSESSMENT — PAIN DESCRIPTION - PAIN TYPE
TYPE: ACUTE PAIN
TYPE: ACUTE PAIN

## 2021-06-27 ASSESSMENT — PAIN SCALES - GENERAL
PAINLEVEL_OUTOF10: 6
PAINLEVEL_OUTOF10: 0
PAINLEVEL_OUTOF10: 2
PAINLEVEL_OUTOF10: 4
PAINLEVEL_OUTOF10: 2
PAINLEVEL_OUTOF10: 7
PAINLEVEL_OUTOF10: 5
PAINLEVEL_OUTOF10: 6

## 2021-06-27 ASSESSMENT — PAIN DESCRIPTION - ORIENTATION: ORIENTATION: LEFT

## 2021-06-27 ASSESSMENT — PAIN DESCRIPTION - DESCRIPTORS: DESCRIPTORS: ACHING

## 2021-06-27 ASSESSMENT — PAIN DESCRIPTION - LOCATION: LOCATION: BACK

## 2021-06-27 ASSESSMENT — PAIN DESCRIPTION - ONSET: ONSET: ON-GOING

## 2021-06-27 ASSESSMENT — PAIN DESCRIPTION - FREQUENCY: FREQUENCY: CONTINUOUS

## 2021-06-27 ASSESSMENT — PAIN - FUNCTIONAL ASSESSMENT: PAIN_FUNCTIONAL_ASSESSMENT: ACTIVITIES ARE NOT PREVENTED

## 2021-06-27 ASSESSMENT — PAIN DESCRIPTION - PROGRESSION: CLINICAL_PROGRESSION: NOT CHANGED

## 2021-06-27 NOTE — FLOWSHEET NOTE
CCPD Order   Exchanges: 5   Exchange Volume: 1600 ml   Total Time: 10 hrs   Dextrose: 2.5%   Last Fill: 0 ml   Total Volume: 8000 ml     Orders verified. Supplies taken to pt's room. Report received. Cycler set up, primed and pre tested. Dressing changed on [facility] Cath site. Pt connected to cycler. CCPD initiated without problem. Initial effluent clear. 06/27/21 1826   Vitals   BP (!) 187/70   Temp 97.9 °F (36.6 °C)   Temp Source Oral   Pulse 74   Resp 17   Weight 223 lb 15.8 oz (101.6 kg)   Peritoneal Dialysis Catheter Left lower abdomen   Placement Date/Time: 08/16/18 0818   Inserted by: Ashlie Payan MD  Catheter Location: Left lower abdomen   Status Accessed   Site Condition No Complications   Dressing Status Clean;Dry; Intact   Dressing Occlusive   Cycler   Verification of Prescription CCPD   Total Volume Programmed 8000 mL   Therapy Time (Hours:Minutes) 10:00   Fill Volume 1600 mL   Last Fill Volume 0 mL   Dextrose Setting Same (Nonextraneal)   Number of Cycles 5   Bag Volume 5000 mL   Number of Bags Used 2   Dianeal Solution Other (Comment)  (Delflex 2.5% in 5000 ml)

## 2021-06-27 NOTE — PLAN OF CARE
Peritoneal dialysis nurse collected fluid to test sample this am r/t patients abdominal pain for the past 2 days. Dressing changed to left foot, patient tolerated well, no changes noted, foot continues to be necrotic. Dr. Rio Mcdonough notified for patients elevated WBC count of 16.8, new orders for Blood Cultures x2. Patient currently is not running a temperature. Will continue to monitor.

## 2021-06-27 NOTE — PROGRESS NOTES
Office : [phone]     Fax :[phone]       Nephrology progress  Note      Patient's Name: Sri Locke  9:29 AM  2021    Reason for Consult:  ESRD    History of Present Ilness:    Sri Locke is a 51 y.o. female with severe end-stage renal disease secondary to diabetic nephropathy on peritoneal dialysis, hypertension, peripheral vascular disease, diabetic neuropathy who was admitted after she had a syncopal episode. She had similar episode 2 weeks ago. Recently had angiogram left lower extremity for gangrene left foot second toe. Denies any shortness of breath. Denies any abdominal pain. Denies any nausea vomiting. Interval HX :        Seen on PD   No chest pain  No sob  S/p CABG on 2010      Sodium level still low at 125       I/O last 3 completed shifts:  In: -   Out: 21     Past Medical History:   Diagnosis Date    [facility] ([facility] [address]     End stage kidney disease ([facility] [address]     peritoneal dialysis every night at home x 2 years    Hypertension     Neuropathy     Peripheral vascular disease ([facility] [address]        Past Surgical History:   Procedure Laterality Date     SECTION      CORONARY ARTERY BYPASS GRAFT N/A 2021    URGENT CABG CORONARY ARTERY BYPASS GRAFTS X3. VEIN TO PDA X1, VEIN TO OM X1, LEFT INTERNAL MAMMARY ARTERY TO LAD X1. ENDOSCOPIC HARVEST RIGHT LEG SAPHENOUS VEIN. LIGATION NYA USING 35MM ATRICLIP. EPI AORTIC ULTRASOUND. TOTAL CARDIOPULMONARY BYPASS. DOPPLER GRAFT FLOW VERIFICATION. BILATERAL INITERCOSTAL NERVE BLOCK USING 1.3% EXPAREL.  performed by Ermias Gibbs MD at [place] [address]  2018     lap PD cath placement lt side 62 cm    TOE AMPUTATION Left 2021    AMPUTATION OF THE LEFT THIRD TOE performed by Estiven Uribe Lawton Hashimoto, DPM at [address] [address] [place] TOE AMPUTATION Left 5/26/2021    AMPUTATION OF HALLUX AND SECOND TOE, LEFT FOOT performed by Alison Brown DPM at [facility] [address]         Family History   Problem Relation Age of Onset    Coronary Art Dis Mother     High Blood Pressure Mother     Heart Disease Mother     Diabetes Mother     Cancer Father     Coronary Art Dis Father     High Blood Pressure Father     Heart Disease Father     Diabetes Father      Current Medications:    [Held by provider] torsemide (DEMADEX) tablet 100 mg, Daily  gentamicin (GARAMYCIN) 0.1 % cream, Daily  insulin aspart (NOVOLOG) injection pen 0-18 Units-PATIENT SUPPLIED, TID WC  insulin aspart (NOVOLOG) injection PEN 0-9 Units-Patient supplied, Nightly  heparin (porcine) injection 5,000 Units, 3 times per day  acetaminophen (TYLENOL) tablet 1,000 mg, 3 times per day  albuterol sulfate  (90 Base) MCG/ACT inhaler 2 puff, Q6H PRN  aspirin EC tablet 325 mg, Daily  magnesium hydroxide (MILK OF MAGNESIA) 400 MG/5ML suspension 30 mL, Daily PRN  metoprolol tartrate (LOPRESSOR) tablet 25 mg, BID  pantoprazole (PROTONIX) tablet 40 mg, Daily  polyethylene glycol (GLYCOLAX) packet 17 g, Daily PRN  sennosides-docusate sodium (SENOKOT-S) 8.6-50 MG tablet 1 tablet, BID  traMADol (ULTRAM) tablet 50 mg, Q6H PRN   Or  traMADol (ULTRAM) tablet 100 mg, Q6H PRN  zolpidem (AMBIEN) tablet 5 mg, Nightly PRN  dextrose 5 % solution, PRN  dextrose 50 % IV solution, PRN  glucagon (rDNA) injection 1 mg, PRN  glucose (GLUTOSE) 40 % oral gel 15 g, PRN  atorvastatin (LIPITOR) tablet 20 mg, Nightly  bisacodyl (DULCOLAX) EC tablet 5 mg, Daily PRN  diphenhydrAMINE (BENADRYL) tablet 25 mg, Q6H PRN  heparin (porcine) injection 3,400 Units, PRN  hydrALAZINE (APRESOLINE) tablet 50 mg, Q8H PRN  ondansetron (ZOFRAN-ODT) disintegrating tablet 4 mg, Q8H PRN  sevelamer (RENVELA) tablet 800 mg, TID WC  miconazole (MICOTIN) 2 % powder, BID          Physical exam: Vitals:  BP (!) 164/83   Pulse 71   Temp 98 °F (36.7 °C) (Oral)   Resp 16   Ht 5' 9\" (1.753 m)   Wt 225 lb 8.5 oz (102.3 kg)   SpO2 98%   BMI 33.31 kg/m²   Constitutional:  OAA X3 NAD  Skin: no rash, turgor wnl  Heent:  eomi, mmm  Neck: no bruits or jvd noted  Cardiovascular:  S1, S2 without m/r/g  Respiratory: CTA B without w/r/r  Abdomen:  +bs, soft, nt, nd  Ext: no  lower extremity edema      Labs:  CBC:   Recent Labs     06/25/21  0812 06/26/21  0847 06/27/21  0602   WBC 11.8* 10.6 16.8*   HGB 7.4* 7.4* 7.4*    197 209     BMP:    Recent Labs     06/25/21  0812 06/26/21  0847 06/27/21  0602   * 125* 125*   K 4.6 4.5 4.7   CL 87* 85* 85*   CO2 26 24 24   BUN 68* 62* 61*   CREATININE 9.4* 8.7* 9.0*   GLUCOSE 137* 167* 320*     Ca/Mg/Phos:   Recent Labs     06/25/21  0812 06/26/21  0847 06/27/21  0602   CALCIUM 8.1* 8.1* 8.0*     UA:  No results for input(s): COLORU, CLARITYU, GLUCOSEU, BILIRUBINUR, KETUA, SPECGRAV, BLOODU, PHUR, PROTEINU, UROBILINOGEN, NITRU, LEUKOCYTESUR, Melinda Gabby in the last 72 hours. Urine Microscopic:   No results for input(s): LABCAST, BACTERIA, COMU, HYALCAST, WBCUA, RBCUA, EPIU in the last 72 hours. Urine Culture:   No results for input(s): LABURIN in the last 72 hours. Urine Chemistry: No results for input(s): [place] Gobble, PROTEINUR, NAUR in the last 72 hours. IMAGING:  XR CHEST PORTABLE   Final Result   Right-sided pleural effusion      Bibasilar hypoaeration with bibasilar opacities that could represent   subsegmental atelectasis or infection                 Assessment/Plan :      1. ESRD. Continue on peritoneal dialysis. continue cycler/CCPD as per current prescription   1400 mL as fill  Volume  Sodium level low. 2/2 hypervolemia  Sodium level 125 today   Limit water intake to 1200 ml   held torsemide   Increase fill volume to 1600 ml     PD site clean and no discharge. 2. HTN. Controlled     3. Anemia of chronic disease. Gets micera at dialysis unit  Continue LUIZA    4. Acid- base disorder. Monitor    5. Electrolytes monitor and replace as needed    6. CAD/Syncopal episode. S/p [facility]   Has Multivessel CAD   S/p CABG       7. Peripheral vascular disease. S/p amputation of left foot toes. Podiatry following     8. Generalized weakness. Getting rehab     9. Constipation .  Resolved   Daily stool softner       Thank you for allowing us to participate in care of Joe La         Electronically signed by: Juanita Chowdary MD, 6/27/2021, 9:29 AM      [facility] [address]  Office : [phone]  Fax :[phone]

## 2021-06-27 NOTE — PLAN OF CARE
Still c/o nausea, but declines more zofran (reports it gives her a h/a). Took tylenol although she had not taken afternoon dose (due to nausea), took ultram later. Refused 2 units of sliding scale, although was drinking 4oz milk. Stated \"I don't think there's anything wrong w/ my peritoneal fluid, I think I caught a bug or it is a s/e of medication. \" Awaiting lab orders for when dialysis RN unhooks in the morning    Problem: Pain:  Description: Pain management should include both nonpharmacologic and pharmacologic interventions. Goal: Pain level will decrease  Description: Pain level will decrease  Outcome: Ongoing  Goal: Control of acute pain  Description: Control of acute pain  Outcome: Ongoing  Goal: Control of chronic pain  Description: Control of chronic pain  Outcome: Ongoing     Problem: Skin Integrity:  Goal: Will show no infection signs and symptoms  Description: Will show no infection signs and symptoms  Outcome: Ongoing  Goal: Absence of new skin breakdown  Description: Absence of new skin breakdown  Outcome: Ongoing     Problem: Falls - Risk of:  Goal: Will remain free from falls  Description: Will remain free from falls  Outcome: Ongoing  Note: Education Provided as followed:  Family/Patient made aware of the tailored interventions falls plan using the TIPS TOOL.   Goal: Absence of physical injury  Description: Absence of physical injury  Outcome: Ongoing     Problem: IP BLADDER/VOIDING  Goal: STG - Patient demonstrates ability to take care of indwelling catheter  Outcome: Ongoing  Goal: STG - Patient demonstrates no accidents  Outcome: Ongoing     Problem: IP BREATHING  Goal: STG - patient will utilize incentive spirometer  Outcome: Ongoing  Goal: STG - Patient performs or directs assisted coughing  Outcome: Ongoing     Problem: NUTRITION  Description: Altered Nutrition and Hydration  Goal: Patient maintains weight  Outcome: Ongoing     Problem: SAFETY  Goal: LTG - Patient will demonstrate safety requirements appropriate to situation/environment  Outcome: Ongoing     Problem: SKIN INTEGRITY  Goal: LTG - Patient will be free from infection  Outcome: Ongoing     Problem: PAIN  Goal: LTG - Patient will manage pain with the appropriate technique/Intervention  Outcome: Ongoing  Goal: STG - pain is manageable through therapies  Outcome: Ongoing     Problem: Nutrition  Goal: Optimal nutrition therapy  Outcome: Ongoing

## 2021-06-27 NOTE — FLOWSHEET NOTE
06/27/21 0835   Vitals   BP (!) 160/83   Temp 98 °F (36.7 °C)   Temp Source Oral   Pulse 70   Resp 18   Weight 227 lb 11.8 oz (103.3 kg)   Peritoneal Dialysis Catheter Left lower abdomen   Placement Date/Time: 08/16/18 0818   Inserted by: Kassi Mccray MD  Catheter Location: Left lower abdomen   Status Clamped   Site Condition No Complications   Dressing Status Clean;Dry; Intact   Dressing Occlusive   Cycler   Ultrafiltration (UF) (mL) 1212 mL   Average Dwell Time (Hours:Minutes) 01:35   Lost Dwell Time (Hours:Minutes) 00:20   Disconnected from [facility] per protocol. Effluent: clear  Total time: 10 hr 10 min   Total UF:  1212 ml. Total Volume:  7013 ml. Dwell time gained:  0 hr 20 min.   Pt Tolerated procedure: good  Report given to: Nellie Phalen   Last BM: 6/26  Fluid sent for cell count and culture

## 2021-06-28 LAB
ANION GAP SERPL CALCULATED.3IONS-SCNC: 17 MMOL/L (ref 3–16)
BUN BLDV-MCNC: 61 MG/DL (ref 7–20)
CALCIUM SERPL-MCNC: 8 MG/DL (ref 8.3–10.6)
CHLORIDE BLD-SCNC: 84 MMOL/L (ref 99–110)
CO2: 24 MMOL/L (ref 21–32)
CREAT SERPL-MCNC: 9.8 MG/DL (ref 0.6–1.1)
GFR AFRICAN AMERICAN: 5
GFR NON-AFRICAN AMERICAN: 4
GLUCOSE BLD-MCNC: 140 MG/DL (ref 70–99)
GLUCOSE BLD-MCNC: 177 MG/DL (ref 70–99)
GLUCOSE BLD-MCNC: 219 MG/DL (ref 70–99)
GLUCOSE BLD-MCNC: 270 MG/DL (ref 70–99)
GLUCOSE BLD-MCNC: 294 MG/DL (ref 70–99)
GLUCOSE BLD-MCNC: 295 MG/DL (ref 70–99)
HCT VFR BLD CALC: 22.4 % (ref 36–48)
HEMOGLOBIN: 7.2 G/DL (ref 12–16)
MCH RBC QN AUTO: 29.6 PG (ref 26–34)
MCHC RBC AUTO-ENTMCNC: 32.1 G/DL (ref 31–36)
MCV RBC AUTO: 92.3 FL (ref 80–100)
PDW BLD-RTO: 20.7 % (ref 12.4–15.4)
PERFORMED ON: ABNORMAL
PLATELET # BLD: 220 K/UL (ref 135–450)
PMV BLD AUTO: 9.1 FL (ref 5–10.5)
POTASSIUM SERPL-SCNC: 4.7 MMOL/L (ref 3.5–5.1)
RBC # BLD: 2.43 M/UL (ref 4–5.2)
SODIUM BLD-SCNC: 125 MMOL/L (ref 136–145)
WBC # BLD: 13.3 K/UL (ref 4–11)

## 2021-06-28 PROCEDURE — 6360000002 HC RX W HCPCS: Performed by: PHYSICAL MEDICINE & REHABILITATION

## 2021-06-28 PROCEDURE — 92507 TX SP LANG VOICE COMM INDIV: CPT

## 2021-06-28 PROCEDURE — 6370000000 HC RX 637 (ALT 250 FOR IP): Performed by: PHYSICAL MEDICINE & REHABILITATION

## 2021-06-28 PROCEDURE — 97110 THERAPEUTIC EXERCISES: CPT

## 2021-06-28 PROCEDURE — 6370000000 HC RX 637 (ALT 250 FOR IP): Performed by: INTERNAL MEDICINE

## 2021-06-28 PROCEDURE — 80048 BASIC METABOLIC PNL TOTAL CA: CPT

## 2021-06-28 PROCEDURE — 1280000000 HC REHAB R&B

## 2021-06-28 PROCEDURE — 36415 COLL VENOUS BLD VENIPUNCTURE: CPT

## 2021-06-28 PROCEDURE — 90945 DIALYSIS ONE EVALUATION: CPT

## 2021-06-28 PROCEDURE — 99233 SBSQ HOSP IP/OBS HIGH 50: CPT | Performed by: INTERNAL MEDICINE

## 2021-06-28 PROCEDURE — 97530 THERAPEUTIC ACTIVITIES: CPT

## 2021-06-28 PROCEDURE — 85027 COMPLETE CBC AUTOMATED: CPT

## 2021-06-28 RX ORDER — CALCIUM CARBONATE 200(500)MG
500 TABLET,CHEWABLE ORAL 3 TIMES DAILY PRN
Status: DISCONTINUED | OUTPATIENT
Start: 2021-06-28 | End: 2021-07-01 | Stop reason: HOSPADM

## 2021-06-28 RX ORDER — SODIUM CHLORIDE 1000 MG
1 TABLET, SOLUBLE MISCELLANEOUS
Status: DISCONTINUED | OUTPATIENT
Start: 2021-06-28 | End: 2021-06-29

## 2021-06-28 RX ADMIN — ANTACID TABLETS 500 MG: 500 TABLET, CHEWABLE ORAL at 12:50

## 2021-06-28 RX ADMIN — ANTACID TABLETS 500 MG: 500 TABLET, CHEWABLE ORAL at 17:55

## 2021-06-28 RX ADMIN — TRAMADOL HYDROCHLORIDE 100 MG: 50 TABLET, FILM COATED ORAL at 10:08

## 2021-06-28 RX ADMIN — GENTAMICIN SULFATE: 1 CREAM TOPICAL at 18:30

## 2021-06-28 RX ADMIN — ANTACID TABLETS 500 MG: 500 TABLET, CHEWABLE ORAL at 03:19

## 2021-06-28 RX ADMIN — HEPARIN SODIUM 5000 UNITS: 5000 INJECTION INTRAVENOUS; SUBCUTANEOUS at 21:24

## 2021-06-28 RX ADMIN — ONDANSETRON 4 MG: 4 TABLET, ORALLY DISINTEGRATING ORAL at 17:55

## 2021-06-28 RX ADMIN — STANDARDIZED SENNA CONCENTRATE AND DOCUSATE SODIUM 1 TABLET: 8.6; 5 TABLET ORAL at 21:24

## 2021-06-28 RX ADMIN — ZOLPIDEM TARTRATE 5 MG: 5 TABLET ORAL at 21:24

## 2021-06-28 RX ADMIN — HEPARIN SODIUM 5000 UNITS: 5000 INJECTION INTRAVENOUS; SUBCUTANEOUS at 05:25

## 2021-06-28 RX ADMIN — METOPROLOL TARTRATE 25 MG: 25 TABLET, FILM COATED ORAL at 21:24

## 2021-06-28 RX ADMIN — TRAMADOL HYDROCHLORIDE 100 MG: 50 TABLET, FILM COATED ORAL at 21:24

## 2021-06-28 RX ADMIN — METOPROLOL TARTRATE 25 MG: 25 TABLET, FILM COATED ORAL at 10:05

## 2021-06-28 RX ADMIN — Medication 1 G: at 17:19

## 2021-06-28 RX ADMIN — ACETAMINOPHEN 1000 MG: 325 TABLET ORAL at 05:25

## 2021-06-28 RX ADMIN — HEPARIN SODIUM 5000 UNITS: 5000 INJECTION INTRAVENOUS; SUBCUTANEOUS at 14:36

## 2021-06-28 RX ADMIN — MICONAZOLE NITRATE: 20 POWDER TOPICAL at 10:15

## 2021-06-28 RX ADMIN — SEVELAMER CARBONATE 800 MG: 800 TABLET, FILM COATED ORAL at 12:28

## 2021-06-28 RX ADMIN — MICONAZOLE NITRATE: 20 POWDER TOPICAL at 21:24

## 2021-06-28 RX ADMIN — ASPIRIN 325 MG: 325 TABLET, COATED ORAL at 10:05

## 2021-06-28 RX ADMIN — Medication 1 G: at 12:28

## 2021-06-28 RX ADMIN — SEVELAMER CARBONATE 800 MG: 800 TABLET, FILM COATED ORAL at 17:19

## 2021-06-28 RX ADMIN — ATORVASTATIN CALCIUM 20 MG: 20 TABLET, FILM COATED ORAL at 21:24

## 2021-06-28 RX ADMIN — ACETAMINOPHEN 1000 MG: 325 TABLET ORAL at 21:24

## 2021-06-28 RX ADMIN — STANDARDIZED SENNA CONCENTRATE AND DOCUSATE SODIUM 1 TABLET: 8.6; 5 TABLET ORAL at 10:05

## 2021-06-28 RX ADMIN — ACETAMINOPHEN 1000 MG: 325 TABLET ORAL at 14:38

## 2021-06-28 RX ADMIN — PANTOPRAZOLE SODIUM 40 MG: 40 TABLET, DELAYED RELEASE ORAL at 05:25

## 2021-06-28 RX ADMIN — TRAMADOL HYDROCHLORIDE 50 MG: 50 TABLET, FILM COATED ORAL at 03:19

## 2021-06-28 ASSESSMENT — PAIN SCALES - GENERAL
PAINLEVEL_OUTOF10: 0
PAINLEVEL_OUTOF10: 5
PAINLEVEL_OUTOF10: 7
PAINLEVEL_OUTOF10: 2
PAINLEVEL_OUTOF10: 5
PAINLEVEL_OUTOF10: 3
PAINLEVEL_OUTOF10: 3

## 2021-06-28 ASSESSMENT — PAIN DESCRIPTION - PAIN TYPE: TYPE: ACUTE PAIN

## 2021-06-28 NOTE — PROGRESS NOTES
Occupational Therapy  Facility/Department: [facility] ACUTE REHAB UNIT  Daily Treatment Note  NAME: Aaron Simmonds  : 1975  MRN: [id number]    Date of Service: 2021    Discharge Recommendations:  S Level 3, Home with Home health OT, Home with assist PRN  OT Equipment Recommendations  Equipment Needed: Yes  Mobility Devices: ADL Assistive Devices  ADL Assistive Devices: Reacher;Sock-Aid Hard;Transfer Tub Bench; Toileting - Raised Toilet Seat without arms  Other: cotkentrellue to update/assess pending progress    Assessment   Performance deficits / Impairments: Decreased functional mobility ; Decreased ADL status; Decreased strength;Decreased endurance;Decreased sensation;Decreased high-level IADLs;Decreased coordination;Decreased balance  Assessment: Pt presented with the above deficits impacting his occupational peprformance. Pt is slowly porgressing towards goals- still requiring extensive assist for LB dressing and set-up/SBA for UB. Pt's activity tolerance is slowly improving, able to ambulate with RW at [facility] however requiring variable assist levels from Mod of 2 to MaxA of 1 for sit>stands. Pt tolerates sessions on RA. Pt would benefit from ongoing intensive therapy services and extended stay in order to maximize her independence and safety with all occupational pursuits. Continue POC  Treatment Diagnosis: decreased ADL/IADL/mobility/activity tolerance due to CABG  Prognosis: Good  OT Education: OT Role;Plan of Care;Precautions;Transfer Training  Patient Education: continue to reinforce for carryover  REQUIRES OT FOLLOW UP: Yes  Activity Tolerance  Activity Tolerance: Patient limited by fatigue  Activity Tolerance: encouragement for tasks, pt self-limiting at times  Safety Devices  Safety Devices in place: Yes  Type of devices: Left in chair; All fall risk precautions in place;Call light within reach;Nurse notified; Chair alarm in place         Patient Diagnosis(es): CABG, debility      has a past medical history of Diabetes mellitus ([facility] [address] End stage kidney disease ([facility] [address] Hypertension, Neuropathy, and Peripheral vascular disease ([facility] [address] has a past surgical history that includes Tonsillectomy;  section; other surgical history (2018); Toe amputation (Left, 2021); Toe amputation (Left, 2021); and Coronary artery bypass graft (N/A, 2021). Restrictions  Restrictions/Precautions  Restrictions/Precautions: Fall Risk, Weight Bearing  Required Braces or Orthoses?: Yes  Lower Extremity Weight Bearing Restrictions  Left Lower Extremity Weight Bearing: Weight Bearing As Tolerated  Required Braces or Orthoses  Left Lower Extremity Brace: Boot  LLE Brace Type: Surgical shoe  Position Activity Restriction  Sternal Precautions: No Pushing, No Pulling, 5# Lifting Restrictions  Other position/activity restrictions: s/p CABG x 3 Patient is a 51year old female with a history of ESRD on PD, HTN, DM, HLD, and PVD. On 21 she had amputation of toes 1 and 2 on the left foot. On 21 she underwent a left popliteal, anterior tibial and dorsalis pedis angioplasty with Dr. Lisa Ferris. She presented to the ER on 21 with a syncopal episode. She has been experiencing dizziness and syncopal episodes X2 weeks prior to this visit. She underwent a Left Heart Catheterization on 21 and was found to have severe coronary microvascular disease. On 21 she underwent a CABG X3 with NYA clip    Subjective   General  Chart Reviewed: Yes  Patient assessed for rehabilitation services?: Yes  Additional Pertinent Hx: DM - peritoneal dialysis; s/p amputation of the 1st and 2nd toes of the left foot (21)  Response to previous treatment: Patient with no complaints from previous session  Family / Caregiver Present: No  Referring Practitioner: Fabiola Tran MD  Diagnosis: CABG x 3   Subjective  Subjective: Pt in bed upon entry, agreeable to cotx session  General Comment  Comments: Leon Bullock   Vital Signs  Patient Currently in Pain: Denies          Objective    ADL  Additional Comments: pt assisted to don brace and shoes seated EOB- all other ADL needs declined           Balance  Sitting Balance: Supervision  Standing Balance: Contact guard assistance  Standing Balance  Time: 9-10min total  Activity: functional mobility/transfers, stairs    Functional Mobility  Functional - Mobility Device: Rolling Walker  Assist Level: Contact guard assistance  Functional Mobility Comments: ~270ft total to gym and then gym to room at EOS, w/c follow provided; pt completed stairs x2 with Kymberly (see PT noted for detail)       Transfers  Sit to stand: 2 Person assistance  Stand to sit: Minimal assistance  Transfer Comments: Mod + Kymberly of 2 to MaxA of 1 for sit>stands; transfer training from EOM table starting at raised height and decreasing, 3 reps x3, variable assist levels and LE/glute activation               Second Session:  Pt in bed upon entry, agreeable to session, denied pain and declined ADL needs. Supine>sit with HOB elevated at SBA. Pt ambulated to/from bathroom for toilet transfer trail with BSC raised- MaxA of 1 for sit>stand from high BSC. Pt ambulated ~ 80ft + 40ft with RW at [place] [id number] with w/c follow. Supine<>sit (Kymberly with cues for sternal precautions) and rolling L/R completed on mat table during TE activity: 5 reps x3 of bridging with 10 sec holds, hip abduction in side-lying R and L 10 reps x2 bilaterally. Pt requesting to lay in bed at EOS, sit>supine with Kymberly to LEs. Pt left supine in bed, bed alarm on, call light and needs in reach.                Plan   Plan  Times per week: 90 minutes; 5-6x/wk  Times per day: Daily  Current Treatment Recommendations: Strengthening, ROM, Balance Training, Functional Mobility Training, Endurance Training, Safety Education & Training, Patient/Caregiver Education & Training, Equipment Evaluation, Education, & procurement, Positioning, Self-Care / ADL, Pain Management, Home Management Training Goals  Short term goals  Time Frame for Short term goals: 1-2weeks  Short term goal 1: Pt will complete UB/LB dressing with mod I and use fo AE as needed  Short term goal 2: Pt will complete bathing with mod I  Short term goal 3: Pt will complete toileting with mod I  Short term goal 4: Pt will complete functional transfers with mod I  Short term goal 5: Pt will tolerate x10 mins of standing IADLs with SpO2 above 90% in order to increase activity tolerance for d/c home  Long term goals  Time Frame for Long term goals : LTG=STG  Patient Goals   Patient goals : to return home       Therapy Time   Individual Concurrent Group Co-treatment   Time In  1170     3566, 1245   Time Out   1245     0915, 1330   Minutes  5     40, 45        Timed Code Treatment Minutes:  40 + 45 + 5   Total Treatment Minutes:  [address], [facility] [address] [facility] [address]

## 2021-06-28 NOTE — PROGRESS NOTES
Physical Therapy  Facility/Department: [facility] ACUTE REHAB UNIT  Daily Treatment Note  NAME: Reinaldo Angel  : 1975  MRN: [id number]    Date of Service: 2021    Discharge Recommendations:  Home with assist PRN, S Level 3, 24 hour supervision or assist   PT Equipment Recommendations  Equipment Needed: Yes  Mobility Devices: Melina Deejay: Rolling    Assessment   Body structures, Functions, Activity limitations: Decreased functional mobility ; Decreased ADL status; Decreased strength;Decreased balance;Decreased endurance  Assessment: [place] demonstrates improved STS strength this date as compared to prior sessions, requiring min+mod A with intermittent max A x 1 when standing from elevated surface. Pt continues to present with limitations due to decreased hip extensor and abductor strength and will benefit from continued skilled PT services to improve independence. PT Education: Goals;Plan of Care;Precautions; Functional Mobility Training;Transfer Training;Gait Training  Patient Education: Ongoing reinforcement of sternal precautions when completing STS  REQUIRES PT FOLLOW UP: Yes  Activity Tolerance  Activity Tolerance: Patient limited by endurance; Patient limited by fatigue  Activity Tolerance: pt tolerates entire session on RA with SPO2 above 90%, pt requires extended rest breaks with all activities due to poor activity tolerance     Patient Diagnosis(es): CABG, generalized weakness    has a past medical history of Diabetes mellitus ([address] End stage kidney disease ([address] Hypertension, Neuropathy, and Peripheral vascular disease ([address] has a past surgical history that includes Tonsillectomy;  section; other surgical history (2018); Toe amputation (Left, 2021); Toe amputation (Left, 2021); and Coronary artery bypass graft (N/A, 2021).     Restrictions  Restrictions/Precautions  Restrictions/Precautions: Fall Risk, Weight Bearing  Required Braces or Orthoses?: Yes  Lower Extremity Weight Bearing Restrictions  Left Lower Extremity Weight Bearing: Weight Bearing As Tolerated  Required Braces or Orthoses  Left Lower Extremity Brace: Boot  LLE Brace Type: Surgical shoe  Position Activity Restriction  Sternal Precautions: No Pushing, No Pulling, 5# Lifting Restrictions  Other position/activity restrictions: s/p CABG x 3 6/9Patient is a 51year old female with a history of ESRD on PD, HTN, DM, HLD, and PVD. On 5/26/21 she had amputation of toes 1 and 2 on the left foot. On 5/27/21 she underwent a left popliteal, anterior tibial and dorsalis pedis angioplasty with Dr. Noe Narvaez. She presented to the ER on 5/31/21 with a syncopal episode. She has been experiencing dizziness and syncopal episodes X2 weeks prior to this visit. She underwent a Left Heart Catheterization on 6/2/21 and was found to have severe coronary microvascular disease. On 6/9/21 she underwent a CABG X3 with NYA clip    Subjective   General  Response To Previous Treatment: Patient with no complaints from previous session. Family / Caregiver Present: No  Subjective  Subjective: Pt denies pain and is agreeable to [address] session, states that she was ill over the weekend but is feeling better.   General Comment  Comments: Semi-Supine in bed on arrival, on 3L NC          Objective   Bed mobility  Supine to Sit: Stand by assistance (HOB elevated)  Scooting: Stand by assistance  Transfers  Sit to Stand: Dependent/Total (mod A x 1 + min A x 1, intermittently max A x 1)  Stand to sit: Contact guard assistance (improved eccentric control)  Bed to Chair: Contact guard assistance (via stand step with RW)  Ambulation  Ambulation?: Yes  Ambulation 1  Surface: level tile  Device: Rolling Walker  Assistance: Contact guard assistance  Quality of Gait: Step to pattern, trunk flexion, short step length  Gait Deviations: Slow Jyoti;Decreased step length;Decreased step height  Distance: 193' + 80'  Stairs/Curb  Stairs?: Yes  Stairs  # Steps : 3 (x 2)  Stairs Height: 6\"  Rails: Bilateral  Assistance: Minimal assistance     Balance  Posture: Fair  Sitting - Static: Good (supervision)  Sitting - Dynamic: Good (supervision)  Standing - Static: Fair (SBA with RW)  Standing - Dynamic: Fair (CGA with RW)            Comment: 1st session: Pt completed repeated STS transfers from moderate->low surface at mat table. Pt requires fluctuating assistance to complete (mod A x 1 + min A x 1 intermittently requiring max A x 1). Pt continues to present with impaired gluteal strength, requiring assistance to complete transfers. Pt completes ~8 reps total.            2nd session:   Pt supine in bed upon PT entry, agreeable to session and denies pain. Pt completes supine to sit transfer with SBA. Pt completes STS transfer from elevated surface at EOB with min A. Pt ambulates to and from bathroom x 15' with CGA and use of RW. Pt requires max A x 1 for STS from high BSC. Pt ambulates 80'+40' with RW and CGA, w/c in tow. Pt completes supine <>sit at mat table with min A (cuing intermittently required to maintain sternal precautions, requires CGA for rolling L/R. While on mat pt completes bridges 5 reps x 3 with 10 second holds, hip abduction in side-lying R/L x 10 reps x 2 BLE, with PT providing stabilization at hip. Upon completion of therapy session, pt requests to return to bed. Sit => supine transfer completed at min A for assistance with LEs. Pt in bed with bed alarm activated and call light within reach.     Goals  Short term goals  Time Frame for Short term goals: to be met in 21 days  Short term goal 1: pt will complete bed mobility with mod I  Short term goal 2: pt will complete STS transfers with mod I  Short term goal 3: pt will complete stand step transfers with mod I  Short term goal 4: pt will ambulate x 150' with mod I of LRAD  Short term goal 5: pt will ascend/descend 2 stairs with mod I  Patient Goals   Patient goals : to return home and regain independence    Plan Plan  Times per week: 90 minutes per day, 5-7x/wk  Times per day: Daily  Current Treatment Recommendations: Strengthening, Balance Training, Functional Mobility Training, Transfer Training, Endurance Training, Gait Training, Stair training, Patient/Caregiver Education & Training, Manual Therapy - Soft Tissue Mobilization, Neuromuscular Re-education, Safety Education & Training  Safety Devices  Type of devices:  All fall risk precautions in place, Call light within reach, Chair alarm in place, Gait belt, Left in chair  Restraints  Initially in place: No     Therapy Time   Individual Concurrent Group Co-treatment   Time In  0830     0835   Time Out  0835     0915   Minutes  5     40          Second Session Therapy Time:   Individual Concurrent Group Co-treatment   Time In       7997   Time Out       1330   Minutes       45     Timed Code Treatment Minutes:  8+72+35    Total Treatment Minutes:  90 minutes    [address] [facility], [address] PT, DPT [id number]

## 2021-06-28 NOTE — FLOWSHEET NOTE
Disconnected from [facility] per protocol. Effluent: clear    Total time: 10 hr 27 min   Total UF:  1237 ml. Total Volume:  8033 ml. Dwell time gained:  0 hr 15 min. Pt Tolerated procedure without difficulty. Report given to: Katheryn Rashid RN  Last BM: 6/26/21 06/28/21 0510   Vitals   BP (!) 184/72   Temp 97.6 °F (36.4 °C)   Temp Source Oral   Pulse 76   Resp 16   SpO2 98 %   Weight 222 lb 10.6 oz (101 kg)   Peritoneal Dialysis Catheter Left lower abdomen   Placement Date/Time: 08/16/18 0818   Inserted by: Gala Mcintosh MD  Catheter Location: Left lower abdomen   Status Deaccessed   Site Condition No Complications   Dressing Status Clean;Dry; Intact   Dressing Occlusive   Cycler   Ultrafiltration (UF) (mL) 1237 mL

## 2021-06-28 NOTE — PROGRESS NOTES
ACUTE REHAB UNIT  SPEECH/LANGUAGE PATHOLOGY      [x] Daily  [] Weekly Care Conference Note  [] Discharge    Patient:Abida Shelton     :1975  RAW:[id number]  Room #: CRISTINA-3810/0091-77   Rehab Dx/Hx: Generalized weakness [R53.1]  Date of Admit: 2021      Precautions: falls and cardiac  Home situation: Pt lives at home with her son (20 y/o). She was independent with medication management and her older son assists with finances. Pt is an active . ST Dx: aphonia     Daily Treatment Info:   Date: 2021     Tx session 1   Pain Denied    Subjective     Pt found in bed, requesting to remain in bed for session. Pt pleasant and cooperative. Pt remains aphonic in conversation. Pt is endorsing poor appetite and is stating she is occasionally having trouble swallowing which is new since CABG x 3. Minimal swallow screen completed due to pt politely declining further po trials (see below). Will schedule to complete formal bedside swallow evaluation  tomorrow during lunch meal (pt reports she has more of an appetite and prefers lunch more than breakfast). Swallow Evaluation to be completed to determine if dysphagia is present/ suspected and for possible  recommendation for an instrumental swallow assessment. Objective:  Goals    Patient will participate in further assessment of breath support and voicing as indicated and able to tolerate. Attempted Voice Exercises (pt continues to be aphonic with targeted tasks / strained)  - Attempted phonation with use of pressing hands together at chest level x 3 attempts with inability to get adequate phonation (strained / breathy)   - Attempted phonation with use of pressing hands together at chest level ('ah' x 3) unable to get approximation for phonation   * Discontinued due to inability to get adequate phonation with strained/ breathy vocal quality.       Breath Support Exercises   - Inhalation x 5 seconds, breath hold for 5 seconds, exhalation for 5 seconds x 5 (pt able to complete c/o being slightly SOB following completion of exercise)   - Graded Inhalation (stair step inhalation x 3, hold for 5 seconds exhalation x 3) x 5 reps   - Pt reports she occ feels SOB when sitting up in bed receiving peritoneal dialysis. Reports feeling occ SOB with PT/OT   - Will continue to target via exercises     Pt remains with largely aphonic voice with all speech tasks. Reviewed vocal hygiene recommendations. Other areas targeted: Assessed pt taking few medications whole with water via straw (provided by RN). Pt utilizing head tilt to transfer pills to back of pharynx. Pt produced immediate dry, weak cough following pill administration. Pt with ongoing dry coughs throughout session with and without po (drinks of thins). Pt expectorated small amount of phlegm and reports when she get phlegm up it has been yellow/ greenish color. Pt reported minimal appetite but stated she does sometimes have trouble swallowing that is new since admission. Pt reports she occasionally feels like she has trouble getting some bites down and needs to drink a lot to get it to go down. Education:   Reviewed recommendations from ENT to initiate and trial voicing/ breath support exercises. If improvement does not occur prior to d/c or during trial period recommend ENT consult via OP for direct laryngoscopy as well as recommendation for outpatient voice therapy with a voice specialist who has the capability to complete videostroboscopy. Pt verbalized understanding. Safety Devices: [x] Call light within reach  [] Chair alarm activated  [x] Bed alarm activated  [] Other:     Assessment:   Speech Therapy Diagnosis  Speech Diagnosis: Pt presents with severe voice impairment following intubation (~3 days?). Pt presents with generally aphonic voice.  Pt able to achieve very minimal, inconsistent voicing with single phonemes demonstrating breathy, weak, and hoarse vocal quality; although, she is mostly aphonic and speaks at a whisper. Pt also demonstrates reduced breath support for longer phrases/sentences and conversational speech. Pt is able to phonate ~4-5 syllables per breath and reports fatigue with effortful speech. Current Diet Order: Adult Oral Nutrition Supplement; Low Calorie/High Protein Oral Supplement  ADULT DIET; Regular; 4 carb choices (60 gm/meal); 1200 ml            Plan/ Recommendations:     Frequency:  Up to 5days/week   Up to 30 minutes/day--at this time (as of 6/25/21), recommend holding voice therapy until ENT consult is completed with further recommendations. Will resume as appropriate.    - ENT saw pt on 6/25/21: indirect laryngoscopy completed with impressions being largely [facility] \"suspicion is slight trauma to the cords due to intubation which may be exacerbating current weakness problem. \" (see report for details)  - Will resume a trial of voice therapy as of 6/28/21 with recommendation to f/u with ENT via OP at d/c if voice does not improve with trial as well to refer to SLP with capability to complete videostroboscopy and who specializes in voice treatment    Discharge Recommendations:   Barriers: Communication deficit and Medication managment  Discharge Recommendations:  [] Home independently  [] Home with assistance []  24 hour supervision  [] SNF [x] Other: TBD  Continued SLP Treatment:  [] Yes [] No [x] TBD based on progress while on ARU [] Vital Stim indicated [] Other:   Estimated discharge date: 7/7/21    Type of Total Treatment Minutes   Session 1     Time In 1000   Time Out 1030    Timed Code Minutes  0   Individual Treatment Minutes  30    Co-Treatment Minutes     Group Treatment Minutes     Concurrent Treatment Minutes       TOTAL DAILY MINUTES:  30    Electronically Signed by     Patt CHAN CCC-SLP #[id number] 6/28/2021 1:19 PM  Speech-Language Pathologist

## 2021-06-28 NOTE — PATIENT CARE CONFERENCE
[facility]  Inpatient Rehabilitation  Weekly Team Conference Note    Patient Name: Pooja Allen        MRN: [id number]    : 1975  (51 y.o.)  Gender: female   Referring Practitioner: Dr. Shayla Sarabia  Diagnosis: s/p CABG     The team conference for this patient was held on 21 at 11:00am by:  Lillie Platt MD    CASE MANAGEMENT:  Assessment: Patient lives at home alone. Pt lives in [place] [address] but plans to stay with son in [address] [facility] upon discharge. PHYSICAL THERAPY:    Bed Mobility:   Scooting: Stand by assistance    Transfers:  Sit to Stand: Dependent/Total (mod A x 1 + min A x 1, intermittently max A x 1)  Stand to sit: Contact guard assistance (improved eccentric control)  Bed to Chair: Contact guard assistance (via stand step with RW)  Comment: 1st session: Pt completed repeated STS transfers from moderate->low surface at mat table. Pt requires fluctuating assistance to complete (mod A x 1 + min A x 1 intermittently requiring max A x 1). Pt continues to present with impaired gluteal strength, requiring assistance to complete transfers.  Pt completes ~8 reps total.    Ambulation 1  Surface: level tile  Device: Rolling Walker  Other Apparatus: O2 (RA)  Assistance: Contact guard assistance  Quality of Gait: Step to pattern, trunk flexion, short step length  Gait Deviations: Slow Jyoti, Decreased step length, Decreased step height  Distance: 193' + 80'  Comments: Slow gait speed    Stairs  # Steps : 3 (x 2)  Stairs Height: 6\"  Rails: Bilateral  Assistance: Minimal assistance  Comment: significant difficulty with knee and hip extension    QM:  Roll Left and Right  Assistance Needed: Supervision or touching assistance  CARE Score: 4  Discharge Goal: Independent  Sit to Lying  Assistance Needed: Supervision or touching assistance  CARE Score: 4  Discharge Goal: Independent  Lying to Sitting on Side of Bed  Assistance Needed: Supervision or touching assistance  CARE Score: 4  Discharge Goal: Independent  Sit to Stand  Assistance Needed: Dependent  CARE Score: 1  Discharge Goal: Independent  Chair/Bed-to-Chair Transfer  Assistance Needed: Supervision or touching assistance  CARE Score: 4  Discharge Goal: Independent  Car Transfer  Assistance Needed: Dependent  CARE Score: 1  Discharge Goal: Independent  Walk 10 Feet  Assistance Needed: Supervision or touching assistance  Reason if not Attempted: Not attempted due to medical condition or safety concerns  CARE Score: 4  Discharge Goal: Independent  Walk 50 Feet with Two Turns  Assistance Needed: Supervision or touching assistance  Reason if not Attempted: Not attempted due to medical condition or safety concerns  CARE Score: 4  Discharge Goal: Independent  Walk 150 Feet  Assistance Needed: Supervision or touching assistance  Reason if not Attempted: Not attempted due to medical condition or safety concerns  CARE Score: 4  Discharge Goal: Independent  Walking 10 Feet on Uneven Surfaces  Reason if not Attempted: Not attempted due to medical condition or safety concerns  CARE Score: 88  Discharge Goal: Independent  1 Step (Curb)  Assistance Needed: Partial/moderate assistance  Reason if not Attempted: Not attempted due to medical condition or safety concerns  CARE Score: 3  Discharge Goal: Independent  4 Steps  Reason if not Attempted: Not attempted due to medical condition or safety concerns  CARE Score: 88  Discharge Goal: Independent  12 Steps  Reason if not Attempted: Not attempted due to medical condition or safety concerns  CARE Score: 88  Discharge Goal: Independent  Picking Up Object  Reason if not Attempted: Not attempted due to medical condition or safety concerns  CARE Score: 88  Discharge Goal: Independent  Wheelchair Ability  Uses a Wheelchair and/or Scooter?: No    SPEECH THERAPY:    Diet Level:Adult Oral Nutrition Supplement; Low Calorie/High Protein Oral Supplement  ADULT DIET;  Regular; 4 carb choices (60 gm/meal); 1200 ml    Assessment: Speech Therapy Diagnosis  Speech Diagnosis: Pt presents with severe voice impairment following intubation (~3 days?). Pt presents with generally aphonic voice. Pt able to achieve very minimal, inconsistent voicing with single phonemes demonstrating breathy, weak, and hoarse vocal quality; although, she is mostly aphonic and speaks at a whisper. Pt also demonstrates reduced breath support for longer phrases/sentences and conversational speech. Pt is able to phonate ~4-5 syllables per breath and reports fatigue with effortful speech. Pt complaining of intermittent swallowing difficulty that is new, bedside swallow evaluation to be completed this date with goals to be added to POC as warranted. OCCUPATIONAL THERAPY:    ADL:   ADL  Feeding: Independent  Grooming: Setup, Contact guard assistance (CGA/SBA for hand hygiene in stance at sink)  UE Bathing: Setup, Stand by assistance, Verbal cueing, Increased time to complete  LE Bathing: Setup, Verbal cueing, Increased time to complete, Moderate assistance (assist to thoroughly dry LEs and posterior rula)  UE Dressing: Setup, Stand by assistance  LE Dressing:  (pt doffed LB clothing with reacher at SBA, CGA/Min for clothing over hips- pt assisted in donning pants/undergarments d/t time at end of shower)  Toileting: Setup, Contact guard assistance (CGA for clothing over hips in stance- set-up/SBA and increased time for rula care)  Additional Comments: pt assisted to don brace and shoes seated EOB- all other ADL needs declined    Toilet Transfers: Toilet Transfers  Toilet - Technique: Ambulating  Equipment Used: Standard toilet  Toilet Transfer: 2 Person assistance    Tub/ShowerTransfers:     Shower Transfers  Shower - Transfer From: Melven Najjar - Transfer Type: To and From  Shower - Transfer To:  Transfer tub bench  Shower - Technique: Ambulating    QM:  Eating  Assistance Needed: Independent  Comment: snacks at bedside  CARE Score: 6  Discharge Goal: Independent  Oral Hygiene  Assistance Needed: Setup or clean-up assistance  CARE Score: 5  Toileting Hygiene  Assistance Needed: Supervision or touching assistance  Reason if not Attempted:  (PD - voids infrequently)  CARE Score: 4  Toilet Transfer  Assistance Needed: Substantial/maximal assistance  CARE Score: 2  Discharge Goal: Independent  Shower/Bathe Self  Assistance Needed: Partial/moderate assistance  Comment: x2 assist  CARE Score: 3  Discharge Goal: Independent  Upper Body Dressing  Assistance Needed: Setup or clean-up assistance  CARE Score: 5  Discharge Goal: Independent  Lower Body Dressing  Assistance Needed: Partial/moderate assistance  Comment: x2 assist  CARE Score: 3  Discharge Goal: Independent  Putting On/Taking Off Footwear  Assistance Needed: Substantial/maximal assistance  CARE Score: 2  Discharge Goal: Independent    NUTRITION:  Weight: 222 lb 14.2 oz (101.1 kg) / Body mass index is 32.91 kg/m². Diet Order: CCC, 1200 ml fluid restriction     Supplements: vanilla Ensure High Protein BID    Pt remains nutritionally compromised d/t increased nutrient needs for healing s/p surgery, & current po intake is less than 50% of meals/ONS. Encouraged small, frequent meals to avoid leaving stomach empty. Please see nutrition note for details. NURSING:    Risk for Readmission: 36%    Ewing Fall Risk Score: 65  Wounds/Incisions/Ulcers: L foot necrotic despite having 3 toes amputated, will need revision. CABG incision, healing slowly. Excessive bruising from heparin injections. Also has PD site  Medication Review: medications reviewed daily with patient  Pain: Takes ultram & tylenol for generalized pain that is strongest on L side. Uses a heating pad on this side as well  Consultations/Labs/X-rays: Monitoring kidney function daily along with typical blood work. Creatinine continues to rise & low sodium has not yet improved.  Recent blood cultures & assessment of peritoneal fluid      Patient/Family Education provided by team:    Discharge Plan   Estimated Length of Stay:8 days  Destination: home health  Pass:No  Services at Discharge: [place] OT S3, [address] at Discharge: raised commode/toilet safety frame, TTB, RW  Factors facilitating achievement of predicted outcomes: high PLOF  Barriers to the achievement of predicted outcomes: complex med hx, motivation   Patient [place] Orion return home and regain independence, to return home,     Plan of Care  Interdisciplinary Individualized Plan of Care Review:    Continue Current Plan of Care:  Yes  Modifications:_____________________________    Rehab Team Members in attendance for Team Conference:  Jaky Gregory MSW, LSW    Marylou Gamboa, RD, LD    Travis Kenney OTR/L    Eliseo Reyes, PT, DPT    Oneida Carty M.A., [address] [place] [address] Katheryn RN, BSN    Marcio Denny PT, DPT,     I approve the established interdisciplinary plan of care as documented within the medical record of Tramaine Vera.     Denny Black MD  Electronically signed by Denny Black MD on 6/29/2021 at 11:58 AM

## 2021-06-28 NOTE — PLAN OF CARE
Problem: Pain:  Goal: Pain level will decrease  Description: Pain level will decrease  Outcome: Ongoing     Problem: Pain:  Goal: Control of acute pain  Description: Control of acute pain  Outcome: Ongoing     Problem: Pain:  Goal: Control of chronic pain  Description: Control of chronic pain  Outcome: Ongoing     Problem: Skin Integrity:  Goal: Will show no infection signs and symptoms  Description: Will show no infection signs and symptoms  Outcome: Ongoing     Problem: Skin Integrity:  Goal: Absence of new skin breakdown  Description: Absence of new skin breakdown  Outcome: Ongoing     Problem: Falls - Risk of:  Goal: Will remain free from falls  Description: Will remain free from falls  Outcome: Ongoing     Problem: Falls - Risk of:  Goal: Absence of physical injury  Description: Absence of physical injury  Outcome: Ongoing     Problem: IP BLADDER/VOIDING  Goal: STG - Patient demonstrates ability to take care of indwelling catheter  Outcome: Ongoing     Problem: IP BLADDER/VOIDING  Goal: STG - Patient demonstrates no accidents  Outcome: Ongoing     Problem: IP BREATHING  Goal: STG - patient will utilize incentive spirometer  Outcome: Ongoing     Problem: IP BREATHING  Goal: STG - Patient performs or directs assisted coughing  Outcome: Ongoing     Problem: NUTRITION  Goal: Patient maintains weight  Outcome: Ongoing     Problem: SAFETY  Goal: LTG - Patient will demonstrate safety requirements appropriate to situation/environment  Outcome: Ongoing     Problem: SKIN INTEGRITY  Goal: LTG - Patient will be free from infection  Outcome: Ongoing     Problem: PAIN  Goal: LTG - Patient will manage pain with the appropriate technique/Intervention  Outcome: Ongoing     Problem: PAIN  Goal: STG - pain is manageable through therapies  Outcome: Ongoing     Problem: Nutrition  Goal: Optimal nutrition therapy  Outcome: Ongoing

## 2021-06-28 NOTE — PROGRESS NOTES
PHARMACY NOTE    Jey Sinclair was ordered Levemir 10units SC twice a day. Per the [address], this medication is non-formulary and not stocked by pharmacy. The medication can be reordered at discharge. Plan:  Use the Formulary interchange of  Lantus 10units SC twice a day.      Gabino SaundersD

## 2021-06-28 NOTE — PROGRESS NOTES
[facility] home care referral. Noted Whiteboard note for d/c 6.29.21. Spoke with pt briefly re: home care plan of care/services. Agreeable. Demographic's upon discharge are unclear, pt states she may stay with son, does not know address. Rehab nurse states discharge date has been changed to 7.7. 21. Will follow for home care. Discharge planner notified.

## 2021-06-28 NOTE — PLAN OF CARE
Problem: Pain:  Goal: Pain level will decrease  Description: Pain level will decrease  6/27/2021 2028 by Dre Smith RN  Outcome: Ongoing     Problem: Skin Integrity:  Goal: Will show no infection signs and symptoms  Description: Will show no infection signs and symptoms  6/27/2021 2028 by Dre Smith RN  Outcome: Ongoing     Problem: Falls - Risk of:  Goal: Will remain free from falls  Description: Will remain free from falls  6/27/2021 2028 by Dre Smith RN  Outcome: Ongoing     Problem: SAFETY  Goal: LTG - Patient will demonstrate safety requirements appropriate to situation/environment  6/27/2021 2028 by Dre Smith RN  Outcome: Ongoing   Education Provided as followed:  \"Family/Patient made aware of the tailored interventions falls plan using the TIPS TOOL.

## 2021-06-28 NOTE — PROGRESS NOTES
Patient's pens . Called son to inform him that she needs new pens supplied.  Electronically signed by Lorraine Hale RN on 2021 at 10:57 AM

## 2021-06-28 NOTE — PROGRESS NOTES
Joe Pacer  6/28/2021  [id number]    Chief Complaint: Generalized weakness    Subjective:   Nauseated with emesis on Saturday. Resolved yesterday and again this am. No current complaints. Labs reviewed. ENT suggests continued SLP therapy for voice. Levemir somehow discontinued over the weekend. ROS: No CP, SOB, dyspnea    Objective:  Patient Vitals for the past 24 hrs:   BP Temp Temp src Pulse Resp SpO2 Weight   06/28/21 0700 -- -- -- -- -- -- 222 lb 14.2 oz (101.1 kg)   06/28/21 0510 (!) 184/72 97.6 °F (36.4 °C) Oral 76 16 98 % 222 lb 10.6 oz (101 kg)   06/27/21 2015 (!) 176/66 97.7 °F (36.5 °C) Oral 73 16 100 % --   06/27/21 1826 (!) 187/70 97.9 °F (36.6 °C) Oral 74 17 -- 223 lb 15.8 oz (101.6 kg)     Gen: No distress, pleasant. Resting in bed  HEENT: Normocephalic, atraumatic  CV: Regular rate and rhythm. No MRG   Resp: No respiratory distress. CTAB, O2 per NC  Abd: Soft, nontender, PD cath in place  Ext: +1 edema noted in BLE. LLE in clean dressing  Neuro: Alert, oriented, appropriately interactive. Laboratory data: Available via EMR. Therapy progress:  PT  Required Braces or Orthoses  Left Lower Extremity Brace: Boot  LLE Brace Type: Surgical shoe  Position Activity Restriction  Sternal Precautions: No Pushing, No Pulling, 5# Lifting Restrictions  Other position/activity restrictions: s/p CABG x 3 6/9Patient is a 51year old female with a history of ESRD on PD, HTN, DM, HLD, and PVD. On 5/26/21 she had amputation of toes 1 and 2 on the left foot. On 5/27/21 she underwent a left popliteal, anterior tibial and dorsalis pedis angioplasty with Dr. Najma Greenberg. She presented to the ER on 5/31/21 with a syncopal episode. She has been experiencing dizziness and syncopal episodes X2 weeks prior to this visit. She underwent a Left Heart Catheterization on 6/2/21 and was found to have severe coronary microvascular disease.  On 6/9/21 she underwent a CABG X3 with NYA clip  Objective     Sit to Stand: Dependent/Total (Aidan of 2 - maxA of 2)  Stand to sit: Moderate Assistance, Minimal Assistance (decreased eccentric control)  Bed to Chair: Contact guard assistance (via stand step with RW)  Device: Rolling Walker  Other Apparatus: O2 (RA)  Assistance: Contact guard assistance  Distance: 80'+113'  OT  PT Equipment Recommendations  Equipment Needed: Yes  Mobility Devices: Litzy Adriana: Rolling  Other: TBD with progress  Toilet - Technique: Ambulating  Equipment Used: Standard toilet  Assessment        SLP                Body mass index is 32.91 kg/m². Assessment:  Patient Active Problem List   Diagnosis    Diabetes mellitus ([facility]    Obesity    Microalbuminuria    Hyperlipidemia with target LDL less than 100    Hypertension    Diabetes mellitus type 2 in obese ([facility]    Acute renal failure (ARF) ([facility]    ESRD (end stage renal disease) ([facility]    Non-smoker    Elevated C-reactive protein (CRP)    Elevated sed rate    Diabetic polyneuropathy associated with type 2 diabetes mellitus (HCC)    Weight loss counseling, encounter for    Atherosclerosis of native arteries of left leg with ulceration of other part of foot ([facility]    Near syncope    Coronary artery disease of native artery of native heart with stable angina pectoris (HCC)    Mild malnutrition ([facility]    S/P coronary artery bypass graft x 3    S/P left atrial appendage ligation    Generalized weakness    Functional dysphonia       Plan:   Generalized weakness: PT/OT     CAD: S/P CABG X3 with NYA Clip (6/9). Sternal precautions. ASA, statin     PAD: S/P Amputation of toes 1 and 2 on the left (5/26/21). Routine wound care     ESRD: on PD 2/2 diabetic nephropathy. Appreciate nephro assistance    Hypoxic respiratory failure: O2 supplementation PRN, stress IS     DM II: Levemir decreased to 10 BID - reordered, prandial d/c'd, SSI high.      HTN: Lopressor 25 - defer adjustments to nephro     HLD: Lipitor 20     Oral candidiasis: Nystatin completed    Hyponatremia: per nephro and PD    Hypophonia: SLP consulted. Suspect pulmonary function related. ENT suggests SLP therapy for now. Potential scope as OP if not improved. Bowels: Per protocol  Bladder: Per protocol   Sleep: Ambien provided prn  Pain: Tylenol scheduled, tramadol as needed  DVT PPx: Heparin     Electronically signed by Marta Dudley MD on 6/28/2021 at 9:00 AM    * This document was created using dictation software. While all precautions were taken to ensure accuracy, errors may have occurred. Please disregard any typographical errors.

## 2021-06-28 NOTE — PROGRESS NOTES
Office : [phone]     Fax :[phone]       Nephrology progress  Note      Patient's Name: Laron Mcdonnell  10:15 AM  2021    Reason for Consult:  ESRD    History of Present Ilness:    Laron Mcdonnell is a 51 y.o. female with severe end-stage renal disease secondary to diabetic nephropathy on peritoneal dialysis, hypertension, peripheral vascular disease, diabetic neuropathy who was admitted after she had a syncopal episode. She had similar episode 2 weeks ago. Recently had angiogram left lower extremity for gangrene left foot second toe. Denies any shortness of breath. Denies any abdominal pain. Denies any nausea vomiting. Interval HX :        Seen on PD   No chest pain  No sob  S/p CABG on 2010      Sodium level still low at 125       I/O last 3 completed shifts: In: 12 [P.O.:240]  Out: 0669     Past Medical History:   Diagnosis Date    Diabetes mellitus ([facility]     End stage kidney disease ([facility]     peritoneal dialysis every night at home x 2 years    Hypertension     Neuropathy     Peripheral vascular disease ([facility]        Past Surgical History:   Procedure Laterality Date     SECTION      CORONARY ARTERY BYPASS GRAFT N/A 2021    URGENT CABG CORONARY ARTERY BYPASS GRAFTS X3. VEIN TO PDA X1, VEIN TO OM X1, LEFT INTERNAL MAMMARY ARTERY TO LAD X1. ENDOSCOPIC HARVEST RIGHT LEG SAPHENOUS VEIN. LIGATION NYA USING 35MM ATRICLIP. EPI AORTIC ULTRASOUND. TOTAL CARDIOPULMONARY BYPASS. DOPPLER GRAFT FLOW VERIFICATION. BILATERAL INITERCOSTAL NERVE BLOCK USING 1.3% EXPAREL.  performed by Corona Quinonez MD at [place] [address]  2018     lap PD cath placement lt side 62 cm    TOE AMPUTATION Left 2021    AMPUTATION OF THE LEFT THIRD TOE performed by Jay Jay Catalan DPM at [address] [facility] [address] TOE AMPUTATION Left 5/26/2021    AMPUTATION OF HALLUX AND SECOND TOE, LEFT FOOT performed by Jay Jay Catalan DPM at [facility] [address]         Family History   Problem Relation Age of Onset    Coronary Art Dis Mother     High Blood Pressure Mother     Heart Disease Mother     Diabetes Mother     Cancer Father     Coronary Art Dis Father     High Blood Pressure Father     Heart Disease Father     Diabetes Father      Current Medications:    calcium carbonate (TUMS) chewable tablet 500 mg, TID PRN  insulin glargine (LANTUS;BASAGLAR) injection pen 10 Units, BID  [Held by provider] torsemide (DEMADEX) tablet 100 mg, Daily  gentamicin (GARAMYCIN) 0.1 % cream, Daily  insulin aspart (NOVOLOG) injection pen 0-18 Units-PATIENT SUPPLIED, TID WC  insulin aspart (NOVOLOG) injection PEN 0-9 Units-Patient supplied, Nightly  heparin (porcine) injection 5,000 Units, 3 times per day  acetaminophen (TYLENOL) tablet 1,000 mg, 3 times per day  albuterol sulfate  (90 Base) MCG/ACT inhaler 2 puff, Q6H PRN  aspirin EC tablet 325 mg, Daily  magnesium hydroxide (MILK OF MAGNESIA) 400 MG/5ML suspension 30 mL, Daily PRN  metoprolol tartrate (LOPRESSOR) tablet 25 mg, BID  pantoprazole (PROTONIX) tablet 40 mg, Daily  polyethylene glycol (GLYCOLAX) packet 17 g, Daily PRN  sennosides-docusate sodium (SENOKOT-S) 8.6-50 MG tablet 1 tablet, BID  traMADol (ULTRAM) tablet 50 mg, Q6H PRN   Or  traMADol (ULTRAM) tablet 100 mg, Q6H PRN  zolpidem (AMBIEN) tablet 5 mg, Nightly PRN  dextrose 5 % solution, PRN  dextrose 50 % IV solution, PRN  glucagon (rDNA) injection 1 mg, PRN  glucose (GLUTOSE) 40 % oral gel 15 g, PRN  atorvastatin (LIPITOR) tablet 20 mg, Nightly  bisacodyl (DULCOLAX) EC tablet 5 mg, Daily PRN  diphenhydrAMINE (BENADRYL) tablet 25 mg, Q6H PRN  heparin (porcine) injection 3,400 Units, PRN  hydrALAZINE (APRESOLINE) tablet 50 mg, Q8H PRN  ondansetron (ZOFRAN-ODT) disintegrating tablet 4 mg, Q8H PRN  sevelamer (RENVELA) tablet 800 mg, TID WC  miconazole (MICOTIN) 2 % powder, BID          Physical exam:     Vitals:  /79   Pulse 78   Temp 97.6 °F (36.4 °C) (Oral)   Resp 16   Ht 5' 9\" (1.753 m)   Wt 222 lb 14.2 oz (101.1 kg)   SpO2 98%   BMI 32.91 kg/m²   Constitutional:  OAA X3 NAD  Skin: no rash, turgor wnl  Heent:  eomi, mmm  Neck: no bruits or jvd noted  Cardiovascular:  S1, S2 without m/r/g  Respiratory: CTA B without w/r/r  Abdomen:  +bs, soft, nt, nd  Ext: no  lower extremity edema      Labs:  CBC:   Recent Labs     06/26/21  0847 06/27/21  0602 06/28/21  0646   WBC 10.6 16.8* 13.3*   HGB 7.4* 7.4* 7.2*    209 220     BMP:    Recent Labs     06/26/21  0847 06/27/21  0602 06/28/21  0646   * 125* 125*   K 4.5 4.7 4.7   CL 85* 85* 84*   CO2 24 24 24   BUN 62* 61* 61*   CREATININE 8.7* 9.0* 9.8*   GLUCOSE 167* 320* 294*     Ca/Mg/Phos:   Recent Labs     06/26/21  0847 06/27/21  0602 06/28/21  0646   CALCIUM 8.1* 8.0* 8.0*     UA:  No results for input(s): COLORU, CLARITYU, GLUCOSEU, BILIRUBINUR, KETUA, SPECGRAV, BLOODU, PHUR, PROTEINU, UROBILINOGEN, NITRU, LEUKOCYTESUR, Reyna Alosa in the last 72 hours. Urine Microscopic:   No results for input(s): LABCAST, BACTERIA, COMU, HYALCAST, WBCUA, RBCUA, EPIU in the last 72 hours. Urine Culture:   No results for input(s): LABURIN in the last 72 hours. Urine Chemistry: No results for input(s): Durand Chalet, PROTEINUR, NAUR in the last 72 hours. IMAGING:  XR CHEST PORTABLE   Final Result   Right-sided pleural effusion      Bibasilar hypoaeration with bibasilar opacities that could represent   subsegmental atelectasis or infection                 Assessment/Plan :      1. ESRD. Continue on peritoneal dialysis. continue cycler/CCPD as per current prescription   1400 mL as fill  Volume  Sodium level low.    2/2 hypervolemia  Sodium level 125 today   Limit water intake to 1200 ml   held torsemide   Increase fill volume to 1600 ml     PD site clean and no discharge. 2. HTN. Controlled     3. Anemia of chronic disease. Gets micera at dialysis unit  Continue LUIZA    4. Acid- base disorder. Monitor    5. Hyponatremia   Poor oral intake   Will give sodium chloride 1 gm po tid  x  3doses     6. CAD/Syncopal episode. S/p [facility]   Has Multivessel CAD   S/p CABG       7. Peripheral vascular disease. S/p amputation of left foot toes. Podiatry following     8. Generalized weakness. Getting rehab     9. Constipation .  Resolved   Daily stool softner       Thank you for allowing us to participate in care of Valerie Newby         Electronically signed by: Poncho Zhao MD, 6/28/2021, 10:15 AM      [facility] [address]  Office : [phone]  Fax :[phone]

## 2021-06-29 LAB
ANION GAP SERPL CALCULATED.3IONS-SCNC: 16 MMOL/L (ref 3–16)
BUN BLDV-MCNC: 65 MG/DL (ref 7–20)
CALCIUM SERPL-MCNC: 7.9 MG/DL (ref 8.3–10.6)
CHLORIDE BLD-SCNC: 88 MMOL/L (ref 99–110)
CO2: 26 MMOL/L (ref 21–32)
CREAT SERPL-MCNC: 9.2 MG/DL (ref 0.6–1.1)
GFR AFRICAN AMERICAN: 6
GFR NON-AFRICAN AMERICAN: 5
GLUCOSE BLD-MCNC: 120 MG/DL (ref 70–99)
GLUCOSE BLD-MCNC: 147 MG/DL (ref 70–99)
GLUCOSE BLD-MCNC: 159 MG/DL (ref 70–99)
GLUCOSE BLD-MCNC: 209 MG/DL (ref 70–99)
GLUCOSE BLD-MCNC: 47 MG/DL (ref 70–99)
GLUCOSE BLD-MCNC: 50 MG/DL (ref 70–99)
GLUCOSE BLD-MCNC: 71 MG/DL (ref 70–99)
GLUCOSE BLD-MCNC: 75 MG/DL (ref 70–99)
GLUCOSE BLD-MCNC: 87 MG/DL (ref 70–99)
HCT VFR BLD CALC: 23.2 % (ref 36–48)
HEMOGLOBIN: 7.3 G/DL (ref 12–16)
MCH RBC QN AUTO: 28.9 PG (ref 26–34)
MCHC RBC AUTO-ENTMCNC: 31.6 G/DL (ref 31–36)
MCV RBC AUTO: 91.6 FL (ref 80–100)
PDW BLD-RTO: 20.1 % (ref 12.4–15.4)
PERFORMED ON: ABNORMAL
PERFORMED ON: NORMAL
PLATELET # BLD: 225 K/UL (ref 135–450)
PMV BLD AUTO: 8.9 FL (ref 5–10.5)
POTASSIUM SERPL-SCNC: 4.2 MMOL/L (ref 3.5–5.1)
RBC # BLD: 2.53 M/UL (ref 4–5.2)
SODIUM BLD-SCNC: 130 MMOL/L (ref 136–145)
WBC # BLD: 13 K/UL (ref 4–11)

## 2021-06-29 PROCEDURE — 97530 THERAPEUTIC ACTIVITIES: CPT

## 2021-06-29 PROCEDURE — 99233 SBSQ HOSP IP/OBS HIGH 50: CPT | Performed by: INTERNAL MEDICINE

## 2021-06-29 PROCEDURE — 6370000000 HC RX 637 (ALT 250 FOR IP): Performed by: INTERNAL MEDICINE

## 2021-06-29 PROCEDURE — 97535 SELF CARE MNGMENT TRAINING: CPT

## 2021-06-29 PROCEDURE — 85027 COMPLETE CBC AUTOMATED: CPT

## 2021-06-29 PROCEDURE — 92610 EVALUATE SWALLOWING FUNCTION: CPT

## 2021-06-29 PROCEDURE — 97116 GAIT TRAINING THERAPY: CPT

## 2021-06-29 PROCEDURE — 80048 BASIC METABOLIC PNL TOTAL CA: CPT

## 2021-06-29 PROCEDURE — 92526 ORAL FUNCTION THERAPY: CPT

## 2021-06-29 PROCEDURE — 6360000002 HC RX W HCPCS: Performed by: PHYSICAL MEDICINE & REHABILITATION

## 2021-06-29 PROCEDURE — 97110 THERAPEUTIC EXERCISES: CPT

## 2021-06-29 PROCEDURE — 6370000000 HC RX 637 (ALT 250 FOR IP): Performed by: PHYSICAL MEDICINE & REHABILITATION

## 2021-06-29 PROCEDURE — 1280000000 HC REHAB R&B

## 2021-06-29 RX ORDER — HYDRALAZINE HYDROCHLORIDE 25 MG/1
25 TABLET, FILM COATED ORAL EVERY 8 HOURS SCHEDULED
Status: DISCONTINUED | OUTPATIENT
Start: 2021-06-29 | End: 2021-07-01 | Stop reason: HOSPADM

## 2021-06-29 RX ADMIN — SEVELAMER CARBONATE 800 MG: 800 TABLET, FILM COATED ORAL at 07:54

## 2021-06-29 RX ADMIN — ATORVASTATIN CALCIUM 20 MG: 20 TABLET, FILM COATED ORAL at 20:26

## 2021-06-29 RX ADMIN — TRAMADOL HYDROCHLORIDE 100 MG: 50 TABLET, FILM COATED ORAL at 05:21

## 2021-06-29 RX ADMIN — HEPARIN SODIUM 5000 UNITS: 5000 INJECTION INTRAVENOUS; SUBCUTANEOUS at 20:28

## 2021-06-29 RX ADMIN — Medication 15 G: at 14:31

## 2021-06-29 RX ADMIN — STANDARDIZED SENNA CONCENTRATE AND DOCUSATE SODIUM 1 TABLET: 8.6; 5 TABLET ORAL at 07:54

## 2021-06-29 RX ADMIN — STANDARDIZED SENNA CONCENTRATE AND DOCUSATE SODIUM 1 TABLET: 8.6; 5 TABLET ORAL at 20:26

## 2021-06-29 RX ADMIN — ANTACID TABLETS 500 MG: 500 TABLET, CHEWABLE ORAL at 00:10

## 2021-06-29 RX ADMIN — PANTOPRAZOLE SODIUM 40 MG: 40 TABLET, DELAYED RELEASE ORAL at 05:21

## 2021-06-29 RX ADMIN — HYDRALAZINE HYDROCHLORIDE 25 MG: 25 TABLET, FILM COATED ORAL at 20:26

## 2021-06-29 RX ADMIN — GENTAMICIN SULFATE: 1 CREAM TOPICAL at 18:25

## 2021-06-29 RX ADMIN — METOPROLOL TARTRATE 25 MG: 25 TABLET, FILM COATED ORAL at 20:26

## 2021-06-29 RX ADMIN — Medication 15 G: at 14:15

## 2021-06-29 RX ADMIN — METOPROLOL TARTRATE 25 MG: 25 TABLET, FILM COATED ORAL at 07:54

## 2021-06-29 RX ADMIN — HEPARIN SODIUM 5000 UNITS: 5000 INJECTION INTRAVENOUS; SUBCUTANEOUS at 05:21

## 2021-06-29 RX ADMIN — ONDANSETRON 4 MG: 4 TABLET, ORALLY DISINTEGRATING ORAL at 00:10

## 2021-06-29 RX ADMIN — ZOLPIDEM TARTRATE 5 MG: 5 TABLET ORAL at 20:26

## 2021-06-29 RX ADMIN — HYDRALAZINE HYDROCHLORIDE 25 MG: 25 TABLET, FILM COATED ORAL at 14:20

## 2021-06-29 RX ADMIN — ASPIRIN 325 MG: 325 TABLET, COATED ORAL at 07:54

## 2021-06-29 RX ADMIN — TRAMADOL HYDROCHLORIDE 100 MG: 50 TABLET, FILM COATED ORAL at 20:45

## 2021-06-29 RX ADMIN — ANTACID TABLETS 500 MG: 500 TABLET, CHEWABLE ORAL at 08:15

## 2021-06-29 RX ADMIN — SEVELAMER CARBONATE 800 MG: 800 TABLET, FILM COATED ORAL at 12:04

## 2021-06-29 RX ADMIN — HEPARIN SODIUM 5000 UNITS: 5000 INJECTION INTRAVENOUS; SUBCUTANEOUS at 14:21

## 2021-06-29 ASSESSMENT — PAIN SCALES - GENERAL
PAINLEVEL_OUTOF10: 3
PAINLEVEL_OUTOF10: 7
PAINLEVEL_OUTOF10: 0
PAINLEVEL_OUTOF10: 0
PAINLEVEL_OUTOF10: 3
PAINLEVEL_OUTOF10: 3

## 2021-06-29 ASSESSMENT — PAIN DESCRIPTION - DESCRIPTORS
DESCRIPTORS: SORE
DESCRIPTORS: ACHING;SORE

## 2021-06-29 ASSESSMENT — PAIN DESCRIPTION - FREQUENCY: FREQUENCY: INTERMITTENT

## 2021-06-29 ASSESSMENT — PAIN DESCRIPTION - PAIN TYPE: TYPE: ACUTE PAIN

## 2021-06-29 ASSESSMENT — PAIN DESCRIPTION - ORIENTATION: ORIENTATION: LEFT

## 2021-06-29 ASSESSMENT — PAIN DESCRIPTION - LOCATION
LOCATION: GENERALIZED
LOCATION: ABDOMEN

## 2021-06-29 NOTE — FLOWSHEET NOTE
Disconnected from [facility] per protocol. Effluent: clear  Total time: 10 hr 20 min   Total UF:  1510 ml. Total Volume:  8033 ml. Dwell time gained:  0 hr 32 min. Pt Tolerated procedure without difficulty. Report given to: Rosa Isela Valencia RN  Last BM: 6/28/21 06/29/21 0454   Vitals   BP (!) 193/83   Temp 97.7 °F (36.5 °C)   Temp Source Oral   Pulse 63   Resp 17   SpO2 100 %   Weight 226 lb 13.7 oz (102.9 kg)   Peritoneal Dialysis Catheter Left lower abdomen   Placement Date/Time: 08/16/18 0818   Inserted by: Brianna Sims MD  Catheter Location: Left lower abdomen   Status Deaccessed   Site Condition No Complications   Dressing Status Clean;Dry; Intact; Changed   Dressing Occlusive   Cycler   Ultrafiltration (UF) (mL) 1510 mL

## 2021-06-29 NOTE — PLAN OF CARE
Nutrition Problem #1: Increased nutrient needs  Intervention: Food and/or Nutrient Delivery: Continue Current Diet, Modify Oral Nutrition Supplement  Nutritional Goals: po intake greater than 50% of meals/supplements

## 2021-06-29 NOTE — PROGRESS NOTES
Comprehensive Nutrition Assessment    Type and Reason for Visit:  Reassess    Nutrition Recommendations/Plan:   Change Ensure to Magic cup BID    Nutrition Assessment:  Pt remains nutritionally compromised AEB poor po intake. Pt is no longer drinking Ensure stating her throat hurts & she remains nauseated. Po intake 1-50% of recent meals. Wt is back to baseline admission wt, 226 lb. Pt agrees to try magic cup to help maintain overall nutrition status. Will monitor for acceptance & improved po intake. Malnutrition Assessment:  Malnutrition Status: At risk for malnutrition (Comment)    Context:  Acute Illness       Estimated Daily Nutrient Needs:  Energy (kcal):  5442- 1926 (15-18 kcal/107kg); Weight Used for Energy Requirements:   (con't to use 107 kg)     Protein (g):  79- 132 (1.2-2.0g/66kg); Weight Used for Protein Requirements:  Ideal        Fluid (ml/day):  2000 ml/d; Method Used for Fluid Requirements:  1 ml/kcal      Nutrition Related Findings:  trace BLE edema; LBM 6/28; Na 130; peritoneal dialysis      Wounds:  Surgical Incision       Current Nutrition Therapies:    ADULT DIET; Regular; 4 carb choices (60 gm/meal); 1200 ml  Adult Oral Nutrition Supplement; Frozen Oral Supplement    Anthropometric Measures:  · Height: 5' 9\" (175.3 cm)  · Current Body Weight: 226 lb (102.5 kg)   · Admission Body Weight: 225 lb (102.1 kg)    · Usual Body Weight: 227 lb (103 kg) (x 1 month ago)     · Ideal Body Weight: 145 lbs; % Ideal Body Weight 155.9 %   · BMI: 33.4  · Adjusted Body Weight:  ; No Adjustment   · BMI Categories: Obese Class 1 (BMI 30.0-34. 9)       Nutrition Diagnosis:   · Increased nutrient needs related to increase demand for energy/nutrients as evidenced by other (comment) (increased protein needs to promote healing)    · Inadequate oral intake related to inadequate protein-energy intake as evidenced by intake 0-25%, intake 26-50%, nausea, other (comment) (sore throat)      Nutrition Interventions:   Food and/or Nutrient Delivery:  Continue Current Diet, Modify Oral Nutrition Supplement  Nutrition Education/Counseling:  Education completed (CVU class 6/15)   Coordination of Nutrition Care:  Continue to monitor while inpatient    Goals:  po intake greater than 50% of meals/supplements       Nutrition Monitoring and Evaluation:   Behavioral-Environmental Outcomes:  None Identified   Food/Nutrient Intake Outcomes:  Food and Nutrient Intake, Supplement Intake  Physical Signs/Symptoms Outcomes:  Skin, Weight     Discharge Planning:     Too soon to determine     Electronically signed by Petr Ahn RD, LD on 6/29/21 at 12:08 PM EDT    Contact: [phone]

## 2021-06-29 NOTE — PLAN OF CARE
Problem: Pain:  Goal: Control of acute pain  Description: Control of acute pain  6/29/2021 1113 by Adama Cortez RN  Outcome: Ongoing  Note: Complaints of pain to her back. Patient being medicated appropriately according to numerical pain scale. Will continue to assess and monitor. Problem: Skin Integrity:  Goal: Absence of new skin breakdown  Description: Absence of new skin breakdown  6/29/2021 1113 by Adama Cortez RN  Outcome: Ongoing  Note: No new skin breakdown identified. Will continue to assess and monitor. Problem: Falls - Risk of:  Goal: Will remain free from falls  Description: Will remain free from falls  6/29/2021 1113 by Adama Cortez RN  Outcome: Ongoing  Note: Pt will be free from falls , pt aware of [facility] policy on falls, will call for needs/assistance. Fall risk precautions in place, call light in reach, bed in lowest position, 2/2 bed rails up, bed wheels locked, bed side table within reach, bed alarm on, will continue to monitor. Problem: Falls - Risk of:  Goal: Absence of physical injury  Description: Absence of physical injury  6/29/2021 1113 by Adama Cortez RN  Outcome: Ongoing  Note: No injuries this shift. Will continue to assess and monitor.

## 2021-06-29 NOTE — PROGRESS NOTES
Occupational Therapy  Facility/Department: [facility] ACUTE REHAB UNIT  Daily Treatment Note  NAME: Rachel Garcia  : 1975  MRN: [id number]    Date of Service: 2021    Discharge Recommendations:  S Level 3, Home with Home health OT, Home with assist PRN  OT Equipment Recommendations  Equipment Needed: Yes  ADL Assistive Devices: Reacher;Sock-Aid Hard;Transfer Tub Bench; Toileting - Raised Toilet Seat without arms  Other: cotninue to update/assess pending progress    Assessment   Performance deficits / Impairments: Decreased functional mobility ; Decreased ADL status; Decreased strength;Decreased endurance;Decreased sensation;Decreased high-level IADLs;Decreased coordination;Decreased balance  Assessment: Pt presented with the above deficits impacting his occupational peprformance. Pt is slowly porgressing towards goals- still requiring extensive assist for LB dressing and set-up/SBA for UB. Pt's activity tolerance is slowly improving, able to ambulate with RW at [facility] however requiring variable assist levels from Mod of 2 to MaxA of 1 for sit>stands. Pt tolerates sessions on RA. Pt would benefit from ongoing intensive therapy services and extended stay in order to maximize her independence and safety with all occupational pursuits. Continue POC  Treatment Diagnosis: decreased ADL/IADL/mobility/activity tolerance due to CABG  Prognosis: Good  OT Education: OT Role;Plan of Care;ADL Adaptive Strategies;Transfer Training;Equipment;IADL Safety; Energy Conservation  Patient Education: continue to reinforce for carryover  REQUIRES OT FOLLOW UP: Yes  Activity Tolerance  Activity Tolerance: Patient limited by fatigue  Activity Tolerance: encouragement for tasks, pt self-limiting at times  Safety Devices  Safety Devices in place: Yes  Type of devices: Left in chair; All fall risk precautions in place;Call light within reach; Chair alarm in place         Patient Diagnosis(es): CABG/Debility      has a past medical history of Diabetes mellitus ([facility] [address] End stage kidney disease ([facility] [address] Hypertension, Neuropathy, and Peripheral vascular disease ([facility] [address] has a past surgical history that includes Tonsillectomy;  section; other surgical history (2018); Toe amputation (Left, 2021); Toe amputation (Left, 2021); and Coronary artery bypass graft (N/A, 2021). Restrictions  Restrictions/Precautions  Restrictions/Precautions: Fall Risk, Weight Bearing  Required Braces or Orthoses?: Yes  Lower Extremity Weight Bearing Restrictions  Left Lower Extremity Weight Bearing: Weight Bearing As Tolerated  Required Braces or Orthoses  Left Lower Extremity Brace: Boot  LLE Brace Type: Surgical shoe  Position Activity Restriction  Sternal Precautions: No Pushing, No Pulling, 5# Lifting Restrictions  Other position/activity restrictions: Patient is a 51year old female with a history of ESRD on PD, HTN, DM, HLD, and PVD. On 21 she had amputation of toes 1 and 2 on the left foot. On 21 she underwent a left popliteal, anterior tibial and dorsalis pedis angioplasty with Dr. Edgard Flores. She presented to the ER on 21 with a syncopal episode. She has been experiencing dizziness and syncopal episodes X2 weeks prior to this visit. She underwent a Left Heart Catheterization on 21 and was found to have severe coronary microvascular disease. On 21 she underwent a CABG X3 with NYA clip    Subjective   General  Chart Reviewed: Yes  Patient assessed for rehabilitation services?: Yes  Additional Pertinent Hx: DM - peritoneal dialysis; s/p amputation of the 1st and 2nd toes of the left foot (21)  Response to previous treatment: Patient with no complaints from previous session  Family / Caregiver Present: No  Referring Practitioner: Michell Kauffman MD  Diagnosis: CABG x 3   Subjective  Subjective: Pt seated in w/c at entry, agreeable to OT session, declined shower and ADL needs  General Comment  Comments: Benjamin Madsen   Vital Signs  Patient Currently in Pain: Denies        Objective    ADL  Additional Comments: Pt declined ADL needs: AE training for LB dressing completed to address energy conservation and adaptive method \"good days vs bad days\"- verbal and visual demo of reacher, sock aid and shoe-horn provided- pt able to don and doff L ortho boot with increased time, thread LEs into and out of pants with reach, donned and doffed L sock and shoe- pt able to complete figure four sit with R LE over L knee, set-up/SBA for all tasks, rest breaks as needed           Balance  Sitting Balance: Supervision  Standing Balance: Contact guard assistance  Standing Balance  Time: ~2min total  Activity: light IADL task, transfers  Bed mobility  Sit to Supine: Contact guard assistance  Transfers  Sit to stand: Maximum assistance  Stand to sit: Minimal assistance  Transfer Comments: 3 attempts to achieve stance up to counter x1 + MaxA from w/c for w/c>EOB transfer                        Additional Activities Comment  Additional Activities: in stance in kicthen area- pt retrieved and replaced 4 items useing R and then L UEs, CGA/SBA for balance, 1 rep of task completed prior to rest break- pt educated on sternal precautions during kitchen tasks and limiting shoulder flexion to 90 or below- pt verbalized understanding        Type of ROM/Therapeutic Exercise  Type of ROM/Therapeutic Exercise: AROM; Resistive Bands (light theraband)  Comment: seated UE TE for strength and activity tolerance: 15 reps x1 of rows, horizontal abduction, shoulder flexion to 90, forward punches, elbow extension, and elbow flexion (all TE ROM adjusted to remain within sternal precautions), rest breaks as needed; pt propelled w/c with LEs for strengthening ~100ft total              Second Session:  Pt in bed upon entry, agreeable to cotx session with encouragement. Pt denied pain. Variable assist for sit<>stands during session- ModA of 2 from w/c and CGA from high surface/EOM.  Pt ambulated ~90ft total room>EOM with RW at [facility]/SBA. From EOM table pt completed resisted theraband sit<>stands- medium resistance band, high table- 3 reps x2 at [place] [id number]. Pt completed platform steps x1 with RW at [place] [id number]. In stance to RW- pt completed punches to target ~10 reps alternating followed by 5 reps of single with R and  L. Pt propelled w/c with LEs gym>room. Sit>supine with [place]. Pt left in bed, bed alarm on, call light and needs in reach.               Plan   Plan  Times per week: 90 minutes; 5-6x/wk  Times per day: Daily  Current Treatment Recommendations: Strengthening, ROM, Balance Training, Functional Mobility Training, Endurance Training, Safety Education & Training, Patient/Caregiver Education & Training, Equipment Evaluation, Education, & procurement, Positioning, Self-Care / ADL, Pain Management, Home Management Training       Goals  Short term goals  Time Frame for Short term goals: 1-2weeks  Short term goal 1: Pt will complete UB/LB dressing with mod I and use fo AE as needed  Short term goal 2: Pt will complete bathing with mod I  Short term goal 3: Pt will complete toileting with mod I  Short term goal 4: Pt will complete functional transfers with mod I  Short term goal 5: Pt will tolerate x10 mins of standing IADLs with SpO2 above 90% in order to increase activity tolerance for d/c home  Long term goals  Time Frame for Long term goals : LTG=STG  Patient Goals   Patient goals : to return home       Therapy Time   Individual Concurrent Group Co-treatment   Time In 5221      1392   Time Out 1100      1330   Minutes 45      45        Timed Code Treatment Minutes:  45 + 45   Total Treatment Minutes:  [address] [facility] 4071

## 2021-06-29 NOTE — PLAN OF CARE
Creatinine continues to rise. Nephrology note addresses low sodium, torsemide being held, & wanting her to limit fluids to 1200ml daily. Labile Bps last night. Patient refused prn apresoline & Bp went down on own     Problem: Pain:  Description: Pain management should include both nonpharmacologic and pharmacologic interventions. Goal: Pain level will decrease  Description: Pain level will decrease  Outcome: Ongoing  Goal: Control of acute pain  Description: Control of acute pain  Outcome: Ongoing  Goal: Control of chronic pain  Description: Control of chronic pain  Outcome: Ongoing     Problem: Skin Integrity:  Goal: Will show no infection signs and symptoms  Description: Will show no infection signs and symptoms  Outcome: Ongoing  Goal: Absence of new skin breakdown  Description: Absence of new skin breakdown  Outcome: Ongoing     Problem: Falls - Risk of:  Goal: Will remain free from falls  Description: Will remain free from falls  Outcome: Ongoing  Family/Patient made aware of the tailored interventions falls plan using the TIPS TOOL.   Goal: Absence of physical injury  Description: Absence of physical injury  Outcome: Ongoing     Problem: IP BLADDER/VOIDING  Goal: STG - Patient demonstrates ability to take care of indwelling catheter  Outcome: Ongoing  Goal: STG - Patient demonstrates no accidents  Outcome: Ongoing     Problem: IP BREATHING  Goal: STG - patient will utilize incentive spirometer  Outcome: Ongoing  Goal: STG - Patient performs or directs assisted coughing  Outcome: Ongoing     Problem: NUTRITION  Description: Altered Nutrition and Hydration  Goal: Patient maintains weight  Outcome: Ongoing     Problem: SAFETY  Goal: LTG - Patient will demonstrate safety requirements appropriate to situation/environment  Outcome: Ongoing     Problem: SKIN INTEGRITY  Goal: LTG - Patient will be free from infection  Outcome: Ongoing     Problem: PAIN  Goal: LTG - Patient will manage pain with the appropriate technique/Intervention  Outcome: Ongoing  Goal: STG - pain is manageable through therapies  Outcome: Ongoing     Problem: Nutrition  Goal: Optimal nutrition therapy  Outcome: Ongoing

## 2021-06-29 NOTE — CARE COORDINATION
Team conference held today. Spoke with patient to discuss progress with therapy, barriers to discharge, and plans to return home. Estimated discharge date set for 7/07/2021. Patient anticipates discharging to home with [facility] and needed supports. SW will continue to follow for support and discharge planning.     Electronically signed by JERRY Vigil on 6/29/2021 at 3:47 PM

## 2021-06-29 NOTE — PROGRESS NOTES
Office : [phone]     Fax :[phone]       Nephrology progress  Note      Patient's Name: Laron Mcdonnell  11:58 AM  2021    Reason for Consult:  ESRD    History of Present Ilness:    Laron Mcdonnell is a 51 y.o. female with severe end-stage renal disease secondary to diabetic nephropathy on peritoneal dialysis, hypertension, peripheral vascular disease, diabetic neuropathy who was admitted after she had a syncopal episode. She had similar episode 2 weeks ago. Recently had angiogram left lower extremity for gangrene left foot second toe. Denies any shortness of breath. Denies any abdominal pain. Denies any nausea vomiting. Interval HX :          No chest pain  No sob  S/p CABG on 2010  Blood pressure is elevated    Sodium level better at 130 today. I/O last 3 completed shifts:  In: -   Out: 1567     Past Medical History:   Diagnosis Date    Diabetes mellitus ([facility] [address]     End stage kidney disease ([facility] [address]     peritoneal dialysis every night at home x 2 years    Hypertension     Neuropathy     Peripheral vascular disease ([facility] [address]        Past Surgical History:   Procedure Laterality Date     SECTION      CORONARY ARTERY BYPASS GRAFT N/A 2021    URGENT CABG CORONARY ARTERY BYPASS GRAFTS X3. VEIN TO PDA X1, VEIN TO OM X1, LEFT INTERNAL MAMMARY ARTERY TO LAD X1. ENDOSCOPIC HARVEST RIGHT LEG SAPHENOUS VEIN. LIGATION NYA USING 35MM ATRICLIP. EPI AORTIC ULTRASOUND. TOTAL CARDIOPULMONARY BYPASS. DOPPLER GRAFT FLOW VERIFICATION. BILATERAL INITERCOSTAL NERVE BLOCK USING 1.3% EXPAREL.  performed by Corona Quinonez MD at [facility] 6  2018     lap PD cath placement lt side 62 cm    TOE AMPUTATION Left 2021    AMPUTATION OF THE LEFT THIRD TOE performed by Ezequiel Garcia DPM at [address] [facility] [address] TOE AMPUTATION Left 5/26/2021    AMPUTATION OF HALLUX AND SECOND TOE, LEFT FOOT performed by Ezequiel Garcia DPM at [facility]         Family History   Problem Relation Age of Onset    Coronary Art Dis Mother     High Blood Pressure Mother     Heart Disease Mother     Diabetes Mother     Cancer Father     Coronary Art Dis Father     High Blood Pressure Father     Heart Disease Father     Diabetes Father      Current Medications:    [START ON 6/30/2021] insulin detemir (LEVEMIR) injection pen 10 Units - Patient Supplied, QAM  insulin detemir (LEVEMIR) injection pen 8 Units - Patient Supplied, Nightly  calcium carbonate (TUMS) chewable tablet 500 mg, TID PRN  sodium chloride tablet 1 g, TID WC  [Held by provider] torsemide (DEMADEX) tablet 100 mg, Daily  gentamicin (GARAMYCIN) 0.1 % cream, Daily  insulin aspart (NOVOLOG) injection pen 0-18 Units-PATIENT SUPPLIED, TID WC  insulin aspart (NOVOLOG) injection PEN 0-9 Units-Patient supplied, Nightly  heparin (porcine) injection 5,000 Units, 3 times per day  acetaminophen (TYLENOL) tablet 1,000 mg, 3 times per day  albuterol sulfate  (90 Base) MCG/ACT inhaler 2 puff, Q6H PRN  aspirin EC tablet 325 mg, Daily  magnesium hydroxide (MILK OF MAGNESIA) 400 MG/5ML suspension 30 mL, Daily PRN  metoprolol tartrate (LOPRESSOR) tablet 25 mg, BID  pantoprazole (PROTONIX) tablet 40 mg, Daily  polyethylene glycol (GLYCOLAX) packet 17 g, Daily PRN  sennosides-docusate sodium (SENOKOT-S) 8.6-50 MG tablet 1 tablet, BID  traMADol (ULTRAM) tablet 50 mg, Q6H PRN   Or  traMADol (ULTRAM) tablet 100 mg, Q6H PRN  zolpidem (AMBIEN) tablet 5 mg, Nightly PRN  dextrose 5 % solution, PRN  dextrose 50 % IV solution, PRN  glucagon (rDNA) injection 1 mg, PRN  glucose (GLUTOSE) 40 % oral gel 15 g, PRN  atorvastatin (LIPITOR) tablet 20 mg, Nightly  bisacodyl (DULCOLAX) EC tablet 5 mg, Daily PRN  diphenhydrAMINE (BENADRYL) tablet 25 mg, Q6H PRN  heparin (porcine) injection 3,400 Units, PRN  hydrALAZINE (APRESOLINE) tablet 50 mg, Q8H PRN  ondansetron (ZOFRAN-ODT) disintegrating tablet 4 mg, Q8H PRN  sevelamer (RENVELA) tablet 800 mg, TID WC  miconazole (MICOTIN) 2 % powder, BID          Physical exam:     Vitals:  BP (!) 185/95   Pulse 73   Temp 98.2 °F (36.8 °C) (Oral)   Resp 17   Ht 5' 9\" (1.753 m)   Wt 226 lb 13.7 oz (102.9 kg)   SpO2 94%   BMI 33.50 kg/m²   Constitutional:  OAA X3 NAD  Skin: no rash, turgor wnl  Heent:  eomi, mmm  Neck: no bruits or jvd noted  Cardiovascular:  S1, S2 without m/r/g  Respiratory: CTA B without w/r/r  Abdomen:  +bs, soft, nt, nd  Ext: no  lower extremity edema      Labs:  CBC:   Recent Labs     06/27/21  0602 06/28/21  0646 06/29/21  0547   WBC 16.8* 13.3* 13.0*   HGB 7.4* 7.2* 7.3*    220 225     BMP:    Recent Labs     06/27/21  0602 06/28/21  0646 06/29/21  0547   * 125* 130*   K 4.7 4.7 4.2   CL 85* 84* 88*   CO2 24 24 26   BUN 61* 61* 65*   CREATININE 9.0* 9.8* 9.2*   GLUCOSE 320* 294* 120*     Ca/Mg/Phos:   Recent Labs     06/27/21  0602 06/28/21  0646 06/29/21  0547   CALCIUM 8.0* 8.0* 7.9*     UA:  No results for input(s): COLORU, CLARITYU, GLUCOSEU, BILIRUBINUR, KETUA, SPECGRAV, BLOODU, PHUR, PROTEINU, UROBILINOGEN, NITRU, LEUKOCYTESUR, Jodee Corti in the last 72 hours. Urine Microscopic:   No results for input(s): LABCAST, BACTERIA, COMU, HYALCAST, WBCUA, RBCUA, EPIU in the last 72 hours. Urine Culture:   No results for input(s): LABURIN in the last 72 hours. Urine Chemistry: No results for input(s): Volney Ly, PROTEINUR, NAUR in the last 72 hours. IMAGING:  XR CHEST PORTABLE   Final Result   Right-sided pleural effusion      Bibasilar hypoaeration with bibasilar opacities that could represent   subsegmental atelectasis or infection                 Assessment/Plan :      1. ESRD. Continue on peritoneal dialysis.   continue cycler/CCPD as per current prescription   1400 mL as fill  Volume  Sodium level low. 2/2 hypervolemia  Sodium level better at 130 now     fill volume to 1600 ml     PD site clean and no discharge. 2. HTN. BP elevated   Will start hydralazine 25 mg po tid     3. Anemia of chronic disease. Continue LUIZA    4. Acid- base disorder. Monitor    5. Hyponatremia   Poor oral intake   Encourage increase oral intake    6. CAD/Syncopal episode. S/p C   Has Multivessel CAD   S/p CABG       7. Peripheral vascular disease. S/p amputation of left foot toes. Podiatry following     8. Generalized weakness. Getting rehab     9. Constipation .  Resolved   Daily stool softner       Thank you for allowing us to participate in care of Rachel Garcia         Electronically signed by: Dariela Ordonez MD, 6/29/2021, 11:58 AM      [facility] [address]  Office : [phone]  Fax :[phone]

## 2021-06-29 NOTE — PROGRESS NOTES
Physical Therapy  Facility/Department: [facility] ACUTE REHAB UNIT  Daily Treatment Note  NAME: Yvonne Hamilton  : 1975  MRN: [id number]    Date of Service: 2021    Discharge Recommendations:  Home with assist PRN, S Level 3, 24 hour supervision or assist   PT Equipment Recommendations  Equipment Needed: Yes  Mobility Devices: Tia Hotter: Rolling    Assessment   Body structures, Functions, Activity limitations: Decreased functional mobility ; Decreased ADL status; Decreased strength;Decreased balance;Decreased endurance  Assessment: David العلي continues to demonstrate progress with skilled therapy tx thus far, presents with substantial improvements in independence with completing STS transfers. However, pt continues to be limited secondary to fatigue requiring frequent rest breaks with activity and intermittent encouragement to participate. PT Education: Goals;Plan of Care;Precautions; Functional Mobility Training;Transfer Training;Gait Training  Patient Education: Ongoing reinforcement of sternal precautions when completing STS  REQUIRES PT FOLLOW UP: Yes  Activity Tolerance  Activity Tolerance: Patient limited by endurance; Patient limited by fatigue  Activity Tolerance: pt tolerates entire session on RA with SPO2 above 90%, pt requires extended rest breaks with all activities due to poor activity tolerance     Patient Diagnosis(es): Debility   has a past medical history of Diabetes mellitus ([facility] [address] End stage kidney disease ([facility] [address] Hypertension, Neuropathy, and Peripheral vascular disease ([facility] [address] has a past surgical history that includes Tonsillectomy;  section; other surgical history (2018); Toe amputation (Left, 2021); Toe amputation (Left, 2021); and Coronary artery bypass graft (N/A, 2021).     Restrictions  Restrictions/Precautions  Restrictions/Precautions: Fall Risk, Weight Bearing  Required Braces or Orthoses?: Yes  Lower Extremity Weight Bearing Restrictions  Left Lower Extremity Jyoti;Decreased step length;Decreased step height  Distance: 193' + 80'  Stairs/Curb  Stairs?: No     Balance  Posture: Fair  Sitting - Static: Good (supervision)  Sitting - Dynamic: Good (supervision)  Standing - Static: Fair (SBA with RW)  Standing - Dynamic: Fair (CGA with RW)            Comment: 1st session: Pt completes seated stepper exercise x 4 minutes to improve cardiovascular endurance with BLE only. Pt requires max A x 1 for STS transfer from raised commode and throughout majority of STS from w/c throughout session. Pt demos 1 instance of questionable effort when completing STS transfer from w/c with minimal gluteal activation requiring max A x 2 to safely stand. 2nd session:  Pt in bed on arrival.  Performed supine to sit transition with HOB elevated and use of bed rails. Pt required bed height to be elevated to complete STS transition with CGA. Pt ambulated 100' with RW and SBA. Performed STS from elevated mat table with red theraband providing resistance at lower trunk to increase activation of glutes X 3 reps X 3 sets with CGA. Performed curb step negotiation with RW X 2, 4\" steps with CGA-Aidan with decreased R glute and quad activation. Performed standing punching X 8 reps alternating and X 5 reps 1 at a time. Pt propelled w/c 228' with BLE with 5 rest breaks and veering to the R wall. Performed SPT from w/c to bed with modA of 2 for STS and CGA for SPT. Pt transitioned to supine with Aidan. Pt remained in supine with alarm on and all needs in reach.            Goals  Short term goals  Time Frame for Short term goals: to be met in 21 days  Short term goal 1: pt will complete bed mobility with mod I  Short term goal 2: pt will complete STS transfers with mod I  Short term goal 3: pt will complete stand step transfers with mod I  Short term goal 4: pt will ambulate x 150' with mod I of LRAD  Short term goal 5: pt will ascend/descend 2 stairs with mod I  Patient Goals   Patient goals : to return home and regain independence    Plan    Plan  Times per week: 90 minutes per day, 5-7x/wk  Times per day: Daily  Current Treatment Recommendations: Strengthening, Balance Training, Functional Mobility Training, Transfer Training, Endurance Training, Gait Training, Stair training, Patient/Caregiver Education & Training, Manual Therapy - Soft Tissue Mobilization, Neuromuscular Re-education, Safety Education & Training  Safety Devices  Type of devices:  All fall risk precautions in place, Call light within reach, Chair alarm in place, Gait belt, Left in chair  Restraints  Initially in place: No     Therapy Time   Individual Concurrent Group Co-treatment   Time In 0830         Time Out 0915         Minutes 45               1st session completed by: [address] [place] [address] 1330 PT, DPT [id number]  Second Session Therapy Time:   Individual Concurrent Group Co-treatment   Time In       1245   Time Out       1330   Minutes       45     Timed Code Treatment Minutes:  14+83    Total Treatment Minutes:  90  2nd session:  Ronnell Prakash DPT [id number]

## 2021-06-29 NOTE — PROGRESS NOTES
[address] [facility], operated by [facility]  6/29/2021  [id number]    Chief Complaint: Generalized weakness    Subjective:   No overnight events. No current complaints. Pain controlled. Did very well in therapy yesterday. Labs reviewed. BP above goal. Not wanting to take levemir. Feels she is too low in the am.    ROS: No CP, SOB, dyspnea    Objective:  Patient Vitals for the past 24 hrs:   BP Temp Temp src Pulse Resp SpO2 Weight   06/29/21 0745 (!) 185/95 98.2 °F (36.8 °C) Oral 73 17 94 % --   06/29/21 0454 (!) 193/83 97.7 °F (36.5 °C) Oral 63 17 100 % 226 lb 13.7 oz (102.9 kg)   06/29/21 0115 137/77 -- -- 63 -- -- --   06/29/21 0017 (!) 204/151 -- -- 62 -- -- --   06/29/21 0004 (!) 211/57 -- -- -- -- -- --   06/28/21 2120 (!) 180/61 97.6 °F (36.4 °C) Oral 66 18 99 % --   06/28/21 2113 (!) 188/47 -- -- -- -- -- --   06/28/21 1827 138/69 98.1 °F (36.7 °C) Oral 69 17 100 % 230 lb 13.2 oz (104.7 kg)   06/28/21 1005 134/79 97.9 °F (36.6 °C) Oral 78 16 92 % --     Gen: No distress, pleasant. Resting in bed  HEENT: Normocephalic, atraumatic  CV: Regular rate and rhythm. No MRG   Resp: No respiratory distress. CTAB, O2 per NC  Abd: Soft, nontender, PD cath in place  Ext: mild edema noted in BLE. LLE amputation site with superficial necrosis medially with toes 4,5 remaining  Neuro: Alert, oriented, appropriately interactive. Laboratory data: Available via EMR. Therapy progress:  PT  Required Braces or Orthoses  Left Lower Extremity Brace: Boot  LLE Brace Type: Surgical shoe  Position Activity Restriction  Sternal Precautions: No Pushing, No Pulling, 5# Lifting Restrictions  Other position/activity restrictions: s/p CABG x 3 6/9Patient is a 51year old female with a history of ESRD on PD, HTN, DM, HLD, and PVD. On 5/26/21 she had amputation of toes 1 and 2 on the left foot. On 5/27/21 she underwent a left popliteal, anterior tibial and dorsalis pedis angioplasty with Dr. Vish Serrano. She presented to the ER on 5/31/21 with a syncopal episode.  She has been experiencing dizziness and syncopal episodes X2 weeks prior to this visit. She underwent a Left Heart Catheterization on 6/2/21 and was found to have severe coronary microvascular disease. On 6/9/21 she underwent a CABG X3 with NYA clip  Objective     Sit to Stand: Dependent/Total (mod A x 1 + min A x 1, intermittently max A x 1)  Stand to sit: Contact guard assistance (improved eccentric control)  Bed to Chair: Contact guard assistance (via stand step with RW)  Device: Rolling Walker  Other Apparatus: O2 (RA)  Assistance: Contact guard assistance  Distance: 80' + [de-identified]'  OT  PT Equipment Recommendations  Equipment Needed: Yes  Mobility Devices: [place] Deng: Rolling  Other: TBD with progress  Toilet - Technique: Ambulating  Equipment Used: Standard toilet  Assessment        SLP                Body mass index is 33.5 kg/m². Assessment:  Patient Active Problem List   Diagnosis    Diabetes mellitus ([address]    Obesity    Microalbuminuria    Hyperlipidemia with target LDL less than 100    Hypertension    Diabetes mellitus type 2 in obese ([address]    Acute renal failure (ARF) ([address]    ESRD (end stage renal disease) ([address]    Non-smoker    Elevated C-reactive protein (CRP)    Elevated sed rate    Diabetic polyneuropathy associated with type 2 diabetes mellitus (HCC)    Weight loss counseling, encounter for    Atherosclerosis of native arteries of left leg with ulceration of other part of foot ([address]    Near syncope    Coronary artery disease of native artery of native heart with stable angina pectoris (HCC)    Mild malnutrition ([address]    S/P coronary artery bypass graft x 3    S/P left atrial appendage ligation    Generalized weakness    Functional dysphonia       Plan:   Generalized weakness: PT/OT     CAD: S/P CABG X3 with NYA Clip (6/9). Sternal precautions. ASA, statin     PAD: S/P Amputation of toes 1 and 2 on the left (5/26/21). Routine wound care     ESRD: on PD 2/2 diabetic nephropathy. Appreciate nephro assistance    Hypoxic respiratory failure: O2 supplementation PRN, stress IS     DM II: Levemir decreased to 10 BID -> 10/8, prandial d/c'd, SSI high. HTN: Lopressor 25 - defer adjustments to nephro     HLD: Lipitor 20     Oral candidiasis: Nystatin completed    Hyponatremia: per nephro and PD    Hypophonia: SLP consulted. Suspect pulmonary function related. ENT suggests SLP therapy for now. Potential scope as OP if not improved. Bowels: Per protocol  Bladder: Per protocol   Sleep: Ambien provided prn  Pain: Tylenol scheduled, tramadol as needed  DVT PPx: Heparin     Team conference was held today on the patient and discussed directly with the patient utilizing their entire treatment team. Please see separate team note for details. Total treatment time for today's care >35 min. Electronically signed by Maurice Wheeler MD on 6/29/2021 at 9:07 AM    * This document was created using dictation software. While all precautions were taken to ensure accuracy, errors may have occurred. Please disregard any typographical errors.

## 2021-06-29 NOTE — PROGRESS NOTES
Facility/Department: [facility] ACUTE REHAB UNIT  Initial Assessment    Patient: Margo Walsh   : 1975   MRN: [id number]      Evaluation Date: 2021      Medical Diagnosis Information:  has Diabetes mellitus ([address] Obesity; Microalbuminuria; Hyperlipidemia with target LDL less than 100; Hypertension; Diabetes mellitus type 2 in obese ([address] Acute renal failure (ARF) ([address] ESRD (end stage renal disease) ([address] Non-smoker; Elevated C-reactive protein (CRP); Elevated sed rate; Diabetic polyneuropathy associated with type 2 diabetes mellitus ([address] Weight loss counseling, encounter for; Atherosclerosis 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                                      ARU H&P: \"Patient is a 51year old female with a history of ESRD on PD, HTN, DM, HLD, and PVD. On 21 she had amputation of toes 1 and 2 on the left foot. On 21 she underwent a left popliteal, anterior tibial and dorsalis pedis angioplasty with Dr. Jimi Chappell. She presented to the ER on 21 with a syncopal episode. She has been experiencing dizziness and syncopal episodes X2 weeks prior to this visit. She underwent a Left Heart Catheterization on 21 and was found to have severe coronary microvascular disease. On 21 she underwent a CABG X3 with NYA clip. She was evaluated by therapy and suggested to continue in an inpatient setting prior to returning home.  She reports concerns for lower extremity weakness and dizziness. \"    Recent Chest xray completed 2021:   Impression   Right-sided pleural effusion       Bibasilar hypoaeration with bibasilar opacities that could represent   subsegmental atelectasis or infection Modified Barium Swallow Study: To be completed 6/30/2021    History/Prior Level of Function:   See previous speech/voice assessment completed on 6/24/2021 for details. PAIN:  Pain Scale: 4/10 stomach pain   Type: [x]Constant   []Intermittent  []Radiating []Localized []other:  Pain Intervention: Denied need                DYSPHAGIA BEDSIDE SWALLOW EVALUATION     Prior Dysphagia History: Pt reports consuming a regular texture diet and thin liquids at home. Pt does have a diagnosis of GERD and is currently taking 40 mg daily of Protonix. Pt reports reflux is well controlled when she takes OTC Omeprazole at home. Patient Complaint: Pt with minimal PO intake with significantly reduced appetite. Pt complaining of burning in chest and states it feels that more foods are sticking in her throat (pointed to cricoid cartilage). Pt reports getting \"choked up\" on certain foods and textures (cheese quesadilla and occasionally pills). Pt appears to be avoiding certain textures due to burning and potential globus sensation of bolus.      Patient Positioning: Upright in bed (slightly reclined)    Respiratory Status:   [x]Room Air   []O2 via nasal cannula   []Other:    Current Diet:   Current Liquid:     [x]Regular    []Dysphagia III/Soft & Bite-Sized     []Dysphagia II/Minced & Moist    []Dysphagia I/Pureed  []NPO   [x]Thin  []Nectar thick/Mildly thick   []Honey thick/Mildly thick   []NPO  []Juarez Water  []Other:       Dentition:  [x]Adequate  []Dentures Top  []Dentures Bottom  [x]Missing Many Teeth  []Edentulous  []Other:    Baseline Vocal Quality:  []Normal  []Dysphonic   [x]Aphonic   [x]Hoarse  []Wet  [x]Weak  []Other:    Volitional Cough:  []Strong  [x]Weak  []Wet  []Absent  []Congested  []Other:    Volitional Swallow:   []Absent   []Delayed     [x]Adequate     []Required use of drink     Oral Mechanism Exam:  [x]WFL []Mild   [] Moderate  []Severe  []To be assessed   []Labial ROM   []Labial Symmetry   []Labial Strength   []Labial Sensation   []Labial Coordination    []Lingual ROM  []Lingual Symmetry  []Lingual Strength    []Lingual Sensation    []Lingual Coordination    []Velum    []Mandible  []Gag    Oral Phase: []WFL []Mild   [] Moderate  []Severe  []To be assessed   [x]Impaired Mastication: mildly prolonged with regular texture    []Spillage Left:   []Spillage Right:  []Pocketing Left:   []Pocketing Right:   []Decreased Anterior to Posterior Transit:   [x]Suspected Premature Bolus Loss:   [x]Lingual/Palatal Residue: min to mod suspect dry lingual surface is contributing   []Other:     Pharyngeal Phase: []WFL []Mild   [] Moderate  []Severe  [x]To be assessed/ suspected therefore MBS will be completed    [x]Delayed Swallow:   [x]Decreased Laryngeal Elevation:   []Absent Swallow:  []Wet Vocal Quality:   []Throat Clearing-Immediate:   []Throat Clearing-Delayed:   []Cough-Immediate:   [x]Cough-Delayed: following thins provided in combination with regular solid texture   []Change in Vital Signs:  []Other:     Dysphagia Impressions:  Oral mech exam grossly WFL however did note residue from earlier food item consumed on posterior portion of the tongue which could be due to dry lingual surface versus reduced tongue base retraction. Pt presents with mild oral dysphagia characterized by lingual residue post swallow, prolonged mastication, and suspected premature bolus loss. Suspect pharyngeal dysphagia characterized by delayed swallow, intermittent delayed cough, and decreased laryngeal elevation via manual palpation during the swallow. Bedside swallow evaluation limited by Pt's willingness to accept a lot of PO due to burning sensation and nausea. Pt with poor PO intake since hospitalization (5/31/2021) suspect mechanism fatigue due to reduced endurance. Suspect component of esophageal dysphagia (GERD) due to Pt's reported s/s and intermittent belching throughout assessment.  Pt seemingly tolerated thins via cup and straw in isolation and whole pill provided with thins with no overt s/s of aspiration. Regular texture solid (craker) revealed prolonged mastication with mod dispersed lingual reside in which Pt required a use of liquid wash to clear. Pt continued with min lingual stasis on posterior portion of tongue. Pt demonstrated a dry weak cough when thin liquids were introduced with dry regular texture. Due to largely aphonic voice, increased globus sensation, reports of getting choked up on certain textures, and intermittent delayed cough recommend completion of a MBS to further assess the pharyngeal phase of the swallow and direct dysphagia POC. Pt in agreement with recommendations.      Eating Assistance:  [x]Independent  []Setup or clean-up assistance   [] Supervision or touching assistance   [] Partial or moderate assistance   [] Substantial or maximal assistance  [] Dependent     Recommended Diet and Intervention:  Diet Solids Recommendation:   Liquid Consistency Recommendation:   Recommended form of Meds:   [x]Regular    []Dysphagia III/Soft & Bite-Sized     []Dysphagia II/Minced & Moist    []Dysphagia I/Pureed  []NPO   [x]Thin  []Nectar thick/Mildly thick   []Honey thick/Mildly thick   []NPO  []Juarez Water  []Other:   [x]Whole in water  []Meds in puree  []Meds crushed in puree  []Via alternative means of nutrition  []Other:       Dysphagia Therapeutic Intervention:  []  Bolus control Exercises  []  Oral Motor Exercises  []  Mushtaq Shorten Water Protocol  []  Thermal Stimulation  []  Oral Care    []  Vital Stim/NMES  []  Laryngeal Exercises  []  Patient/Family Education  []  Pharyngeal Exercises  []  Therapeutic PO trials with SLP  []  Diet tolerance monitoring  [x]  Other: TBD pending MBS    Compensatory Swallowing Strategies:  [x]  Alternate Solids/Liquids  []  No straws   [] Lingual sweeps  []  Check for pocketing of food L []  Assist feed  [x] Eat/feed Slowly  []  Check for pocketing of food R []  Chin tuck   [x] Remain upright 30-45 min  []  Effortful Swallows   []  External pacing  [] Total feed  []  Liquids by spoon only  []  Small bites/sips  [] Other:   [x]  Upright as possible with all PO []  Swallow 2 times per bite/sip    SHORT TERM DYSPHAGIA GOALS  Short-term Goals  Goal 1: Pt will tolerate completion of MBS with goals to be added to POC as needed.     Treatment Diagnosis:  Speech-Language  []Expressive Aphasia  []Mild   [] Moderate  []Severe    []Receptive Aphasia  []Mild   [] Moderate  []Severe    []Word Finding Difficulty []Mild   [] Moderate  []Severe    []Dysarthria   []Mild   [] Moderate  []Severe    []Apraxia of Speech  []Mild   [] Moderate  []Severe   []Dysfluency   []Mild   [] Moderate  []Severe   []Other:   []Mild   [] Moderate  []Severe    Voice (see report from 6/24/2021)  []Dysphonia   []Mild   [] Moderate  []Severe    [x]Aphonia   []Mild   [] Moderate  [x]Severe    []Hypophonia   []Mild   [] Moderate  []Severe    []Other:   []Mild   [] Moderate  []Severe    Cognitive-Linguistic  []Cognitive-Linguistic Deficits []Mild   [] Moderate  []Severe     []Suspected; unable to fully assess at this time   Dysphagia  [x]Oral Dysphagia  [x]Mild   [] Moderate  []Severe    [x]Pharyngeal Dysphagia [x]Mild/ suspected   [] Moderate  []Severe    [x]Other:   [x]Mild/ suspected esophageal component   [] Moderate  []Severe      Prognosis/Rehab Potential:      []Excellent   [x]Good    []Fair   []Poor    Tolerance of evaluation/treatment:    []Excellent   [x]Good    []Fair   []Poor    PLAN:    SLP treatment warranted:    [x] Yes  [] No      Frequency/Duration: Up to 30 minutes/day and up 5 days per week, as tolerated, until goals met/ depending on Pt's progress and results from [facility] or discharged from [facility],    Barriers to/and or personal factors that will affect rehab potential:              []Age              []Motivation/Lack of Motivation                        []Co-Morbidities              []Cognitive Function, education/learning

## 2021-06-30 ENCOUNTER — APPOINTMENT (OUTPATIENT)
Dept: GENERAL RADIOLOGY | Age: 46
DRG: 949 | End: 2021-06-30
Attending: PHYSICAL MEDICINE & REHABILITATION
Payer: MEDICARE

## 2021-06-30 LAB
ANION GAP SERPL CALCULATED.3IONS-SCNC: 18 MMOL/L (ref 3–16)
BODY FLUID CULTURE, STERILE: NORMAL
BUN BLDV-MCNC: 57 MG/DL (ref 7–20)
CALCIUM SERPL-MCNC: 8.4 MG/DL (ref 8.3–10.6)
CHLORIDE BLD-SCNC: 86 MMOL/L (ref 99–110)
CO2: 24 MMOL/L (ref 21–32)
CREAT SERPL-MCNC: 8.8 MG/DL (ref 0.6–1.1)
GFR AFRICAN AMERICAN: 6
GFR NON-AFRICAN AMERICAN: 5
GLUCOSE BLD-MCNC: 151 MG/DL (ref 70–99)
GLUCOSE BLD-MCNC: 77 MG/DL (ref 70–99)
GLUCOSE BLD-MCNC: 90 MG/DL (ref 70–99)
GLUCOSE BLD-MCNC: 92 MG/DL (ref 70–99)
GLUCOSE BLD-MCNC: 96 MG/DL (ref 70–99)
GLUCOSE BLD-MCNC: 97 MG/DL (ref 70–99)
GRAM STAIN RESULT: NORMAL
HCT VFR BLD CALC: 26 % (ref 36–48)
HEMOGLOBIN: 8.2 G/DL (ref 12–16)
MCH RBC QN AUTO: 29.2 PG (ref 26–34)
MCHC RBC AUTO-ENTMCNC: 31.5 G/DL (ref 31–36)
MCV RBC AUTO: 92.6 FL (ref 80–100)
PDW BLD-RTO: 19.6 % (ref 12.4–15.4)
PERFORMED ON: ABNORMAL
PERFORMED ON: NORMAL
PLATELET # BLD: 264 K/UL (ref 135–450)
PMV BLD AUTO: 9.1 FL (ref 5–10.5)
POTASSIUM SERPL-SCNC: 4.1 MMOL/L (ref 3.5–5.1)
RBC # BLD: 2.81 M/UL (ref 4–5.2)
SODIUM BLD-SCNC: 128 MMOL/L (ref 136–145)
WBC # BLD: 14.2 K/UL (ref 4–11)

## 2021-06-30 PROCEDURE — 99221 1ST HOSP IP/OBS SF/LOW 40: CPT | Performed by: SURGERY

## 2021-06-30 PROCEDURE — 6370000000 HC RX 637 (ALT 250 FOR IP): Performed by: INTERNAL MEDICINE

## 2021-06-30 PROCEDURE — 6370000000 HC RX 637 (ALT 250 FOR IP): Performed by: PHYSICAL MEDICINE & REHABILITATION

## 2021-06-30 PROCEDURE — 6360000002 HC RX W HCPCS: Performed by: PHYSICAL MEDICINE & REHABILITATION

## 2021-06-30 PROCEDURE — 1280000000 HC REHAB R&B

## 2021-06-30 PROCEDURE — 85027 COMPLETE CBC AUTOMATED: CPT

## 2021-06-30 PROCEDURE — 74018 RADEX ABDOMEN 1 VIEW: CPT

## 2021-06-30 PROCEDURE — 71045 X-RAY EXAM CHEST 1 VIEW: CPT

## 2021-06-30 PROCEDURE — 6360000002 HC RX W HCPCS: Performed by: INTERNAL MEDICINE

## 2021-06-30 PROCEDURE — 80048 BASIC METABOLIC PNL TOTAL CA: CPT

## 2021-06-30 PROCEDURE — 36415 COLL VENOUS BLD VENIPUNCTURE: CPT

## 2021-06-30 PROCEDURE — 99233 SBSQ HOSP IP/OBS HIGH 50: CPT | Performed by: INTERNAL MEDICINE

## 2021-06-30 RX ORDER — ACETAMINOPHEN 325 MG/1
650 TABLET ORAL EVERY 6 HOURS PRN
Status: DISCONTINUED | OUTPATIENT
Start: 2021-06-30 | End: 2021-07-01 | Stop reason: HOSPADM

## 2021-06-30 RX ORDER — PROMETHAZINE HYDROCHLORIDE 25 MG/1
12.5 TABLET ORAL EVERY 6 HOURS PRN
Status: DISCONTINUED | OUTPATIENT
Start: 2021-06-30 | End: 2021-07-01 | Stop reason: HOSPADM

## 2021-06-30 RX ORDER — ONDANSETRON 2 MG/ML
4 INJECTION INTRAMUSCULAR; INTRAVENOUS EVERY 6 HOURS PRN
Status: DISCONTINUED | OUTPATIENT
Start: 2021-06-30 | End: 2021-07-01 | Stop reason: HOSPADM

## 2021-06-30 RX ADMIN — HEPARIN SODIUM 5000 UNITS: 5000 INJECTION INTRAVENOUS; SUBCUTANEOUS at 15:44

## 2021-06-30 RX ADMIN — MICONAZOLE NITRATE: 20 POWDER TOPICAL at 20:18

## 2021-06-30 RX ADMIN — EPOETIN ALFA-EPBX 10000 UNITS: 10000 INJECTION, SOLUTION INTRAVENOUS; SUBCUTANEOUS at 11:27

## 2021-06-30 RX ADMIN — HEPARIN SODIUM 5000 UNITS: 5000 INJECTION INTRAVENOUS; SUBCUTANEOUS at 07:16

## 2021-06-30 RX ADMIN — ATORVASTATIN CALCIUM 20 MG: 20 TABLET, FILM COATED ORAL at 20:12

## 2021-06-30 RX ADMIN — GENTAMICIN SULFATE: 1 CREAM TOPICAL at 19:31

## 2021-06-30 RX ADMIN — ZOLPIDEM TARTRATE 5 MG: 5 TABLET ORAL at 20:12

## 2021-06-30 RX ADMIN — METOPROLOL TARTRATE 25 MG: 25 TABLET, FILM COATED ORAL at 20:12

## 2021-06-30 RX ADMIN — PROMETHAZINE HYDROCHLORIDE 12.5 MG: 25 TABLET ORAL at 09:36

## 2021-06-30 RX ADMIN — MICONAZOLE NITRATE: 20 POWDER TOPICAL at 10:14

## 2021-06-30 RX ADMIN — ONDANSETRON 4 MG: 4 TABLET, ORALLY DISINTEGRATING ORAL at 15:44

## 2021-06-30 RX ADMIN — HYDRALAZINE HYDROCHLORIDE 25 MG: 25 TABLET, FILM COATED ORAL at 20:17

## 2021-06-30 RX ADMIN — PROMETHAZINE HYDROCHLORIDE 12.5 MG: 25 TABLET ORAL at 20:10

## 2021-06-30 RX ADMIN — STANDARDIZED SENNA CONCENTRATE AND DOCUSATE SODIUM 1 TABLET: 8.6; 5 TABLET ORAL at 20:12

## 2021-06-30 RX ADMIN — ANTACID TABLETS 500 MG: 500 TABLET, CHEWABLE ORAL at 20:12

## 2021-06-30 RX ADMIN — PANTOPRAZOLE SODIUM 40 MG: 40 TABLET, DELAYED RELEASE ORAL at 07:13

## 2021-06-30 RX ADMIN — HYDRALAZINE HYDROCHLORIDE 25 MG: 25 TABLET, FILM COATED ORAL at 07:14

## 2021-06-30 RX ADMIN — TRAMADOL HYDROCHLORIDE 100 MG: 50 TABLET, FILM COATED ORAL at 20:12

## 2021-06-30 RX ADMIN — HYDRALAZINE HYDROCHLORIDE 25 MG: 25 TABLET, FILM COATED ORAL at 15:40

## 2021-06-30 RX ADMIN — ONDANSETRON 4 MG: 4 TABLET, ORALLY DISINTEGRATING ORAL at 07:13

## 2021-06-30 ASSESSMENT — PAIN SCALES - GENERAL
PAINLEVEL_OUTOF10: 0
PAINLEVEL_OUTOF10: 3
PAINLEVEL_OUTOF10: 5
PAINLEVEL_OUTOF10: 0
PAINLEVEL_OUTOF10: 7

## 2021-06-30 ASSESSMENT — PAIN DESCRIPTION - DESCRIPTORS: DESCRIPTORS: SORE

## 2021-06-30 ASSESSMENT — PAIN DESCRIPTION - FREQUENCY: FREQUENCY: INTERMITTENT

## 2021-06-30 ASSESSMENT — PAIN DESCRIPTION - ORIENTATION: ORIENTATION: LEFT

## 2021-06-30 ASSESSMENT — PAIN DESCRIPTION - PAIN TYPE: TYPE: ACUTE PAIN

## 2021-06-30 ASSESSMENT — PAIN DESCRIPTION - LOCATION: LOCATION: ABDOMEN

## 2021-06-30 NOTE — FLOWSHEET NOTE
Vitals:    06/30/21 0713 06/30/21 0730 06/30/21 0843 06/30/21 1209   BP: (!) 122/96 (!) 166/85 (!) 119/90 (!) 164/74   Pulse:  86 79 90   Resp:  14 16 18   Temp:  98.3 °F (36.8 °C) 97.2 °F (36.2 °C) 98.5 °F (36.9 °C)   TempSrc:  Oral Oral Oral   SpO2:   96% 94%   Weight:  222 lb 0.1 oz (100.7 kg)     Height:           Morning shift assessment completed, A&Ox4 with delayed whispered responses, moderate A x 2  with transfers, ambulates via w/c. Uses call light approprietly for assistance. Pt c/o pain to LLQ but denies SOA at this time. O2 sats are stable on RA. Lung sounds are clear through out. Abdomen is rounded but soft nondistended bowel sounds are active. Reports of constipation and poor intakes as well as nausea with no vomiting. No edema noted to BLE. Fall precautions in place for safety, bed is in low position and wheels are locked. Pt is wearing nonskid sock for safety. Call light in reach for wants and needs, will monitor. POC reviewed and pt is agreeable to KUB and hold therapy until tomorrow. Spiritual consult for s/s of depression post CABG       06/30/21 0843   Assessment   Charting Type Shift assessment   Neurological   Neuro (WDL) X   Level of Consciousness Alert (0)   Orientation Level Oriented X4   Cognition Poor judgement; Appropriate safety awareness;Poor attention/concentration; Follows commands   Language Clear  (whispers)   Size R Pupil (mm) 3   R Pupil Shape Round   R Pupil Reaction Brisk   Size L Pupil (mm) 3   L Pupil Shape Round   L Pupil Reaction Brisk   R Hand  Moderate   L Hand  Moderate   R Foot Dorsiflexion Weak   L Foot Dorsiflexion Weak   R Foot Plantar Flexion Weak   L Foot Plantar Flexion Weak   RUE Motor Response Responds to command;Normal extension;Normal flexion; Movement to painful stimulus; No tremor   LUE Motor Response Responds to command;Normal extension;Normal flexion; Movement to painful stimulus; No tremor   RLE Motor Response Responds to command;Normal extension;Normal flexion; Movement to painful stimulus; No tremor   LLE Motor Response Responds to command;Normal extension;Normal flexion; Movement to painful stimulus; No tremor   Yong Coma Scale   Eye Opening 4   Best Verbal Response 5   Best Motor Response 6   [place] Coma Scale Score 15   HEENT   HEENT (WDL) X   Right Eye Impaired vision; Intact  (wears glasses)   Left Eye Impaired vision; Intact  (wears glasses)   Mucous Membrane Pink; Intact; Moist   Teeth Missing teeth   Voice Other (Comment)  (whisper-functional dysphonia)   Tongue Pink & moist   Throat Other (Comment); Intact;Dry  (whispers)   Respiratory   Respiratory (WDL) X   Respiratory Pattern Regular   Respiratory Depth Normal   Respiratory Quality/Effort Dyspnea with exertion   Chest Assessment Chest expansion symmetrical;Trachea midline   L Breath Sounds Clear   R Breath Sounds Clear   Breath Sounds   Right Upper Lobe Clear   Right Middle Lobe Clear   Right Lower Lobe Clear   Left Upper Lobe Clear   Left Lower Lobe Clear   Cough/Sputum   Cough None   Cardiac   Cardiac (WDL) WDL   Cardiac Regularity Regular   Rhythm Interpretation   Pulse 79   Cardiac Monitor   Telemetry Monitor On No   Gastrointestinal   Abdominal (WDL) X  (PD site)   RUQ Bowel Sounds Active   LUQ Bowel Sounds Active   RLQ Bowel Sounds Active   LLQ Bowel Sounds Active   Abdomen Inspection Soft;Rotund;Rounded;Non-distended; No ascites   GI Symptoms Constipation   Tenderness Soft;Guarding;Tenderness   Current Interventions Stool Softener   Relieved By Laxative   Constipation Precipitating Factors Medication (iron supplements, opiods, antidepressants etc)   Problems with Passing Stool Incomplete passage of stool   Passing Flatus Y   Peripheral Vascular   Peripheral Vascular (WDL) X   Edema Left lower extremity   RLE Edema None   LLE Edema Trace   Sensation RUE Full sensation; No numbness; No pain; No tingling   Sensation LUE Full sensation; No numbness; No pain; No tingling   Sensation RLE Decreased   Sensation LLE Decreased   RUE Neurovascular Assessment   Capillary Refill Less than/equal to 3 seconds   Color Pale   Temperature Warm   R Radial Pulse +2   LUE Neurovascular Assessment   Capillary Refill Less than/equal to 3 seconds   Color Pale   Temperature Warm   L Radial Pulse +2   RLE Neurovascular Assessment   Capillary Refill Less than/equal to 3 seconds   Color Pale   Temperature Warm   R Pedal Pulse +1   LLE Neurovascular Assessment   Capillary Refill GRETCHEN   Color Pale   Temperature Warm   L Pedal Pulse +1   Skin Color/Condition   Skin Color/Condition (WDL) X   Skin Color Pale   Skin Condition/Temp Warm;Dry   Skin Integrity   Skin Integrity (WDL) X   Skin Integrity Other (Comment)  (See LDA-Ulcer wound)   Location Left foot   Multiple Skin Integrity Sites Yes   Skin Fold Management Yes   Dressing Site Abdominal pannus   Treatment Silver impregnated textile   Date applied? 06/30/21   Assessed this shift Red;Moist   Skin Integrity Site 2   Skin Integrity Location 2 Other (Comment)  (surgical scar-see LDA)   Location 2 Sternum   Preventative Dressing No  (RAMA)   Skin Integrity Site 3   Skin Integrity Location 3 Tear  (ST-See LDA)    Location 3 under right breast   Musculoskeletal   Musculoskeletal (WDL) X   RUE Full movement;Weakness   LUE Full movement;Weakness   RL Extremity Full movement;Weakness; Unsteady   LL Extremity Weakness; Unsteady; Swelling;Surgery; Walking boot; Full movement; Amputation   Musculoskeletal Details   L Foot Surgery  (nacrotic ulcer)   L Toes Other (Comment)  (toe ampution)   Genitourinary   Genitourinary (WDL) X  (peritoneal dialysis-oliguric)   Flank Tenderness No   Suprapubic Tenderness No   Dysuria No   Urine Assessment   Incontinence No   Anus/Rectum   Anus/Rectum (WDL) WDL   Wound 06/16/21 Breast Lower;Right Skin tear under right breast   Date First Assessed/Time First Assessed: 06/16/21 1800   Present on Hospital Admission: Yes  Wound Approximate Age at [address] (Weeks): 1 weeks  Primary Wound Type: Skin Tear  Location: Breast  Wound Location Orientation: Lower;Right  Wound De. .. Wound Etiology Skin Tear   Dressing Status Clean;Dry; Intact   Wound Cleansed Soap and water   Dressing/Treatment Interdry Ag/wicking fabric with Ag   Wound Assessment Superficial   Drainage Amount None   Odor None   Jazmyn-wound Assessment Fragile;Dry/flaky   Incision 06/09/21 Sternum Anterior   Date First Assessed: 06/09/21   Primary Wound Type: Incision  Location: Sternum  Wound Location Orientation: Anterior   Dressing Status Other (Comment)  (RAMA)   Incision Cleansed Soap and water   Dressing/Treatment Open to air   Margins Approximated  (distal end open with no drainage and open to air)   Incision Assessment Dry   Drainage Amount None   Odor None   Incision 05/26/21 Foot Left   Date First Assessed: 05/26/21   Present on Hospital Admission: No  Primary Wound Type: Incision  Location: Foot  Wound Location Orientation: Left  Wound Description (Comments): DRESSING: ADAPTIC, GAUZE 4X4'S, BILLY, WEBRIL, COBAN   Dressing Status Clean;Dry; Intact   Incision Cleansed Betadine/povidone iodine   Dressing/Treatment Dry dressing   Incision Assessment Dry;Eschar   Drainage Amount None   Odor None   Jazmyn-incision Assessment Fragile;Ecchymosis   Psychosocial   Psychosocial (WDL) X   Patient Behaviors Withdrawn;Tearful;Flat affect; Other (Comment)  (Depressed)   Needs Expressed Physical;Emotional;Spiritual

## 2021-06-30 NOTE — PROGRESS NOTES
Speech Language Pathology  Attempt/HOLD    Valerie Newby  1975    Modified Barium Swallow scheduled for this date; however, cancelled this date due to pt report of nausea and MD requesting to hold MBS this date. Attempted to see pt for speech therapy; however, pt continued to report nausea and not feeling able to participate. Per MD, pt is on medical hold at this time. Will reschedule MBS for tomorrow (7/1/21) and complete as pt is able to tolerate/participate.     Thanks,  Natalya Salinas M.S. [address] [facility]  Speech-Language Pathologist

## 2021-06-30 NOTE — PROGRESS NOTES
BP in the higher range at 194/76- declined her PRN hydralazine, stating, \" I will take it later\". Will continue to monitor.

## 2021-06-30 NOTE — PLAN OF CARE
Problem: Pain:  Goal: Control of acute pain  Description: Control of acute pain  6/29/2021 2340 by Elio Betancur RN  Outcome: Ongoing     Problem: Skin Integrity:  Goal: Absence of new skin breakdown  Description: Absence of new skin breakdown  6/29/2021 2340 by Elio Betancur RN  Outcome: Ongoing     Problem: Falls - Risk of:  Goal: Will remain free from falls  Description: Will remain free from falls  6/29/2021 2340 by Elio Betancur RN  Outcome: Ongoing  Note: Education Provided as followed:  \"Family/Patient made aware of the tailored interventions falls plan using the TIPS TOOL.       Problem: Falls - Risk of:  Goal: Absence of physical injury  Description: Absence of physical injury  6/29/2021 2340 by Elio Betancur RN  Outcome: Ongoing     Problem: IP BREATHING  Goal: STG - patient will utilize incentive spirometer  6/29/2021 2340 by Elio Betancur RN  Outcome: Met This Shift     Problem: NUTRITION  Goal: Patient maintains weight  6/29/2021 2340 by Elio Betancur RN  Outcome: Ongoing     Problem: SKIN INTEGRITY  Goal: LTG - Patient will be free from infection  6/29/2021 2340 by Elio Betancur RN  Outcome: Ongoing

## 2021-06-30 NOTE — PROGRESS NOTES
[address] [facility]  6/30/2021  [id number]    Chief Complaint: Generalized weakness    Subjective:   Nauseated overnight. Resistant to zofran. + BM this am. States she isn't able to do MBS this am. Glucoses low 2/2 poor PO intake    ROS: No CP, SOB, dyspnea    Objective:  Patient Vitals for the past 24 hrs:   BP Temp Temp src Pulse Resp SpO2 Height Weight   06/30/21 0843 (!) 119/90 97.2 °F (36.2 °C) Oral 79 16 96 % -- --   06/30/21 0730 (!) 166/85 98.3 °F (36.8 °C) Oral 86 14 -- -- 222 lb 0.1 oz (100.7 kg)   06/30/21 0713 (!) 122/96 -- -- -- -- -- -- --   06/29/21 1945 (!) 179/78 97.8 °F (36.6 °C) Oral 73 16 98 % -- --   06/29/21 1417 (!) 186/65 97.2 °F (36.2 °C) Axillary 76 14 95 % -- --   06/29/21 1153 -- -- -- -- -- -- 5' 9\" (1.753 m) --     Gen: No distress, pleasant. Resting in bed. Appears uncomfortable. HEENT: Normocephalic, atraumatic  CV: Regular rate and rhythm. No MRG   Resp: No respiratory distress. CTAB  Abd: Soft, nontender, PD cath in place  Ext: mild edema noted in BLE. LLE amputation site covered in clean dressing. Neuro: Alert, oriented, appropriately interactive. Laboratory data: Available via EMR. Therapy progress:  PT  Required Braces or Orthoses  Left Lower Extremity Brace: Boot  LLE Brace Type: Surgical shoe  Position Activity Restriction  Sternal Precautions: No Pushing, No Pulling, 5# Lifting Restrictions  Other position/activity restrictions: Patient is a 51year old female with a history of ESRD on PD, HTN, DM, HLD, and PVD. On 5/26/21 she had amputation of toes 1 and 2 on the left foot. On 5/27/21 she underwent a left popliteal, anterior tibial and dorsalis pedis angioplasty with Dr. Manuel Melton. She presented to the ER on 5/31/21 with a syncopal episode. She has been experiencing dizziness and syncopal episodes X2 weeks prior to this visit. She underwent a Left Heart Catheterization on 6/2/21 and was found to have severe coronary microvascular disease.  On 6/9/21 she underwent a CABG X3 with NYA clip  Objective     Sit to Stand: Maximum Assistance (max A from raised commode, w/c x 4 reps, 1 episode where pt unable to clear bottom from w/c requiring max A x 2, mod A for STS from elevated mat table)  Stand to sit: Contact guard assistance (improved eccentric control)  Bed to Chair: Contact guard assistance (via stand step with RW)  Device: Rolling Walker  Other Apparatus: O2 (RA)  Assistance: Contact guard assistance  Distance: 80' + [de-identified]'  OT  PT Equipment Recommendations  Equipment Needed: Yes  Mobility Devices: Rosales Booze: Rolling  Other: TBD with progress  Toilet - Technique: Ambulating  Equipment Used: Standard toilet  Assessment        SLP                Body mass index is 32.78 kg/m². Assessment:  Patient Active Problem List   Diagnosis    Diabetes mellitus ([address]    Obesity    Microalbuminuria    Hyperlipidemia with target LDL less than 100    Hypertension    Diabetes mellitus type 2 in obese ([address]    Acute renal failure (ARF) ([address]    ESRD (end stage renal disease) [facility]    Non-smoker    Elevated C-reactive protein (CRP)    Elevated sed rate    Diabetic polyneuropathy associated with type 2 diabetes mellitus (HCC)    Weight loss counseling, encounter for    Atherosclerosis of native arteries of left leg with ulceration of other part of foot ([address]    Near syncope    Coronary artery disease of native artery of native heart with stable angina pectoris (HCC)    Mild malnutrition ([address]    S/P coronary artery bypass graft x 3    S/P left atrial appendage ligation    Generalized weakness    Functional dysphonia       Plan:   Generalized weakness: PT/OT     CAD: S/P CABG X3 with NYA Clip (6/9). Sternal precautions. ASA, statin     PAD: S/P Amputation of toes 1 and 2 on the left (5/26/21). Routine wound care     ESRD: on PD 2/2 diabetic nephropathy.  Appreciate nephro assistance    Hypoxic respiratory failure: O2 supplementation PRN, stress IS     DM II: Levemir decreased to 10/8 -> 8/6, prandial d/c'd, SSI high. HTN: Lopressor 25, hydralazine 25. Per nephro     HLD: Lipitor 20     Oral candidiasis: Nystatin completed    Hyponatremia: per nephro and PD. - Improving    Hypophonia: SLP consulted. Suspect pulmonary function related. ENT suggests SLP therapy for now. Potential scope as OP if not improved. Nausea: consider reglan. - Add phenergan PRN      Bowels: Per protocol  Bladder: Per protocol   Sleep: Ambien provided prn  Pain: Tylenol scheduled -> PRN, tramadol as needed  DVT PPx: Heparin     Electronically signed by Michelle oBne MD on 6/30/2021 at 8:50 AM    * This document was created using dictation software. While all precautions were taken to ensure accuracy, errors may have occurred. Please disregard any typographical errors.

## 2021-06-30 NOTE — PROGRESS NOTES
Office : [phone]     Fax :[phone]       Nephrology progress  Note      Patient's Name: Charmaine Donato  11:21 AM  2021    Reason for Consult:  ESRD    History of Present Ilness:    Charmaine Donato is a 51 y.o. female with severe end-stage renal disease secondary to diabetic nephropathy on peritoneal dialysis, hypertension, peripheral vascular disease, diabetic neuropathy who was admitted after she had a syncopal episode. She had similar episode 2 weeks ago. Recently had angiogram left lower extremity for gangrene left foot second toe. Denies any shortness of breath. Denies any abdominal pain. Denies any nausea vomiting. Interval HX :          No chest pain  No sob  S/p CABG on 2010  Blood pressure is elevated    Sodium level better at 130 today. I/O last 3 completed shifts: In: 300 [P.O.:300]  Out: -     Past Medical History:   Diagnosis Date    Diabetes mellitus ([address]     End stage kidney disease ([address]     peritoneal dialysis every night at home x 2 years    Hypertension     Neuropathy     Peripheral vascular disease ([facility]        Past Surgical History:   Procedure Laterality Date     SECTION      CORONARY ARTERY BYPASS GRAFT N/A 2021    URGENT CABG CORONARY ARTERY BYPASS GRAFTS X3. VEIN TO PDA X1, VEIN TO OM X1, LEFT INTERNAL MAMMARY ARTERY TO LAD X1. ENDOSCOPIC HARVEST RIGHT LEG SAPHENOUS VEIN. LIGATION NYA USING 35MM ATRICLIP. EPI AORTIC ULTRASOUND. TOTAL CARDIOPULMONARY BYPASS. DOPPLER GRAFT FLOW VERIFICATION. BILATERAL INITERCOSTAL NERVE BLOCK USING 1.3% EXPAREL.  performed by Man La MD at [place] [address]  2018     lap PD cath placement lt side 62 cm    TOE AMPUTATION Left 2021    AMPUTATION OF THE LEFT THIRD TOE performed by Lacie Levy DPM at [address] [facility] [address] TOE AMPUTATION Left 5/26/2021    AMPUTATION OF HALLUX AND SECOND TOE, LEFT FOOT performed by Lacie Levy DPM at [place] [address]         Family History   Problem Relation Age of Onset    Coronary Art Dis Mother     High Blood Pressure Mother     Heart Disease Mother     Diabetes Mother     Cancer Father     Coronary Art Dis Father     High Blood Pressure Father     Heart Disease Father     Diabetes Father      Current Medications:    promethazine (PHENERGAN) tablet 12.5 mg, Q6H PRN  insulin detemir (LEVEMIR) injection pen 8 Units (PATIENT SUPPLIED), QAM  insulin detemir (LEVEMIR) injection pen 6 Units (PATIENT SUPPLIED), Nightly  acetaminophen (TYLENOL) tablet 650 mg, Q6H PRN  hydrALAZINE (APRESOLINE) tablet 25 mg, 3 times per day  epoetin toy-epbx (RETACRIT) injection 10,000 Units, Once  calcium carbonate (TUMS) chewable tablet 500 mg, TID PRN  torsemide (DEMADEX) tablet 100 mg, Daily  gentamicin (GARAMYCIN) 0.1 % cream, Daily  insulin aspart (NOVOLOG) injection pen 0-18 Units-PATIENT SUPPLIED, TID WC  insulin aspart (NOVOLOG) injection PEN 0-9 Units-Patient supplied, Nightly  heparin (porcine) injection 5,000 Units, 3 times per day  albuterol sulfate  (90 Base) MCG/ACT inhaler 2 puff, Q6H PRN  aspirin EC tablet 325 mg, Daily  magnesium hydroxide (MILK OF MAGNESIA) 400 MG/5ML suspension 30 mL, Daily PRN  metoprolol tartrate (LOPRESSOR) tablet 25 mg, BID  pantoprazole (PROTONIX) tablet 40 mg, Daily  polyethylene glycol (GLYCOLAX) packet 17 g, Daily PRN  sennosides-docusate sodium (SENOKOT-S) 8.6-50 MG tablet 1 tablet, BID  traMADol (ULTRAM) tablet 50 mg, Q6H PRN   Or  traMADol (ULTRAM) tablet 100 mg, Q6H PRN  zolpidem (AMBIEN) tablet 5 mg, Nightly PRN  dextrose 5 % solution, PRN  dextrose 50 % IV solution, PRN  glucagon (rDNA) injection 1 mg, PRN  glucose (GLUTOSE) 40 % oral gel 15 g, PRN  atorvastatin (LIPITOR) tablet 20 mg, Nightly  bisacodyl (DULCOLAX) EC tablet 5 mg, Daily PRN  diphenhydrAMINE (BENADRYL) tablet 25 mg, Q6H PRN  heparin (porcine) injection 3,400 Units, PRN  hydrALAZINE (APRESOLINE) tablet 50 mg, Q8H PRN  ondansetron (ZOFRAN-ODT) disintegrating tablet 4 mg, Q8H PRN  sevelamer (RENVELA) tablet 800 mg, TID WC  miconazole (MICOTIN) 2 % powder, BID          Physical exam:     Vitals:  BP (!) 119/90   Pulse 79   Temp 97.2 °F (36.2 °C) (Oral)   Resp 16   Ht 5' 9\" (1.753 m)   Wt 222 lb 0.1 oz (100.7 kg)   SpO2 96%   BMI 32.78 kg/m²   Constitutional:  OAA X3 NAD  Skin: no rash, turgor wnl  Heent:  eomi, mmm  Neck: no bruits or jvd noted  Cardiovascular:  S1, S2 without m/r/g  Respiratory: CTA B without w/r/r  Abdomen:  +bs, soft, nt, nd  Ext: no  lower extremity edema      Labs:  CBC:   Recent Labs     06/28/21  0646 06/29/21  0547 06/30/21  0702   WBC 13.3* 13.0* 14.2*   HGB 7.2* 7.3* 8.2*    225 264     BMP:    Recent Labs     06/28/21  0646 06/29/21  0547 06/30/21  0702   * 130* 128*   K 4.7 4.2 4.1   CL 84* 88* 86*   CO2 24 26 24   BUN 61* 65* 57*   CREATININE 9.8* 9.2* 8.8*   GLUCOSE 294* 120* 97     Ca/Mg/Phos:   Recent Labs     06/28/21  0646 06/29/21  0547 06/30/21  0702   CALCIUM 8.0* 7.9* 8.4     UA:  No results for input(s): COLORU, CLARITYU, GLUCOSEU, BILIRUBINUR, KETUA, SPECGRAV, BLOODU, PHUR, PROTEINU, UROBILINOGEN, NITRU, LEUKOCYTESUR, Arvell Roes in the last 72 hours. Urine Microscopic:   No results for input(s): LABCAST, BACTERIA, COMU, HYALCAST, WBCUA, RBCUA, EPIU in the last 72 hours. Urine Culture:   No results for input(s): LABURIN in the last 72 hours. Urine Chemistry: No results for input(s): Lonie Esteban, PROTEINUR, NAUR in the last 72 hours.              IMAGING:  XR CHEST PORTABLE   Final Result   Right-sided pleural effusion      Bibasilar hypoaeration with bibasilar opacities that could represent   subsegmental atelectasis or infection         FL MODIFIED BARIUM SWALLOW W VIDEO    (Results Pending)   XR ABDOMEN (KUB) (SINGLE AP VIEW)    (Results Pending)           Assessment/Plan :      1. ESRD. Continue on peritoneal dialysis. continue cycler/CCPD as per current prescription  Sodium level low. Sodium level better at 130  ---->  128   C/o nausea   fill volume to 1600 ml   PD site clean and no discharge. Reviewed note by PD nurse  There was issue with PD catheter disconnected   The night nurse mentioned she too all precautions   Will monitor     2. HTN. BP elevated   Started hydralazine 25 mg po tid     3. Anemia of chronic disease. Continue LUIZA    4. Acid- base disorder. Monitor    5. Hyponatremia   Poor oral intake   Encourage increase oral intake    6. CAD/Syncopal episode. S/p LHC   Has Multivessel CAD   S/p CABG       7. Peripheral vascular disease. S/p amputation of left foot toes. Podiatry following     8. Generalized weakness. Getting rehab     9.  C/o nausea/ abdominal pain   Get X ray KUB         Thank you for allowing us to participate in care of Tena Medley         Electronically signed by: Jimy Funk MD, 6/30/2021, 11:21 AM      [facility] [address]  Office : [phone]  Fax :[phone]

## 2021-06-30 NOTE — PROGRESS NOTES
This dialysis RN arrived to the patients room to disconnect patient from PD. Upon entering room the patients nurse Tessy Flores who was identified as the  notified this RN that the patient was already disconnected from PD by a non PD nurse. This RN noted the PD supplies, drain bags, cassette from PD machine was in the floor next to the patients bed. There was no way to obtain PD treatment information from the PD machine as the screen with the post treatment information had already been confirmed and exited from prior to this PD RN from entering the patients room. This RN noted that the patients PD line was hanging unsecured and noted that the patient had been disconnected and capped in the incorrect place on the patients PD line. This RN questioned the patient if the appropriate steps were taken including turning the fan off in the room that was blowing directly on the patient. The patient shrugged and stated she did not know. This RN turned the fan off, and used aseptic technique including masks on both this RN and the patient, and gown, scrubbed the patients PD line and capped the line at the correct position on the line. This RN informed PD charge nurse Vika Fragoso and Latinda, and Dr. Lexa Leahy of the above.

## 2021-07-01 ENCOUNTER — HOSPITAL ENCOUNTER (INPATIENT)
Age: 46
LOS: 13 days | Discharge: NURSING, REHAB OR LONG-TERM CARE | DRG: 919 | End: 2021-07-14
Attending: INTERNAL MEDICINE | Admitting: INTERNAL MEDICINE
Payer: MEDICARE

## 2021-07-01 VITALS
HEIGHT: 69 IN | WEIGHT: 217.37 LBS | HEART RATE: 87 BPM | TEMPERATURE: 98.3 F | RESPIRATION RATE: 18 BRPM | OXYGEN SATURATION: 93 % | SYSTOLIC BLOOD PRESSURE: 194 MMHG | BODY MASS INDEX: 32.2 KG/M2 | DIASTOLIC BLOOD PRESSURE: 78 MMHG

## 2021-07-01 PROBLEM — K56.609 SBO (SMALL BOWEL OBSTRUCTION) (HCC): Status: ACTIVE | Noted: 2021-07-01

## 2021-07-01 LAB
A/G RATIO: 0.4 (ref 1.1–2.2)
ALBUMIN SERPL-MCNC: 1.9 G/DL (ref 3.4–5)
ALP BLD-CCNC: 186 U/L (ref 40–129)
ALT SERPL-CCNC: <5 U/L (ref 10–40)
ANION GAP SERPL CALCULATED.3IONS-SCNC: 19 MMOL/L (ref 3–16)
APPEARANCE FLUID: CLEAR
APTT: 33.8 SEC (ref 26.2–38.6)
AST SERPL-CCNC: 7 U/L (ref 15–37)
BASOPHILS ABSOLUTE: 0.1 K/UL (ref 0–0.2)
BASOPHILS RELATIVE PERCENT: 0.3 %
BILIRUB SERPL-MCNC: 0.4 MG/DL (ref 0–1)
BILIRUBIN DIRECT: <0.2 MG/DL (ref 0–0.3)
BILIRUBIN, INDIRECT: ABNORMAL MG/DL (ref 0–1)
BUN BLDV-MCNC: 66 MG/DL (ref 7–20)
CALCIUM SERPL-MCNC: 8 MG/DL (ref 8.3–10.6)
CELL COUNT FLUID TYPE: NORMAL
CHLORIDE BLD-SCNC: 85 MMOL/L (ref 99–110)
CLOT EVALUATION: NORMAL
CO2: 23 MMOL/L (ref 21–32)
COLOR FLUID: YELLOW
CREAT SERPL-MCNC: 10 MG/DL (ref 0.6–1.1)
CULTURE, BLOOD 2: NORMAL
EOSINOPHILS ABSOLUTE: 0 K/UL (ref 0–0.6)
EOSINOPHILS RELATIVE PERCENT: 0.2 %
FLUID DIFF COMMENT: NORMAL
GFR AFRICAN AMERICAN: 5
GFR NON-AFRICAN AMERICAN: 4
GLOBULIN: 4.6 G/DL
GLUCOSE BLD-MCNC: 117 MG/DL (ref 70–99)
GLUCOSE BLD-MCNC: 117 MG/DL (ref 70–99)
GLUCOSE BLD-MCNC: 156 MG/DL (ref 70–99)
GLUCOSE BLD-MCNC: 168 MG/DL (ref 70–99)
GLUCOSE BLD-MCNC: 281 MG/DL (ref 70–99)
GLUCOSE BLD-MCNC: 99 MG/DL (ref 70–99)
HCT VFR BLD CALC: 25.1 % (ref 36–48)
HEMOGLOBIN: 8 G/DL (ref 12–16)
INR BLD: 1.39 (ref 0.88–1.12)
LACTIC ACID: 1.4 MMOL/L (ref 0.4–2)
LIPASE: 11 U/L (ref 13–60)
LYMPHOCYTES ABSOLUTE: 1.1 K/UL (ref 1–5.1)
LYMPHOCYTES RELATIVE PERCENT: 5.8 %
MAGNESIUM: 1.8 MG/DL (ref 1.8–2.4)
MCH RBC QN AUTO: 29.3 PG (ref 26–34)
MCHC RBC AUTO-ENTMCNC: 31.7 G/DL (ref 31–36)
MCV RBC AUTO: 92.4 FL (ref 80–100)
MONOCYTES ABSOLUTE: 1.1 K/UL (ref 0–1.3)
MONOCYTES RELATIVE PERCENT: 6 %
NEUTROPHILS ABSOLUTE: 16.5 K/UL (ref 1.7–7.7)
NEUTROPHILS RELATIVE PERCENT: 87.7 %
NUCLEATED CELLS FLUID: 0 /CUMM
PDW BLD-RTO: 19.8 % (ref 12.4–15.4)
PERFORMED ON: ABNORMAL
PERFORMED ON: NORMAL
PHOSPHORUS: 7.8 MG/DL (ref 2.5–4.9)
PLATELET # BLD: 282 K/UL (ref 135–450)
PMV BLD AUTO: 8.7 FL (ref 5–10.5)
POTASSIUM SERPL-SCNC: 5.2 MMOL/L (ref 3.5–5.1)
PREALBUMIN: 7.3 MG/DL (ref 20–40)
PROTHROMBIN TIME: 15.9 SEC (ref 9.9–12.7)
RBC # BLD: 2.71 M/UL (ref 4–5.2)
RBC FLUID: 4 /CUMM
SODIUM BLD-SCNC: 127 MMOL/L (ref 136–145)
TOTAL PROTEIN: 6.5 G/DL (ref 6.4–8.2)
WBC # BLD: 18.8 K/UL (ref 4–11)

## 2021-07-01 PROCEDURE — 80053 COMPREHEN METABOLIC PANEL: CPT

## 2021-07-01 PROCEDURE — 83605 ASSAY OF LACTIC ACID: CPT

## 2021-07-01 PROCEDURE — 85610 PROTHROMBIN TIME: CPT

## 2021-07-01 PROCEDURE — 3E1M39Z IRRIGATION OF PERITONEAL CAVITY USING DIALYSATE, PERCUTANEOUS APPROACH: ICD-10-PCS | Performed by: OTOLARYNGOLOGY

## 2021-07-01 PROCEDURE — 87077 CULTURE AEROBIC IDENTIFY: CPT

## 2021-07-01 PROCEDURE — 84100 ASSAY OF PHOSPHORUS: CPT

## 2021-07-01 PROCEDURE — 89050 BODY FLUID CELL COUNT: CPT

## 2021-07-01 PROCEDURE — 6360000002 HC RX W HCPCS: Performed by: SURGERY

## 2021-07-01 PROCEDURE — 6370000000 HC RX 637 (ALT 250 FOR IP): Performed by: NURSE PRACTITIONER

## 2021-07-01 PROCEDURE — 6360000002 HC RX W HCPCS: Performed by: PHYSICAL MEDICINE & REHABILITATION

## 2021-07-01 PROCEDURE — 6370000000 HC RX 637 (ALT 250 FOR IP): Performed by: INTERNAL MEDICINE

## 2021-07-01 PROCEDURE — 99231 SBSQ HOSP IP/OBS SF/LOW 25: CPT | Performed by: SURGERY

## 2021-07-01 PROCEDURE — APPNB30 APP NON BILLABLE TIME 0-30 MINS: Performed by: NURSE PRACTITIONER

## 2021-07-01 PROCEDURE — 2580000003 HC RX 258: Performed by: INTERNAL MEDICINE

## 2021-07-01 PROCEDURE — 6360000002 HC RX W HCPCS: Performed by: INTERNAL MEDICINE

## 2021-07-01 PROCEDURE — 87340 HEPATITIS B SURFACE AG IA: CPT

## 2021-07-01 PROCEDURE — 83690 ASSAY OF LIPASE: CPT

## 2021-07-01 PROCEDURE — 82248 BILIRUBIN DIRECT: CPT

## 2021-07-01 PROCEDURE — 6370000000 HC RX 637 (ALT 250 FOR IP): Performed by: PHYSICAL MEDICINE & REHABILITATION

## 2021-07-01 PROCEDURE — 87186 SC STD MICRODIL/AGAR DIL: CPT

## 2021-07-01 PROCEDURE — 97535 SELF CARE MNGMENT TRAINING: CPT

## 2021-07-01 PROCEDURE — 87205 SMEAR GRAM STAIN: CPT

## 2021-07-01 PROCEDURE — 87070 CULTURE OTHR SPECIMN AEROBIC: CPT

## 2021-07-01 PROCEDURE — 83735 ASSAY OF MAGNESIUM: CPT

## 2021-07-01 PROCEDURE — 85025 COMPLETE CBC W/AUTO DIFF WBC: CPT

## 2021-07-01 PROCEDURE — 84466 ASSAY OF TRANSFERRIN: CPT

## 2021-07-01 PROCEDURE — 85730 THROMBOPLASTIN TIME PARTIAL: CPT

## 2021-07-01 PROCEDURE — 87040 BLOOD CULTURE FOR BACTERIA: CPT

## 2021-07-01 PROCEDURE — 2060000000 HC ICU INTERMEDIATE R&B

## 2021-07-01 PROCEDURE — APPSS15 APP SPLIT SHARED TIME 0-15 MINUTES: Performed by: NURSE PRACTITIONER

## 2021-07-01 PROCEDURE — 36415 COLL VENOUS BLD VENIPUNCTURE: CPT

## 2021-07-01 PROCEDURE — 99233 SBSQ HOSP IP/OBS HIGH 50: CPT | Performed by: INTERNAL MEDICINE

## 2021-07-01 PROCEDURE — 84134 ASSAY OF PREALBUMIN: CPT

## 2021-07-01 RX ORDER — TRAMADOL HYDROCHLORIDE 50 MG/1
50 TABLET ORAL EVERY 6 HOURS PRN
Status: CANCELLED | OUTPATIENT
Start: 2021-07-01

## 2021-07-01 RX ORDER — NICOTINE POLACRILEX 4 MG
15 LOZENGE BUCCAL PRN
Status: DISCONTINUED | OUTPATIENT
Start: 2021-07-01 | End: 2021-07-14 | Stop reason: HOSPADM

## 2021-07-01 RX ORDER — PANTOPRAZOLE SODIUM 40 MG/1
40 TABLET, DELAYED RELEASE ORAL DAILY
Status: CANCELLED | OUTPATIENT
Start: 2021-07-02

## 2021-07-01 RX ORDER — HYDRALAZINE HYDROCHLORIDE 25 MG/1
25 TABLET, FILM COATED ORAL EVERY 8 HOURS SCHEDULED
Status: CANCELLED | OUTPATIENT
Start: 2021-07-01

## 2021-07-01 RX ORDER — ONDANSETRON 2 MG/ML
4 INJECTION INTRAMUSCULAR; INTRAVENOUS EVERY 6 HOURS PRN
Status: CANCELLED | OUTPATIENT
Start: 2021-07-01

## 2021-07-01 RX ORDER — ALBUTEROL SULFATE 90 UG/1
2 AEROSOL, METERED RESPIRATORY (INHALATION) EVERY 6 HOURS PRN
Status: CANCELLED | OUTPATIENT
Start: 2021-07-01

## 2021-07-01 RX ORDER — POLYETHYLENE GLYCOL 3350 17 G/17G
17 POWDER, FOR SOLUTION ORAL DAILY
Status: DISCONTINUED | OUTPATIENT
Start: 2021-07-02 | End: 2021-07-14 | Stop reason: HOSPADM

## 2021-07-01 RX ORDER — ACETAMINOPHEN 325 MG/1
650 TABLET ORAL EVERY 6 HOURS PRN
Status: DISCONTINUED | OUTPATIENT
Start: 2021-07-01 | End: 2021-07-14 | Stop reason: HOSPADM

## 2021-07-01 RX ORDER — HEPARIN SODIUM 5000 [USP'U]/ML
5000 INJECTION, SOLUTION INTRAVENOUS; SUBCUTANEOUS EVERY 8 HOURS SCHEDULED
Status: CANCELLED | OUTPATIENT
Start: 2021-07-01

## 2021-07-01 RX ORDER — GENTAMICIN SULFATE 1 MG/G
CREAM TOPICAL DAILY
Status: DISCONTINUED | OUTPATIENT
Start: 2021-07-01 | End: 2021-07-14 | Stop reason: HOSPADM

## 2021-07-01 RX ORDER — ONDANSETRON 2 MG/ML
4 INJECTION INTRAMUSCULAR; INTRAVENOUS EVERY 6 HOURS PRN
Status: DISCONTINUED | OUTPATIENT
Start: 2021-07-01 | End: 2021-07-14 | Stop reason: HOSPADM

## 2021-07-01 RX ORDER — SENNA AND DOCUSATE SODIUM 50; 8.6 MG/1; MG/1
1 TABLET, FILM COATED ORAL 2 TIMES DAILY
Status: CANCELLED | OUTPATIENT
Start: 2021-07-01

## 2021-07-01 RX ORDER — ZOLPIDEM TARTRATE 5 MG/1
5 TABLET ORAL NIGHTLY PRN
Status: CANCELLED | OUTPATIENT
Start: 2021-07-01

## 2021-07-01 RX ORDER — ONDANSETRON 4 MG/1
4 TABLET, ORALLY DISINTEGRATING ORAL EVERY 8 HOURS PRN
Status: CANCELLED | OUTPATIENT
Start: 2021-07-01

## 2021-07-01 RX ORDER — TRAMADOL HYDROCHLORIDE 50 MG/1
100 TABLET ORAL EVERY 6 HOURS PRN
Status: CANCELLED | OUTPATIENT
Start: 2021-07-01

## 2021-07-01 RX ORDER — DEXTROSE MONOHYDRATE 50 MG/ML
100 INJECTION, SOLUTION INTRAVENOUS PRN
Status: CANCELLED | OUTPATIENT
Start: 2021-07-01

## 2021-07-01 RX ORDER — DEXTROSE MONOHYDRATE 25 G/50ML
12.5 INJECTION, SOLUTION INTRAVENOUS PRN
Status: DISCONTINUED | OUTPATIENT
Start: 2021-07-01 | End: 2021-07-14 | Stop reason: HOSPADM

## 2021-07-01 RX ORDER — NICOTINE POLACRILEX 4 MG
15 LOZENGE BUCCAL PRN
Status: DISCONTINUED | OUTPATIENT
Start: 2021-07-01 | End: 2021-07-01 | Stop reason: SDUPTHER

## 2021-07-01 RX ORDER — GENTAMICIN SULFATE 1 MG/G
CREAM TOPICAL DAILY
Status: CANCELLED | OUTPATIENT
Start: 2021-07-01

## 2021-07-01 RX ORDER — DEXTROSE MONOHYDRATE 25 G/50ML
12.5 INJECTION, SOLUTION INTRAVENOUS PRN
Status: CANCELLED | OUTPATIENT
Start: 2021-07-01

## 2021-07-01 RX ORDER — ACETAMINOPHEN 325 MG/1
650 TABLET ORAL EVERY 6 HOURS PRN
Status: CANCELLED | OUTPATIENT
Start: 2021-07-01

## 2021-07-01 RX ORDER — PANTOPRAZOLE SODIUM 40 MG/1
40 TABLET, DELAYED RELEASE ORAL DAILY
Status: DISCONTINUED | OUTPATIENT
Start: 2021-07-02 | End: 2021-07-14 | Stop reason: HOSPADM

## 2021-07-01 RX ORDER — PROMETHAZINE HYDROCHLORIDE 25 MG/1
12.5 TABLET ORAL EVERY 6 HOURS PRN
Status: DISCONTINUED | OUTPATIENT
Start: 2021-07-01 | End: 2021-07-14 | Stop reason: HOSPADM

## 2021-07-01 RX ORDER — SEVELAMER CARBONATE 800 MG/1
800 TABLET, FILM COATED ORAL
Status: CANCELLED | OUTPATIENT
Start: 2021-07-01

## 2021-07-01 RX ORDER — PROMETHAZINE HYDROCHLORIDE 25 MG/1
12.5 TABLET ORAL EVERY 6 HOURS PRN
Status: CANCELLED | OUTPATIENT
Start: 2021-07-01

## 2021-07-01 RX ORDER — SODIUM CHLORIDE 0.9 % (FLUSH) 0.9 %
5-40 SYRINGE (ML) INJECTION PRN
Status: DISCONTINUED | OUTPATIENT
Start: 2021-07-01 | End: 2021-07-01 | Stop reason: HOSPADM

## 2021-07-01 RX ORDER — POLYETHYLENE GLYCOL 3350 17 G/17G
17 POWDER, FOR SOLUTION ORAL DAILY
Status: CANCELLED | OUTPATIENT
Start: 2021-07-02

## 2021-07-01 RX ORDER — TORSEMIDE 100 MG/1
100 TABLET ORAL DAILY
Status: DISCONTINUED | OUTPATIENT
Start: 2021-07-02 | End: 2021-07-14 | Stop reason: HOSPADM

## 2021-07-01 RX ORDER — HYDRALAZINE HYDROCHLORIDE 25 MG/1
25 TABLET, FILM COATED ORAL EVERY 8 HOURS SCHEDULED
Status: DISCONTINUED | OUTPATIENT
Start: 2021-07-01 | End: 2021-07-02

## 2021-07-01 RX ORDER — CALCIUM CARBONATE 200(500)MG
500 TABLET,CHEWABLE ORAL 3 TIMES DAILY PRN
Status: CANCELLED | OUTPATIENT
Start: 2021-07-01

## 2021-07-01 RX ORDER — HYDRALAZINE HYDROCHLORIDE 25 MG/1
50 TABLET, FILM COATED ORAL EVERY 8 HOURS PRN
Status: CANCELLED | OUTPATIENT
Start: 2021-07-01

## 2021-07-01 RX ORDER — TORSEMIDE 100 MG/1
100 TABLET ORAL DAILY
Status: CANCELLED | OUTPATIENT
Start: 2021-07-02

## 2021-07-01 RX ORDER — POLYETHYLENE GLYCOL 3350 17 G/17G
17 POWDER, FOR SOLUTION ORAL DAILY
Status: DISCONTINUED | OUTPATIENT
Start: 2021-07-01 | End: 2021-07-01 | Stop reason: HOSPADM

## 2021-07-01 RX ORDER — DIPHENHYDRAMINE HCL 25 MG
25 TABLET ORAL EVERY 6 HOURS PRN
Status: CANCELLED | OUTPATIENT
Start: 2021-07-01

## 2021-07-01 RX ORDER — DEXTROSE MONOHYDRATE 25 G/50ML
12.5 INJECTION, SOLUTION INTRAVENOUS PRN
Status: DISCONTINUED | OUTPATIENT
Start: 2021-07-01 | End: 2021-07-01 | Stop reason: SDUPTHER

## 2021-07-01 RX ORDER — CALCIUM CARBONATE 200(500)MG
500 TABLET,CHEWABLE ORAL 3 TIMES DAILY PRN
Status: DISCONTINUED | OUTPATIENT
Start: 2021-07-01 | End: 2021-07-14 | Stop reason: HOSPADM

## 2021-07-01 RX ORDER — ATORVASTATIN CALCIUM 20 MG/1
20 TABLET, FILM COATED ORAL NIGHTLY
Status: DISCONTINUED | OUTPATIENT
Start: 2021-07-01 | End: 2021-07-14 | Stop reason: HOSPADM

## 2021-07-01 RX ORDER — TRAMADOL HYDROCHLORIDE 50 MG/1
50 TABLET ORAL EVERY 6 HOURS PRN
Status: DISCONTINUED | OUTPATIENT
Start: 2021-07-01 | End: 2021-07-14 | Stop reason: HOSPADM

## 2021-07-01 RX ORDER — NICOTINE POLACRILEX 4 MG
15 LOZENGE BUCCAL PRN
Status: CANCELLED | OUTPATIENT
Start: 2021-07-01

## 2021-07-01 RX ORDER — INSULIN LISPRO 100 [IU]/ML
0-9 INJECTION, SOLUTION INTRAVENOUS; SUBCUTANEOUS NIGHTLY
Status: DISCONTINUED | OUTPATIENT
Start: 2021-07-01 | End: 2021-07-01

## 2021-07-01 RX ORDER — HYDRALAZINE HYDROCHLORIDE 25 MG/1
50 TABLET, FILM COATED ORAL EVERY 8 HOURS PRN
Status: DISCONTINUED | OUTPATIENT
Start: 2021-07-01 | End: 2021-07-14 | Stop reason: HOSPADM

## 2021-07-01 RX ORDER — INSULIN LISPRO 100 [IU]/ML
0-18 INJECTION, SOLUTION INTRAVENOUS; SUBCUTANEOUS
Status: DISCONTINUED | OUTPATIENT
Start: 2021-07-01 | End: 2021-07-01

## 2021-07-01 RX ORDER — DEXTROSE MONOHYDRATE 50 MG/ML
100 INJECTION, SOLUTION INTRAVENOUS PRN
Status: DISCONTINUED | OUTPATIENT
Start: 2021-07-01 | End: 2021-07-01 | Stop reason: SDUPTHER

## 2021-07-01 RX ORDER — ONDANSETRON 4 MG/1
4 TABLET, ORALLY DISINTEGRATING ORAL EVERY 8 HOURS PRN
Status: DISCONTINUED | OUTPATIENT
Start: 2021-07-01 | End: 2021-07-14 | Stop reason: HOSPADM

## 2021-07-01 RX ORDER — ALBUTEROL SULFATE 90 UG/1
2 AEROSOL, METERED RESPIRATORY (INHALATION) EVERY 6 HOURS PRN
Status: DISCONTINUED | OUTPATIENT
Start: 2021-07-01 | End: 2021-07-01 | Stop reason: CLARIF

## 2021-07-01 RX ORDER — DIPHENHYDRAMINE HCL 25 MG
25 TABLET ORAL EVERY 6 HOURS PRN
Status: DISCONTINUED | OUTPATIENT
Start: 2021-07-01 | End: 2021-07-14 | Stop reason: HOSPADM

## 2021-07-01 RX ORDER — ALBUTEROL SULFATE 2.5 MG/3ML
2.5 SOLUTION RESPIRATORY (INHALATION) EVERY 6 HOURS PRN
Status: DISCONTINUED | OUTPATIENT
Start: 2021-07-01 | End: 2021-07-14 | Stop reason: HOSPADM

## 2021-07-01 RX ORDER — HEPARIN SODIUM 1000 [USP'U]/ML
3400 INJECTION, SOLUTION INTRAVENOUS; SUBCUTANEOUS PRN
Status: DISCONTINUED | OUTPATIENT
Start: 2021-07-01 | End: 2021-07-14 | Stop reason: HOSPADM

## 2021-07-01 RX ORDER — HEPARIN SODIUM 5000 [USP'U]/ML
5000 INJECTION, SOLUTION INTRAVENOUS; SUBCUTANEOUS EVERY 8 HOURS SCHEDULED
Status: DISCONTINUED | OUTPATIENT
Start: 2021-07-01 | End: 2021-07-14 | Stop reason: HOSPADM

## 2021-07-01 RX ORDER — ATORVASTATIN CALCIUM 20 MG/1
20 TABLET, FILM COATED ORAL NIGHTLY
Status: CANCELLED | OUTPATIENT
Start: 2021-07-01

## 2021-07-01 RX ORDER — HEPARIN SODIUM 1000 [USP'U]/ML
3400 INJECTION, SOLUTION INTRAVENOUS; SUBCUTANEOUS PRN
Status: CANCELLED | OUTPATIENT
Start: 2021-07-01

## 2021-07-01 RX ORDER — POLYETHYLENE GLYCOL 3350 17 G/17G
17 POWDER, FOR SOLUTION ORAL DAILY PRN
Status: CANCELLED | OUTPATIENT
Start: 2021-07-01

## 2021-07-01 RX ORDER — SENNA AND DOCUSATE SODIUM 50; 8.6 MG/1; MG/1
1 TABLET, FILM COATED ORAL 2 TIMES DAILY
Status: DISCONTINUED | OUTPATIENT
Start: 2021-07-01 | End: 2021-07-14 | Stop reason: HOSPADM

## 2021-07-01 RX ORDER — TRAMADOL HYDROCHLORIDE 50 MG/1
100 TABLET ORAL EVERY 6 HOURS PRN
Status: DISCONTINUED | OUTPATIENT
Start: 2021-07-01 | End: 2021-07-14 | Stop reason: HOSPADM

## 2021-07-01 RX ORDER — ZOLPIDEM TARTRATE 5 MG/1
5 TABLET ORAL NIGHTLY PRN
Status: DISCONTINUED | OUTPATIENT
Start: 2021-07-01 | End: 2021-07-14 | Stop reason: HOSPADM

## 2021-07-01 RX ORDER — DEXTROSE MONOHYDRATE 50 MG/ML
100 INJECTION, SOLUTION INTRAVENOUS PRN
Status: DISCONTINUED | OUTPATIENT
Start: 2021-07-01 | End: 2021-07-14 | Stop reason: HOSPADM

## 2021-07-01 RX ORDER — SEVELAMER CARBONATE 800 MG/1
800 TABLET, FILM COATED ORAL
Status: DISCONTINUED | OUTPATIENT
Start: 2021-07-01 | End: 2021-07-02

## 2021-07-01 RX ORDER — POLYETHYLENE GLYCOL 3350 17 G/17G
17 POWDER, FOR SOLUTION ORAL DAILY PRN
Status: DISCONTINUED | OUTPATIENT
Start: 2021-07-01 | End: 2021-07-14 | Stop reason: HOSPADM

## 2021-07-01 RX ADMIN — ZOLPIDEM TARTRATE 5 MG: 5 TABLET, COATED ORAL at 21:36

## 2021-07-01 RX ADMIN — ATORVASTATIN CALCIUM 20 MG: 20 TABLET, FILM COATED ORAL at 21:36

## 2021-07-01 RX ADMIN — GENTAMICIN SULFATE: 1 CREAM TOPICAL at 18:28

## 2021-07-01 RX ADMIN — VANCOMYCIN HYDROCHLORIDE 1000 MG: 1 INJECTION, POWDER, LYOPHILIZED, FOR SOLUTION INTRAVENOUS at 13:12

## 2021-07-01 RX ADMIN — ONDANSETRON 4 MG: 2 INJECTION INTRAMUSCULAR; INTRAVENOUS at 00:44

## 2021-07-01 RX ADMIN — HEPARIN SODIUM 5000 UNITS: 5000 INJECTION INTRAVENOUS; SUBCUTANEOUS at 05:45

## 2021-07-01 RX ADMIN — CEFEPIME HYDROCHLORIDE 1000 MG: 1 INJECTION, POWDER, FOR SOLUTION INTRAMUSCULAR; INTRAVENOUS at 11:53

## 2021-07-01 RX ADMIN — TRAMADOL HYDROCHLORIDE 100 MG: 50 TABLET, FILM COATED ORAL at 05:34

## 2021-07-01 RX ADMIN — HYDRALAZINE HYDROCHLORIDE 50 MG: 25 TABLET, FILM COATED ORAL at 13:28

## 2021-07-01 RX ADMIN — METOPROLOL TARTRATE 25 MG: 25 TABLET, FILM COATED ORAL at 21:35

## 2021-07-01 RX ADMIN — PANTOPRAZOLE SODIUM 40 MG: 40 TABLET, DELAYED RELEASE ORAL at 05:35

## 2021-07-01 RX ADMIN — STANDARDIZED SENNA CONCENTRATE AND DOCUSATE SODIUM 1 TABLET: 8.6; 5 TABLET ORAL at 21:36

## 2021-07-01 RX ADMIN — HYDRALAZINE HYDROCHLORIDE 25 MG: 25 TABLET, FILM COATED ORAL at 05:34

## 2021-07-01 RX ADMIN — PROMETHAZINE HYDROCHLORIDE 12.5 MG: 25 TABLET ORAL at 05:34

## 2021-07-01 RX ADMIN — ONDANSETRON 4 MG: 2 INJECTION INTRAMUSCULAR; INTRAVENOUS at 13:00

## 2021-07-01 RX ADMIN — HEPARIN SODIUM 5000 UNITS: 5000 INJECTION INTRAVENOUS; SUBCUTANEOUS at 21:36

## 2021-07-01 RX ADMIN — HYDRALAZINE HYDROCHLORIDE 25 MG: 25 TABLET, FILM COATED ORAL at 21:35

## 2021-07-01 RX ADMIN — HEPARIN SODIUM 5000 UNITS: 5000 INJECTION INTRAVENOUS; SUBCUTANEOUS at 13:09

## 2021-07-01 RX ADMIN — ONDANSETRON 4 MG: 2 INJECTION INTRAMUSCULAR; INTRAVENOUS at 07:10

## 2021-07-01 RX ADMIN — ONDANSETRON 4 MG: 2 INJECTION INTRAMUSCULAR; INTRAVENOUS at 18:39

## 2021-07-01 RX ADMIN — POLYETHYLENE GLYCOL 3350 17 G: 17 POWDER, FOR SOLUTION ORAL at 13:22

## 2021-07-01 RX ADMIN — TRAMADOL HYDROCHLORIDE 100 MG: 50 TABLET, FILM COATED ORAL at 21:36

## 2021-07-01 ASSESSMENT — PAIN SCALES - GENERAL
PAINLEVEL_OUTOF10: 0
PAINLEVEL_OUTOF10: 10
PAINLEVEL_OUTOF10: 7
PAINLEVEL_OUTOF10: 0
PAINLEVEL_OUTOF10: 0

## 2021-07-01 ASSESSMENT — PAIN DESCRIPTION - ORIENTATION: ORIENTATION: LOWER

## 2021-07-01 ASSESSMENT — PAIN DESCRIPTION - PAIN TYPE: TYPE: ACUTE PAIN

## 2021-07-01 ASSESSMENT — PAIN DESCRIPTION - LOCATION: LOCATION: ABDOMEN

## 2021-07-01 NOTE — PROGRESS NOTES
Speech Language Pathology  Attempt/Hold    Lemmondaniel Polancoh  1975    Modified Barium Swallow rescheduled for this date (due to need to cancel yesterday--pt feeling nauseated and on medical hold). This morning, pt NPO per surgery and rehab MD indicating not to complete MBS this date. Cancelled orders for MBS. Attempted to see pt x2 for speech/voice therapy this date. On first attempt, pt finishing up OT session and then nephrology MD in to meet with pt. Upon second attempt, pt laying in bed, declining speech therapy and stated she was again feeling nauseated. Per MD, pt is now on medical hold. RN indicated pt will be transferred to acute side of hospital due to change in medical status.     Thanks,  Alejandra Prajapati M.S. [address] [facility], [place], operated by [facility]  Speech-Language Pathologist

## 2021-07-01 NOTE — PLAN OF CARE
Problem: Pain:  Description: Pain management should include both nonpharmacologic and pharmacologic interventions. Goal: Pain level will decrease  Description: Pain level will decrease  7/1/2021 1435 by Sergio Jordan RN  Outcome: Not Met This Shift     Problem: Pain:  Description: Pain management should include both nonpharmacologic and pharmacologic interventions. Goal: Control of acute pain  Description: Control of acute pain  7/1/2021 1435 by Sergio Jordan RN  Outcome: Not Met This Shift     Problem: Pain:  Description: Pain management should include both nonpharmacologic and pharmacologic interventions.   Goal: Control of chronic pain  Description: Control of chronic pain  7/1/2021 1435 by Sergio Jordan RN  Outcome: Not Met This Shift     Problem: Skin Integrity:  Goal: Will show no infection signs and symptoms  Description: Will show no infection signs and symptoms  7/1/2021 1435 by Sergio Jordan RN  Outcome: Not Met This Shift     Problem: Skin Integrity:  Goal: Absence of new skin breakdown  Description: Absence of new skin breakdown  7/1/2021 1435 by Sergio Jordan RN  Outcome: Not Met This Shift     Problem: Falls - Risk of:  Goal: Will remain free from falls  Description: Will remain free from falls  7/1/2021 1435 by Sergio Jordan RN  Outcome: Not Met This Shift     Problem: Falls - Risk of:  Goal: Absence of physical injury  Description: Absence of physical injury  7/1/2021 1435 by Sergio Jordan RN  Outcome: Not Met This Shift     Problem: IP BLADDER/VOIDING  Goal: STG - Patient demonstrates ability to take care of indwelling catheter  7/1/2021 1435 by Sergio Jordan RN  Outcome: Not Met This Shift     Problem: IP BLADDER/VOIDING  Goal: STG - Patient demonstrates no accidents  7/1/2021 1435 by Sergio Jordan RN  Outcome: Not Met This Shift     Problem: IP BREATHING  Goal: STG - patient will utilize incentive spirometer  7/1/2021 1435 by Sergio Jordan RN  Outcome: Not Met This Shift     Problem: IP BREATHING  Goal: STG - Patient performs or directs assisted coughing  7/1/2021 1435 by Juan Jordan RN  Outcome: Not Met This Shift     Problem: NUTRITION  Description: Altered Nutrition and Hydration  Goal: Patient maintains weight  7/1/2021 1435 by Juan Jordan RN  Outcome: Not Met This Shift     Problem: SAFETY  Goal: LTG - Patient will demonstrate safety requirements appropriate to situation/environment  7/1/2021 1435 by Juan Jordan RN  Outcome: Not Met This Shift     Problem: SKIN INTEGRITY  Goal: LTG - Patient will be free from infection  7/1/2021 1435 by Juan Jordan RN  Outcome: Not Met This Shift     Problem: PAIN  Goal: LTG - Patient will manage pain with the appropriate technique/Intervention  7/1/2021 1435 by Juan Jordan RN  Outcome: Not Met This Shift     Problem: PAIN  Goal: STG - pain is manageable through therapies  7/1/2021 1435 by Juan Jordan RN  Outcome: Not Met This Shift     Problem: Nutrition  Goal: Optimal nutrition therapy  7/1/2021 1435 by Juan Jordan RN  Outcome: Not Met This Shift

## 2021-07-01 NOTE — PROGRESS NOTES
At 1320, BP was 194/78. Asymptomatic. Administered Hydralazine 50 mg po. Report given to a RN at [facility] [address]. Discharged the pt to  via bed. She informed her family for the transfer.

## 2021-07-01 NOTE — PROGRESS NOTES
Office : [phone]     Fax :[phone]       Nephrology progress  Note      Patient's Name: Dontrell Lima  8:54 AM  2021    Reason for Consult:  ESRD    History of Present Ilness:    Dontrell Lima is a 51 y.o. female with severe end-stage renal disease secondary to diabetic nephropathy on peritoneal dialysis, hypertension, peripheral vascular disease, diabetic neuropathy who was admitted after she had a syncopal episode. She had similar episode 2 weeks ago. Recently had angiogram left lower extremity for gangrene left foot second toe. Denies any shortness of breath. Denies any abdominal pain. Denies any nausea vomiting. Interval HX :        Abdominal pain has decreased  KUB x-ray showed ileus so n.p.o. No chest pain  No sob  S/p CABG on 2010    Feeling weak and fatigued. Oral intake has been poor for last few days. No intake/output data recorded. Past Medical History:   Diagnosis Date    [facility] mellitus [facility]     End stage kidney disease ([facility]     peritoneal dialysis every night at home x 2 years    Hypertension     Neuropathy     Peripheral vascular disease ([facility]        Past Surgical History:   Procedure Laterality Date     SECTION      CORONARY ARTERY BYPASS GRAFT N/A 2021    URGENT CABG CORONARY ARTERY BYPASS GRAFTS X3. VEIN TO PDA X1, VEIN TO OM X1, LEFT INTERNAL MAMMARY ARTERY TO LAD X1. ENDOSCOPIC HARVEST RIGHT LEG SAPHENOUS VEIN. LIGATION NYA USING 35MM ATRICLIP. EPI AORTIC ULTRASOUND. TOTAL CARDIOPULMONARY BYPASS. DOPPLER GRAFT FLOW VERIFICATION. BILATERAL INITERCOSTAL NERVE BLOCK USING 1.3% EXPAREL.  performed by Nica Mariano MD at [facility]  2018     lap PD cath placement lt side 62 cm    TOE AMPUTATION Left 2/26/2021    AMPUTATION OF THE LEFT THIRD TOE performed by Melissa Coe DPM at [facility] TOE AMPUTATION Left 5/26/2021    AMPUTATION OF HALLUX AND SECOND TOE, LEFT FOOT performed by Melissa Coe DPM at [facility]         Family History   Problem Relation Age of Onset    Coronary Art Dis Mother     High Blood Pressure Mother     Heart Disease Mother     Diabetes Mother     Cancer Father     Coronary Art Dis Father     High Blood Pressure Father     Heart Disease Father     Diabetes Father      Current Medications:    vancomycin 1000 mg IVPB in 250 mL D5W addavial, Once  cefepime (MAXIPIME) 1000 mg IVPB minibag, Once  promethazine (PHENERGAN) tablet 12.5 mg, Q6H PRN  [Held by provider] insulin detemir (LEVEMIR) injection pen 8 Units (PATIENT SUPPLIED), QAM  [Held by provider] insulin detemir (LEVEMIR) injection pen 6 Units (PATIENT SUPPLIED), Nightly  acetaminophen (TYLENOL) tablet 650 mg, Q6H PRN  ondansetron (ZOFRAN) injection 4 mg, Q6H PRN  hydrALAZINE (APRESOLINE) tablet 25 mg, 3 times per day  calcium carbonate (TUMS) chewable tablet 500 mg, TID PRN  torsemide (DEMADEX) tablet 100 mg, Daily  gentamicin (GARAMYCIN) 0.1 % cream, Daily  insulin aspart (NOVOLOG) injection pen 0-18 Units-PATIENT SUPPLIED, TID WC  insulin aspart (NOVOLOG) injection PEN 0-9 Units-Patient supplied, Nightly  heparin (porcine) injection 5,000 Units, 3 times per day  albuterol sulfate  (90 Base) MCG/ACT inhaler 2 puff, Q6H PRN  aspirin EC tablet 325 mg, Daily  magnesium hydroxide (MILK OF MAGNESIA) 400 MG/5ML suspension 30 mL, Daily PRN  metoprolol tartrate (LOPRESSOR) tablet 25 mg, BID  pantoprazole (PROTONIX) tablet 40 mg, Daily  polyethylene glycol (GLYCOLAX) packet 17 g, Daily PRN  sennosides-docusate sodium (SENOKOT-S) 8.6-50 MG tablet 1 tablet, BID  traMADol (ULTRAM) tablet 50 mg, Q6H PRN   Or  traMADol (ULTRAM) tablet 100 mg, Q6H PRN  zolpidem (AMBIEN) tablet 5 mg, Nightly PRN  dextrose 5 % solution, PRN  dextrose 50 % IV solution, PRN  glucagon (rDNA) injection 1 mg, PRN  glucose (GLUTOSE) 40 % oral gel 15 g, PRN  atorvastatin (LIPITOR) tablet 20 mg, Nightly  bisacodyl (DULCOLAX) EC tablet 5 mg, Daily PRN  diphenhydrAMINE (BENADRYL) tablet 25 mg, Q6H PRN  heparin (porcine) injection 3,400 Units, PRN  hydrALAZINE (APRESOLINE) tablet 50 mg, Q8H PRN  ondansetron (ZOFRAN-ODT) disintegrating tablet 4 mg, Q8H PRN  sevelamer (RENVELA) tablet 800 mg, TID WC  miconazole (MICOTIN) 2 % powder, BID          Physical exam:     Vitals:  BP (!) 163/63   Pulse 75   Temp 98.2 °F (36.8 °C)   Resp 16   Ht 5' 9\" (1.753 m)   Wt 217 lb 6 oz (98.6 kg)   SpO2 99%   BMI 32.10 kg/m²   Constitutional:  OAA X3 NAD  Skin: no rash, turgor wnl  Heent:  eomi, mmm  Neck: no bruits or jvd noted  Cardiovascular:  S1, S2 without m/r/g  Respiratory: CTA B without w/r/r  Abdomen:  +bs, soft, nt, nd  Ext: no  lower extremity edema      Labs:  CBC:   Recent Labs     06/29/21  0547 06/30/21  0702 07/01/21  0549   WBC 13.0* 14.2* 18.8*   HGB 7.3* 8.2* 8.0*    264 282     BMP:    Recent Labs     06/29/21  0547 06/30/21  0702 07/01/21  0549   * 128* 127*   K 4.2 4.1 5.2*   CL 88* 86* 85*   CO2 26 24 23   BUN 65* 57* 66*   CREATININE 9.2* 8.8* 10.0*   GLUCOSE 120* 97 117*     Ca/Mg/Phos:   Recent Labs     06/29/21  0547 06/30/21  0702 07/01/21  0549   CALCIUM 7.9* 8.4 8.0*   MG  --   --  1.80   PHOS  --   --  7.8*     UA:  No results for input(s): COLORU, CLARITYU, GLUCOSEU, BILIRUBINUR, KETUA, SPECGRAV, BLOODU, PHUR, PROTEINU, UROBILINOGEN, NITRU, LEUKOCYTESUR, LABMICR, URINETYPE in the last 72 hours. Urine Microscopic:   No results for input(s): LABCAST, BACTERIA, COMU, HYALCAST, WBCUA, RBCUA, EPIU in the last 72 hours. Urine Culture:   No results for input(s): LABURIN in the last 72 hours. Urine Chemistry: No results for input(s): Sallyanne Ly, PROTEINUR, NAUR in the last 72 hours. IMAGING:  XR CHEST PORTABLE   Final Result   Slight decrease in the heart size with resolving central pulmonary congestion. Slowly resolving bibasilar opacities. Small right pleural effusion which is less prominent. XR ABDOMEN (KUB) (SINGLE AP VIEW)   Final Result   Localized right mid small bowel gaseous distention possible mild small bowel   ileus. The remainder the study is unremarkable. XR CHEST PORTABLE   Final Result   Right-sided pleural effusion      Bibasilar hypoaeration with bibasilar opacities that could represent   subsegmental atelectasis or infection                 Assessment/Plan :      1. ESRD. Continue on peritoneal dialysis. continue cycler/CCPD as per current prescription  Sodium level low. Sodium level better at 130  ---->  128 ---> 127       KUB shows possible ileus versus small bowel obstruction. Was made n.p.o.  PD was not done last night. fill volume to 1600 ml   PD site clean and no discharge. Reviewed note by PD nurse  There was issue with PD catheter disconnected   The night nurse mentioned she too all precautions   Will monitor     Send fluid for culture and cell count  Empirically give 1 dose of vancomycin and cefepime    2. HTN. BP slightly elevated   Started hydralazine 25 mg po tid   Continue current blood pressure medications    3. Anemia of chronic disease. Continue LUIZA    4. Acid- base disorder. Monitor    5. Hyponatremia   Poor oral intake   Encourage increase oral intake    6. CAD/Syncopal episode. S/p [facility]   Has Multivessel CAD   S/p CABG       7. Peripheral vascular disease. S/p amputation of left foot toes. Podiatry following     8. Generalized weakness. Getting rehab     9. Ileus. Surgery on board. Bowel rest.  No nausea today.   Says has passed some flatus today      Will get peritoneal dialysis tonight        Thank you for allowing us to participate in care of [facility]         Electronically signed by: Ann Jones MD, 7/1/2021, 8:54 AM      [facility] [address]  Office : [phone]  Fax :[phone]

## 2021-07-01 NOTE — PROGRESS NOTES
Areli Vu  7/1/2021  [id number]    Chief Complaint: Generalized weakness    Subjective:   Nausea improved today. Feels improved today. + flatus. No BM. Ok to do therapy today. Leukocytosis has returned. CXR improved from prior. Recent PD cultures unremarkable. Afebrile. ROS: No CP, SOB, dyspnea    Objective:  Patient Vitals for the past 24 hrs:   BP Temp Temp src Pulse Resp SpO2 Weight   07/01/21 0800 (!) 163/63 98.2 °F (36.8 °C) -- 75 16 99 % --   07/01/21 0544 -- -- -- -- -- -- 217 lb 6 oz (98.6 kg)   07/01/21 0537 (!) 154/108 -- -- -- -- -- --   06/30/21 2015 (!) 170/99 -- -- -- -- 100 % --   06/30/21 2006 (!) 183/101 98.3 °F (36.8 °C) Oral 95 16 95 % --   06/30/21 1815 (!) 181/76 -- -- -- -- -- --   06/30/21 1540 (!) 194/76 -- -- -- -- -- --   06/30/21 1209 (!) 164/74 98.5 °F (36.9 °C) Oral 90 18 94 % --     Gen: No distress, pleasant. Resting in bed. Appears improved subjectively  HEENT: Normocephalic, atraumatic  CV: Regular rate and rhythm. No MRG   Resp: No respiratory distress. CTAB  Abd: Soft, nontender, PD cath in place  Ext: mild edema noted in BLE. LLE amputation site covered in clean dressing. Neuro: Alert, oriented, appropriately interactive. Laboratory data: Available via EMR. Therapy progress:  PT  Required Braces or Orthoses  Left Lower Extremity Brace: Boot  LLE Brace Type: Surgical shoe  Position Activity Restriction  Sternal Precautions: No Pushing, No Pulling, 5# Lifting Restrictions  Other position/activity restrictions: Patient is a 51year old female with a history of ESRD on PD, HTN, DM, HLD, and PVD. On 5/26/21 she had amputation of toes 1 and 2 on the left foot. On 5/27/21 she underwent a left popliteal, anterior tibial and dorsalis pedis angioplasty with Dr. Mary Anne Navarro. She presented to the ER on 5/31/21 with a syncopal episode. She has been experiencing dizziness and syncopal episodes X2 weeks prior to this visit.  She underwent a Left Heart Catheterization on 6/2/21 and was found to have severe coronary microvascular disease. On 6/9/21 she underwent a CABG X3 with NYA clip  Objective     Sit to Stand: Maximum Assistance (max A from raised commode, w/c x 4 reps, 1 episode where pt unable to clear bottom from w/c requiring max A x 2, mod A for STS from elevated mat table)  Stand to sit: Contact guard assistance (improved eccentric control)  Bed to Chair: Contact guard assistance (via stand step with RW)  Device: Rolling Walker  Other Apparatus: O2 (RA)  Assistance: Contact guard assistance  Distance: 80' + [de-identified]'  OT  PT Equipment Recommendations  Equipment Needed: Yes  Mobility Devices: Monreal Rowe: Rolling  Other: TBD with progress  Toilet - Technique: Ambulating  Equipment Used: Standard toilet  Assessment        SLP                Body mass index is 32.1 kg/m². Assessment:  Patient Active Problem List   Diagnosis    Diabetes mellitus ([address]    Obesity    Microalbuminuria    Hyperlipidemia with target LDL less than 100    Hypertension    Diabetes mellitus type 2 in obese ([address]    Acute renal failure (ARF) ([address]    ESRD (end stage renal disease) ([address]    Non-smoker    Elevated C-reactive protein (CRP)    Elevated sed rate    Diabetic polyneuropathy associated with type 2 diabetes mellitus (HCC)    Weight loss counseling, encounter for    Atherosclerosis of native arteries of left leg with ulceration of other part of foot ([address]    Near syncope    Coronary artery disease of native artery of native heart with stable angina pectoris (HCC)    Mild malnutrition ([address]    S/P coronary artery bypass graft x 3    S/P left atrial appendage ligation    Generalized weakness    Functional dysphonia       Plan:   Generalized weakness: PT/OT     CAD: S/P CABG X3 with NYA Clip (6/9). Sternal precautions. ASA, statin     PAD: S/P Amputation of toes 1 and 2 on the left (5/26/21). Wound care per orders     ESRD: on PD 2/2 diabetic nephropathy.  Appreciate nephro assistance    Hypoxic respiratory failure: O2 supplementation PRN, stress IS     DM II: Levemir decreased to 8/6 - hold, prandial d/c'd, SSI high. HTN: Lopressor 25, hydralazine 25. Per nephro     HLD: Lipitor 20     Oral candidiasis: Nystatin completed    Hyponatremia: per nephro and PD. Hypophonia: SLP consulted. Suspect pulmonary function related. ENT suggests SLP therapy for now. Potential scope as OP if not improved. Nausea: consider reglan for gastroparesis. Added phenergan PRN. Surgery following for possible ileus. NPO. - defer PO status and possible initiation of reglan to surgery. Leukocytosis: afebrile, recent PD cultures unremarakable - repeats per nephro recs. CXR improved. Anuric. Bowels: Per protocol  Bladder: Per protocol   Sleep: Ambien provided prn  Pain: Tylenol PRN, tramadol as needed  DVT PPx: Heparin     Electronically signed by Eliose Conn MD on 7/1/2021 at 8:57 AM    * This document was created using dictation software. While all precautions were taken to ensure accuracy, errors may have occurred. Please disregard any typographical errors.

## 2021-07-01 NOTE — H&P
Hospital Medicine History & Physical      PCP: Kieran Lemus MD    Date of Admission: (Not on file)    Date of Service: Pt seen/examined on 2021 and Admitted to Inpatient with expected LOS greater than two midnights due to medical therapy. Chief Complaint: SBO      History Of Present Illness: 51 y.o. female with past medical history of ESRD on PD, CAD status post CABG, diabetes, hypertension, peripheral vascular disease status post partial foot accommodation was initially admitted to [facility] 2021 for dizziness. At that time cardiac catheterization was performed which showed multivessel disease. Patient undergone CABG. Subsequently patient was discharged to [facility] acute rehab unit where she was undergoing therapy. She was being followed by nephrology in view of her ESRD for PD. She subsequently developed abdominal discomfort nausea and vomiting. Imaging suggestive of ileus versus SBO. Surgery was consulted at [facility] who suggested transfer to acute care. Patient states that she was not able to tolerate anything p.o. or since the day before yesterday. Her last bowel movement was approximately that time, no melena or hematochezia, hard. Today her labs demonstrate hyponatremia, hyperkalemia, BUN/creatinine consistent with ESRD patient, and leukocytosis to 18. Nephrology has ordered single dose of vancomycin and cefepime and sent PD fluid for analysis. Past Medical History:          Diagnosis Date    Diabetes mellitus ([address]     End stage kidney disease ([address]     peritoneal dialysis every night at home x 2 years    Hypertension     Neuropathy     Peripheral vascular disease ([address]        Past Surgical History:          Procedure Laterality Date     SECTION      CORONARY ARTERY BYPASS GRAFT N/A 2021    URGENT CABG CORONARY ARTERY BYPASS GRAFTS X3. VEIN TO PDA X1, VEIN TO OM X1, LEFT INTERNAL MAMMARY ARTERY TO LAD X1.  ENDOSCOPIC HARVEST RIGHT LEG SAPHENOUS VEIN. LIGATION NYA USING 35MM ATRICLIP. EPI AORTIC ULTRASOUND. TOTAL CARDIOPULMONARY BYPASS. DOPPLER GRAFT FLOW VERIFICATION. BILATERAL INITERCOSTAL NERVE BLOCK USING 1.3% EXPAREL. performed by Kwesi Hansen MD at [facility]  08/16/2018     lap PD cath placement lt side 62 cm    TOE AMPUTATION Left 2/26/2021    AMPUTATION OF THE LEFT THIRD TOE performed by Brian Parks DPM at [address] TOE AMPUTATION Left 5/26/2021    AMPUTATION OF HALLUX AND SECOND TOE, LEFT FOOT performed by Brian Parks DPM at [facility]         Medications Prior to Admission:      Prior to Admission medications    Medication Sig Start Date End Date Taking?  Authorizing Provider   acetaminophen (TYLENOL) 500 MG tablet Take 2 tablets by mouth every 6 hours 6/16/21   Loreatha Gutting, APRN - CNP   aspirin 325 MG EC tablet Take 1 tablet by mouth daily 6/17/21   Loreatha Gutting, APRN - CNP   atorvastatin (LIPITOR) 40 MG tablet Take 1 tablet by mouth nightly 6/16/21   Loreatha Gutting, APRN - PATRICIO   metoprolol tartrate (LOPRESSOR) 25 MG tablet Take 1 tablet by mouth 2 times daily 6/16/21   Loreatha Gutting, APRN - CNP   bisacodyl (DULCOLAX) 10 MG suppository Place 1 suppository rectally daily as needed for Constipation 6/16/21 7/16/21  Loreatha Gutting, APRN - CNP   sennosides-docusate sodium (SENOKOT-S) 8.6-50 MG tablet Take 1 tablet by mouth 2 times daily 6/16/21 7/16/21  Loreatha Gutting, APRN - CNP   polyethylene glycol (GLYCOLAX) 17 g packet Take 17 g by mouth daily as needed for Constipation 6/16/21 7/16/21  Loreatha Gutting, APRN - CNP   sevelamer (RENVELA) 800 MG tablet Take 1 tablet by mouth 3 times daily (with meals) 6/16/21   Loreatha Gutting, APRN - CNP   pantoprazole (PROTONIX) 40 MG tablet Take 1 tablet by mouth daily 6/17/21 7/17/21  Loreatha Gutting, APRN - CNP   insulin aspart (NOVOLOG) 100 UNIT/ML injection vial Inject 0-18 Units into the skin 3 times daily (with meals) 6/16/21   RICHARD Fajardo CNP   albuterol sulfate  (90 Base) MCG/ACT inhaler Inhale 2 puffs into the lungs every 6 hours as needed for Wheezing or Shortness of Breath 6/16/21   RICHARD Fajardo CNP   insulin aspart (NOVOLOG) 100 UNIT/ML injection vial Inject 0-9 Units into the skin nightly 6/16/21   RICHARD Fajardo CNP   insulin detemir (LEVEMIR FLEXTOUCH) 100 UNIT/ML injection pen Inject 25 Units into the skin nightly 6/16/21   RICHARD Fajardo CNP   fluticasone (FLONASE) 50 MCG/ACT nasal spray 1 spray by Each Nostril route daily as needed for Rhinitis    Historical Provider, MD   Continuous Blood Gluc Sensor (FREESTYLE KOREY 2 SENSOR) MISC Change every 14 days 5/11/21   Love Coe MD   Continuous Blood Gluc  (FREESTYLE KOREY 2 READER) JOHNNIE Use to check glucose 5/11/21   Love Coe MD   Continuous Blood Gluc Sensor (FREESTYLE KOREY 14 DAY SENSOR) MISC Check glucose ---change every 14 days 5/10/21   Love Coe MD   Continuous Blood Gluc  (FREESTYLE KOREY 14 DAY READER) JOHNNIE To test glucose 5/10/21   Love Coe MD   insulin aspart (NOVOLOG FLEXPEN) 100 UNIT/ML injection pen Inject 10 Units into the skin 3 times daily (before meals) 2/18/21   Kitty Castro MD   Insulin Pen Needle 32G X 6 MM MISC Use with insulin pens 4 times daily 2/18/21   Kitty Castro MD   calcium acetate 667 MG TABS Take 667 mg by mouth 3 times daily (with meals)    Historical Provider, MD   Multiple Vitamins-Minerals (THERAPEUTIC MULTIVITAMIN-MINERALS) tablet Take 1 tablet by mouth daily. 1/30/14 11/13/14  Kitty Castro MD       Allergies:  Zetia [ezetimibe], Quinolones, and Lisinopril    Social History:      The patient currently lives home    TOBACCO:   reports that she has never smoked. She has never used smokeless tobacco.  ETOH:   reports no history of alcohol use.   E-Cigarettes/Vaping Use     Questions Responses    E-Cigarette/Vaping Use Never User    Start Date     Passive Exposure     Quit Date     Counseling Given     Comments             Family History:       Reviewed in detail and negative for DM, CAD, Cancer, CVA. Positive as follows:        Problem Relation Age of Onset    Coronary Art Dis Mother     High Blood Pressure Mother     Heart Disease Mother     Diabetes Mother     Cancer Father     Coronary Art Dis Father     High Blood Pressure Father     Heart Disease Father     Diabetes Father        REVIEW OF SYSTEMS:   Pertinent positives as noted in the HPI. All other systems reviewed and negative. PHYSICAL EXAM PERFORMED:    There were no vitals taken for this visit. General appearance:  No apparent distress, appears stated age and cooperative. HEENT:  Normal cephalic, atraumatic without obvious deformity. Pupils equal, round, and reactive to light. Extra ocular muscles intact. Conjunctivae/corneas clear. Neck: Supple, with full range of motion. No jugular venous distention. Trachea midline. Respiratory:  Normal respiratory effort. Clear to auscultation, bilaterally without Rales/Wheezes/Rhonchi. Cardiovascular:  Regular rate and rhythm with normal S1/S2 without murmurs, rubs or gallops. Abdomen: Soft, non-tender, non-distended with normal bowel sounds. Musculoskeletal:  No clubbing, cyanosis or edema bilaterally. Full range of motion without deformity. Skin: Skin color, texture, turgor normal.  No rashes or lesions. Neurologic:  Neurovascularly intact without any focal sensory/motor deficits.  Cranial nerves: II-XII intact, grossly non-focal.  Psychiatric:  Alert and oriented, thought content appropriate, normal insight  Capillary Refill: Brisk,< 3 seconds   Peripheral Pulses: +2 palpable, equal bilaterally       Labs:     Recent Labs     06/29/21  0547 06/30/21  0702 07/01/21  0549   WBC 13.0* 14.2* 18.8*   HGB 7.3* 8.2* 8.0*   HCT 23.2* 26.0* 25.1*    264 282     Recent Labs     06/29/21  0547 06/30/21  0702 07/01/21  0549   NA 130* 128* 127*   K 4.2 4.1 5.2*   CL 88* 86* 85*   CO2 26 24 23   BUN 65* 57* 66*   CREATININE 9.2* 8.8* 10.0*   CALCIUM 7.9* 8.4 8.0*   PHOS  --   --  7.8*     Recent Labs     07/01/21  0549   AST 7*   ALT <5*   BILIDIR <0.2   BILITOT 0.4   ALKPHOS 186*     Recent Labs     07/01/21  0549   INR 1.39*     No results for input(s): CKTOTAL, TROPONINI in the last 72 hours. Urinalysis:      Lab Results   Component Value Date    NITRU Negative 06/06/2021    WBCUA 36 06/06/2021    BACTERIA RARE 06/06/2021    RBCUA 587 06/06/2021    BLOODU LARGE 06/06/2021    SPECGRAV 1.019 06/06/2021    GLUCOSEU 100 06/06/2021       Radiology:     CXR: I have reviewed the CXR with the following interpretation: Not available  EKG:  I have reviewed the EKG with the following interpretation: Not available    No orders to display       ASSESSMENT:    There are no active hospital problems to display for this patient.         PLAN:    SBO versus ileus  General surgery consulted  Abdominal exam benign  Clear liquids as tolerated and monitor bowel function  Hydration  As needed analgesics    Leukocytosis  Etiology yet to be identified  Prophylactically covered with vancomycin and cefepime  Follow-up PD fluid analysis  Follow-up blood culture    CAD  Status post CABG  Stable  On high-dose aspirin  Apparently does not tolerate Plavix    ESRD  On PD  Nephrology following    Mild hyperkalemia  Nephrology to address with dialysis    Mild hyponatremia  As per nephrology    Anion gap metabolic acidosis  Likely in view of uremia  Lactate not elevated    Normocytic anemia  Hemoglobin stable    DVT Prophylaxis: Heparin  Diet: No diet orders on file  Code Status: Full Code    Electronically signed by Kathy Negrete MD on 7/1/2021 at 2:30 PM

## 2021-07-01 NOTE — PROGRESS NOTES
ENT consultation was requested today. Patient was previously seen by Dr. Gaurav Chacon of the ENT service and recommendation was for speech therapy. Apparently, patient was readmitted to the floor from the rehab unit for possible small bowel obstruction. It is unclear why the ENT consultation was requested or what question needs to be answered. I am happy to see the patient if there is a new problem or question and a need for repeat consultation. The nurse stated she will check with the referring physician and let me know.

## 2021-07-01 NOTE — PROGRESS NOTES
PODIATRY DAILY PROGRESS NOTE  Matt Morton  Subjective :   Patient seen and examined this am at beside. She is s/p amputation of the 1st and 2nd toes of the left foot (5/26/21). She denies any pain in the foot. She thinks that there is a possibility that she is discharged next week. Patient admits to nausea at bedside. Patient denies calf pain, thigh pain, chest pain. Review of Systems: A 12 point review of symptoms is unremarkable with the exception of the chief complaint. Patient specifically denies fever, vomiting, chills, shortness of breath, chest pain, abdominal pain, constipation or difficulty urinating. Objective     BP (!) 163/63   Pulse 75   Temp 98.2 °F (36.8 °C)   Resp 16   Ht 5' 9\" (1.753 m)   Wt 217 lb 6 oz (98.6 kg)   SpO2 99%   BMI 32.10 kg/m²      I/O:    Intake/Output Summary (Last 24 hours) at 7/1/2021 1235  Last data filed at 6/30/2021 1300  Gross per 24 hour   Intake 0 ml   Output --   Net 0 ml              Wt Readings from Last 3 Encounters:   07/01/21 217 lb 6 oz (98.6 kg)   06/16/21 227 lb 4.7 oz (103.1 kg)   05/27/21 229 lb (103.9 kg)       LABS:    Recent Labs     06/30/21  0702 07/01/21  0549   WBC 14.2* 18.8*   HGB 8.2* 8.0*   HCT 26.0* 25.1*    282        Recent Labs     07/01/21  0549   *   K 5.2*   CL 85*   CO2 23   PHOS 7.8*   BUN 66*   CREATININE 10.0*        Recent Labs     07/01/21  0549   PROT 6.5   INR 1.39*   APTT 33.8           LOWER EXTREMITY EXAMINATION  VASCULAR: DP and PT pulses are nonpalpable b/l. CFT is sluggish to the digits of the foot b/l. No pain with calf compression b/l.     NEUROLOGIC: Gross and epicritic sensation is diminished b/l     DERMATOLOGIC: Incision site noted to the distal aspect of the 1st through 3rd metatarsals on left foot. Necrotic changes are appreciated with evidence of ischemic/deep tissue injury to the plantar 5th metatarsal head. Incision is dry with no active drainage noted. No erythema noted.    MUSCULOSKELETAL: Muscle strength is 5/5 for all pedal groups tested. Amputation of toes 1-3 noted to the left. ASSESSMENT/PLAN  1. Gangrene of left foot - s/p amputation of toes 1 and 2 on the left (5/26/21)  2. Peripheral arterial disease - s/p left popliteal, anterior tibial, and dorsalis pedis angioplasty by Dr. Anayeli Abernathy on 5/27/21  3. Diabetes mellitus with peripheral neuropathy  4. End stage renal disease on daily PD     - Patient was seen and examined at bedside. Continued (expected) ischemic/necrotic changes appreciated to the incision site  - No indication for imaging at this time  - No signs of infection noted. Site is dry. No need for antibiotics  - Will continue to allow necrotic changes to demarcate to the surgical site. If they progress, patient will require outpatient revision.  - Dressing placed consisting of betadine paint, adaptic, gauze, kerlix, and ace. Dressing changes ordered  - WBAT in a surgical shoe. Patient prefers surgical shoe over boot. Order for surgical shoe placed.      DISPO: continued necrotic changes noted to the left foot. Will continue betadine wet-to-dry daily changes to the foot until discharge and allow necrotic areas to demarcate prior to revision surgery. I will come once a week to follow the patient.     Gracie Martinez DPM  [phone]

## 2021-07-01 NOTE — PROGRESS NOTES
Occupational Therapy  Facility/Department: [facility] ACUTE REHAB UNIT  Daily Treatment Note  NAME: Tramaine Vera  : 1975  MRN: [id number]    Date of Service: 2021    Discharge Recommendations:  S Level 3, Home with Home health OT, Home with assist PRN  OT Equipment Recommendations  Equipment Needed: Yes  Mobility Devices: ADL Assistive Devices  ADL Assistive Devices: Reacher;Sock-Aid Hard;Transfer Tub Bench; Toileting - Raised Toilet Seat with arms    Assessment   Performance deficits / Impairments: Decreased functional mobility ; Decreased ADL status; Decreased strength;Decreased endurance;Decreased sensation;Decreased high-level IADLs;Decreased coordination;Decreased balance  Assessment: Pt presented with the above deficits impacting his occupational peprformance. Pt is slowly porgressing towards goals- still requiring extensive assist for LB dressing and set-upModI for UB. able to ambulate with RW at CGA/Kymberly however requiring variable assist levels from 2x to MaxA of 1 for sit>stands. Pt tolerates sessions on RA. Pt would benefit from ongoing intensive therapy services and extended stay in order to maximize her independence and safety with all occupational pursuits. Continue POC  Treatment Diagnosis: decreased ADL/IADL/mobility/activity tolerance due to CABG  OT Education: OT Role;Plan of Care;ADL Adaptive Strategies;Transfer Training  Patient Education: continue to reinforce for carryover  REQUIRES OT FOLLOW UP: Yes  Activity Tolerance  Activity Tolerance: Patient limited by fatigue  Safety Devices  Safety Devices in place: Yes  Type of devices: Bed alarm in place; Left in bed; All fall risk precautions in place;Call light within reach         Patient Diagnosis(es): Debility      has a past medical history of Diabetes mellitus ([facility] End stage kidney disease ([facility] Hypertension, Neuropathy, and Peripheral vascular disease ([facility] has a past surgical history that includes Tonsillectomy;   section; other surgical history (08/16/2018); Toe amputation (Left, 2/26/2021); Toe amputation (Left, 5/26/2021); and Coronary artery bypass graft (N/A, 6/9/2021). Restrictions  Restrictions/Precautions  Restrictions/Precautions: Fall Risk, Weight Bearing  Required Braces or Orthoses?: Yes  Lower Extremity Weight Bearing Restrictions  Left Lower Extremity Weight Bearing: Weight Bearing As Tolerated  Required Braces or Orthoses  Left Lower Extremity Brace: Boot  LLE Brace Type: Surgical shoe  Position Activity Restriction  Sternal Precautions: No Pushing, No Pulling, 5# Lifting Restrictions  Other position/activity restrictions: Patient is a 51year old female with a history of ESRD on PD, HTN, DM, HLD, and PVD. On 5/26/21 she had amputation of toes 1 and 2 on the left foot. On 5/27/21 she underwent a left popliteal, anterior tibial and dorsalis pedis angioplasty with Dr. Najma Greenberg. She presented to the ER on 5/31/21 with a syncopal episode. She has been experiencing dizziness and syncopal episodes X2 weeks prior to this visit. She underwent a Left Heart Catheterization on 6/2/21 and was found to have severe coronary microvascular disease. On 6/9/21 she underwent a CABG X3 with NYA clip    Subjective   General  Chart Reviewed: Yes  Patient assessed for rehabilitation services?: Yes  Additional Pertinent Hx: DM - peritoneal dialysis; s/p amputation of the 1st and 2nd toes of the left foot (5/26/21)  Response to previous treatment: Patient with no complaints from previous session  Family / Caregiver Present: No  Referring Practitioner: Maik Henry MD  Diagnosis: CABG x 3 6/9  Subjective  Subjective: Pt in bed at entry, agreeable to OT session- provided options for session and pt asking to shower  General Comment  Comments: Wild Ramirez   Vital Signs  Patient Currently in Pain: Denies        Objective    ADL  UE Bathing: Setup;Supervision  LE Bathing: Setup;Stand by assistance (assisted to dry LEs d/t time)  UE Dressing: Setup;Modified independent   LE Dressing:  (pt assisted with LB clothing secondary to time and fatigue)  Additional Comments: Pt ambulated to/from bathroom with RW for ADLs- Pt doffed LB clothing EOB- UB/LB bathing and dressing completed seated on TTB, lateral and anterior leans for rula care as needed- increased time to complete and cues to progress tasks along- cues and assist for thorough sternal cleansing           Balance  Sitting Balance: Supervision  Standing Balance: Contact guard assistance  Standing Balance  Time: ~4min total  Activity: functional mobility/transfers, ADL completion    Functional Mobility  Functional - Mobility Device: Rolling Walker  Activity: To/from bathroom  Assist Level: Minimal assistance  Functional Mobility Comments: minor assist for RW management, CGA for balance    Shower Transfers  Shower - Transfer From: Walker  Shower - Transfer Type: To and From  Shower - Transfer To: Transfer tub bench  Shower - Technique: Ambulating  Shower Transfers: Maximal assistance    Bed mobility  Supine to Sit: Stand by assistance  Sit to Supine: Stand by assistance  Scooting: Stand by assistance     Transfers  Sit to stand: Maximum assistance  Stand to sit: Minimal assistance  Transfer Comments: CGA from rasied bed, MaxA from shower chair                 Cognition  Arousal/Alertness: Delayed responses to stimuli  Following Commands: Follows one step commands consistently; Follows one step commands with increased time  Initiation: Requires cues for some  Cognition Comment: appearing depressed, self-limiting at times                          Plan   Plan  Times per week: 90 minutes; 5-6x/wk  Times per day: Daily  Current Treatment Recommendations: Strengthening, ROM, Balance Training, Functional Mobility Training, Endurance Training, Safety Education & Training, Patient/Caregiver Education & Training, Equipment Evaluation, Education, & procurement, Positioning, Self-Care / ADL, Pain Management, Home Management Training         Goals  Short term goals  Time Frame for Short term goals: 1-2weeks  Short term goal 1: Pt will complete UB/LB dressing with mod I and use fo AE as needed  Short term goal 2: Pt will complete bathing with mod I  Short term goal 3: Pt will complete toileting with mod I  Short term goal 4: Pt will complete functional transfers with mod I  Short term goal 5: Pt will tolerate x10 mins of standing IADLs with SpO2 above 90% in order to increase activity tolerance for d/c home  Long term goals  Time Frame for Long term goals : LTG=STG  Patient Goals   Patient goals : to return home       Therapy Time   Individual Concurrent Group Co-treatment   Time In 0830         Time Out 0923         Minutes 53           Timed Code Treatment Minutes:  53  Total Treatment Minutes:  [facility], [facility] [address] [id number]

## 2021-07-01 NOTE — PLAN OF CARE
maintains weight  Outcome: Ongoing     Problem: SAFETY  Goal: LTG - Patient will demonstrate safety requirements appropriate to situation/environment  Outcome: Ongoing     Problem: SKIN INTEGRITY  Goal: LTG - Patient will be free from infection  Outcome: Ongoing     Problem: PAIN  Goal: LTG - Patient will manage pain with the appropriate technique/Intervention  Outcome: Ongoing  Goal: STG - pain is manageable through therapies  Outcome: Ongoing     Problem: Nutrition  Goal: Optimal nutrition therapy  Outcome: Ongoing

## 2021-07-01 NOTE — PROGRESS NOTES
CCPD Order   Exchanges: 5   Exchange Volume: 1600 ml   Total Time: 10:00 hrs   Dextrose: 2.5 %   Last Fill: 0 ml   Total Volume:8000 ml     Orders verified. Supplies taken to pt's room. Report received. Cycler set up, primed and pre tested. Dressing changed on [facility] Cath site. Pt connected to cycler. CCPD initiated without problem. Initial effluent clear.

## 2021-07-01 NOTE — CONSULTS
[facility]     Consult                                                     Patient Name: Dontrell Lmia  MRN: [id number]  YOB: 1975  Admission date: 2021  6:08 PM   Date of evaluation: 2021  Primary Care Physician: Dusty Samuel MD  Requesting physician: Joanette Bence MD  Reason for consult: Abdominal pain  History of Present Illness:    Ms. Elke Lowery is a 51 y.o. female who has crampy lower abdominal pain beginning earlier today after having a bowel movement, was hard and with straining. Also associated with nausea but no vomiting. Abdominal X-ray shows a dilated small bowel loop possible consistent with obstruction or ileus and surgery consulted. ESRD on peritoneal dialysis. Known diabetic with gastroparesis causing chronic nausea, similar to current symptoms, but not on reglan. Abdominal pain mild currently, nausea not responding well to oral zofran or phenergan    Past Medical History:        Diagnosis Date    Diabetes mellitus ([address]     End stage kidney disease ([address]     peritoneal dialysis every night at home x 2 years    Hypertension     Neuropathy     Peripheral vascular disease ([address]        Past Surgical History:        Procedure Laterality Date     SECTION      CORONARY ARTERY BYPASS GRAFT N/A 2021    URGENT CABG CORONARY ARTERY BYPASS GRAFTS X3. VEIN TO PDA X1, VEIN TO OM X1, LEFT INTERNAL MAMMARY ARTERY TO LAD X1. ENDOSCOPIC HARVEST RIGHT LEG SAPHENOUS VEIN. LIGATION NYA USING 35MM ATRICLIP. EPI AORTIC ULTRASOUND. TOTAL CARDIOPULMONARY BYPASS. DOPPLER GRAFT FLOW VERIFICATION. BILATERAL INITERCOSTAL NERVE BLOCK USING 1.3% EXPAREL.  performed by Nica Mariano MD at [place] [address]  2018     lap PD cath placement lt side 62 cm    TOE AMPUTATION Left 2021    AMPUTATION OF THE LEFT THIRD TOE performed by Alison Brown DPM at [address] [place] [address] [address] TOE AMPUTATION Left 2021    AMPUTATION OF HALLUX AND SECOND TOE, LEFT FOOT performed by Zhane So DPM at [facility]         Scheduled Meds:   insulin detemir  8 Units Subcutaneous QAM    insulin detemir  6 Units Subcutaneous Nightly    hydrALAZINE  25 mg Oral 3 times per day    torsemide  100 mg Oral Daily    gentamicin   Topical Daily    insulin aspart  0-18 Units Subcutaneous TID WC    insulin aspart  0-9 Units Subcutaneous Nightly    heparin (porcine)  5,000 Units Subcutaneous 3 times per day    aspirin  325 mg Oral Daily    metoprolol tartrate  25 mg Oral BID    pantoprazole  40 mg Oral Daily    sennosides-docusate sodium  1 tablet Oral BID    atorvastatin  20 mg Oral Nightly    sevelamer  800 mg Oral TID WC    miconazole   Topical BID     Continuous Infusions:   dextrose       PRN Meds:.promethazine, acetaminophen, ondansetron, calcium carbonate, albuterol sulfate HFA, magnesium hydroxide, polyethylene glycol, traMADol **OR** traMADol, zolpidem, dextrose, dextrose, glucagon (rDNA), glucose, bisacodyl, diphenhydrAMINE, heparin (porcine), hydrALAZINE, ondansetron    Prior to Admission medications    Medication Sig Start Date End Date Taking?  Authorizing Provider   acetaminophen (TYLENOL) 500 MG tablet Take 2 tablets by mouth every 6 hours 6/16/21  Yes RICHARD Osborn CNP   aspirin 325 MG EC tablet Take 1 tablet by mouth daily 6/17/21  Yes RICHARD Osborn CNP   atorvastatin (LIPITOR) 40 MG tablet Take 1 tablet by mouth nightly 6/16/21  Yes RICHARD Osborn CNP   metoprolol tartrate (LOPRESSOR) 25 MG tablet Take 1 tablet by mouth 2 times daily 6/16/21  Yes RICHARD Osborn CNP   sennosides-docusate sodium (SENOKOT-S) 8.6-50 MG tablet Take 1 tablet by mouth 2 times daily 6/16/21 7/16/21 Yes RICHARD Osborn CNP   insulin aspart (NOVOLOG) 100 UNIT/ML injection vial Inject 0-18 Units into the skin 3 times daily (with meals) 6/16/21  Yes RICHARD Osborn CNP   insulin aspart (NOVOLOG) 100 UNIT/ML injection vial Inject 0-9 Units into the skin nightly 6/16/21  Yes RICHARD Osborn CNP   insulin detemir (LEVEMIR FLEXTOUCH) 100 UNIT/ML injection pen Inject 25 Units into the skin nightly 6/16/21  Yes RICHARD Osborn CNP   fluticasone (FLONASE) 50 MCG/ACT nasal spray 1 spray by Each Nostril route daily as needed for Rhinitis   Yes Historical Provider, MD   Continuous Blood Gluc Sensor (FREESTYLE KOREY 2 SENSOR) MISC Change every 14 days 5/11/21  Yes Dany Valadez MD   Continuous Blood Gluc  (FREESTYLE KOREY 2 READER) JOHNNIE Use to check glucose 5/11/21  Yes Dany Valadez MD   Continuous Blood Gluc Sensor (FREESTYLE KOREY 14 DAY SENSOR) MISC Check glucose ---change every 14 days 5/10/21  Yes Dany Valadez MD   Continuous Blood Gluc  (FREESTYLE KOREY 14 DAY READER) JOHNNIE To test glucose 5/10/21  Yes Dany Valadez MD   Insulin Pen Needle 32G X 6 MM MISC Use with insulin pens 4 times daily 2/18/21  Yes Baltazar Duffy MD   calcium acetate 667 MG TABS Take 667 mg by mouth 3 times daily (with meals)   Yes Historical Provider, MD   bisacodyl (DULCOLAX) 10 MG suppository Place 1 suppository rectally daily as needed for Constipation 6/16/21 7/16/21  RICHARD Osborn CNP   polyethylene glycol (GLYCOLAX) 17 g packet Take 17 g by mouth daily as needed for Constipation 6/16/21 7/16/21  RICHARD Osborn CNP   sevelamer (RENVELA) 800 MG tablet Take 1 tablet by mouth 3 times daily (with meals) 6/16/21   RICHARD Osborn CNP   pantoprazole (PROTONIX) 40 MG tablet Take 1 tablet by mouth daily 6/17/21 7/17/21  RICHARD Osborn CNP   albuterol sulfate  (90 Base) MCG/ACT inhaler Inhale 2 puffs into the lungs every 6 hours as needed for Wheezing or Shortness of Breath 6/16/21   RICHARD Osborn CNP   zolpidem (AMBIEN) 5 MG tablet Take 1 tablet by mouth nightly as needed for Sleep for up to 14 days.  6/16/21 6/30/21  RICHARD Osborn - PATRICIO   insulin aspart (NOVOLOG FLEXPEN) 100 UNIT/ML injection pen Inject 10 Units into the skin 3 times daily (before meals) 2/18/21   Wero Medrano MD   Multiple Vitamins-Minerals (THERAPEUTIC MULTIVITAMIN-MINERALS) tablet Take 1 tablet by mouth daily. 1/30/14 11/13/14  Wero Medrano MD        Allergies:  Zetia [ezetimibe], Quinolones, and Lisinopril    Social History:   Social History     Socioeconomic History    Marital status:      Spouse name: Not on file    Number of children: Not on file    Years of education: Not on file    Highest education level: Not on file   Occupational History    Not on file   Tobacco Use    Smoking status: Never Smoker    Smokeless tobacco: Never Used   Vaping Use    Vaping Use: Never used   Substance and Sexual Activity    Alcohol use: No    Drug use: No    Sexual activity: Not on file   Other Topics Concern    Not on file   Social History Narrative    Lives with spouse     Social Determinants of Health     Financial Resource Strain: Low Risk     Difficulty of Paying Living Expenses: Not hard at all   Food Insecurity: No Food Insecurity    Worried About Running Out of Food in the Last Year: Never true    Rush of Food in the Last Year: Never true   Transportation Needs: No Transportation Needs    Lack of Transportation (Medical): No    Lack of Transportation (Non-Medical):  No   Physical Activity:     Days of Exercise per Week:     Minutes of Exercise per Session:    Stress:     Feeling of Stress :    Social Connections:     Frequency of Communication with Friends and Family:     Frequency of Social Gatherings with Friends and Family:     Attends Tenriism Services:     Active Member of Clubs or Organizations:     Attends Club or Organization Meetings:     Marital Status:    Intimate Partner Violence:     Fear of Current or Ex-Partner:     Emotionally Abused:     Physically Abused:     Sexually Abused:        Family History:    Family History Problem Relation Age of Onset    Coronary Art Dis Mother     High Blood Pressure Mother     Heart Disease Mother     Diabetes Mother     Cancer Father     Coronary Art Dis Father     High Blood Pressure Father     Heart Disease Father     Diabetes Father        Review of Systems:  CONSTITUTIONAL:  Negative except as above  HEENT:  negative  RESPIRATORY:  negative  CARDIOVASCULAR:  negative  GASTROINTESTINAL:  negative except as above   GENITOURINARY:  negative  HEMATOLOGIC/LYMPHATIC:  negative  NEUROLOGICAL:  Negative      Vital Signs:  Patient Vitals for the past 24 hrs:   BP Temp Temp src Pulse Resp SpO2 Weight   21 (!) 170/99 -- -- -- -- 100 % --   21 (!) 183/101 98.3 °F (36.8 °C) Oral 95 16 95 % --   21 1815 (!) 181/76 -- -- -- -- -- --   21 1540 (!) 194/76 -- -- -- -- -- --   21 1209 (!) 164/74 98.5 °F (36.9 °C) Oral 90 18 94 % --   21 0843 (!) 119/90 97.2 °F (36.2 °C) Oral 79 16 96 % --   21 0730 (!) 166/85 98.3 °F (36.8 °C) Oral 86 14 -- 222 lb 0.1 oz (100.7 kg)   21 0713 (!) 122/96 -- -- -- -- -- --      TEMPERATURE HISTORY 24H: Temp (24hrs), Av.1 °F (36.7 °C), Min:97.2 °F (36.2 °C), Max:98.5 °F (36.9 °C)    BLOOD PRESSURE HISTORY: Systolic (45WZU), NXL:750 , Min:119 , AUC:418    Diastolic (38IKB), YZA:41, Min:65, Max:101    Admission Weight: 236 lb 11.2 oz (107.4 kg)       Intake/Output Summary (Last 24 hours) at 2021  Last data filed at 2021 1300  Gross per 24 hour   Intake 0 ml   Output --   Net 0 ml     Drain/tube Output:         Physical Exam:  CONSTITUTIONAL:  alert, no apparent distress   NEUROLOGIC:  Mental Status Exam:  Level of Alertness:   awake  Orientation:  Oriented to person, place, time  EYES:  sclera clear  HENT:  normocepalic, without obvious abnormality  NECK:  supple, symmetrical, trachea midline  LUNGS:  clear to auscultation  CARDIOVASCULAR:  regular rate and rhythm   ABDOMEN: soft, non-distended, minimal lower abdominal tenderness, peritoneal dialysis catheter present without signs of complication, abdomen with ecchymosis  SKIN:  no bruising or bleeding, normal skin color, texture, turgor and no redness, warmth, or swelling otherwise    Labs:    CBC:    Recent Labs     06/28/21  0646 06/29/21  0547 06/30/21  0702   WBC 13.3* 13.0* 14.2*   HGB 7.2* 7.3* 8.2*   HCT 22.4* 23.2* 26.0*    225 264     BMP:    Recent Labs     06/28/21  0646 06/29/21  0547 06/30/21  0702   * 130* 128*   K 4.7 4.2 4.1   CL 84* 88* 86*   CO2 24 26 24   BUN 61* 65* 57*   CREATININE 9.8* 9.2* 8.8*   GLUCOSE 294* 120* 97     Hepatic: No results for input(s): AST, ALT, ALB, BILITOT, ALKPHOS in the last 72 hours. Amylase: No results for input(s): AMYLASE in the last 72 hours. Lipase: No results for input(s): LIPASE in the last 72 hours. Mag:    No results for input(s): MG in the last 72 hours. Phos:   No results for input(s): PHOS in the last 72 hours. Coags: No results for input(s): INR, APTT in the last 72 hours. Imaging:  I have personally reviewed the following films:  XR ABDOMEN (KUB) (SINGLE AP VIEW)    Result Date: 6/30/2021  EXAMINATION: ONE SUPINE XRAY VIEW(S) OF THE ABDOMEN 6/30/2021 2:03 pm COMPARISON: Prior study(s) most recent 06/13/2021. HISTORY: ORDERING SYSTEM PROVIDED HISTORY: n/v and LLQ pain, r/o ileus TECHNOLOGIST PROVIDED HISTORY: Reason for exam:->n/v and LLQ pain, r/o ileus Reason for Exam: n/v and LLQ pain, r/o ileus Acuity: Acute Type of Exam: Initial FINDINGS: There is some gas and  stool within the colon . Small bowel demonstrates localized gas-filled loop to the right of midline. Catheter device overlies the lower abdomen and pelvis. This represents a peritoneal dialysis catheter. Arterial vascular calcifications are present. .  Included lungs are unremarkable. There is no obstruction or free air. Localized right mid small bowel gaseous distention possible mild small bowel ileus. The remainder the study is unremarkable. XR ABDOMEN (KUB) (SINGLE AP VIEW)    Result Date: 6/13/2021  EXAMINATION: ONE SUPINE XRAY VIEW(S) OF THE ABDOMEN 6/13/2021 2:58 pm COMPARISON: 01/27/2004 HISTORY: ORDERING SYSTEM PROVIDED HISTORY: left sided abdominal pain TECHNOLOGIST PROVIDED HISTORY: Reason for exam:->left sided abdominal pain Acuity: Unknown FINDINGS: Scattered small bowel gas with no focally distended loops. Right-sided colonic stool. Gas in the more distal colon with no significant distal retained stool. No pathologic calcifications. Large bore right femoral line. Chest tubes are noted extending into the lower chest.     Nonspecific bowel gas pattern. Mild small bowel and colonic distension suggesting a mild ileus. No evidence of obstruction. CT CHEST WO CONTRAST    Result Date: 6/3/2021  EXAMINATION: CT OF THE CHEST WITHOUT CONTRAST 6/3/2021 12:19 pm TECHNIQUE: CT of the chest was performed without the administration of intravenous contrast. Multiplanar reformatted images are provided for review. Dose modulation, iterative reconstruction, and/or weight based adjustment of the mA/kV was utilized to reduce the radiation dose to as low as reasonably achievable. COMPARISON: 05/16/2021 abdomen CT HISTORY: ORDERING SYSTEM PROVIDED HISTORY: pre-op CABG TECHNOLOGIST PROVIDED HISTORY: Reason for exam:->pre-op CABG Is the patient pregnant?->No Reason for Exam: pre-op CABG Acuity: Acute Type of Exam: Initial FINDINGS: Lungs/pleura: The central airways are patent. Small right basilar pleural effusion with associated atelectasis is overall similar in appearance to the previous exam.  The left lung is clear. There are no suspicious pulmonary nodules Mediastinum: There is no acute mediastinal abnormality. There is extensive calcification of the coronary arteries.  Upper Abdomen: No definite acute abnormality Soft Tissues/Bones: No suspicious osteolytic or osteoblastic lesions     No acute intrathoracic abnormality     IR FLUORO GUIDED CVA DEVICE PLMT/REPLACE/REMOVAL    Result Date: 6/4/2021  PROCEDURE: IR FLUOROSCOPY GUIDED CENTRAL VENOUS ACCESS DEVICE PLACEMENT 6/4/2021 HISTORY: ORDERING SYSTEM PROVIDED HISTORY: renal failure TECHNOLOGIST PROVIDED HISTORY: Reason for exam:->renal failure Is the patient pregnant?->No TECHNIQUE: Ultrasound and fluoroscopically guided placement of a right temporary dialysis catheter CONTRAST: None SEDATION: None FLUOROSCOPY DOSE AND TYPE OR TIME AND EXPOSURES: Fluoro time: 0.3 minutes. Air Kerma: 5.48 mGy. DESCRIPTION OF PROCEDURE: Informed consent was obtained after a detailed explanation of the procedure including risks, benefits, and alternatives. Universal protocol was observed. Preprocedural ultrasound was performed which demonstrated diminutive bilateral internal jugular veins. The right common femoral vein was widely patent. The expected needle course was anesthetized with subcutaneous lidocaine. Next, under continuous ultrasound guidance, micropuncture needle was advanced into the common femoral vein. An image was obtained with the needle within the vein. Next, microwire was advanced and a 5 Western Elana sheath was placed. An 035 wire was placed in the IVC. Tract was sequentially dilated to accommodate a 12.5 Western Elana temporary dialysis catheter. Catheter ports flushed and aspirated without difficulty. Catheter was sutured to the skin. Catheter ports were locked with heparinized saline. The patient tolerated the procedure well and there were no immediate complications. EBL: Less than 5 cc FINDINGS: Right approach temporary dialysis catheter with the tip in IVC. Technically successful right-sided HD catheter placement. Catheter is okay for use.      XR CHEST PORTABLE    Result Date: 6/30/2021  EXAMINATION: ONE XRAY VIEW OF THE CHEST 6/30/2021 5:01 pm COMPARISON: 06/22/2021 HISTORY: ORDERING SYSTEM PROVIDED HISTORY: increased wbc with wheezing TECHNOLOGIST PROVIDED HISTORY: Reason for exam:->increased wbc with wheezing Reason for Exam: increased wbc with wheezing Acuity: Chronic Type of Exam: Ongoing FINDINGS: The heart is mildly enlarged less prominent. The pulmonary vessels are less prominent. There are hazy bibasilar opacities which are less prominent. There is a small right pleural effusion which is decreased. Slight decrease in the heart size with resolving central pulmonary congestion. Slowly resolving bibasilar opacities. Small right pleural effusion which is less prominent. XR CHEST PORTABLE    Result Date: 6/22/2021  EXAMINATION: ONE XRAY VIEW OF THE CHEST 6/22/2021 3:27 pm COMPARISON: 06/14/2021 HISTORY: ORDERING SYSTEM PROVIDED HISTORY: continued O2 requirements TECHNOLOGIST PROVIDED HISTORY: Reason for exam:->continued O2 requirements Reason for Exam: continued O2 requirements Acuity: Unknown Type of Exam: Ongoing FINDINGS: Status post median sternotomy. Stable cardiomegaly. Bibasilar hypoaeration. Right-sided pleural effusion. Bibasilar opacities. Right-sided pleural effusion Bibasilar hypoaeration with bibasilar opacities that could represent subsegmental atelectasis or infection     XR CHEST PORTABLE    Result Date: 6/14/2021  EXAMINATION: ONE XRAY VIEW OF THE CHEST 6/14/2021 6:24 am COMPARISON: Prior study(s) most recent 06/09/2021. HISTORY: ORDERING SYSTEM PROVIDED HISTORY: CT removal TECHNOLOGIST PROVIDED HISTORY: Reason for exam:->CT removal FINDINGS: The heart is moderately enlarged. Pulmonary vessels are upper normal.  There is patchy bilateral airspace disease. Lung volume is diminished. The endotracheal tube, chest tubes and swan catheter have been removed. A right jugular sheath remains. There is no pneumothorax. Suspect small pleural effusion more so on the right. No pneumothorax following tube removal. Cardiomegaly with bibasilar airspace disease and possible small pleural effusion. Poor inspiration.      XR CHEST PORTABLE    Result Date: 6/9/2021  EXAMINATION: ONE XRAY VIEW OF THE CHEST 6/9/2021 4:40 pm COMPARISON: 05/31/2021 HISTORY: ORDERING SYSTEM PROVIDED HISTORY: Post op open heart surgery TECHNOLOGIST PROVIDED HISTORY: Reason for exam:->Post op open heart surgery Reason for Exam: post open heart Acuity: Acute Type of Exam: Initial FINDINGS: Status post median sternotomy. Cardiac silhouette enlargement again noted. The endotracheal tube terminates approximately 6 cm above the jamar. Right internal jugular Muldrow-Ebonie catheter terminates at the level of the proximal right pulmonary artery. Left chest tube in place. No pneumothorax identified. Right basilar opacities are noted with small right effusion. Status post open heart surgery. Support tubes and Muldrow-Ebonie catheter, as above. Right basilar atelectasis and small effusion. VL DUP CAROTID BILATERAL    Result Date: 6/3/2021  Carotid Duplex Study  Demographics   Patient Name       Haven Chester   Date of Study      06/03/2021         Gender              Female   Patient Number     [id number]         Date of Birth       1975   Visit Number       [id number]          Age                 51 year(s)   Accession Number   [id number]         Room Number         7174   Corporate ID       [id number]           [facility], [address],                                                            [address]   Ordering Physician Lucretia Smith   Interpreting        I Vascular                     CNP                Physician           Anita Painting MD,                                                            [facility], [address] [place] [address]  Procedure Type of Study:   Cerebral:Carotid, VL CAROTID DUPLEX BILATERAL. Vascular Sonographer Report  Additional Indications:Pre-op CABG Impressions Right Impression The right internal carotid artery appears to have a <50% diameter reducing stenosis based on velocity criteria. The right vertebral artery demonstrates normal antegrade flow. The right subclavian artery is visualized and demonstrates multiphasic flow. Left Impression The left internal carotid artery appears to have a <50% diameter reducing stenosis based on velocity criteria. The left vertebral artery demonstrates normal antegrade flow. The left subclavian artery is visualized and demonstrates multiphasic flow. The left brachial pressure was not obtained due to IV placement . Conclusions   Summary   No hemodynamically significant stenosis noted in the internal carotid artery  bilaterally. Signature   ------------------------------------------------------------------  Electronically signed by Meghan Cespedes MD, [address] [facility], [facility]  (Interpreting physician) on 06/03/2021 at 03:46 PM  ------------------------------------------------------------------  Patient Status:Routine. Study Location:Portable. Technical Quality:Adequate visualization. Risk Factors History +------------------+----------+----------------------------------------------+ ! Diagnosis         ! Date      ! Comments                                      ! +------------------+----------+----------------------------------------------+ ! Previous Procedure!02/11/2021! Hx of left popliteal, tibioperoneal trunk, and! !                  !          !peroneal atherectomy and PTA.                 ! +------------------+----------+----------------------------------------------+ Plaque   - A plaque was found in the Right ICA. Smooth. The plaque characteristics are: heterogeneous texture. There is evidence of calcified plaque. - A plaque was found in the Left ICA. Smooth. The plaque characteristics are: heterogeneous texture. There is evidence of calcified plaque. Velocities are measured in cm/s ; Diameters are measured in mm Carotid Right Measurements +---------------+----+----+-----+----+ ! Location       ! PSV ! EDV ! Angle! RI  ! +---------------+----+----+-----+----+ ! Prox CCA       !97. 1! 17  !60   !0.82! +---------------+----+----+-----+----+ ! Mid CCA        ! 73. 5!13. 2! 60   !0.82! +---------------+----+----+-----+----+ ! Dist CCA       !54. 5!83. 2! 60   !0.67! +---------------+----+----+-----+----+ ! Prox ICA       !63. 5!20  !60   !0.69! +---------------+----+----+-----+----+ ! Mid ICA        !69.8!18. 4!60   !0.74! +---------------+----+----+-----+----+ ! Dist ICA       !65.4!22. 8!60   !0.65! +---------------+----+----+-----+----+ ! Prox ECA       !61.3!    !60   !    ! +---------------+----+----+-----+----+ ! Vertebral      !41.2!    !60   !    ! +---------------+----+----+-----+----+ ! Prox Subclavian!61.4!    !61   !    ! +---------------+----+----+-----+----+   - There is antegrade vertebral flow noted on the right side. - Additional Measurements:ICAPSV/CCAPSV 0.95. ICAEDV/CCAEDV 1.34. Carotid Left Measurements +---------------+----+----+-----+----+ ! Location       ! PSV ! EDV ! Angle! RI  ! +---------------+----+----+-----+----+ ! Prox CCA       !108 !15. 5!60   !0.86! +---------------+----+----+-----+----+ ! Mid CCA        !61.5!14. 5!60   !0.76! +---------------+----+----+-----+----+ ! Dist CCA       !50. 4!14. 4!60   !0.71! +---------------+----+----+-----+----+ ! Prox ICA       !51.8!17. 7! 60   !0.66! +---------------+----+----+-----+----+ ! Mid ICA        !60.9!23. 1! 60   !0.62! +---------------+----+----+-----+----+ ! Dist ICA       !93. 8!31. 3!60   !0.67! +---------------+----+----+-----+----+ ! Prox ECA       !51.5!    !60   !    ! +---------------+----+----+-----+----+ ! Vertebral      !42  !    !60   !    ! +---------------+----+----+-----+----+ ! Prox Subclavian! 102 !    !60   !    ! +---------------+----+----+-----+----+   - There is antegrade vertebral flow noted on the left side. - Additional Measurements:ICAPSV/CCAPSV 1.53. ICAEDV/CCAEDV 2.02.     VL PRE OP VEIN MAPPING    Result Date: 6/3/2021  Lower Extremities Vein Mapping  Demographics   Patient Name       Cathye Cheadle   Date of Study      06/03/2021         Gender              Female   Patient Number [id number]         Date of Birth       1975   Visit Number       [id number]          Age                 51 year(s)   Accession Number   [id number]         Room Number         5259   Corporate ID       [id number]           [address], [place],                                                            [address]   Ordering Physician Benedict Agudelo   Interpreting        [facility]                     CNP                Physician           Shari Tellez MD,                                                            [facility] [place] [address]  Procedure Type of Study:   Veins:Lower Extremity Vein Mapping, VASC PRE-OP VEIN MAPPING. Vascular Sonographer Report  Additional Indications:Pre-op CABG Impressions Right Impression The right greater saphenous vein was visualized between zone 1 and 8 and demonstrates total compressibility. Please refer to diameter measurements. Right GSV: Sapheno femoral junction 5.5 mm GSV high thigh 3.8 mm GSV mid thigh 3.4 mm GSV low thigh 5.9 mm GSV knee 4.7 mm GSV high calf 2.9 mm GSV mid calf 2.4 mm GSV ankle calf 2.3 mm Left Impression The left greater saphenous vein was visualized between zone 1 and 8 and demonstrates total compressibility. Please refer to diameter measurements. Left GSV: Sapheno femoral junction 7.8 mm GSV high thigh 4.1 mm GSV mid thigh 3.7 mm GSV low thigh 5.2 mm GSV knee 4.4 mm GSV high calf 3.2 mm GSV mid calf 2.9 mm GSV ankle calf 2.6 mm Conclusions   Summary   Usable greater Saphenous vein conduit in both lower extremities. Signature   ------------------------------------------------------------------  Electronically signed by Shari Tellez MD[place] [facility] [place] [address]  (Interpreting physician) on 06/03/2021 at 03:45 PM  ------------------------------------------------------------------  Patient Status:Routine. Study Location:Portable. Technical Quality:Adequate visualization.  Risk Factors History +------------------+----------+----------------------------------------------+ ! Diagnosis         ! Date      ! Comments                                      ! +------------------+----------+----------------------------------------------+ ! Previous Procedure!02/11/2021! Hx of left popliteal, tibioperoneal trunk, and! !                  !          !peroneal atherectomy and PTA.                 ! +------------------+----------+----------------------------------------------+ Velocities are measured in cm/s ; Diameters are measured in mm LE Vein Mapping +----------------------------------++--------+-----+----+--------+-----+ ! Superficial - Great Saphenous Vein! !Right   ! ! Left!        !     ! +----------------------------------++--------+-----+----+--------+-----+ ! Location                          ! !Diameter! Depth! !Diameter! Depth! +----------------------------------++--------+-----+----+--------+-----+ ! Sapheno Femoral Junction          ! !5.5     !     !    !7.8     !     ! +----------------------------------++--------+-----+----+--------+-----+ ! GSV High Thigh                    ! !3.8     !     !    !4.1     !     ! +----------------------------------++--------+-----+----+--------+-----+ ! GSV Mid Thigh                     ! !3.4     !     !    !3.7     !     ! +----------------------------------++--------+-----+----+--------+-----+ ! GSV Low Thigh                     !!5.9     !     !    !5.2     !     ! +----------------------------------++--------+-----+----+--------+-----+ ! GSV Knee                          !!4.7     !     !    !4.4     !     ! +----------------------------------++--------+-----+----+--------+-----+ ! GSV High Calf                     !!2.9     !     !    !3.2     !     ! +----------------------------------++--------+-----+----+--------+-----+ ! GSV Mid Calf                      !!2.4     !     !    !2.9     !     ! +----------------------------------++--------+-----+----+--------+-----+ ! GSV Ankle                         !!2.3     !     !    !2.6     !     ! +----------------------------------++--------+-----+----+--------+-----+    MRI BRAIN WO CONTRAST    Result Date: 6/1/2021  EXAMINATION: MRI OF THE BRAIN WITHOUT CONTRAST  6/1/2021 2:33 pm TECHNIQUE: Multiplanar multisequence MRI of the brain was performed without the administration of intravenous contrast. COMPARISON: None. HISTORY: ORDERING SYSTEM PROVIDED HISTORY: syncope and collapse TECHNOLOGIST PROVIDED HISTORY: Reason for exam:->syncope and collapse Decision Support Exception - unselect if not a suspected or confirmed emergency medical condition->Emergency Medical Condition (MA) Is the patient pregnant?->No Reason for Exam: Patient had an episode of vertigo with syncope. Acuity: Unknown Type of Exam: Ongoing FINDINGS: INTRACRANIAL STRUCTURES/VENTRICLES: There is no acute infarct or mass. Mild chronic white matter microvascular ischemic changes are present. No mass effect or midline shift. No evidence of an acute intracranial hemorrhage. The ventricles and sulci are normal in size and configuration. The sellar/suprasellar regions appear unremarkable. The normal signal voids within the major intracranial vessels appear maintained. ORBITS: The visualized portion of the orbits demonstrate no acute abnormality. SINUSES: Right maxillary sinus mucoperiosteal thickening and frothy secretions. No mastoid effusion. BONES/SOFT TISSUES: The bone marrow signal intensity appears normal. The soft tissues demonstrate no acute abnormality. 1. No acute intracranial abnormality. 2. Mild chronic white matter microvascular ischemic changes. 3. Right maxillary sinusitis.        Cultures:  Relevant cultures documented under results section     Assessment:  Patient Active Problem List   Diagnosis    Diabetes mellitus ([address]    Obesity    Microalbuminuria    Hyperlipidemia with target LDL less than 100    Hypertension    Diabetes mellitus type 2 in obese ([address]    Acute renal failure (ARF) ([address]    ESRD (end stage renal disease) ([address]    Non-smoker    Elevated C-reactive protein (CRP)    Elevated sed rate    Diabetic polyneuropathy associated with type 2 diabetes mellitus (HCC)    Weight loss counseling, encounter for    Atherosclerosis 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 Bowel rest, monitor nausea and start IV zofran, if nausea worsens or vomits will need NG decompression  2. Monitor bowel function, passed stool earlier but was with strain, will need bowel regimen once diet resumed  3. If does not improve, will need additional imaging with CT  4. Defer management of remainder of medical comorbidities to primary and consulting teams    This plan was discussed at length with the patient. She was understanding and in agreement with the plan  Thank you for the consult and allowing me to participate in the care of this patient. I look forward to following her this admission    Guevara Aguilar MD, FACS  6/30/2021  8:57 PM

## 2021-07-01 NOTE — PROGRESS NOTES
Physical Therapy  Pt in bed on arrival.  Pt refusing PT session X 2 trials due to being tired and not feeling well. Per MD, pt now on hold and going to acute side of hospital due to change in medical status.   Ruth Burgess, DPT [id number]

## 2021-07-01 NOTE — PROGRESS NOTES
[facility]        Progress Note    Patient Name: Una Aguilar  MRN: [id number]  YOB: 1975  Date of Evaluation: 2021    Chief Complaint: Abdominal pain    Subjective:  No acute events overnight  Pain nearly resolved, feeling much better  No nausea or vomiting  Passing flatus, no BM  Resting in bed at this time      Vital Signs:  Patient Vitals for the past 24 hrs:   BP Temp Temp src Pulse Resp SpO2 Weight   21 0800 (!) 163/63 98.2 °F (36.8 °C) -- 75 16 99 % --   21 0544 -- -- -- -- -- -- 217 lb 6 oz (98.6 kg)   21 0537 (!) 154/108 -- -- -- -- -- --   21 (!) 170/99 -- -- -- -- 100 % --   21 (!) 183/101 98.3 °F (36.8 °C) Oral 95 16 95 % --   21 1815 (!) 181/76 -- -- -- -- -- --   21 1540 (!) 194/76 -- -- -- -- -- --   21 1209 (!) 164/74 98.5 °F (36.9 °C) Oral 90 18 94 % --      TEMPERATURE HISTORY 24H: Temp (24hrs), Av.3 °F (36.8 °C), Min:98.2 °F (36.8 °C), Max:98.5 °F (36.9 °C)    BLOOD PRESSURE HISTORY: Systolic (66JFQ), KO , Min:119 , NOD:689    Diastolic (00MKE), XPY:16, Min:63, Max:108      Intake/Output:  No intake/output data recorded. No intake/output data recorded.   Drain/tube Output:       Physical Exam:  General: awake, alert, oriented to  person, place, time  Cardiovascular:  regular rate and rhythm and no murmur noted  Lungs: clear to auscultation  Abdomen: soft, nontender, nondistended, nowel sounds normal     Labs:  CBC:    Recent Labs     21  0547 21  0702 21  0549   WBC 13.0* 14.2* 18.8*   HGB 7.3* 8.2* 8.0*   HCT 23.2* 26.0* 25.1*    264 282     BMP:    Recent Labs     21  0547 21  0702 21  0549   * 128* 127*   K 4.2 4.1 5.2*   CL 88* 86* 85*   CO2 26 24 23   BUN 65* 57* 66*   CREATININE 9.2* 8.8* 10.0*   GLUCOSE 120* 97 117*     Hepatic:    Recent Labs     21  0549   AST 7*   ALT <5*   BILITOT 0.4   ALKPHOS 186* Amylase:  No results found for: AMYLASE  Lipase:    Lab Results   Component Value Date    LIPASE 11.0 07/01/2021      Mag:    Lab Results   Component Value Date    MG 1.80 07/01/2021    MG 2.50 06/15/2021     Phos:     Lab Results   Component Value Date    PHOS 7.8 07/01/2021    PHOS 4.5 06/14/2021      Coags:   Lab Results   Component Value Date    PROTIME 15.9 07/01/2021    INR 1.39 07/01/2021    APTT 33.8 07/01/2021       Cultures:  Anaerobic culture  No results found for: LABANAE  Fungus stain  No results found for requested labs within last 30 days. Gram stain  Results in Past 30 Days  Result Component Current Result Ref Range Previous Result Ref Range   Gram Stain Result 1+ WBC's (Polymorphonuclear)  No organisms seen   (6/27/2021)  Not in Time Range      Organism  Lab Results   Component Value Date/Time    ORG Gram negative taryn (A) 06/06/2021 05:00 PM     Surgical culture  No results found for: CXSURG  Blood culture 1  No results found for requested labs within last 30 days. Blood culture 2  Results in Past 30 Days  Result Component Current Result Ref Range Previous Result Ref Range   Culture, Blood 2 No Growth to date. Any change in status will be called. (6/27/2021)  Not in Time Range      Fecal occult  No results found for requested labs within last 30 days. GI bacterial pathogens by PCR  No results found for requested labs within last 30 days. C. difficile  No results found for requested labs within last 30 days. Urine culture  Lab Results   Component Value Date    LABURIN <10,000 CFU/ml  No further workup   06/06/2021       Pathology:  No relevant pathology     Imaging:  I have personally reviewed the following films:    XR ABDOMEN (KUB) (SINGLE AP VIEW)    Result Date: 6/30/2021  EXAMINATION: ONE SUPINE XRAY VIEW(S) OF THE ABDOMEN 6/30/2021 2:03 pm COMPARISON: Prior study(s) most recent 06/13/2021.  HISTORY: ORDERING SYSTEM PROVIDED HISTORY: n/v and LLQ pain, r/o ileus TECHNOLOGIST PROVIDED HISTORY: Reason for exam:->n/v and LLQ pain, r/o ileus Reason for Exam: n/v and LLQ pain, r/o ileus Acuity: Acute Type of Exam: Initial FINDINGS: There is some gas and  stool within the colon . Small bowel demonstrates localized gas-filled loop to the right of midline. Catheter device overlies the lower abdomen and pelvis. This represents a peritoneal dialysis catheter. Arterial vascular calcifications are present. .  Included lungs are unremarkable. There is no obstruction or free air. Localized right mid small bowel gaseous distention possible mild small bowel ileus. The remainder the study is unremarkable. XR CHEST PORTABLE    Result Date: 6/30/2021  EXAMINATION: ONE XRAY VIEW OF THE CHEST 6/30/2021 5:01 pm COMPARISON: 06/22/2021 HISTORY: ORDERING SYSTEM PROVIDED HISTORY: increased wbc with wheezing TECHNOLOGIST PROVIDED HISTORY: Reason for exam:->increased wbc with wheezing Reason for Exam: increased wbc with wheezing Acuity: Chronic Type of Exam: Ongoing FINDINGS: The heart is mildly enlarged less prominent. The pulmonary vessels are less prominent. There are hazy bibasilar opacities which are less prominent. There is a small right pleural effusion which is decreased. Slight decrease in the heart size with resolving central pulmonary congestion. Slowly resolving bibasilar opacities. Small right pleural effusion which is less prominent.      Scheduled Meds:   vancomycin  1,000 mg Intravenous Once    cefepime  1,000 mg Intravenous Once    polyethylene glycol  17 g Oral Daily    [Held by provider] insulin detemir  8 Units Subcutaneous QAM    [Held by provider] insulin detemir  6 Units Subcutaneous Nightly    hydrALAZINE  25 mg Oral 3 times per day    torsemide  100 mg Oral Daily    gentamicin   Topical Daily    insulin aspart  0-18 Units Subcutaneous TID WC    insulin aspart  0-9 Units Subcutaneous Nightly    heparin (porcine)  5,000 Units Subcutaneous 3 times per will need CT with PO contrast  4. Defer management of remainder of medical comorbidities to primary and consulting teams    Guevara Horton MD, FACS  7/1/2021  2:38 PM

## 2021-07-01 NOTE — CONSULTS
Pharmacy Vancomycin Consult     iLz Redding is a 51 y.o. female is receiving vancomycin for sepsis and gangrene of left foot     Assessment/Plan:  Patient transferred from ARU to [facility]    Today, the patient received in ARU:  Vancomycin 1000mg IVPB @ 1312  7/1/21 in ARU  Cefepime 1000mg IVPB @ 1153  7/1/21 in ARU    Will redose Vancomycin based on random level on 7/2/21 am.   Cefepime renally dosed to cefepime 1000mg IVPB every 24 hours.      Per pharmacy consult,  Marni Thayer PharmD

## 2021-07-01 NOTE — FLOWSHEET NOTE
07/01/21 1537   Encounter Summary   Services provided to: Patient   Referral/Consult From: Nurse   Support System Children;Family members   Place of Sabianist Non-Jainism   Continue Visiting   (Support offered, presented as quiet/withdrawn. GB 7/1)   Complexity of Encounter Moderate   Length of Encounter 15 minutes   Spiritual/Episcopal   Type Spiritual support   Assessment Passive; Loneliness   Intervention Active listening;Explored feelings, thoughts, concerns;Explored coping resources;Sustaining presence/ Ministry of presence; Discussed belief system/Adventism practices/fredrick;Discussed illness/injury and it's impact   Outcome Engaged in conversation;Expressed feelings/needs/concerns;Coping;Receptive       Should any needs arise, please contact spiritual care services for follow-up.      Electronically signed by Gerard Zamora on 7/1/2021 at 3:39 PM

## 2021-07-01 NOTE — PROGRESS NOTES
Occupational Therapy  Dick Hernandez     Pt not seen for 2nd OT session: pt placed on medical hold per MD d/t change in medical status with plans for pt to acute off floor.      Amol Manriquez, OTR/L #[id number]

## 2021-07-02 ENCOUNTER — APPOINTMENT (OUTPATIENT)
Dept: CT IMAGING | Age: 46
DRG: 919 | End: 2021-07-02
Attending: INTERNAL MEDICINE
Payer: MEDICARE

## 2021-07-02 LAB
ALBUMIN SERPL-MCNC: 1.7 G/DL (ref 3.4–5)
ALP BLD-CCNC: 183 U/L (ref 40–129)
ALT SERPL-CCNC: <5 U/L (ref 10–40)
ANION GAP SERPL CALCULATED.3IONS-SCNC: 18 MMOL/L (ref 3–16)
ANISOCYTOSIS: ABNORMAL
AST SERPL-CCNC: 7 U/L (ref 15–37)
BANDED NEUTROPHILS RELATIVE PERCENT: 16 % (ref 0–7)
BASOPHILS ABSOLUTE: 0 K/UL (ref 0–0.2)
BASOPHILS RELATIVE PERCENT: 0 %
BILIRUB SERPL-MCNC: 0.3 MG/DL (ref 0–1)
BILIRUBIN DIRECT: <0.2 MG/DL (ref 0–0.3)
BILIRUBIN, INDIRECT: ABNORMAL MG/DL (ref 0–1)
BUN BLDV-MCNC: 73 MG/DL (ref 7–20)
CALCIUM SERPL-MCNC: 7.8 MG/DL (ref 8.3–10.6)
CHLORIDE BLD-SCNC: 84 MMOL/L (ref 99–110)
CO2: 23 MMOL/L (ref 21–32)
CREAT SERPL-MCNC: 9.9 MG/DL (ref 0.6–1.1)
EOSINOPHILS ABSOLUTE: 0 K/UL (ref 0–0.6)
EOSINOPHILS RELATIVE PERCENT: 0 %
GFR AFRICAN AMERICAN: 5
GFR NON-AFRICAN AMERICAN: 4
GLUCOSE BLD-MCNC: 128 MG/DL (ref 70–99)
GLUCOSE BLD-MCNC: 141 MG/DL (ref 70–99)
GLUCOSE BLD-MCNC: 189 MG/DL (ref 70–99)
GLUCOSE BLD-MCNC: 275 MG/DL (ref 70–99)
GLUCOSE BLD-MCNC: 66 MG/DL (ref 70–99)
GLUCOSE BLD-MCNC: 67 MG/DL (ref 70–99)
GLUCOSE BLD-MCNC: 69 MG/DL (ref 70–99)
GLUCOSE BLD-MCNC: 71 MG/DL (ref 70–99)
GLUCOSE BLD-MCNC: 93 MG/DL (ref 70–99)
HCT VFR BLD CALC: 25.9 % (ref 36–48)
HEMOGLOBIN: 8.4 G/DL (ref 12–16)
HEPATITIS B SURFACE ANTIGEN INTERPRETATION: NORMAL
HYPOCHROMIA: ABNORMAL
LYMPHOCYTES ABSOLUTE: 0.9 K/UL (ref 1–5.1)
LYMPHOCYTES RELATIVE PERCENT: 8 %
MAGNESIUM: 1.8 MG/DL (ref 1.8–2.4)
MCH RBC QN AUTO: 29.6 PG (ref 26–34)
MCHC RBC AUTO-ENTMCNC: 32.3 G/DL (ref 31–36)
MCV RBC AUTO: 91.6 FL (ref 80–100)
METAMYELOCYTES RELATIVE PERCENT: 1 %
MONOCYTES ABSOLUTE: 0.5 K/UL (ref 0–1.3)
MONOCYTES RELATIVE PERCENT: 5 %
NEUTROPHILS ABSOLUTE: 9.3 K/UL (ref 1.7–7.7)
NEUTROPHILS RELATIVE PERCENT: 70 %
OVALOCYTES: ABNORMAL
PDW BLD-RTO: 19.7 % (ref 12.4–15.4)
PERFORMED ON: ABNORMAL
PERFORMED ON: NORMAL
PERFORMED ON: NORMAL
PHOSPHORUS: 6.8 MG/DL (ref 2.5–4.9)
PLATELET # BLD: 322 K/UL (ref 135–450)
PLATELET SLIDE REVIEW: ADEQUATE
PMV BLD AUTO: 8.9 FL (ref 5–10.5)
POTASSIUM SERPL-SCNC: 4.7 MMOL/L (ref 3.5–5.1)
RBC # BLD: 2.83 M/UL (ref 4–5.2)
SLIDE REVIEW: ABNORMAL
SODIUM BLD-SCNC: 125 MMOL/L (ref 136–145)
TOTAL PROTEIN: 6.4 G/DL (ref 6.4–8.2)
TOXIC GRANULATION: PRESENT
TRANSFERRIN: 73 MG/DL (ref 200–360)
VANCOMYCIN RANDOM: 16.1 UG/ML
WBC # BLD: 10.7 K/UL (ref 4–11)

## 2021-07-02 PROCEDURE — 74176 CT ABD & PELVIS W/O CONTRAST: CPT

## 2021-07-02 PROCEDURE — 2500000003 HC RX 250 WO HCPCS: Performed by: INTERNAL MEDICINE

## 2021-07-02 PROCEDURE — 99233 SBSQ HOSP IP/OBS HIGH 50: CPT | Performed by: INTERNAL MEDICINE

## 2021-07-02 PROCEDURE — 94760 N-INVAS EAR/PLS OXIMETRY 1: CPT

## 2021-07-02 PROCEDURE — 36415 COLL VENOUS BLD VENIPUNCTURE: CPT

## 2021-07-02 PROCEDURE — 6370000000 HC RX 637 (ALT 250 FOR IP): Performed by: INTERNAL MEDICINE

## 2021-07-02 PROCEDURE — 80076 HEPATIC FUNCTION PANEL: CPT

## 2021-07-02 PROCEDURE — 90945 DIALYSIS ONE EVALUATION: CPT

## 2021-07-02 PROCEDURE — 2580000003 HC RX 258: Performed by: INTERNAL MEDICINE

## 2021-07-02 PROCEDURE — 83735 ASSAY OF MAGNESIUM: CPT

## 2021-07-02 PROCEDURE — 6360000004 HC RX CONTRAST MEDICATION: Performed by: SURGERY

## 2021-07-02 PROCEDURE — 2060000000 HC ICU INTERMEDIATE R&B

## 2021-07-02 PROCEDURE — 85025 COMPLETE CBC W/AUTO DIFF WBC: CPT

## 2021-07-02 PROCEDURE — 2700000000 HC OXYGEN THERAPY PER DAY

## 2021-07-02 PROCEDURE — 80202 ASSAY OF VANCOMYCIN: CPT

## 2021-07-02 PROCEDURE — 80048 BASIC METABOLIC PNL TOTAL CA: CPT

## 2021-07-02 PROCEDURE — 6360000002 HC RX W HCPCS: Performed by: NURSE PRACTITIONER

## 2021-07-02 PROCEDURE — 6360000002 HC RX W HCPCS: Performed by: INTERNAL MEDICINE

## 2021-07-02 PROCEDURE — APPNB30 APP NON BILLABLE TIME 0-30 MINS: Performed by: NURSE PRACTITIONER

## 2021-07-02 PROCEDURE — 99231 SBSQ HOSP IP/OBS SF/LOW 25: CPT | Performed by: SURGERY

## 2021-07-02 PROCEDURE — P9047 ALBUMIN (HUMAN), 25%, 50ML: HCPCS | Performed by: INTERNAL MEDICINE

## 2021-07-02 PROCEDURE — 84100 ASSAY OF PHOSPHORUS: CPT

## 2021-07-02 PROCEDURE — APPSS15 APP SPLIT SHARED TIME 0-15 MINUTES: Performed by: NURSE PRACTITIONER

## 2021-07-02 RX ORDER — DEXTROSE MONOHYDRATE 25 G/50ML
12.5 INJECTION, SOLUTION INTRAVENOUS PRN
Status: DISCONTINUED | OUTPATIENT
Start: 2021-07-02 | End: 2021-07-14 | Stop reason: HOSPADM

## 2021-07-02 RX ORDER — METOCLOPRAMIDE HYDROCHLORIDE 5 MG/ML
5 INJECTION INTRAMUSCULAR; INTRAVENOUS EVERY 6 HOURS
Status: DISCONTINUED | OUTPATIENT
Start: 2021-07-02 | End: 2021-07-14 | Stop reason: HOSPADM

## 2021-07-02 RX ORDER — ALBUMIN (HUMAN) 12.5 G/50ML
25 SOLUTION INTRAVENOUS EVERY 6 HOURS
Status: COMPLETED | OUTPATIENT
Start: 2021-07-02 | End: 2021-07-02

## 2021-07-02 RX ORDER — METOCLOPRAMIDE HYDROCHLORIDE 5 MG/ML
3.3 INJECTION INTRAMUSCULAR; INTRAVENOUS EVERY 6 HOURS
Status: DISCONTINUED | OUTPATIENT
Start: 2021-07-02 | End: 2021-07-02

## 2021-07-02 RX ADMIN — SODIUM BICARBONATE: 84 INJECTION, SOLUTION INTRAVENOUS at 14:23

## 2021-07-02 RX ADMIN — ALBUMIN (HUMAN) 25 G: 0.25 INJECTION, SOLUTION INTRAVENOUS at 21:17

## 2021-07-02 RX ADMIN — ALBUMIN (HUMAN) 25 G: 0.25 INJECTION, SOLUTION INTRAVENOUS at 13:03

## 2021-07-02 RX ADMIN — METOCLOPRAMIDE 5 MG: 5 INJECTION, SOLUTION INTRAMUSCULAR; INTRAVENOUS at 21:25

## 2021-07-02 RX ADMIN — HYDRALAZINE HYDROCHLORIDE 25 MG: 25 TABLET, FILM COATED ORAL at 05:50

## 2021-07-02 RX ADMIN — ATORVASTATIN CALCIUM 20 MG: 20 TABLET, FILM COATED ORAL at 21:26

## 2021-07-02 RX ADMIN — METOPROLOL TARTRATE 25 MG: 25 TABLET, FILM COATED ORAL at 21:26

## 2021-07-02 RX ADMIN — ONDANSETRON 4 MG: 2 INJECTION INTRAMUSCULAR; INTRAVENOUS at 07:07

## 2021-07-02 RX ADMIN — CEFEPIME HYDROCHLORIDE 1000 MG: 1 INJECTION, POWDER, FOR SOLUTION INTRAMUSCULAR; INTRAVENOUS at 14:39

## 2021-07-02 RX ADMIN — DEXTROSE MONOHYDRATE 12.5 G: 25 INJECTION, SOLUTION INTRAVENOUS at 18:05

## 2021-07-02 RX ADMIN — ONDANSETRON 4 MG: 2 INJECTION INTRAMUSCULAR; INTRAVENOUS at 12:55

## 2021-07-02 RX ADMIN — PANTOPRAZOLE SODIUM 40 MG: 40 TABLET, DELAYED RELEASE ORAL at 05:50

## 2021-07-02 RX ADMIN — STANDARDIZED SENNA CONCENTRATE AND DOCUSATE SODIUM 1 TABLET: 8.6; 5 TABLET ORAL at 21:26

## 2021-07-02 RX ADMIN — HEPARIN SODIUM 5000 UNITS: 5000 INJECTION INTRAVENOUS; SUBCUTANEOUS at 21:29

## 2021-07-02 RX ADMIN — IOHEXOL 50 ML: 240 INJECTION, SOLUTION INTRATHECAL; INTRAVASCULAR; INTRAVENOUS; ORAL at 10:45

## 2021-07-02 RX ADMIN — TRAMADOL HYDROCHLORIDE 100 MG: 50 TABLET, FILM COATED ORAL at 04:33

## 2021-07-02 RX ADMIN — HEPARIN SODIUM 5000 UNITS: 5000 INJECTION INTRAVENOUS; SUBCUTANEOUS at 05:50

## 2021-07-02 RX ADMIN — HEPARIN SODIUM 5000 UNITS: 5000 INJECTION INTRAVENOUS; SUBCUTANEOUS at 13:55

## 2021-07-02 RX ADMIN — METOCLOPRAMIDE 5 MG: 5 INJECTION, SOLUTION INTRAMUSCULAR; INTRAVENOUS at 15:50

## 2021-07-02 RX ADMIN — ZOLPIDEM TARTRATE 5 MG: 5 TABLET, COATED ORAL at 21:26

## 2021-07-02 RX ADMIN — TRAMADOL HYDROCHLORIDE 100 MG: 50 TABLET, FILM COATED ORAL at 21:26

## 2021-07-02 RX ADMIN — ONDANSETRON 4 MG: 2 INJECTION INTRAMUSCULAR; INTRAVENOUS at 01:20

## 2021-07-02 ASSESSMENT — PAIN - FUNCTIONAL ASSESSMENT: PAIN_FUNCTIONAL_ASSESSMENT: PREVENTS OR INTERFERES SOME ACTIVE ACTIVITIES AND ADLS

## 2021-07-02 ASSESSMENT — PAIN DESCRIPTION - LOCATION
LOCATION: ABDOMEN

## 2021-07-02 ASSESSMENT — PAIN SCALES - GENERAL
PAINLEVEL_OUTOF10: 4
PAINLEVEL_OUTOF10: 7
PAINLEVEL_OUTOF10: 7
PAINLEVEL_OUTOF10: 5
PAINLEVEL_OUTOF10: 4
PAINLEVEL_OUTOF10: 0

## 2021-07-02 ASSESSMENT — PAIN DESCRIPTION - ORIENTATION
ORIENTATION: LOWER

## 2021-07-02 ASSESSMENT — PAIN DESCRIPTION - DESCRIPTORS: DESCRIPTORS: SORE

## 2021-07-02 ASSESSMENT — PAIN DESCRIPTION - PAIN TYPE
TYPE: ACUTE PAIN

## 2021-07-02 ASSESSMENT — PAIN DESCRIPTION - ONSET
ONSET: ON-GOING
ONSET: ON-GOING

## 2021-07-02 ASSESSMENT — PAIN DESCRIPTION - PROGRESSION: CLINICAL_PROGRESSION: NOT CHANGED

## 2021-07-02 ASSESSMENT — PAIN DESCRIPTION - FREQUENCY: FREQUENCY: INTERMITTENT

## 2021-07-02 NOTE — PROGRESS NOTES
Disconnected from CCPD per protocol. Effluent: hazy yellow  Total time: 10 hr 07 min   Total UF:  -49 ml. Total Volume:  8033 ml. Dwell time gained:  0 hr 17 min.   Pt Tolerated procedure: c/o nausea; negative UF /68  Report given to: Carmelo Finney RN  Last BM: 6/28/2021 per EPIC

## 2021-07-02 NOTE — PROGRESS NOTES
CCPD Order   Exchanges: 5   Exchange Volume: 1400 ml   Total Time: 10 hrs   Dextrose: 2.5%   Last Fill: 0 ml   Total Volume: 7000 ml     Orders verified. Supplies taken to pt's room. Report received. Cycler set up, primed and pre tested. Dressing changed on Tenckhoff Cath site. , gentamycin cream to site as ordered; exit site w/o s/s of infection, surrounding site with significant bruising.  Will return later to initiate CCPD

## 2021-07-02 NOTE — CARE COORDINATION
Discharge Planning Assessment  Readmission risk score 40%  RN discharge planner met with patient/ (and family member) to discuss reason for admission, current living situation, and potential needs at the time of discharge    Demographics/Insurance verified Yes    Current type of dwelling: house--patient lives in [place] alone, she plans to discharge to her son, Giorgi Diane [phone] when she is medically stable    Patient from ECF/SW confirmed with: n/a    Living arrangements: alone    Level of function/Support: previously independent, used home PD [facility]    PCP: Dao Alcaraz    Last Visit to PCP:6 months    DME: home PD supplies    Active with any community resources/agencies/skilled home care: patient plans on using [address] [facility]) when she is ready for discharge    Medication compliance issues: not noted    Financial issues that could impact healthcare: not noted    Tentative discharge plan: home to anjelica C[place] residence: [address] [facility] [facility], this is a single level residence with 1-2 steps to enter Phone [phone]    Discussed and provided facilities of choice if transition to a skilled nursing facility is required at the time of discharge      Discussed with patient and/or family that on the day of discharge home tentative time of discharge will be between 10 AM and noon.     Transportation at the time of discharge: anjelica      Selinda Cockayne, BSN, CCM, RN  [facility]  [phone]

## 2021-07-02 NOTE — PLAN OF CARE
Problem: Falls - Risk of:  Goal: Will remain free from falls  Description: Will remain free from falls  Outcome: Ongoing  Goal: Absence of physical injury  Description: Absence of physical injury  Outcome: Ongoing     Problem: Skin Integrity:  Goal: Will show no infection signs and symptoms  Description: Will show no infection signs and symptoms  Outcome: Ongoing  Goal: Absence of new skin breakdown  Description: Absence of new skin breakdown  Outcome: Ongoing     Problem: Pain:  Goal: Pain level will decrease  Description: Pain level will decrease  Outcome: Ongoing  Goal: Control of acute pain  Description: Control of acute pain  Outcome: Ongoing  Goal: Control of chronic pain  Description: Control of chronic pain  Outcome: Ongoing   Pt remains free from falls and physical injury. Pt shows no new signs of infection or skin breakdown. Pt pain controlled with current PRN medication. Pt VSS at this time.

## 2021-07-02 NOTE — PROGRESS NOTES
Hospitalist Progress Note      PCP: Deidra Menendez MD    Date of Admission: 7/1/2021    Chief Complaint: Nausea and vomiting    Hospital Course: 51 y.o. female with past medical history of ESRD on PD, CAD status post CABG, diabetes, hypertension, peripheral vascular disease status post partial foot accommodation was initially admitted to [facility] 5/31/2021 for dizziness. At that time cardiac catheterization was performed which showed multivessel disease. Patient undergone CABG. Subsequently patient was discharged to [facility] acute rehab unit where she was undergoing therapy. She was being followed by nephrology in view of her ESRD for PD. She subsequently developed abdominal discomfort nausea and vomiting. Imaging suggestive of ileus versus SBO. Surgery was consulted at ARU who suggested transfer to acute care. Patient states that she was not able to tolerate anything p.o. or since the day before yesterday. Her last bowel movement was approximately that time, no melena or hematochezia, hard. Today her labs demonstrate hyponatremia, hyperkalemia, BUN/creatinine consistent with ESRD patient, and leukocytosis to 18. Nephrology has ordered single dose of vancomycin and cefepime and sent PD fluid for analysis which are noted to be negative. Subjective: Patient seen and examined at bedside. She complains of nausea and dry heaving today.       Medications:  Reviewed    Infusion Medications    IV infusion builder      dextrose       Scheduled Medications    albumin human  25 g Intravenous Q6H    aspirin  325 mg Oral Daily    metoprolol tartrate  25 mg Oral BID    pantoprazole  40 mg Oral Daily    sennosides-docusate sodium  1 tablet Oral BID    gentamicin   Topical Daily    atorvastatin  20 mg Oral Nightly    heparin (porcine)  5,000 Units Subcutaneous 3 times per day    miconazole   Topical BID    polyethylene glycol  17 g Oral Daily    [Held by provider] torsemide  100 mg Oral Daily    vancomycin (VANCOCIN) intermittent dosing (placeholder)   Other RX Placeholder    cefepime  1,000 mg Intravenous Q24H    insulin aspart  0-18 Units Subcutaneous TID     insulin aspart  0-9 Units Subcutaneous Nightly    insulin detemir  20 Units Subcutaneous Nightly     PRN Meds: acetaminophen, magnesium hydroxide, polyethylene glycol, traMADol **OR** traMADol, zolpidem, dextrose, dextrose, glucagon (rDNA), glucose, bisacodyl, calcium carbonate, diphenhydrAMINE, heparin (porcine), hydrALAZINE, ondansetron, ondansetron, promethazine, albuterol      Intake/Output Summary (Last 24 hours) at 7/2/2021 1223  Last data filed at 7/1/2021 1838  Gross per 24 hour   Intake 10 ml   Output 0 ml   Net 10 ml       Physical Exam Performed:    /60   Pulse 87   Temp 97.9 °F (36.6 °C) (Oral)   Resp 16   Ht 5' 9\" (1.753 m)   Wt 216 lb 14.4 oz (98.4 kg)   SpO2 96%   BMI 32.03 kg/m²     General appearance: No apparent distress, appears stated age and cooperative. HEENT: Pupils equal, round, and reactive to light. Conjunctivae/corneas clear. Neck: Supple, with full range of motion. No jugular venous distention. Trachea midline. Respiratory:  Normal respiratory effort. Clear to auscultation, bilaterally without Rales/Wheezes/Rhonchi. Cardiovascular: Regular rate and rhythm with normal S1/S2 without murmurs, rubs or gallops. Abdomen: Soft, non-tender, non-distended with normal bowel sounds. Musculoskeletal: No clubbing, cyanosis or edema bilaterally. Full range of motion without deformity. Skin: Skin color, texture, turgor normal.  No rashes or lesions. Neurologic:  Neurovascularly intact without any focal sensory/motor deficits.  Cranial nerves: II-XII intact, grossly non-focal.  Psychiatric: Alert and oriented, thought content appropriate, normal insight  Peripheral Pulses: +2 palpable, equal bilaterally       Labs:   Recent Labs     06/30/21  0702 07/01/21  0549 07/02/21  0447   WBC 14.2* 18.8* 10. 7   HGB 8.2* 8.0* 8.4*   HCT 26.0* 25.1* 25.9*    282 322     Recent Labs     06/30/21  0702 07/01/21  0549 07/02/21  0447   * 127* 125*   K 4.1 5.2* 4.7   CL 86* 85* 84*   CO2 24 23 23   BUN 57* 66* 73*   CREATININE 8.8* 10.0* 9.9*   CALCIUM 8.4 8.0* 7.8*   PHOS  --  7.8* 6.8*     Recent Labs     07/01/21  0549 07/02/21  0447   AST 7* 7*   ALT <5* <5*   BILIDIR <0.2 <0.2   BILITOT 0.4 0.3   ALKPHOS 186* 183*     Recent Labs     07/01/21  0549   INR 1.39*     No results for input(s): CKTOTAL, TROPONINI in the last 72 hours. Urinalysis:      Lab Results   Component Value Date    NITRU Negative 06/06/2021    WBCUA 36 06/06/2021    BACTERIA RARE 06/06/2021    RBCUA 587 06/06/2021    BLOODU LARGE 06/06/2021    SPECGRAV 1.019 06/06/2021    GLUCOSEU 100 06/06/2021       Radiology:  CT ABDOMEN PELVIS WO CONTRAST Additional Contrast? Oral    (Results Pending)           Assessment/Plan:    Active Hospital Problems    Diagnosis     SBO (small bowel obstruction) ([facility] [K56.609]      SBO versus ileus  General surgery consulted  Abdominal exam benign  Clear liquids as tolerated and monitor bowel function  Hydration  As needed analgesics  CT abdomen pelvis pending     Leukocytosis  Etiology yet to be identified  Prophylactically covered with vancomycin and cefepime  PD fluid analysis not concerning for infection  Follow-up blood culture     CAD  Status post CABG  Stable  On high-dose aspirin  Apparently does not tolerate Plavix     ESRD  On PD  Nephrology following     Mild hyperkalemia  Nephrology to address with dialysis     Mild hyponatremia  As per nephrology     Anion gap metabolic acidosis  Likely in view of uremia  Lactate not elevated     Normocytic anemia  Hemoglobin stable    DVT Prophylaxis: Heparin  Diet: ADULT DIET;  Clear Liquid  Code Status: Full Code      Electronically signed by Telma Huggins MD on 7/2/2021 at 12:23 PM

## 2021-07-02 NOTE — PROGRESS NOTES
Per patient request CCPD initiated at [address] [place] [address]. Pt has no needs at this time. Report to Anjali Fisher RN.

## 2021-07-02 NOTE — DISCHARGE INSTR - COC
Continuity of Care Form    Patient Name: Tamiko Zuleta   :  1975  MRN:  [id number]    Admit date:  2021  Discharge date:  21    Code Status Order: Full Code   Advance Directives:   [facility] Documentation       Date/Time Healthcare Directive Type of Healthcare Directive Copy in [facility] [address] Agent's Name Healthcare Agent's Phone Number    21 1505  No, patient does not have an advance directive for healthcare treatment  --  --  --  --  --            Admitting Physician:  Shira Mcpherson MD  PCP: Natalya Hackett MD    Discharging Nurse: Darron Suazo RN  [address] Unit/Room#: 4OB-9306/7539-87  Discharging Unit Phone Number: [phone]    Emergency Contact:   Extended Emergency Contact Information  Primary Emergency Contact: virgie campos  Home Phone: [phone]  Relation: Child  Secondary Emergency Contact: kathrine Grover  Home Phone: [phone]  Relation: Child  Preferred language: English    Past Surgical History:  Past Surgical History:   Procedure Laterality Date     SECTION      CORONARY ARTERY BYPASS GRAFT N/A 2021    URGENT CABG CORONARY ARTERY BYPASS GRAFTS X3. VEIN TO PDA X1, VEIN TO OM X1, LEFT INTERNAL MAMMARY ARTERY TO LAD X1. ENDOSCOPIC HARVEST RIGHT LEG SAPHENOUS VEIN. LIGATION NYA USING 35MM ATRICLIP. EPI AORTIC ULTRASOUND. TOTAL CARDIOPULMONARY BYPASS. DOPPLER GRAFT FLOW VERIFICATION. BILATERAL INITERCOSTAL NERVE BLOCK USING 1.3% EXPAREL.  performed by Piedad Lewis MD at [place] [address]  2018     lap PD cath placement lt side 62 cm    TOE AMPUTATION Left 2021    AMPUTATION OF THE LEFT THIRD TOE performed by Maicol Desai DPM at [address] [place] [address] [address] TOE AMPUTATION Left 2021    AMPUTATION OF HALLUX AND SECOND TOE, LEFT FOOT performed by Maicol Desai DPM at [facility]         Immunization History:   Immunization History   Administered Date(s) Administered   [facility] Influenza Virus Vaccine 10/23/2014, 12/09/2015    Influenza, Joanna Memory, IM, (6 mo and older Fluzone, Flulaval, Fluarix and 3 yrs and older Afluria) 12/07/2018    Influenza, Quadv, IM, PF (6 mo and older Fluzone, Flulaval, Fluarix, and 3 yrs and older Afluria) 11/08/2016    Pneumococcal Polysaccharide ([id number]) 04/28/2017    Td, unspecified formulation 01/01/1999, 07/01/2007       Active Problems:  Patient Active Problem List   Diagnosis Code    Diabetes mellitus ([facility] E11.9    Obesity E66.9    Microalbuminuria R80.9    Hyperlipidemia with target LDL less than 100 E78.5    Hypertension I10    Type 2 diabetes mellitus with obesity (HCC) E11.69, E66.9    Acute renal failure (ARF) ([facility]) N17.9    ESRD on peritoneal dialysis ([facility] N18.6, Z99.2    Non-smoker Z78.9    Elevated C-reactive protein (CRP) R79.82    Elevated sed rate R70.0    Diabetic polyneuropathy associated with type 2 diabetes mellitus ([facility] E11.42    Diabetes education, encounter for Z71.89    Atherosclerosis of native arteries of left leg with ulceration of other part of foot ([facility] I70.245    Near syncope R55    Coronary artery disease due to lipid rich plaque I25.10, I25.83    Mild malnutrition (HCC) E44.1    S/P coronary artery bypass graft x 3 Z95.1    S/P left atrial appendage ligation Z98.890    Generalized weakness R53.1    Functional dysphonia F44.4    SBO (small bowel obstruction) ([facility] K56.609    Dialysis-associated peritonitis (HCC) T85.71XA    Peripheral vascular disease (HCC) I73.9    Moderate malnutrition (HCC) E44.0    Anemia of chronic disease D63.8    Hypoalbuminemia E88.09       Isolation/Infection:   Isolation            No Isolation          Patient Infection Status       Infection Onset Added Last Indicated Last Indicated By Review Planned Expiration Resolved Resolved By    None active    Resolved    COVID-19 Rule Out 02/26/21 02/26/21 02/26/21 COVID-19, Rapid (Ordered)   02/26/21 Rule-Out Test Resulted Wound Etiology Other 07/13/21 1515   Dressing/Treatment Triad hydro/zinc oxide-based hydrophilic paste 62/88/71 6903   Wound Length (cm) 3 cm 07/13/21 1515   Wound Width (cm) 1 cm 07/13/21 1515   Wound Surface Area (cm^2) 3 cm^2 07/13/21 1515   Wound Assessment Slough 07/13/21 1515   Jazmyn-wound Assessment Non-blanchable erythema 07/13/21 1515   Number of days:        Wound Abdomen Lower;Right lower right pannus MASD (Active)   Wound Image   07/13/21 1515   Wound Etiology Other 07/13/21 1515   Dressing/Treatment Triad hydro/zinc oxide-based hydrophilic paste 98/72/55 3669   Wound Assessment Slough 07/13/21 1515   Jazmyn-wound Assessment Non-blanchable erythema 07/13/21 1515   Number of days:         Elimination:  Continence:   · Bowel: No  · Bladder: No  Urinary Catheter: None   Colostomy/Ileostomy/Ileal Conduit: No       Date of Last BM: 7-14-21    Intake/Output Summary (Last 24 hours) at 7/13/2021 1608  Last data filed at 7/13/2021 0930  Gross per 24 hour   Intake 736.17 ml   Output 1920 ml   Net -1183.83 ml     I/O last 3 completed shifts: In: 736.2 [P.O.:50; NG/GT:447; IV Piggyback:239.2]  Out: 1920     Safety Concerns: At Risk for Falls    Impairments/Disabilities:      Amputation - 3 toes amputated 1-3 on left foot. Nutrition Therapy:  Current Nutrition Therapy:   - Oral Diet:  Carb Control 4 carbs/meal (1800kcals/day)  - Oral Nutrition Supplement:  Diabetic  three times a day    Routes of Feeding: Oral  Liquids: No Restrictions  Daily Fluid Restriction: yes - amount 2000  Last Modified Barium Swallow with Video (Video Swallowing Test): not done    Treatments at the Time of Hospital Discharge:   Respiratory Treatments: None  Oxygen Therapy:  is on oxygen at 2 L/min per nasal cannula.   Ventilator:    - No ventilator support    Rehab Therapies: Physical Therapy, Occupational Therapy and Orthotics/Prosthetics  Weight Bearing Status/Restrictions: No weight bearing restirctions  Other Medical Equipment (for information only, NOT a DME order):  bath bench, bedside commode and hospital bed  Other Treatments: None    Patient's personal belongings (please select all that are sent with patient):  Prem Valentine    RN SIGNATURE:  Electronically signed by Jeovanny Fink RN on 7/14/21 at 5:39 PM EDT    CASE MANAGEMENT/SOCIAL WORK SECTION    Inpatient Status Date: ***    Readmission Risk Assessment Score:  Readmission Risk              Risk of Unplanned Readmission:  52       Justice's address: [address] [place] [address]                           Gian Acharya 3  Phone     Discharging to Facility/ Referred to   Call-  [facility] [place], [phone], or [phone]  at the  of probable discharge tomorrow.  Name: Gabrielle Cummings:     [facility] ADDRESS:   [address] [place] [address]    PHONE:        [phone]  · FAX:              [phone]  ·     Dialysis Facility (if applicable)   · Name:  [facility]/ Dr Hillary Harper  · Address:    / signature: Electronically signed by NEIL Hernandez on 7/9/21 at 12:59 PM EDT    PHYSICIAN SECTION    Prognosis: Fair    Condition at Discharge: Stable    Rehab Potential (if transferring to Rehab): Fair    Recommended Labs or Other Treatments After Discharge: daily peritoneal cell counts, PD dialysis, follow up with Nephrology, PT/OT    Physician Certification: I certify the above information and transfer of Chantell Jimenes  is necessary for the continuing treatment of the diagnosis listed and that she requires [facility] for greater 30 days.      Update Admission H&P: No change in H&P    PHYSICIAN SIGNATURE:  Electronically signed by Prabhakar Vance MD on 7/14/21 at 1:57 PM EDT

## 2021-07-02 NOTE — PLAN OF CARE
Problem: Falls - Risk of:  Goal: Will remain free from falls  Description: Will remain free from falls  Outcome: Ongoing  Note: All fall precautions in place, bed in lowest position, frequent rounding to assist with toileting. Pt absent of fall this shift.     Goal: Absence of physical injury  Description: Absence of physical injury  Outcome: Ongoing     Problem: Skin Integrity:  Goal: Will show no infection signs and symptoms  Description: Will show no infection signs and symptoms  Outcome: Ongoing  Goal: Absence of new skin breakdown  Description: Absence of new skin breakdown  Outcome: Ongoing     Problem: Pain:  Goal: Pain level will decrease  Description: Pain level will decrease  Outcome: Ongoing  Goal: Control of acute pain  Description: Control of acute pain  Outcome: Ongoing  Note: Pt complains of pain in lower abdomen, controlled by medications indicated on MAR  Goal: Control of chronic pain  Description: Control of chronic pain  Outcome: Ongoing

## 2021-07-02 NOTE — PROGRESS NOTES
[facility]ar[facility]        Progress Note    Patient Name: Eleno Tavarez  MRN: [id number]  YOB: 1975  Date of Evaluation: 2021    Chief Complaint: Abdominal pain    Subjective:  Transferred to [facility] [place] from [facility] yesterday for further management of acute issues  Reports lower abdominal pain last night but is better this morning  Having nausea and reports dry heaves  No stool  Resting in bed at this time      Vital Signs:  Patient Vitals for the past 24 hrs:   BP Temp Temp src Pulse Resp SpO2 Height Weight   21 0915 (!) 102/43 98 °F (36.7 °C) Oral 76 16 99 % -- --   21 0803 -- -- -- -- -- -- -- 216 lb 14.4 oz (98.4 kg)   21 0423 (!) 111/53 97.9 °F (36.6 °C) Oral 87 18 96 % -- --   21 2115 (!) 104/51 97.9 °F (36.6 °C) Oral 88 18 99 % -- --   21 1511 -- -- -- -- -- -- 5' 9\" (1.753 m) 217 lb (98.4 kg)   21 1449 (!) 166/88 97.9 °F (36.6 °C) Oral 84 18 100 % -- --      TEMPERATURE HISTORY 24H: Temp (24hrs), Av °F (36.7 °C), Min:97.9 °F (36.6 °C), Max:98.3 °F (36.8 °C)    BLOOD PRESSURE HISTORY: Systolic (18SZE), ELEONORA:462 , Min:102 , LAB:093    Diastolic (95XAG), AHM:45, Min:43, Max:108      Intake/Output:  I/O last 3 completed shifts: In: 10 [I.V.:10]  Out: 0   No intake/output data recorded.   Drain/tube Output:       Physical Exam:  General: awake, alert, oriented to  person, place, time  Cardiovascular:  regular rate and rhythm and no murmur noted  Lungs: clear to auscultation  Abdomen: soft, minimal lower abdominal tenderness, nondistended, nowel sounds normal     Labs:  CBC:    Recent Labs     21  0702 21  0549 21  0447   WBC 14.2* 18.8* 10.7   HGB 8.2* 8.0* 8.4*   HCT 26.0* 25.1* 25.9*    282 322     BMP:    Recent Labs     21  0702 21  0549 21  0447   * 127* 125*   K 4.1 5.2* 4.7   CL 86* 85* 84*   CO2 24 23 23   BUN 57* 66* 73*   CREATININE 8.8* 10.0* 9.9*   GLUCOSE 97 117* 275* Hepatic:    Recent Labs     07/01/21  0549 07/02/21  0447   AST 7* 7*   ALT <5* <5*   BILITOT 0.4 0.3   ALKPHOS 186* 183*     Amylase:  No results found for: AMYLASE  Lipase:    Lab Results   Component Value Date    LIPASE 11.0 07/01/2021      Mag:    Lab Results   Component Value Date    MG 1.80 07/02/2021    MG 1.80 07/01/2021     Phos:     Lab Results   Component Value Date    PHOS 6.8 07/02/2021    PHOS 7.8 07/01/2021      Coags:   Lab Results   Component Value Date    PROTIME 15.9 07/01/2021    INR 1.39 07/01/2021    APTT 33.8 07/01/2021       Cultures:  Anaerobic culture  No results found for: LABANAE  Fungus stain  No results found for requested labs within last 30 days. Gram stain  Results in Past 30 Days  Result Component Current Result Ref Range Previous Result Ref Range   Gram Stain Result 1+ WBC's (Polymorphonuclear)  No organisms seen   (6/27/2021)  Not in Time Range      Organism  Lab Results   Component Value Date/Time    ORG Gram negative taryn (A) 06/06/2021 05:00 PM     Surgical culture  No results found for: CXSURG  Blood culture 1  No results found for requested labs within last 30 days. Blood culture 2  Results in Past 30 Days  Result Component Current Result Ref Range Previous Result Ref Range   Culture, Blood 2 No Growth after 4 days of incubation. (6/27/2021)  Not in Time Range      Fecal occult  No results found for requested labs within last 30 days. GI bacterial pathogens by PCR  No results found for requested labs within last 30 days. C. difficile  No results found for requested labs within last 30 days.      Urine culture  Lab Results   Component Value Date    LABURIN <10,000 CFU/ml  No further workup   06/06/2021       Pathology:  No relevant pathology     Imaging:  I have personally reviewed the following films:    XR ABDOMEN (KUB) (SINGLE AP VIEW)    Result Date: 6/30/2021  EXAMINATION: ONE SUPINE XRAY VIEW(S) OF THE ABDOMEN 6/30/2021 2:03 pm COMPARISON: Prior study(s) most recent 06/13/2021. HISTORY: ORDERING SYSTEM PROVIDED HISTORY: n/v and LLQ pain, r/o ileus TECHNOLOGIST PROVIDED HISTORY: Reason for exam:->n/v and LLQ pain, r/o ileus Reason for Exam: n/v and LLQ pain, r/o ileus Acuity: Acute Type of Exam: Initial FINDINGS: There is some gas and  stool within the colon . Small bowel demonstrates localized gas-filled loop to the right of midline. Catheter device overlies the lower abdomen and pelvis. This represents a peritoneal dialysis catheter. Arterial vascular calcifications are present. .  Included lungs are unremarkable. There is no obstruction or free air. Localized right mid small bowel gaseous distention possible mild small bowel ileus. The remainder the study is unremarkable. XR CHEST PORTABLE    Result Date: 6/30/2021  EXAMINATION: ONE XRAY VIEW OF THE CHEST 6/30/2021 5:01 pm COMPARISON: 06/22/2021 HISTORY: ORDERING SYSTEM PROVIDED HISTORY: increased wbc with wheezing TECHNOLOGIST PROVIDED HISTORY: Reason for exam:->increased wbc with wheezing Reason for Exam: increased wbc with wheezing Acuity: Chronic Type of Exam: Ongoing FINDINGS: The heart is mildly enlarged less prominent. The pulmonary vessels are less prominent. There are hazy bibasilar opacities which are less prominent. There is a small right pleural effusion which is decreased. Slight decrease in the heart size with resolving central pulmonary congestion. Slowly resolving bibasilar opacities. Small right pleural effusion which is less prominent.      Scheduled Meds:   albumin human  25 g Intravenous Q6H    aspirin  325 mg Oral Daily    metoprolol tartrate  25 mg Oral BID    pantoprazole  40 mg Oral Daily    sennosides-docusate sodium  1 tablet Oral BID    gentamicin   Topical Daily    atorvastatin  20 mg Oral Nightly    heparin (porcine)  5,000 Units Subcutaneous 3 times per day    miconazole   Topical BID    polyethylene glycol  17 g Oral Daily    [Held by provider] torsemide  100 mg Oral Daily    vancomycin (VANCOCIN) intermittent dosing (placeholder)   Other RX Placeholder    cefepime  1,000 mg Intravenous Q24H    insulin aspart  0-18 Units Subcutaneous TID WC    insulin aspart  0-9 Units Subcutaneous Nightly    insulin detemir  20 Units Subcutaneous Nightly     Continuous Infusions:   IV infusion builder      dextrose       PRN Meds:.acetaminophen, magnesium hydroxide, polyethylene glycol, traMADol **OR** traMADol, zolpidem, dextrose, dextrose, glucagon (rDNA), glucose, bisacodyl, calcium carbonate, diphenhydrAMINE, heparin (porcine), hydrALAZINE, ondansetron, ondansetron, promethazine, albuterol      Assessment:  Ileus verses small bowel obstruction  Diabetes with possible gastroparesis  ESRD on PD       Plan:  1. Pain returned last night with nausea/dry heaves, better at present, no stool; CT abdomen/pelvis with PO contrast today to further evaluate  2. Hold at clear liquid diet as tolerated; monitor bowel function  3. IV hydration and electrolyte correction per Nephrology  4. Activity as tolerated, ambulate TID, up to chair for all meals  5. PRN analgesics and antiemetics--minimizing narcotics as tolerated  6. Management of medical comorbid etiologies per primary team and consulting services    EDUCATION:  Educated patient on plan of care and disease process--all questions answered. Plans discussed with patient and nursing. Reviewed and discussed with Dr. Deshawn Johnson. Signed:  RICHARD Plascencia - CNP  7/2/2021 11:59 AM    I have personally performed a face to face diagnostic evaluation on this patient. I have interviewed and examined the patient and I agree with the assessment above.  In summary, my findings and plan are the following:   Ms. Robert Oliveros is a 51 y.o. female who presents with   Ileus vs obstruction  Diabetes with possible gastroparesis  ESRD on PD     Plan:  1. Pain intermittent low cramping, still with nausea of unclear etiology, will order CT with PO contrast to better clarify etiology of symptoms  2. Monitor bowel function, passed hard stool 2 days ago, flatus today, continue bowel regimen  3. Leukocytosis improved, but now with bandemia of unclear etiology  4. Defer management of remainder of medical comorbidities to primary and consulting teams    Guevara Henriquez MD, FACS  7/2/2021  12:38 PM

## 2021-07-02 NOTE — DISCHARGE SUMMARY
Physical Medicine & Rehabilitation  Discharge Summary     Patient Identification:  Tramaine Vera  : 1975  Admit date: 2021  Discharge date: 2021  Attending provider: No att. providers found        Primary care provider: Daniel Mercado MD     Discharge Diagnoses:   Patient Active Problem List   Diagnosis    Diabetes mellitus ([facility]    Obesity    Microalbuminuria    Hyperlipidemia with target LDL less than 100    Hypertension    Diabetes mellitus type 2 in obese [facility]    [facility]) [facility]    ESRD (end stage renal disease) ([facility]    Non-smoker    Elevated C-reactive protein (CRP)    Elevated sed rate    Diabetic polyneuropathy associated with type 2 diabetes mellitus ([facility]    Weight loss counseling, encounter for    Atherosclerosis of native arteries of left leg with ulceration of other part of foot ([facility]    Near syncope    Coronary artery disease of native artery of native heart with stable angina pectoris (HCC)    Mild malnutrition ([facility]    S/P coronary artery bypass graft x 3    S/P left atrial appendage ligation    Generalized weakness    Functional dysphonia    SBO (small bowel obstruction) ([facility]       Discharge Functional Status:    Physical therapy:  Bed Mobility: Scooting: Stand by assistance  Transfers: Sit to Stand: Maximum Assistance (max A from raised commode, w/c x 4 reps, 1 episode where pt unable to clear bottom from w/c requiring max A x 2, mod A for STS from elevated mat table)  Stand to sit: Contact guard assistance (improved eccentric control)  Bed to Chair: Contact guard assistance (via stand step with RW)  Comment: 1st session: Pt completes seated stepper exercise x 4 minutes to improve cardiovascular endurance with BLE only. Pt requires max A x 1 for STS transfer from raised commode and throughout majority of STS from w/c throughout session.  Pt demos 1 instance of questionable effort when completing STS transfer from w/c with minimal gluteal activation requiring max A x 2 to safely stand., Ambulation 1  Surface: level tile  Device: Rolling Walker  Other Apparatus: O2 (RA)  Assistance: Contact guard assistance  Quality of Gait: Step to pattern, trunk flexion, short step length  Gait Deviations: Slow Jyoti, Decreased step length, Decreased step height  Distance: 193' + 80'  Comments: Slow gait speed, Stairs  # Steps : 3 (x 2)  Stairs Height: 6\"  Rails: Bilateral  Assistance: Minimal assistance  Comment: significant difficulty with knee and hip extension  Mobility:  , PT Equipment Recommendations  Equipment Needed: Yes  Mobility Devices: Alston Jeff: Rolling  Other: TBD with progress, Assessment: Carmen Adrian continues to demonstrate progress with skilled therapy tx thus far, presents with substantial improvements in independence with completing STS transfers. However, pt continues to be limited secondary to fatigue requiring frequent rest breaks with activity and intermittent encouragement to participate. Occupational therapy:  ,  , Assessment: Pt presented with the above deficits impacting his occupational peprformance. Pt is slowly porgressing towards goals- still requiring extensive assist for LB dressing and set-upModI for UB. able to ambulate with RW at CGA/Kymberly however requiring variable assist levels from 2x to MaxA of 1 for sit>stands. Pt tolerates sessions on RA. Pt would benefit from ongoing intensive therapy services and extended stay in order to maximize her independence and safety with all occupational pursuits. Continue POC    Speech therapy:       Inpatient Rehabilitation Course:   Laron Mcdonnell is a 51 y.o. female admitted to inpatient rehabilitation on 6/16/2021 s/p Generalized weakness. The patient participated in an aggressive multidisciplinary inpatient rehabilitation program involving 3 hours of therapy per day, at least 5 days per week. Patient had poor oral intake, developed ileus, and had increased weakness.  She was no longer able to tolerate therapies, and after discussion with nephrology and general surgery, it was determined that patient would need further management and evaluation on the acute side. Discharge Exam:  Constitutional: Pleasant, no distress  Head: Normocephalic  Eyes: Conjunctiva noninjected  Pulm: CTA bilat  CV: No murmurs noted  Abd: Soft, nontender  Ext: No edema  Neuro: Alert, fully oriented, appropriate   MSK: No joint abnormalities noted     Significant Diagnostics:   Lab Results   Component Value Date    CREATININE 9.9 (HH) 07/02/2021    BUN 73 (H) 07/02/2021     (L) 07/02/2021    K 4.7 07/02/2021    CL 84 (L) 07/02/2021    CO2 23 07/02/2021       Lab Results   Component Value Date    WBC 10.7 07/02/2021    HGB 8.4 (L) 07/02/2021    HCT 25.9 (L) 07/02/2021    MCV 91.6 07/02/2021     07/02/2021       Disposition:  Readmit to Acute Care in guarded condition.      Follow-up:  See after visit summary from hospitalization    Discharge Medications:     Medication List      ASK your doctor about these medications    acetaminophen 500 MG tablet  Commonly known as: TYLENOL  Take 2 tablets by mouth every 6 hours     albuterol sulfate  (90 Base) MCG/ACT inhaler  Inhale 2 puffs into the lungs every 6 hours as needed for Wheezing or Shortness of Breath     aspirin 325 MG EC tablet  Take 1 tablet by mouth daily     atorvastatin 40 MG tablet  Commonly known as: LIPITOR  Take 1 tablet by mouth nightly     bisacodyl 10 MG suppository  Commonly known as: DULCOLAX  Place 1 suppository rectally daily as needed for Constipation     calcium acetate 667 MG Tabs     fluticasone 50 MCG/ACT nasal spray  Commonly known as: FLONASE     * FreeStyle Lien 14 Day [place] Quynh Rodriguez  To test glucose     * FreeStyle Lien 2 [place] Quynh Rodriguez  Use to check glucose     * FreeStyle Lien 14 Day Sensor Misc  Check glucose ---change every 14 days     * FreeStyle Lien 2 Sensor Misc  Change every 14 days     Insulin Pen Needle 32G X 6 MM Misc  Use with insulin pens 4 times daily     Levemir FlexTouch 100 UNIT/ML injection pen  Generic drug: insulin detemir  Inject 25 Units into the skin nightly     metoprolol tartrate 25 MG tablet  Commonly known as: LOPRESSOR  Take 1 tablet by mouth 2 times daily     * NovoLOG FlexPen 100 UNIT/ML injection pen  Generic drug: insulin aspart  Inject 10 Units into the skin 3 times daily (before meals)     * insulin aspart 100 UNIT/ML injection vial  Commonly known as: NOVOLOG  Inject 0-18 Units into the skin 3 times daily (with meals)     * insulin aspart 100 UNIT/ML injection vial  Commonly known as: NOVOLOG  Inject 0-9 Units into the skin nightly     nystatin 789227 UNIT/ML suspension  Commonly known as: MYCOSTATIN  Take 5 mLs by mouth 4 times daily for 10 days  Ask about: Should I take this medication? pantoprazole 40 MG tablet  Commonly known as: PROTONIX  Take 1 tablet by mouth daily     polyethylene glycol 17 g packet  Commonly known as: GLYCOLAX  Take 17 g by mouth daily as needed for Constipation     promethazine 25 MG tablet  Commonly known as: PHENERGAN  Take 1 tablet by mouth every 6 hours as needed for Nausea WARNING:  May cause drowsiness. May impair ability to operate vehicles or machinery. Do not use in combination with alcohol. Ask about: Should I take this medication?     sennosides-docusate sodium 8.6-50 MG tablet  Commonly known as: SENOKOT-S  Take 1 tablet by mouth 2 times daily     sevelamer 800 MG tablet  Commonly known as: RENVELA  Take 1 tablet by mouth 3 times daily (with meals)     traMADol 50 MG tablet  Commonly known as: ULTRAM  Take 1 tablet by mouth every 6 hours as needed for Pain for up to 7 days. Ask about: Should I take this medication?     zolpidem 5 MG tablet  Commonly known as: AMBIEN  Take 1 tablet by mouth nightly as needed for Sleep for up to 14 days. Ask about: Should I take this medication?          * This list has 7 medication(s) that are the same as other medications

## 2021-07-02 NOTE — PROGRESS NOTES
Office : [phone]     Fax :[phone]       Nephrology consult   Note      Patient's Name: Tariq Villa  8:59 AM  2021    Reason for Consult:  ESRD    History of Present Ilness:    Tariq Villa is a 51 y.o. female with severe end-stage renal disease secondary to diabetic nephropathy on peritoneal dialysis, hypertension, peripheral vascular disease, diabetic neuropathy who was admitted after she had a syncopal episode. She had similar episode 2 weeks ago. Recently had angiogram left lower extremity for gangrene left foot second toe. Denies any shortness of breath. Denies any abdominal pain. Denies any nausea vomiting. Interval HX :      Transferred from rehab to floor   Abdominal pain has decreased  KUB x-ray showed ileus   Passing flatus   Started on clears per surgery   High WBC   Poor oral intake       No chest pain  No sob  S/p CABG on 2010    Feeling weak and fatigued. Oral intake has been poor for last few days. Sodium level dropped to 125   Unable to take salt tablets       I/O last 3 completed shifts: In: 10 [I.V.:10]  Out: 0     Past Medical History:   Diagnosis Date    Diabetes mellitus ([address]     End stage kidney disease ([address]     peritoneal dialysis every night at home x 2 years    Hypertension     Neuropathy     Peripheral vascular disease ([facility]        Past Surgical History:   Procedure Laterality Date     SECTION      CORONARY ARTERY BYPASS GRAFT N/A 2021    URGENT CABG CORONARY ARTERY BYPASS GRAFTS X3. VEIN TO PDA X1, VEIN TO OM X1, LEFT INTERNAL MAMMARY ARTERY TO LAD X1. ENDOSCOPIC HARVEST RIGHT LEG SAPHENOUS VEIN. LIGATION NYA USING 35MM ATRICLIP. EPI AORTIC ULTRASOUND. TOTAL CARDIOPULMONARY BYPASS. DOPPLER GRAFT FLOW VERIFICATION. BILATERAL INITERCOSTAL NERVE BLOCK USING 1.3% EXPAREL.  performed by Gene Shi MD at [facility]  08/16/2018     lap PD cath placement lt side 62 cm    TOE AMPUTATION Left 2/26/2021    AMPUTATION OF THE LEFT THIRD TOE performed by Harpal Magallon DPM at [id number] [place] [address] [address] TOE AMPUTATION Left 5/26/2021    AMPUTATION OF HALLUX AND SECOND TOE, LEFT FOOT performed by Harpal Magallon DPM at [facility]         Family History   Problem Relation Age of Onset    Coronary Art Dis Mother     High Blood Pressure Mother     Heart Disease Mother     Diabetes Mother     Cancer Father     Coronary Art Dis Father     High Blood Pressure Father     Heart Disease Father     Diabetes Father      Current Medications:    acetaminophen (TYLENOL) tablet 650 mg, Q6H PRN  aspirin EC tablet 325 mg, Daily  magnesium hydroxide (MILK OF MAGNESIA) 400 MG/5ML suspension 30 mL, Daily PRN  metoprolol tartrate (LOPRESSOR) tablet 25 mg, BID  pantoprazole (PROTONIX) tablet 40 mg, Daily  polyethylene glycol (GLYCOLAX) packet 17 g, Daily PRN  sennosides-docusate sodium (SENOKOT-S) 8.6-50 MG tablet 1 tablet, BID  traMADol (ULTRAM) tablet 50 mg, Q6H PRN   Or  traMADol (ULTRAM) tablet 100 mg, Q6H PRN  zolpidem (AMBIEN) tablet 5 mg, Nightly PRN  dextrose 5 % solution, PRN  dextrose 50 % IV solution, PRN  glucagon (rDNA) injection 1 mg, PRN  glucose (GLUTOSE) 40 % oral gel 15 g, PRN  gentamicin (GARAMYCIN) 0.1 % cream, Daily  atorvastatin (LIPITOR) tablet 20 mg, Nightly  bisacodyl (DULCOLAX) EC tablet 5 mg, Daily PRN  calcium carbonate (TUMS) chewable tablet 500 mg, TID PRN  diphenhydrAMINE (BENADRYL) tablet 25 mg, Q6H PRN  heparin (porcine) injection 3,400 Units, PRN  heparin (porcine) injection 5,000 Units, 3 times per day  hydrALAZINE (APRESOLINE) tablet 25 mg, 3 times per day  hydrALAZINE (APRESOLINE) tablet 50 mg, Q8H PRN  miconazole (MICOTIN) 2 % powder, BID  ondansetron (ZOFRAN) injection 4 mg, Q6H PRN  ondansetron (ZOFRAN-ODT) disintegrating tablet 4 mg, Q8H PRN  polyethylene glycol (GLYCOLAX) packet 17 g, Daily  promethazine (PHENERGAN) tablet 12.5 mg, Q6H PRN  sevelamer (RENVELA) tablet 800 mg, TID WC  torsemide (DEMADEX) tablet 100 mg, Daily  albuterol (PROVENTIL) nebulizer solution 2.5 mg, Q6H PRN  vancomycin (VANCOCIN) intermittent dosing (placeholder), RX Placeholder  cefepime (MAXIPIME) 1000 mg IVPB minibag, Q24H  insulin aspart (NOVOLOG) injection pen 0-18 Units - Patient Supply, TID WC  insulin aspart (NOVOLOG) injection pen 0-9 Units - Patient Supply, Nightly  insulin detemir (LEVEMIR) injection pen 20 Units-Patient Supplied, Nightly          Physical exam:     Vitals:  BP (!) 111/53   Pulse 87   Temp 97.9 °F (36.6 °C) (Oral)   Resp 18   Ht 5' 9\" (1.753 m)   Wt 216 lb 14.4 oz (98.4 kg)   SpO2 96%   BMI 32.03 kg/m²   Constitutional:  OAA X3 NAD  Skin: no rash, turgor wnl  Heent:  eomi, mmm  Neck: no bruits or jvd noted  Cardiovascular:  S1, S2 without m/r/g  Respiratory: CTA B without w/r/r  Abdomen:  +bs, soft, nt, nd  Ext: no  lower extremity edema      Labs:  CBC:   Recent Labs     06/30/21  0702 07/01/21  0549 07/02/21 0447   WBC 14.2* 18.8* 10.7   HGB 8.2* 8.0* 8.4*    282 322     BMP:    Recent Labs     06/30/21  0702 07/01/21  0549 07/02/21  0447   * 127* 125*   K 4.1 5.2* 4.7   CL 86* 85* 84*   CO2 24 23 23   BUN 57* 66* 73*   CREATININE 8.8* 10.0* 9.9*   GLUCOSE 97 117* 275*     Ca/Mg/Phos:   Recent Labs     06/30/21  0702 07/01/21  0549 07/02/21 0447   CALCIUM 8.4 8.0* 7.8*   MG  --  1.80 1.80   PHOS  --  7.8* 6.8*     UA:  No results for input(s): COLORU, CLARITYU, GLUCOSEU, BILIRUBINUR, KETUA, SPECGRAV, BLOODU, PHUR, PROTEINU, UROBILINOGEN, NITRU, LEUKOCYTESUR, LABMICR, URINETYPE in the last 72 hours. Urine Microscopic:   No results for input(s): LABCAST, BACTERIA, COMU, HYALCAST, WBCUA, RBCUA, EPIU in the last 72 hours.   Urine Culture:   No results for input(s): Jose G Latham in the last 72 hours. Urine Chemistry: No results for input(s): Nhi Desir, PAULINE, JULIO in the last 72 hours. IMAGING:  No orders to display           Assessment/Plan :      1. ESRD. Continue on peritoneal dialysis. continue cycler/CCPD as per current prescription  Sodium level low. Low albumin   Hold torsemide   Give albumin iv       KUB shows possible ileus versus small bowel obstruction. Was made n.p.o.  PD was not done last night. Change the fill volume to 1400 ml   PD site clean and no discharge. PD fluid clear   Cell count   Culture so far negative     2. HTN. BP slightly elevated   Started hydralazine 25 mg po tid   Continue current blood pressure medications    3. Anemia of chronic disease. Continue LUIZA    4. Acid- base disorder. Monitor    5. Hyponatremia   Poor oral intake    Sodium level better at 130  ---->  128 ---> 127 ---> 125   Will star mild hypertonic saline    Encourage increase oral intake    6. CAD/Syncopal episode. S/p [facility]   Has Multivessel CAD   S/p CABG       7. Peripheral vascular disease. H/o  amputation of left foot toes. Podiatry following   ? Source of infection   empirical abx     8. Generalized weakness. Getting rehab     9. Ileus. Surgery on board.   Bowel rest.  Says has passed some flatus today  On clears             Thank you for allowing us to participate in care of Lynne Kulkarni         Electronically signed by: Luci Pierce MD, 7/2/2021, 8:59 AM      [facility] [address]  Office : [phone]  Fax :[phone]

## 2021-07-03 LAB
ANION GAP SERPL CALCULATED.3IONS-SCNC: 16 MMOL/L (ref 3–16)
ANTIBODY SCREEN: NORMAL
BASOPHILS ABSOLUTE: 0 K/UL (ref 0–0.2)
BASOPHILS RELATIVE PERCENT: 0.3 %
BLOOD BANK DISPENSE STATUS: NORMAL
BLOOD BANK PRODUCT CODE: NORMAL
BODY FLUID CULTURE, STERILE: ABNORMAL
BPU ID: NORMAL
BUN BLDV-MCNC: 68 MG/DL (ref 7–20)
CALCIUM SERPL-MCNC: 8 MG/DL (ref 8.3–10.6)
CHLORIDE BLD-SCNC: 85 MMOL/L (ref 99–110)
CO2: 26 MMOL/L (ref 21–32)
CREAT SERPL-MCNC: 9.2 MG/DL (ref 0.6–1.1)
DAT IGG CAPTURE: NORMAL
DESCRIPTION BLOOD BANK: NORMAL
EOSINOPHILS ABSOLUTE: 0.2 K/UL (ref 0–0.6)
EOSINOPHILS RELATIVE PERCENT: 1.6 %
GFR AFRICAN AMERICAN: 6
GFR NON-AFRICAN AMERICAN: 5
GLUCOSE BLD-MCNC: 121 MG/DL (ref 70–99)
GLUCOSE BLD-MCNC: 140 MG/DL (ref 70–99)
GLUCOSE BLD-MCNC: 152 MG/DL (ref 70–99)
GLUCOSE BLD-MCNC: 45 MG/DL (ref 70–99)
GLUCOSE BLD-MCNC: 59 MG/DL (ref 70–99)
GLUCOSE BLD-MCNC: 67 MG/DL (ref 70–99)
GLUCOSE BLD-MCNC: 86 MG/DL (ref 70–99)
GLUCOSE BLD-MCNC: 96 MG/DL (ref 70–99)
GRAM STAIN RESULT: ABNORMAL
HCT VFR BLD CALC: 22.7 % (ref 36–48)
HEMOGLOBIN: 7.2 G/DL (ref 12–16)
LACTIC ACID: 2.1 MMOL/L (ref 0.4–2)
LYMPHOCYTES ABSOLUTE: 0.9 K/UL (ref 1–5.1)
LYMPHOCYTES RELATIVE PERCENT: 8.6 %
MAGNESIUM: 1.8 MG/DL (ref 1.8–2.4)
MCH RBC QN AUTO: 29.4 PG (ref 26–34)
MCHC RBC AUTO-ENTMCNC: 31.8 G/DL (ref 31–36)
MCV RBC AUTO: 92.7 FL (ref 80–100)
MONOCYTES ABSOLUTE: 0.9 K/UL (ref 0–1.3)
MONOCYTES RELATIVE PERCENT: 9 %
NEUTROPHILS ABSOLUTE: 8.4 K/UL (ref 1.7–7.7)
NEUTROPHILS RELATIVE PERCENT: 80.5 %
ORGANISM: ABNORMAL
PDW BLD-RTO: 19.3 % (ref 12.4–15.4)
PERFORMED ON: ABNORMAL
PERFORMED ON: NORMAL
PERFORMED ON: NORMAL
PHOSPHORUS: 5.8 MG/DL (ref 2.5–4.9)
PLATELET # BLD: 295 K/UL (ref 135–450)
PMV BLD AUTO: 8.3 FL (ref 5–10.5)
POTASSIUM SERPL-SCNC: 4.5 MMOL/L (ref 3.5–5.1)
RBC # BLD: 2.45 M/UL (ref 4–5.2)
SODIUM BLD-SCNC: 127 MMOL/L (ref 136–145)
VANCOMYCIN RANDOM: 12 UG/ML
WBC # BLD: 10.4 K/UL (ref 4–11)

## 2021-07-03 PROCEDURE — 86850 RBC ANTIBODY SCREEN: CPT

## 2021-07-03 PROCEDURE — 99232 SBSQ HOSP IP/OBS MODERATE 35: CPT | Performed by: SURGERY

## 2021-07-03 PROCEDURE — 83605 ASSAY OF LACTIC ACID: CPT

## 2021-07-03 PROCEDURE — 6360000002 HC RX W HCPCS: Performed by: NURSE PRACTITIONER

## 2021-07-03 PROCEDURE — 86922 COMPATIBILITY TEST ANTIGLOB: CPT

## 2021-07-03 PROCEDURE — 2500000003 HC RX 250 WO HCPCS: Performed by: INTERNAL MEDICINE

## 2021-07-03 PROCEDURE — 6360000002 HC RX W HCPCS: Performed by: INTERNAL MEDICINE

## 2021-07-03 PROCEDURE — 84100 ASSAY OF PHOSPHORUS: CPT

## 2021-07-03 PROCEDURE — 99222 1ST HOSP IP/OBS MODERATE 55: CPT | Performed by: INTERNAL MEDICINE

## 2021-07-03 PROCEDURE — 90945 DIALYSIS ONE EVALUATION: CPT

## 2021-07-03 PROCEDURE — 6360000002 HC RX W HCPCS: Performed by: HOSPITALIST

## 2021-07-03 PROCEDURE — 2060000000 HC ICU INTERMEDIATE R&B

## 2021-07-03 PROCEDURE — 86900 BLOOD TYPING SEROLOGIC ABO: CPT

## 2021-07-03 PROCEDURE — 2580000003 HC RX 258: Performed by: INTERNAL MEDICINE

## 2021-07-03 PROCEDURE — 86901 BLOOD TYPING SEROLOGIC RH(D): CPT

## 2021-07-03 PROCEDURE — 36415 COLL VENOUS BLD VENIPUNCTURE: CPT

## 2021-07-03 PROCEDURE — 6370000000 HC RX 637 (ALT 250 FOR IP): Performed by: INTERNAL MEDICINE

## 2021-07-03 PROCEDURE — 2700000000 HC OXYGEN THERAPY PER DAY

## 2021-07-03 PROCEDURE — 83735 ASSAY OF MAGNESIUM: CPT

## 2021-07-03 PROCEDURE — 85025 COMPLETE CBC W/AUTO DIFF WBC: CPT

## 2021-07-03 PROCEDURE — 80048 BASIC METABOLIC PNL TOTAL CA: CPT

## 2021-07-03 PROCEDURE — P9016 RBC LEUKOCYTES REDUCED: HCPCS

## 2021-07-03 PROCEDURE — 80202 ASSAY OF VANCOMYCIN: CPT

## 2021-07-03 PROCEDURE — 2580000003 HC RX 258: Performed by: HOSPITALIST

## 2021-07-03 PROCEDURE — 86880 COOMBS TEST DIRECT: CPT

## 2021-07-03 PROCEDURE — 99233 SBSQ HOSP IP/OBS HIGH 50: CPT | Performed by: HOSPITALIST

## 2021-07-03 RX ORDER — SODIUM CHLORIDE 9 MG/ML
INJECTION, SOLUTION INTRAVENOUS
Status: DISPENSED
Start: 2021-07-03 | End: 2021-07-04

## 2021-07-03 RX ORDER — SODIUM CHLORIDE 9 MG/ML
INJECTION, SOLUTION INTRAVENOUS PRN
Status: COMPLETED | OUTPATIENT
Start: 2021-07-03 | End: 2021-07-07

## 2021-07-03 RX ORDER — LABETALOL HYDROCHLORIDE 5 MG/ML
10 INJECTION, SOLUTION INTRAVENOUS EVERY 4 HOURS PRN
Status: DISCONTINUED | OUTPATIENT
Start: 2021-07-03 | End: 2021-07-04

## 2021-07-03 RX ADMIN — HEPARIN SODIUM 5000 UNITS: 5000 INJECTION INTRAVENOUS; SUBCUTANEOUS at 06:17

## 2021-07-03 RX ADMIN — MEROPENEM 1000 MG: 1 INJECTION, POWDER, FOR SOLUTION INTRAVENOUS at 12:06

## 2021-07-03 RX ADMIN — METOCLOPRAMIDE 5 MG: 5 INJECTION, SOLUTION INTRAMUSCULAR; INTRAVENOUS at 03:09

## 2021-07-03 RX ADMIN — STANDARDIZED SENNA CONCENTRATE AND DOCUSATE SODIUM 1 TABLET: 8.6; 5 TABLET ORAL at 20:51

## 2021-07-03 RX ADMIN — POLYETHYLENE GLYCOL 3350 17 G: 17 POWDER, FOR SOLUTION ORAL at 09:23

## 2021-07-03 RX ADMIN — ASPIRIN 325 MG: 325 TABLET, COATED ORAL at 09:23

## 2021-07-03 RX ADMIN — ATORVASTATIN CALCIUM 20 MG: 20 TABLET, FILM COATED ORAL at 20:51

## 2021-07-03 RX ADMIN — TRAMADOL HYDROCHLORIDE 50 MG: 50 TABLET, FILM COATED ORAL at 18:20

## 2021-07-03 RX ADMIN — CEFEPIME HYDROCHLORIDE 2000 MG: 2 INJECTION, POWDER, FOR SOLUTION INTRAVENOUS at 11:15

## 2021-07-03 RX ADMIN — MICONAZOLE NITRATE: 20 POWDER TOPICAL at 09:23

## 2021-07-03 RX ADMIN — STANDARDIZED SENNA CONCENTRATE AND DOCUSATE SODIUM 1 TABLET: 8.6; 5 TABLET ORAL at 09:23

## 2021-07-03 RX ADMIN — MICONAZOLE NITRATE: 20 POWDER TOPICAL at 21:45

## 2021-07-03 RX ADMIN — DEXTROSE MONOHYDRATE 12.5 G: 25 INJECTION, SOLUTION INTRAVENOUS at 11:08

## 2021-07-03 RX ADMIN — VANCOMYCIN HYDROCHLORIDE 1000 MG: 1 INJECTION, POWDER, LYOPHILIZED, FOR SOLUTION INTRAVENOUS at 13:14

## 2021-07-03 RX ADMIN — DEXTROSE MONOHYDRATE 12.5 G: 25 INJECTION, SOLUTION INTRAVENOUS at 07:52

## 2021-07-03 RX ADMIN — ACETAMINOPHEN 650 MG: 325 TABLET ORAL at 07:40

## 2021-07-03 RX ADMIN — ACETAMINOPHEN 650 MG: 325 TABLET ORAL at 01:40

## 2021-07-03 RX ADMIN — LABETALOL HYDROCHLORIDE 10 MG: 5 INJECTION INTRAVENOUS at 17:07

## 2021-07-03 RX ADMIN — METOCLOPRAMIDE 5 MG: 5 INJECTION, SOLUTION INTRAMUSCULAR; INTRAVENOUS at 09:23

## 2021-07-03 RX ADMIN — TRAMADOL HYDROCHLORIDE 100 MG: 50 TABLET, FILM COATED ORAL at 03:08

## 2021-07-03 RX ADMIN — METOPROLOL TARTRATE 25 MG: 25 TABLET, FILM COATED ORAL at 20:51

## 2021-07-03 RX ADMIN — SODIUM BICARBONATE: 84 INJECTION, SOLUTION INTRAVENOUS at 11:20

## 2021-07-03 RX ADMIN — PANTOPRAZOLE SODIUM 40 MG: 40 TABLET, DELAYED RELEASE ORAL at 06:18

## 2021-07-03 RX ADMIN — GENTAMICIN SULFATE: 1 CREAM TOPICAL at 18:55

## 2021-07-03 RX ADMIN — METOPROLOL TARTRATE 25 MG: 25 TABLET, FILM COATED ORAL at 09:23

## 2021-07-03 RX ADMIN — METOCLOPRAMIDE 5 MG: 5 INJECTION, SOLUTION INTRAMUSCULAR; INTRAVENOUS at 20:53

## 2021-07-03 RX ADMIN — HEPARIN SODIUM 5000 UNITS: 5000 INJECTION INTRAVENOUS; SUBCUTANEOUS at 21:01

## 2021-07-03 RX ADMIN — METOCLOPRAMIDE 5 MG: 5 INJECTION, SOLUTION INTRAMUSCULAR; INTRAVENOUS at 14:29

## 2021-07-03 RX ADMIN — SODIUM BICARBONATE: 84 INJECTION, SOLUTION INTRAVENOUS at 13:11

## 2021-07-03 RX ADMIN — ACETAMINOPHEN 650 MG: 325 TABLET ORAL at 21:01

## 2021-07-03 RX ADMIN — HEPARIN SODIUM 5000 UNITS: 5000 INJECTION INTRAVENOUS; SUBCUTANEOUS at 14:29

## 2021-07-03 RX ADMIN — ZOLPIDEM TARTRATE 5 MG: 5 TABLET, COATED ORAL at 21:46

## 2021-07-03 ASSESSMENT — PAIN DESCRIPTION - PAIN TYPE
TYPE: ACUTE PAIN

## 2021-07-03 ASSESSMENT — PAIN SCALES - GENERAL
PAINLEVEL_OUTOF10: 2
PAINLEVEL_OUTOF10: 4
PAINLEVEL_OUTOF10: 6
PAINLEVEL_OUTOF10: 8
PAINLEVEL_OUTOF10: 6
PAINLEVEL_OUTOF10: 8
PAINLEVEL_OUTOF10: 4

## 2021-07-03 ASSESSMENT — PAIN DESCRIPTION - LOCATION
LOCATION: ABDOMEN

## 2021-07-03 ASSESSMENT — PAIN DESCRIPTION - ORIENTATION
ORIENTATION: RIGHT

## 2021-07-03 NOTE — CONSULTS
Infectious Diseases Inpatient Consult Note    Reason for Consult:   PD related peritonitis  Requesting Physician:   Dr Jerel Lombardi  Primary Care Physician:  Kitty Castro MD  History Obtained From:   Pt, EPIC    Admit Date: 2021  Hospital Day: 3    CHIEF COMPLAINT:     Abdominal pain    HISTORY OF PRESENT ILLNESS:      50 yo woman with hx DM, CAD, HTN, PVD, CRF on PD, neuropathy, toe amputation    Admit  -  with syncopy  Cath with multivessel d, had CABG 2021. Pt was converted to HD though resumed PD prior to discharge. Discharged to [facility] ARU     - Presents from ARU with abd pain, nausea / vomiting  Afebrile, WBC 18.8  PD fluid - 0 WBC, 4 RBC. GS no WBC, 4+GNR. Cult - heavy Ps aeruginosa    Today 7/3 - afeb, 3L  Still with lower abd pain      Past Medical History:    Past Medical History:   Diagnosis Date    [facility]     End stage kidney disease ([facility]     peritoneal dialysis every night at home x 2 years    Hypertension     Neuropathy     Peripheral vascular disease ([facility]        Past Surgical History:    Past Surgical History:   Procedure Laterality Date     SECTION      CORONARY ARTERY BYPASS GRAFT N/A 2021    URGENT CABG CORONARY ARTERY BYPASS GRAFTS X3. VEIN TO PDA X1, VEIN TO OM X1, LEFT INTERNAL MAMMARY ARTERY TO LAD X1. ENDOSCOPIC HARVEST RIGHT LEG SAPHENOUS VEIN. LIGATION NYA USING 35MM ATRICLIP. EPI AORTIC ULTRASOUND. TOTAL CARDIOPULMONARY BYPASS. DOPPLER GRAFT FLOW VERIFICATION. BILATERAL INITERCOSTAL NERVE BLOCK USING 1.3% EXPAREL.  performed by Katherine De La Rosa MD at [place] [address]  2018     lap PD cath placement lt side 62 cm    TOE AMPUTATION Left 2021    AMPUTATION OF THE LEFT THIRD TOE performed by Mike Knowles DPM at [address] TOE AMPUTATION Left 2021    AMPUTATION OF HALLUX AND SECOND TOE, LEFT FOOT performed by Mike Knowles DPM at [facility]         Current Medications:     meropenem  1,000 mg Intravenous Q24H    insulin detemir  10 Units Subcutaneous Nightly    metoclopramide  5 mg Intravenous Q6H    aspirin  325 mg Oral Daily    metoprolol tartrate  25 mg Oral BID    pantoprazole  40 mg Oral Daily    sennosides-docusate sodium  1 tablet Oral BID    gentamicin   Topical Daily    atorvastatin  20 mg Oral Nightly    heparin (porcine)  5,000 Units Subcutaneous 3 times per day    miconazole   Topical BID    polyethylene glycol  17 g Oral Daily    [Held by provider] torsemide  100 mg Oral Daily    vancomycin (VANCOCIN) intermittent dosing (placeholder)   Other RX Placeholder    insulin aspart  0-18 Units Subcutaneous TID WC    insulin aspart  0-9 Units Subcutaneous Nightly       Allergies:  Zetia [ezetimibe], Quinolones, and Lisinopril    Social History:    TOBACCO:    None   ETOH:    None   DRUGS:   None   MARITAL STATUS:      OCCUPATION:   Disabled     Family History:   No immunodeficiency    REVIEW OF SYSTEMS:    No fever / chills / sweats. No weight loss. No visual change, eye pain, eye discharge. No oral lesion, sore throat, dysphagia. Denies cough / sputum. Denies chest pain, palpitations. Denies n / v / abd pain. No diarrhea. Denies dysuria or change in urinary function. Denies joint swelling or pain. No myalgia, arthralgia. Denies skin changes, itching  Denies focal weakness, sensory change or other neurologic symptom    Denies new / worse depression, psychiatric symptoms    PHYSICAL EXAM:      Vitals:    BP (!) 195/109   Pulse 83   Temp 97.8 °F (36.6 °C) (Oral)   Resp 20   Ht 5' 9\" (1.753 m)   Wt 217 lb 2.5 oz (98.5 kg)   SpO2 100%   BMI 32.07 kg/m²     GENERAL: No apparent distress.   2l  HEENT: Membranes moist, no oral lesion, PERRL  NECK:  Supple, no lymphadenopathy  LUNGS: Clear b/l, no rales, no dullness  CARDIAC: RRR, no murmur appreciated - chest wound closed  ABD:  + BS, soft - PD catheter in place, lower abd tender, no rebound  EXT:  No rash, no edema, no lesions  NEURO: No focal neurologic findings  LINES:  Peripheral iv    DATA:    Lab Results   Component Value Date    WBC 10.4 2021    HGB 7.2 (L) 2021    HCT 22.7 (L) 2021    MCV 92.7 2021     2021     Lab Results   Component Value Date    CREATININE 9.2 (HH) 2021    BUN 68 (H) 2021     (L) 2021    K 4.5 2021    CL 85 (L) 2021    CO2 26 2021       Hepatic Function Panel:   Lab Results   Component Value Date    ALKPHOS 183 2021    ALT <5 2021    AST 7 2021    PROT 6.4 2021    PROT 7.1 2012    BILITOT 0.3 2021    BILIDIR <0.2 2021    IBILI see below 2021    LABALBU 1.7 2021       Micro:   BC no growth to date     PD fluid: GS no WBC, 4+GNR. Cult heavy Ps aeruginosa  Antibiotic Interpretation DENNISE   cefepime Sensitive <=2 mcg/mL   ciprofloxacin Sensitive <=1 mcg/mL   gentamicin Sensitive <=4 mcg/mL   meropenem Sensitive <=1 mcg/mL   piperacillin-tazobactam Sensitive <=16 mcg/mL   tobramycin Sensitive <=4 mcg/mL       Imagin/2 Abd / pelvic CT:  1. Mild bibasilar segmental atelectasis versus pneumonia. 2. Negative for small bowel obstruction. 3. Mild ascites.       IMPRESSION:      Patient Active Problem List   Diagnosis    [facility]    Obesity    Microalbuminuria    Hyperlipidemia with target LDL less than 100    Hypertension    Diabetes mellitus type 2 in obese ([facility]    Acute renal failure (ARF) ([facility]    [facility]    Non-smoker    Elevated C-reactive protein (CRP)    Elevated sed rate    Diabetic polyneuropathy associated with type 2 diabetes mellitus (HCC)    Weight loss counseling, encounter for    Atherosclerosis of native arteries of left leg with ulceration of other part of foot ([facility]    Near syncope    Coronary artery disease of native artery of native heart with stable angina pectoris (HCC)    Mild malnutrition [facility]    S/P coronary artery bypass graft x 3    S/P left atrial appendage ligation    Generalized weakness    Functional dysphonia    SBO (small bowel obstruction) (HCC)       hx DM, CAD, HTN, PVD, CRF on PD, neuropathy, toe amputation  On PD x 2 yr, no hx PD peritonitis    Admit 5/31 - 6/16, CABG 6/9/2021. ARU 6/16 - 7/1    IMP/  Abd pain  PD fluid with NO WBC yet heavy Ps aeruginosa      RECOMMENDATIONS:    Cont meropenem  D/c vancomycin  Renal to give IP dialysate with antibiotic (ceftazidime or cipro can be added to fluid)  Repeat cell count     Medical Decision Making:   The following items were considered in medical decision making:  Reviewed clinical lab tests  Reviewed radiology tests  Reviewed other diagnostic tests/interventions  Microbiology cultures reviewed      Discussed with pt  Yuniel Guerra MD

## 2021-07-03 NOTE — PROGRESS NOTES
Pt complaining of right shoulder pain related to peritoneal dialysis.  Heating pad given, awaiting time for tylenol to be dispensed and given

## 2021-07-03 NOTE — PROGRESS NOTES
Peritoneal fluid sent on 7/1/2021 resulted today   Growing pseudomonas   Will increase cefepime dose to 2 gm iv daily  Consult infectious disease

## 2021-07-03 NOTE — PROGRESS NOTES
Clinical Pharmacy Note: Pharmacy to Dose Vancomcyin    Vancomycin Day: 3  Current Dosing: dosing by levels (PD)      Recent Labs     07/02/21 0447 07/03/21  0423   BUN 73* 68*       Recent Labs     07/02/21  0447 07/03/21  0423   CREATININE 9.9* 9.2*       Recent Labs     07/02/21  0447 07/03/21  0424   WBC 10.7 10.4         Intake/Output Summary (Last 24 hours) at 7/3/2021 1110  Last data filed at 7/3/2021 0615  Gross per 24 hour   Intake 280.11 ml   Output 83 ml   Net 197.11 ml         Ht Readings from Last 1 Encounters:   07/01/21 5' 9\" (1.753 m)        Wt Readings from Last 1 Encounters:   07/02/21 217 lb 2.5 oz (98.5 kg)         Body mass index is 32.07 kg/m². Estimated Creatinine Clearance: 9 mL/min (A) (based on SCr of 9.2 mg/dL [facility] [place] [facility])). Random: 12.0    Assessment/Plan:  Vancomycin level is therapeutic. Level was drawn appropriately in respect to last dose given. Patient remains on peritoneal dialysis. Re-dose vancomycin 1000 mg x1 today. A vancomycin random level has been ordered for 7/5/21 with AM labs. Changes in regimen will be determined based on culture results, renal function, and clinical response. Pharmacy will continue to monitor and adjust regimen as necessary.     Thank you for the consult,    Angle Wyman, PharmD  Clinical Pharmacist [id number]  7/3/2021

## 2021-07-03 NOTE — PROGRESS NOTES
Sarbjit Guerrero MD        Or    traMADol Christopher Lee) tablet 100 mg  100 mg Oral Q6H PRN Sarbjit BARNHART MD   100 mg at 07/03/21 0308    zolpidem (AMBIEN) tablet 5 mg  5 mg Oral Nightly PRN Sarbjit BARNHART MD   5 mg at 07/02/21 2126    dextrose 5 % solution  100 mL/hr Intravenous PRN Sarbjit Guerrero MD        dextrose 50 % IV solution  12.5 g Intravenous PRN Sarbjit BARNHART MD        glucagon (rDNA) injection 1 mg  1 mg Intramuscular PRN Sarbjit BARNHART MD        glucose (GLUTOSE) 40 % oral gel 15 g  15 g Oral PRN Sarbjit BARNHART MD        gentamicin (GARAMYCIN) 0.1 % cream   Topical Daily Saad BARNHART MD   Given at 07/01/21 1828    atorvastatin (LIPITOR) tablet 20 mg  20 mg Oral Nightly Sarbjit BARNHART MD   20 mg at 07/02/21 2126    bisacodyl (DULCOLAX) EC tablet 5 mg  5 mg Oral Daily PRN Sarbjit Guerrero MD        calcium carbonate (TUMS) chewable tablet 500 mg  500 mg Oral TID PRN Sarbjit Guerrero MD        diphenhydrAMINE (BENADRYL) tablet 25 mg  25 mg Oral Q6H PRN Sarbjit BARNHART MD        heparin (porcine) injection 3,400 Units  3,400 Units Intracatheter PRN Sarbjit BARNHART MD        heparin (porcine) injection 5,000 Units  5,000 Units Subcutaneous 3 times per day Saad BARNHART MD   5,000 Units at 07/03/21 0617    hydrALAZINE (APRESOLINE) tablet 50 mg  50 mg Oral Q8H PRN Sarbjit Guerrero MD        miconazole (MICOTIN) 2 % powder   Topical BID Sarbjit Guerrero MD   Given at 07/03/21 0923    ondansetron (ZOFRAN) injection 4 mg  4 mg Intravenous Q6H PRN Sarbjit BARNHART MD   4 mg at 07/02/21 1255    ondansetron (ZOFRAN-ODT) disintegrating tablet 4 mg  4 mg Oral Q8H PRN Sarbjit BARNHART MD        polyethylene glycol (GLYCOLAX) packet 17 g  17 g Oral Daily Sarbjit BARNHART MD   17 g at 07/03/21 0923    promethazine (PHENERGAN) tablet 12.5 mg  12.5 mg Oral Q6H PRN Sarbjit Guerrero MD        [Held by provider] torsemide (DEMADEX) tablet 100 mg  100 mg Oral Daily Sarbjit Guerrero MD  albuterol (PROVENTIL) nebulizer solution 2.5 mg  2.5 mg Nebulization Q6H PRN Sarbjit Andersen MD        vancomycin (VANCOCIN) intermittent dosing (placeholder)   Other RX Placeholder Sarbjit Andersen MD   Given at 07/01/21 1829    insulin aspart (NOVOLOG) injection pen 0-18 Units - Patient Supply  0-18 Units Subcutaneous TID WC Sarbjit Andersen MD        insulin aspart (NOVOLOG) injection pen 0-9 Units - Patient Supply  0-9 Units Subcutaneous Nightly Sarbjit BARNHART MD   5 Units at 07/01/21 2235     Allergies   Allergen Reactions    Zetia [Ezetimibe] Shortness Of Breath    Quinolones Nausea And Vomiting and Nausea Only     Other reaction(s): Vomiting    Lisinopril Swelling     Active Problems:    SBO (small bowel obstruction) ([facility])  Resolved Problems:    * No resolved hospital problems. *    Blood pressure (!) 168/75, pulse 65, temperature 97.8 °F (36.6 °C), temperature source Oral, resp. rate 20, height 5' 9\" (1.753 m), weight 217 lb 2.5 oz (98.5 kg), SpO2 100 %, not currently breastfeeding. Subjective:  Symptoms:  Improved. Diet:  Dietary issues: Tolerating clear liquid. Pain:  She reports no pain. Objective:  General Appearance:  Comfortable. Vital signs: (most recent): Blood pressure (!) 168/75, pulse 65, temperature 97.8 °F (36.6 °C), temperature source Oral, resp. rate 20, height 5' 9\" (1.753 m), weight 217 lb 2.5 oz (98.5 kg), SpO2 100 %, not currently breastfeeding. Output: Producing urine and minimal stool output. Lungs:  Normal effort and normal respiratory rate. Abdomen: Abdomen is soft and non-distended. There is no abdominal tenderness. Neurological: Patient is alert and oriented to person, place and time. Skin:  Warm and dry. Assessment & Plan 69-year-old female seen in follow-up for lower abdominal pain as well as nausea and dry heaves. Feeling better today. All of her abdominal symptoms have resolved. She is tolerating a clear liquid diet.   CAT

## 2021-07-03 NOTE — FLOWSHEET NOTE
07/03/21 0615   Vitals   BP (!) 123/46   Temp 97.8 °F (36.6 °C)   Pulse 89   Resp 20     Disconnected from CCPD per protocol. Effluent: Milky yellow with fibrin  Total time: 10:14  Total UF:  83 ml. Total Volume:  7001 ml. Dwell time gained: 00:45    Pt Tolerated procedure: Without difficulty. Flat affect, refused to have multiple blankets/pillows removed from bed to be weighed. Report given to: Zayda Rodriguez RN.   Last BM: 3 days ago

## 2021-07-03 NOTE — FLOWSHEET NOTE
07/03/21 1847   Vital Signs   Temp 97.4 °F (36.3 °C)   Pulse 79   Resp 20   BP (!) 170/64     Connected to Cycler    CCPD order: Total Volume: 8400 ml                       Therapy Time Duration (Hours): 10:00                        Exchange Volume: 1400 ml                        Number of Exchanges: 6                        No Final Fill    Dwell Time: 01:00  Fill Time: 00:20  Drain Time: 00:20    Effluent milky yellow with fibrin present. Dr. Hillary Harper aware, # of exchanges increased from 5 to 6, dwell time decreased from 01:30 to 01:00 per order. Refused to be weighed, did not want pillow/blankets removed from bed. Exit site care done per protocol. Garamycin . 1% cream to site. DSD/occlusive to site. Report given to Dominic Salazar RN.   Kathy Bullard

## 2021-07-03 NOTE — PROGRESS NOTES
Sent following message to Dr. Zamorano Dear:    PT had a hypoglycemic event this morning where she was 67 then 59 after intervention. Had D50 pushed then and was 120 after. Recently another sugar check was 45, gave D50 and she is 96. Did you want to start a dextrose gtt?

## 2021-07-03 NOTE — PROGRESS NOTES
Hospitalist Progress Note      PCP: Asha Hernandez MD    Date of Admission: 7/1/2021    Chief Complaint: Nausea and vomiting    Hospital Course: 51 y.o. female with past medical history of ESRD on PD, CAD status post CABG, diabetes, hypertension, peripheral vascular disease status post partial foot accommodation was initially admitted to [facility] 5/31/2021 for dizziness. At that time cardiac catheterization was performed which showed multivessel disease. Patient undergone CABG. Subsequently patient was discharged to [facility] acute rehab unit where she was undergoing therapy. She was being followed by nephrology in view of her ESRD for PD. She subsequently developed abdominal discomfort nausea and vomiting. Imaging suggestive of ileus versus SBO. Surgery was consulted at [facility] who suggested transfer to acute care. Patient states that she was not able to tolerate anything p.o. or since the day before yesterday. Her last bowel movement was approximately that time, no melena or hematochezia, hard. Today her labs demonstrate hyponatremia, hyperkalemia, BUN/creatinine consistent with ESRD patient, and leukocytosis to 18. Fluid from PD catheter has grown Pseudomonas aeruginosa. Discussed with nephrology, increased dose of cefepime, give additional dose of meropenem, ID consulted. Subjective: Patient seen and examined at bedside. She feels somewhat better than yesterday.     Medications:  Reviewed    Infusion Medications    sodium chloride      IV infusion builder 50 mL/hr at 07/03/21 1311    dextrose       Scheduled Medications    meropenem  1,000 mg Intravenous Q24H    insulin detemir  15 Units Subcutaneous Nightly    metoclopramide  5 mg Intravenous Q6H    aspirin  325 mg Oral Daily    metoprolol tartrate  25 mg Oral BID    pantoprazole  40 mg Oral Daily    sennosides-docusate sodium  1 tablet Oral BID    gentamicin   Topical Daily    atorvastatin  20 mg Oral Nightly  heparin (porcine)  5,000 Units Subcutaneous 3 times per day    miconazole   Topical BID    polyethylene glycol  17 g Oral Daily    [Held by provider] torsemide  100 mg Oral Daily    vancomycin (VANCOCIN) intermittent dosing (placeholder)   Other RX Placeholder    insulin aspart  0-18 Units Subcutaneous TID WC    insulin aspart  0-9 Units Subcutaneous Nightly     PRN Meds: sodium chloride, dextrose, acetaminophen, magnesium hydroxide, polyethylene glycol, traMADol **OR** traMADol, zolpidem, dextrose, dextrose, glucagon (rDNA), glucose, bisacodyl, calcium carbonate, diphenhydrAMINE, heparin (porcine), hydrALAZINE, ondansetron, ondansetron, promethazine, albuterol      Intake/Output Summary (Last 24 hours) at 7/3/2021 1535  Last data filed at 7/3/2021 0615  Gross per 24 hour   Intake 280.11 ml   Output 83 ml   Net 197.11 ml       Physical Exam Performed:    BP (!) 168/75   Pulse 65   Temp 97.8 °F (36.6 °C) (Oral)   Resp 20   Ht 5' 9\" (1.753 m)   Wt 217 lb 2.5 oz (98.5 kg)   SpO2 100%   BMI 32.07 kg/m²     General appearance: No apparent distress, appears stated age and cooperative. HEENT: Pupils equal, round, and reactive to light. Conjunctivae/corneas clear. Neck: Supple, with full range of motion. No jugular venous distention. Trachea midline. Respiratory:  Normal respiratory effort. Clear to auscultation, bilaterally without Rales/Wheezes/Rhonchi. Cardiovascular: Regular rate and rhythm with normal S1/S2 without murmurs, rubs or gallops. Abdomen: Soft, non-tender, non-distended with normal bowel sounds. Musculoskeletal: No clubbing, cyanosis or edema bilaterally. Full range of motion without deformity. Skin: Skin color, texture, turgor normal.  No rashes or lesions. Neurologic:  Neurovascularly intact without any focal sensory/motor deficits.  Cranial nerves: II-XII intact, grossly non-focal.  Psychiatric: Alert and oriented, thought content appropriate, normal insight  Peripheral Pulses: +2 palpable, equal bilaterally       Labs:   Recent Labs     07/01/21  0549 07/02/21  0447 07/03/21  0424   WBC 18.8* 10.7 10.4   HGB 8.0* 8.4* 7.2*   HCT 25.1* 25.9* 22.7*    322 295     Recent Labs     07/01/21  0549 07/02/21  0447 07/03/21  0423   * 125* 127*   K 5.2* 4.7 4.5   CL 85* 84* 85*   CO2 23 23 26   BUN 66* 73* 68*   CREATININE 10.0* 9.9* 9.2*   CALCIUM 8.0* 7.8* 8.0*   PHOS 7.8* 6.8* 5.8*     Recent Labs     07/01/21  0549 07/02/21 0447   AST 7* 7*   ALT <5* <5*   BILIDIR <0.2 <0.2   BILITOT 0.4 0.3   ALKPHOS 186* 183*     Recent Labs     07/01/21  0549   INR 1.39*     No results for input(s): CKTOTAL, TROPONINI in the last 72 hours. Urinalysis:      Lab Results   Component Value Date    NITRU Negative 06/06/2021    WBCUA 36 06/06/2021    BACTERIA RARE 06/06/2021    RBCUA 587 06/06/2021    BLOODU LARGE 06/06/2021    SPECGRAV 1.019 06/06/2021    GLUCOSEU 100 06/06/2021       Radiology:  CT ABDOMEN PELVIS WO CONTRAST Additional Contrast? Oral   Final Result   1. Mild bibasilar segmental atelectasis versus pneumonia. 2. Negative for small bowel obstruction. 3. Mild ascites. Assessment/Plan:    Active Hospital Problems    Diagnosis     SBO (small bowel obstruction) ([facility] [address] [K51.979]      SBO versus ileus resolved  General surgery consulted  Abdominal exam benign  Clear liquids as tolerated and monitor bowel function  Hydration  As needed analgesics  CT abdomen pelvis showed no SBO     Leukocytosis  Pseudomonas peritonitis?  vancomycin and cefepime  Added 1 dose of meropenem after discussion with nephrology  ID consulted  PD fluid grows Pseudomonas aeruginosa  Blood culture NTD    Normocytic anemia  Hemoglobin 7.2 today  Has mildly elevated lactate at 2.1  Patient status post CABG  We will order 1 unit PRBC at this time    Persistent hypoglycemia  Patient has been n.p.o.   We will decrease tonight's dose of Lantus from 20 to 15 units  Dextrose added to sodium bicarb solution     CAD  Status post CABG  Stable  On high-dose aspirin  Apparently does not tolerate Plavix     ESRD  On PD  Nephrology following     Mild hyperkalemia  Nephrology to address with dialysis     Mild hyponatremia  As per nephrology     Anion gap metabolic acidosis  Likely in view of uremia  Lactate not elevated     Normocytic anemia  Hemoglobin stable    DVT Prophylaxis: Heparin  Diet: ADULT DIET;  Regular  Code Status: Full Code      Electronically signed by Kamla Cristobal MD on 7/3/2021 at 3:35 PM

## 2021-07-03 NOTE — PROGRESS NOTES
Office : [phone]     Fax :[phone]       Nephrology consult   Note      Patient's Name: Akash Bill  1:10 PM  7/3/2021    Reason for Consult:  ESRD    History of Present Ilness:    Akash Bill is a 51 y.o. female with severe end-stage renal disease secondary to diabetic nephropathy on peritoneal dialysis, hypertension, peripheral vascular disease, diabetic neuropathy who was admitted after she had a syncopal episode. She had similar episode 2 weeks ago. Recently had angiogram left lower extremity for gangrene left foot second toe. Denies any shortness of breath. Denies any abdominal pain. Denies any nausea vomiting. Interval HX :    PD fluid growing pseudomonas    C/o abdominal pain    PD overnight         Transferred from rehab to floor      KUB x-ray showed ileus   Passing flatus   Started on clears per surgery   High WBC   Poor oral intake       No chest pain  No sob  S/p CABG on 2010    Feeling weak and fatigued. Oral intake has been poor for last few days. Sodium level dropped to 125   Unable to take salt tablets       I/O last 3 completed shifts: In: 280.1 [P.O.:240; IV Piggyback:40.1]  Out: 80     Past Medical History:   Diagnosis Date    Diabetes mellitus ([facility] [address]     End stage kidney disease ([facility] [address]     peritoneal dialysis every night at home x 2 years    Hypertension     Neuropathy     Peripheral vascular disease ([facility] [address]        Past Surgical History:   Procedure Laterality Date     SECTION      CORONARY ARTERY BYPASS GRAFT N/A 2021    URGENT CABG CORONARY ARTERY BYPASS GRAFTS X3. VEIN TO PDA X1, VEIN TO OM X1, LEFT INTERNAL MAMMARY ARTERY TO LAD X1. ENDOSCOPIC HARVEST RIGHT LEG SAPHENOUS VEIN.  LIGATION NYA USING 35MM ATRICLIP. EPI AORTIC ULTRASOUND. TOTAL CARDIOPULMONARY BYPASS. DOPPLER GRAFT FLOW VERIFICATION. BILATERAL INITERCOSTAL NERVE BLOCK USING 1.3% EXPAREL.  performed by Feliciano Spears MD at [facility]  08/16/2018     lap PD cath placement lt side 62 cm    TOE AMPUTATION Left 2/26/2021    AMPUTATION OF THE LEFT THIRD TOE performed by Gabriela Santillan DPM at [id number] [place] [address] [address] TOE AMPUTATION Left 5/26/2021    AMPUTATION OF HALLUX AND SECOND TOE, LEFT FOOT performed by Gabriela Santillan DPM at [facility]         Family History   Problem Relation Age of Onset    Coronary Art Dis Mother     High Blood Pressure Mother     Heart Disease Mother     Diabetes Mother     Cancer Father     Coronary Art Dis Father     High Blood Pressure Father     Heart Disease Father     Diabetes Father      Current Medications:    0.9 % sodium chloride infusion, PRN  vancomycin 1000 mg IVPB in 250 mL D5W addavial, Once  meropenem (MERREM) 1,000 mg in sodium chloride 0.9 % 100 mL IVPB (mini-bag), Q24H  insulin detemir (LEVEMIR) injection pen 15 Units (Patient Supplied), Nightly  sodium bicarbonate 50 mEq in dextrose 5 % and 0.9 % NaCl 1,000 mL infusion, Continuous  metoclopramide (REGLAN) injection 5 mg, Q6H  dextrose 50 % IV solution, PRN  acetaminophen (TYLENOL) tablet 650 mg, Q6H PRN  aspirin EC tablet 325 mg, Daily  magnesium hydroxide (MILK OF MAGNESIA) 400 MG/5ML suspension 30 mL, Daily PRN  metoprolol tartrate (LOPRESSOR) tablet 25 mg, BID  pantoprazole (PROTONIX) tablet 40 mg, Daily  polyethylene glycol (GLYCOLAX) packet 17 g, Daily PRN  sennosides-docusate sodium (SENOKOT-S) 8.6-50 MG tablet 1 tablet, BID  traMADol (ULTRAM) tablet 50 mg, Q6H PRN   Or  traMADol (ULTRAM) tablet 100 mg, Q6H PRN  zolpidem (AMBIEN) tablet 5 mg, Nightly PRN  dextrose 5 % solution, PRN  dextrose 50 % IV solution, PRN  glucagon (rDNA) injection 1 mg, PRN  glucose (GLUTOSE) 40 % oral gel 15 g, PRN  gentamicin (GARAMYCIN) 0.1 % cream, Daily  atorvastatin (LIPITOR) tablet 20 mg, Nightly  bisacodyl (DULCOLAX) EC tablet 5 mg, Daily PRN  calcium carbonate (TUMS) chewable tablet 500 mg, TID PRN  diphenhydrAMINE (BENADRYL) tablet 25 mg, Q6H PRN  heparin (porcine) injection 3,400 Units, PRN  heparin (porcine) injection 5,000 Units, 3 times per day  hydrALAZINE (APRESOLINE) tablet 50 mg, Q8H PRN  miconazole (MICOTIN) 2 % powder, BID  ondansetron (ZOFRAN) injection 4 mg, Q6H PRN  ondansetron (ZOFRAN-ODT) disintegrating tablet 4 mg, Q8H PRN  polyethylene glycol (GLYCOLAX) packet 17 g, Daily  promethazine (PHENERGAN) tablet 12.5 mg, Q6H PRN  [Held by provider] torsemide (DEMADEX) tablet 100 mg, Daily  albuterol (PROVENTIL) nebulizer solution 2.5 mg, Q6H PRN  vancomycin (VANCOCIN) intermittent dosing (placeholder), RX Placeholder  insulin aspart (NOVOLOG) injection pen 0-18 Units - Patient Supply, TID WC  insulin aspart (NOVOLOG) injection pen 0-9 Units - Patient Supply, Nightly          Physical exam:     Vitals:  BP (!) 168/75   Pulse 65   Temp 97.8 °F (36.6 °C) (Oral)   Resp 20   Ht 5' 9\" (1.753 m)   Wt 217 lb 2.5 oz (98.5 kg)   SpO2 100%   BMI 32.07 kg/m²   Constitutional:  OAA X3 NAD  Skin: no rash, turgor wnl  Heent:  eomi, mmm  Neck: no bruits or jvd noted  Cardiovascular:  S1, S2 without m/r/g  Respiratory: CTA B without w/r/r  Abdomen:  +bs, soft, nt, nd  Ext: no  lower extremity edema      Labs:  CBC:   Recent Labs     07/01/21  0549 07/02/21 0447 07/03/21  0424   WBC 18.8* 10.7 10.4   HGB 8.0* 8.4* 7.2*    322 295     BMP:    Recent Labs     07/01/21  0549 07/02/21 0447 07/03/21  0423   * 125* 127*   K 5.2* 4.7 4.5   CL 85* 84* 85*   CO2 23 23 26   BUN 66* 73* 68*   CREATININE 10.0* 9.9* 9.2*   GLUCOSE 117* 275* 140*     Ca/Mg/Phos:   Recent Labs     07/01/21  0549 07/02/21  0447 07/03/21  0423   CALCIUM 8.0* 7.8* 8.0*   MG 1.80 1.80 1.80   PHOS 7.8* 6.8* 5.8*     UA:  No results for input(s): COLORU, CLARITYU, GLUCOSEU, Lylia Kimberly, UROBILINOGEN, NITRU, LEUKOCYTESUR, Charlton Ducking in the last 72 hours. Urine Microscopic:   No results for input(s): LABCAST, BACTERIA, COMU, HYALCAST, WBCUA, RBCUA, EPIU in the last 72 hours. Urine Culture:   No results for input(s): LABURIN in the last 72 hours. Urine Chemistry: No results for input(s): Hassie Home, PROTEINUR, NAUR in the last 72 hours. IMAGING:  CT ABDOMEN PELVIS WO CONTRAST Additional Contrast? Oral   Final Result   1. Mild bibasilar segmental atelectasis versus pneumonia. 2. Negative for small bowel obstruction. 3. Mild ascites. Assessment/Plan :        1 Abdominal pain  , Acute peritonitis      PD fluid cx : pseudomonas +    Will continue cefepime    Give 1 dose of IV meropenem    Cont vancomycin     ID consult      PD prescription changed , nurse informed       1. ESRD. Continue on peritoneal dialysis. continue cycler/CCPD as per current prescription  Sodium level low. Low albumin   Hold torsemide   Give albumin iv       KUB shows possible ileus versus small bowel obstruction. Was made n.p.o.  PD was not done last night. Change the fill volume to 1400 ml   PD site clean and no discharge. PD fluid clear   Cell count   Culture so far negative     2. HTN. BP slightly elevated   Started hydralazine 25 mg po tid   Continue current blood pressure medications    3. Anemia of chronic disease. Continue LUIZA    4. Acid- base disorder. Monitor    5. Hyponatremia   Poor oral intake    Sodium level better at 130  ---->  128 ---> 127 ---> 125   Will star mild hypertonic saline    Encourage increase oral intake    6. CAD/Syncopal episode. S/p [facility]   Has Multivessel CAD   S/p CABG       7. Peripheral vascular disease. H/o  amputation of left foot toes. Podiatry following   ? Source of infection   empirical abx     8. Generalized weakness. Getting rehab     9. Ileus.   Surgery on board.   Bowel rest.  Says has passed some flatus today  On clears             Thank you for allowing us to participate in care of Shaila Cordero         Electronically signed by: Natali Hines MD, 7/3/2021, 1:10 PM      [facility] of [address] [place] [address] S  Office : [phone]  Fax :[phone]

## 2021-07-04 LAB
ABO/RH: NORMAL
ANION GAP SERPL CALCULATED.3IONS-SCNC: 17 MMOL/L (ref 3–16)
ANTIBODY SCREEN: NORMAL
APPEARANCE FLUID: NORMAL
BASOPHILS ABSOLUTE: 0.1 K/UL (ref 0–0.2)
BASOPHILS RELATIVE PERCENT: 0.4 %
BUN BLDV-MCNC: 62 MG/DL (ref 7–20)
CALCIUM SERPL-MCNC: 7.7 MG/DL (ref 8.3–10.6)
CELL COUNT FLUID TYPE: NORMAL
CHLORIDE BLD-SCNC: 88 MMOL/L (ref 99–110)
CLOT EVALUATION: NORMAL
CO2: 24 MMOL/L (ref 21–32)
COLOR FLUID: NORMAL
CREAT SERPL-MCNC: 8.1 MG/DL (ref 0.6–1.1)
EOSINOPHIL FLUID: 2 %
EOSINOPHILS ABSOLUTE: 0.1 K/UL (ref 0–0.6)
EOSINOPHILS RELATIVE PERCENT: 0.6 %
GFR AFRICAN AMERICAN: 6
GFR NON-AFRICAN AMERICAN: 5
GLUCOSE BLD-MCNC: 118 MG/DL (ref 70–99)
GLUCOSE BLD-MCNC: 147 MG/DL (ref 70–99)
GLUCOSE BLD-MCNC: 150 MG/DL (ref 70–99)
GLUCOSE BLD-MCNC: 223 MG/DL (ref 70–99)
GLUCOSE BLD-MCNC: 288 MG/DL (ref 70–99)
GLUCOSE BLD-MCNC: 309 MG/DL (ref 70–99)
HCT VFR BLD CALC: 27.3 % (ref 36–48)
HEMOGLOBIN: 8.7 G/DL (ref 12–16)
LYMPHOCYTES ABSOLUTE: 0.5 K/UL (ref 1–5.1)
LYMPHOCYTES RELATIVE PERCENT: 3.3 %
LYMPHOCYTES, BODY FLUID: 4 %
MAGNESIUM: 1.7 MG/DL (ref 1.8–2.4)
MCH RBC QN AUTO: 29.2 PG (ref 26–34)
MCHC RBC AUTO-ENTMCNC: 32 G/DL (ref 31–36)
MCV RBC AUTO: 91.3 FL (ref 80–100)
MONOCYTE, FLUID: 2 %
MONOCYTES ABSOLUTE: 1.1 K/UL (ref 0–1.3)
MONOCYTES RELATIVE PERCENT: 7.1 %
NEUTROPHIL, FLUID: 92 %
NEUTROPHILS ABSOLUTE: 13 K/UL (ref 1.7–7.7)
NEUTROPHILS RELATIVE PERCENT: 88.6 %
NUCLEATED CELLS FLUID: 1780 /CUMM
NUMBER OF CELLS COUNTED FLUID: 100
PDW BLD-RTO: 19.6 % (ref 12.4–15.4)
PERFORMED ON: ABNORMAL
PHOSPHORUS: 5.5 MG/DL (ref 2.5–4.9)
PLATELET # BLD: 264 K/UL (ref 135–450)
PMV BLD AUTO: 8.6 FL (ref 5–10.5)
POTASSIUM SERPL-SCNC: 3.9 MMOL/L (ref 3.5–5.1)
RBC # BLD: 2.99 M/UL (ref 4–5.2)
RBC FLUID: <1000 /CUMM
SODIUM BLD-SCNC: 129 MMOL/L (ref 136–145)
WBC # BLD: 14.7 K/UL (ref 4–11)

## 2021-07-04 PROCEDURE — 2580000003 HC RX 258: Performed by: HOSPITALIST

## 2021-07-04 PROCEDURE — 80048 BASIC METABOLIC PNL TOTAL CA: CPT

## 2021-07-04 PROCEDURE — 36415 COLL VENOUS BLD VENIPUNCTURE: CPT

## 2021-07-04 PROCEDURE — 2060000000 HC ICU INTERMEDIATE R&B

## 2021-07-04 PROCEDURE — 2700000000 HC OXYGEN THERAPY PER DAY

## 2021-07-04 PROCEDURE — 99232 SBSQ HOSP IP/OBS MODERATE 35: CPT | Performed by: SURGERY

## 2021-07-04 PROCEDURE — 85025 COMPLETE CBC W/AUTO DIFF WBC: CPT

## 2021-07-04 PROCEDURE — 89051 BODY FLUID CELL COUNT: CPT

## 2021-07-04 PROCEDURE — 6360000002 HC RX W HCPCS: Performed by: NURSE PRACTITIONER

## 2021-07-04 PROCEDURE — 99233 SBSQ HOSP IP/OBS HIGH 50: CPT | Performed by: HOSPITALIST

## 2021-07-04 PROCEDURE — 6360000002 HC RX W HCPCS: Performed by: INTERNAL MEDICINE

## 2021-07-04 PROCEDURE — 6360000002 HC RX W HCPCS: Performed by: HOSPITALIST

## 2021-07-04 PROCEDURE — 90945 DIALYSIS ONE EVALUATION: CPT

## 2021-07-04 PROCEDURE — 84100 ASSAY OF PHOSPHORUS: CPT

## 2021-07-04 PROCEDURE — 6370000000 HC RX 637 (ALT 250 FOR IP): Performed by: INTERNAL MEDICINE

## 2021-07-04 PROCEDURE — 83735 ASSAY OF MAGNESIUM: CPT

## 2021-07-04 PROCEDURE — 2500000003 HC RX 250 WO HCPCS: Performed by: INTERNAL MEDICINE

## 2021-07-04 RX ORDER — LABETALOL HYDROCHLORIDE 5 MG/ML
20 INJECTION, SOLUTION INTRAVENOUS EVERY 4 HOURS PRN
Status: DISCONTINUED | OUTPATIENT
Start: 2021-07-04 | End: 2021-07-04

## 2021-07-04 RX ORDER — MORPHINE SULFATE 2 MG/ML
1 INJECTION, SOLUTION INTRAMUSCULAR; INTRAVENOUS ONCE
Status: COMPLETED | OUTPATIENT
Start: 2021-07-04 | End: 2021-07-04

## 2021-07-04 RX ORDER — HEPARIN SODIUM 1000 [USP'U]/ML
1000 INJECTION, SOLUTION INTRAVENOUS; SUBCUTANEOUS PRN
Status: DISCONTINUED | OUTPATIENT
Start: 2021-07-04 | End: 2021-07-10

## 2021-07-04 RX ORDER — LABETALOL HYDROCHLORIDE 5 MG/ML
10 INJECTION, SOLUTION INTRAVENOUS EVERY 4 HOURS PRN
Status: DISCONTINUED | OUTPATIENT
Start: 2021-07-04 | End: 2021-07-14 | Stop reason: HOSPADM

## 2021-07-04 RX ADMIN — HEPARIN SODIUM 5000 UNITS: 5000 INJECTION INTRAVENOUS; SUBCUTANEOUS at 21:49

## 2021-07-04 RX ADMIN — MORPHINE SULFATE 1 MG: 2 INJECTION, SOLUTION INTRAMUSCULAR; INTRAVENOUS at 04:22

## 2021-07-04 RX ADMIN — ACETAMINOPHEN 650 MG: 325 TABLET ORAL at 08:30

## 2021-07-04 RX ADMIN — PANTOPRAZOLE SODIUM 40 MG: 40 TABLET, DELAYED RELEASE ORAL at 05:29

## 2021-07-04 RX ADMIN — STANDARDIZED SENNA CONCENTRATE AND DOCUSATE SODIUM 1 TABLET: 8.6; 5 TABLET ORAL at 08:30

## 2021-07-04 RX ADMIN — METOPROLOL TARTRATE 25 MG: 25 TABLET, FILM COATED ORAL at 21:09

## 2021-07-04 RX ADMIN — METOCLOPRAMIDE 5 MG: 5 INJECTION, SOLUTION INTRAMUSCULAR; INTRAVENOUS at 02:18

## 2021-07-04 RX ADMIN — HEPARIN SODIUM 5000 UNITS: 5000 INJECTION INTRAVENOUS; SUBCUTANEOUS at 05:30

## 2021-07-04 RX ADMIN — METOCLOPRAMIDE 5 MG: 5 INJECTION, SOLUTION INTRAMUSCULAR; INTRAVENOUS at 21:08

## 2021-07-04 RX ADMIN — LABETALOL HYDROCHLORIDE 10 MG: 5 INJECTION INTRAVENOUS at 17:36

## 2021-07-04 RX ADMIN — MEROPENEM 1000 MG: 1 INJECTION, POWDER, FOR SOLUTION INTRAVENOUS at 11:54

## 2021-07-04 RX ADMIN — TRAMADOL HYDROCHLORIDE 100 MG: 50 TABLET, FILM COATED ORAL at 21:49

## 2021-07-04 RX ADMIN — METOPROLOL TARTRATE 25 MG: 25 TABLET, FILM COATED ORAL at 08:29

## 2021-07-04 RX ADMIN — METOCLOPRAMIDE 5 MG: 5 INJECTION, SOLUTION INTRAMUSCULAR; INTRAVENOUS at 15:06

## 2021-07-04 RX ADMIN — GENTAMICIN SULFATE: 1 CREAM TOPICAL at 21:20

## 2021-07-04 RX ADMIN — STANDARDIZED SENNA CONCENTRATE AND DOCUSATE SODIUM 1 TABLET: 8.6; 5 TABLET ORAL at 21:09

## 2021-07-04 RX ADMIN — TRAMADOL HYDROCHLORIDE 100 MG: 50 TABLET, FILM COATED ORAL at 00:20

## 2021-07-04 RX ADMIN — TRAMADOL HYDROCHLORIDE 50 MG: 50 TABLET, FILM COATED ORAL at 12:51

## 2021-07-04 RX ADMIN — LABETALOL HYDROCHLORIDE 10 MG: 5 INJECTION INTRAVENOUS at 12:51

## 2021-07-04 RX ADMIN — METOCLOPRAMIDE 5 MG: 5 INJECTION, SOLUTION INTRAMUSCULAR; INTRAVENOUS at 08:29

## 2021-07-04 RX ADMIN — HEPARIN SODIUM 5000 UNITS: 5000 INJECTION INTRAVENOUS; SUBCUTANEOUS at 14:09

## 2021-07-04 RX ADMIN — CEFTAZIDIME: 1 INJECTION, POWDER, FOR SOLUTION INTRAMUSCULAR; INTRAVENOUS at 15:12

## 2021-07-04 RX ADMIN — ASPIRIN 325 MG: 325 TABLET, COATED ORAL at 08:29

## 2021-07-04 RX ADMIN — TRAMADOL HYDROCHLORIDE 100 MG: 50 TABLET, FILM COATED ORAL at 06:25

## 2021-07-04 RX ADMIN — ATORVASTATIN CALCIUM 20 MG: 20 TABLET, FILM COATED ORAL at 21:09

## 2021-07-04 ASSESSMENT — PAIN DESCRIPTION - LOCATION
LOCATION: ABDOMEN

## 2021-07-04 ASSESSMENT — PAIN SCALES - GENERAL
PAINLEVEL_OUTOF10: 10
PAINLEVEL_OUTOF10: 10
PAINLEVEL_OUTOF10: 8
PAINLEVEL_OUTOF10: 4
PAINLEVEL_OUTOF10: 7
PAINLEVEL_OUTOF10: 4
PAINLEVEL_OUTOF10: 4
PAINLEVEL_OUTOF10: 8
PAINLEVEL_OUTOF10: 2
PAINLEVEL_OUTOF10: 3
PAINLEVEL_OUTOF10: 9
PAINLEVEL_OUTOF10: 6

## 2021-07-04 ASSESSMENT — PAIN DESCRIPTION - ORIENTATION
ORIENTATION: ANTERIOR
ORIENTATION: RIGHT
ORIENTATION: ANTERIOR

## 2021-07-04 ASSESSMENT — PAIN DESCRIPTION - DESCRIPTORS
DESCRIPTORS: ACHING;SORE
DESCRIPTORS: ACHING;CONSTANT
DESCRIPTORS: ACHING;SORE
DESCRIPTORS: ACHING;CONSTANT
DESCRIPTORS: ACHING;CRAMPING
DESCRIPTORS: ACHING;CONSTANT

## 2021-07-04 ASSESSMENT — PAIN DESCRIPTION - PAIN TYPE
TYPE: ACUTE PAIN

## 2021-07-04 ASSESSMENT — PAIN DESCRIPTION - PROGRESSION: CLINICAL_PROGRESSION: GRADUALLY WORSENING

## 2021-07-04 ASSESSMENT — PAIN - FUNCTIONAL ASSESSMENT: PAIN_FUNCTIONAL_ASSESSMENT: PREVENTS OR INTERFERES SOME ACTIVE ACTIVITIES AND ADLS

## 2021-07-04 ASSESSMENT — PAIN DESCRIPTION - ONSET: ONSET: ON-GOING

## 2021-07-04 NOTE — PROGRESS NOTES
Hospitalist Progress Note      PCP: Deidra Menendez MD    Date of Admission: 7/1/2021    Chief Complaint: Nausea and vomiting    Hospital Course: 51 y.o. female with past medical history of ESRD on PD, CAD status post CABG, diabetes, hypertension, peripheral vascular disease status post partial foot accommodation was initially admitted to [facility] 5/31/2021 for dizziness. At that time cardiac catheterization was performed which showed multivessel disease. Patient undergone CABG. Subsequently patient was discharged to [facility] acute rehab unit where she was undergoing therapy. She was being followed by nephrology in view of her ESRD for PD. She subsequently developed abdominal discomfort nausea and vomiting. Imaging suggestive of ileus versus SBO. Surgery was consulted at [facility]U who suggested transfer to acute care. Patient states that she was not able to tolerate anything p.o. or since the day before yesterday. Her last bowel movement was approximately that time, no melena or hematochezia, hard. Today her labs demonstrate hyponatremia, hyperkalemia, BUN/creatinine consistent with ESRD patient, and leukocytosis to 18. Fluid from PD catheter has grown Pseudomonas aeruginosa. Discussed with nephrology, increased dose of cefepime, give additional dose of meropenem, ID consulted. Subjective: Patient seen and examined at bedside. She feels somewhat better than yesterday.     Medications:  Reviewed    Infusion Medications    sodium chloride      dextrose       Scheduled Medications    insulin detemir  15 Units Subcutaneous Nightly    meropenem  1,000 mg Intravenous Q24H    metoclopramide  5 mg Intravenous Q6H    aspirin  325 mg Oral Daily    metoprolol tartrate  25 mg Oral BID    pantoprazole  40 mg Oral Daily    sennosides-docusate sodium  1 tablet Oral BID    gentamicin   Topical Daily    atorvastatin  20 mg Oral Nightly    heparin (porcine)  5,000 Units Subcutaneous 3 times per day    miconazole   Topical BID    polyethylene glycol  17 g Oral Daily    [Held by provider] torsemide  100 mg Oral Daily    insulin aspart  0-18 Units Subcutaneous TID WC    insulin aspart  0-9 Units Subcutaneous Nightly     PRN Meds: sodium chloride, labetalol, dextrose, acetaminophen, magnesium hydroxide, polyethylene glycol, traMADol **OR** traMADol, zolpidem, dextrose, dextrose, glucagon (rDNA), glucose, bisacodyl, calcium carbonate, diphenhydrAMINE, heparin (porcine), hydrALAZINE, ondansetron, ondansetron, promethazine, albuterol      Intake/Output Summary (Last 24 hours) at 7/4/2021 1235  Last data filed at 7/4/2021 1126  Gross per 24 hour   Intake 3101.65 ml   Output 200 ml   Net 2901.65 ml       Physical Exam Performed:    BP (!) 188/86 Comment: pt refusing PRN meds at this time  Pulse 85   Temp 97.6 °F (36.4 °C) (Oral)   Resp 18   Ht 5' 9\" (1.753 m)   Wt 217 lb 2.5 oz (98.5 kg)   SpO2 99%   BMI 32.07 kg/m²     General appearance: No apparent distress, appears stated age and cooperative. HEENT: Pupils equal, round, and reactive to light. Conjunctivae/corneas clear. Neck: Supple, with full range of motion. No jugular venous distention. Trachea midline. Respiratory:  Normal respiratory effort. Clear to auscultation, bilaterally without Rales/Wheezes/Rhonchi. Cardiovascular: Regular rate and rhythm with normal S1/S2 without murmurs, rubs or gallops. Abdomen: Soft, non-tender, non-distended with normal bowel sounds. Musculoskeletal: No clubbing, cyanosis or edema bilaterally. Full range of motion without deformity. Skin: Skin color, texture, turgor normal.  No rashes or lesions. Neurologic:  Neurovascularly intact without any focal sensory/motor deficits.  Cranial nerves: II-XII intact, grossly non-focal.  Psychiatric: Alert and oriented, thought content appropriate, normal insight  Peripheral Pulses: +2 palpable, equal bilaterally       Labs:   Recent Labs     07/02/21  5634 07/03/21  0424 07/04/21  0459   WBC 10.7 10.4 14.7*   HGB 8.4* 7.2* 8.7*   HCT 25.9* 22.7* 27.3*    295 264     Recent Labs     07/02/21  0447 07/03/21  0423 07/04/21  0459   * 127* 129*   K 4.7 4.5 3.9   CL 84* 85* 88*   CO2 23 26 24   BUN 73* 68* 62*   CREATININE 9.9* 9.2* 8.1*   CALCIUM 7.8* 8.0* 7.7*   PHOS 6.8* 5.8* 5.5*     Recent Labs     07/02/21 0447   AST 7*   ALT <5*   BILIDIR <0.2   BILITOT 0.3   ALKPHOS 183*     No results for input(s): INR in the last 72 hours. No results for input(s): Jadene Moh in the last 72 hours. Urinalysis:      Lab Results   Component Value Date    NITRU Negative 06/06/2021    WBCUA 36 06/06/2021    BACTERIA RARE 06/06/2021    RBCUA 587 06/06/2021    BLOODU LARGE 06/06/2021    SPECGRAV 1.019 06/06/2021    GLUCOSEU 100 06/06/2021       Radiology:  CT ABDOMEN PELVIS WO CONTRAST Additional Contrast? Oral   Final Result   1. Mild bibasilar segmental atelectasis versus pneumonia. 2. Negative for small bowel obstruction. 3. Mild ascites.                  Assessment/Plan:    Active Hospital Problems    Diagnosis     SBO (small bowel obstruction) ([facility] [K56.609]      SBO versus ileus resolved  General surgery consulted  Abdominal exam benign  Clear liquids as tolerated and monitor bowel function  Hydration  As needed analgesics  CT abdomen pelvis showed no SBO     Leukocytosis  Pseudomonas peritonitis  vancomycin and cefepime discontinued  ID recommendations appreciated  IV Merrem  Ceftazidime more Cipro in dialysate, discussed with nephrology  PD fluid grows Pseudomonas aeruginosa  Blood culture NTD    Normocytic anemia  Hemoglobin 8.7 today  Has mildly elevated lactate at 2.1  Patient status post CABG  Status post 1 unit PRBC 7/3/2021    Persistent hypoglycemia-resolved  Now patient is actually hyperglycemic  Stopped dextrose infusion  Increased Lantus to 15 units tonight     CAD  Status post CABG  Stable  On high-dose aspirin  Apparently does not tolerate Plavix    Hypertension  She is on Lopressor 25 twice daily  She has hydralazine and labetalol as needed  Anticipate nephrology recommendations     ESRD  On PD  Nephrology following     Mild hyperkalemia  Resolved     Mild hyponatremia  As per nephrology     Anion gap metabolic acidosis  Likely in view of uremia  Lactate not elevated       DVT Prophylaxis: Heparin  Diet: ADULT DIET;  Regular  Code Status: Full Code      Electronically signed by Telma Huggins MD on 7/4/2021 at 12:35 PM

## 2021-07-04 NOTE — PLAN OF CARE
Problem: Falls - Risk of:  Goal: Will remain free from falls  Description: Will remain free from falls  7/4/2021 1757 by Anjali Fisher RN  Outcome: Ongoing  Goal: Absence of physical injury  Description: Absence of physical injury  Outcome: Ongoing     Problem: Skin Integrity:  Goal: Will show no infection signs and symptoms  Description: Will show no infection signs and symptoms  Outcome: Ongoing  Goal: Absence of new skin breakdown  Description: Absence of new skin breakdown  Outcome: Ongoing     Problem: Pain:  Goal: Pain level will decrease  Description: Pain level will decrease  7/4/2021 1757 by Anjali Fisher RN  Outcome: Ongoing    Goal: Control of acute pain  Description: Control of acute pain  Outcome: Ongoing  Goal: Control of chronic pain  Description: Control of chronic pain  Outcome: Ongoing   Pt remains free from falls and physical injury. Pt receiving antibiotics for current infection and remains afebrile. Pt shows no new signs of skin breakdown. Pain controlled with current plan of care and pt stated pain is tolerable at this time. Pt BP elevated and IV labetalol given.

## 2021-07-04 NOTE — PLAN OF CARE
Problem: Falls - Risk of:  Goal: Will remain free from falls  Description: Will remain free from falls  Outcome: Ongoing  Goal: Absence of physical injury  Description: Absence of physical injury  Outcome: Ongoing     Problem: Skin Integrity:  Goal: Will show no infection signs and symptoms  Description: Will show no infection signs and symptoms  Outcome: Ongoing  Goal: Absence of new skin breakdown  Description: Absence of new skin breakdown  Outcome: Ongoing     Problem: Pain:  Goal: Pain level will decrease  Description: Pain level will decrease  Outcome: Ongoing  Goal: Control of acute pain  Description: Control of acute pain  Outcome: Ongoing  Goal: Control of chronic pain  Description: Control of chronic pain  Outcome: Ongoing     7/3 D: Turns self slightly, but still in pain. PT up to the chair for a few minutes. PD fluid + for pseudomonas started on meropenum, continued vanc, increase in cefepime. ID consulted and they want to add abx to PD fluid (see note). Labetalol added due to refusing hydralazine stating that it almost killed her in the ED. 1x dose of labetalol, PT became SOB, but VSS. 1 u PRBC started due to Hgb goal of 8 per Dr. Sandra Lomas. Blood positive for antibodies and TAMIKA (IgG) per blood bank. NaHCO3 changed to NS w 5% Dex for blood sugar control due to sugars wildly fluctuating during the day. No sliding scale insulin was given for correction and D50 was given x2.

## 2021-07-04 NOTE — PROGRESS NOTES
Dialysis charge RN called & informed that antibiotic PD exchange completed at 1510 using Tenkoff catheter & capped sterilely, non-Tenkoff connecter disconnected for manual exchange. Maggy RN verbalized understanding & stated PD RN will bring sterile connecter tubing for cycler @ 2100. Viet Henry RN, BSN, PCCN.

## 2021-07-04 NOTE — PROGRESS NOTES
Patient's primary RN,  Melani Cruz called dialysis charge phone regarding frequent PD cycler alrming during 1st cycle of drain phase. Pat RN observed signignificant amt of fibrin in effluent. Called Dr Sandra Alvarenga and made aware of issue and order received to add Heparin 500units to 5liter bag when restart [facility] in am. Report give to OSMAR Jeffries. Jatinder Vo

## 2021-07-04 NOTE — PLAN OF CARE
Problem: Falls - Risk of:  Goal: Will remain free from falls  Description: Will remain free from falls  7/4/2021 0651 by Mayela Zacarias RN  Note: Patient free from harm. ID bands on, bed in lowest position, call light in reach. Patient instructed to call for help if needed. Patient educated on ambulation and safety. 7/3/2021 2047 by Mari Mendoza RN  Outcome: Ongoing  Goal: Absence of physical injury  Description: Absence of physical injury  7/3/2021 2047 by Mari Mendoza RN  Outcome: Ongoing     Problem: Skin Integrity:  Goal: Will show no infection signs and symptoms  Description: Will show no infection signs and symptoms  7/3/2021 2047 by Mari Mendoza RN  Outcome: Ongoing  Goal: Absence of new skin breakdown  Description: Absence of new skin breakdown  7/3/2021 2047 by Mari Mendoza RN  Outcome: Ongoing     Problem: Pain:  Goal: Pain level will decrease  Description: Pain level will decrease  7/4/2021 0651 by Mayela Zacarias RN  Note: Pain assessed. Patient received  moderate relief with medication. Patient offered other alternatives such as distraction. Pain reassessed post medication.     7/3/2021 2047 by Mari Mendoza RN  Outcome: Ongoing  Goal: Control of acute pain  Description: Control of acute pain  7/3/2021 2047 by Mari Mendoza RN  Outcome: Ongoing  Goal: Control of chronic pain  Description: Control of chronic pain  7/3/2021 2047 by Mari Mendoza RN  Outcome: Ongoing

## 2021-07-04 NOTE — PROGRESS NOTES
Pt refused ordered 20 mg of IV labetalol for elevated BP (180s/100s) and wanted no more than 10 mg. MD notified and orders changed to 10 mg IV labetalol Q4 to keep patient agreeable. 10 mg of IV labetalol given see in MAR.

## 2021-07-04 NOTE — FLOWSHEET NOTE
07/04/21 0815   Vitals   BP (!) 171/91   Temp 97.8 °F (36.6 °C)   Temp Source Oral   Pulse 83   Resp 18   Weight 233 lb 0.4 oz (105.7 kg)   Peritoneal Dialysis Catheter Left lower abdomen   Placement Date/Time: 08/16/18 0818   Inserted by: Jesus Avitia MD  Catheter Location: Left lower abdomen   Status Deaccessed   Dressing Status Clean;Dry; Intact   Cycler   Ultrafiltration (UF) (mL) -681 mL   Average Dwell Time (Hours:Minutes) 01:15   Lost Dwell Time (Hours:Minutes) 01:34   Disconnected from [facility] per protocol. Effluent: yellow, cloudy, fibrin  Total time: 11 hr 52 min   Total UF:  -681 ml. Total Volume:  8417 ml. Dwell time gained:  1 hr 34 min.   Pt Tolerated procedure: fair, alarms with draining   Report given to: Tyrel Santos  Last BM: unsure, couple days ago per pt    Effluent fluid sent to lab for cell count

## 2021-07-05 LAB
ANION GAP SERPL CALCULATED.3IONS-SCNC: 18 MMOL/L (ref 3–16)
ANISOCYTOSIS: ABNORMAL
APPEARANCE FLUID: NORMAL
BASOPHILS ABSOLUTE: 0 K/UL (ref 0–0.2)
BASOPHILS RELATIVE PERCENT: 0 %
BLOOD CULTURE, ROUTINE: NORMAL
BUN BLDV-MCNC: 59 MG/DL (ref 7–20)
CALCIUM SERPL-MCNC: 7.7 MG/DL (ref 8.3–10.6)
CELL COUNT FLUID TYPE: NORMAL
CHLORIDE BLD-SCNC: 90 MMOL/L (ref 99–110)
CLOT EVALUATION: NORMAL
CO2: 22 MMOL/L (ref 21–32)
COLOR FLUID: NORMAL
CREAT SERPL-MCNC: 7.5 MG/DL (ref 0.6–1.1)
EOSINOPHIL FLUID: 1 %
EOSINOPHILS ABSOLUTE: 0 K/UL (ref 0–0.6)
EOSINOPHILS RELATIVE PERCENT: 0 %
GFR AFRICAN AMERICAN: 7
GFR NON-AFRICAN AMERICAN: 6
GLUCOSE BLD-MCNC: 107 MG/DL (ref 70–99)
GLUCOSE BLD-MCNC: 118 MG/DL (ref 70–99)
GLUCOSE BLD-MCNC: 123 MG/DL (ref 70–99)
GLUCOSE BLD-MCNC: 127 MG/DL (ref 70–99)
GLUCOSE BLD-MCNC: 214 MG/DL (ref 70–99)
HCT VFR BLD CALC: 28.1 % (ref 36–48)
HEMOGLOBIN: 9 G/DL (ref 12–16)
LYMPHOCYTES ABSOLUTE: 0.9 K/UL (ref 1–5.1)
LYMPHOCYTES RELATIVE PERCENT: 7 %
MACROPHAGE FLUID: 2 %
MAGNESIUM: 1.7 MG/DL (ref 1.8–2.4)
MCH RBC QN AUTO: 29.5 PG (ref 26–34)
MCHC RBC AUTO-ENTMCNC: 32 G/DL (ref 31–36)
MCV RBC AUTO: 92.1 FL (ref 80–100)
METAMYELOCYTES RELATIVE PERCENT: 1 %
MONOCYTES ABSOLUTE: 0.6 K/UL (ref 0–1.3)
MONOCYTES RELATIVE PERCENT: 5 %
NEUTROPHIL, FLUID: 97 %
NEUTROPHILS ABSOLUTE: 11.3 K/UL (ref 1.7–7.7)
NEUTROPHILS RELATIVE PERCENT: 87 %
NUCLEATED CELLS FLUID: 6959 /CUMM
NUMBER OF CELLS COUNTED FLUID: 100
OVALOCYTES: ABNORMAL
PDW BLD-RTO: 19.5 % (ref 12.4–15.4)
PERFORMED ON: ABNORMAL
PHOSPHORUS: 5.8 MG/DL (ref 2.5–4.9)
PLATELET # BLD: 261 K/UL (ref 135–450)
PLATELET SLIDE REVIEW: ADEQUATE
PMV BLD AUTO: 9.1 FL (ref 5–10.5)
POLYCHROMASIA: ABNORMAL
POTASSIUM SERPL-SCNC: 3.8 MMOL/L (ref 3.5–5.1)
RBC # BLD: 3.06 M/UL (ref 4–5.2)
RBC FLUID: 2500 /CUMM
SLIDE REVIEW: ABNORMAL
SMUDGE CELLS: PRESENT
SODIUM BLD-SCNC: 130 MMOL/L (ref 136–145)
TEAR DROP CELLS: ABNORMAL
VACUOLATED NEUTROPHILS: PRESENT
WBC # BLD: 12.8 K/UL (ref 4–11)

## 2021-07-05 PROCEDURE — 6360000002 HC RX W HCPCS: Performed by: INTERNAL MEDICINE

## 2021-07-05 PROCEDURE — 6370000000 HC RX 637 (ALT 250 FOR IP): Performed by: INTERNAL MEDICINE

## 2021-07-05 PROCEDURE — 2580000003 HC RX 258: Performed by: HOSPITALIST

## 2021-07-05 PROCEDURE — 2060000000 HC ICU INTERMEDIATE R&B

## 2021-07-05 PROCEDURE — 83735 ASSAY OF MAGNESIUM: CPT

## 2021-07-05 PROCEDURE — 84100 ASSAY OF PHOSPHORUS: CPT

## 2021-07-05 PROCEDURE — 80048 BASIC METABOLIC PNL TOTAL CA: CPT

## 2021-07-05 PROCEDURE — 99233 SBSQ HOSP IP/OBS HIGH 50: CPT | Performed by: HOSPITALIST

## 2021-07-05 PROCEDURE — 2500000003 HC RX 250 WO HCPCS: Performed by: INTERNAL MEDICINE

## 2021-07-05 PROCEDURE — 90945 DIALYSIS ONE EVALUATION: CPT

## 2021-07-05 PROCEDURE — 36415 COLL VENOUS BLD VENIPUNCTURE: CPT

## 2021-07-05 PROCEDURE — 6360000002 HC RX W HCPCS: Performed by: HOSPITALIST

## 2021-07-05 PROCEDURE — 99231 SBSQ HOSP IP/OBS SF/LOW 25: CPT | Performed by: SURGERY

## 2021-07-05 PROCEDURE — 6360000002 HC RX W HCPCS: Performed by: NURSE PRACTITIONER

## 2021-07-05 PROCEDURE — 85025 COMPLETE CBC W/AUTO DIFF WBC: CPT

## 2021-07-05 PROCEDURE — 89051 BODY FLUID CELL COUNT: CPT

## 2021-07-05 RX ORDER — MAGNESIUM SULFATE IN WATER 40 MG/ML
2000 INJECTION, SOLUTION INTRAVENOUS ONCE
Status: COMPLETED | OUTPATIENT
Start: 2021-07-05 | End: 2021-07-05

## 2021-07-05 RX ADMIN — PANTOPRAZOLE SODIUM 40 MG: 40 TABLET, DELAYED RELEASE ORAL at 05:50

## 2021-07-05 RX ADMIN — METOCLOPRAMIDE 5 MG: 5 INJECTION, SOLUTION INTRAMUSCULAR; INTRAVENOUS at 08:00

## 2021-07-05 RX ADMIN — STANDARDIZED SENNA CONCENTRATE AND DOCUSATE SODIUM 1 TABLET: 8.6; 5 TABLET ORAL at 21:14

## 2021-07-05 RX ADMIN — METOPROLOL TARTRATE 25 MG: 25 TABLET, FILM COATED ORAL at 08:00

## 2021-07-05 RX ADMIN — HEPARIN SODIUM 5000 UNITS: 5000 INJECTION INTRAVENOUS; SUBCUTANEOUS at 22:32

## 2021-07-05 RX ADMIN — METOPROLOL TARTRATE 25 MG: 25 TABLET, FILM COATED ORAL at 21:13

## 2021-07-05 RX ADMIN — METOCLOPRAMIDE 5 MG: 5 INJECTION, SOLUTION INTRAMUSCULAR; INTRAVENOUS at 15:37

## 2021-07-05 RX ADMIN — ONDANSETRON 4 MG: 2 INJECTION INTRAMUSCULAR; INTRAVENOUS at 17:56

## 2021-07-05 RX ADMIN — METOCLOPRAMIDE 5 MG: 5 INJECTION, SOLUTION INTRAMUSCULAR; INTRAVENOUS at 21:13

## 2021-07-05 RX ADMIN — ATORVASTATIN CALCIUM 20 MG: 20 TABLET, FILM COATED ORAL at 21:13

## 2021-07-05 RX ADMIN — MICONAZOLE NITRATE: 20 POWDER TOPICAL at 21:15

## 2021-07-05 RX ADMIN — MEROPENEM 1000 MG: 1 INJECTION, POWDER, FOR SOLUTION INTRAVENOUS at 12:14

## 2021-07-05 RX ADMIN — HEPARIN SODIUM 1000 UNITS: 1000 INJECTION INTRAVENOUS; SUBCUTANEOUS at 20:41

## 2021-07-05 RX ADMIN — GENTAMICIN SULFATE: 1 CREAM TOPICAL at 20:41

## 2021-07-05 RX ADMIN — METOCLOPRAMIDE 5 MG: 5 INJECTION, SOLUTION INTRAMUSCULAR; INTRAVENOUS at 04:17

## 2021-07-05 RX ADMIN — CEFTAZIDIME: 1 INJECTION, POWDER, FOR SOLUTION INTRAMUSCULAR; INTRAVENOUS at 13:20

## 2021-07-05 RX ADMIN — TRAMADOL HYDROCHLORIDE 100 MG: 50 TABLET, FILM COATED ORAL at 04:16

## 2021-07-05 RX ADMIN — MAGNESIUM SULFATE HEPTAHYDRATE 2000 MG: 40 INJECTION, SOLUTION INTRAVENOUS at 09:58

## 2021-07-05 RX ADMIN — ASPIRIN 325 MG: 325 TABLET, COATED ORAL at 08:00

## 2021-07-05 RX ADMIN — ZOLPIDEM TARTRATE 5 MG: 5 TABLET, COATED ORAL at 22:48

## 2021-07-05 RX ADMIN — HEPARIN SODIUM 5000 UNITS: 5000 INJECTION INTRAVENOUS; SUBCUTANEOUS at 05:50

## 2021-07-05 RX ADMIN — LABETALOL HYDROCHLORIDE 10 MG: 5 INJECTION INTRAVENOUS at 00:40

## 2021-07-05 RX ADMIN — ONDANSETRON 4 MG: 2 INJECTION INTRAMUSCULAR; INTRAVENOUS at 00:45

## 2021-07-05 RX ADMIN — HEPARIN SODIUM 5000 UNITS: 5000 INJECTION INTRAVENOUS; SUBCUTANEOUS at 13:40

## 2021-07-05 ASSESSMENT — PAIN SCALES - GENERAL
PAINLEVEL_OUTOF10: 3
PAINLEVEL_OUTOF10: 7
PAINLEVEL_OUTOF10: 2
PAINLEVEL_OUTOF10: 0
PAINLEVEL_OUTOF10: 3
PAINLEVEL_OUTOF10: 2
PAINLEVEL_OUTOF10: 3

## 2021-07-05 ASSESSMENT — PAIN DESCRIPTION - PAIN TYPE
TYPE: ACUTE PAIN

## 2021-07-05 ASSESSMENT — PAIN DESCRIPTION - ORIENTATION
ORIENTATION: ANTERIOR

## 2021-07-05 ASSESSMENT — PAIN DESCRIPTION - DESCRIPTORS
DESCRIPTORS: ACHING;CONSTANT
DESCRIPTORS: ACHING;CONSTANT
DESCRIPTORS: ACHING
DESCRIPTORS: ACHING;CONSTANT
DESCRIPTORS: ACHING

## 2021-07-05 ASSESSMENT — PAIN DESCRIPTION - ONSET
ONSET: ON-GOING
ONSET: ON-GOING

## 2021-07-05 ASSESSMENT — PAIN DESCRIPTION - LOCATION
LOCATION: ABDOMEN

## 2021-07-05 ASSESSMENT — PAIN DESCRIPTION - PROGRESSION
CLINICAL_PROGRESSION: GRADUALLY IMPROVING
CLINICAL_PROGRESSION: GRADUALLY IMPROVING

## 2021-07-05 ASSESSMENT — PAIN - FUNCTIONAL ASSESSMENT: PAIN_FUNCTIONAL_ASSESSMENT: PREVENTS OR INTERFERES SOME ACTIVE ACTIVITIES AND ADLS

## 2021-07-05 ASSESSMENT — PAIN DESCRIPTION - FREQUENCY: FREQUENCY: INTERMITTENT

## 2021-07-05 NOTE — PROGRESS NOTES
Hospitalist Progress Note      PCP: Jeet Pina MD    Date of Admission: 7/1/2021    Chief Complaint: Nausea and vomiting    Hospital Course: 51 y.o. female with past medical history of ESRD on PD, CAD status post CABG, diabetes, hypertension, peripheral vascular disease status post partial foot accommodation was initially admitted to [facility] 5/31/2021 for dizziness. At that time cardiac catheterization was performed which showed multivessel disease. Patient undergone CABG. Subsequently patient was discharged to [facility] acute rehab unit where she was undergoing therapy. She was being followed by nephrology in view of her ESRD for PD. She subsequently developed abdominal discomfort nausea and vomiting. Imaging suggestive of ileus versus SBO. Surgery was consulted at ARU who suggested transfer to acute care. Patient states that she was not able to tolerate anything p.o. or since the day before yesterday. Her last bowel movement was approximately that time, no melena or hematochezia, hard. Today her labs demonstrate hyponatremia, hyperkalemia, BUN/creatinine consistent with ESRD patient, and leukocytosis to 18. Fluid from PD catheter has grown Pseudomonas aeruginosa. Discussed with nephrology, increased dose of cefepime, give additional dose of meropenem, ID consulted. Subjective: Patient seen and examined at bedside. She states she was nauseous again overnight.     Medications:  Reviewed    Infusion Medications    sodium chloride      dextrose       Scheduled Medications    custom dialysis builder   Intraperitoneal Daily    insulin detemir  15 Units Subcutaneous Nightly    meropenem  1,000 mg Intravenous Q24H    metoclopramide  5 mg Intravenous Q6H    aspirin  325 mg Oral Daily    metoprolol tartrate  25 mg Oral BID    pantoprazole  40 mg Oral Daily    sennosides-docusate sodium  1 tablet Oral BID    gentamicin   Topical Daily    atorvastatin  20 mg Oral Nightly    heparin (porcine)  5,000 Units Subcutaneous 3 times per day    miconazole   Topical BID    polyethylene glycol  17 g Oral Daily    [Held by provider] torsemide  100 mg Oral Daily    insulin aspart  0-18 Units Subcutaneous TID WC    insulin aspart  0-9 Units Subcutaneous Nightly     PRN Meds: labetalol, heparin (porcine), sodium chloride, dextrose, acetaminophen, magnesium hydroxide, polyethylene glycol, traMADol **OR** traMADol, zolpidem, dextrose, dextrose, glucagon (rDNA), glucose, bisacodyl, calcium carbonate, diphenhydrAMINE, heparin (porcine), hydrALAZINE, ondansetron, ondansetron, promethazine, albuterol      Intake/Output Summary (Last 24 hours) at 7/5/2021 1252  Last data filed at 7/5/2021 0756  Gross per 24 hour   Intake 1470 ml   Output 779 ml   Net 691 ml       Physical Exam Performed:    /79   Pulse 72   Temp 97.6 °F (36.4 °C) (Oral)   Resp 18   Ht 5' 9\" (1.753 m)   Wt 229 lb 11.5 oz (104.2 kg)   SpO2 100%   BMI 33.92 kg/m²     General appearance: No apparent distress, appears stated age and cooperative. HEENT: Pupils equal, round, and reactive to light. Conjunctivae/corneas clear. Neck: Supple, with full range of motion. No jugular venous distention. Trachea midline. Respiratory:  Normal respiratory effort. Clear to auscultation, bilaterally without Rales/Wheezes/Rhonchi. Cardiovascular: Regular rate and rhythm with normal S1/S2 without murmurs, rubs or gallops. Abdomen: Soft, non-tender, non-distended with normal bowel sounds. Musculoskeletal: No clubbing, cyanosis or edema bilaterally. Full range of motion without deformity. Skin: Skin color, texture, turgor normal.  No rashes or lesions. Neurologic:  Neurovascularly intact without any focal sensory/motor deficits.  Cranial nerves: II-XII intact, grossly non-focal.  Psychiatric: Alert and oriented, thought content appropriate, normal insight  Peripheral Pulses: +2 palpable, equal bilaterally       Labs:   Recent Labs     07/03/21  0424 07/04/21  0459 07/05/21  0438   WBC 10.4 14.7* 12.8*   HGB 7.2* 8.7* 9.0*   HCT 22.7* 27.3* 28.1*    264 261     Recent Labs     07/03/21  0423 07/04/21  0459 07/05/21  0438   * 129* 130*   K 4.5 3.9 3.8   CL 85* 88* 90*   CO2 26 24 22   BUN 68* 62* 59*   CREATININE 9.2* 8.1* 7.5*   CALCIUM 8.0* 7.7* 7.7*   PHOS 5.8* 5.5* 5.8*     No results for input(s): AST, ALT, BILIDIR, BILITOT, ALKPHOS in the last 72 hours. No results for input(s): INR in the last 72 hours. No results for input(s): Yuki Breeze in the last 72 hours. Urinalysis:      Lab Results   Component Value Date    NITRU Negative 06/06/2021    WBCUA 36 06/06/2021    BACTERIA RARE 06/06/2021    RBCUA 587 06/06/2021    BLOODU LARGE 06/06/2021    SPECGRAV 1.019 06/06/2021    GLUCOSEU 100 06/06/2021       Radiology:  CT ABDOMEN PELVIS WO CONTRAST Additional Contrast? Oral   Final Result   1. Mild bibasilar segmental atelectasis versus pneumonia. 2. Negative for small bowel obstruction. 3. Mild ascites.                  Assessment/Plan:    Active Hospital Problems    Diagnosis     SBO (small bowel obstruction) ([facility] [K56.609]      SBO versus ileus resolved  General surgery consulted  Abdominal exam benign  Clear liquids as tolerated and monitor bowel function  Hydration  As needed analgesics  CT abdomen pelvis showed no SBO     Leukocytosis  Pseudomonas peritonitis  vancomycin and cefepime discontinued  ID recommendations appreciated  IV Merrem  Ceftazidime in dialysate, discussed with nephrology  PD fluid grows Pseudomonas aeruginosa  Blood culture NTD    Normocytic anemia  Hemoglobin 9 today  Patient status post CABG, target hemoglobin above 8  Status post 1 unit PRBC 7/3/2021    Persistent hypoglycemia-resolved  Now patient is actually hyperglycemic  Stopped dextrose infusion  Lantus resumed at previous dose     CAD  Status post CABG  Stable  On high-dose aspirin  Apparently does not tolerate Plavix    Hypertension  She is on Lopressor 25 twice daily  She has hydralazine and labetalol as needed  Anticipate nephrology recommendations     ESRD  On PD  Nephrology following     Mild hyperkalemia  Resolved     Mild hyponatremia  As per nephrology     Anion gap metabolic acidosis  Likely in view of uremia  Lactate not elevated       DVT Prophylaxis: Heparin  Diet: ADULT DIET;  Regular  Code Status: Full Code      Electronically signed by Adrianne Steen MD on 7/5/2021 at 12:52 PM

## 2021-07-05 NOTE — PROGRESS NOTES
[facility]        Progress Note    Patient Name: Anna Centeno  MRN: [id number]  YOB: 1975  Date of Evaluation: 2021    Chief Complaint: Abdominal pain    Subjective:  No acute events overnight  Pain controlled and stable  Tolerating diet  Stools normalizing    Vital Signs:  Patient Vitals for the past 24 hrs:   BP Temp Temp src Pulse Resp SpO2 Height Weight   21 1145 138/79 97.6 °F (36.4 °C) Oral 72 18 100 % -- --   21 1000 (!) 127/58 -- -- -- -- -- -- --   21 0745 (!) 187/61 97.8 °F (36.6 °C) Oral 74 18 99 % -- --   21 0700 (!) 164/82 97.8 °F (36.6 °C) Oral 78 20 -- -- 229 lb 11.5 oz (104.2 kg)   21 0545 138/69 -- -- 96 -- -- -- --   21 0416 (!) 168/79 -- -- -- -- -- -- --   21 0359 (!) 175/81 97.5 °F (36.4 °C) Oral 75 18 99 % -- --   21 0228 -- -- -- 80 -- -- -- --   21 0030 (!) 184/84 97.6 °F (36.4 °C) Oral 80 18 100 % -- --   21 2045 (!) 146/96 97.7 °F (36.5 °C) Oral 89 18 100 % 5' 9\" (1.753 m) 234 lb 2.1 oz (106.2 kg)   21 1730 (!) 183/101 97.6 °F (36.4 °C) Oral 80 18 100 % -- --   21 1400 (!) 189/88 -- -- -- -- -- -- --      TEMPERATURE HISTORY 24H: Temp (24hrs), Av.7 °F (36.5 °C), Min:97.5 °F (36.4 °C), Max:97.8 °F (36.6 °C)    BLOOD PRESSURE HISTORY: Systolic (44VFK), AGV:179 , Min:127 , KXL:560    Diastolic (27KCM), ROBB:86, Min:58, Max:112      Intake/Output:  I/O last 3 completed shifts: In:  [P.O.:320; I.V.:635; Other:1100]  Out: 148 [Urine:50; Other:250]  I/O this shift:   In: 48 [P.O.:50]  Out: -   Drain/tube Output:       Physical Exam:  General: awake, alert, oriented to  person, place, time  Abdomen: soft, minimal lower abdominal tenderness, nondistended, PD catheter in place and functional    Labs:  CBC:    Recent Labs     21  0424 21  0459 21  0438   WBC 10.4 14.7* 12.8*   HGB 7.2* 8.7* 9.0*   HCT 22.7* 27.3* 28.1*    264 261     BMP: Recent Labs     07/03/21  0423 07/04/21  0459 07/05/21  0438   * 129* 130*   K 4.5 3.9 3.8   CL 85* 88* 90*   CO2 26 24 22   BUN 68* 62* 59*   CREATININE 9.2* 8.1* 7.5*   GLUCOSE 140* 288* 214*     Hepatic:    No results for input(s): AST, ALT, ALB, BILITOT, ALKPHOS in the last 72 hours. Amylase:  No results found for: AMYLASE  Lipase:    Lab Results   Component Value Date    LIPASE 11.0 07/01/2021      Mag:    Lab Results   Component Value Date    MG 1.70 07/05/2021    MG 1.70 07/04/2021     Phos:     Lab Results   Component Value Date    PHOS 5.8 07/05/2021    PHOS 5.5 07/04/2021      Coags:   Lab Results   Component Value Date    PROTIME 15.9 07/01/2021    INR 1.39 07/01/2021    APTT 33.8 07/01/2021       Cultures:  Anaerobic culture  No results found for: LABANAE  Fungus stain  No results found for requested labs within last 30 days. Gram stain  Results in Past 30 Days  Result Component Current Result Ref Range Previous Result Ref Range   Gram Stain Result No WBC's seen  4+ Gram negative rods   (7/1/2021)  1+ WBC's (Polymorphonuclear)  No organisms seen   (6/27/2021)      Organism  Lab Results   Component Value Date/Time    ORG Pseudomonas aeruginosa (A) 07/01/2021 05:00 PM     Surgical culture  No results found for: CXSURG  Blood culture 1  Results in Past 30 Days  Result Component Current Result Ref Range Previous Result Ref Range   Blood Culture, Routine No Growth to date. Any change in status will be called. (7/1/2021)  Not in Time Range      Blood culture 2  Results in Past 30 Days  Result Component Current Result Ref Range Previous Result Ref Range   Culture, Blood 2 No Growth after 4 days of incubation. (6/27/2021)  Not in Time Range      Fecal occult  No results found for requested labs within last 30 days. GI bacterial pathogens by PCR  No results found for requested labs within last 30 days. C. difficile  No results found for requested labs within last 30 days.      Urine culture  Lab Results   Component Value Date    LABURIN <10,000 CFU/ml  No further workup   06/06/2021       Pathology:  No relevant pathology     Imaging:  I have personally reviewed the following films:    XR ABDOMEN (KUB) (SINGLE AP VIEW)    Result Date: 6/30/2021  EXAMINATION: ONE SUPINE XRAY VIEW(S) OF THE ABDOMEN 6/30/2021 2:03 pm COMPARISON: Prior study(s) most recent 06/13/2021. HISTORY: ORDERING SYSTEM PROVIDED HISTORY: n/v and LLQ pain, r/o ileus TECHNOLOGIST PROVIDED HISTORY: Reason for exam:->n/v and LLQ pain, r/o ileus Reason for Exam: n/v and LLQ pain, r/o ileus Acuity: Acute Type of Exam: Initial FINDINGS: There is some gas and  stool within the colon . Small bowel demonstrates localized gas-filled loop to the right of midline. Catheter device overlies the lower abdomen and pelvis. This represents a peritoneal dialysis catheter. Arterial vascular calcifications are present. .  Included lungs are unremarkable. There is no obstruction or free air. Localized right mid small bowel gaseous distention possible mild small bowel ileus. The remainder the study is unremarkable. XR CHEST PORTABLE    Result Date: 6/30/2021  EXAMINATION: ONE XRAY VIEW OF THE CHEST 6/30/2021 5:01 pm COMPARISON: 06/22/2021 HISTORY: ORDERING SYSTEM PROVIDED HISTORY: increased wbc with wheezing TECHNOLOGIST PROVIDED HISTORY: Reason for exam:->increased wbc with wheezing Reason for Exam: increased wbc with wheezing Acuity: Chronic Type of Exam: Ongoing FINDINGS: The heart is mildly enlarged less prominent. The pulmonary vessels are less prominent. There are hazy bibasilar opacities which are less prominent. There is a small right pleural effusion which is decreased. Slight decrease in the heart size with resolving central pulmonary congestion. Slowly resolving bibasilar opacities. Small right pleural effusion which is less prominent.      Scheduled Meds:   custom dialysis builder   Intraperitoneal Daily    insulin detemir  20 Units Subcutaneous Nightly    meropenem  1,000 mg Intravenous Q24H    metoclopramide  5 mg Intravenous Q6H    aspirin  325 mg Oral Daily    metoprolol tartrate  25 mg Oral BID    pantoprazole  40 mg Oral Daily    sennosides-docusate sodium  1 tablet Oral BID    gentamicin   Topical Daily    atorvastatin  20 mg Oral Nightly    heparin (porcine)  5,000 Units Subcutaneous 3 times per day    miconazole   Topical BID    polyethylene glycol  17 g Oral Daily    [Held by provider] torsemide  100 mg Oral Daily    insulin aspart  0-18 Units Subcutaneous TID WC    insulin aspart  0-9 Units Subcutaneous Nightly     Continuous Infusions:   sodium chloride      dextrose       PRN Meds:.labetalol, heparin (porcine), sodium chloride, dextrose, acetaminophen, magnesium hydroxide, polyethylene glycol, traMADol **OR** traMADol, zolpidem, dextrose, dextrose, glucagon (rDNA), glucose, bisacodyl, calcium carbonate, diphenhydrAMINE, heparin (porcine), hydrALAZINE, ondansetron, ondansetron, promethazine, albuterol      Assessment:  Ms. Iveth Marks is a 51 y.o. female who presents with   Ileus vs obstruction  Diabetes with possible gastroparesis  ESRD on PD     Plan:  1. Pain stable, at baseline  2. Tolerating diet  3. Bowel function normalizing, passing stool, continue bowel regimen  4. Defer management of remainder of medical comorbidities to primary and consulting teams  5. Will follow peripherally, please call with questions and follow electively as needed    Guevara Mcdonnell MD, FACS  7/5/2021  1:01 PM

## 2021-07-05 NOTE — PROGRESS NOTES
Office : [phone]     Fax :[phone]       Nephrology Progress   Note      Patient's Name: Bertram De La Rosa  2:14 PM  2021    Reason for Consult:  ESRD    History of Present Ilness:    Bertram De La Rosa is a 51 y.o. female with severe end-stage renal disease secondary to diabetic nephropathy on peritoneal dialysis, hypertension, peripheral vascular disease, diabetic neuropathy who was admitted after she had a syncopal episode. She had similar episode 2 weeks ago. Recently had angiogram left lower extremity for gangrene left foot second toe. Denies any shortness of breath. Denies any abdominal pain. Denies any nausea vomiting. Interval HX :          PD fluid growing pseudomonas    C/o abdominal pain    PD overnight : first drain was painful , after that she tolerated PD procedure well        Transferred from rehab to floor      KUB x-ray showed ileus   Passing flatus   Started on clears per surgery   High WBC   Poor oral intake       No chest pain  No sob  S/p CABG on 2010    Feeling weak and fatigued. Oral intake has been poor for last few days. Sodium level dropped to 125   Unable to take salt tablets       I/O last 3 completed shifts: In:  [P.O.:320; I.V.:635; Other:1100]  Out: 148 [Urine:50; Other:250]    Past Medical History:   Diagnosis Date    Diabetes mellitus ([address]     End stage kidney disease ([address]     peritoneal dialysis every night at home x 2 years    Hypertension     Neuropathy     Peripheral vascular [facility] [address] [address]        Past Surgical History:   Procedure Laterality Date     SECTION      CORONARY ARTERY BYPASS GRAFT N/A 2021    URGENT CABG CORONARY ARTERY BYPASS GRAFTS X3.  VEIN TO PDA X1, VEIN TO OM X1, LEFT INTERNAL MAMMARY ARTERY TO LAD X1. ENDOSCOPIC HARVEST RIGHT LEG SAPHENOUS VEIN. LIGATION NYA USING 35MM ATRICLIP. EPI AORTIC ULTRASOUND. TOTAL CARDIOPULMONARY BYPASS. DOPPLER GRAFT FLOW VERIFICATION. BILATERAL INITERCOSTAL NERVE BLOCK USING 1.3% EXPAREL.  performed by Katherine De La Rosa MD at [facility]  08/16/2018     lap PD cath placement lt side 62 cm    TOE AMPUTATION Left 2/26/2021    AMPUTATION OF THE LEFT THIRD TOE performed by Mike Knowles DPM at [address] TOE AMPUTATION Left 5/26/2021    AMPUTATION OF HALLUX AND SECOND TOE, LEFT FOOT performed by Mike Knowles DPM at [facility]         Family History   Problem Relation Age of Onset    Coronary Art Dis Mother     High Blood Pressure Mother     Heart Disease Mother     Diabetes Mother     Cancer Father     Coronary Art Dis Father     High Blood Pressure Father     Heart Disease Father     Diabetes Father      Current Medications:    dianeal lo-massimo 2.5% 1,000 mL with cefTAZidime 1,000 mg solution, Daily  insulin detemir (LEVEMIR) injection pen 20 Units- PATIENT SUPPLIED, Nightly  labetalol (NORMODYNE;TRANDATE) injection 10 mg, Q4H PRN  heparin (porcine) injection 1,000 Units, PRN  0.9 % sodium chloride infusion, PRN  meropenem (MERREM) 1,000 mg in sodium chloride 0.9 % 100 mL IVPB (mini-bag), Q24H  metoclopramide (REGLAN) injection 5 mg, Q6H  dextrose 50 % IV solution, PRN  acetaminophen (TYLENOL) tablet 650 mg, Q6H PRN  aspirin EC tablet 325 mg, Daily  magnesium hydroxide (MILK OF MAGNESIA) 400 MG/5ML suspension 30 mL, Daily PRN  metoprolol tartrate (LOPRESSOR) tablet 25 mg, BID  pantoprazole (PROTONIX) tablet 40 mg, Daily  polyethylene glycol (GLYCOLAX) packet 17 g, Daily PRN  sennosides-docusate sodium (SENOKOT-S) 8.6-50 MG tablet 1 tablet, BID  traMADol (ULTRAM) tablet 50 mg, Q6H PRN   Or  traMADol (ULTRAM) tablet 100 mg, Q6H PRN  zolpidem (AMBIEN) tablet 5 mg, Nightly PRN  dextrose 5 % solution, PRN  dextrose 50 % IV solution, PRN  glucagon (rDNA) injection 1 mg, PRN  glucose (GLUTOSE) 40 % oral gel 15 g, PRN  gentamicin (GARAMYCIN) 0.1 % cream, Daily  atorvastatin (LIPITOR) tablet 20 mg, Nightly  bisacodyl (DULCOLAX) EC tablet 5 mg, Daily PRN  calcium carbonate (TUMS) chewable tablet 500 mg, TID PRN  diphenhydrAMINE (BENADRYL) tablet 25 mg, Q6H PRN  heparin (porcine) injection 3,400 Units, PRN  heparin (porcine) injection 5,000 Units, 3 times per day  hydrALAZINE (APRESOLINE) tablet 50 mg, Q8H PRN  miconazole (MICOTIN) 2 % powder, BID  ondansetron (ZOFRAN) injection 4 mg, Q6H PRN  ondansetron (ZOFRAN-ODT) disintegrating tablet 4 mg, Q8H PRN  polyethylene glycol (GLYCOLAX) packet 17 g, Daily  promethazine (PHENERGAN) tablet 12.5 mg, Q6H PRN  [Held by provider] torsemide (DEMADEX) tablet 100 mg, Daily  albuterol (PROVENTIL) nebulizer solution 2.5 mg, Q6H PRN  insulin aspart (NOVOLOG) injection pen 0-18 Units - Patient Supply, TID WC  insulin aspart (NOVOLOG) injection pen 0-9 Units - Patient Supply, Nightly          Physical exam:     Vitals:  /79   Pulse 72   Temp 97.6 °F (36.4 °C) (Oral)   Resp 18   Ht 5' 9\" (1.753 m)   Wt 229 lb 11.5 oz (104.2 kg)   SpO2 100%   BMI 33.92 kg/m²   Constitutional:  OAA X3 NAD  Skin: no rash, turgor wnl  Heent:  eomi, mmm  Neck: no bruits or jvd noted  Cardiovascular:  S1, S2 without m/r/g  Respiratory: CTA B without w/r/r  Abdomen:  +bs, soft, nt, nd  Ext: no  lower extremity edema      Labs:  CBC:   Recent Labs     07/03/21  0424 07/04/21  0459 07/05/21  0438   WBC 10.4 14.7* 12.8*   HGB 7.2* 8.7* 9.0*    264 261     BMP:    Recent Labs     07/03/21  0423 07/04/21  0459 07/05/21  0438   * 129* 130*   K 4.5 3.9 3.8   CL 85* 88* 90*   CO2 26 24 22   BUN 68* 62* 59*   CREATININE 9.2* 8.1* 7.5*   GLUCOSE 140* 288* 214*     Ca/Mg/Phos:   Recent Labs     07/03/21 0423 07/04/21 0459 07/05/21  0438   CALCIUM 8.0* 7.7* 7.7*   MG 1.80 1.70* 1.70*   PHOS 5.8* 5.5* 5.8*     UA:  No results empirical abx     8. Generalized weakness. Getting rehab     9. Ileus. Surgery on board.   Bowel rest.  Says has passed some flatus today  On clears             Thank you for allowing us to participate in care of Melissa Martin         Electronically signed by: Chau Esquivel MD, 7/5/2021, 2:14 PM      [facility] [address] [place] [address] S  Office : [phone]  Fax :[phone]

## 2021-07-05 NOTE — PROGRESS NOTES
Pt stated she had an episode of vomit; upon assessment there was less than 5 mL of emesis in basis resembling mucous. Pt had two-three bites of turkey sandwich with dinner; liquid intake during this shift 200 mL water with meds; pt offered various drinks throughout shift - pt declined.

## 2021-07-05 NOTE — PLAN OF CARE
Pt identified current goals as \"pain control and maintaining systolic blood pressure less than 160\"; pt received and understood teachings related to labetolol; pt remains free from falls & injuries. Pt continues to be able to express needs; pt understands tx plan. Bed alarm in place; hourly rounding continues; call light within reach; will continue to assess.       Problem: Falls - Risk of:  Goal: Will remain free from falls  Description: Will remain free from falls  7/5/2021 0335 by Lulu Arango RN  Outcome: Ongoing    Goal: Absence of physical injury  Description: Absence of physical injury  7/5/2021 0335 by Lulu Arango RN  Outcome: Ongoing       Problem: Skin Integrity:  Goal: Will show no infection signs and symptoms  Description: Will show no infection signs and symptoms  7/5/2021 0335 by Lulu Arango RN  Outcome: Ongoing    Goal: Absence of new skin breakdown  Description: Absence of new skin breakdown  7/5/2021 0335 by Lulu Arango RN  Outcome: Ongoing       Problem: Pain:  Goal: Pain level will decrease  Description: Pain level will decrease  7/5/2021 0335 by Lulu Arango RN  Outcome: Ongoing    Goal: Control of acute pain  Description: Control of acute pain  7/5/2021 0335 by Lulu Arango RN  Outcome: Ongoing    Goal: Control of chronic pain  Description: Control of chronic pain  7/5/2021 0335 by Lulu Arango RN  Outcome: Ongoing

## 2021-07-05 NOTE — PLAN OF CARE
Problem: Falls - Risk of:  Goal: Will remain free from falls  Description: Will remain free from falls  7/5/2021 1501 by Anjali Fishre RN  Outcome: Ongoing     Problem: Falls - Risk of:  Goal: Absence of physical injury  Description: Absence of physical injury  7/5/2021 1501 by Anjali Fisher RN  Outcome: Ongoing     Problem: Skin Integrity:  Goal: Will show no infection signs and symptoms  Description: Will show no infection signs and symptoms  7/5/2021 1501 by Anjali Fisher RN  Outcome: Ongoing     Problem: Skin Integrity:  Goal: Absence of new skin breakdown  Description: Absence of new skin breakdown  7/5/2021 1501 by Anjali Fisher RN  Outcome: Ongoing     Problem: Pain:  Goal: Pain level will decrease  Description: Pain level will decrease  7/5/2021 1501 by Anjali Fisher RN  Outcome: Ongoing     Problem: Pain:  Goal: Control of acute pain  Description: Control of acute pain  7/5/2021 1501 by Anjali Fisher RN  Outcome: Ongoing     Problem: Pain:  Goal: Control of chronic pain  Description: Control of chronic pain  7/5/2021 1501 by Anjali Fisher RN  Outcome: Ongoing     Problem: Nutrition Deficit:  Goal: Ability to achieve adequate nutritional intake will improve  Description: Ability to achieve adequate nutritional intake will improve  Outcome: Ongoing   Pt remains free from falls and physical injury. Pt shows no new signs of infection or skin breakdown and remains afebrile. Pt dressing changed on left foot per order. Pt pain better controlled today with current plan of care. PO intake remains very poor, pt is encouraged to eat and drink, but intake is not adequate at this time. See flowsheet for assessment.

## 2021-07-05 NOTE — FLOWSHEET NOTE
07/05/21 0700   Vitals   BP (!) 164/82   Temp 97.8 °F (36.6 °C)   Pulse 78   Resp 20     Disconnected from CCPD per protocol. Effluent: 3+ cloudy yellow without fibrin, (less cloudy than 7-3-2021 when I last saw)  Total time: 08:24  Total UF:  529 ml. Total Volume:  7008 ml. Dwell time gained: 00:59    Pt Tolerated procedure: Without difficulty. Flat affect. Cell count obtained and sent to lab. Report given to: Nilesh Piña RN. Last BM: 7-4-2021.

## 2021-07-05 NOTE — FLOWSHEET NOTE
Treatment initiated without complications or complaints from patient. Patient resting comfortably with VSS upon dialysis RN exit from room. Suly Richards, OSMAR notified of start of treatment and hotline number located on top of machine for any questions about troubleshooting during after hours. 07/04/21 2045   Vitals   BP (!) 146/96   Temp 97.7 °F (36.5 °C)   Temp Source Oral   Pulse 89   Resp 18   SpO2 100 %   Height 5' 9\" (1.753 m)   Weight 234 lb 2.1 oz (106.2 kg)   Peritoneal Dialysis Catheter Left lower abdomen   Placement Date/Time: 08/16/18 0818   Inserted by: Chidi Sanchez MD  Catheter Location: Left lower abdomen   Status Accessed   Site Condition No Complications   Dressing Status Clean;Dry; Intact   Dressing Occlusive   Cycler   Verification of Prescription CCPD   Total Volume Programmed 7000 mL   Therapy Time (Hours:Minutes) 08:00   Fill Volume 1400 mL   Number of Cycles 5   Bag Volume 5000 mL   Number of Bags Used 2   Dianeal Solution Other (Comment)  (2.5% in 5L )

## 2021-07-05 NOTE — PROGRESS NOTES
Office : [phone]     Fax :[phone]       Nephrology Progress   Note      Patient's Name: Char Munoz  2:17 PM  2021    Reason for Consult:  ESRD    History of Present Ilness:    Char Munoz is a 51 y.o. female with severe end-stage renal disease secondary to diabetic nephropathy on peritoneal dialysis, hypertension, peripheral vascular disease, diabetic neuropathy who was admitted after she had a syncopal episode. She had similar episode 2 weeks ago. Recently had angiogram left lower extremity for gangrene left foot second toe. Denies any shortness of breath. Denies any abdominal pain. Denies any nausea vomiting. Interval HX :    S/p ceftazidime x 6 hr day time dwell first dose on        PD fluid growing pseudomonas      C/o abdominal pain  : improving       Poor oral intake       No chest pain  No sob  S/p CABG on 2010    Feeling weak and fatigued. Oral intake has been poor for last few days. Sodium level dropped to 125   Unable to take salt tablets       I/O last 3 completed shifts: In:  [P.O.:320; I.V.:635; Other:1100]  Out: 148 [Urine:50; Other:250]    Past Medical History:   Diagnosis Date    [facility]     End stage kidney disease ([facility]     peritoneal dialysis every night at home x 2 years    Hypertension     Neuropathy     Peripheral vascular disease ([facility]        Past Surgical History:   Procedure Laterality Date     SECTION      CORONARY ARTERY BYPASS GRAFT N/A 2021    URGENT CABG CORONARY ARTERY BYPASS GRAFTS X3. VEIN TO PDA X1, VEIN TO OM X1, LEFT INTERNAL MAMMARY ARTERY TO LAD X1. ENDOSCOPIC HARVEST RIGHT LEG SAPHENOUS VEIN. LIGATION NYA USING 35MM ATRICLIP. EPI AORTIC ULTRASOUND. TOTAL CARDIOPULMONARY BYPASS. DOPPLER GRAFT FLOW VERIFICATION. BILATERAL INITERCOSTAL NERVE BLOCK USING 1.3% EXPAREL.  performed by aMrie Stallworth MD at [facility] 40  08/16/2018     lap PD cath placement lt side 62 cm    TOE AMPUTATION Left 2/26/2021    AMPUTATION OF THE LEFT THIRD TOE performed by Yandy Perez DPM at [address] TOE AMPUTATION Left 5/26/2021    AMPUTATION OF HALLUX AND SECOND TOE, LEFT FOOT performed by Yandy Perez DPM at [facility]         Family History   Problem Relation Age of Onset    Coronary Art Dis Mother     High Blood Pressure Mother     Heart Disease Mother     Diabetes Mother     Cancer Father     Coronary Art Dis Father     High Blood Pressure Father     Heart Disease Father     Diabetes Father      Current Medications:    dianeal lo-massimo 2.5% 1,000 mL with cefTAZidime 1,000 mg solution, Daily  insulin detemir (LEVEMIR) injection pen 20 Units- PATIENT SUPPLIED, Nightly  labetalol (NORMODYNE;TRANDATE) injection 10 mg, Q4H PRN  heparin (porcine) injection 1,000 Units, PRN  0.9 % sodium chloride infusion, PRN  meropenem (MERREM) 1,000 mg in sodium chloride 0.9 % 100 mL IVPB (mini-bag), Q24H  metoclopramide (REGLAN) injection 5 mg, Q6H  dextrose 50 % IV solution, PRN  acetaminophen (TYLENOL) tablet 650 mg, Q6H PRN  aspirin EC tablet 325 mg, Daily  magnesium hydroxide (MILK OF MAGNESIA) 400 MG/5ML suspension 30 mL, Daily PRN  metoprolol tartrate (LOPRESSOR) tablet 25 mg, BID  pantoprazole (PROTONIX) tablet 40 mg, Daily  polyethylene glycol (GLYCOLAX) packet 17 g, Daily PRN  sennosides-docusate sodium (SENOKOT-S) 8.6-50 MG tablet 1 tablet, BID  traMADol (ULTRAM) tablet 50 mg, Q6H PRN   Or  traMADol (ULTRAM) tablet 100 mg, Q6H PRN  zolpidem (AMBIEN) tablet 5 mg, Nightly PRN  dextrose 5 % solution, PRN  dextrose 50 % IV solution, PRN  glucagon (rDNA) injection 1 mg, PRN  glucose (GLUTOSE) 40 % oral gel 15 g, PRN  gentamicin (GARAMYCIN) 0.1 % cream, Daily  atorvastatin (LIPITOR) tablet 20 mg, Nightly  bisacodyl (DULCOLAX) EC tablet 5 mg, Daily PRN  calcium carbonate (TUMS) chewable tablet 500 mg, TID PRN  diphenhydrAMINE (BENADRYL) tablet 25 mg, Q6H PRN  heparin (porcine) injection 3,400 Units, PRN  heparin (porcine) injection 5,000 Units, 3 times per day  hydrALAZINE (APRESOLINE) tablet 50 mg, Q8H PRN  miconazole (MICOTIN) 2 % powder, BID  ondansetron (ZOFRAN) injection 4 mg, Q6H PRN  ondansetron (ZOFRAN-ODT) disintegrating tablet 4 mg, Q8H PRN  polyethylene glycol (GLYCOLAX) packet 17 g, Daily  promethazine (PHENERGAN) tablet 12.5 mg, Q6H PRN  [Held by provider] torsemide (DEMADEX) tablet 100 mg, Daily  albuterol (PROVENTIL) nebulizer solution 2.5 mg, Q6H PRN  insulin aspart (NOVOLOG) injection pen 0-18 Units - Patient Supply, TID WC  insulin aspart (NOVOLOG) injection pen 0-9 Units - Patient Supply, Nightly          Physical exam:     Vitals:  /79   Pulse 72   Temp 97.6 °F (36.4 °C) (Oral)   Resp 18   Ht 5' 9\" (1.753 m)   Wt 229 lb 11.5 oz (104.2 kg)   SpO2 100%   BMI 33.92 kg/m²   Constitutional:  OAA X3 NAD  Skin: no rash, turgor wnl  Heent:  eomi, mmm  Neck: no bruits or jvd noted  Cardiovascular:  S1, S2 without m/r/g  Respiratory: CTA B without w/r/r  Abdomen:  +bs, soft, nt, nd  Ext: no  lower extremity edema      Labs:  CBC:   Recent Labs     07/03/21 0424 07/04/21 0459 07/05/21 0438   WBC 10.4 14.7* 12.8*   HGB 7.2* 8.7* 9.0*    264 261     BMP:    Recent Labs     07/03/21 0423 07/04/21 0459 07/05/21 0438   * 129* 130*   K 4.5 3.9 3.8   CL 85* 88* 90*   CO2 26 24 22   BUN 68* 62* 59*   CREATININE 9.2* 8.1* 7.5*   GLUCOSE 140* 288* 214*     Ca/Mg/Phos:   Recent Labs     07/03/21  0423 07/04/21  0459 07/05/21  0438   CALCIUM 8.0* 7.7* 7.7*   MG 1.80 1.70* 1.70*   PHOS 5.8* 5.5* 5.8*     UA:  No results for input(s): COLORU, CLARITYU, GLUCOSEU, BILIRUBINUR, KETUA, SPECGRAV, BLOODU, PHUR, PROTEINU, UROBILINOGEN, NITRU, Merlyn Rhoades in the last 72 hours. Urine Microscopic:   No results for input(s): LABCAST, BACTERIA, COMU, HYALCAST, WBCUA, RBCUA, EPIU in the last 72 hours. Urine Culture:   No results for input(s): LABURIN in the last 72 hours. Urine Chemistry: No results for input(s): Madolyn Ch, PROTEINUR, NAUR in the last 72 hours. IMAGING:  CT ABDOMEN PELVIS WO CONTRAST Additional Contrast? Oral   Final Result   1. Mild bibasilar segmental atelectasis versus pneumonia. 2. Negative for small bowel obstruction. 3. Mild ascites. Assessment/Plan :        1 Abdominal pain  , Acute peritonitis    Will give ceftazidime intraperitoneally  Daily for now  : give PD fluid dwell x 6 hrs     Will cut down night time PD cycles to 5 cycles with total  8 hr duration, discussed with PD nurse and pharmacy      PD fluid cx : pseudomonas +    Continue Meropenem IV     Cont vancomycin     ID consult      2   ESRD. Continue on peritoneal dialysis. continue cycler/CCPD as per current prescription  Sodium level low. Low albumin   Hold torsemide   Give albumin iv       KUB shows possible ileus versus small bowel obstruction        Change the fill volume to 1400 ml   PD site clean and no discharge. 2. HTN. BP slightly elevated   Started hydralazine 25 mg po tid   Continue current blood pressure medications    3. Anemia of chronic disease. Continue LUIZA    4. Acid- base disorder. Monitor    5. Hyponatremia   Poor oral intake    Sodium level better at 130  ---->  128 ---> 127 ---> 125   Will star mild hypertonic saline    Encourage increase oral intake    6. CAD/Syncopal episode. S/p [facility]   Has Multivessel CAD   S/p CABG       7. Peripheral vascular disease. H/o  amputation of left foot toes. Podiatry following   ? Source of infection   empirical abx     8. Generalized weakness. Getting rehab     9. Ileus. Surgery on board.   Bowel rest.  Says has passed some flatus today  On leyla             Thank you for allowing us to participate in care of Sophie Briggs         Electronically signed by: Bob Zimmerman MD, 7/5/2021, 2:17 PM      [facility] [address]  Office : [phone]  Fax :[phone]

## 2021-07-06 PROBLEM — I25.83 CORONARY ARTERY DISEASE DUE TO LIPID RICH PLAQUE: Status: ACTIVE | Noted: 2021-06-03

## 2021-07-06 PROBLEM — I25.10 CORONARY ARTERY DISEASE DUE TO LIPID RICH PLAQUE: Status: ACTIVE | Noted: 2021-06-03

## 2021-07-06 LAB
ANION GAP SERPL CALCULATED.3IONS-SCNC: 15 MMOL/L (ref 3–16)
ANISOCYTOSIS: ABNORMAL
APPEARANCE FLUID: NORMAL
BASOPHILS ABSOLUTE: 0 K/UL (ref 0–0.2)
BASOPHILS RELATIVE PERCENT: 0 %
BUN BLDV-MCNC: 59 MG/DL (ref 7–20)
CALCIUM SERPL-MCNC: 7.7 MG/DL (ref 8.3–10.6)
CELL COUNT FLUID TYPE: NORMAL
CHLORIDE BLD-SCNC: 88 MMOL/L (ref 99–110)
CLOT EVALUATION: NORMAL
CO2: 26 MMOL/L (ref 21–32)
COLOR FLUID: NORMAL
CREAT SERPL-MCNC: 6.9 MG/DL (ref 0.6–1.1)
EOSINOPHIL FLUID: 2 %
EOSINOPHILS ABSOLUTE: 0.5 K/UL (ref 0–0.6)
EOSINOPHILS RELATIVE PERCENT: 4 %
FLUID PATH CONSULT: NO
GFR AFRICAN AMERICAN: 8
GFR NON-AFRICAN AMERICAN: 6
GLUCOSE BLD-MCNC: 155 MG/DL (ref 70–99)
GLUCOSE BLD-MCNC: 158 MG/DL (ref 70–99)
GLUCOSE BLD-MCNC: 166 MG/DL (ref 70–99)
GLUCOSE BLD-MCNC: 217 MG/DL (ref 70–99)
GLUCOSE BLD-MCNC: 240 MG/DL (ref 70–99)
GLUCOSE BLD-MCNC: 244 MG/DL (ref 70–99)
HCT VFR BLD CALC: 28.8 % (ref 36–48)
HEMOGLOBIN: 9.2 G/DL (ref 12–16)
HYPOCHROMIA: ABNORMAL
LYMPHOCYTES ABSOLUTE: 0.7 K/UL (ref 1–5.1)
LYMPHOCYTES RELATIVE PERCENT: 5 %
LYMPHOCYTES, BODY FLUID: 8 %
MAGNESIUM: 2.2 MG/DL (ref 1.8–2.4)
MCH RBC QN AUTO: 29.3 PG (ref 26–34)
MCHC RBC AUTO-ENTMCNC: 31.9 G/DL (ref 31–36)
MCV RBC AUTO: 91.9 FL (ref 80–100)
MONOCYTE, FLUID: 2 %
MONOCYTES ABSOLUTE: 0.9 K/UL (ref 0–1.3)
MONOCYTES RELATIVE PERCENT: 7 %
NEUTROPHIL, FLUID: 88 %
NEUTROPHILS ABSOLUTE: 11.3 K/UL (ref 1.7–7.7)
NEUTROPHILS RELATIVE PERCENT: 84 %
NUCLEATED CELLS FLUID: 2829 /CUMM
NUMBER OF CELLS COUNTED FLUID: 100
PDW BLD-RTO: 19.1 % (ref 12.4–15.4)
PERFORMED ON: ABNORMAL
PHOSPHORUS: 5.9 MG/DL (ref 2.5–4.9)
PLATELET # BLD: 244 K/UL (ref 135–450)
PLATELET SLIDE REVIEW: ADEQUATE
PMV BLD AUTO: 8.6 FL (ref 5–10.5)
POLYCHROMASIA: ABNORMAL
POTASSIUM SERPL-SCNC: 3.9 MMOL/L (ref 3.5–5.1)
RBC # BLD: 3.14 M/UL (ref 4–5.2)
RBC FLUID: <1000 /CUMM
SLIDE REVIEW: ABNORMAL
SODIUM BLD-SCNC: 129 MMOL/L (ref 136–145)
TOXIC GRANULATION: PRESENT
WBC # BLD: 13.4 K/UL (ref 4–11)

## 2021-07-06 PROCEDURE — 6360000002 HC RX W HCPCS: Performed by: HOSPITALIST

## 2021-07-06 PROCEDURE — 6360000002 HC RX W HCPCS: Performed by: INTERNAL MEDICINE

## 2021-07-06 PROCEDURE — 6370000000 HC RX 637 (ALT 250 FOR IP): Performed by: NURSE PRACTITIONER

## 2021-07-06 PROCEDURE — 2060000000 HC ICU INTERMEDIATE R&B

## 2021-07-06 PROCEDURE — 6360000002 HC RX W HCPCS: Performed by: NURSE PRACTITIONER

## 2021-07-06 PROCEDURE — 89051 BODY FLUID CELL COUNT: CPT

## 2021-07-06 PROCEDURE — 90945 DIALYSIS ONE EVALUATION: CPT | Performed by: INTERNAL MEDICINE

## 2021-07-06 PROCEDURE — 83735 ASSAY OF MAGNESIUM: CPT

## 2021-07-06 PROCEDURE — 80048 BASIC METABOLIC PNL TOTAL CA: CPT

## 2021-07-06 PROCEDURE — 90945 DIALYSIS ONE EVALUATION: CPT

## 2021-07-06 PROCEDURE — 85025 COMPLETE CBC W/AUTO DIFF WBC: CPT

## 2021-07-06 PROCEDURE — 84100 ASSAY OF PHOSPHORUS: CPT

## 2021-07-06 PROCEDURE — 6370000000 HC RX 637 (ALT 250 FOR IP): Performed by: INTERNAL MEDICINE

## 2021-07-06 PROCEDURE — 2580000003 HC RX 258: Performed by: HOSPITALIST

## 2021-07-06 PROCEDURE — 36415 COLL VENOUS BLD VENIPUNCTURE: CPT

## 2021-07-06 PROCEDURE — 99233 SBSQ HOSP IP/OBS HIGH 50: CPT | Performed by: INTERNAL MEDICINE

## 2021-07-06 RX ORDER — LACTOBACILLUS RHAMNOSUS GG 10B CELL
1 CAPSULE ORAL 2 TIMES DAILY WITH MEALS
Status: DISCONTINUED | OUTPATIENT
Start: 2021-07-06 | End: 2021-07-14 | Stop reason: HOSPADM

## 2021-07-06 RX ADMIN — HEPARIN SODIUM 5000 UNITS: 5000 INJECTION INTRAVENOUS; SUBCUTANEOUS at 15:43

## 2021-07-06 RX ADMIN — HEPARIN SODIUM 5000 UNITS: 5000 INJECTION INTRAVENOUS; SUBCUTANEOUS at 06:09

## 2021-07-06 RX ADMIN — METOCLOPRAMIDE 5 MG: 5 INJECTION, SOLUTION INTRAMUSCULAR; INTRAVENOUS at 08:51

## 2021-07-06 RX ADMIN — NYSTATIN 500000 UNITS: 100000 SUSPENSION ORAL at 21:47

## 2021-07-06 RX ADMIN — METOPROLOL TARTRATE 25 MG: 25 TABLET, FILM COATED ORAL at 21:46

## 2021-07-06 RX ADMIN — METOCLOPRAMIDE 5 MG: 5 INJECTION, SOLUTION INTRAMUSCULAR; INTRAVENOUS at 21:47

## 2021-07-06 RX ADMIN — STANDARDIZED SENNA CONCENTRATE AND DOCUSATE SODIUM 1 TABLET: 8.6; 5 TABLET ORAL at 21:46

## 2021-07-06 RX ADMIN — METOCLOPRAMIDE 5 MG: 5 INJECTION, SOLUTION INTRAMUSCULAR; INTRAVENOUS at 03:59

## 2021-07-06 RX ADMIN — TRAMADOL HYDROCHLORIDE 50 MG: 50 TABLET, FILM COATED ORAL at 21:45

## 2021-07-06 RX ADMIN — MICONAZOLE NITRATE: 20 POWDER TOPICAL at 08:51

## 2021-07-06 RX ADMIN — MEROPENEM 1000 MG: 1 INJECTION, POWDER, FOR SOLUTION INTRAVENOUS at 11:55

## 2021-07-06 RX ADMIN — METOPROLOL TARTRATE 25 MG: 25 TABLET, FILM COATED ORAL at 08:51

## 2021-07-06 RX ADMIN — POLYETHYLENE GLYCOL 3350 17 G: 17 POWDER, FOR SOLUTION ORAL at 08:51

## 2021-07-06 RX ADMIN — HEPARIN SODIUM 5000 UNITS: 5000 INJECTION INTRAVENOUS; SUBCUTANEOUS at 21:46

## 2021-07-06 RX ADMIN — STANDARDIZED SENNA CONCENTRATE AND DOCUSATE SODIUM 1 TABLET: 8.6; 5 TABLET ORAL at 08:51

## 2021-07-06 RX ADMIN — ZOLPIDEM TARTRATE 5 MG: 5 TABLET, COATED ORAL at 21:46

## 2021-07-06 RX ADMIN — ATORVASTATIN CALCIUM 20 MG: 20 TABLET, FILM COATED ORAL at 21:46

## 2021-07-06 RX ADMIN — PANTOPRAZOLE SODIUM 40 MG: 40 TABLET, DELAYED RELEASE ORAL at 06:09

## 2021-07-06 RX ADMIN — CEFTAZIDIME: 1 INJECTION, POWDER, FOR SOLUTION INTRAMUSCULAR; INTRAVENOUS at 11:42

## 2021-07-06 RX ADMIN — GENTAMICIN SULFATE: 1 CREAM TOPICAL at 18:10

## 2021-07-06 RX ADMIN — Medication 1 CAPSULE: at 17:21

## 2021-07-06 RX ADMIN — EPOETIN ALFA-EPBX 10000 UNITS: 10000 INJECTION, SOLUTION INTRAVENOUS; SUBCUTANEOUS at 11:55

## 2021-07-06 RX ADMIN — METOCLOPRAMIDE 5 MG: 5 INJECTION, SOLUTION INTRAMUSCULAR; INTRAVENOUS at 15:43

## 2021-07-06 RX ADMIN — ASPIRIN 325 MG: 325 TABLET, COATED ORAL at 08:51

## 2021-07-06 ASSESSMENT — ENCOUNTER SYMPTOMS
TROUBLE SWALLOWING: 0
DIARRHEA: 0
COLOR CHANGE: 0
ABDOMINAL DISTENTION: 1
ABDOMINAL PAIN: 0
FACIAL SWELLING: 0
EYE DISCHARGE: 0
RHINORRHEA: 0
NAUSEA: 0
APNEA: 0
CHEST TIGHTNESS: 0
STRIDOR: 0
COUGH: 0
PHOTOPHOBIA: 0
BLOOD IN STOOL: 0
SHORTNESS OF BREATH: 0
EYE REDNESS: 0
CHOKING: 0

## 2021-07-06 ASSESSMENT — PAIN SCALES - GENERAL
PAINLEVEL_OUTOF10: 0
PAINLEVEL_OUTOF10: 5

## 2021-07-06 ASSESSMENT — PAIN DESCRIPTION - ORIENTATION: ORIENTATION: OTHER (COMMENT)

## 2021-07-06 ASSESSMENT — PAIN DESCRIPTION - PAIN TYPE: TYPE: ACUTE PAIN

## 2021-07-06 ASSESSMENT — PAIN DESCRIPTION - DESCRIPTORS: DESCRIPTORS: ACHING

## 2021-07-06 ASSESSMENT — PAIN DESCRIPTION - LOCATION: LOCATION: ABDOMEN

## 2021-07-06 NOTE — PROGRESS NOTES
Infectious Diseases   Progress Note      Admission Date: 7/1/2021  Hospital Day: Hospital Day: 6   Attending: Aria Marie MD  Date of service: 7/6/2021     Chief complaint/ Reason for consult:     · Peritoneal dialysis catheter related peritonitis-PD fluid is aeruginosa Pseudomonas aeruginosa  · ESRD on peritoneal dialysis for 2 years  · Type 2 diabetes mellitus  · Essential hypertension    Microbiology:        I have reviewed allavailable micro lab data and cultures    · Blood culture (1/1) - collected on 7/1/2021: negative  · Peritoneal dialysate fluid culture  - collected on 7/1/2021: Heavy growth of Pseudomonas    [address] [address]   Phone [phone]   Susceptibility    Pseudomonas aeruginosa (1)    Antibiotic Interpretation DENNISE Status    cefepime Sensitive <=2 mcg/mL     ciprofloxacin Sensitive <=1 mcg/mL     gentamicin Sensitive <=4 mcg/mL     meropenem Sensitive <=1 mcg/mL     piperacillin-tazobactam Sensitive <=16 mcg/mL     tobramycin Sensitive <=4 mcg/mL             Antibiotics and immunizations:       Current antibiotics: All antibiotics and their doses were reviewed by me    Recent Abx Admin                   meropenem (MERREM) 1,000 mg in sodium chloride 0.9 % 100 mL IVPB (mini-bag) (mg) 1,000 mg New Bag 07/06/21 1155    dianeal lo-massimo 2.5% 1,000 mL with cefTAZidime 1,000 mg solution ()  New Bag 07/06/21 1142      New Bag 07/05/21 1320                  Immunization History: All immunization history was reviewed by me today.     Immunization History   Administered Date(s) Administered    Influenza Virus Vaccine 10/23/2014, 12/09/2015    Influenza, [place], IM, (6 mo and older Fluzone, Flulaval, Fluarix and 3 yrs and older Afluria) 12/07/2018    Influenza, Quadv, IM, PF (6 mo and older Fluzone, Flulaval, Fluarix, and 3 yrs and older Afluria) 11/08/2016    Pneumococcal Polysaccharide ([id number]) 04/28/2017    Td, unspecified formulation 01/01/1999, 07/01/2007 what to expect. All of the patient's questions were addressed in a satisfactory manner and patient verbalized understanding all instructions. Level of complexity of visit: High     Risk of Complications/Morbidity: High     · Illness(es)/ Infection present that pose threat tobodily function. · There is potential for severe exacerbation of infection/side effects of treatment. · Therapy requires intensive monitoring for antimicrobial agent toxicity. TIME SPENT TODAY:     - Spent over  37 minutes on visit (including interval history, physical exam, review of data including labs, cultures, imaging, development and implementation of treatment plan and coordination of complex care). More than 50 percent of this includes face-to-face time spent with the patient for counseling and coordination of care. Thank you for involving me in the care of your patient. I will continue to follow. If you have anyadditional questions, please do not hesitate to contact me. Subjective: Interval history: Interval history was obtained from chart review and patient/ RN. The patient feels tired. She is tolerating antibiotics okay. No diarrhea. REVIEW OF SYSTEMS:     Review of Systems   Constitutional: Negative for chills, diaphoresis and unexpected weight change. HENT: Negative for congestion, ear discharge, ear pain, facial swelling, hearing loss, rhinorrhea and trouble swallowing. Eyes: Negative for photophobia, discharge, redness and visual disturbance. Respiratory: Negative for apnea, cough, choking, chest tightness, shortness of breath and stridor. Cardiovascular: Negative for chest pain and palpitations. Gastrointestinal: Positive for abdominal distention. Negative for abdominal pain, blood in stool, diarrhea and nausea. Endocrine: Negative for polydipsia, polyphagia and polyuria.    Genitourinary: Negative for difficulty urinating, dysuria, frequency, hematuria, menstrual problem and vaginal discharge. Musculoskeletal: Negative for arthralgias, joint swelling, myalgias and neck stiffness. Skin: Negative for color change and rash. Allergic/Immunologic: Negative for immunocompromised state. Neurological: Negative for dizziness, seizures, speech difficulty, light-headedness and headaches. Hematological: Negative for adenopathy. Psychiatric/Behavioral: Negative for agitation, hallucinations and suicidal ideas. Past Medical History: All past medical history reviewed today. Past Medical History:   Diagnosis Date    Diabetes mellitus ([facility]     End stage kidney disease ([facility]     peritoneal dialysis every night at home x 2 years    Hypertension     Neuropathy     Peripheral vascular disease ([facility]        Past Surgical History: All past surgical history was reviewed today. Past Surgical History:   Procedure Laterality Date     SECTION      CORONARY ARTERY BYPASS GRAFT N/A 2021    URGENT CABG CORONARY ARTERY BYPASS GRAFTS X3. VEIN TO PDA X1, VEIN TO OM X1, LEFT INTERNAL MAMMARY ARTERY TO LAD X1. ENDOSCOPIC HARVEST RIGHT LEG SAPHENOUS VEIN. LIGATION NYA USING 35MM ATRICLIP. EPI AORTIC ULTRASOUND. TOTAL CARDIOPULMONARY BYPASS. DOPPLER GRAFT FLOW VERIFICATION. BILATERAL INITERCOSTAL NERVE BLOCK USING 1.3% EXPAREL. performed by Gene Shi MD at [facility].  2018     lap PD cath placement lt side 62 cm    TOE AMPUTATION Left 2021    AMPUTATION OF THE LEFT THIRD TOE performed by Harpal Magallon DPM at [address] [facility] [address] TOE AMPUTATION Left 2021    AMPUTATION OF HALLUX AND SECOND TOE, LEFT FOOT performed by Harpal Magallon DPM at [facility] 300         Family History: All family history was reviewed today.         Problem Relation Age of Onset    Coronary Art Dis Mother     High Blood Pressure Mother     Heart Disease Mother     Diabetes Mother     Cancer Father     Coronary Art Dis Father     High Blood Pressure output:    I/O last 3 completed shifts: In: 1640 [P.O.:540; Other:1100]  Out: 407 [Other:300]    Lab Data:   All available labs and old records have been reviewed by me. CBC:  Recent Labs     07/04/21 0459 07/05/21 0438 07/06/21 0446   WBC 14.7* 12.8* 13.4*   RBC 2.99* 3.06* 3.14*   HGB 8.7* 9.0* 9.2*   HCT 27.3* 28.1* 28.8*    261 244   MCV 91.3 92.1 91.9   MCH 29.2 29.5 29.3   MCHC 32.0 32.0 31.9   RDW 19.6* 19.5* 19.1*        BMP:  Recent Labs     07/04/21 0459 07/05/21 0438 07/06/21 0446   * 130* 129*   K 3.9 3.8 3.9   CL 88* 90* 88*   CO2 24 22 26   BUN 62* 59* 59*   CREATININE 8.1* 7.5* 6.9*   CALCIUM 7.7* 7.7* 7.7*   GLUCOSE 288* 214* 244*        Hepatic Function Panel:   Lab Results   Component Value Date    ALKPHOS 183 07/02/2021    ALT <5 07/02/2021    AST 7 07/02/2021    PROT 6.4 07/02/2021    PROT 7.1 12/13/2012    BILITOT 0.3 07/02/2021    BILIDIR <0.2 07/02/2021    IBILI see below 07/02/2021    LABALBU 1.7 07/02/2021       CPK:   Lab Results   Component Value Date    CKTOTAL 45 12/19/2013     ESR:   Lab Results   Component Value Date    SEDRATE 84 (H) 02/08/2021     CRP:   Lab Results   Component Value Date    CRP 8.9 (H) 02/08/2021           Imaging: All pertinent images and reports for the current visit were reviewed by me during this visit. CT ABDOMEN PELVIS WO CONTRAST Additional Contrast? Oral   Final Result   1. Mild bibasilar segmental atelectasis versus pneumonia. 2. Negative for small bowel obstruction. 3. Mild ascites. Medications: All current and past medications were reviewed.      epoetin toy-epbx  10,000 Units Subcutaneous Q7 Days    custom dialysis builder   Intraperitoneal Daily    insulin detemir  20 Units Subcutaneous Nightly    meropenem  1,000 mg Intravenous Q24H    metoclopramide  5 mg Intravenous Q6H    aspirin  325 mg Oral Daily    metoprolol tartrate  25 mg Oral BID    pantoprazole  40 mg Oral Daily    sennosides-docusate sodium  1 tablet Oral BID    gentamicin   Topical Daily    atorvastatin  20 mg Oral Nightly    heparin (porcine)  5,000 Units Subcutaneous 3 times per day    miconazole   Topical BID    polyethylene glycol  17 g Oral Daily    [Held by provider] torsemide  100 mg Oral Daily    insulin aspart  0-18 Units Subcutaneous TID WC    insulin aspart  0-9 Units Subcutaneous Nightly        sodium chloride      dextrose         labetalol, heparin (porcine), sodium chloride, dextrose, acetaminophen, magnesium hydroxide, polyethylene glycol, traMADol **OR** traMADol, zolpidem, dextrose, dextrose, glucagon (rDNA), glucose, bisacodyl, calcium carbonate, diphenhydrAMINE, heparin (porcine), hydrALAZINE, ondansetron, ondansetron, promethazine, albuterol      Problem list:       Patient Active Problem List   Diagnosis Code    [facility] E11.9    Obesity E66.9    Microalbuminuria R80.9    Hyperlipidemia with target LDL less than 100 E78.5    Hypertension I10    Type 2 diabetes mellitus with obesity ([facility]) E11.69, E66.9    Acute renal failure (ARF) ([facility]) N17.9    ESRD on peritoneal dialysis ([facility]) N18.6, Z99.2    Non-smoker Z78.9    Elevated C-reactive protein (CRP) R79.82    Elevated sed rate R70.0    Diabetic polyneuropathy associated with type 2 diabetes mellitus ([facility]) E11.42    Weight loss counseling, encounter for Z71.3    Atherosclerosis of native arteries of left leg with ulceration of other part of foot ([facility]) I70.245    Near syncope R55    Coronary artery disease due to lipid rich plaque I25.10, I25.83    Mild malnutrition ([facility]) E44.1    S/P coronary artery bypass graft x 3 Z95.1    S/P left atrial appendage ligation Z98.890    Generalized weakness R53.1    Functional dysphonia F44.4    SBO (small bowel obstruction) ([facility] K56.609    Dialysis-associated peritonitis ([facility]) T85.71XA    Peripheral vascular disease ([facility]) I73.9       Please note that this chart was generated using Dragon dictation software. Although every effort was made to ensure the accuracy of this automated transcription, some errors in transcription may have occurred inadvertently. If you may need any clarification, please do not hesitate to contact me through EPIC or at the phone number provided below with my electronic signature. Any pictures or media included in this note were obtained after taking informed verbal consent from the patient and with their approval to include those in the patient's medical record.     Gordo Sepulveda MD, MPH  7/6/2021 , 1:08 PM   [facility] Infectious Disease   [address] [place] [address], [address] [place] [address]  Office: [phone]  Fax: [phone]  Clinic days:  Tuesday & Thursday

## 2021-07-06 NOTE — PROGRESS NOTES
CCPD Order   Exchanges: 5   Exchange Volume: 1400 ml   Total Time: 8 hrs   Dextrose: 2.5% with heparin per orders   Last Fill: 0 ml   Total Volume: 7000 ml     Orders verified. Supplies taken to pt's room. Report received. Cycler set up, primed and pre tested. [facility]. Pt connected to cycler. CCPD initiated without problem. Initial effluent clear.

## 2021-07-06 NOTE — PROGRESS NOTES
CCPD Order   Exchanges: 5   Exchange Volume: 1400 ml   Total Time: 8 hrs   Dextrose: 2.5%   Last Fill: 0 ml   Total Volume: 7000 ml     Orders verified. Supplies taken to pt's room. Report received. Cycler set up, primed and pre tested. Dressing changed on [facility] Cath site. Pt connected to cycler. CCPD initiated without problem. Initial effluent clear.

## 2021-07-06 NOTE — FLOWSHEET NOTE
07/06/21 0625   Vitals   BP (!) 167/69   Temp 97.6 °F (36.4 °C)   Temp Source Oral   Pulse 82   Resp 20   Cycler   Ultrafiltration (UF) (mL) 107 mL     Disconnected from CCPD per protocol. Effluent: 3+ cloudy with large amount fibrin present. (same cloudiness as yesterday)    Total time: 08:20  Total UF:  107 ml. Total Volume:  7015 ml. Dwell time gained: 00:55    Pt Tolerated procedure: No problems. Cell ct obtained and sent to lab. Report given to: Vinny Espino RN    Last BM: 7-5-2021.

## 2021-07-06 NOTE — PLAN OF CARE
Pt remains free from falls & injury; remains free from new impaired skin integrity; pt states she is still in pain, rates the pain as \"manageable\" and declines pain medication. Pt encouraged to increase oral intake per nephrology. Pt requested PRN Ambien to assist with sleep. Pt continues to communicate needs and participate in care plan; bed alarm on; call light within reach; will continue to assess.       Problem: Falls - Risk of:  Goal: Will remain free from falls  Description: Will remain free from falls  7/5/2021 2257 by Carmen Castillo RN  Outcome: Ongoing     Problem: Falls - Risk of:  Goal: Absence of physical injury  Description: Absence of physical injury  7/5/2021 2257 by Carmen Castillo RN  Outcome: Ongoing     Problem: Skin Integrity:  Goal: Will show no infection signs and symptoms  Description: Will show no infection signs and symptoms  7/5/2021 2257 by Carmen Castillo RN  Outcome: Ongoing     Problem: Skin Integrity:  Goal: Absence of new skin breakdown  Description: Absence of new skin breakdown  7/5/2021 2257 by Carmen Castillo RN  Outcome: Ongoing     Problem: Pain:  Goal: Pain level will decrease  Description: Pain level will decrease  7/5/2021 2257 by Carmen Castillo RN  Outcome: Ongoing     Problem: Pain:  Goal: Control of acute pain  Description: Control of acute pain  7/5/2021 2257 by Carmen Castillo RN  Outcome: Ongoing     Problem: Pain:  Goal: Control of chronic pain  Description: Control of chronic pain  7/5/2021 2257 by Carmen Castillo RN  Outcome: Ongoing     Problem: Nutrition Deficit:  Goal: Ability to achieve adequate nutritional intake will improve  Description: Ability to achieve adequate nutritional intake will improve  7/5/2021 2257 by Carmen Castillo RN  Outcome: Ongoing

## 2021-07-06 NOTE — PROGRESS NOTES
Office : [phone]     Fax :[phone]       Nephrology Progress   Note      Patient's Name: Lei Williamson  7:13 AM  2021    Reason for Consult:  ESRD    History of Present iIlness:    Lei Williamson is a 51 y.o. female with severe end-stage renal disease secondary to diabetic nephropathy on peritoneal dialysis, hypertension, peripheral vascular disease, diabetic neuropathy who was admitted after she had a syncopal episode. She had similar episode 2 weeks ago. Recently had angiogram left lower extremity for gangrene left foot second toe. Denies any shortness of breath. Denies any abdominal pain. Denies any nausea vomiting. Interval HX :    S/p ceftazidime x 6 hr day time dwell first dose on    PD fluid growing pseudomonas    C/o abdominal pain  : improving   Poor oral intake   No chest pain  No sob  S/p CABG on 2010          I/O last 3 completed shifts: In: 1640 [P.O.:540; Other:1100]  Out: 56 [Other:300]    Past Medical History:   Diagnosis Date    [facility]     End stage kidney disease ([address]     peritoneal dialysis every night at home x 2 years    Hypertension     Neuropathy     Peripheral vascular disease ([facility]        Past Surgical History:   Procedure Laterality Date     SECTION      CORONARY ARTERY BYPASS GRAFT N/A 2021    URGENT CABG CORONARY ARTERY BYPASS GRAFTS X3. VEIN TO PDA X1, VEIN TO OM X1, LEFT INTERNAL MAMMARY ARTERY TO LAD X1. ENDOSCOPIC HARVEST RIGHT LEG SAPHENOUS VEIN. LIGATION NYA USING 35MM ATRICLIP. EPI AORTIC ULTRASOUND. TOTAL CARDIOPULMONARY BYPASS. DOPPLER GRAFT FLOW VERIFICATION. BILATERAL INITERCOSTAL NERVE BLOCK USING 1.3% EXPAREL.  performed by Arlen Jacobo MD at [place] [address] 08/16/2018     lap PD cath placement lt side 62 cm    TOE AMPUTATION Left 2/26/2021    AMPUTATION OF THE LEFT THIRD TOE performed by Liana Oneill DPM at [address] [place] [address] [address] TOE AMPUTATION Left 5/26/2021    AMPUTATION OF HALLUX AND SECOND TOE, LEFT FOOT performed by Liana Oneill DPM at [facility]         Family History   Problem Relation Age of Onset    Coronary Art Dis Mother     High Blood Pressure Mother     Heart Disease Mother     Diabetes Mother     Cancer Father     Coronary Art Dis Father     High Blood Pressure Father     Heart Disease Father     Diabetes Father      Current Medications:    dianeal lo-massimo 2.5% 1,000 mL with cefTAZidime 1,000 mg solution, Daily  insulin detemir (LEVEMIR) injection pen 20 Units- PATIENT SUPPLIED, Nightly  labetalol (NORMODYNE;TRANDATE) injection 10 mg, Q4H PRN  heparin (porcine) injection 1,000 Units, PRN  0.9 % sodium chloride infusion, PRN  meropenem (MERREM) 1,000 mg in sodium chloride 0.9 % 100 mL IVPB (mini-bag), Q24H  metoclopramide (REGLAN) injection 5 mg, Q6H  dextrose 50 % IV solution, PRN  acetaminophen (TYLENOL) tablet 650 mg, Q6H PRN  aspirin EC tablet 325 mg, Daily  magnesium hydroxide (MILK OF MAGNESIA) 400 MG/5ML suspension 30 mL, Daily PRN  metoprolol tartrate (LOPRESSOR) tablet 25 mg, BID  pantoprazole (PROTONIX) tablet 40 mg, Daily  polyethylene glycol (GLYCOLAX) packet 17 g, Daily PRN  sennosides-docusate sodium (SENOKOT-S) 8.6-50 MG tablet 1 tablet, BID  traMADol (ULTRAM) tablet 50 mg, Q6H PRN   Or  traMADol (ULTRAM) tablet 100 mg, Q6H PRN  zolpidem (AMBIEN) tablet 5 mg, Nightly PRN  dextrose 5 % solution, PRN  dextrose 50 % IV solution, PRN  glucagon (rDNA) injection 1 mg, PRN  glucose (GLUTOSE) 40 % oral gel 15 g, PRN  gentamicin (GARAMYCIN) 0.1 % cream, Daily  atorvastatin (LIPITOR) tablet 20 mg, Nightly  bisacodyl (DULCOLAX) EC tablet 5 mg, Daily PRN  calcium carbonate (TUMS) chewable tablet 500 mg, TID PRN  diphenhydrAMINE (BENADRYL) tablet 25 mg, Q6H PRN  heparin (porcine) injection 3,400 Units, PRN  heparin (porcine) injection 5,000 Units, 3 times per day  hydrALAZINE (APRESOLINE) tablet 50 mg, Q8H PRN  miconazole (MICOTIN) 2 % powder, BID  ondansetron (ZOFRAN) injection 4 mg, Q6H PRN  ondansetron (ZOFRAN-ODT) disintegrating tablet 4 mg, Q8H PRN  polyethylene glycol (GLYCOLAX) packet 17 g, Daily  promethazine (PHENERGAN) tablet 12.5 mg, Q6H PRN  [Held by provider] torsemide (DEMADEX) tablet 100 mg, Daily  albuterol (PROVENTIL) nebulizer solution 2.5 mg, Q6H PRN  insulin aspart (NOVOLOG) injection pen 0-18 Units - Patient Supply, TID WC  insulin aspart (NOVOLOG) injection pen 0-9 Units - Patient Supply, Nightly          Physical exam:     Vitals:  BP (!) 167/69   Pulse 82   Temp 97.6 °F (36.4 °C) (Oral)   Resp 20   Ht 5' 9\" (1.753 m)   Wt 227 lb 4.7 oz (103.1 kg)   SpO2 100%   BMI 33.57 kg/m²   Constitutional:  OAA X3 NAD  Skin: no rash, turgor wnl  Heent:  eomi, mmm  Neck: no bruits or jvd noted  Cardiovascular:  S1, S2 without m/r/g  Respiratory: CTA B without w/r/r  Abdomen:  +bs, soft, nt, nd  Ext: no  lower extremity edema      Labs:  CBC:   Recent Labs     07/04/21 0459 07/05/21 0438 07/06/21 0446   WBC 14.7* 12.8* 13.4*   HGB 8.7* 9.0* 9.2*    261 244     BMP:    Recent Labs     07/04/21 0459 07/05/21 0438 07/06/21 0446   * 130* 129*   K 3.9 3.8 3.9   CL 88* 90* 88*   CO2 24 22 26   BUN 62* 59* 59*   CREATININE 8.1* 7.5* 6.9*   GLUCOSE 288* 214* 244*     Ca/Mg/Phos:   Recent Labs     07/04/21 0459 07/05/21  0438 07/06/21  0446   CALCIUM 7.7* 7.7* 7.7*   MG 1.70* 1.70* 2.20   PHOS 5.5* 5.8* 5.9*     UA:  No results for input(s): COLORU, CLARITYU, GLUCOSEU, BILIRUBINUR, KETUA, SPECGRAV, BLOODU, PHUR, PROTEINU, UROBILINOGEN, NITRU, LEUKOCYTESUR, LABMICR, URINETYPE in the last 72 hours.    Urine Microscopic:   No results for input(s): LABCAST, BACTERIA, COMU, HYALCAST, WBCUA, RBCUA, EPIU in the last 72 hours.  Urine Culture:   No results for input(s): LABURIN in the last 72 hours. Urine Chemistry: No results for input(s): Cody Hashimoto, PROTEINUR, NAUR in the last 72 hours. IMAGING:  CT ABDOMEN PELVIS WO CONTRAST Additional Contrast? Oral   Final Result   1. Mild bibasilar segmental atelectasis versus pneumonia. 2. Negative for small bowel obstruction. 3. Mild ascites. Assessment/Plan :        1 Abdominal pain  , Acute peritonitis    Will give ceftazidime intraperitoneally  Daily for now  : give PD fluid dwell x 6 hrs   Will cut down night time PD cycles to 5 cycles with total  8 hr duration, discussed with PD nurse and pharmacy  PD fluid cx : pseudomonas +    PD fluid neutrophil count is high at 88  Fluid remains hazy     Continue Meropenem IV   ID is on board         2   ESRD. Continue on peritoneal dialysis. continue cycler/CCPD as per current prescription  Sodium level low. Low albumin   Hold torsemide   Change the fill volume to 1400 ml   PD site clean and no discharge. 2. HTN. BP controlled   Continue current blood pressure medications    3. Anemia of chronic disease. Continue LUIZA    4. Acid- base disorder. Monitor    5. Hyponatremia   Poor oral intake    Sodium level better at 129   Will star mild hypertonic saline    Encourage increase oral intake    6. CAD/Syncopal episode. S/p [facility]   Has Multivessel CAD   S/p CABG       7. Peripheral vascular disease. H/o  amputation of left foot toes. Podiatry following       8. Generalized weakness. Getting rehab     9. Ileus. Resolving   Surgery on board.                 Thank you for allowing us to participate in care of [facility]         Electronically signed by: Angelica Hidalgo MD, 7/6/2021, 7:13 AM      [facility] [address]  Office : [phone]  Fax :[phone]

## 2021-07-06 NOTE — PROGRESS NOTES
Intravenous Q6H    aspirin  325 mg Oral Daily    metoprolol tartrate  25 mg Oral BID    pantoprazole  40 mg Oral Daily    sennosides-docusate sodium  1 tablet Oral BID    gentamicin   Topical Daily    atorvastatin  20 mg Oral Nightly    heparin (porcine)  5,000 Units Subcutaneous 3 times per day    miconazole   Topical BID    polyethylene glycol  17 g Oral Daily    [Held by provider] torsemide  100 mg Oral Daily    insulin aspart  0-18 Units Subcutaneous TID WC    insulin aspart  0-9 Units Subcutaneous Nightly     PRN Meds: labetalol, heparin (porcine), sodium chloride, dextrose, acetaminophen, magnesium hydroxide, polyethylene glycol, traMADol **OR** traMADol, zolpidem, dextrose, dextrose, glucagon (rDNA), glucose, bisacodyl, calcium carbonate, diphenhydrAMINE, heparin (porcine), hydrALAZINE, ondansetron, ondansetron, promethazine, albuterol      Intake/Output Summary (Last 24 hours) at 7/6/2021 0901  Last data filed at 7/6/2021 5025  Gross per 24 hour   Intake 1590 ml   Output 407 ml   Net 1183 ml       Physical Exam Performed:    BP (!) 189/76   Pulse 85   Temp 97.4 °F (36.3 °C) (Oral)   Resp 20   Ht 5' 9\" (1.753 m)   Wt 226 lb 12.8 oz (102.9 kg)   SpO2 100%   BMI 33.49 kg/m²     General appearance: No apparent distress, appears stated age and cooperative. Respiratory:  Normal respiratory effort. Clear to auscultation, bilaterally without Rales/Wheezes/Rhonchi. Cardiovascular: Regular rate and rhythm with normal S1/S2 without murmurs, rubs or gallops. Abdomen: Soft, non-tender, non-distended with normal bowel sounds. Musculoskeletal: No clubbing, cyanosis or edema bilaterally. Full range of motion without deformity. Skin: Skin color, texture, turgor normal.  No rashes or lesions. Neurologic:  Neurovascularly intact without any focal sensory/motor deficits.  Cranial nerves: II-XII intact, grossly non-focal.  Psychiatric: Alert and oriented, thought content appropriate, normal Stable  On high-dose aspirin  Apparently does not tolerate Plavix    Hypertension  She is on Lopressor 25 twice daily  She has hydralazine and labetalol as needed  Anticipate nephrology recommendations     ESRD  On PD  Nephrology following     Mild hyperkalemia  Resolved     Mild hyponatremia  As per nephrology     Anion gap metabolic acidosis: Resolved  Likely in view of uremia       DVT Prophylaxis: Heparin  Diet: ADULT DIET;  Regular  Code Status: Full Code      Electronically signed by Niles Cook MD on 7/6/2021 at 9:01 AM

## 2021-07-07 PROBLEM — E44.0 MODERATE MALNUTRITION (HCC): Chronic | Status: ACTIVE | Noted: 2021-07-07

## 2021-07-07 LAB
ANION GAP SERPL CALCULATED.3IONS-SCNC: 13 MMOL/L (ref 3–16)
ANISOCYTOSIS: ABNORMAL
APPEARANCE FLUID: NORMAL
BANDED NEUTROPHILS RELATIVE PERCENT: 3 % (ref 0–7)
BASOPHILS ABSOLUTE: 0 K/UL (ref 0–0.2)
BASOPHILS RELATIVE PERCENT: 0 %
BUN BLDV-MCNC: 59 MG/DL (ref 7–20)
CALCIUM SERPL-MCNC: 7.7 MG/DL (ref 8.3–10.6)
CELL COUNT FLUID TYPE: NORMAL
CHLORIDE BLD-SCNC: 90 MMOL/L (ref 99–110)
CLOT EVALUATION: NORMAL
CO2: 27 MMOL/L (ref 21–32)
COLOR FLUID: NORMAL
CREAT SERPL-MCNC: 7.8 MG/DL (ref 0.6–1.1)
EOSINOPHIL FLUID: 3 %
EOSINOPHILS ABSOLUTE: 0.7 K/UL (ref 0–0.6)
EOSINOPHILS RELATIVE PERCENT: 5 %
FLUID PATH CONSULT: NO
GFR AFRICAN AMERICAN: 7
GFR NON-AFRICAN AMERICAN: 6
GLUCOSE BLD-MCNC: 106 MG/DL (ref 70–99)
GLUCOSE BLD-MCNC: 140 MG/DL (ref 70–99)
GLUCOSE BLD-MCNC: 167 MG/DL (ref 70–99)
GLUCOSE BLD-MCNC: 207 MG/DL (ref 70–99)
GLUCOSE BLD-MCNC: 234 MG/DL (ref 70–99)
GLUCOSE BLD-MCNC: 90 MG/DL (ref 70–99)
HCT VFR BLD CALC: 28.7 % (ref 36–48)
HEMOGLOBIN: 9 G/DL (ref 12–16)
LYMPHOCYTES ABSOLUTE: 0.5 K/UL (ref 1–5.1)
LYMPHOCYTES RELATIVE PERCENT: 4 %
LYMPHOCYTES, BODY FLUID: 7 %
MAGNESIUM: 2.1 MG/DL (ref 1.8–2.4)
MCH RBC QN AUTO: 29.1 PG (ref 26–34)
MCHC RBC AUTO-ENTMCNC: 31.5 G/DL (ref 31–36)
MCV RBC AUTO: 92.4 FL (ref 80–100)
METAMYELOCYTES RELATIVE PERCENT: 2 %
MONOCYTE, FLUID: 7 %
MONOCYTES ABSOLUTE: 1.2 K/UL (ref 0–1.3)
MONOCYTES RELATIVE PERCENT: 9 %
NEUTROPHIL, FLUID: 83 %
NEUTROPHILS ABSOLUTE: 11.2 K/UL (ref 1.7–7.7)
NEUTROPHILS RELATIVE PERCENT: 77 %
NUCLEATED CELLS FLUID: 1069 /CUMM
NUMBER OF CELLS COUNTED FLUID: 100
PDW BLD-RTO: 19.1 % (ref 12.4–15.4)
PERFORMED ON: ABNORMAL
PERFORMED ON: NORMAL
PHOSPHORUS: 5.5 MG/DL (ref 2.5–4.9)
PLATELET # BLD: 227 K/UL (ref 135–450)
PLATELET SLIDE REVIEW: ADEQUATE
PMV BLD AUTO: 8.8 FL (ref 5–10.5)
POLYCHROMASIA: ABNORMAL
POTASSIUM SERPL-SCNC: 3.7 MMOL/L (ref 3.5–5.1)
RBC # BLD: 3.1 M/UL (ref 4–5.2)
RBC FLUID: <1000 /CUMM
SLIDE REVIEW: ABNORMAL
SODIUM BLD-SCNC: 130 MMOL/L (ref 136–145)
WBC # BLD: 13.6 K/UL (ref 4–11)

## 2021-07-07 PROCEDURE — 97530 THERAPEUTIC ACTIVITIES: CPT

## 2021-07-07 PROCEDURE — 6360000002 HC RX W HCPCS: Performed by: INTERNAL MEDICINE

## 2021-07-07 PROCEDURE — 6360000002 HC RX W HCPCS: Performed by: NURSE PRACTITIONER

## 2021-07-07 PROCEDURE — 6360000002 HC RX W HCPCS: Performed by: HOSPITALIST

## 2021-07-07 PROCEDURE — 94760 N-INVAS EAR/PLS OXIMETRY 1: CPT

## 2021-07-07 PROCEDURE — 99233 SBSQ HOSP IP/OBS HIGH 50: CPT | Performed by: INTERNAL MEDICINE

## 2021-07-07 PROCEDURE — 97110 THERAPEUTIC EXERCISES: CPT

## 2021-07-07 PROCEDURE — 6370000000 HC RX 637 (ALT 250 FOR IP): Performed by: INTERNAL MEDICINE

## 2021-07-07 PROCEDURE — 2580000003 HC RX 258: Performed by: INTERNAL MEDICINE

## 2021-07-07 PROCEDURE — 97162 PT EVAL MOD COMPLEX 30 MIN: CPT

## 2021-07-07 PROCEDURE — 80048 BASIC METABOLIC PNL TOTAL CA: CPT

## 2021-07-07 PROCEDURE — 2580000003 HC RX 258: Performed by: HOSPITALIST

## 2021-07-07 PROCEDURE — 2060000000 HC ICU INTERMEDIATE R&B

## 2021-07-07 PROCEDURE — 97535 SELF CARE MNGMENT TRAINING: CPT

## 2021-07-07 PROCEDURE — 85025 COMPLETE CBC W/AUTO DIFF WBC: CPT

## 2021-07-07 PROCEDURE — 6370000000 HC RX 637 (ALT 250 FOR IP): Performed by: NURSE PRACTITIONER

## 2021-07-07 PROCEDURE — 2700000000 HC OXYGEN THERAPY PER DAY

## 2021-07-07 PROCEDURE — 36415 COLL VENOUS BLD VENIPUNCTURE: CPT

## 2021-07-07 PROCEDURE — 83735 ASSAY OF MAGNESIUM: CPT

## 2021-07-07 PROCEDURE — 90945 DIALYSIS ONE EVALUATION: CPT

## 2021-07-07 PROCEDURE — 2500000003 HC RX 250 WO HCPCS: Performed by: INTERNAL MEDICINE

## 2021-07-07 PROCEDURE — 89051 BODY FLUID CELL COUNT: CPT

## 2021-07-07 PROCEDURE — 84100 ASSAY OF PHOSPHORUS: CPT

## 2021-07-07 PROCEDURE — 97166 OT EVAL MOD COMPLEX 45 MIN: CPT

## 2021-07-07 RX ADMIN — NYSTATIN 500000 UNITS: 100000 SUSPENSION ORAL at 22:30

## 2021-07-07 RX ADMIN — MICONAZOLE NITRATE: 20 POWDER TOPICAL at 10:00

## 2021-07-07 RX ADMIN — PANTOPRAZOLE SODIUM 40 MG: 40 TABLET, DELAYED RELEASE ORAL at 05:55

## 2021-07-07 RX ADMIN — METOCLOPRAMIDE 5 MG: 5 INJECTION, SOLUTION INTRAMUSCULAR; INTRAVENOUS at 03:42

## 2021-07-07 RX ADMIN — METOPROLOL TARTRATE 25 MG: 25 TABLET, FILM COATED ORAL at 10:18

## 2021-07-07 RX ADMIN — METOCLOPRAMIDE 5 MG: 5 INJECTION, SOLUTION INTRAMUSCULAR; INTRAVENOUS at 17:49

## 2021-07-07 RX ADMIN — Medication 1 CAPSULE: at 10:17

## 2021-07-07 RX ADMIN — Medication 1 CAPSULE: at 17:50

## 2021-07-07 RX ADMIN — LABETALOL HYDROCHLORIDE 10 MG: 5 INJECTION INTRAVENOUS at 16:46

## 2021-07-07 RX ADMIN — CEFTAZIDIME: 1 INJECTION, POWDER, FOR SOLUTION INTRAMUSCULAR; INTRAVENOUS at 15:38

## 2021-07-07 RX ADMIN — NYSTATIN 500000 UNITS: 100000 SUSPENSION ORAL at 17:50

## 2021-07-07 RX ADMIN — NYSTATIN 500000 UNITS: 100000 SUSPENSION ORAL at 10:16

## 2021-07-07 RX ADMIN — NYSTATIN 500000 UNITS: 100000 SUSPENSION ORAL at 14:15

## 2021-07-07 RX ADMIN — HEPARIN SODIUM 5000 UNITS: 5000 INJECTION INTRAVENOUS; SUBCUTANEOUS at 05:55

## 2021-07-07 RX ADMIN — ASPIRIN 325 MG: 325 TABLET, COATED ORAL at 10:18

## 2021-07-07 RX ADMIN — HEPARIN SODIUM 1000 UNITS: 1000 INJECTION INTRAVENOUS; SUBCUTANEOUS at 22:01

## 2021-07-07 RX ADMIN — MEROPENEM 1000 MG: 1 INJECTION, POWDER, FOR SOLUTION INTRAVENOUS at 10:13

## 2021-07-07 RX ADMIN — ZOLPIDEM TARTRATE 5 MG: 5 TABLET, COATED ORAL at 22:30

## 2021-07-07 RX ADMIN — HEPARIN SODIUM 5000 UNITS: 5000 INJECTION INTRAVENOUS; SUBCUTANEOUS at 22:41

## 2021-07-07 RX ADMIN — METOCLOPRAMIDE 5 MG: 5 INJECTION, SOLUTION INTRAMUSCULAR; INTRAVENOUS at 22:29

## 2021-07-07 RX ADMIN — ATORVASTATIN CALCIUM 20 MG: 20 TABLET, FILM COATED ORAL at 22:29

## 2021-07-07 RX ADMIN — GENTAMICIN SULFATE: 1 CREAM TOPICAL at 22:15

## 2021-07-07 RX ADMIN — LABETALOL HYDROCHLORIDE 10 MG: 5 INJECTION INTRAVENOUS at 11:50

## 2021-07-07 RX ADMIN — METOCLOPRAMIDE 5 MG: 5 INJECTION, SOLUTION INTRAMUSCULAR; INTRAVENOUS at 10:18

## 2021-07-07 RX ADMIN — METOPROLOL TARTRATE 25 MG: 25 TABLET, FILM COATED ORAL at 22:29

## 2021-07-07 RX ADMIN — SODIUM CHLORIDE: 9 INJECTION, SOLUTION INTRAVENOUS at 10:11

## 2021-07-07 RX ADMIN — HEPARIN SODIUM 5000 UNITS: 5000 INJECTION INTRAVENOUS; SUBCUTANEOUS at 14:00

## 2021-07-07 ASSESSMENT — PAIN SCALES - GENERAL
PAINLEVEL_OUTOF10: 0
PAINLEVEL_OUTOF10: 5
PAINLEVEL_OUTOF10: 0
PAINLEVEL_OUTOF10: 4

## 2021-07-07 ASSESSMENT — PAIN DESCRIPTION - ORIENTATION
ORIENTATION: OTHER (COMMENT)

## 2021-07-07 ASSESSMENT — ENCOUNTER SYMPTOMS
SHORTNESS OF BREATH: 0
COUGH: 0
EYE DISCHARGE: 0
RHINORRHEA: 0
DIARRHEA: 0
ABDOMINAL PAIN: 1
PHOTOPHOBIA: 0
FACIAL SWELLING: 0
TROUBLE SWALLOWING: 0
ABDOMINAL DISTENTION: 1
CHOKING: 0
CHEST TIGHTNESS: 0
BLOOD IN STOOL: 0
COLOR CHANGE: 0
APNEA: 0
EYE REDNESS: 0
STRIDOR: 0
NAUSEA: 0

## 2021-07-07 ASSESSMENT — PAIN DESCRIPTION - LOCATION
LOCATION: ABDOMEN
LOCATION: ABDOMEN;BACK

## 2021-07-07 ASSESSMENT — PAIN DESCRIPTION - DESCRIPTORS
DESCRIPTORS: ACHING

## 2021-07-07 ASSESSMENT — PAIN DESCRIPTION - PAIN TYPE
TYPE: ACUTE PAIN

## 2021-07-07 ASSESSMENT — PAIN - FUNCTIONAL ASSESSMENT
PAIN_FUNCTIONAL_ASSESSMENT: ACTIVITIES ARE NOT PREVENTED
PAIN_FUNCTIONAL_ASSESSMENT: ACTIVITIES ARE NOT PREVENTED

## 2021-07-07 ASSESSMENT — PAIN DESCRIPTION - PROGRESSION
CLINICAL_PROGRESSION: NOT CHANGED
CLINICAL_PROGRESSION: GRADUALLY IMPROVING

## 2021-07-07 NOTE — PROGRESS NOTES
Infectious Diseases   Progress Note      Admission Date: 7/1/2021  Hospital Day: Hospital Day: 7   Attending: Enrico Tang MD  Date of service: 7/7/2021     Chief complaint/ Reason for consult:     · Peritoneal dialysis catheter related peritonitis-PD fluid is aeruginosa Pseudomonas aeruginosa  · ESRD on peritoneal dialysis for 2 years  · Type 2 diabetes mellitus  · Essential hypertension    Microbiology:        I have reviewed allavailable micro lab data and cultures    · Blood culture (1/1) - collected on 7/1/2021: negative  · Peritoneal dialysate fluid culture  - collected on 7/1/2021: Heavy growth of Pseudomonas    [address] [place] [address]   Phone [phone]   Susceptibility    Pseudomonas aeruginosa (1)    Antibiotic Interpretation DENNISE Status    cefepime Sensitive <=2 mcg/mL     ciprofloxacin Sensitive <=1 mcg/mL     gentamicin Sensitive <=4 mcg/mL     meropenem Sensitive <=1 mcg/mL     piperacillin-tazobactam Sensitive <=16 mcg/mL     tobramycin Sensitive <=4 mcg/mL             Antibiotics and immunizations:       Current antibiotics: All antibiotics and their doses were reviewed by me    Recent Abx Admin                   meropenem (MERREM) 1,000 mg in sodium chloride 0.9 % 100 mL IVPB (mini-bag) (mg) 1,000 mg New Bag 07/07/21 1013                  Immunization History: All immunization history was reviewed by me today. Immunization History   Administered Date(s) Administered    Influenza Virus Vaccine 10/23/2014, 12/09/2015    Influenza, Nellene Lexus, IM, (6 mo and older Fluzone, Flulaval, Fluarix and 3 yrs and older Afluria) 12/07/2018    Influenza, Quadv, IM, PF (6 mo and older Fluzone, Flulaval, Fluarix, and 3 yrs and older Afluria) 11/08/2016    Pneumococcal Polysaccharide ([id number]) 04/28/2017    Td, unspecified formulation 01/01/1999, 07/01/2007       Known drug allergies:      All allergies were reviewed and updated    Allergies   Allergen Reactions    Zetia [Ezetimibe] Shortness Of Breath    Quinolones Nausea And Vomiting and Nausea Only     Other reaction(s): Vomiting    Lisinopril Swelling       Social history:     Social History:  All social andepidemiologic history was reviewed and updated by me today as needed. · Tobacco use:   reports that she has never smoked. She has never used smokeless tobacco.  · Alcohol use:   reports no history of alcohol use. · Currently lives in: [place] [address]  ·  reports no history of drug use. COVID VACCINATION AND LAB RESULT RECORDS:     Internal Administration   First Dose      Second Dose           Last COVID Lab SARS-CoV-2 (no units)   Date Value   05/24/2021 Not Detected     SARS-CoV-2, NAAT (no units)   Date Value   06/08/2021 Not Detected            Assessment:     The patient is a 51 y.o. old female who  has a past medical history of Diabetes mellitus ([facility] End stage kidney disease ([facility] Hypertension, Neuropathy, and Peripheral vascular disease ([facility] with following problems:    · Peritoneal dialysis catheter related peritonitis-PD fluid culture from 7/1/2021 had heavy growth of pan susceptible Pseudomonas aeruginosa-covered with IV meropenem and intraperitoneal ceftazidime  · ESRD on peritoneal dialysis for 2 years  · Type 2 diabetes mellitus-diabetes counseling done  · Essential hypertension-blood pressure okay  · Coronary artery disease-stable  · Peripheral vascular disease  · Diabetic polyneuropathy type II  · Obesity Class 1 due to excess calorie intake : Body mass index is 33.86 kg/m². Discussion:      The patient is afebrile. She remains on systemic IV meropenem and intraperitoneal ceftazidime. White cell count is 13,600 today. Patient is afebrile. Peritoneal fluid cell counts from today show improvement in white cell count of 1683% neutrophils.   The dialysate fluid still remains hazy      Plan:     Diagnostic Workup:      · Continue to follow  fever curve, WBC count and blood cultures. · Continue to monitor blood counts, liver and renal function. Antimicrobials:    · Will continue IV meropenem at dialysis dose for now  · We will continue intraperitoneal ceftazidime  · Plan to salvage the peritoneal dialysis catheter. However, if the patient fails to improve or has recurrence of bacteria peritonitis, may need to consider removal  · We will follow up on the culture results and clinical progress and will make further recommendations accordingly. · Continue close vitals monitoring. · Maintain good glycemic control. · Fall precautions. Aspiration precautions. · Continue to watch for new fever or diarrhea. · DVT prophylaxis. · Discussed all above with patient and RN. Drug Monitoring:    · Continue monitoring for antibiotic toxicity as follows: CBC, CMP   · Continue to watch for following: new or worsening fever, new hypotension, hives, lip swelling and redness or purulence at vascular access sites. I/v access Management:    · Continue to monitor i.v access sites for erythema, induration, discharge or tenderness. · As always, continue efforts to minimize tubes/lines/drains as clinically appropriate to reduce chances of line associated infections. Patient education and counseling:        · The patient was educated in detail about the side-effects of various antibiotics and things to watch for like new rashes, lip swelling, severe reaction, worsening diarrhea, break through fever etc.  · Discussed patient's condition and what to expect. All of the patient's questions were addressed in a satisfactory manner and patient verbalized understanding all instructions. Diabetes mellitus education and counseling:    Patient was educated in detail about the importance of keeping diabetes under control to improve the cure rate of infection and prevent future infections. Patient was advised to check blood glucose level regularly and to stay compliant with the diabetes medications. Patient was advised to the trim the toe nails carefully, wear shoes or slippers at all times and check both feet everyday before going to bed to look for any cuts, blisters, swelling or redness. Importance of washing the feet everyday with soap and water and keeping them dry, and seeking prompt medical attention in case of a non-healing wound or ulcer on the feet was also highlighted. Level of complexity of visit: High     Risk of Complications/Morbidity: High     · Illness(es)/ Infection present that pose threat to life/bodily function. · There is potential for severe exacerbation of infection/side effects of treatment. · Therapy requires intensive monitoring for antimicrobial agent toxicity. TIME SPENT TODAY:     - Spent over  36 minutes on visit (including interval history, physical exam, review of data including labs, cultures, imaging, development and implementation of treatment plan and coordination of complex care). More than 50 percent of this includes face-to-face time spent with the patient for counseling and coordination of care. Thank you for involving me in the care of your patient. I will continue to follow. If you have anyadditional questions, please do not hesitate to contact me. Subjective: Interval history: Interval history was obtained from chart review and patient/ RN. She is afebrile. She is tolerating antibiotics okay. No diarrhea     REVIEW OF SYSTEMS:     Review of Systems   Constitutional: Positive for fatigue. Negative for chills, diaphoresis and unexpected weight change. HENT: Negative for congestion, ear discharge, ear pain, facial swelling, hearing loss, rhinorrhea and trouble swallowing. Eyes: Negative for photophobia, discharge, redness and visual disturbance. Respiratory: Negative for apnea, cough, choking, chest tightness, shortness of breath and stridor. Cardiovascular: Negative for chest pain and palpitations.    Gastrointestinal: Neurological:      Mental Status: She is alert and oriented to person, place, and time. Motor: No abnormal muscle tone. Psychiatric:         Judgment: Judgment normal.         Lines: All vascular access sites are healthy with no local erythema, discharge or tenderness. Intake and output:    I/O last 3 completed shifts:  In: -   Out: 169     Lab Data:   All available labs and old records have been reviewed by me. CBC:  Recent Labs     07/05/21 0438 07/06/21 0446 07/07/21  0450   WBC 12.8* 13.4* 13.6*   RBC 3.06* 3.14* 3.10*   HGB 9.0* 9.2* 9.0*   HCT 28.1* 28.8* 28.7*    244 227   MCV 92.1 91.9 92.4   MCH 29.5 29.3 29.1   MCHC 32.0 31.9 31.5   RDW 19.5* 19.1* 19.1*   BANDSPCT  --   --  3        BMP:  Recent Labs     07/05/21 0438 07/06/21 0446 07/07/21  0450   * 129* 130*   K 3.8 3.9 3.7   CL 90* 88* 90*   CO2 22 26 27   BUN 59* 59* 59*   CREATININE 7.5* 6.9* 7.8*   CALCIUM 7.7* 7.7* 7.7*   GLUCOSE 214* 244* 207*        Hepatic Function Panel:   Lab Results   Component Value Date    ALKPHOS 183 07/02/2021    ALT <5 07/02/2021    AST 7 07/02/2021    PROT 6.4 07/02/2021    PROT 7.1 12/13/2012    BILITOT 0.3 07/02/2021    BILIDIR <0.2 07/02/2021    IBILI see below 07/02/2021    LABALBU 1.7 07/02/2021       CPK:   Lab Results   Component Value Date    CKTOTAL 45 12/19/2013     ESR:   Lab Results   Component Value Date    SEDRATE 84 (H) 02/08/2021     CRP:   Lab Results   Component Value Date    CRP 8.9 (H) 02/08/2021           Imaging: All pertinent images and reports for the current visit were reviewed by me during this visit. CT ABDOMEN PELVIS WO CONTRAST Additional Contrast? Oral   Final Result   1. Mild bibasilar segmental atelectasis versus pneumonia. 2. Negative for small bowel obstruction. 3. Mild ascites. Medications: All current and past medications were reviewed.      insulin detemir  23 Units Subcutaneous Nightly    epoetin toy-epbx  10,000 Units Subcutaneous Q7 Days    lactobacillus  1 capsule Oral BID     nystatin  5 mL Oral 4x Daily    custom dialysis builder   Intraperitoneal Daily    meropenem  1,000 mg Intravenous Q24H    metoclopramide  5 mg Intravenous Q6H    aspirin  325 mg Oral Daily    metoprolol tartrate  25 mg Oral BID    pantoprazole  40 mg Oral Daily    sennosides-docusate sodium  1 tablet Oral BID    gentamicin   Topical Daily    atorvastatin  20 mg Oral Nightly    heparin (porcine)  5,000 Units Subcutaneous 3 times per day    miconazole   Topical BID    polyethylene glycol  17 g Oral Daily    [Held by provider] torsemide  100 mg Oral Daily    insulin aspart  0-18 Units Subcutaneous TID WC    insulin aspart  0-9 Units Subcutaneous Nightly        dextrose         labetalol, heparin (porcine), dextrose, acetaminophen, magnesium hydroxide, polyethylene glycol, traMADol **OR** traMADol, zolpidem, dextrose, dextrose, glucagon (rDNA), glucose, bisacodyl, calcium carbonate, diphenhydrAMINE, heparin (porcine), hydrALAZINE, ondansetron, ondansetron, promethazine, albuterol      Problem list:       Patient Active Problem List   Diagnosis Code    Diabetes mellitus ([facility] E11.9    Obesity E66.9    Microalbuminuria R80.9    Hyperlipidemia with target LDL less than 100 E78.5    Hypertension I10    Type 2 diabetes mellitus with obesity ([facility]) E11.69, E66.9    Acute renal failure (ARF) ([facility]) N17.9    ESRD on peritoneal dialysis ([facility] N18.6, Z99.2    Non-smoker Z78.9    Elevated C-reactive protein (CRP) R79.82    Elevated sed rate R70.0    Diabetic polyneuropathy associated with type 2 diabetes mellitus ([facility]) E11.42    Weight loss counseling, encounter for Z71.3    Atherosclerosis of native arteries of left leg with ulceration of other part of foot ([facility] I70.245    Near syncope R55    Coronary artery disease due to lipid rich plaque I25.10, I25.83    Mild malnutrition ([facility]) E44.1    S/P coronary artery bypass graft x 3 Z95.1    S/P left atrial appendage ligation Z98.890    Generalized weakness R53.1    Functional dysphonia F44.4    SBO (small bowel obstruction) (HCC) K56.609    Dialysis-associated peritonitis ([address] T85.71XA    Peripheral vascular disease (HCC) I73.9    Moderate malnutrition ([address] E44.0       Please note that this chart was generated using Dragon dictation software. Although every effort was made to ensure the accuracy of this automated transcription, some errors in transcription may have occurred inadvertently. If you may need any clarification, please do not hesitate to contact me through EPIC or at the phone number provided below with my electronic signature. Any pictures or media included in this note were obtained after taking informed verbal consent from the patient and with their approval to include those in the patient's medical record.     Yocasta Og MD, MPH  7/7/2021 , 3:21 PM   [facility] Infectious Disease   [address] [place] [address], [address] [place] [address]  Office: [phone]  Fax: [phone]  Clinic days:  Tuesday & Thursday

## 2021-07-07 NOTE — PROGRESS NOTES
Occupational Therapy   Occupational Therapy Initial Assessment  Date: 2021   Patient Name: Ender Cha  MRN: [id number]     : 1975    Date of Service: 2021    Discharge Recommendations: Ender Cha scored a 15/24 on the AM-PAC ADL Inpatient form. Current research shows that an AM-PAC score of 17 or less is typically not associated with a discharge to the patient's home setting. Based on the patient's AM-PAC score and their current ADL deficits, it is recommended that the patient have 3-5 sessions per week of Occupational Therapy at d/c to increase the patient's independence. Please see assessment section for further patient specific details. If patient discharges prior to next session this note will serve as a discharge summary. Please see below for the latest assessment towards goals. OT Equipment Recommendations  Equipment Needed: Yes  ADL Assistive Devices: Reacher;Sock-Aid Hard;Transfer Tub Bench; Toileting - Raised Toilet Seat with arms    Assessment   Performance deficits / Impairments: Decreased functional mobility ; Decreased ADL status; Decreased strength;Decreased endurance;Decreased high-level IADLs;Decreased coordination;Decreased balance  Assessment: Patient presents below baseline d/t above deficits. OT indicated to maximize safety and independence in ADLs. Treatment Diagnosis: Above deficits associated with peritonitis and s/p CABG  Prognosis: Good  Decision Making: Medium Complexity  Exam: as above  OT Education: OT Role;Plan of Care  Patient Education: Eval/dc -- pt v/u  REQUIRES OT FOLLOW UP: Yes  Activity Tolerance  Activity Tolerance: Patient Tolerated treatment well;Patient limited by fatigue  Safety Devices  Safety Devices in place: Yes  Type of devices: Bed alarm in place; Left in bed; All fall risk precautions in place;Call light within reach;Gait belt;Patient at risk for falls;Nurse notified  Restraints  Initially in place: No           Patient Diagnosis(es): There were no encounter diagnoses. has a past medical history of Diabetes mellitus ([facility] [address] End stage kidney disease ([address] [address] Hypertension, Neuropathy, and Peripheral vascular disease ([facility] [address] has a past surgical history that includes Tonsillectomy;  section; other surgical history (2018); Toe amputation (Left, 2021); Toe amputation (Left, 2021); and Coronary artery bypass graft (N/A, 2021). Treatment Diagnosis: Above deficits associated with peritonitis and s/p CABG      Restrictions  Restrictions/Precautions  Restrictions/Precautions: Weight Bearing, Fall Risk (high fall risk; L LE WBAT with surgical shoe; sternal precautions)  Required Braces or Orthoses?: Yes  Lower Extremity Weight Bearing Restrictions  Left Lower Extremity Weight Bearing: Weight Bearing As Tolerated  Required Braces or Orthoses  Left Lower Extremity Brace: Boot  LLE Brace Type: Surgical shoe  Position Activity Restriction  Sternal Precautions: No Pushing, No Pulling, 5# Lifting Restrictions  Other position/activity restrictions: Patient is a 51year old female with a history of ESRD on PD, HTN, DM, HLD, and PVD. On 21 she had amputation of toes 1 and 2 on the left foot. On 21 she underwent a left popliteal, anterior tibial and dorsalis pedis angioplasty with Dr. Jillian Smith. She presented to the ER on 21 with a syncopal episode. She has been experiencing dizziness and syncopal episodes X2 weeks prior to this visit. She underwent a Left Heart Catheterization on 21 and was found to have severe coronary microvascular disease. On 21 she underwent a CABG X3 with NYA clip. Subjective   General  Chart Reviewed: Yes  Patient assessed for rehabilitation services?: Yes  Response to previous treatment: Patient with no complaints from previous session  Family / Caregiver Present: No  Diagnosis: peritonitis  Subjective  Subjective: Pt lying supine in bed upon arrival, agreeable to evaluation. Social/Functional History  Social/Functional History  Lives With: Son (plans to stay with son upon d/c)  Type of Home: House  Home Layout: Two level, Able to Live on Main level with bedroom/bathroom  Home Access: Stairs to enter without rails  Entrance Stairs - Number of Steps: 2  Bathroom Shower/Tub: Tub/Shower unit  Bathroom Toilet: Standard  Bathroom Equipment: Hand-held shower (shower stool)  ADL Assistance: Independent  Homemaking Assistance: Independent  Homemaking Responsibilities: Yes  Ambulation Assistance: Independent  Transfer Assistance: Independent  Active : Yes  Occupation: On disability  Additional Comments: Reports 2 falls due to passing out. Has been independent since your toe amputation on 5/26. Plans to stay with son at discharge (info above based on son's home)       Objective   Vision: Impaired  Vision Exceptions: Wears glasses at all times  Hearing: Within functional limits    Orientation  Overall Orientation Status: Within Functional Limits     Balance  Sitting Balance: Supervision  Standing Balance: Contact guard assistance  Standing Balance  Time: ~1 minute total  Activity: functional transfers  Functional Mobility  Functional Mobility Comments: unable to assess  Toilet Transfers  Toilet - Technique: Stand step  Equipment Used: Extra wide bedside commode  Toilet Transfer: Contact guard assistance  ADL  Grooming: Setup;Stand by assistance (brush teeth at EOB)  LE Dressing: Dependent/Total (don/doff socks, shoe, and surgical shoe)  Toileting: Maximum assistance (assist in donning/doffing briefs, able to complete pericare at [facility])  Tone RUE  RUE Tone: Normotonic  Tone LUE  LUE Tone: Normotonic  Coordination  Movements Are Fluid And Coordinated: Yes     Bed mobility  Rolling to Left: Modified independent  Rolling to Right: Modified independent  Supine to Sit: Stand by assistance  Sit to Supine:  Moderate assistance  Scooting: Stand by assistance  Comment: Use of grab bars for rolling in bed. HOB elevated in supine to sit, Mod A for B LE in sit to supine. Transfers  Stand Step Transfers: Contact guard assistance (2x)  Sit to stand: 2 Person assistance;Dependent/Total (2 initial failed attempts, 2 successful attempts; maxA x2 >>> maxA x1, modA x1)  Stand to sit: Minimal assistance     Cognition  Overall Cognitive Status: WFL  Perception  Overall Perceptual Status: WFL     Sensation  Overall Sensation Status: WFL        LUE AROM (degrees)  LUE AROM : WFL  LUE General AROM: limited assessment d/t sternal precautions  RUE AROM (degrees)  RUE AROM : WFL  RUE General AROM: limited assessment d/t sternal precautions  LUE Strength  LUE Strength Comment: Not formally assessed due to surgical precautions  RUE Strength  RUE Strength Comment: Not formally assessed due to surgical precautions                   Plan   Plan  Times per week: 3-5  Times per day: Daily  Current Treatment Recommendations: Strengthening, Balance Training, Functional Mobility Training, Safety Education & Training, Endurance Training, Self-Care / ADL, Equipment Evaluation, Education, & procurement, Patient/Caregiver Education & Training    AM-PAC Score        AM-[facility] Inpatient Daily Activity Raw Score: 15 (07/07/21 1536)  AM-PAC Inpatient ADL T-Scale Score : 34.69 (07/07/21 1536)  ADL Inpatient CMS 0-100% Score: 56.46 (07/07/21 1536)  ADL Inpatient CMS G-Code Modifier : CK (07/07/21 1536)    Goals  Short term goals  Time Frame for Short term goals: discharge  Short term goal 1: Fxl mobility SBA  Short term goal 2: Fxl transfers SBA  Short term goal 3: Increase standing endurance to 4+ minutes for ADL participation  Short term goal 4: LB ADL min A  Short term goal 5:  Toileting SBA  Long term goals  Time Frame for Long term goals : LTG=STG       Therapy Time   Individual Concurrent Group Co-treatment   Time In 1331         Time Out 1425         Minutes 54              Timed Code Treatment Minutes: 39 minutes      Total Treatment Minutes:

## 2021-07-07 NOTE — PLAN OF CARE
Nutrition Problem #1: Inadequate oral intake  Intervention: Food and/or Nutrient Delivery: Continue Current Diet, Start Oral Nutrition Supplement  Nutritional Goals: PO intake at least 50% of meals and supplements vs need for tube feeds to start

## 2021-07-07 NOTE — FLOWSHEET NOTE
CCPD treatment completed and patient disconnected from cycler per protocol. Effluent:cloudy yellos Fibrin (no) Specimen collected and sent to lab (yes)  Total time: 8hrs 20min  Total UF:  169 ml. Total Volume:  703 ml. Dwell time gained:  1 hr 6min. Tolerance of procedure : Tolerated tx well. No HD related complaints or complications. Report given to: Leon Smiley RN.         07/07/21 0847   Vitals   BP (!) 167/88   Temp 98.3 °F (36.8 °C)   Pulse 102   Resp 18   SpO2 100 %   Weight 229 lb 4.5 oz (104 kg)   Peritoneal Dialysis Catheter Left lower abdomen   Placement Date/Time: 08/16/18 0818   Inserted by: Madeleine Rosales MD  Catheter Location: Left lower abdomen   Status Deaccessed   Site Condition No Complications   Dressing Status Clean;Dry; Intact   Dressing Occlusive   Cycler   Verification of Prescription CCPD   Total Volume Programmed 7000 mL   Therapy Time (Hours:Minutes) 0800   Fill Volume 1400 mL   Last Fill Volume 0 mL   Dextrose Setting Same (Nonextraneal)   I Drain Alarm 0 mL   Number of Cycles 5   Bag Volume 5000 mL   Number of Bags Used 2   Dianeal Solution Other (Comment)  (Delflex 2.5% in 5L)   Ultrafiltration (UF) (mL) 169 mL   Average Dwell Time (Hours:Minutes) 74   Lost Dwell Time (Hours:Minutes) 0

## 2021-07-07 NOTE — PROGRESS NOTES
Office : [phone]     Fax :[phone]       Nephrology Progress   Note      Patient's Name: Leah Byrne  8:31 AM  2021    Reason for Consult:  ESRD    History of Present iIlness:    Leah Byrne is a 51 y.o. female with severe end-stage renal disease secondary to diabetic nephropathy on peritoneal dialysis, hypertension, peripheral vascular disease, diabetic neuropathy who was admitted after she had a syncopal episode. She had similar episode 2 weeks ago. Recently had angiogram left lower extremity for gangrene left foot second toe. Denies any shortness of breath. Denies any abdominal pain. Denies any nausea vomiting. Interval HX :      No nausea , no vomiting today     S/p ceftazidime x 6 hr day time dwell first dose started  on    On meropenem     PD fluid growing pseudomonas    C/o abdominal pain  : improving   oral intake improving   No chest pain  No sob  S/p CABG on 2010          No intake/output data recorded. Past Medical History:   Diagnosis Date    Diabetes mellitus ([facility] [address]     End stage kidney disease ([facility] [address]     peritoneal dialysis every night at home x 2 years    Hypertension     Neuropathy     Peripheral vascular disease ([facility] [address]        Past Surgical History:   Procedure Laterality Date     SECTION      CORONARY ARTERY BYPASS GRAFT N/A 2021    URGENT CABG CORONARY ARTERY BYPASS GRAFTS X3. VEIN TO PDA X1, VEIN TO OM X1, LEFT INTERNAL MAMMARY ARTERY TO LAD X1. ENDOSCOPIC HARVEST RIGHT LEG SAPHENOUS VEIN. LIGATION NYA USING 35MM ATRICLIP. EPI AORTIC ULTRASOUND. TOTAL CARDIOPULMONARY BYPASS. DOPPLER GRAFT FLOW VERIFICATION. BILATERAL INITERCOSTAL NERVE BLOCK USING 1.3% EXPAREL.  performed by Daly Ellis MD at [place] [address] HISTORY  08/16/2018     lap PD cath placement lt side 62 cm    TOE AMPUTATION Left 2/26/2021    AMPUTATION OF THE LEFT THIRD TOE performed by Maurice Diaz DPM at [id number] [place] [address] [place] TOE AMPUTATION Left 5/26/2021    AMPUTATION OF HALLUX AND SECOND TOE, LEFT FOOT performed by Maurice Diaz DPM at [facility]         Family History   Problem Relation Age of Onset    Coronary Art Dis Mother     High Blood Pressure Mother     Heart Disease Mother     Diabetes Mother     Cancer Father     Coronary Art Dis Father     High Blood Pressure Father     Heart Disease Father     Diabetes Father      Current Medications:    epoetin toy-epbx (RETACRIT) injection 10,000 Units, Q7 Days  lactobacillus (CULTURELLE) capsule 1 capsule, BID WC  nystatin (MYCOSTATIN) 907071 UNIT/ML suspension 500,000 Units, 4x Daily  dianeal lo-massimo 2.5% 1,000 mL with cefTAZidime 1,000 mg solution, Daily  insulin detemir (LEVEMIR) injection pen 20 Units- PATIENT SUPPLIED, Nightly  labetalol (NORMODYNE;TRANDATE) injection 10 mg, Q4H PRN  heparin (porcine) injection 1,000 Units, PRN  0.9 % sodium chloride infusion, PRN  meropenem (MERREM) 1,000 mg in sodium chloride 0.9 % 100 mL IVPB (mini-bag), Q24H  metoclopramide (REGLAN) injection 5 mg, Q6H  dextrose 50 % IV solution, PRN  acetaminophen (TYLENOL) tablet 650 mg, Q6H PRN  aspirin EC tablet 325 mg, Daily  magnesium hydroxide (MILK OF MAGNESIA) 400 MG/5ML suspension 30 mL, Daily PRN  metoprolol tartrate (LOPRESSOR) tablet 25 mg, BID  pantoprazole (PROTONIX) tablet 40 mg, Daily  polyethylene glycol (GLYCOLAX) packet 17 g, Daily PRN  sennosides-docusate sodium (SENOKOT-S) 8.6-50 MG tablet 1 tablet, BID  traMADol (ULTRAM) tablet 50 mg, Q6H PRN   Or  traMADol (ULTRAM) tablet 100 mg, Q6H PRN  zolpidem (AMBIEN) tablet 5 mg, Nightly PRN  dextrose 5 % solution, PRN  dextrose 50 % IV solution, PRN  glucagon (rDNA) injection 1 mg, PRN  glucose (GLUTOSE) 40 % oral gel 15 g, PRN  gentamicin (GARAMYCIN) 0.1 % cream, Daily  atorvastatin (LIPITOR) tablet 20 mg, Nightly  bisacodyl (DULCOLAX) EC tablet 5 mg, Daily PRN  calcium carbonate (TUMS) chewable tablet 500 mg, TID PRN  diphenhydrAMINE (BENADRYL) tablet 25 mg, Q6H PRN  heparin (porcine) injection 3,400 Units, PRN  heparin (porcine) injection 5,000 Units, 3 times per day  hydrALAZINE (APRESOLINE) tablet 50 mg, Q8H PRN  miconazole (MICOTIN) 2 % powder, BID  ondansetron (ZOFRAN) injection 4 mg, Q6H PRN  ondansetron (ZOFRAN-ODT) disintegrating tablet 4 mg, Q8H PRN  polyethylene glycol (GLYCOLAX) packet 17 g, Daily  promethazine (PHENERGAN) tablet 12.5 mg, Q6H PRN  [Held by provider] torsemide (DEMADEX) tablet 100 mg, Daily  albuterol (PROVENTIL) nebulizer solution 2.5 mg, Q6H PRN  insulin aspart (NOVOLOG) injection pen 0-18 Units - Patient Supply, TID WC  insulin aspart (NOVOLOG) injection pen 0-9 Units - Patient Supply, Nightly          Physical exam:     Vitals:  BP (!) 97/58 Comment: notified the nurse yousuf  Pulse 75   Temp 97.6 °F (36.4 °C) (Oral)   Resp 20   Ht 5' 9\" (1.753 m)   Wt 229 lb 4.5 oz (104 kg)   SpO2 100%   BMI 33.86 kg/m²   Constitutional:  OAA X3 NAD  Skin: no rash, turgor wnl  Heent:  eomi, mmm  Neck: no bruits or jvd noted  Cardiovascular:  S1, S2 without m/r/g  Respiratory: CTA B without w/r/r  Abdomen:  +bs, soft, nt, nd  Ext: no  lower extremity edema      Labs:  CBC:   Recent Labs     07/05/21 0438 07/06/21  0446 07/07/21  0450   WBC 12.8* 13.4* 13.6*   HGB 9.0* 9.2* 9.0*    244 227     BMP:    Recent Labs     07/05/21  0438 07/06/21  0446 07/07/21  0450   * 129* 130*   K 3.8 3.9 3.7   CL 90* 88* 90*   CO2 22 26 27   BUN 59* 59* 59*   CREATININE 7.5* 6.9* 7.8*   GLUCOSE 214* 244* 207*     Ca/Mg/Phos:   Recent Labs     07/05/21  0438 07/06/21  0446 07/07/21  0450   CALCIUM 7.7* 7.7* 7.7*   MG 1.70* 2.20 2.10   PHOS 5.8* 5.9* 5.5*         IMAGING:  CT ABDOMEN PELVIS WO CONTRAST Additional Contrast? Oral   Final

## 2021-07-07 NOTE — PROGRESS NOTES
Physical Therapy    Facility/Department: [address]  Initial Assessment    NAME: Buster Lorenz  : 1975  MRN: [id number]    Date of Service: 2021  Buster Lorenz scored a 14/24 on the AM-PAC short mobility form. Current research shows that an AM-PAC score of 17 or less is typically not associated with a discharge to the patient's home setting. Based on the patient's AM-PAC score and their current functional mobility deficits, it is recommended that the patient have 3-5 sessions per week of Physical Therapy at d/c to increase the patient's independence. Please see assessment section for further patient specific details. If patient discharges prior to next session this note will serve as a discharge summary. Please see below for the latest assessment towards goals. Discharge Recommendations:  3-5 sessions per week   PT Equipment Recommendations  Equipment Needed: Yes  Other: TBD with progress    Assessment   Body structures, Functions, Activity limitations: Decreased functional mobility ; Decreased ADL status; Decreased strength;Decreased balance;Decreased endurance  Assessment: Pt requires assist of 2 for all transfers and is limited by decreased endurance and fatigue. Pt declines final transfer to chair this date reporting she is too fatigued and therefore supine therex completed. PT/OT educated pt on benefits of sitting in chair and plan to transfer pt to chair next session as appropriate. Pt demonstrates decreased functional mobility, strength and endurance and would benefit from continued skilled PT in order to improve functional mobility and independence. Treatment Diagnosis: imparied functional mobility, strength, and endurance  Prognosis: Good  Decision Making: Medium Complexity  PT Education: Goals;Plan of Care;Precautions; Functional Mobility Training;Transfer Training;PT Role  Patient Education: Reinforcement of sternal precautions during functional mobility.  Benefits of in bed exercise and sitting in chair. Pt verbalized understanding. Barriers to Learning: none  REQUIRES PT FOLLOW UP: Yes  Activity Tolerance  Activity Tolerance: Patient limited by endurance; Patient limited by fatigue  Activity Tolerance: Pt tolerates session on 3L O2 with SpO2 above 90%. Pt requires exteded rest breaks and declines transfer to chair due to fatigue. Patient Diagnosis(es):  Debility post CABG complicated by possible SBO   has a past medical history of Diabetes mellitus ([facility] [address] End stage kidney disease ([facility] [address] Hypertension, Neuropathy, and Peripheral vascular disease ([facility] [address] has a past surgical history that includes Tonsillectomy;  section; other surgical history (2018); Toe amputation (Left, 2021); Toe amputation (Left, 2021); and Coronary artery bypass graft (N/A, 2021). Restrictions  Restrictions/Precautions  Restrictions/Precautions: Weight Bearing, Fall Risk (high fall risk; L LE WBAT with surgical shoe; sternal precautions)  Required Braces or Orthoses?: Yes  Lower Extremity Weight Bearing Restrictions  Left Lower Extremity Weight Bearing: Weight Bearing As Tolerated  Required Braces or Orthoses  Left Lower Extremity Brace: Boot  LLE Brace Type: Surgical shoe  Position Activity Restriction  Sternal Precautions: No Pushing, No Pulling, 5# Lifting Restrictions  Other position/activity restrictions: 51 y.o. female with PMHx of ESRD on PD, CAD status post CABG, diabetes, hypertension, peripheral vascular disease status post partial foot accommodation was initially admitted to [facility] 2021 for dizziness.  At that time cardiac catheterization was performed which showed multivessel disease.  Patient undergone CABG.  Subsequently patient was discharged to [facility] acute rehab unit where she was undergoing therapy.  She was being followed by nephrology in view of her ESRD for PD.  She subsequently developed abdominal discomfort nausea and vomiting.  Imaging suggestive of ileus versus SBO.  Surgery was consulted at [facility]U who suggested transfer to acute care. Lissa Jessica states that she was not able to tolerate anything p.o. or since the day before yesterday. Fiorella Chapa last bowel movement was approximately that time, no melena or hematochezia, hard.  Today her labs demonstrate hyponatremia, hyperkalemia, BUN/creatinine consistent with ESRD patient, and leukocytosis to 18.    Fluid from PD catheter has grown Pseudomonas aeruginosa. Discussed with nephrology, increased dose of cefepime, give additional dose of meropenem, ID consulted. Vision/Hearing  Vision: Impaired  Vision Exceptions: Wears glasses at all times  Hearing: Within functional limits     Subjective  General  Chart Reviewed: Yes  Patient assessed for rehabilitation services?: Yes  Family / Caregiver Present: No  Follows Commands: Within Functional Limits  General Comment  Comments: Pt supine in bed upon arrival on 3 L O2 amd is agreeable to therapy. Subjective  Subjective: Pt denies pain. Pain Screening  Patient Currently in Pain: Denies  Vital Signs  Patient Currently in Pain: Denies       Orientation  Orientation  Overall Orientation Status: Within Functional Limits  Social/Functional History  Social/Functional History  Lives With: Son (plans to stay with son upon d/c)  Type of Home: House  Home Layout: Two level, Able to Live on Main level with bedroom/bathroom  Home Access: Stairs to enter without rails  Entrance Stairs - Number of Steps: 2  Bathroom Shower/Tub: Tub/Shower unit  Bathroom Toilet: Standard  Bathroom Equipment: Hand-held shower (shower stool)  ADL Assistance: Independent  Homemaking Assistance: Independent  Homemaking Responsibilities: Yes  Ambulation Assistance: Independent  Transfer Assistance: Independent  Active : Yes  Occupation: On disability  Additional Comments: Reports 2 falls due to passing out. Has been independent since your toe amputation on 5/26.   Plans to stay with son at discharge (info above based on son's home)  Cognition   Cognition  Overall Cognitive Status: Exceptions  Arousal/Alertness: Delayed responses to stimuli  Following Commands: Follows one step commands consistently; Follows one step commands with increased time  Attention Span: Appears intact  Memory: Appears intact  Initiation: Requires cues for some  Sequencing: Requires cues for some  Cognition Comment: appearing depressed,    Objective          AROM RLE (degrees)  RLE AROM: WFL  RLE General AROM: Pt demonstrates decreased AROM during healslide due to weakness  AROM LLE (degrees)  LLE AROM : WFL  LLE General AROM: Pt demonstrates decreased AROM during healslide due to weakness  Strength RLE  Strength RLE: Exception  Comment: weakness evident through assist of 2 required for transfer  Strength LLE  Strength LLE: Exception  Comment: weakness evident through assist of 2 required for tranfers     Sensation  Overall Sensation Status: WFL  Bed mobility  Rolling to Left: Modified independent  Rolling to Right: Modified independent  Supine to Sit: Stand by assistance (HOB elevated)  Sit to Supine: Moderate assistance (assist required for B LE)  Scooting: Stand by assistance  Comment: Use of grab bars for rolling in bed. HOB elevated in supine to sit, Mod A for B LE in sit to supine.   Transfers  Sit to Stand: Dependent/Total;2 Person Assistance (max x 1 + mod x 1; B knees blocked)  Stand to sit: Contact guard assistance (improved eccentric control)  Bed to Chair: Dependent/Total;2 Person Assistance (from EOB<>bed side commode via stand pivot with B HHA)  Stand Pivot Transfers: Dependent/Total;2 Person Assistance (from EOB>bed side commode with max assist x 2; bedside commode>EOB with mod x1 + max x 1; CGAx1 and B HHA used during turn; min assist for eccentric control to sit)  Comment: VC for upright posture, LE muscle activation, and for sternal precautions during all transfers  Ambulation  Ambulation?: No complete transfers with mod assist x1  Short term goal 3: pt will ambulate 20' with CGA.        Therapy Time   Individual Concurrent Group Co-treatment   Time In 1331         Time Out 1425         Minutes 54         Timed Code Treatment Minutes: [address] [place], [place] [address]

## 2021-07-07 NOTE — PROGRESS NOTES
Comprehensive Nutrition Assessment    Type and Reason for Visit:  Initial (LOS assessment)    Nutrition Recommendations/Plan:   1. Offer vanilla Ensure Enlive TID  2. Recommend tube feeds to start. Pt will be at significant risk for refeeding syndrome per AND/ASPEN guidelines. RD remains available to provide recommendations as needed. Nutrition Assessment:  Pt is moderately malnourished as evidenced by malnutrition assessment below. Pt with poor PO intake over the past month of hospitalization (5/31-6/15 inpatient, 6/16-6/30 ARU, 7/1-present inpatient). Pt attributes poor PO intake to multiple etiologies including nausea, abdominal pain, pain with swallowing d/t thrush and lack of appetite. PO intake throughout admission has consistently been less than 50% of meals throughout admission. Seen over breakfast tray having consumed 0% of meal. She is agreeable to vanilla Ensure Enlive TID. RD recommends nutrition support to start. Malnutrition Assessment:  Malnutrition Status: Moderate malnutrition    Context:  Chronic Illness     Findings of the 6 clinical characteristics of malnutrition:  Energy Intake:  7 - 75% or less estimated energy requirements for 1 month or longer  Weight Loss:  No significant weight loss     Body Fat Loss:  No significant body fat loss     Muscle Mass Loss:  1 - Mild muscle mass loss Temples (temporalis), Clavicles (pectoralis & deltoids)  Fluid Accumulation:  1 - Mild Extremities   Strength:  Not Performed    Estimated Daily Nutrient Needs:  Energy (kcal):  2239-0561; Weight Used for Energy Requirements:  Current (104 kg)     Protein (g):  86-99 grams; Weight Used for Protein Requirements:  Ideal (66 kg; 1.3-1.5 grams per kg)        Fluid (ml/day):   ; Method Used for Fluid Requirements:  1 ml/kcal      Nutrition Related Findings:  +2 pitting RLE edema. +1 pitting LLE edema. Na+ 130. Phos 5.5. LBM 7/7.       Wounds:  Surgical Incision       Current Nutrition Therapies:    ADULT DIET; Regular    Anthropometric Measures:  · Height: 5' 9\" (175.3 cm)  · Current Body Weight: 229 lb 4.5 oz (104 kg)   · Admission Body Weight: 225 lb (102.1 kg)    · Ideal Body Weight: 145 lbs; % Ideal Body Weight 158.1 %   · BMI: 33.8  · BMI Categories: Obese Class 1 (BMI 30.0-34. 9)       Nutrition Diagnosis:   · Inadequate oral intake related to inadequate protein-energy intake as evidenced by intake 0-25%    · Moderate malnutrition related to inadequate protein-energy intake as evidenced by intake 0-25%, intake 26-50%, localized or generalized fluid accumulation, mild muscle loss      Nutrition Interventions:   Food and/or Nutrient Delivery:  Continue Current Diet, Start Oral Nutrition Supplement  Nutrition Education/Counseling:  Education completed (CVU class 6/15)   Coordination of Nutrition Care:  Continue to monitor while inpatient    Goals:  PO intake at least 50% of meals and supplements vs need for tube feeds to start       Nutrition Monitoring and Evaluation:   Behavioral-Environmental Outcomes:  None Identified   Food/Nutrient Intake Outcomes:  Food and Nutrient Intake, Supplement Intake  Physical Signs/Symptoms Outcomes:  Chewing or Swallowing, Nausea or Vomiting, Skin     Discharge Planning:     Too soon to determine     Electronically signed by Jaden Chris RD, LD on 7/7/21 at 12:03 PM EDT    Contact: [phone]

## 2021-07-07 NOTE — CARE COORDINATION
SW contacted [facility]/Counts include 234 beds at the [facility] to check patient's IV benefits. SW will continue to follow progress and update patient's discharge plan as needed.     Electronically signed by JERRY Carrillo on 7/7/2021 at 1:09 PM

## 2021-07-07 NOTE — PROGRESS NOTES
Hospitalist Progress Note      PCP: Kam Blas MD    Date of Admission: 7/1/2021    Chief Complaint: Nausea and vomiting    Hospital Course: 51 y.o. female with PMHx of ESRD on PD, CAD status post CABG, diabetes, hypertension, peripheral vascular disease status post partial foot accommodation was initially admitted to [facility] 5/31/2021 for dizziness. At that time cardiac catheterization was performed which showed multivessel disease. Patient undergone CABG. Subsequently patient was discharged to [facility] acute rehab unit where she was undergoing therapy. She was being followed by nephrology in view of her ESRD for PD. She subsequently developed abdominal discomfort nausea and vomiting. Imaging suggestive of ileus versus SBO. Surgery was consulted at ARU who suggested transfer to acute care. Patient states that she was not able to tolerate anything p.o. or since the day before yesterday. Her last bowel movement was approximately that time, no melena or hematochezia, hard. Today her labs demonstrate hyponatremia, hyperkalemia, BUN/creatinine consistent with ESRD patient, and leukocytosis to 18. Fluid from PD catheter has grown Pseudomonas aeruginosa. Discussed with nephrology, increased dose of cefepime, give additional dose of meropenem, ID consulted. Interval history:   Patient seen and examined today  Overnight events and interval ancillary notes reviewed  Afebrile overnight, WBC count 13.  6 this morning  On 3 L NC: Satting at 100%.   Wean as tolerated keep sats above 92  Blood cultures from 7/1 remain no growth to date  Reports feeling tired but denied any nausea, vomiting or abdominal pain    Medications:  Reviewed    Infusion Medications    sodium chloride      dextrose       Scheduled Medications    epoetin toy-epbx  10,000 Units Subcutaneous Q7 Days    lactobacillus  1 capsule Oral BID WC    nystatin  5 mL Oral 4x Daily    custom dialysis builder Intraperitoneal Daily    insulin detemir  20 Units Subcutaneous Nightly    meropenem  1,000 mg Intravenous Q24H    metoclopramide  5 mg Intravenous Q6H    aspirin  325 mg Oral Daily    metoprolol tartrate  25 mg Oral BID    pantoprazole  40 mg Oral Daily    sennosides-docusate sodium  1 tablet Oral BID    gentamicin   Topical Daily    atorvastatin  20 mg Oral Nightly    heparin (porcine)  5,000 Units Subcutaneous 3 times per day    miconazole   Topical BID    polyethylene glycol  17 g Oral Daily    [Held by provider] torsemide  100 mg Oral Daily    insulin aspart  0-18 Units Subcutaneous TID WC    insulin aspart  0-9 Units Subcutaneous Nightly     PRN Meds: labetalol, heparin (porcine), sodium chloride, dextrose, acetaminophen, magnesium hydroxide, polyethylene glycol, traMADol **OR** traMADol, zolpidem, dextrose, dextrose, glucagon (rDNA), glucose, bisacodyl, calcium carbonate, diphenhydrAMINE, heparin (porcine), hydrALAZINE, ondansetron, ondansetron, promethazine, albuterol    No intake or output data in the 24 hours ending 07/07/21 0835    Physical Exam Performed:    BP (!) 97/58 Comment: notified the nurse yousuf  Pulse 75   Temp 97.6 °F (36.4 °C) (Oral)   Resp 20   Ht 5' 9\" (1.753 m)   Wt 229 lb 4.5 oz (104 kg)   SpO2 100%   BMI 33.86 kg/m²     General appearance: No apparent distress, appears stated age and cooperative. Respiratory:  Normal respiratory effort. Clear to auscultation, bilaterally without Rales/Wheezes/Rhonchi. Cardiovascular: Regular rate and rhythm with normal S1/S2 without murmurs, rubs or gallops. Abdomen: Soft, non-tender, non-distended with normal bowel sounds. Musculoskeletal: No clubbing, cyanosis or edema bilaterally. Full range of motion without deformity. Skin: Skin color, texture, turgor normal.  No rashes or lesions. Neurologic:  Neurovascularly intact without any focal sensory/motor deficits.  Cranial nerves: II-XII intact, grossly non-focal.  Psychiatric: Alert and oriented, thought content appropriate, normal insight  Peripheral Pulses: +2 palpable, equal bilaterally       Labs:   Recent Labs     07/05/21 0438 07/06/21  0446 07/07/21  0450   WBC 12.8* 13.4* 13.6*   HGB 9.0* 9.2* 9.0*   HCT 28.1* 28.8* 28.7*    244 227     Recent Labs     07/05/21 0438 07/06/21 0446 07/07/21  0450   * 129* 130*   K 3.8 3.9 3.7   CL 90* 88* 90*   CO2 22 26 27   BUN 59* 59* 59*   CREATININE 7.5* 6.9* 7.8*   CALCIUM 7.7* 7.7* 7.7*   PHOS 5.8* 5.9* 5.5*     No results for input(s): AST, ALT, BILIDIR, BILITOT, ALKPHOS in the last 72 hours. No results for input(s): INR in the last 72 hours. No results for input(s): Gavi Basques in the last 72 hours. Urinalysis:      Lab Results   Component Value Date    NITRU Negative 06/06/2021    WBCUA 36 06/06/2021    BACTERIA RARE 06/06/2021    RBCUA 587 06/06/2021    BLOODU LARGE 06/06/2021    SPECGRAV 1.019 06/06/2021    GLUCOSEU 100 06/06/2021       Radiology:  CT ABDOMEN PELVIS WO CONTRAST Additional Contrast? Oral   Final Result   1. Mild bibasilar segmental atelectasis versus pneumonia. 2. Negative for small bowel obstruction. 3. Mild ascites.                  Assessment/Plan:    Active Hospital Problems    Diagnosis     Dialysis-associated peritonitis ([facility] [address] Jeannie Ventura     Peripheral vascular disease ([facility] [address] [I73.9]     SBO (small bowel obstruction) ([facility] [address] [K56.609]     Coronary artery disease due to lipid rich plaque [I25.10, I25.83]     ESRD on peritoneal dialysis ([address] [N18.6, Z99.2]     Type 2 diabetes mellitus with obesity ([facility] [address] [E11.69, E66.9]      SBO versus ileus: Resolved  CT abdomen pelvis showed no SBO  General surgery on board: Appreciate their rec  Tolerating diet     Leukocytosis secondary to PD catheter related peritonitis  PD fluid from 7/1/2021 grew heavy growth of Pseudomonas  Blood cultures negative  ID on board appreciate their recs  vancomycin and cefepime discontinued  On IV Merrem andCeftazidime in dialysate, discussed with nephrology      Normocytic anemia  Hemoglobin stable around 9   Patient status post CABG, target hemoglobin above 8  Status post 1 unit PRBC 7/3/2021    Persistent hypoglycemia-resolved  Now patient is actually hyperglycemic  Stopped dextrose infusion  Lantus resumed at previous dose     CAD Status post CABG: Stable  On high-dose aspirin  Apparently does not tolerate Plavix    Hypertension  She is on Lopressor 25 twice daily  She has hydralazine and labetalol as needed  Anticipate nephrology recommendations     ESRD  On PD  Nephrology following     Mild hyperkalemia  Resolved     Mild hyponatremia  As per nephrology     Anion gap metabolic acidosis: Resolved  Likely in view of uremia       DVT Prophylaxis: Heparin  Diet: ADULT DIET;  Regular  Code Status: Full Code      Electronically signed by Marcelino Larson MD on 7/7/2021 at 8:35 AM

## 2021-07-08 LAB
ANION GAP SERPL CALCULATED.3IONS-SCNC: 13 MMOL/L (ref 3–16)
ANISOCYTOSIS: ABNORMAL
APPEARANCE FLUID: NORMAL
BASOPHILIC STIPPLING: ABNORMAL
BASOPHILS ABSOLUTE: 0 K/UL (ref 0–0.2)
BASOPHILS RELATIVE PERCENT: 0 %
BUN BLDV-MCNC: 57 MG/DL (ref 7–20)
CALCIUM SERPL-MCNC: 7.6 MG/DL (ref 8.3–10.6)
CELL COUNT FLUID TYPE: NORMAL
CHLORIDE BLD-SCNC: 91 MMOL/L (ref 99–110)
CLOT EVALUATION: NORMAL
CO2: 26 MMOL/L (ref 21–32)
COLOR FLUID: NORMAL
CREAT SERPL-MCNC: 7 MG/DL (ref 0.6–1.1)
EOSINOPHIL FLUID: 1 %
EOSINOPHILS ABSOLUTE: 0.5 K/UL (ref 0–0.6)
EOSINOPHILS RELATIVE PERCENT: 4 %
GFR AFRICAN AMERICAN: 8
GFR NON-AFRICAN AMERICAN: 6
GLUCOSE BLD-MCNC: 117 MG/DL (ref 70–99)
GLUCOSE BLD-MCNC: 172 MG/DL (ref 70–99)
GLUCOSE BLD-MCNC: 172 MG/DL (ref 70–99)
GLUCOSE BLD-MCNC: 207 MG/DL (ref 70–99)
GLUCOSE BLD-MCNC: 224 MG/DL (ref 70–99)
GLUCOSE BLD-MCNC: 72 MG/DL (ref 70–99)
GLUCOSE BLD-MCNC: 79 MG/DL (ref 70–99)
GLUCOSE BLD-MCNC: 80 MG/DL (ref 70–99)
GLUCOSE BLD-MCNC: 87 MG/DL (ref 70–99)
HCT VFR BLD CALC: 29.1 % (ref 36–48)
HEMATOLOGY PATH CONSULT: NORMAL
HEMATOLOGY PATH CONSULT: YES
HEMOGLOBIN: 9.3 G/DL (ref 12–16)
LYMPHOCYTES ABSOLUTE: 0.9 K/UL (ref 1–5.1)
LYMPHOCYTES RELATIVE PERCENT: 7 %
LYMPHOCYTES, BODY FLUID: 5 %
MACROPHAGE FLUID: 11 %
MAGNESIUM: 2 MG/DL (ref 1.8–2.4)
MCH RBC QN AUTO: 29.4 PG (ref 26–34)
MCHC RBC AUTO-ENTMCNC: 32 G/DL (ref 31–36)
MCV RBC AUTO: 91.6 FL (ref 80–100)
MESOTHELIAL FLUID: 1 %
METAMYELOCYTES RELATIVE PERCENT: 4 %
MONOCYTES ABSOLUTE: 1.7 K/UL (ref 0–1.3)
MONOCYTES RELATIVE PERCENT: 14 %
NEUTROPHIL, FLUID: 82 %
NEUTROPHILS ABSOLUTE: 9.3 K/UL (ref 1.7–7.7)
NEUTROPHILS RELATIVE PERCENT: 71 %
NUCLEATED CELLS FLUID: 480 /CUMM
NUMBER OF CELLS COUNTED FLUID: 100
PDW BLD-RTO: 18.5 % (ref 12.4–15.4)
PERFORMED ON: ABNORMAL
PERFORMED ON: NORMAL
PHOSPHORUS: 5.4 MG/DL (ref 2.5–4.9)
PLATELET # BLD: 193 K/UL (ref 135–450)
PLATELET SLIDE REVIEW: ADEQUATE
PMV BLD AUTO: 9.3 FL (ref 5–10.5)
POLYCHROMASIA: ABNORMAL
POTASSIUM SERPL-SCNC: 3.8 MMOL/L (ref 3.5–5.1)
RBC # BLD: 3.17 M/UL (ref 4–5.2)
RBC FLUID: <1000 /CUMM
SLIDE REVIEW: ABNORMAL
SODIUM BLD-SCNC: 130 MMOL/L (ref 136–145)
TOXIC GRANULATION: PRESENT
WBC # BLD: 12.4 K/UL (ref 4–11)

## 2021-07-08 PROCEDURE — 99233 SBSQ HOSP IP/OBS HIGH 50: CPT | Performed by: INTERNAL MEDICINE

## 2021-07-08 PROCEDURE — 36415 COLL VENOUS BLD VENIPUNCTURE: CPT

## 2021-07-08 PROCEDURE — 6370000000 HC RX 637 (ALT 250 FOR IP): Performed by: NURSE PRACTITIONER

## 2021-07-08 PROCEDURE — 89051 BODY FLUID CELL COUNT: CPT

## 2021-07-08 PROCEDURE — 6370000000 HC RX 637 (ALT 250 FOR IP): Performed by: INTERNAL MEDICINE

## 2021-07-08 PROCEDURE — 6360000002 HC RX W HCPCS: Performed by: HOSPITALIST

## 2021-07-08 PROCEDURE — 83735 ASSAY OF MAGNESIUM: CPT

## 2021-07-08 PROCEDURE — 97530 THERAPEUTIC ACTIVITIES: CPT

## 2021-07-08 PROCEDURE — 2500000003 HC RX 250 WO HCPCS: Performed by: INTERNAL MEDICINE

## 2021-07-08 PROCEDURE — 80048 BASIC METABOLIC PNL TOTAL CA: CPT

## 2021-07-08 PROCEDURE — 84100 ASSAY OF PHOSPHORUS: CPT

## 2021-07-08 PROCEDURE — 97110 THERAPEUTIC EXERCISES: CPT

## 2021-07-08 PROCEDURE — 90945 DIALYSIS ONE EVALUATION: CPT

## 2021-07-08 PROCEDURE — 97535 SELF CARE MNGMENT TRAINING: CPT

## 2021-07-08 PROCEDURE — 6360000002 HC RX W HCPCS: Performed by: INTERNAL MEDICINE

## 2021-07-08 PROCEDURE — 2580000003 HC RX 258: Performed by: INTERNAL MEDICINE

## 2021-07-08 PROCEDURE — 2060000000 HC ICU INTERMEDIATE R&B

## 2021-07-08 PROCEDURE — 6360000002 HC RX W HCPCS: Performed by: NURSE PRACTITIONER

## 2021-07-08 PROCEDURE — 2580000003 HC RX 258: Performed by: HOSPITALIST

## 2021-07-08 PROCEDURE — 85025 COMPLETE CBC W/AUTO DIFF WBC: CPT

## 2021-07-08 RX ORDER — SODIUM CHLORIDE, SODIUM LACTATE, CALCIUM CHLORIDE, MAGNESIUM CHLORIDE AND DEXTROSE 2.5; 538; 448; 18.3; 5.08 G/100ML; MG/100ML; MG/100ML; MG/100ML; MG/100ML
5000 INJECTION, SOLUTION INTRAPERITONEAL DAILY
Status: DISCONTINUED | OUTPATIENT
Start: 2021-07-08 | End: 2021-07-10

## 2021-07-08 RX ADMIN — METOCLOPRAMIDE 5 MG: 5 INJECTION, SOLUTION INTRAMUSCULAR; INTRAVENOUS at 11:09

## 2021-07-08 RX ADMIN — SODIUM CHLORIDE, SODIUM LACTATE, CALCIUM CHLORIDE, MAGNESIUM CHLORIDE AND DEXTROSE 5000 ML: 2.5; 538; 448; 18.3; 5.08 INJECTION, SOLUTION INTRAPERITONEAL at 18:00

## 2021-07-08 RX ADMIN — MEROPENEM 1000 MG: 1 INJECTION, POWDER, FOR SOLUTION INTRAVENOUS at 12:58

## 2021-07-08 RX ADMIN — Medication 1 CAPSULE: at 18:01

## 2021-07-08 RX ADMIN — METOPROLOL TARTRATE 25 MG: 25 TABLET, FILM COATED ORAL at 11:09

## 2021-07-08 RX ADMIN — NYSTATIN 500000 UNITS: 100000 SUSPENSION ORAL at 18:02

## 2021-07-08 RX ADMIN — Medication 1 CAPSULE: at 11:09

## 2021-07-08 RX ADMIN — NYSTATIN 500000 UNITS: 100000 SUSPENSION ORAL at 11:10

## 2021-07-08 RX ADMIN — ZOLPIDEM TARTRATE 5 MG: 5 TABLET, COATED ORAL at 21:59

## 2021-07-08 RX ADMIN — MICONAZOLE NITRATE: 20 POWDER TOPICAL at 12:59

## 2021-07-08 RX ADMIN — HEPARIN SODIUM 5000 UNITS: 5000 INJECTION INTRAVENOUS; SUBCUTANEOUS at 21:59

## 2021-07-08 RX ADMIN — CEFTAZIDIME: 1 INJECTION, POWDER, FOR SOLUTION INTRAMUSCULAR; INTRAVENOUS at 17:41

## 2021-07-08 RX ADMIN — METOCLOPRAMIDE 5 MG: 5 INJECTION, SOLUTION INTRAMUSCULAR; INTRAVENOUS at 04:26

## 2021-07-08 RX ADMIN — METOCLOPRAMIDE 5 MG: 5 INJECTION, SOLUTION INTRAMUSCULAR; INTRAVENOUS at 18:02

## 2021-07-08 RX ADMIN — GENTAMICIN SULFATE: 1 CREAM TOPICAL at 18:24

## 2021-07-08 RX ADMIN — GENTAMICIN SULFATE: 1 CREAM TOPICAL at 18:03

## 2021-07-08 RX ADMIN — NYSTATIN 500000 UNITS: 100000 SUSPENSION ORAL at 13:30

## 2021-07-08 RX ADMIN — PANTOPRAZOLE SODIUM 40 MG: 40 TABLET, DELAYED RELEASE ORAL at 05:57

## 2021-07-08 RX ADMIN — ASPIRIN 325 MG: 325 TABLET, COATED ORAL at 11:08

## 2021-07-08 RX ADMIN — HEPARIN SODIUM 5000 UNITS: 5000 INJECTION INTRAVENOUS; SUBCUTANEOUS at 05:57

## 2021-07-08 RX ADMIN — NYSTATIN 500000 UNITS: 100000 SUSPENSION ORAL at 21:59

## 2021-07-08 RX ADMIN — ATORVASTATIN CALCIUM 20 MG: 20 TABLET, FILM COATED ORAL at 21:59

## 2021-07-08 ASSESSMENT — PAIN SCALES - GENERAL
PAINLEVEL_OUTOF10: 0
PAINLEVEL_OUTOF10: 4
PAINLEVEL_OUTOF10: 0

## 2021-07-08 ASSESSMENT — ENCOUNTER SYMPTOMS
PHOTOPHOBIA: 0
BLOOD IN STOOL: 0
EYE DISCHARGE: 0
CHOKING: 0
ABDOMINAL PAIN: 0
STRIDOR: 0
SHORTNESS OF BREATH: 0
CHEST TIGHTNESS: 0
COLOR CHANGE: 0
RHINORRHEA: 0
TROUBLE SWALLOWING: 0
NAUSEA: 0
ABDOMINAL DISTENTION: 1
FACIAL SWELLING: 0
DIARRHEA: 0
APNEA: 0
COUGH: 0
EYE REDNESS: 0

## 2021-07-08 NOTE — PROGRESS NOTES
PODIATRY DAILY PROGRESS NOTE  Maddison He  Subjective :   Patient seen and examined this am at beside. She is s/p amputation of the 1st and 2nd toes of the left foot (5/26/21). She denies any pain in the foot. Patient is hoarse at bedside and states that she is doing the best she can at this point. She appears in poorer spirits than usual. Patient denies calf pain, thigh pain, chest pain. Review of Systems: A 12 point review of symptoms is unremarkable with the exception of the chief complaint. Patient specifically denies fever, vomiting, chills, shortness of breath, chest pain, abdominal pain, constipation or difficulty urinating. Objective     BP (!) 175/76   Pulse 79   Temp 97.7 °F (36.5 °C) (Oral)   Resp 22   Ht 5' 9\" (1.753 m)   Wt 231 lb 4.2 oz (104.9 kg)   SpO2 97%   BMI 34.15 kg/m²      I/O:    Intake/Output Summary (Last 24 hours) at 7/8/2021 1043  Last data filed at 7/8/2021 0710  Gross per 24 hour   Intake --   Output 174 ml   Net -174 ml              Wt Readings from Last 3 Encounters:   07/08/21 231 lb 4.2 oz (104.9 kg)   07/01/21 217 lb 6 oz (98.6 kg)   06/16/21 227 lb 4.7 oz (103.1 kg)       LABS:    Recent Labs     07/07/21  0450 07/08/21  0435   WBC 13.6* 12.4*   HGB 9.0* 9.3*   HCT 28.7* 29.1*    193        Recent Labs     07/08/21  0436   *   K 3.8   CL 91*   CO2 26   PHOS 5.4*   BUN 57*   CREATININE 7.0*        No results for input(s): PROT, INR, APTT in the last 72 hours. LOWER EXTREMITY EXAMINATION  VASCULAR: DP and PT pulses are nonpalpable b/l. CFT is sluggish to the digits of the foot b/l. No pain with calf compression b/l.     NEUROLOGIC: Gross and epicritic sensation is diminished b/l     DERMATOLOGIC: Incision site noted to the distal aspect of the 1st through 3rd metatarsals on left foot. Necrotic changes are appreciated with evidence of ischemic/deep tissue injury to the plantar 5th metatarsal head. Incision is dry with no active drainage noted. No erythema noted.      MUSCULOSKELETAL: Muscle strength is 5/5 for all pedal groups tested. Amputation of toes 1-3 noted to the left. ASSESSMENT/PLAN  1. Gangrene of left foot - s/p amputation of toes 1 and 2 on the left (5/26/21)  2. Peripheral arterial disease - s/p left popliteal, anterior tibial, and dorsalis pedis angioplasty by Dr. Jimmy Verma on 5/27/21  3. Diabetes mellitus with peripheral neuropathy  4. End stage renal disease on daily PD     - Patient was seen and examined at bedside. Continued (expected) ischemic/necrotic changes appreciated to the incision site  - No indication for imaging at this time  - No signs of infection noted. Site is dry. No need for antibiotics  - Will continue to allow necrotic changes to demarcate to the surgical site. If they progress, patient will require outpatient revision.  - Dressing placed consisting of betadine paint, adaptic, gauze, kerlix, and ace. Dressing changes ordered  - WBAT in a surgical shoe. Patient prefers surgical shoe over boot. Order for surgical shoe placed.      DISPO: continued necrotic changes noted to the left foot. Will continue betadine wet-to-dry daily changes to the foot until discharge and allow necrotic areas to demarcate prior to revision surgery. Patient is aware that she will require further amputation once stable. I will come once a week to follow the patient.     Josue Wade DPM  [phone]

## 2021-07-08 NOTE — CARE COORDINATION
Referral received to check IV Benefits. Patients benefit information is as follows: Therapy:Merrem 1 gram qd    Pt.  Copay:$2/wk for [employer] signed by Bárbara Patrick on 7/8/2021 at 10:09 AM   Cell Ph# [phone], Office # [phone]

## 2021-07-08 NOTE — PROGRESS NOTES
[facility]PD Order   Exchanges: 5   Exchange Volume: 1400 ml   Total Time: 8 hrs   Dextrose: 2.5%   Last Fill: 0 ml   Total Volume: 7000 ml        Orders verified. Supplies taken to pt's room. Report received. Cycler set up, primed and pre tested. Dressing changed on [facility] Cath site. Pt connected to cycler. CCPD initiated without problem. Initial effluent clear.

## 2021-07-08 NOTE — PROGRESS NOTES
Infectious Diseases   Progress Note      Admission Date: 7/1/2021  Hospital Day: Hospital Day: 8   Attending: Dariana Thomas MD  Date of service: 7/8/2021     Chief complaint/ Reason for consult:     · Peritoneal dialysis catheter related peritonitis-PD fluid is positive for Pseudomonas aeruginosa  · ESRD on peritoneal dialysis for 2 years  · Type 2 diabetes mellitus  · Essential hypertension    Microbiology:        I have reviewed allavailable micro lab data and cultures    · Blood culture (1/1) - collected on 7/1/2021: negative  · Peritoneal dialysate fluid culture  - collected on 7/1/2021: Heavy growth of Pseudomonas    [address], [place], [place] [facility] [address]   Phone [phone]   Susceptibility    Pseudomonas aeruginosa (1)    Antibiotic Interpretation DENNISE Status    cefepime Sensitive <=2 mcg/mL     ciprofloxacin Sensitive <=1 mcg/mL     gentamicin Sensitive <=4 mcg/mL     meropenem Sensitive <=1 mcg/mL     piperacillin-tazobactam Sensitive <=16 mcg/mL     tobramycin Sensitive <=4 mcg/mL             Antibiotics and immunizations:       Current antibiotics: All antibiotics and their doses were reviewed by me    Recent Abx Admin                   meropenem (MERREM) 1,000 mg in sodium chloride 0.9 % 100 mL IVPB (mini-bag) (mg) 1,000 mg New Bag 07/08/21 1258    dianeal lo-massimo 2.5% 1,000 mL with cefTAZidime 1,000 mg solution ()  New Bag 07/07/21 1538                  Immunization History: All immunization history was reviewed by me today.     Immunization History   Administered Date(s) Administered    Influenza Virus Vaccine 10/23/2014, 12/09/2015    Influenza, Janas Rota, IM, (6 mo and older Fluzone, Flulaval, Fluarix and 3 yrs and older Afluria) 12/07/2018    Influenza, Quadv, IM, PF (6 mo and older Fluzone, Flulaval, Fluarix, and 3 yrs and older Afluria) 11/08/2016    Pneumococcal Polysaccharide ([id number]) 04/28/2017    Td, unspecified formulation 01/01/1999, 07/01/2007       Known drug allergies: All allergies were reviewed and updated    Allergies   Allergen Reactions    Zetia [Ezetimibe] Shortness Of Breath    Quinolones Nausea And Vomiting and Nausea Only     Other reaction(s): Vomiting    Lisinopril Swelling       Social history:     Social History:  All social andepidemiologic history was reviewed and updated by me today as needed. · Tobacco use:   reports that she has never smoked. She has never used smokeless tobacco.  · Alcohol use:   reports no history of alcohol use. · Currently lives in: [place] [address]  ·  reports no history of drug use. COVID VACCINATION AND LAB RESULT RECORDS:     Internal Administration   First Dose      Second Dose           Last COVID Lab SARS-CoV-2 (no units)   Date Value   05/24/2021 Not Detected     SARS-CoV-2, NAAT (no units)   Date Value   06/08/2021 Not Detected            Assessment:     The patient is a 51 y.o. old female who  has a past medical history of Diabetes mellitus ([facility] End stage kidney disease ([facility] Hypertension, Neuropathy, and Peripheral vascular disease ([facility] with following problems:    · Peritoneal dialysis catheter related peritonitis-PD fluid culture from 7/1/2021 had heavy growth of pan susceptible Pseudomonas aeruginosa-covered with appropriate antibiotics  · ESRD on peritoneal dialysis for 2 years  · Type 2 diabetes mellitus-maintain good glucose control  · Essential hypertension-BP is controlled, hypotension seems to be improving  · Coronary artery disease-stable  · Peripheral vascular disease  · Diabetic polyneuropathy type II  · Obesity Class 1 due to excess calorie intake : Body mass index is 34.15 kg/m². Discussion:      The patient is afebrile. Blood culture from 7/1/2021 was negative. She is on IV meropenem and intraperitoneal ceftazidime. Peripheral blood white cell count is improving slowly and is 12,400 today. Serum sodium is 130 today.     PD fluid remains hazy, however, PD fluid white cell count is improving and is 480 today. Still is 82% neutrophils. Plan:     Diagnostic Workup:      · Continue to follow  fever curve, WBC count and blood cultures. · Continue to monitor blood counts, liver and renal function. Antimicrobials:    · Will continue IV meropenem 1 g every 24 hour  · Will continue intraperitoneal ceftazidime for now. Today will be day 5 of that  · We will plan to stop intraperitoneal ceftazidime after the dose tomorrow  · Patient remains quite sick but has slow improvement. · Continue to watch for any worsening diarrhea  · We will follow up on the culture results and clinical progress and will make further recommendations accordingly. · Continue close vitals monitoring. · Maintain good glycemic control. · Fall precautions. Aspiration precautions. · Continue to watch for new fever or diarrhea. · DVT prophylaxis. · Discussed all above with patient and RN. · Discussed with Dr. Rufino Marion      Drug Monitoring:    · Continue monitoring for antibiotic toxicity as follows: CBC, CMP   · Continue to watch for following: new or worsening fever, new hypotension, hives, lip swelling and redness or purulence at vascular access sites. I/v access Management:    · Continue to monitor i.v access sites for erythema, induration, discharge or tenderness. · As always, continue efforts to minimize tubes/lines/drains as clinically appropriate to reduce chances of line associated infections. Patient education and counseling:        · The patient was educated in detail about the side-effects of various antibiotics and things to watch for like new rashes, lip swelling, severe reaction, worsening diarrhea, break through fever etc.  · Discussed patient's condition and what to expect. All of the patient's questions were addressed in a satisfactory manner and patient verbalized understanding all instructions.       Level of complexity of visit: High     Risk of Complications/Morbidity: High · Illness(es)/ Infection present that pose threat to life/bodily function. · There is potential for severe exacerbation of infection/side effects of treatment. · Therapy requires intensive monitoring for antimicrobial agent toxicity. TIME SPENT TODAY:     - Spent over  37 minutes on visit (including interval history, physical exam, review of data including labs, cultures, imaging, development and implementation of treatment plan and coordination of complex care). More than 50 percent of this includes face-to-face time spent with the patient for counseling and coordination of care. Thank you for involving me in the care of your patient. I will continue to follow. If you have anyadditional questions, please do not hesitate to contact me. Subjective: Interval history: Interval history was obtained from chart review and patient/ RN. The patient is afebrile. She is tolerating antibiotics okay. Abdominal pain is improving. No diarrhea. She remains on 3 L oxygen by nasal cannula     REVIEW OF SYSTEMS:     Review of Systems   Constitutional: Positive for fatigue. Negative for chills, diaphoresis and unexpected weight change. HENT: Negative for congestion, ear discharge, ear pain, facial swelling, hearing loss, rhinorrhea and trouble swallowing. Eyes: Negative for photophobia, discharge, redness and visual disturbance. Respiratory: Negative for apnea, cough, choking, chest tightness, shortness of breath and stridor. Cardiovascular: Negative for chest pain and palpitations. Gastrointestinal: Positive for abdominal distention. Negative for abdominal pain, blood in stool, diarrhea and nausea. Endocrine: Negative for polydipsia, polyphagia and polyuria. Genitourinary: Negative for difficulty urinating, dysuria, frequency, hematuria, menstrual problem and vaginal discharge. Musculoskeletal: Negative for arthralgias, joint swelling, myalgias and neck stiffness.    Skin: Negative for color change and rash. Allergic/Immunologic: Negative for immunocompromised state. Neurological: Negative for dizziness, seizures, speech difficulty, light-headedness and headaches. Hematological: Negative for adenopathy. Psychiatric/Behavioral: Negative for agitation, hallucinations and suicidal ideas. Past Medical History: All past medical history reviewed today. Past Medical History:   Diagnosis Date    Diabetes mellitus ([facility]     End stage kidney disease ([facility]     peritoneal dialysis every night at home x 2 years    Hypertension     Neuropathy     Peripheral vascular disease ([facility]        Past Surgical History: All past surgical history was reviewed today. Past Surgical History:   Procedure Laterality Date     SECTION      CORONARY ARTERY BYPASS GRAFT N/A 2021    URGENT CABG CORONARY ARTERY BYPASS GRAFTS X3. VEIN TO PDA X1, VEIN TO OM X1, LEFT INTERNAL MAMMARY ARTERY TO LAD X1. ENDOSCOPIC HARVEST RIGHT LEG SAPHENOUS VEIN. LIGATION NYA USING 35MM ATRICLIP. EPI AORTIC ULTRASOUND. TOTAL CARDIOPULMONARY BYPASS. DOPPLER GRAFT FLOW VERIFICATION. BILATERAL INITERCOSTAL NERVE BLOCK USING 1.3% EXPAREL. performed by Arlen Jacobo MD at [place] [address]  2018     lap PD cath placement lt side 62 cm    TOE AMPUTATION Left 2021    AMPUTATION OF THE LEFT THIRD TOE performed by Chirag Esquivel DPM at [address] TOE AMPUTATION Left 2021    AMPUTATION OF HALLUX AND SECOND TOE, LEFT FOOT performed by Chirag Esquivel DPM at [address]         Family History: All family history was reviewed today.         Problem Relation Age of Onset    Coronary Art Dis Mother     High Blood Pressure Mother     Heart Disease Mother     Diabetes Mother     Cancer Father     Coronary Art Dis Father     High Blood Pressure Father     Heart Disease Father     Diabetes Father        Objective:       PHYSICAL EXAM:      Vitals:   Vitals: 07/08/21 0327 07/08/21 0710 07/08/21 0815 07/08/21 1310   BP: 136/74 (!) 205/82 (!) 175/76 (!) 118/57   Pulse: 77 78 79 78   Resp: 16 20 22 20   Temp: 97.5 °F (36.4 °C) 97.8 °F (36.6 °C) 97.7 °F (36.5 °C) 97.6 °F (36.4 °C)   TempSrc: Oral Oral Oral Oral   SpO2: 99%  97% 100%   Weight:  231 lb 4.2 oz (104.9 kg)     Height:           Physical Exam  Vitals and nursing note reviewed. Constitutional:       General: She is not in acute distress. Appearance: She is well-developed. She is not diaphoretic. HENT:      Head: Normocephalic. Right Ear: External ear normal.      Left Ear: External ear normal.      Nose: Nose normal.   Eyes:      General: No scleral icterus. Right eye: No discharge. Left eye: No discharge. Conjunctiva/sclera: Conjunctivae normal.      Pupils: Pupils are equal, round, and reactive to light. Cardiovascular:      Rate and Rhythm: Normal rate and regular rhythm. Heart sounds: No murmur heard. No friction rub. Pulmonary:      Effort: Pulmonary effort is normal.      Breath sounds: No stridor. No wheezing or rales. Chest:      Chest wall: No tenderness. Abdominal:      General: There is distension. Palpations: Abdomen is soft. There is no mass. Tenderness: There is no abdominal tenderness. There is no guarding or rebound. Musculoskeletal:         General: No tenderness. Cervical back: Normal range of motion and neck supple. Lymphadenopathy:      Cervical: No cervical adenopathy. Skin:     General: Skin is warm and dry. Findings: No erythema or rash. Neurological:      Mental Status: She is alert and oriented to person, place, and time. Motor: No abnormal muscle tone. Psychiatric:         Judgment: Judgment normal.         Lines: All vascular access sites are healthy with no local erythema, discharge or tenderness.     Intake and output:    I/O last 3 completed shifts:  In: -   Out: 169     Lab Data:   All available labs and old records have been reviewed by me. CBC:  Recent Labs     07/06/21 0446 07/07/21  0450 07/08/21  0435   WBC 13.4* 13.6* 12.4*   RBC 3.14* 3.10* 3.17*   HGB 9.2* 9.0* 9.3*   HCT 28.8* 28.7* 29.1*    227 193   MCV 91.9 92.4 91.6   MCH 29.3 29.1 29.4   MCHC 31.9 31.5 32.0   RDW 19.1* 19.1* 18.5*   BANDSPCT  --  3  --         BMP:  Recent Labs     07/06/21 0446 07/07/21 0450 07/08/21  0436   * 130* 130*   K 3.9 3.7 3.8   CL 88* 90* 91*   CO2 26 27 26   BUN 59* 59* 57*   CREATININE 6.9* 7.8* 7.0*   CALCIUM 7.7* 7.7* 7.6*   GLUCOSE 244* 207* 207*        Hepatic Function Panel:   Lab Results   Component Value Date    ALKPHOS 183 07/02/2021    ALT <5 07/02/2021    AST 7 07/02/2021    PROT 6.4 07/02/2021    PROT 7.1 12/13/2012    BILITOT 0.3 07/02/2021    BILIDIR <0.2 07/02/2021    IBILI see below 07/02/2021    LABALBU 1.7 07/02/2021       CPK:   Lab Results   Component Value Date    CKTOTAL 45 12/19/2013     ESR:   Lab Results   Component Value Date    SEDRATE 84 (H) 02/08/2021     CRP:   Lab Results   Component Value Date    CRP 8.9 (H) 02/08/2021           Imaging: All pertinent images and reports for the current visit were reviewed by me during this visit. CT ABDOMEN PELVIS WO CONTRAST Additional Contrast? Oral   Final Result   1. Mild bibasilar segmental atelectasis versus pneumonia. 2. Negative for small bowel obstruction. 3. Mild ascites. Medications: All current and past medications were reviewed.      insulin detemir  23 Units Subcutaneous Nightly    epoetin toy-epbx  10,000 Units Subcutaneous Q7 Days    lactobacillus  1 capsule Oral BID WC    nystatin  5 mL Oral 4x Daily    custom dialysis builder   Intraperitoneal Daily    meropenem  1,000 mg Intravenous Q24H    metoclopramide  5 mg Intravenous Q6H    aspirin  325 mg Oral Daily    metoprolol tartrate  25 mg Oral BID    pantoprazole  40 mg Oral Daily    sennosides-docusate sodium  1 tablet Oral BID    gentamicin   Topical Daily    atorvastatin  20 mg Oral Nightly    heparin (porcine)  5,000 Units Subcutaneous 3 times per day    miconazole   Topical BID    polyethylene glycol  17 g Oral Daily    [Held by provider] torsemide  100 mg Oral Daily    insulin aspart  0-18 Units Subcutaneous TID WC    insulin aspart  0-9 Units Subcutaneous Nightly        dextrose         labetalol, heparin (porcine), dextrose, acetaminophen, magnesium hydroxide, polyethylene glycol, traMADol **OR** traMADol, zolpidem, dextrose, dextrose, glucagon (rDNA), glucose, bisacodyl, calcium carbonate, diphenhydrAMINE, heparin (porcine), hydrALAZINE, ondansetron, ondansetron, promethazine, albuterol      Problem list:       Patient Active Problem List   Diagnosis Code    Diabetes mellitus ([facility] E11.9    Obesity E66.9    Microalbuminuria R80.9    Hyperlipidemia with target LDL less than 100 E78.5    Hypertension I10    Type 2 diabetes mellitus with obesity (HCC) E11.69, E66.9    Acute renal failure (ARF) ([facility]) N17.9    ESRD (end stage renal disease) ([facility]) N18.6    Non-smoker Z78.9    Elevated C-reactive protein (CRP) R79.82    Elevated sed rate R70.0    Diabetic polyneuropathy associated with type 2 diabetes mellitus ([facility]) E11.42    Diabetes education, encounter for Z71.89    Atherosclerosis of native arteries of left leg with ulceration of other part of foot ([facility]) I70.245    Near syncope R55    CAD, multiple vessel I25.10    Mild malnutrition (HCC) E44.1    S/P coronary artery bypass graft x 3 Z95.1    S/P left atrial appendage ligation Z98.890    Generalized weakness R53.1    Functional dysphonia F44.4    SBO (small bowel obstruction) ([facility] K56.609    Dialysis-associated peritonitis (HCC) T85.71XA    Peripheral vascular disease (HCC) I73.9    Moderate malnutrition (HCC) E44.0    Anemia of chronic disease D63.8    Hypoalbuminemia E88.09       Please note that this chart was generated using Dragon dictation software. Although every effort was made to ensure the accuracy of this automated transcription, some errors in transcription may have occurred inadvertently. If you may need any clarification, please do not hesitate to contact me through EPIC or at the phone number provided below with my electronic signature. Any pictures or media included in this note were obtained after taking informed verbal consent from the patient and with their approval to include those in the patient's medical record.     Ruel Saenz MD, MPH  7/8/2021 , 2:59 PM   [facility] Infectious Disease   [address] [place] [address]  Office: [phone]  Fax: [phone]  Clinic days:  Tuesday & Thursday

## 2021-07-08 NOTE — FLOWSHEET NOTE
07/08/21 0710   Vitals   BP (!) 205/82   Temp 97.8 °F (36.6 °C)   Temp Source Oral   Pulse 78   Resp 20     Disconnected from [facility] per protocol. Effluent: Sl hazy, no fibrin. Cell count obtained and sent to lab. Total time: 08:22  Total UF:  174 ml. Total Volume:  7013 ml. Dwell time gained: 01:09    Pt Tolerated procedure: Without difficulty. Report given to: Seward Buerger, RN    Last BM: 7-7-2021.

## 2021-07-08 NOTE — PROGRESS NOTES
Hospitalist Progress Note      PCP: Anu Mcdonough MD    Date of Admission: 7/1/2021    Chief Complaint: Nausea and vomiting    Hospital Course: 51 y.o. female with PMHx of ESRD on PD, CAD status post CABG, diabetes, hypertension, peripheral vascular disease status post partial foot accommodation was initially admitted to [facility] 5/31/2021 for dizziness. At that time cardiac catheterization was performed which showed multivessel disease. Patient undergone CABG. Subsequently patient was discharged to [facility] acute rehab unit where she was undergoing therapy. She was being followed by nephrology in view of her ESRD for PD. She subsequently developed abdominal discomfort nausea and vomiting. Imaging suggestive of ileus versus SBO. Surgery was consulted at ARU who suggested transfer to acute care. Patient states that she was not able to tolerate anything p.o. or since the day before yesterday. Her last bowel movement was approximately that time, no melena or hematochezia, hard. Today her labs demonstrate hyponatremia, hyperkalemia, BUN/creatinine consistent with ESRD patient, and leukocytosis to 18. Fluid from PD catheter has grown Pseudomonas aeruginosa. Discussed with nephrology, increased dose of cefepime, give additional dose of meropenem, ID consulted.     Interval history:   Patient seen and examined today  Overnight events and interval ancillary notes reviewed  Afebrile overnight, WBCs down to 12.4  On 2.5 L NC satting around 97%; wean as tolerated keep sats above 92  blood cultures from 7/1 remain no growth to date  Reports feeling fatigued and having poor appetite  Scored poorly with PT and OT      Medications:  Reviewed    Infusion Medications    dextrose       Scheduled Medications    insulin detemir  23 Units Subcutaneous Nightly    epoetin toy-epbx  10,000 Units Subcutaneous Q7 Days    lactobacillus  1 capsule Oral BID WC    nystatin  5 mL Oral 4x Daily    custom dialysis builder   Intraperitoneal Daily    meropenem  1,000 mg Intravenous Q24H    metoclopramide  5 mg Intravenous Q6H    aspirin  325 mg Oral Daily    metoprolol tartrate  25 mg Oral BID    pantoprazole  40 mg Oral Daily    sennosides-docusate sodium  1 tablet Oral BID    gentamicin   Topical Daily    atorvastatin  20 mg Oral Nightly    heparin (porcine)  5,000 Units Subcutaneous 3 times per day    miconazole   Topical BID    polyethylene glycol  17 g Oral Daily    [Held by provider] torsemide  100 mg Oral Daily    insulin aspart  0-18 Units Subcutaneous TID WC    insulin aspart  0-9 Units Subcutaneous Nightly     PRN Meds: labetalol, heparin (porcine), dextrose, acetaminophen, magnesium hydroxide, polyethylene glycol, traMADol **OR** traMADol, zolpidem, dextrose, dextrose, glucagon (rDNA), glucose, bisacodyl, calcium carbonate, diphenhydrAMINE, heparin (porcine), hydrALAZINE, ondansetron, ondansetron, promethazine, albuterol      Intake/Output Summary (Last 24 hours) at 7/8/2021 1000  Last data filed at 7/8/2021 0710  Gross per 24 hour   Intake --   Output 174 ml   Net -174 ml       Physical Exam Performed:    BP (!) 175/76   Pulse 79   Temp 97.7 °F (36.5 °C) (Oral)   Resp 22   Ht 5' 9\" (1.753 m)   Wt 231 lb 4.2 oz (104.9 kg)   SpO2 97%   BMI 34.15 kg/m²     General appearance: No apparent distress, appears stated age and cooperative. Respiratory:  Normal respiratory effort. Clear to auscultation, bilaterally without Rales/Wheezes/Rhonchi. Cardiovascular: Regular rate and rhythm with normal S1/S2 without murmurs, rubs or gallops. Abdomen: Soft, non-tender, non-distended with normal bowel sounds. Musculoskeletal:Necrotic area the site to amputations on the left foot  Neurologic:  Neurovascularly intact without any focal sensory/motor deficits.  Cranial nerves: II-XII intact, grossly non-focal.  Psychiatric: Alert and oriented, thought content appropriate, normal insight  Peripheral Pulses: +2 palpable, equal bilaterally       Labs:   Recent Labs     07/06/21  0446 07/07/21  0450 07/08/21  0435   WBC 13.4* 13.6* 12.4*   HGB 9.2* 9.0* 9.3*   HCT 28.8* 28.7* 29.1*    227 193     Recent Labs     07/06/21  0446 07/07/21  0450 07/08/21  0436   * 130* 130*   K 3.9 3.7 3.8   CL 88* 90* 91*   CO2 26 27 26   BUN 59* 59* 57*   CREATININE 6.9* 7.8* 7.0*   CALCIUM 7.7* 7.7* 7.6*   PHOS 5.9* 5.5* 5.4*     No results for input(s): AST, ALT, BILIDIR, BILITOT, ALKPHOS in the last 72 hours. No results for input(s): INR in the last 72 hours. No results for input(s): Towana Ball in the last 72 hours. Urinalysis:      Lab Results   Component Value Date    NITRU Negative 06/06/2021    WBCUA 36 06/06/2021    BACTERIA RARE 06/06/2021    RBCUA 587 06/06/2021    BLOODU LARGE 06/06/2021    SPECGRAV 1.019 06/06/2021    GLUCOSEU 100 06/06/2021       Radiology:  CT ABDOMEN PELVIS WO CONTRAST Additional Contrast? Oral   Final Result   1. Mild bibasilar segmental atelectasis versus pneumonia. 2. Negative for small bowel obstruction. 3. Mild ascites.              Assessment/Plan:    Active Hospital Problems    Diagnosis     Anemia of chronic disease [D63.8]     Hypoalbuminemia [E88.09]     Moderate malnutrition (HCC) [E44.0]     Dialysis-associated peritonitis ([facility] [T85.71XA]     Peripheral vascular disease ([facility] [I73.9]     SBO (small bowel obstruction) ([facility] [K56.609]     CAD, multiple vessel [I25.10]     Diabetes education, encounter for [Z71.89]     ESRD (end stage renal disease) ([facility] [N18.6]     Type 2 diabetes mellitus with obesity ([facility] [E11.69, E66.9]      SBO versus ileus: Resolved  CT abdomen pelvis showed no SBO  General surgery on board: Appreciate their rec  Tolerating diet     Leukocytosis secondary to PD catheter related peritonitis: Improving  PD fluid from 7/1/2021 grew heavy growth of Pseudomonas  Blood cultures negative  ID on board appreciate their recs  vancomycin and cefepime discontinued  On IV Merrem andCeftazidime in dialysate, discussed with nephrology    CAD Status post CABG: Stable  On high-dose aspirin  Apparently does not tolerate Plavix    Gangrene of left foot: s/p amputation of toes 1 and 2 on the left on 5/26-21  Podiatry on board; continue Betadine wet-to-dry daily dressing changes to foot until discharge; WBAT in a surgical shoe  May need further ambulation once stable    PVD; status post left popliteal, anterior tibial and dorsalis pedis angioplasty by Dr. Avila Hercules on 5/27/2021    Normocytic anemia  Hemoglobin stable around 9   Patient status post CABG, target hemoglobin above 8  Status post 1 unit PRBC 7/3/2021    Diabetes mellitus complicated with peripheral neuropathy  Hemoglobin A1c 8.8 on 5/31/2021  Continue Levemir and SSI  Monitor blood glucose closely    Persistent hypoglycemia-resolved    Hypertension  She is on Lopressor 25 twice daily  She has hydralazine and labetalol as needed  Anticipate nephrology recommendations     ESRD on PD  Nephrology following     Mild hyperkalemia  Resolved     Mild hyponatremia  As per nephrology     Anion gap metabolic acidosis: Resolved  Likely in view of uremia       DVT Prophylaxis: Heparin  Diet: ADULT DIET;  Regular  Adult Oral Nutrition Supplement; Standard High Calorie/High Protein Oral Supplement  Code Status: Full Code      Electronically signed by Wendy Langston MD on 7/8/2021 at 10:00 AM

## 2021-07-08 NOTE — CARE COORDINATION
SW received a call back from [facility] at [place] stating they can accept pt at discharge. SW informed unsure of discharge date yet, but will keep informed. [facility] asked if we could find out which facility follows patient's PD and equipment.     Electronically signed by JERRY Colunga, NEIL on 7/8/2021 at 3:47 PM

## 2021-07-08 NOTE — CARE COORDINATION
SW met with pt informing that PT is recommending ARU again and OT recommended SNF. Pt stated she would like to get back into [facility] ARU if she can. SW provided list of SNFs as well. Patient asked which ones she could go to. SW informed we would have to find one that does peritoneal dialysis and that there are not many. SW called Frank [facility] w/ARU informing of PT's recommendations for ARU again. Stated he will inform Dr. Shannan Sutherland and have him review again. SW found that [facility] and [address] does peritoneal dialysis. Faxed referrals to both facilities and informed their admissions departments who are reviewing. Discharge Plan:  ARU vs SNF. Marta will review again. Referrals sent to  and [facility] who reported they can do daily PD.     Electronically signed by JERRY Preston, LSW on 7/8/2021 at 2:11 PM

## 2021-07-08 NOTE — PLAN OF CARE
Problem: Falls - Risk of:  Goal: Will remain free from falls  Description: Will remain free from falls  Outcome: Ongoing  Note: Pt stays in bed most of the time. Free from falls this shift and fall sign, socks, and armband in place. Problem: Falls - Risk of:  Goal: Absence of physical injury  Description: Absence of physical injury  Outcome: Ongoing     Problem: Skin Integrity:  Goal: Will show no infection signs and symptoms  Description: Will show no infection signs and symptoms  Outcome: Ongoing     Problem: Skin Integrity:  Goal: Absence of new skin breakdown  Description: Absence of new skin breakdown  Outcome: Ongoing     Problem: Pain:  Goal: Pain level will decrease  Description: Pain level will decrease  Outcome: Ongoing  Note: Pain has decreased at night to 5/10 on 0-10 scale     Problem: Pain:  Goal: Control of acute pain  Description: Control of acute pain  Outcome: Ongoing     Problem: Pain:  Goal: Control of chronic pain  Description: Control of chronic pain  Outcome: Ongoing     Problem: Nutrition Deficit:  Goal: Ability to achieve adequate nutritional intake will improve  Description: Ability to achieve adequate nutritional intake will improve  Outcome: Ongoing     Problem: Nutrition  Goal: Optimal nutrition therapy  Outcome: Ongoing  Note: Pt requesting ensure or like supplements to meals.

## 2021-07-08 NOTE — PROGRESS NOTES
Per Dr Yasmine Gomez  Due to the late hour pt is receiving CAPD antibiotic  They will dwell all night and ccpd cycler will start in am

## 2021-07-08 NOTE — PROGRESS NOTES
Office : [phone]     Fax :[phone]       Nephrology Progress   Note      Patient's Name: Eleno Tavarez  8:31 AM  2021    Reason for Consult:  ESRD    History of Present iIlness:    Eleno Tavarez is a 51 y.o. female with severe end-stage renal disease secondary to diabetic nephropathy on peritoneal dialysis, hypertension, peripheral vascular disease, diabetic neuropathy who was admitted after she had a syncopal episode. She had similar episode 2 weeks ago. Recently had angiogram left lower extremity for gangrene left foot second toe. Denies any shortness of breath. Denies any abdominal pain. Denies any nausea vomiting. Interval HX :      No nausea , no vomiting today         PD fluid growing pseudomonas    C/o abdominal pain  : improving   oral intake improving   No chest pain  No sob  S/p CABG on 2010          I/O last 3 completed shifts:  In: -   Out: 169     Past Medical History:   Diagnosis Date    Diabetes mellitus ([facility]     End stage kidney disease ([facility]     peritoneal dialysis every night at home x 2 years    Hypertension     Neuropathy     Peripheral vascular disease ([facility]        Past Surgical History:   Procedure Laterality Date     SECTION      CORONARY ARTERY BYPASS GRAFT N/A 2021    URGENT CABG CORONARY ARTERY BYPASS GRAFTS X3. VEIN TO PDA X1, VEIN TO OM X1, LEFT INTERNAL MAMMARY ARTERY TO LAD X1. ENDOSCOPIC HARVEST RIGHT LEG SAPHENOUS VEIN. LIGATION NYA USING 35MM ATRICLIP. EPI AORTIC ULTRASOUND. TOTAL CARDIOPULMONARY BYPASS. DOPPLER GRAFT FLOW VERIFICATION. BILATERAL INITERCOSTAL NERVE BLOCK USING 1.3% EXPAREL.  performed by Chantel Vega MD at [address] [place], [place] [address]  2018     lap PD cath placement lt side 62 cm    TOE AMPUTATION Left 2/26/2021    AMPUTATION OF THE LEFT THIRD TOE performed by Erlin Olivo DPM at [address] [place] [address] [address] TOE AMPUTATION Left 5/26/2021    AMPUTATION OF HALLUX AND SECOND TOE, LEFT FOOT performed by Erlin Olivo DPM at [facility]         Family History   Problem Relation Age of Onset    Coronary Art Dis Mother     High Blood Pressure Mother     Heart Disease Mother     Diabetes Mother     Cancer Father     Coronary Art Dis Father     High Blood Pressure Father     Heart Disease Father     Diabetes Father      Current Medications:    insulin detemir (LEVEMIR) injection pen 23 Units - Patient Supplied, Nightly  epoetin toy-epbx (RETACRIT) injection 10,000 Units, Q7 Days  lactobacillus (CULTURELLE) capsule 1 capsule, BID WC  nystatin (MYCOSTATIN) 884330 UNIT/ML suspension 500,000 Units, 4x Daily  dianeal lo-massimo 2.5% 1,000 mL with cefTAZidime 1,000 mg solution, Daily  labetalol (NORMODYNE;TRANDATE) injection 10 mg, Q4H PRN  heparin (porcine) injection 1,000 Units, PRN  meropenem (MERREM) 1,000 mg in sodium chloride 0.9 % 100 mL IVPB (mini-bag), Q24H  metoclopramide (REGLAN) injection 5 mg, Q6H  dextrose 50 % IV solution, PRN  acetaminophen (TYLENOL) tablet 650 mg, Q6H PRN  aspirin EC tablet 325 mg, Daily  magnesium hydroxide (MILK OF MAGNESIA) 400 MG/5ML suspension 30 mL, Daily PRN  metoprolol tartrate (LOPRESSOR) tablet 25 mg, BID  pantoprazole (PROTONIX) tablet 40 mg, Daily  polyethylene glycol (GLYCOLAX) packet 17 g, Daily PRN  sennosides-docusate sodium (SENOKOT-S) 8.6-50 MG tablet 1 tablet, BID  traMADol (ULTRAM) tablet 50 mg, Q6H PRN   Or  traMADol (ULTRAM) tablet 100 mg, Q6H PRN  zolpidem (AMBIEN) tablet 5 mg, Nightly PRN  dextrose 5 % solution, PRN  dextrose 50 % IV solution, PRN  glucagon (rDNA) injection 1 mg, PRN  glucose (GLUTOSE) 40 % oral gel 15 g, PRN  gentamicin (GARAMYCIN) 0.1 % cream, Daily  atorvastatin (LIPITOR) tablet 20 mg, Nightly  bisacodyl (DULCOLAX) EC tablet 5 mg, Daily PRN  calcium carbonate (TUMS) chewable tablet 500 mg, TID PRN  diphenhydrAMINE (BENADRYL) tablet 25 mg, Q6H PRN  heparin (porcine) injection 3,400 Units, PRN  heparin (porcine) injection 5,000 Units, 3 times per day  hydrALAZINE (APRESOLINE) tablet 50 mg, Q8H PRN  miconazole (MICOTIN) 2 % powder, BID  ondansetron (ZOFRAN) injection 4 mg, Q6H PRN  ondansetron (ZOFRAN-ODT) disintegrating tablet 4 mg, Q8H PRN  polyethylene glycol (GLYCOLAX) packet 17 g, Daily  promethazine (PHENERGAN) tablet 12.5 mg, Q6H PRN  [Held by provider] torsemide (DEMADEX) tablet 100 mg, Daily  albuterol (PROVENTIL) nebulizer solution 2.5 mg, Q6H PRN  insulin aspart (NOVOLOG) injection pen 0-18 Units - Patient Supply, TID WC  insulin aspart (NOVOLOG) injection pen 0-9 Units - Patient Supply, Nightly          Physical exam:     Vitals:  BP (!) 175/76   Pulse 79   Temp 97.7 °F (36.5 °C) (Oral)   Resp 20   Ht 5' 9\" (1.753 m)   Wt 231 lb 4.2 oz (104.9 kg)   SpO2 99%   BMI 34.15 kg/m²   Constitutional:  OAA X3 NAD  Skin: no rash, turgor wnl  Heent:  eomi, mmm  Neck: no bruits or jvd noted  Cardiovascular:  S1, S2 without m/r/g  Respiratory: CTA B without w/r/r  Abdomen:  +bs, soft, nt, nd  Ext: no  lower extremity edema      Labs:  CBC:   Recent Labs     07/06/21 0446 07/07/21  0450 07/08/21  0435   WBC 13.4* 13.6* 12.4*   HGB 9.2* 9.0* 9.3*    227 193     BMP:    Recent Labs     07/06/21 0446 07/07/21  0450 07/08/21  0436   * 130* 130*   K 3.9 3.7 3.8   CL 88* 90* 91*   CO2 26 27 26   BUN 59* 59* 57*   CREATININE 6.9* 7.8* 7.0*   GLUCOSE 244* 207* 207*     Ca/Mg/Phos:   Recent Labs     07/06/21  0446 07/07/21  0450 07/08/21  0436   CALCIUM 7.7* 7.7* 7.6*   MG 2.20 2.10 2.00   PHOS 5.9* 5.5* 5.4*         IMAGING:  CT ABDOMEN PELVIS WO CONTRAST Additional Contrast? Oral   Final Result   1. Mild bibasilar segmental atelectasis versus pneumonia. 2. Negative for small bowel obstruction. 3. Mild ascites. Assessment/Plan :      1. Acute peritonitis  ceftazidime x 6 hr day time dwell first dose started  on  7/4  On meropenem iv . First dose started on 7/4   PD cycles to 5 cycles with total  8 hr duration, discussed with PD nurse   PD fluid cx : pseudomonas +    PD fluid neutrophil count is high at 88 ---> 82     Fluid remains hazy     ID on board       2   ESRD. Continue on peritoneal dialysis. continue cycler/CCPD as per current prescription  Sodium level better at 130   Low albumin   Hold torsemide    fill volume to 1400 ml   PD site  no discharge. 2. HTN. BP controlled   Continue current blood pressure medications    3. Anemia of chronic disease. Continue LUIZA    4. Acid- base disorder. Monitor    5. Hyponatremia   Improving     Poor oral intake    Sodium level better at 129 ----> 130   Encourage increase oral intake    6. CAD/Syncopal episode. S/p [facility]   Has Multivessel CAD   S/p CABG       7. Peripheral vascular disease. H/o  amputation of left foot toes. Podiatry following       8. Generalized weakness. Getting rehab     9. Ileus. Resolved   Surgery on board.                 Thank you for allowing us to participate in care of Waymon Essex         Electronically signed by: Rebeca Lund MD, 7/8/2021, 8:31 AM      [facility] [address]  Office : [phone]  Fax :[phone]

## 2021-07-08 NOTE — PROGRESS NOTES
and endurance  Prognosis: Good  Decision Making: Medium Complexity  PT Education: Goals;Plan of Care;Precautions; Functional Mobility Training;Transfer Training;PT Role  Patient Education: Reinforcement of sternal precautions during functional mobility. Pt verbalized understanding. Barriers to Learning: none  REQUIRES PT FOLLOW UP: Yes  Activity Tolerance  Activity Tolerance: Patient limited by endurance; Patient limited by fatigue  Activity Tolerance: Pt tolerates session on 3L O2 . Pt requires exteded rest breaks. Patient Diagnosis(es): There were no encounter diagnoses. has a past medical history of Diabetes mellitus ([facility] End stage kidney disease ([facility] Hypertension, Neuropathy, and Peripheral vascular disease ([facility] has a past surgical history that includes Tonsillectomy;  section; other surgical history (2018); Toe amputation (Left, 2021); Toe amputation (Left, 2021); and Coronary artery bypass graft (N/A, 2021). Restrictions  Restrictions/Precautions  Restrictions/Precautions: Weight Bearing, Fall Risk (high fall risk; L LE WBAT with surgical shoe; sternal precautions)  Required Braces or Orthoses?: Yes  Lower Extremity Weight Bearing Restrictions  Left Lower Extremity Weight Bearing: Weight Bearing As Tolerated  Required Braces or Orthoses  Left Lower Extremity Brace: Boot  LLE Brace Type: Surgical shoe  Position Activity Restriction  Sternal Precautions: No Pushing, No Pulling, 5# Lifting Restrictions  Other position/activity restrictions: 51 y.o. female with PMHx of ESRD on PD, CAD status post CABG, diabetes, hypertension, peripheral vascular disease status post partial foot accommodation was initially admitted to [facility] 2021 for dizziness. At that time cardiac catheterization was performed which showed multivessel disease. Patient undergone CABG.   Subsequently patient was discharged to [facility] acute rehab unit where she was undergoing therapy. She was being followed by nephrology in view of her ESRD for PD. She subsequently developed abdominal discomfort nausea and vomiting. Imaging suggestive of ileus versus SBO. Surgery was consulted at ARU who suggested transfer to acute care. Patient states that she was not able to tolerate anything p.o. or since the day before yesterday. Her last bowel movement was approximately that time, no melena or hematochezia, hard. Today her labs demonstrate hyponatremia, hyperkalemia, BUN/creatinine consistent with ESRD patient, and leukocytosis to 18. Fluid from PD catheter has grown Pseudomonas aeruginosa. Discussed with nephrology, increased dose of cefepime, give additional dose of meropenem, ID consulted. Subjective   General  Chart Reviewed: Yes  Family / Caregiver Present: No  Subjective  Subjective: Pt denies pain. General Comment  Comments: Pt supine in bed upon arrival on 3 L O2 amd is agreeable to therapy. Pain Screening  Patient Currently in Pain: No  Pain Assessment  Pain Level: 0  Vital Signs  Temp: 97.7 °F (36.5 °C)  Temp Source: Oral  Pulse: 79  BP: (!) 175/76  Patient Currently in Pain: No  Oxygen Therapy  O2 Device: Nasal cannula  O2 Flow Rate (L/min): 2.5 L/min       Orientation  Orientation  Overall Orientation Status: Within Functional Limits  Cognition   Cognition  Overall Cognitive Status: Exceptions  Arousal/Alertness: Delayed responses to stimuli  Following Commands: Follows one step commands consistently; Follows one step commands with increased time  Attention Span: Appears intact  Memory: Appears intact  Initiation: Requires cues for some  Sequencing: Requires cues for some  Cognition Comment: appearing depressed,  Objective   Bed mobility  Rolling to Left: Supervision  Rolling to Right: Supervision  Supine to Sit: Stand by assistance (HOB elevated)  Sit to Supine:  Moderate assistance (assist for BLE)  Scooting: Stand by assistance  Transfers  Sit to Stand: Dependent/Total;2 mod I of LRAD  Short term goal 5: pt will ascend/descend 2 stairs with mod I  No goals met    Plan    Plan  Times per week: 3-5 x per week  Times per day: Daily  Current Treatment Recommendations: Strengthening, Balance Training, Functional Mobility Training, Transfer Training, Endurance Training, Gait Training, Stair training, Patient/Caregiver Education & Training, Manual Therapy - Soft Tissue Mobilization, Neuromuscular Re-education, Safety Education & Training, Home Exercise Program  Safety Devices  Type of devices: All fall risk precautions in place, Call light within reach, Chair alarm in place, Gait belt, Left in chair, Nurse notified  Restraints  Initially in place: No     Therapy Time   Individual Concurrent Group Co-treatment   Time In       373   Time Out       912   Minutes       62       Second Session Therapy Time:   Individual Concurrent Group Co-treatment   Time In       1007   Time Out       1040   Minutes       33     Timed Code Treatment Minutes:  91    Total Treatment Minutes:  [address], SPT  Jerome Mariano PT     I agree with the above note. PT directly observed the SPT with the patient.   Margaret Kaplan DPT [id number]

## 2021-07-09 LAB
ANION GAP SERPL CALCULATED.3IONS-SCNC: 14 MMOL/L (ref 3–16)
ANISOCYTOSIS: ABNORMAL
APPEARANCE FLUID: NORMAL
BANDED NEUTROPHILS RELATIVE PERCENT: 9 % (ref 0–7)
BASOPHILS ABSOLUTE: 0 K/UL (ref 0–0.2)
BASOPHILS RELATIVE PERCENT: 0 %
BUN BLDV-MCNC: 59 MG/DL (ref 7–20)
CALCIUM SERPL-MCNC: 7.5 MG/DL (ref 8.3–10.6)
CELL COUNT FLUID TYPE: NORMAL
CHLORIDE BLD-SCNC: 89 MMOL/L (ref 99–110)
CLOT EVALUATION: NORMAL
CO2: 25 MMOL/L (ref 21–32)
COLOR FLUID: NORMAL
CREAT SERPL-MCNC: 8.4 MG/DL (ref 0.6–1.1)
EOSINOPHIL FLUID: 3 %
EOSINOPHILS ABSOLUTE: 0.6 K/UL (ref 0–0.6)
EOSINOPHILS RELATIVE PERCENT: 4 %
GFR AFRICAN AMERICAN: 6
GFR NON-AFRICAN AMERICAN: 5
GLUCOSE BLD-MCNC: 102 MG/DL (ref 70–99)
GLUCOSE BLD-MCNC: 110 MG/DL (ref 70–99)
GLUCOSE BLD-MCNC: 115 MG/DL (ref 70–99)
GLUCOSE BLD-MCNC: 117 MG/DL (ref 70–99)
GLUCOSE BLD-MCNC: 118 MG/DL (ref 70–99)
GLUCOSE BLD-MCNC: 119 MG/DL (ref 70–99)
GLUCOSE BLD-MCNC: 53 MG/DL (ref 70–99)
GLUCOSE BLD-MCNC: 63 MG/DL (ref 70–99)
GLUCOSE BLD-MCNC: 67 MG/DL (ref 70–99)
GLUCOSE BLD-MCNC: 67 MG/DL (ref 70–99)
GLUCOSE BLD-MCNC: 96 MG/DL (ref 70–99)
HCT VFR BLD CALC: 29.8 % (ref 36–48)
HEMOGLOBIN: 9.4 G/DL (ref 12–16)
LYMPHOCYTES ABSOLUTE: 2.1 K/UL (ref 1–5.1)
LYMPHOCYTES RELATIVE PERCENT: 15 %
LYMPHOCYTES, BODY FLUID: 5 %
MACROPHAGE FLUID: 7 %
MAGNESIUM: 2 MG/DL (ref 1.8–2.4)
MCH RBC QN AUTO: 29.3 PG (ref 26–34)
MCHC RBC AUTO-ENTMCNC: 31.6 G/DL (ref 31–36)
MCV RBC AUTO: 92.7 FL (ref 80–100)
METAMYELOCYTES RELATIVE PERCENT: 1 %
MONOCYTES ABSOLUTE: 0.6 K/UL (ref 0–1.3)
MONOCYTES RELATIVE PERCENT: 4 %
NEUTROPHIL, FLUID: 85 %
NEUTROPHILS ABSOLUTE: 11 K/UL (ref 1.7–7.7)
NEUTROPHILS RELATIVE PERCENT: 67 %
NUCLEATED CELLS FLUID: 3770 /CUMM
NUMBER OF CELLS COUNTED FLUID: 100
PDW BLD-RTO: 18.6 % (ref 12.4–15.4)
PERFORMED ON: ABNORMAL
PHOSPHORUS: 5.2 MG/DL (ref 2.5–4.9)
PLATELET # BLD: 172 K/UL (ref 135–450)
PLATELET SLIDE REVIEW: ADEQUATE
PMV BLD AUTO: 9.3 FL (ref 5–10.5)
POLYCHROMASIA: ABNORMAL
POTASSIUM SERPL-SCNC: 3.5 MMOL/L (ref 3.5–5.1)
RBC # BLD: 3.21 M/UL (ref 4–5.2)
RBC FLUID: 2800 /CUMM
SLIDE REVIEW: ABNORMAL
SODIUM BLD-SCNC: 128 MMOL/L (ref 136–145)
TOXIC GRANULATION: PRESENT
VACUOLATED NEUTROPHILS: PRESENT
WBC # BLD: 14.3 K/UL (ref 4–11)

## 2021-07-09 PROCEDURE — 80048 BASIC METABOLIC PNL TOTAL CA: CPT

## 2021-07-09 PROCEDURE — 87102 FUNGUS ISOLATION CULTURE: CPT

## 2021-07-09 PROCEDURE — 85025 COMPLETE CBC W/AUTO DIFF WBC: CPT

## 2021-07-09 PROCEDURE — 87070 CULTURE OTHR SPECIMN AEROBIC: CPT

## 2021-07-09 PROCEDURE — 2500000003 HC RX 250 WO HCPCS: Performed by: INTERNAL MEDICINE

## 2021-07-09 PROCEDURE — 6370000000 HC RX 637 (ALT 250 FOR IP): Performed by: INTERNAL MEDICINE

## 2021-07-09 PROCEDURE — 89051 BODY FLUID CELL COUNT: CPT

## 2021-07-09 PROCEDURE — 6360000002 HC RX W HCPCS: Performed by: INTERNAL MEDICINE

## 2021-07-09 PROCEDURE — 2580000003 HC RX 258: Performed by: INTERNAL MEDICINE

## 2021-07-09 PROCEDURE — 87205 SMEAR GRAM STAIN: CPT

## 2021-07-09 PROCEDURE — 6360000002 HC RX W HCPCS: Performed by: HOSPITALIST

## 2021-07-09 PROCEDURE — 2060000000 HC ICU INTERMEDIATE R&B

## 2021-07-09 PROCEDURE — 87015 SPECIMEN INFECT AGNT CONCNTJ: CPT

## 2021-07-09 PROCEDURE — 84100 ASSAY OF PHOSPHORUS: CPT

## 2021-07-09 PROCEDURE — 83735 ASSAY OF MAGNESIUM: CPT

## 2021-07-09 PROCEDURE — 90945 DIALYSIS ONE EVALUATION: CPT

## 2021-07-09 PROCEDURE — 99233 SBSQ HOSP IP/OBS HIGH 50: CPT | Performed by: INTERNAL MEDICINE

## 2021-07-09 PROCEDURE — 6370000000 HC RX 637 (ALT 250 FOR IP): Performed by: NURSE PRACTITIONER

## 2021-07-09 PROCEDURE — 90945 DIALYSIS ONE EVALUATION: CPT | Performed by: INTERNAL MEDICINE

## 2021-07-09 PROCEDURE — 6360000002 HC RX W HCPCS: Performed by: NURSE PRACTITIONER

## 2021-07-09 PROCEDURE — 2580000003 HC RX 258: Performed by: HOSPITALIST

## 2021-07-09 PROCEDURE — 36415 COLL VENOUS BLD VENIPUNCTURE: CPT

## 2021-07-09 RX ORDER — INSULIN LISPRO 100 [IU]/ML
0-12 INJECTION, SOLUTION INTRAVENOUS; SUBCUTANEOUS
Status: DISCONTINUED | OUTPATIENT
Start: 2021-07-09 | End: 2021-07-09

## 2021-07-09 RX ORDER — INSULIN LISPRO 100 [IU]/ML
0-3 INJECTION, SOLUTION INTRAVENOUS; SUBCUTANEOUS NIGHTLY
Status: DISCONTINUED | OUTPATIENT
Start: 2021-07-09 | End: 2021-07-10

## 2021-07-09 RX ORDER — INSULIN LISPRO 100 [IU]/ML
0-6 INJECTION, SOLUTION INTRAVENOUS; SUBCUTANEOUS NIGHTLY
Status: DISCONTINUED | OUTPATIENT
Start: 2021-07-09 | End: 2021-07-09

## 2021-07-09 RX ORDER — CIPROFLOXACIN 2 MG/ML
400 INJECTION, SOLUTION INTRAVENOUS EVERY 24 HOURS
Status: DISCONTINUED | OUTPATIENT
Start: 2021-07-09 | End: 2021-07-13

## 2021-07-09 RX ORDER — INSULIN LISPRO 100 [IU]/ML
0-6 INJECTION, SOLUTION INTRAVENOUS; SUBCUTANEOUS
Status: DISCONTINUED | OUTPATIENT
Start: 2021-07-09 | End: 2021-07-10

## 2021-07-09 RX ADMIN — Medication 1 CAPSULE: at 17:58

## 2021-07-09 RX ADMIN — THIAMINE HYDROCHLORIDE 100 MG: 100 INJECTION, SOLUTION INTRAMUSCULAR; INTRAVENOUS at 15:32

## 2021-07-09 RX ADMIN — GENTAMICIN SULFATE: 1 CREAM TOPICAL at 17:34

## 2021-07-09 RX ADMIN — TRAMADOL HYDROCHLORIDE 100 MG: 50 TABLET, FILM COATED ORAL at 00:43

## 2021-07-09 RX ADMIN — ATORVASTATIN CALCIUM 20 MG: 20 TABLET, FILM COATED ORAL at 21:36

## 2021-07-09 RX ADMIN — PANTOPRAZOLE SODIUM 40 MG: 40 TABLET, DELAYED RELEASE ORAL at 09:47

## 2021-07-09 RX ADMIN — LABETALOL HYDROCHLORIDE 10 MG: 5 INJECTION INTRAVENOUS at 12:28

## 2021-07-09 RX ADMIN — MEROPENEM 1000 MG: 1 INJECTION, POWDER, FOR SOLUTION INTRAVENOUS at 12:36

## 2021-07-09 RX ADMIN — NYSTATIN 500000 UNITS: 100000 SUSPENSION ORAL at 09:47

## 2021-07-09 RX ADMIN — HEPARIN SODIUM 5000 UNITS: 5000 INJECTION INTRAVENOUS; SUBCUTANEOUS at 15:43

## 2021-07-09 RX ADMIN — METOCLOPRAMIDE 5 MG: 5 INJECTION, SOLUTION INTRAMUSCULAR; INTRAVENOUS at 09:47

## 2021-07-09 RX ADMIN — STANDARDIZED SENNA CONCENTRATE AND DOCUSATE SODIUM 1 TABLET: 8.6; 5 TABLET ORAL at 09:47

## 2021-07-09 RX ADMIN — Medication 1 CAPSULE: at 09:47

## 2021-07-09 RX ADMIN — ASPIRIN 325 MG: 325 TABLET, COATED ORAL at 09:48

## 2021-07-09 RX ADMIN — CEFTAZIDIME: 1 INJECTION, POWDER, FOR SOLUTION INTRAMUSCULAR; INTRAVENOUS at 12:01

## 2021-07-09 RX ADMIN — POLYETHYLENE GLYCOL 3350 17 G: 17 POWDER, FOR SOLUTION ORAL at 09:48

## 2021-07-09 RX ADMIN — DEXTROSE MONOHYDRATE 12.5 G: 25 INJECTION, SOLUTION INTRAVENOUS at 03:28

## 2021-07-09 RX ADMIN — DEXTROSE MONOHYDRATE 12.5 G: 25 INJECTION, SOLUTION INTRAVENOUS at 05:53

## 2021-07-09 RX ADMIN — ZOLPIDEM TARTRATE 5 MG: 5 TABLET, COATED ORAL at 21:36

## 2021-07-09 RX ADMIN — CIPROFLOXACIN 400 MG: 2 INJECTION, SOLUTION INTRAVENOUS at 16:06

## 2021-07-09 RX ADMIN — METOCLOPRAMIDE 5 MG: 5 INJECTION, SOLUTION INTRAMUSCULAR; INTRAVENOUS at 00:38

## 2021-07-09 RX ADMIN — MICONAZOLE NITRATE: 20 POWDER TOPICAL at 09:48

## 2021-07-09 RX ADMIN — NYSTATIN 500000 UNITS: 100000 SUSPENSION ORAL at 15:25

## 2021-07-09 RX ADMIN — METOPROLOL TARTRATE 25 MG: 25 TABLET, FILM COATED ORAL at 09:48

## 2021-07-09 ASSESSMENT — ENCOUNTER SYMPTOMS
RHINORRHEA: 0
COUGH: 0
ABDOMINAL PAIN: 1
EYE REDNESS: 0
SHORTNESS OF BREATH: 0
CHOKING: 0
EYE DISCHARGE: 0
FACIAL SWELLING: 0
CHEST TIGHTNESS: 0
APNEA: 0
COLOR CHANGE: 0
ABDOMINAL DISTENTION: 1
BLOOD IN STOOL: 0
STRIDOR: 0
TROUBLE SWALLOWING: 0
PHOTOPHOBIA: 0
DIARRHEA: 0
NAUSEA: 0

## 2021-07-09 ASSESSMENT — PAIN SCALES - GENERAL
PAINLEVEL_OUTOF10: 5
PAINLEVEL_OUTOF10: 0
PAINLEVEL_OUTOF10: 6

## 2021-07-09 ASSESSMENT — PAIN DESCRIPTION - ORIENTATION: ORIENTATION: LEFT;MID

## 2021-07-09 ASSESSMENT — PAIN DESCRIPTION - PROGRESSION: CLINICAL_PROGRESSION: NOT CHANGED

## 2021-07-09 ASSESSMENT — PAIN DESCRIPTION - DESCRIPTORS: DESCRIPTORS: PRESSURE

## 2021-07-09 ASSESSMENT — PAIN DESCRIPTION - PAIN TYPE: TYPE: ACUTE PAIN

## 2021-07-09 ASSESSMENT — PAIN DESCRIPTION - ONSET: ONSET: ON-GOING

## 2021-07-09 ASSESSMENT — PAIN DESCRIPTION - LOCATION: LOCATION: ABDOMEN

## 2021-07-09 ASSESSMENT — PAIN DESCRIPTION - FREQUENCY: FREQUENCY: CONTINUOUS

## 2021-07-09 NOTE — PROGRESS NOTES
Office : [phone]     Fax :[phone]       Nephrology Progress   Note      Patient's Name: Tamiko Zuleta  11:03 AM  2021    Reason for Consult:  ESRD    History of Present iIlness:    Tamiko Zuleta is a 51 y.o. female with severe end-stage renal disease secondary to diabetic nephropathy on peritoneal dialysis, hypertension, peripheral vascular disease, diabetic neuropathy who was admitted after she had a syncopal episode. She had similar episode 2 weeks ago. Recently had angiogram left lower extremity for gangrene left foot second toe. Denies any shortness of breath. Denies any abdominal pain. Denies any nausea vomiting. Interval HX :      No nausea , no vomiting today         PD fluid growing pseudomonas    C/o abdominal pain  : improving   oral intake improving   No chest pain  No sob  S/p CABG on 2010          I/O last 3 completed shifts:  In: -   Out: 80     Past Medical History:   Diagnosis Date    Diabetes mellitus ([facility]     End stage kidney disease ([facility]     peritoneal dialysis every night at home x 2 years    Hypertension     Neuropathy     Peripheral vascular disease ([facility]        Past Surgical History:   Procedure Laterality Date     SECTION      CORONARY ARTERY BYPASS GRAFT N/A 2021    URGENT CABG CORONARY ARTERY BYPASS GRAFTS X3. VEIN TO PDA X1, VEIN TO OM X1, LEFT INTERNAL MAMMARY ARTERY TO LAD X1. ENDOSCOPIC HARVEST RIGHT LEG SAPHENOUS VEIN. LIGATION NYA USING 35MM ATRICLIP. EPI AORTIC ULTRASOUND. TOTAL CARDIOPULMONARY BYPASS. DOPPLER GRAFT FLOW VERIFICATION. BILATERAL INITERCOSTAL NERVE BLOCK USING 1.3% EXPAREL.  performed by Piedad Lewis MD at [place] [address]  2018     lap PD cath placement lt side 62 cm    TOE AMPUTATION Left 2/26/2021    AMPUTATION OF THE LEFT THIRD TOE performed by Lizzie Reilly DPM at [id number] [place] [address] [address] TOE AMPUTATION Left 5/26/2021    AMPUTATION OF HALLUX AND SECOND TOE, LEFT FOOT performed by Lizzie Reilly DPM at [facility]         Family History   Problem Relation Age of Onset    Coronary Art Dis Mother     High Blood Pressure Mother     Heart Disease Mother     Diabetes Mother     Cancer Father     Coronary Art Dis Father     High Blood Pressure Father     Heart Disease Father     Diabetes Father      Current Medications:    insulin detemir (LEVEMIR) injection pen 19 Units - Patient Supplied, Nightly  insulin lispro (1 Unit Dial) 0-12 Units, TID WC  insulin lispro (1 Unit Dial) 0-6 Units, Nightly  dianeal lo-massimo 2.5% 5,000 mL, Daily  epoetin toy-epbx (RETACRIT) injection 10,000 Units, Q7 Days  lactobacillus (CULTURELLE) capsule 1 capsule, BID WC  nystatin (MYCOSTATIN) 951940 UNIT/ML suspension 500,000 Units, 4x Daily  dianeal lo-massimo 2.5% 1,000 mL with cefTAZidime 1,000 mg solution, Daily  labetalol (NORMODYNE;TRANDATE) injection 10 mg, Q4H PRN  heparin (porcine) injection 1,000 Units, PRN  meropenem (MERREM) 1,000 mg in sodium chloride 0.9 % 100 mL IVPB (mini-bag), Q24H  metoclopramide (REGLAN) injection 5 mg, Q6H  dextrose 50 % IV solution, PRN  acetaminophen (TYLENOL) tablet 650 mg, Q6H PRN  aspirin EC tablet 325 mg, Daily  magnesium hydroxide (MILK OF MAGNESIA) 400 MG/5ML suspension 30 mL, Daily PRN  metoprolol tartrate (LOPRESSOR) tablet 25 mg, BID  pantoprazole (PROTONIX) tablet 40 mg, Daily  polyethylene glycol (GLYCOLAX) packet 17 g, Daily PRN  sennosides-docusate sodium (SENOKOT-S) 8.6-50 MG tablet 1 tablet, BID  traMADol (ULTRAM) tablet 50 mg, Q6H PRN   Or  traMADol (ULTRAM) tablet 100 mg, Q6H PRN  zolpidem (AMBIEN) tablet 5 mg, Nightly PRN  dextrose 5 % solution, PRN  dextrose 50 % IV solution, PRN  glucagon (rDNA) injection 1 mg, PRN  glucose (GLUTOSE) 40 % oral gel 15 g, PRN  gentamicin (GARAMYCIN) 0.1 % cream, Daily  atorvastatin (LIPITOR) tablet 20 mg, Nightly  bisacodyl (DULCOLAX) EC tablet 5 mg, Daily PRN  calcium carbonate (TUMS) chewable tablet 500 mg, TID PRN  diphenhydrAMINE (BENADRYL) tablet 25 mg, Q6H PRN  heparin (porcine) injection 3,400 Units, PRN  heparin (porcine) injection 5,000 Units, 3 times per day  hydrALAZINE (APRESOLINE) tablet 50 mg, Q8H PRN  miconazole (MICOTIN) 2 % powder, BID  ondansetron (ZOFRAN) injection 4 mg, Q6H PRN  ondansetron (ZOFRAN-ODT) disintegrating tablet 4 mg, Q8H PRN  polyethylene glycol (GLYCOLAX) packet 17 g, Daily  promethazine (PHENERGAN) tablet 12.5 mg, Q6H PRN  [Held by provider] torsemide (DEMADEX) tablet 100 mg, Daily  albuterol (PROVENTIL) nebulizer solution 2.5 mg, Q6H PRN          Physical exam:     Vitals:  BP (!) 192/92   Pulse 79   Temp 97.8 °F (36.6 °C) (Oral)   Resp 18   Ht 5' 9\" (1.753 m)   Wt 237 lb 14 oz (107.9 kg)   SpO2 100%   BMI 35.13 kg/m²   Constitutional:  OAA X3 NAD  Skin: no rash, turgor wnl  Heent:  eomi, mmm  Neck: no bruits or jvd noted  Cardiovascular:  S1, S2 without m/r/g  Respiratory: CTA B without w/r/r  Abdomen:  +bs, soft, nt, nd  Ext: no  lower extremity edema      Labs:  CBC:   Recent Labs     07/07/21  0450 07/08/21  0435 07/09/21  0425   WBC 13.6* 12.4* 14.3*   HGB 9.0* 9.3* 9.4*    193 172     BMP:    Recent Labs     07/07/21  0450 07/08/21  0436 07/09/21  0425   * 130* 128*   K 3.7 3.8 3.5   CL 90* 91* 89*   CO2 27 26 25   BUN 59* 57* 59*   CREATININE 7.8* 7.0* 8.4*   GLUCOSE 207* 207* 96     Ca/Mg/Phos:   Recent Labs     07/07/21  0450 07/08/21  0436 07/09/21  0425   CALCIUM 7.7* 7.6* 7.5*   MG 2.10 2.00 2.00   PHOS 5.5* 5.4* 5.2*         IMAGING:  CT ABDOMEN PELVIS WO CONTRAST Additional Contrast? Oral   Final Result   1. Mild bibasilar segmental atelectasis versus pneumonia. 2. Negative for small bowel obstruction. 3. Mild ascites. Assessment/Plan :      1. Acute peritonitis  Ceftazidime  6 hr day time dwell first dose started  on  7/4  On meropenem iv . First dose started on 7/4   PD cycles to 5 cycles with total  8 hr duration, discussed with PD nurse   PD fluid cx : pseudomonas +    PD fluid neutrophil count is high at 88 ---> 82     Fluid remains hazy   WBC decreasing   ID on board       2   ESRD. Continue on peritoneal dialysis. continue cycler/CCPD as per current prescription  Sodium level 128   Poor oral intake   Low albumin   Hold torsemide    fill volume to 1400 ml   PD site  no discharge. 2. HTN. BP controlled   Continue current blood pressure medications    3. Anemia of chronic disease. Continue LUIZA    4. Acid- base disorder. Monitor    5. Hyponatremia   Improving     Poor oral intake    Sodium level better at 129 ----> 130 ---> 128   Encourage increase oral intake    6. CAD/Syncopal episode. S/p [facility]   Has Multivessel CAD   S/p CABG       7. Peripheral vascular disease. H/o  amputation of left foot toes. Podiatry following       8. Generalized weakness. Needs rehabilitation. 9.  iIleus. Resolved   Surgery on board.                 Thank you for allowing us to participate in care of Feliciano Ayon         Electronically signed by: Dinah Aguero MD, 7/9/2021, 11:03 AM      [facility] [address]  Office : [phone]  Fax :[phone]

## 2021-07-09 NOTE — FLOWSHEET NOTE
07/09/21 0540   Vitals   /72   Temp 97 °F (36.1 °C)   Pulse 74   Resp 20     Extension set connected, connected to [place] per protocol. CCPD order: Total Volume: 7000 ml                       Therapy Time Duration (Hours): 08:00                        Exchange Volume: 1400 ml                        Number of Exchanges: 5                        No Final Fill    Dwell Time: 01:00  Fill Time: 00:16  Drain Time: 00:20    Initial drain bypassed, drained 5 ml  Effluent hazy with fibrin. Cell ct obtained and sent to lab. Report given to Jailene Murdock RN.

## 2021-07-09 NOTE — CARE COORDINATION
Notified Radha Angel [phone] that patient gets her PD supplies through [employer]. Patient is aware that the [facility] is following her care for possible acceptance. Pending ID, RN to offer [facility] teaching prior to discharge,   and discharge after last dose (via [employer]). Please add IVABX order/dose and STOP date to the [address] form. Thank you  [facility] cannot provide PD, and Enrique needs  Training prior to discharge .

## 2021-07-09 NOTE — PROGRESS NOTES
toy-epbx  10,000 Units Subcutaneous Q7 Days    lactobacillus  1 capsule Oral BID WC    nystatin  5 mL Oral 4x Daily    custom dialysis builder   Intraperitoneal Daily    meropenem  1,000 mg Intravenous Q24H    metoclopramide  5 mg Intravenous Q6H    aspirin  325 mg Oral Daily    metoprolol tartrate  25 mg Oral BID    pantoprazole  40 mg Oral Daily    sennosides-docusate sodium  1 tablet Oral BID    gentamicin   Topical Daily    atorvastatin  20 mg Oral Nightly    heparin (porcine)  5,000 Units Subcutaneous 3 times per day    miconazole   Topical BID    polyethylene glycol  17 g Oral Daily    [Held by provider] torsemide  100 mg Oral Daily    insulin aspart  0-18 Units Subcutaneous TID WC    insulin aspart  0-9 Units Subcutaneous Nightly     PRN Meds: labetalol, heparin (porcine), dextrose, acetaminophen, magnesium hydroxide, polyethylene glycol, traMADol **OR** traMADol, zolpidem, dextrose, dextrose, glucagon (rDNA), glucose, bisacodyl, calcium carbonate, diphenhydrAMINE, heparin (porcine), hydrALAZINE, ondansetron, ondansetron, promethazine, albuterol    No intake or output data in the 24 hours ending 07/09/21 0837    Physical Exam Performed:    BP (!) 162/74   Pulse 82   Temp 97.7 °F (36.5 °C) (Oral)   Resp 16   Ht 5' 9\" (1.753 m)   Wt 237 lb 14 oz (107.9 kg)   SpO2 99%   BMI 35.13 kg/m²     General appearance: No apparent distress, appears stated age and cooperative. Respiratory:  Normal respiratory effort. Clear to auscultation, bilaterally without Rales/Wheezes/Rhonchi. Cardiovascular: Regular rate and rhythm with normal S1/S2 without murmurs, rubs or gallops. Abdomen: Soft, non-tender, non-distended with normal bowel sounds. Musculoskeletal:Necrotic area the site to amputations on the left foot  Neurologic:  Neurovascularly intact without any focal sensory/motor deficits.  Cranial nerves: II-XII intact, grossly non-focal.  Psychiatric: Alert and oriented, thought content appropriate, normal insight  Peripheral Pulses: +2 palpable, equal bilaterally       Labs:   Recent Labs     07/07/21  0450 07/08/21  0435 07/09/21  0425   WBC 13.6* 12.4* 14.3*   HGB 9.0* 9.3* 9.4*   HCT 28.7* 29.1* 29.8*    193 172     Recent Labs     07/07/21  0450 07/08/21  0436 07/09/21  0425   * 130* 128*   K 3.7 3.8 3.5   CL 90* 91* 89*   CO2 27 26 25   BUN 59* 57* 59*   CREATININE 7.8* 7.0* 8.4*   CALCIUM 7.7* 7.6* 7.5*   PHOS 5.5* 5.4* 5.2*     No results for input(s): AST, ALT, BILIDIR, BILITOT, ALKPHOS in the last 72 hours. No results for input(s): INR in the last 72 hours. No results for input(s): Gavi Basques in the last 72 hours. Urinalysis:      Lab Results   Component Value Date    NITRU Negative 06/06/2021    WBCUA 36 06/06/2021    BACTERIA RARE 06/06/2021    RBCUA 587 06/06/2021    BLOODU LARGE 06/06/2021    SPECGRAV 1.019 06/06/2021    GLUCOSEU 100 06/06/2021       Radiology:  CT ABDOMEN PELVIS WO CONTRAST Additional Contrast? Oral   Final Result   1. Mild bibasilar segmental atelectasis versus pneumonia. 2. Negative for small bowel obstruction. 3. Mild ascites.              Assessment/Plan:    Active Hospital Problems    Diagnosis     Anemia of chronic disease [D63.8]     Hypoalbuminemia [E88.09]     Moderate malnutrition ([address] [E44.0]     Dialysis-associated peritonitis ([facility] [T85.71XA]     Peripheral vascular disease ([facility] [address] [I73.9]     SBO (small bowel obstruction) ([facility] [facility] [K56.609]     Coronary artery disease due to lipid rich plaque [I25.10, I25.83]     Diabetes education, encounter for [Z71.89]     ESRD on peritoneal dialysis ([facility] [facility] [N18.6, Z99.2]     Type 2 diabetes mellitus with obesity ([facility] [address] [E11.69, E66.9]      SBO versus ileus: Resolved  CT abdomen pelvis showed no SBO  General surgery on board: Appreciate their rec  Tolerating diet     Leukocytosis secondary to PD catheter related peritonitis: Improving  PD fluid from 7/1/2021 grew heavy growth of Pseudomonas  Blood cultures negative  ID on board appreciate their recs  vancomycin and cefepime discontinued  On IV Merrem andCeftazidime in dialysate, discussed with nephrology    CAD Status post CABG: Stable  On high-dose aspirin  Apparently does not tolerate Plavix    Gangrene of left foot: s/p amputation of toes 1 and 2 on the left on 5/26-21  Podiatry on board; continue Betadine wet-to-dry daily dressing changes to foot until discharge; WBAT in a surgical shoe  May need further ambulation once stable    PVD; status post left popliteal, anterior tibial and dorsalis pedis angioplasty by Dr. Kaden Beebe on 5/27/2021    Normocytic anemia  Hemoglobin stable around 9   Patient status post CABG, target hemoglobin above 8  Status post 1 unit PRBC 7/3/2021    Diabetes mellitus complicated with peripheral neuropathy  Hemoglobin A1c 8.8 on 5/31/2021  Continue Levemir and SSI  Monitor blood glucose closely    Persistent hypoglycemia-resolved    Hypertension  She is on Lopressor 25 twice daily  She has hydralazine and labetalol as needed  Anticipate nephrology recommendations     ESRD on PD  Nephrology following     Mild hyperkalemia  Resolved     Mild hyponatremia  As per nephrology     Anion gap metabolic acidosis: Resolved  Likely in view of uremia       DVT Prophylaxis: Heparin  Diet: ADULT DIET;  Regular  Adult Oral Nutrition Supplement; Standard High Calorie/High Protein Oral Supplement  Code Status: Full Code      Electronically signed by Kendra Murcia MD on 7/9/2021 at 8:37 AM

## 2021-07-09 NOTE — FLOWSHEET NOTE
Treatment time: 5 Hours 10 minutes  Net UF: 1058 ml  Dwell Time: 95 minutes Lost    Treatment terminated early r/t scheduled antibiotics to be placed by manual drain from 3T RN. Effluent yellow/hazy and lines taped to patient per protocol. Patient resting comfortably with VSS and no complaints upon exiting room. Copy of dialysis treatment record placed in chart, to be scanned into EMR and report given to Haydee Payne RN.     07/09/21 1046   Vitals   BP (!) 192/92   Temp 97.8 °F (36.6 °C)   Temp Source Oral   Pulse 79   Resp 18   SpO2 100 %   Height 5' 9\" (1.753 m)   Peritoneal Dialysis Catheter Left lower abdomen   Placement Date/Time: 08/16/18 0818   Inserted by: Mira Alvarado MD  Catheter Location: Left lower abdomen   Status Clamped   Site Condition No Complications   Dressing Status Clean;Dry; Intact   Dressing Occlusive   Cycler   Ultrafiltration (UF) (mL) 1058 mL   Average Dwell Time (Hours:Minutes) 68   Lost Dwell Time (Hours:Minutes) 01:35

## 2021-07-09 NOTE — PROGRESS NOTES
Infectious Diseases   Progress Note      Admission Date: 7/1/2021  Hospital Day: Hospital Day: 9   Attending: Jamal Giron MD  Date of service: 7/9/2021     Chief complaint/ Reason for consult:     · Peritoneal dialysis catheter related peritonitis-PD fluid is positive for Pseudomonas aeruginosa  · ESRD on peritoneal dialysis for 2 years  · Type 2 diabetes mellitus  · Essential hypertension    Microbiology:        I have reviewed allavailable micro lab data and cultures    · Blood culture (1/1) - collected on 7/1/2021: negative  · Peritoneal dialysate fluid culture  - collected on 7/1/2021: Heavy growth of Pseudomonas    [address] [place], [place] [address]   Phone [phone]   Susceptibility    Pseudomonas aeruginosa (1)    Antibiotic Interpretation DENNISE Status    cefepime Sensitive <=2 mcg/mL     ciprofloxacin Sensitive <=1 mcg/mL     gentamicin Sensitive <=4 mcg/mL     meropenem Sensitive <=1 mcg/mL     piperacillin-tazobactam Sensitive <=16 mcg/mL     tobramycin Sensitive <=4 mcg/mL             Antibiotics and immunizations:       Current antibiotics: All antibiotics and their doses were reviewed by me    Recent Abx Admin                   meropenem (MERREM) 1,000 mg in sodium chloride 0.9 % 100 mL IVPB (mini-bag) (mg) 1,000 mg New Bag 07/09/21 1236     1,000 mg New Bag 07/08/21 1258    dianeal lo-massimo 2.5% 1,000 mL with cefTAZidime 1,000 mg solution ()  New Bag 07/09/21 1201      New Bag 07/08/21 1741                  Immunization History: All immunization history was reviewed by me today.     Immunization History   Administered Date(s) Administered    Influenza Virus Vaccine 10/23/2014, 12/09/2015    Influenza, Quadv, IM, (6 mo and older Fluzone, Flulaval, Fluarix and 3 yrs and older Afluria) 12/07/2018    Influenza, Quadv, IM, PF (6 mo and older Fluzone, Flulaval, Fluarix, and 3 yrs and older Afluria) 11/08/2016    Pneumococcal Polysaccharide ([id number]) 04/28/2017    Td, unspecified formulation 01/01/1999, 07/01/2007       Known drug allergies: All allergies were reviewed and updated    Allergies   Allergen Reactions    Zetia [Ezetimibe] Shortness Of Breath    Quinolones Nausea And Vomiting and Nausea Only     Other reaction(s): Vomiting    Lisinopril Swelling       Social history:     Social History:  All social andepidemiologic history was reviewed and updated by me today as needed. · Tobacco use:   reports that she has never smoked. She has never used smokeless tobacco.  · Alcohol use:   reports no history of alcohol use. · Currently lives in: [place] [address]  ·  reports no history of drug use. COVID VACCINATION AND LAB RESULT RECORDS:     Internal Administration   First Dose      Second Dose           Last COVID Lab SARS-CoV-2 (no units)   Date Value   05/24/2021 Not Detected     SARS-CoV-2, NAAT (no units)   Date Value   06/08/2021 Not Detected            Assessment:     The patient is a 51 y.o. old female who  has a past medical history of Diabetes mellitus ([facility] End stage kidney disease ([facility] Hypertension, Neuropathy, and Peripheral vascular disease ([facility] with following problems:    · Peritoneal dialysis catheter related peritonitis-PD fluid culture from 7/1/2021 had heavy growth of pan susceptible Pseudomonas aeruginosa-currently being covered with IV meropenem and intraperitoneal ceftazidime  · ESRD on peritoneal dialysis for 2 years  · Type 2 diabetes mellitus-maintain good glycemic control  · Essential hypertension-blood pressure okay  · Coronary artery disease-stable  · Peripheral vascular disease  · Diabetic polyneuropathy type II  · Obesity Class 1 due to excess calorie intake : Body mass index is 35.42 kg/m². Discussion:      The patient is afebrile. White cell count in the peripheral blood is 14,300    The peritoneal dialysate fluid white cell count is 3770 today with 85% neutrophils.     It was improving steadily for past 4-5 days and now seems to have All of the patient's questions were addressed in a satisfactory manner and patient verbalized understanding all instructions. Level of complexity of visit: High     Risk of Complications/Morbidity: High     · Illness(es)/ Infection present that pose threat to life/bodily function. · There is potential for severe exacerbation of infection/side effects of treatment. · Therapy requires intensive monitoring for antimicrobial agent toxicity. Thank you for involving me in the care of your patient. I will continue to follow. If you have anyadditional questions, please do not hesitate to contact me. Subjective: Interval history: Interval history was obtained from chart review and patient/ RN. She is afebrile. Has ongoing peritonitis. Seems to be tolerating antibiotics okay. REVIEW OF SYSTEMS:     Review of Systems   Constitutional: Negative for chills, diaphoresis and unexpected weight change. HENT: Negative for congestion, ear discharge, ear pain, facial swelling, hearing loss, rhinorrhea and trouble swallowing. Eyes: Negative for photophobia, discharge, redness and visual disturbance. Respiratory: Negative for apnea, cough, choking, chest tightness, shortness of breath and stridor. Cardiovascular: Negative for chest pain and palpitations. Gastrointestinal: Positive for abdominal distention and abdominal pain. Negative for blood in stool, diarrhea and nausea. Endocrine: Negative for polydipsia, polyphagia and polyuria. Genitourinary: Negative for difficulty urinating, dysuria, frequency, hematuria, menstrual problem and vaginal discharge. Musculoskeletal: Negative for arthralgias, joint swelling, myalgias and neck stiffness. Skin: Negative for color change and rash. Allergic/Immunologic: Negative for immunocompromised state. Neurological: Negative for dizziness, seizures, speech difficulty, light-headedness and headaches. Hematological: Negative for adenopathy. Psychiatric/Behavioral: Negative for agitation, hallucinations and suicidal ideas. Past Medical History: All past medical history reviewed today. Past Medical History:   Diagnosis Date    Diabetes mellitus ([facility]     End stage kidney disease ([facility]     peritoneal dialysis every night at home x 2 years    Hypertension     Neuropathy     Peripheral vascular disease ([facility]        Past Surgical History: All past surgical history was reviewed today. Past Surgical History:   Procedure Laterality Date     SECTION      CORONARY ARTERY BYPASS GRAFT N/A 2021    URGENT CABG CORONARY ARTERY BYPASS GRAFTS X3. VEIN TO PDA X1, VEIN TO OM X1, LEFT INTERNAL MAMMARY ARTERY TO LAD X1. ENDOSCOPIC HARVEST RIGHT LEG SAPHENOUS VEIN. LIGATION NYA USING 35MM ATRICLIP. EPI AORTIC ULTRASOUND. TOTAL CARDIOPULMONARY BYPASS. DOPPLER GRAFT FLOW VERIFICATION. BILATERAL INITERCOSTAL NERVE BLOCK USING 1.3% EXPAREL. performed by Kael Dong MD at [place] [address]  2018     lap PD cath placement lt side 62 cm    TOE AMPUTATION Left 2021    AMPUTATION OF THE LEFT THIRD TOE performed by Radha Garcia DPM at [address] [place] [address] [address] TOE AMPUTATION Left 2021    AMPUTATION OF HALLUX AND SECOND TOE, LEFT FOOT performed by Radha Garcia DPM at [facility]         Family History: All family history was reviewed today.         Problem Relation Age of Onset    Coronary Art Dis Mother     High Blood Pressure Mother     Heart Disease Mother     Diabetes Mother     Cancer Father     Coronary Art Dis Father     High Blood Pressure Father     Heart Disease Father     Diabetes Father        Objective:       PHYSICAL EXAM:      Vitals:   Vitals:    21 0810 21 1046 21 1203 21 1210   BP: (!) 162/74 (!) 192/92  (!) 198/92   Pulse: 82 79  85   Resp: 16 18  16   Temp: 97.7 °F (36.5 °C) 97.8 °F (36.6 °C)  97.7 °F (36.5 °C)   TempSrc: Oral Oral  Oral   SpO2: 99% 100%  99%   Weight:  239 lb 13.8 oz (108.8 kg)     Height:  5' 9\" (1.753 m) 5' 9\" (1.753 m)      Physical Exam  Vitals and nursing note reviewed. Constitutional:       General: She is not in acute distress. Appearance: She is well-developed. She is not diaphoretic. HENT:      Head: Normocephalic. Right Ear: External ear normal.      Left Ear: External ear normal.      Nose: Nose normal.   Eyes:      General: No scleral icterus. Right eye: No discharge. Left eye: No discharge. Conjunctiva/sclera: Conjunctivae normal.      Pupils: Pupils are equal, round, and reactive to light. Cardiovascular:      Rate and Rhythm: Normal rate and regular rhythm. Heart sounds: No murmur heard. No friction rub. Pulmonary:      Effort: Pulmonary effort is normal.      Breath sounds: No stridor. No wheezing or rales. Chest:      Chest wall: No tenderness. Abdominal:      General: There is distension. Palpations: Abdomen is soft. There is no mass. Tenderness: There is abdominal tenderness (mild to moderate, generalized). There is no guarding or rebound. Musculoskeletal:         General: No tenderness. Cervical back: Normal range of motion and neck supple. Lymphadenopathy:      Cervical: No cervical adenopathy. Skin:     General: Skin is warm and dry. Findings: No erythema or rash. Neurological:      Mental Status: She is alert and oriented to person, place, and time. Motor: No abnormal muscle tone. Psychiatric:         Judgment: Judgment normal.             Lines: All vascular access sites are healthy with no local erythema, discharge or tenderness. Intake and output:    I/O last 3 completed shifts:  In: -   Out: 174     Lab Data:   All available labs and old records have been reviewed by me.     CBC:  Recent Labs     07/07/21  0450 07/08/21  0435 07/09/21  0425   WBC 13.6* 12.4* 14.3*   RBC 3.10* 3.17* 3.21*   HGB 9.0* 9.3* 9.4*   HCT 28.7* 29.1* 29.8*    193 172   MCV 92.4 91.6 92.7   MCH 29.1 29.4 29.3   MCHC 31.5 32.0 31.6   RDW 19.1* 18.5* 18.6*   BANDSPCT 3  --  9*        BMP:  Recent Labs     07/07/21  0450 07/08/21  0436 07/09/21  0425   * 130* 128*   K 3.7 3.8 3.5   CL 90* 91* 89*   CO2 27 26 25   BUN 59* 57* 59*   CREATININE 7.8* 7.0* 8.4*   CALCIUM 7.7* 7.6* 7.5*   GLUCOSE 207* 207* 96        Hepatic Function Panel:   Lab Results   Component Value Date    ALKPHOS 183 07/02/2021    ALT <5 07/02/2021    AST 7 07/02/2021    PROT 6.4 07/02/2021    PROT 7.1 12/13/2012    BILITOT 0.3 07/02/2021    BILIDIR <0.2 07/02/2021    IBILI see below 07/02/2021    LABALBU 1.7 07/02/2021       CPK:   Lab Results   Component Value Date    CKTOTAL 45 12/19/2013     ESR:   Lab Results   Component Value Date    SEDRATE 84 (H) 02/08/2021     CRP:   Lab Results   Component Value Date    CRP 8.9 (H) 02/08/2021           Imaging: All pertinent images and reports for the current visit were reviewed by me during this visit. CT ABDOMEN PELVIS WO CONTRAST Additional Contrast? Oral   Final Result   1. Mild bibasilar segmental atelectasis versus pneumonia. 2. Negative for small bowel obstruction. 3. Mild ascites. Medications: All current and past medications were reviewed.      insulin detemir  19 Units Subcutaneous Nightly    insulin lispro  0-6 Units Subcutaneous TID WC    insulin lispro  0-3 Units Subcutaneous Nightly    thiamine (VITAMIN B1) IVPB  100 mg Intravenous Q24H    dianeal lo-massimo 2.5%  5,000 mL Intraperitoneal Daily    epoetin toy-epbx  10,000 Units Subcutaneous Q7 Days    lactobacillus  1 capsule Oral BID WC    nystatin  5 mL Oral 4x Daily    custom dialysis builder   Intraperitoneal Daily    meropenem  1,000 mg Intravenous Q24H    metoclopramide  5 mg Intravenous Q6H    aspirin  325 mg Oral Daily    metoprolol tartrate  25 mg Oral BID    pantoprazole  40 mg Oral Daily    sennosides-docusate sodium  1 tablet Oral BID  gentamicin   Topical Daily    atorvastatin  20 mg Oral Nightly    heparin (porcine)  5,000 Units Subcutaneous 3 times per day    miconazole   Topical BID    polyethylene glycol  17 g Oral Daily    [Held by provider] torsemide  100 mg Oral Daily        dextrose         labetalol, heparin (porcine), dextrose, acetaminophen, magnesium hydroxide, polyethylene glycol, traMADol **OR** traMADol, zolpidem, dextrose, dextrose, glucagon (rDNA), glucose, bisacodyl, calcium carbonate, diphenhydrAMINE, heparin (porcine), hydrALAZINE, ondansetron, ondansetron, promethazine, albuterol      Problem list:       Patient Active Problem List   Diagnosis Code    Diabetes mellitus ([facility] E11.9    Obesity E66.9    Microalbuminuria R80.9    Hyperlipidemia with target LDL less than 100 E78.5    Hypertension I10    Type 2 diabetes mellitus with obesity ([facility]) E11.69, E66.9    Acute renal failure (ARF) ([facility]) N17.9    ESRD on peritoneal dialysis ([facility] N18.6, Z99.2    Non-smoker Z78.9    Elevated C-reactive protein (CRP) R79.82    Elevated sed rate R70.0    Diabetic polyneuropathy associated with type 2 diabetes mellitus ([facility]) E11.42    Diabetes education, encounter for Z71.89    Atherosclerosis of native arteries of left leg with ulceration of other part of foot ([facility]) I70.245    Near syncope R55    Coronary artery disease due to lipid rich plaque I25.10, I25.83    Mild malnutrition (HCC) E44.1    S/P coronary artery bypass graft x 3 Z95.1    S/P left atrial appendage ligation Z98.890    Generalized weakness R53.1    Functional dysphonia F44.4    SBO (small bowel obstruction) ([facility] K56.609    Dialysis-associated peritonitis (HCC) T85.71XA    Peripheral vascular disease (HCC) I73.9    Moderate malnutrition (HCC) E44.0    Anemia of chronic disease D63.8    Hypoalbuminemia E88.09       Please note that this chart was generated using Dragon dictation software.  Although every effort was made to ensure the accuracy of this automated transcription, some errors in transcription may have occurred inadvertently. If you may need any clarification, please do not hesitate to contact me through EPIC or at the phone number provided below with my electronic signature. Any pictures or media included in this note were obtained after taking informed verbal consent from the patient and with their approval to include those in the patient's medical record.     Wendy Sheehan MD, MPH  7/9/2021 , 12:57 PM   [facility] Infectious Disease   [address] [place] [address]  Office: [phone]  Fax: [phone]  Clinic days:  Tuesday & Thursday the abdomen and he just had the [facility] catheter placement everything they are telling me is fluid overloaded so I told him

## 2021-07-09 NOTE — PROGRESS NOTES
Physical Therapy  Leah Byrne  Attempted PT treatment this date. Pt currently receiving peritoneal dialysis. Will re-attempt treatment session as schedule allows.    [address] [place], [place] [address]

## 2021-07-09 NOTE — CARE COORDINATION
Received a call from Dr. Citlalli De La Torre wanted to speak with SW in charge of the patient today. Sent Dr. Citlalli De La Torre a perfect serve message with the 's phone number. Received a call from Ella, [phone], at [address] stating they could take pt for SNF but will need to do a training with the staff and coordinate it with the pt's dialysis center where she gets the supplies from. Stated this could take some days to set up and would like to know discharge plan as soon as possible.     Electronically signed by JERRY Villar, KIMBERLEYW on 7/9/2021 at 12:24 PM

## 2021-07-09 NOTE — PROGRESS NOTES
Comprehensive Nutrition Assessment    Type and Reason for Visit:  Reassess    Nutrition Recommendations/Plan:   1. Recommend EN support to start. Pt will be at significant risk for refeeding syndrome per AND/ASPEN guidelines. 2. Recommend to start 100 mg thiamine per day and continue for 5-7 days. 3. Recommend to start Nepro (renal formula) at 20 mL per hour x 24 hours and monitor electrolytes. 4. Recommend water flush 30 mL q 4 hours for tube maintenance. 5. If lytes (K+, Mg+ and Phos) are stable 7/10, can advance by 10 mL q 4 hours until goal rate 40 mL per hour is achieved. 6. Nepro at goal rate 40 mL per hour to provide 960 mL total volume, 1728 calories, 78 grams protein, 698 mL free water. 7. Monitor closely and correct lytes (K, Phos, Mg) before progressing TF to goal d/t risk of refeeding syndrome (hx extended inadequate nutritional intake). 8. Ensure head of bed is 30 - 45 degrees during continuous or bolus gastric feeding and for one hour after bolus. Turn off the feeding if head of bed is lowered less than 30 degrees. 9. Monitor for tolerance (bowel habits, N/V, cramping, abdominal exam findings: distended, firm, tense, guarded, discomfort). Nutrition Assessment:  Pt's nutrition status continues to decline as evidenced by continued PO intake less than 25% of meals. Today pt tells me she is not eating because the food is gross. She has Ensure Enlive ordered TID but is drinking less than 1 daily. Pt with inadequate PO intake for more than 1 month and meets criteria for moderate malnutrition. Recommend for placement of NG tube/dobhoff with EN support to start. Discussed with Dr. Adrian Butt via perfect serve. Pt will be at significant risk for refeeding syndrome per AND/ASPEN guidelines. Pharmacy to order thiamine. Malnutrition Assessment:  Malnutrition Status:   Moderate malnutrition    Context:  Chronic Illness     Findings of the 6 clinical characteristics of malnutrition:  Energy Intake:  7 - 75% or less estimated energy requirements for 1 month or longer  Weight Loss:  No significant weight loss     Body Fat Loss:  No significant body fat loss     Muscle Mass Loss:  1 - Mild muscle mass loss Temples (temporalis), Clavicles (pectoralis & deltoids)  Fluid Accumulation:  1 - Mild Extremities   Strength:  Not Performed    Estimated Daily Nutrient Needs:  Energy (kcal):  3391-2356; Weight Used for Energy Requirements:  Admission (102 kg)     Protein (g):  79-99 grams; Weight Used for Protein Requirements:  Ideal (66 kg; 1.2-1.5 grams per kg)        Fluid (ml/day):   ; Method Used for Fluid Requirements:  1 ml/kcal      Nutrition Related Findings:  +2 pitting RLE edema. +1 pitting LLE edema. +5.8 liters. Na+ 128. Phos 5.2. +5.8 liters. Wounds:  Surgical Incision       Current Nutrition Therapies:    ADULT DIET;  Regular  Adult Oral Nutrition Supplement; Standard High Calorie/High Protein Oral Supplement    Anthropometric Measures:  · Height: 5' 9\" (175.3 cm)  · Current Body Weight: 239 lb 13.8 oz (108.8 kg)   · Admission Body Weight: 225 lb (102.1 kg)    · Ideal Body Weight: 145 lbs; % Ideal Body Weight 165.4 %   · BMI: 35.4  · BMI Categories: Obese Class 2 (BMI 35.0 -39.9)       Nutrition Diagnosis:   · Inadequate oral intake related to inadequate protein-energy intake as evidenced by intake 0-25%    · Moderate malnutrition related to inadequate protein-energy intake as evidenced by intake 0-25%, mild muscle loss, localized or generalized fluid accumulation      Nutrition Interventions:   Food and/or Nutrient Delivery:  Continue Current Diet, Continue Oral Nutrition Supplement, Start Tube Feeding  Nutrition Education/Counseling:  Education completed   Coordination of Nutrition Care:  Continue to monitor while inpatient    Goals:  Pt will tolerate EN at goal       Nutrition Monitoring and Evaluation:   Behavioral-Environmental Outcomes:  None Identified   Food/Nutrient Intake Outcomes: Food and Nutrient Intake, Supplement Intake, Enteral Nutrition Intake/Tolerance  Physical Signs/Symptoms Outcomes:  Biochemical Data     Discharge Planning:     Too soon to determine     Electronically signed by Suzie Pillai RD, LD on 7/9/21 at 12:00 PM EDT    Contact: [phone]

## 2021-07-10 LAB
ANION GAP SERPL CALCULATED.3IONS-SCNC: 13 MMOL/L (ref 3–16)
ANISOCYTOSIS: ABNORMAL
APPEARANCE FLUID: NORMAL
BASOPHILS ABSOLUTE: 0 K/UL (ref 0–0.2)
BASOPHILS RELATIVE PERCENT: 0 %
BUN BLDV-MCNC: 51 MG/DL (ref 7–20)
CALCIUM SERPL-MCNC: 7.7 MG/DL (ref 8.3–10.6)
CELL COUNT FLUID TYPE: NORMAL
CHLORIDE BLD-SCNC: 91 MMOL/L (ref 99–110)
CLOT EVALUATION: NORMAL
CO2: 26 MMOL/L (ref 21–32)
COLOR FLUID: COLORLESS
CREAT SERPL-MCNC: 7.6 MG/DL (ref 0.6–1.1)
EOSINOPHIL FLUID: 1 %
EOSINOPHILS ABSOLUTE: 0.1 K/UL (ref 0–0.6)
EOSINOPHILS RELATIVE PERCENT: 1 %
FLUID PATH CONSULT: YES
GFR AFRICAN AMERICAN: 7
GFR NON-AFRICAN AMERICAN: 6
GLUCOSE BLD-MCNC: 127 MG/DL (ref 70–99)
GLUCOSE BLD-MCNC: 146 MG/DL (ref 70–99)
GLUCOSE BLD-MCNC: 168 MG/DL (ref 70–99)
GLUCOSE BLD-MCNC: 175 MG/DL (ref 70–99)
GLUCOSE BLD-MCNC: 228 MG/DL (ref 70–99)
GLUCOSE BLD-MCNC: 245 MG/DL (ref 70–99)
GLUCOSE BLD-MCNC: 246 MG/DL (ref 70–99)
HCT VFR BLD CALC: 29.5 % (ref 36–48)
HEMOGLOBIN: 9.5 G/DL (ref 12–16)
LYMPHOCYTES ABSOLUTE: 1.3 K/UL (ref 1–5.1)
LYMPHOCYTES RELATIVE PERCENT: 10 %
LYMPHOCYTES, BODY FLUID: 4 %
MACROPHAGE FLUID: 25 %
MAGNESIUM: 1.8 MG/DL (ref 1.8–2.4)
MCH RBC QN AUTO: 29.3 PG (ref 26–34)
MCHC RBC AUTO-ENTMCNC: 32.2 G/DL (ref 31–36)
MCV RBC AUTO: 91 FL (ref 80–100)
MESOTHELIAL FLUID: 1 %
MONOCYTES ABSOLUTE: 0.5 K/UL (ref 0–1.3)
MONOCYTES RELATIVE PERCENT: 4 %
MONONUCLEAR UNIDENTIFIED CELLS FLUID: 2 %
NEUTROPHIL, FLUID: 67 %
NEUTROPHILS ABSOLUTE: 10.8 K/UL (ref 1.7–7.7)
NEUTROPHILS RELATIVE PERCENT: 85 %
NUCLEATED CELLS FLUID: 628 /CUMM
NUMBER OF CELLS COUNTED FLUID: 100
PDW BLD-RTO: 18.2 % (ref 12.4–15.4)
PERFORMED ON: ABNORMAL
PHOSPHORUS: 4.5 MG/DL (ref 2.5–4.9)
PLATELET # BLD: 155 K/UL (ref 135–450)
PLATELET SLIDE REVIEW: ADEQUATE
PMV BLD AUTO: 9.9 FL (ref 5–10.5)
POLYCHROMASIA: ABNORMAL
POTASSIUM SERPL-SCNC: 3.2 MMOL/L (ref 3.5–5.1)
RBC # BLD: 3.24 M/UL (ref 4–5.2)
RBC FLUID: 1353 /CUMM
SLIDE REVIEW: ABNORMAL
SODIUM BLD-SCNC: 130 MMOL/L (ref 136–145)
TOXIC GRANULATION: PRESENT
VACUOLATED NEUTROPHILS: PRESENT
WBC # BLD: 12.7 K/UL (ref 4–11)

## 2021-07-10 PROCEDURE — 6360000002 HC RX W HCPCS: Performed by: INTERNAL MEDICINE

## 2021-07-10 PROCEDURE — 6360000002 HC RX W HCPCS: Performed by: HOSPITALIST

## 2021-07-10 PROCEDURE — 90945 DIALYSIS ONE EVALUATION: CPT | Performed by: INTERNAL MEDICINE

## 2021-07-10 PROCEDURE — 83735 ASSAY OF MAGNESIUM: CPT

## 2021-07-10 PROCEDURE — 84100 ASSAY OF PHOSPHORUS: CPT

## 2021-07-10 PROCEDURE — 2060000000 HC ICU INTERMEDIATE R&B

## 2021-07-10 PROCEDURE — 85025 COMPLETE CBC W/AUTO DIFF WBC: CPT

## 2021-07-10 PROCEDURE — 6370000000 HC RX 637 (ALT 250 FOR IP): Performed by: NURSE PRACTITIONER

## 2021-07-10 PROCEDURE — 80048 BASIC METABOLIC PNL TOTAL CA: CPT

## 2021-07-10 PROCEDURE — 6370000000 HC RX 637 (ALT 250 FOR IP): Performed by: INTERNAL MEDICINE

## 2021-07-10 PROCEDURE — 2580000003 HC RX 258: Performed by: HOSPITALIST

## 2021-07-10 PROCEDURE — 90945 DIALYSIS ONE EVALUATION: CPT

## 2021-07-10 PROCEDURE — 2580000003 HC RX 258: Performed by: INTERNAL MEDICINE

## 2021-07-10 PROCEDURE — 6360000002 HC RX W HCPCS: Performed by: NURSE PRACTITIONER

## 2021-07-10 PROCEDURE — 36415 COLL VENOUS BLD VENIPUNCTURE: CPT

## 2021-07-10 PROCEDURE — 89051 BODY FLUID CELL COUNT: CPT

## 2021-07-10 RX ORDER — INSULIN LISPRO 100 [IU]/ML
0-3 INJECTION, SOLUTION INTRAVENOUS; SUBCUTANEOUS NIGHTLY
Status: DISCONTINUED | OUTPATIENT
Start: 2021-07-10 | End: 2021-07-10

## 2021-07-10 RX ORDER — INSULIN LISPRO 100 [IU]/ML
0-6 INJECTION, SOLUTION INTRAVENOUS; SUBCUTANEOUS
Status: DISCONTINUED | OUTPATIENT
Start: 2021-07-10 | End: 2021-07-10

## 2021-07-10 RX ADMIN — CEFTAZIDIME: 1 INJECTION, POWDER, FOR SOLUTION INTRAMUSCULAR; INTRAVENOUS at 12:45

## 2021-07-10 RX ADMIN — ZOLPIDEM TARTRATE 5 MG: 5 TABLET, COATED ORAL at 21:45

## 2021-07-10 RX ADMIN — MICONAZOLE NITRATE: 20 POWDER TOPICAL at 21:46

## 2021-07-10 RX ADMIN — ATORVASTATIN CALCIUM 20 MG: 20 TABLET, FILM COATED ORAL at 21:39

## 2021-07-10 RX ADMIN — GENTAMICIN SULFATE: 1 CREAM TOPICAL at 19:25

## 2021-07-10 RX ADMIN — METOCLOPRAMIDE 5 MG: 5 INJECTION, SOLUTION INTRAMUSCULAR; INTRAVENOUS at 01:13

## 2021-07-10 RX ADMIN — TRAMADOL HYDROCHLORIDE 100 MG: 50 TABLET, FILM COATED ORAL at 01:12

## 2021-07-10 RX ADMIN — METOCLOPRAMIDE 5 MG: 5 INJECTION, SOLUTION INTRAMUSCULAR; INTRAVENOUS at 06:46

## 2021-07-10 RX ADMIN — Medication 1 CAPSULE: at 09:39

## 2021-07-10 RX ADMIN — NYSTATIN 500000 UNITS: 100000 SUSPENSION ORAL at 09:40

## 2021-07-10 RX ADMIN — METOCLOPRAMIDE 5 MG: 5 INJECTION, SOLUTION INTRAMUSCULAR; INTRAVENOUS at 17:04

## 2021-07-10 RX ADMIN — CIPROFLOXACIN 400 MG: 2 INJECTION, SOLUTION INTRAVENOUS at 14:24

## 2021-07-10 RX ADMIN — METOCLOPRAMIDE 5 MG: 5 INJECTION, SOLUTION INTRAMUSCULAR; INTRAVENOUS at 12:38

## 2021-07-10 RX ADMIN — PANTOPRAZOLE SODIUM 40 MG: 40 TABLET, DELAYED RELEASE ORAL at 06:46

## 2021-07-10 RX ADMIN — METOPROLOL TARTRATE 25 MG: 25 TABLET, FILM COATED ORAL at 01:12

## 2021-07-10 RX ADMIN — NYSTATIN 500000 UNITS: 100000 SUSPENSION ORAL at 14:19

## 2021-07-10 RX ADMIN — NYSTATIN 500000 UNITS: 100000 SUSPENSION ORAL at 17:04

## 2021-07-10 RX ADMIN — Medication 1 CAPSULE: at 17:04

## 2021-07-10 RX ADMIN — METOPROLOL TARTRATE 25 MG: 25 TABLET, FILM COATED ORAL at 09:39

## 2021-07-10 RX ADMIN — ASPIRIN 325 MG: 325 TABLET, COATED ORAL at 09:39

## 2021-07-10 RX ADMIN — HEPARIN SODIUM 5000 UNITS: 5000 INJECTION INTRAVENOUS; SUBCUTANEOUS at 06:45

## 2021-07-10 RX ADMIN — THIAMINE HYDROCHLORIDE 100 MG: 100 INJECTION, SOLUTION INTRAMUSCULAR; INTRAVENOUS at 15:48

## 2021-07-10 ASSESSMENT — PAIN SCALES - GENERAL
PAINLEVEL_OUTOF10: 0
PAINLEVEL_OUTOF10: 6
PAINLEVEL_OUTOF10: 0
PAINLEVEL_OUTOF10: 7
PAINLEVEL_OUTOF10: 5
PAINLEVEL_OUTOF10: 0
PAINLEVEL_OUTOF10: 0

## 2021-07-10 ASSESSMENT — PAIN DESCRIPTION - PAIN TYPE
TYPE: ACUTE PAIN
TYPE: ACUTE PAIN

## 2021-07-10 ASSESSMENT — PAIN DESCRIPTION - LOCATION
LOCATION: ABDOMEN
LOCATION: ABDOMEN;BACK

## 2021-07-10 ASSESSMENT — PAIN DESCRIPTION - PROGRESSION

## 2021-07-10 ASSESSMENT — PAIN DESCRIPTION - ORIENTATION: ORIENTATION: LEFT;MID

## 2021-07-10 NOTE — PROGRESS NOTES
RN attempted to titrate O2 to 2L from 3L d/t SpO2 99% at which time pt quickly demands that RN leaves SpO2 on 3L d/t abdominal pain.

## 2021-07-10 NOTE — PROGRESS NOTES
CCPD Order   Exchanges: 5   Exchange Volume: 1400 ml   Total Time: 8 hrs   Dextrose: 2.5%   Last Fill: 0 ml   Total Volume: 7000 ml     Orders verified. Supplies taken to pt's room. Report received. Cycler set up, primed and pre tested. Dressing changed on [facility] Cath site. Pt connected to cycler. CCPD initiated without problem. Initial effluent clear.

## 2021-07-10 NOTE — FLOWSHEET NOTE
Treatment initiated without complications or complaints from patient. Specimen sent. Patient resting comfortably with VSS upon dialysis RN exit from room. Verenice Day RN notified of start of treatment and hotline number located on top of machine for any questions about troubleshooting during after hours. 07/09/21 2030   Vitals   BP (!) 179/48   Temp 97.8 °F (36.6 °C)   Temp Source Oral   Pulse 82   Resp 18   SpO2 98 %   Height 5' 9\" (1.753 m)   Weight 244 lb 11.4 oz (111 kg)   Peritoneal Dialysis Catheter Left lower abdomen   Placement Date/Time: 08/16/18 0818   Inserted by: Germain Salter MD  Catheter Location: Left lower abdomen   Status Accessed   Site Condition No Complications   Dressing Status Clean;Dry; Intact   Dressing Occlusive   Drainage Description Tan   Cycler   Verification of Prescription CCPD   Total Volume Programmed 7000 mL   Therapy Time (Hours:Minutes) 08:00   Fill Volume 1400 mL   Dextrose Setting Same (Nonextraneal)   Number of Cycles 5   Bag Volume 5000 mL   Number of Bags Used 2   Dianeal Solution Other (Comment)

## 2021-07-10 NOTE — PROGRESS NOTES
Pt requesting to wait until 0000 assessment to take metoprolol d/t decrease in BP since earlier today. Pt also requested for BG to be rechecked at that time and she will decide on her levemir based on that result d/t multiple episodes of hypoglycemia over the past few days.

## 2021-07-10 NOTE — PLAN OF CARE
Problem: Falls - Risk of:  Goal: Will remain free from falls  Description: Will remain free from falls  Outcome: Ongoing  Goal: Absence of physical injury  Description: Absence of physical injury  Outcome: Ongoing     Problem: Skin Integrity:  Goal: Will show no infection signs and symptoms  Description: Will show no infection signs and symptoms  Outcome: Ongoing  Goal: Absence of new skin breakdown  Description: Absence of new skin breakdown  Outcome: Ongoing     Problem: Pain:  Goal: Pain level will decrease  Description: Pain level will decrease  Outcome: Ongoing  Goal: Control of acute pain  Description: Control of acute pain  Outcome: Ongoing  Goal: Control of chronic pain  Description: Control of chronic pain  Outcome: Ongoing     Problem: Nutrition Deficit:  Goal: Ability to achieve adequate nutritional intake will improve  Description: Ability to achieve adequate nutritional intake will improve  Outcome: Ongoing     Problem: Nutrition  Goal: Optimal nutrition therapy  Outcome: Ongoing   Pt remains free from falls and physical injury. Pt shows no new signs of infection. Pt has skin breakdown on left buttock, abdomen and posterior right upper thigh, see flowsheets. MD notified and wound care consulted. Pt pain has been controlled with current plan of care. Pt nutritional intake still poor. Pt VSS at this time on 3L of oxygen. Pt foot wound dressing changed per orders. Pt tolerated well. See flowsheets for assessment.

## 2021-07-10 NOTE — PROGRESS NOTES
Hospitalist Progress Note      PCP: Linda Hannon MD    Date of Admission: 7/1/2021    Chief Complaint: Nausea and vomiting    Hospital Course: 51 y.o. female with PMHx of ESRD on PD, CAD status post CABG, diabetes, hypertension, peripheral vascular disease status post partial foot accommodation was initially admitted to [facility] 5/31/2021 for dizziness. At that time cardiac catheterization was performed which showed multivessel disease. Patient undergone CABG. Subsequently patient was discharged to [facility] acute rehab unit where she was undergoing therapy. She was being followed by nephrology in view of her ESRD for PD. She subsequently developed abdominal discomfort nausea and vomiting. Imaging suggestive of ileus versus SBO. Surgery was consulted at [facility] who suggested transfer to acute care. Patient states that she was not able to tolerate anything p.o. or since the day before yesterday. Her last bowel movement was approximately that time, no melena or hematochezia, hard. Today her labs demonstrate hyponatremia, hyperkalemia, BUN/creatinine consistent with ESRD patient, and leukocytosis to 18. Fluid from PD catheter has grown Pseudomonas aeruginosa. Discussed with nephrology, increased dose of cefepime, give additional dose of meropenem, ID consulted.     Interval history:   Patient seen and examined today  Overnight events and interval ancillary notes reviewed  Afebrile overnight WBC trended down to12.7  On 3 NC satting around 98%; wean as tolerated keep sats above 92  blood cultures from 7/1 remain no growth to date  PD dialysate bloody; Heparin on hold; ordered SCDs  Hemoglobin stable around 9.5  Small wounds in the groin area noted; wound care consulted      Medications:  Reviewed    Infusion Medications    dextrose       Scheduled Medications    insulin detemir  19 Units Subcutaneous Nightly    insulin lispro  0-6 Units Subcutaneous TID     insulin lispro  0-3 Units Subcutaneous Nightly    thiamine (VITAMIN B1) IVPB  100 mg Intravenous Q24H    ciprofloxacin  400 mg Intravenous Q24H    dianeal lo-massimo 2.5%  5,000 mL Intraperitoneal Daily    epoetin toy-epbx  10,000 Units Subcutaneous Q7 Days    lactobacillus  1 capsule Oral BID WC    nystatin  5 mL Oral 4x Daily    custom dialysis builder   Intraperitoneal Daily    metoclopramide  5 mg Intravenous Q6H    aspirin  325 mg Oral Daily    metoprolol tartrate  25 mg Oral BID    pantoprazole  40 mg Oral Daily    sennosides-docusate sodium  1 tablet Oral BID    gentamicin   Topical Daily    atorvastatin  20 mg Oral Nightly    heparin (porcine)  5,000 Units Subcutaneous 3 times per day    miconazole   Topical BID    polyethylene glycol  17 g Oral Daily    [Held by provider] torsemide  100 mg Oral Daily     PRN Meds: labetalol, heparin (porcine), dextrose, acetaminophen, magnesium hydroxide, polyethylene glycol, traMADol **OR** traMADol, zolpidem, dextrose, dextrose, glucagon (rDNA), glucose, bisacodyl, calcium carbonate, diphenhydrAMINE, heparin (porcine), hydrALAZINE, ondansetron, ondansetron, promethazine, albuterol      Intake/Output Summary (Last 24 hours) at 7/10/2021 0859  Last data filed at 7/10/2021 0620  Gross per 24 hour   Intake --   Output 1302 ml   Net -1302 ml       Physical Exam Performed:    BP (!) 125/51   Pulse 90   Temp 97.5 °F (36.4 °C) (Oral)   Resp 20   Ht 5' 9\" (1.753 m)   Wt 223 lb (101.2 kg)   SpO2 98%   BMI 32.93 kg/m²     General appearance: No apparent distress, appears stated age and cooperative. Respiratory:  Normal respiratory effort. Clear to auscultation, bilaterally without Rales/Wheezes/Rhonchi. Cardiovascular: Regular rate and rhythm with normal S1/S2 without murmurs, rubs or gallops. Abdomen: Soft, non-tender, non-distended with normal bowel sounds.   Small wounds in the groin area   Musculoskeletal:Necrotic area the site to amputations on the left foot  Neurologic: Neurovascularly intact without any focal sensory/motor deficits. Cranial nerves: II-XII intact, grossly non-focal.  Psychiatric: Alert and oriented, thought content appropriate, normal insight  Peripheral Pulses: +2 palpable, equal bilaterally       Labs:   Recent Labs     07/08/21  0435 07/09/21  0425 07/10/21  0459   WBC 12.4* 14.3* 12.7*   HGB 9.3* 9.4* 9.5*   HCT 29.1* 29.8* 29.5*    172 155     Recent Labs     07/08/21  0436 07/09/21  0425 07/10/21  0459   * 128* 130*   K 3.8 3.5 3.2*   CL 91* 89* 91*   CO2 26 25 26   BUN 57* 59* 51*   CREATININE 7.0* 8.4* 7.6*   CALCIUM 7.6* 7.5* 7.7*   PHOS 5.4* 5.2* 4.5     No results for input(s): AST, ALT, BILIDIR, BILITOT, ALKPHOS in the last 72 hours. No results for input(s): INR in the last 72 hours. No results for input(s): Mame Leticia in the last 72 hours. Urinalysis:      Lab Results   Component Value Date    NITRU Negative 06/06/2021    WBCUA 36 06/06/2021    BACTERIA RARE 06/06/2021    RBCUA 587 06/06/2021    BLOODU LARGE 06/06/2021    SPECGRAV 1.019 06/06/2021    GLUCOSEU 100 06/06/2021       Radiology:  CT ABDOMEN PELVIS WO CONTRAST Additional Contrast? Oral   Final Result   1. Mild bibasilar segmental atelectasis versus pneumonia. 2. Negative for small bowel obstruction. 3. Mild ascites.              Assessment/Plan:    Active Hospital Problems    Diagnosis     Anemia of chronic disease [D63.8]     Hypoalbuminemia [E88.09]     Moderate malnutrition ([address] [E44.0]     Dialysis-associated peritonitis ([facility] [T85.71XA]     Peripheral vascular disease ([facility] [I73.9]     SBO (small bowel obstruction) ([facility] [K56.609]     Coronary artery disease due to lipid rich plaque [I25.10, I25.83]     Diabetes education, encounter for [Z71.89]     ESRD on peritoneal dialysis ([facility] [N18.6, Z99.2]     Type 2 diabetes mellitus with obesity ([facility] [E11.69, E66.9]      SBO versus ileus: Resolved  CT abdomen pelvis showed no SBO  General surgery on board: Appreciate their rec  Tolerating diet     Leukocytosis secondary to PD catheter related peritonitis: Improving  PD fluid from 7/1/2021 grew heavy growth of Pseudomonas  Blood cultures negative  ID on board appreciate their recs  vancomycin and cefepime discontinued  Ceftazidime in dialysate for 6 doses  IV meropenem d/c on 7/9/2021 and switched to IV Cipro per ID recs    CAD Status post CABG: Stable  On high-dose aspirin  Apparently does not tolerate Plavix    Gangrene of left foot: s/p amputation of toes 1 and 2 on the left on 5/26-21  Podiatry on board; continue Betadine wet-to-dry daily dressing changes to foot until discharge; WBAT in a surgical shoe  May need further ambulation once stable    PVD; status post left popliteal, anterior tibial and dorsalis pedis angioplasty by Dr. Angelia Parks on 5/27/2021    Normocytic anemia  Hemoglobin stable around 9   Patient status post CABG, target hemoglobin above 8  Status post 1 unit PRBC 7/3/2021    Diabetes mellitus complicated with peripheral neuropathy  Hemoglobin A1c 8.8 on 5/31/2021  Continue Levemir and SSI  Monitor blood glucose closely    Persistent hypoglycemia-resolved    Hypertension  She is on Lopressor 25 twice daily  She has hydralazine and labetalol as needed  Anticipate nephrology recommendations     ESRD on PD  Nephrology following     Mild hyperkalemia  Resolved     Mild hyponatremia  As per nephrology     Anion gap metabolic acidosis: Resolved  Likely in view of uremia       DVT Prophylaxis: Heparin  Diet: ADULT DIET;  Regular  Adult Oral Nutrition Supplement; Standard High Calorie/High Protein Oral Supplement  Code Status: Full Code      Electronically signed by Pina Dorado MD on 7/10/2021 at 8:59 AM

## 2021-07-10 NOTE — PROGRESS NOTES
Office : [phone]     Fax :[phone]       Nephrology Progress   Note      Patient's Name: Katrina Tavarez  7:55 AM  7/10/2021    Reason for Consult:  ESRD    History of Present iIlness:    Katrina Tavarez is a 51 y.o. female with severe end-stage renal disease secondary to diabetic nephropathy on peritoneal dialysis, hypertension, peripheral vascular disease, diabetic neuropathy who was admitted after she had a syncopal episode. She had similar episode 2 weeks ago. Recently had angiogram left lower extremity for gangrene left foot second toe. Denies any shortness of breath. Denies any abdominal pain. Denies any nausea vomiting. Interval HX :      No nausea , no vomiting today     Had blood tinged dialysate fluid       PD fluid growing pseudomonas    C/o abdominal pain  : improving   oral intake improving   No chest pain  No sob  S/p CABG on 2010          I/O last 3 completed shifts:  In: -   Out: 1302     Past Medical History:   Diagnosis Date    Diabetes mellitus [facility]     End stage kidney disease ([facility]     peritoneal dialysis every night at home x 2 years    Hypertension     Neuropathy     Peripheral vascular disease ([facility]        Past Surgical History:   Procedure Laterality Date     SECTION      CORONARY ARTERY BYPASS GRAFT N/A 2021    URGENT CABG CORONARY ARTERY BYPASS GRAFTS X3. VEIN TO PDA X1, VEIN TO OM X1, LEFT INTERNAL MAMMARY ARTERY TO LAD X1. ENDOSCOPIC HARVEST RIGHT LEG SAPHENOUS VEIN. LIGATION NYA USING 35MM ATRICLIP. EPI AORTIC ULTRASOUND. TOTAL CARDIOPULMONARY BYPASS. DOPPLER GRAFT FLOW VERIFICATION. BILATERAL INITERCOSTAL NERVE BLOCK USING 1.3% EXPAREL.  performed by Ga Ortiz MD at [address] [place] [address],[facility]  [place]     [facility] PD cath placement lt side 62 cm    TOE AMPUTATION Left 2/26/2021    AMPUTATION OF THE LEFT THIRD TOE performed by Gabriela Santillan DPM at [id number] [place] [address] [address] TOE AMPUTATION Left 5/26/2021    AMPUTATION OF HALLUX AND SECOND TOE, LEFT FOOT performed by Gabriela Santillan DPM at [facility]         Family History   Problem Relation Age of Onset    Coronary Art Dis Mother     High Blood Pressure Mother     Heart Disease Mother     Diabetes Mother     Cancer Father     Coronary Art Dis Father     High Blood Pressure Father     Heart Disease Father     Diabetes Father      Current Medications:    insulin detemir (LEVEMIR) injection pen 19 Units - Patient Supplied, Nightly  insulin lispro (1 Unit Dial) 0-6 Units, TID WC  insulin lispro (1 Unit Dial) 0-3 Units, Nightly  thiamine (B-1) 100 mg in sodium chloride 0.9 % 100 mL IVPB, Q24H  ciprofloxacin (CIPRO) IVPB 400 mg, Q24H  dianeal lo-massimo 2.5% 5,000 mL, Daily  epoetin toy-epbx (RETACRIT) injection 10,000 Units, Q7 Days  lactobacillus (CULTURELLE) capsule 1 capsule, BID WC  nystatin (MYCOSTATIN) 459579 UNIT/ML suspension 500,000 Units, 4x Daily  dianeal lo-massimo 2.5% 1,000 mL with cefTAZidime 1,000 mg solution, Daily  labetalol (NORMODYNE;TRANDATE) injection 10 mg, Q4H PRN  heparin (porcine) injection 1,000 Units, PRN  metoclopramide (REGLAN) injection 5 mg, Q6H  dextrose 50 % IV solution, PRN  acetaminophen (TYLENOL) tablet 650 mg, Q6H PRN  aspirin EC tablet 325 mg, Daily  magnesium hydroxide (MILK OF MAGNESIA) 400 MG/5ML suspension 30 mL, Daily PRN  metoprolol tartrate (LOPRESSOR) tablet 25 mg, BID  pantoprazole (PROTONIX) tablet 40 mg, Daily  polyethylene glycol (GLYCOLAX) packet 17 g, Daily PRN  sennosides-docusate sodium (SENOKOT-S) 8.6-50 MG tablet 1 tablet, BID  traMADol (ULTRAM) tablet 50 mg, Q6H PRN   Or  traMADol (ULTRAM) tablet 100 mg, Q6H PRN  zolpidem (AMBIEN) tablet 5 mg, Nightly PRN  dextrose 5 % solution, PRN  dextrose 50 % IV solution, PRN  glucagon (rDNA) injection 1 mg, PRN  glucose (GLUTOSE) 40 % oral gel 15 g, PRN  gentamicin (GARAMYCIN) 0.1 % cream, Daily  atorvastatin (LIPITOR) tablet 20 mg, Nightly  bisacodyl (DULCOLAX) EC tablet 5 mg, Daily PRN  calcium carbonate (TUMS) chewable tablet 500 mg, TID PRN  diphenhydrAMINE (BENADRYL) tablet 25 mg, Q6H PRN  heparin (porcine) injection 3,400 Units, PRN  heparin (porcine) injection 5,000 Units, 3 times per day  hydrALAZINE (APRESOLINE) tablet 50 mg, Q8H PRN  miconazole (MICOTIN) 2 % powder, BID  ondansetron (ZOFRAN) injection 4 mg, Q6H PRN  ondansetron (ZOFRAN-ODT) disintegrating tablet 4 mg, Q8H PRN  polyethylene glycol (GLYCOLAX) packet 17 g, Daily  promethazine (PHENERGAN) tablet 12.5 mg, Q6H PRN  [Held by provider] torsemide (DEMADEX) tablet 100 mg, Daily  albuterol (PROVENTIL) nebulizer solution 2.5 mg, Q6H PRN          Physical exam:     Vitals:  BP (!) 125/51   Pulse 90   Temp 97.5 °F (36.4 °C) (Oral)   Resp 20   Ht 5' 9\" (1.753 m)   Wt 223 lb 8.7 oz (101.4 kg)   SpO2 98%   BMI 33.01 kg/m²   Constitutional:  OAA X3 NAD  Skin: no rash, turgor wnl  Heent:  eomi, mmm  Neck: no bruits or jvd noted  Cardiovascular:  S1, S2 without m/r/g  Respiratory: CTA B without w/r/r  Abdomen:  +bs, soft, nt, nd  Ext: no  lower extremity edema      Labs:  CBC:   Recent Labs     07/08/21  0435 07/09/21  0425 07/10/21  0459   WBC 12.4* 14.3* 12.7*   HGB 9.3* 9.4* 9.5*    172 155     BMP:    Recent Labs     07/08/21  0436 07/09/21  0425 07/10/21  0459   * 128* 130*   K 3.8 3.5 3.2*   CL 91* 89* 91*   CO2 26 25 26   BUN 57* 59* 51*   CREATININE 7.0* 8.4* 7.6*   GLUCOSE 207* 96 246*     Ca/Mg/Phos:   Recent Labs     07/08/21  0436 07/09/21  0425 07/10/21  0459   CALCIUM 7.6* 7.5* 7.7*   MG 2.00 2.00 1.80   PHOS 5.4* 5.2* 4.5         IMAGING:  CT ABDOMEN PELVIS WO CONTRAST Additional Contrast? Oral   Final Result   1. Mild bibasilar segmental atelectasis versus pneumonia.    2. Negative for small bowel obstruction. 3. Mild ascites. Assessment/Plan :      1. Acute peritonitis  Ceftazidime  6 hr day time dwell first dose started  on  7/4  On meropenem iv . First dose started on 7/4   PD cycles to 5 cycles with total  8 hr duration, discussed with PD nurse   PD fluid cx : pseudomonas +    PD fluid neutrophil count is high at 88 ---> 82 ---> 67 %     Fluid remains hazy   WBC decreasing   ID on board       2   ESRD. Continue on peritoneal dialysis. continue cycler/CCPD as per current prescription  Sodium level 128   Poor oral intake   Low albumin   Hold torsemide    fill volume to 1400 ml   PD site  no discharge. Hold SQ heparin because of blood tinged dialysate drained fluid      2. HTN. BP controlled   Continue current blood pressure medications    3. Anemia of chronic disease. Continue LUIZA    4. Acid- base disorder. Monitor    5. Hyponatremia   Improving     Poor oral intake    Sodium level better at 128 ----> 130    Encourage increase oral intake    6. CAD/Syncopal episode. S/p [facility]   Has Multivessel CAD   S/p CABG       7. Peripheral vascular disease. H/o  amputation of left foot toes. Podiatry following       8. Generalized weakness. Needs rehabilitation/ SNF      9.  iIleus. Resolved   Surgery on board.                 Thank you for allowing us to participate in care of Sena Cunningham         Electronically signed by: Tavo Silva MD, 7/10/2021, 7:55 AM      [facility] [address]  Office : [phone]  Fax :[phone]

## 2021-07-11 LAB
ANION GAP SERPL CALCULATED.3IONS-SCNC: 10 MMOL/L (ref 3–16)
ANISOCYTOSIS: ABNORMAL
APPEARANCE FLUID: NORMAL
BANDED NEUTROPHILS RELATIVE PERCENT: 1 % (ref 0–7)
BASOPHILS ABSOLUTE: 0 K/UL (ref 0–0.2)
BASOPHILS RELATIVE PERCENT: 0 %
BUN BLDV-MCNC: 51 MG/DL (ref 7–20)
CALCIUM SERPL-MCNC: 7.5 MG/DL (ref 8.3–10.6)
CELL COUNT FLUID TYPE: NORMAL
CHLORIDE BLD-SCNC: 90 MMOL/L (ref 99–110)
CLOT EVALUATION: NORMAL
CO2: 28 MMOL/L (ref 21–32)
COLOR FLUID: YELLOW
CREAT SERPL-MCNC: 7.4 MG/DL (ref 0.6–1.1)
EOSINOPHILS ABSOLUTE: 0.3 K/UL (ref 0–0.6)
EOSINOPHILS RELATIVE PERCENT: 2 %
GFR AFRICAN AMERICAN: 7
GFR NON-AFRICAN AMERICAN: 6
GLUCOSE BLD-MCNC: 154 MG/DL (ref 70–99)
GLUCOSE BLD-MCNC: 209 MG/DL (ref 70–99)
GLUCOSE BLD-MCNC: 237 MG/DL (ref 70–99)
GLUCOSE BLD-MCNC: 255 MG/DL (ref 70–99)
GLUCOSE BLD-MCNC: 281 MG/DL (ref 70–99)
GLUCOSE BLD-MCNC: 305 MG/DL (ref 70–99)
GLUCOSE BLD-MCNC: 305 MG/DL (ref 70–99)
HCT VFR BLD CALC: 28.2 % (ref 36–48)
HEMOGLOBIN: 8.8 G/DL (ref 12–16)
HYPOCHROMIA: ABNORMAL
LYMPHOCYTES ABSOLUTE: 0.7 K/UL (ref 1–5.1)
LYMPHOCYTES RELATIVE PERCENT: 5 %
LYMPHOCYTES, BODY FLUID: 21 %
MACROCYTES: ABNORMAL
MACROPHAGE FLUID: 7 %
MAGNESIUM: 1.8 MG/DL (ref 1.8–2.4)
MCH RBC QN AUTO: 29 PG (ref 26–34)
MCHC RBC AUTO-ENTMCNC: 31.2 G/DL (ref 31–36)
MCV RBC AUTO: 92.8 FL (ref 80–100)
MESOTHELIAL FLUID: 2 %
MICROCYTES: ABNORMAL
MONOCYTES ABSOLUTE: 0.7 K/UL (ref 0–1.3)
MONOCYTES RELATIVE PERCENT: 5 %
NEUTROPHIL, FLUID: 70 %
NEUTROPHILS ABSOLUTE: 12.2 K/UL (ref 1.7–7.7)
NEUTROPHILS RELATIVE PERCENT: 87 %
NUCLEATED CELLS FLUID: 1236 /CUMM
NUMBER OF CELLS COUNTED FLUID: 100
PDW BLD-RTO: 18.7 % (ref 12.4–15.4)
PERFORMED ON: ABNORMAL
PHOSPHORUS: 4.7 MG/DL (ref 2.5–4.9)
PLATELET # BLD: 155 K/UL (ref 135–450)
PLATELET SLIDE REVIEW: ADEQUATE
PMV BLD AUTO: 10.4 FL (ref 5–10.5)
POLYCHROMASIA: ABNORMAL
POTASSIUM SERPL-SCNC: 3.2 MMOL/L (ref 3.5–5.1)
RBC # BLD: 3.03 M/UL (ref 4–5.2)
RBC FLUID: 2500 /CUMM
SLIDE REVIEW: ABNORMAL
SODIUM BLD-SCNC: 128 MMOL/L (ref 136–145)
STOMATOCYTES: ABNORMAL
WBC # BLD: 13.9 K/UL (ref 4–11)

## 2021-07-11 PROCEDURE — 85025 COMPLETE CBC W/AUTO DIFF WBC: CPT

## 2021-07-11 PROCEDURE — 2580000003 HC RX 258: Performed by: INTERNAL MEDICINE

## 2021-07-11 PROCEDURE — 89051 BODY FLUID CELL COUNT: CPT

## 2021-07-11 PROCEDURE — 6370000000 HC RX 637 (ALT 250 FOR IP): Performed by: INTERNAL MEDICINE

## 2021-07-11 PROCEDURE — 84100 ASSAY OF PHOSPHORUS: CPT

## 2021-07-11 PROCEDURE — 6360000002 HC RX W HCPCS: Performed by: INTERNAL MEDICINE

## 2021-07-11 PROCEDURE — 80048 BASIC METABOLIC PNL TOTAL CA: CPT

## 2021-07-11 PROCEDURE — 2060000000 HC ICU INTERMEDIATE R&B

## 2021-07-11 PROCEDURE — 90945 DIALYSIS ONE EVALUATION: CPT | Performed by: INTERNAL MEDICINE

## 2021-07-11 PROCEDURE — 6360000002 HC RX W HCPCS: Performed by: NURSE PRACTITIONER

## 2021-07-11 PROCEDURE — 83735 ASSAY OF MAGNESIUM: CPT

## 2021-07-11 PROCEDURE — 90945 DIALYSIS ONE EVALUATION: CPT

## 2021-07-11 PROCEDURE — 36415 COLL VENOUS BLD VENIPUNCTURE: CPT

## 2021-07-11 PROCEDURE — 6370000000 HC RX 637 (ALT 250 FOR IP): Performed by: NURSE PRACTITIONER

## 2021-07-11 RX ORDER — POTASSIUM CHLORIDE 20 MEQ/1
40 TABLET, EXTENDED RELEASE ORAL ONCE
Status: COMPLETED | OUTPATIENT
Start: 2021-07-11 | End: 2021-07-11

## 2021-07-11 RX ADMIN — ASPIRIN 325 MG: 325 TABLET, COATED ORAL at 08:19

## 2021-07-11 RX ADMIN — METOCLOPRAMIDE 5 MG: 5 INJECTION, SOLUTION INTRAMUSCULAR; INTRAVENOUS at 05:02

## 2021-07-11 RX ADMIN — GENTAMICIN SULFATE: 1 CREAM TOPICAL at 17:41

## 2021-07-11 RX ADMIN — METOPROLOL TARTRATE 25 MG: 25 TABLET, FILM COATED ORAL at 08:19

## 2021-07-11 RX ADMIN — TRAMADOL HYDROCHLORIDE 100 MG: 50 TABLET, FILM COATED ORAL at 08:25

## 2021-07-11 RX ADMIN — METOCLOPRAMIDE 5 MG: 5 INJECTION, SOLUTION INTRAMUSCULAR; INTRAVENOUS at 00:09

## 2021-07-11 RX ADMIN — ATORVASTATIN CALCIUM 20 MG: 20 TABLET, FILM COATED ORAL at 21:10

## 2021-07-11 RX ADMIN — NYSTATIN 500000 UNITS: 100000 SUSPENSION ORAL at 12:00

## 2021-07-11 RX ADMIN — MICONAZOLE NITRATE: 20 POWDER TOPICAL at 08:21

## 2021-07-11 RX ADMIN — CEFTAZIDIME: 1 INJECTION, POWDER, FOR SOLUTION INTRAMUSCULAR; INTRAVENOUS at 11:58

## 2021-07-11 RX ADMIN — POTASSIUM CHLORIDE 40 MEQ: 20 TABLET, EXTENDED RELEASE ORAL at 15:40

## 2021-07-11 RX ADMIN — THIAMINE HYDROCHLORIDE 100 MG: 100 INJECTION, SOLUTION INTRAMUSCULAR; INTRAVENOUS at 12:09

## 2021-07-11 RX ADMIN — GENTAMICIN SULFATE: 1 CREAM TOPICAL at 18:33

## 2021-07-11 RX ADMIN — ZOLPIDEM TARTRATE 5 MG: 5 TABLET, COATED ORAL at 21:10

## 2021-07-11 RX ADMIN — NYSTATIN 500000 UNITS: 100000 SUSPENSION ORAL at 15:53

## 2021-07-11 RX ADMIN — MICONAZOLE NITRATE: 20 POWDER TOPICAL at 21:48

## 2021-07-11 RX ADMIN — METOCLOPRAMIDE 5 MG: 5 INJECTION, SOLUTION INTRAMUSCULAR; INTRAVENOUS at 17:40

## 2021-07-11 RX ADMIN — NYSTATIN 500000 UNITS: 100000 SUSPENSION ORAL at 21:10

## 2021-07-11 RX ADMIN — CIPROFLOXACIN 400 MG: 2 INJECTION, SOLUTION INTRAVENOUS at 12:48

## 2021-07-11 RX ADMIN — METOCLOPRAMIDE 5 MG: 5 INJECTION, SOLUTION INTRAMUSCULAR; INTRAVENOUS at 12:04

## 2021-07-11 RX ADMIN — PANTOPRAZOLE SODIUM 40 MG: 40 TABLET, DELAYED RELEASE ORAL at 05:03

## 2021-07-11 RX ADMIN — Medication 1 CAPSULE: at 08:19

## 2021-07-11 RX ADMIN — Medication 1 CAPSULE: at 15:40

## 2021-07-11 ASSESSMENT — PAIN SCALES - GENERAL
PAINLEVEL_OUTOF10: 0
PAINLEVEL_OUTOF10: 7
PAINLEVEL_OUTOF10: 0

## 2021-07-11 ASSESSMENT — PAIN DESCRIPTION - PROGRESSION
CLINICAL_PROGRESSION: NOT CHANGED

## 2021-07-11 ASSESSMENT — PAIN DESCRIPTION - ORIENTATION: ORIENTATION: LEFT

## 2021-07-11 ASSESSMENT — PAIN DESCRIPTION - DESCRIPTORS: DESCRIPTORS: CONSTANT

## 2021-07-11 ASSESSMENT — PAIN DESCRIPTION - PAIN TYPE: TYPE: ACUTE PAIN

## 2021-07-11 ASSESSMENT — PAIN DESCRIPTION - FREQUENCY: FREQUENCY: INTERMITTENT

## 2021-07-11 ASSESSMENT — PAIN DESCRIPTION - LOCATION: LOCATION: BACK

## 2021-07-11 ASSESSMENT — PAIN DESCRIPTION - ONSET: ONSET: ON-GOING

## 2021-07-11 NOTE — PLAN OF CARE
Problem: Falls - Risk of:  Goal: Will remain free from falls  Description: Will remain free from falls  7/11/2021 0137 by Rosario Robertson RN  Note: Call light within reach, bed in lowest position, bed alarm on , and pt educated to use call light for assistance. Problem: Skin Integrity:  Goal: Absence of new skin breakdown  Description: Absence of new skin breakdown  7/11/2021 0137 by Rosario Robertson RN  Note: Pt checked for incontinence and educated on the importance of repositioning to prevent new skin breakdown.       Problem: Pain:  Goal: Pain level will decrease  Description: Pain level will decrease  7/11/2021 0137 by Rosario Robertson RN  Note: Pt denies any pain at this time     Vitals:    07/11/21 0010   BP: (!) 91/47   Pulse: 84   Resp: 16   Temp: 97.4 °F (36.3 °C)   SpO2: 100%

## 2021-07-11 NOTE — PROGRESS NOTES
Office : [phone]     Fax :[phone]       Nephrology Progress   Note      Patient's Name: Vilma Hurd  11:13 AM  2021    Reason for Consult:  ESRD    History of Present iIlness:    Vilma Hurd is a 51 y.o. female with severe end-stage renal disease secondary to diabetic nephropathy on peritoneal dialysis, hypertension, peripheral vascular disease, diabetic neuropathy who was admitted after she had a syncopal episode. She had similar episode 2 weeks ago. Recently had angiogram left lower extremity for gangrene left foot second toe. Denies any shortness of breath. Denies any abdominal pain. Denies any nausea vomiting. Interval HX :      No nausea , no vomiting today         PD fluid growing pseudomonas    Rpt culture no growth so far    WBC still elevated and increased       C/o abdominal pain  : improving   oral intake improving   No chest pain  No sob  S/p CABG on 2010          I/O last 3 completed shifts: In: 1857.3 [P.O.:240; Other:1100; IV Piggyback:517.3]  Out: 400 [Other:400]    Past Medical History:   Diagnosis Date    Diabetes mellitus ([address]     End stage kidney disease ([address]     peritoneal dialysis every night at home x 2 years    Hypertension     Neuropathy     Peripheral vascular disease [facility]        Past Surgical History:   Procedure Laterality Date     SECTION      CORONARY ARTERY BYPASS GRAFT N/A 2021    URGENT CABG CORONARY ARTERY BYPASS GRAFTS X3. VEIN TO PDA X1, VEIN TO OM X1, LEFT INTERNAL MAMMARY ARTERY TO LAD X1. ENDOSCOPIC HARVEST RIGHT LEG SAPHENOUS VEIN. LIGATION NYA USING 35MM ATRICLIP. EPI AORTIC ULTRASOUND. TOTAL CARDIOPULMONARY BYPASS. DOPPLER GRAFT FLOW VERIFICATION.  BILATERAL INITERCOSTAL NERVE BLOCK USING 1.3% EXPAREL.  performed by Piedad Lewis MD at [address] [facility]  08/16/2018     lap PD cath placement lt side 62 cm    TOE AMPUTATION Left 2/26/2021    AMPUTATION OF THE LEFT THIRD TOE performed by Maicol Desai DPM at [address] [facility] [address] TOE AMPUTATION Left 5/26/2021    AMPUTATION OF HALLUX AND SECOND TOE, LEFT FOOT performed by Maicol Desai DPM at [facility]         Family History   Problem Relation Age of Onset    Coronary Art Dis Mother     High Blood Pressure Mother     Heart Disease Mother     Diabetes Mother     Cancer Father     Coronary Art Dis Father     High Blood Pressure Father     Heart Disease Father     Diabetes Father      Current Medications:    dianeal lo-massimo 2.5% 1,000 mL with cefTAZidime 1,000 mg solution, Daily  insulin aspart (NOVOLOG) injection pen 0-6 Units - Patient Supplied, TID AC   And  insulin aspart (NOVOLOG) injection pen 0-3 Units - Patient Supplied, Nightly  insulin detemir (LEVEMIR) injection pen 19 Units - Patient Supplied, Nightly  thiamine (B-1) 100 mg in sodium chloride 0.9 % 100 mL IVPB, Q24H  ciprofloxacin (CIPRO) IVPB 400 mg, Q24H  epoetin toy-epbx (RETACRIT) injection 10,000 Units, Q7 Days  lactobacillus (CULTURELLE) capsule 1 capsule, BID WC  nystatin (MYCOSTATIN) 991717 UNIT/ML suspension 500,000 Units, 4x Daily  labetalol (NORMODYNE;TRANDATE) injection 10 mg, Q4H PRN  metoclopramide (REGLAN) injection 5 mg, Q6H  dextrose 50 % IV solution, PRN  acetaminophen (TYLENOL) tablet 650 mg, Q6H PRN  aspirin EC tablet 325 mg, Daily  magnesium hydroxide (MILK OF MAGNESIA) 400 MG/5ML suspension 30 mL, Daily PRN  metoprolol tartrate (LOPRESSOR) tablet 25 mg, BID  pantoprazole (PROTONIX) tablet 40 mg, Daily  polyethylene glycol (GLYCOLAX) packet 17 g, Daily PRN  sennosides-docusate sodium (SENOKOT-S) 8.6-50 MG tablet 1 tablet, BID  traMADol (ULTRAM) tablet 50 mg, Q6H PRN   Or  traMADol (ULTRAM) tablet 100 mg, Q6H PRN  zolpidem (AMBIEN) tablet 5 mg, Nightly PRN  dextrose 5 % solution, PRN  dextrose 50 % IV solution, PRN  glucagon (rDNA) injection 1 mg, PRN  glucose (GLUTOSE) 40 % oral gel 15 g, PRN  gentamicin (GARAMYCIN) 0.1 % cream, Daily  atorvastatin (LIPITOR) tablet 20 mg, Nightly  bisacodyl (DULCOLAX) EC tablet 5 mg, Daily PRN  calcium carbonate (TUMS) chewable tablet 500 mg, TID PRN  diphenhydrAMINE (BENADRYL) tablet 25 mg, Q6H PRN  heparin (porcine) injection 3,400 Units, PRN  [Held by provider] heparin (porcine) injection 5,000 Units, 3 times per day  hydrALAZINE (APRESOLINE) tablet 50 mg, Q8H PRN  miconazole (MICOTIN) 2 % powder, BID  ondansetron (ZOFRAN) injection 4 mg, Q6H PRN  ondansetron (ZOFRAN-ODT) disintegrating tablet 4 mg, Q8H PRN  polyethylene glycol (GLYCOLAX) packet 17 g, Daily  promethazine (PHENERGAN) tablet 12.5 mg, Q6H PRN  [Held by provider] torsemide (DEMADEX) tablet 100 mg, Daily  albuterol (PROVENTIL) nebulizer solution 2.5 mg, Q6H PRN          Physical exam:     Vitals:  BP (!) 120/58   Pulse 98   Temp 97.5 °F (36.4 °C) (Oral)   Resp 17   Ht 5' 9\" (1.753 m)   Wt 222 lb 10.6 oz (101 kg)   SpO2 99%   BMI 32.88 kg/m²   Constitutional:  OAA X3 NAD  Skin: no rash, turgor wnl  Heent:  eomi, mmm  Neck: no bruits or jvd noted  Cardiovascular:  S1, S2 without m/r/g  Respiratory: CTA B without w/r/r  Abdomen:  +bs, soft, nt, nd  Ext: no  lower extremity edema      Labs:  CBC:   Recent Labs     07/09/21  0425 07/10/21  0459 07/11/21  0444   WBC 14.3* 12.7* 13.9*   HGB 9.4* 9.5* 8.8*    155 155     BMP:    Recent Labs     07/09/21  0425 07/10/21  0459 07/11/21  0444   * 130* 128*   K 3.5 3.2* 3.2*   CL 89* 91* 90*   CO2 25 26 28   BUN 59* 51* 51*   CREATININE 8.4* 7.6* 7.4*   GLUCOSE 96 246* 281*     Ca/Mg/Phos:   Recent Labs     07/09/21  0425 07/10/21  0459 07/11/21  0444   CALCIUM 7.5* 7.7* 7.5*   MG 2.00 1.80 1.80   PHOS 5.2* 4.5 4.7         IMAGING:  CT ABDOMEN PELVIS WO CONTRAST Additional Contrast? Oral Final Result   1. Mild bibasilar segmental atelectasis versus pneumonia. 2. Negative for small bowel obstruction. 3. Mild ascites. Assessment/Plan :      1. Acute peritonitis  Ceftazidime  6 hr day time dwell first dose started  on  7/4  On meropenem iv . First dose started on 7/4   PD cycles to 5 cycles with total  8 hr duration, discussed with PD nurse   PD fluid cx : pseudomonas +    PD fluid neutrophil count is high at 88 ---> 82 ---> 67 ---> 70 %    WBC increased       D/w ID   If  Cell count continue to increase then may need removal of PD catheter   Then will need to transition to HD for few weeks         2   ESRD. Continue on peritoneal dialysis. continue cycler/CCPD as per current prescription  Sodium level 128   Poor oral intake   Low albumin   Hold torsemide    fill volume to 1400 ml   PD site  no discharge. Hold SQ heparin because of blood tinged dialysate drained fluid      2. HTN. BP controlled   Continue current blood pressure medications    3. Anemia of chronic disease. Continue LUIZA    4. Acid- base disorder. Monitor    5. Hyponatremia   Improving     Poor oral intake    Sodium level better at 128 ----> 130  ---> 128   Encourage increase oral intake    6. CAD/Syncopal episode. S/p [facility]   Has Multivessel CAD   S/p CABG       7. Peripheral vascular disease. H/o  amputation of left foot toes. Podiatry following       8. Generalized weakness.  Needs rehabilitation/ SNF      9. hypokalemia- replace potassium                 Thank you for allowing us to participate in care of Feliciano Ayon         Electronically signed by: Dinah Aguero MD, 7/11/2021, 11:13 AM      [facility] [address]  Office : [phone]  Fax :[phone]

## 2021-07-11 NOTE — PROGRESS NOTES
CCPD Order   Exchanges: 5   Exchange Volume: 1400 ml   Total Time: 8 hrs   Dextrose: 2.5%   Last Fill: 0 ml   Total Volume: 7000 ml     Orders verified. Supplies taken to pt's room. Report received. Cycler set up, primed and pre tested. Dressing changed on [facility] Cath site. Pt connected to cycler. CCPD initiated without problem. Initial effluent clear.

## 2021-07-11 NOTE — FLOWSHEET NOTE
07/11/21 0800   Vital Signs   BP (!) 120/58   Temp 97.5 °F (36.4 °C)   Pulse 98   Resp 17     Disconnected from [facility] per protocol. Effluent: Pink tinged, large red fibrin clots. Effluent starting to clear to yellow. Total time: 08:39  Total UF:  505 ml. Total Volume:  7013 ml. Requested a final drain, stated felt like too much fluid was still in, stat drained for an additional 106 ml. Dwell time gained: 01:01    Cell ct obtained and sent to lab.     Pt Tolerated procedure: without difficulty, no c/o abd pain    Report given to: Francisco Javier Casanova RN    Last BM: 7-

## 2021-07-11 NOTE — PROGRESS NOTES
Shift assessment complete. BP hypotensive but all other vital signs stable. Metoprolol held due to [facility] order parameters; all other medications given per [facility]. Pt experiencing periods of incontinence of bowel x2. Pt changed and repositioned for comfort. Call light within reach, bed in lowest position, bed alarm on, and pt educated to use call light for assistance.

## 2021-07-11 NOTE — PROGRESS NOTES
Hospitalist Progress Note      PCP: Farshad Robb MD    Date of Admission: 7/1/2021    Chief Complaint: Nausea and vomiting    Hospital Course: 51 y.o. female with PMHx of ESRD on PD, CAD status post CABG, diabetes, hypertension, peripheral vascular disease status post partial foot accommodation was initially admitted to [facility] 5/31/2021 for dizziness. At that time cardiac catheterization was performed which showed multivessel disease. Patient undergone CABG. Subsequently patient was discharged to [facility] acute rehab unit where she was undergoing therapy. She was being followed by nephrology in view of her ESRD for PD. She subsequently developed abdominal discomfort nausea and vomiting. Imaging suggestive of ileus versus SBO. Surgery was consulted at ARU who suggested transfer to acute care. Patient states that she was not able to tolerate anything p.o. or since the day before yesterday. Her last bowel movement was approximately that time, no melena or hematochezia, hard. Today her labs demonstrate hyponatremia, hyperkalemia, BUN/creatinine consistent with ESRD patient, and leukocytosis to 18. Fluid from PD catheter has grown Pseudomonas aeruginosa. Discussed with nephrology, increased dose of cefepime, give additional dose of meropenem, ID consulted.     Interval history:   Patient seen and examined today  Overnight events and interval ancillary notes reviewed  On 3 NC satting around 98%; wean as tolerated keep sats above 92  Afebrile overnight; WBC is up to 13.9  Blood cultures showed no growth to date  PD dialysate culture from 7/9/2021 showed no growth to date  Holding heparin due to bloodstained PD dilation      Medications:  Reviewed    Infusion Medications    dextrose       Scheduled Medications    custom dialysis builder   Intraperitoneal Daily    insulin aspart  0-6 Units Subcutaneous TID AC    And    insulin aspart  0-3 Units Subcutaneous Nightly    insulin detemir  19 Units Subcutaneous Nightly    thiamine (VITAMIN B1) IVPB  100 mg Intravenous Q24H    ciprofloxacin  400 mg Intravenous Q24H    epoetin toy-epbx  10,000 Units Subcutaneous Q7 Days    lactobacillus  1 capsule Oral BID WC    nystatin  5 mL Oral 4x Daily    metoclopramide  5 mg Intravenous Q6H    aspirin  325 mg Oral Daily    metoprolol tartrate  25 mg Oral BID    pantoprazole  40 mg Oral Daily    sennosides-docusate sodium  1 tablet Oral BID    gentamicin   Topical Daily    atorvastatin  20 mg Oral Nightly    [Held by provider] heparin (porcine)  5,000 Units Subcutaneous 3 times per day    miconazole   Topical BID    polyethylene glycol  17 g Oral Daily    [Held by provider] torsemide  100 mg Oral Daily     PRN Meds: labetalol, dextrose, acetaminophen, magnesium hydroxide, polyethylene glycol, traMADol **OR** traMADol, zolpidem, dextrose, dextrose, glucagon (rDNA), glucose, bisacodyl, calcium carbonate, diphenhydrAMINE, heparin (porcine), hydrALAZINE, ondansetron, ondansetron, promethazine, albuterol      Intake/Output Summary (Last 24 hours) at 7/11/2021 0959  Last data filed at 7/11/2021 0742  Gross per 24 hour   Intake 1737.25 ml   Output 905 ml   Net 832.25 ml       Physical Exam Performed:    BP (!) 120/58   Pulse 98   Temp 97.5 °F (36.4 °C) (Oral)   Resp 17   Ht 5' 9\" (1.753 m)   Wt 222 lb 10.6 oz (101 kg)   SpO2 99%   BMI 32.88 kg/m²     General appearance: No apparent distress, appears stated age and cooperative. Respiratory:  Normal respiratory effort. Clear to auscultation, bilaterally without Rales/Wheezes/Rhonchi. Cardiovascular: Regular rate and rhythm with normal S1/S2 without murmurs, rubs or gallops. Abdomen: Soft, non-tender, non-distended with normal bowel sounds. Small wounds in the groin area   Musculoskeletal:Necrotic area the site to amputations on the left foot  Neurologic:  Neurovascularly intact without any focal sensory/motor deficits.  Cranial nerves: II-XII intact, grossly non-focal.  Psychiatric: Alert and oriented, thought content appropriate, normal insight  Peripheral Pulses: +2 palpable, equal bilaterally       Labs:   Recent Labs     07/09/21  0425 07/10/21  0459 07/11/21  0444   WBC 14.3* 12.7* 13.9*   HGB 9.4* 9.5* 8.8*   HCT 29.8* 29.5* 28.2*    155 155     Recent Labs     07/09/21  0425 07/10/21  0459 07/11/21  0444   * 130* 128*   K 3.5 3.2* 3.2*   CL 89* 91* 90*   CO2 25 26 28   BUN 59* 51* 51*   CREATININE 8.4* 7.6* 7.4*   CALCIUM 7.5* 7.7* 7.5*   PHOS 5.2* 4.5 4.7     No results for input(s): AST, ALT, BILIDIR, BILITOT, ALKPHOS in the last 72 hours. No results for input(s): INR in the last 72 hours. No results for input(s): Verlena Jeff in the last 72 hours. Urinalysis:      Lab Results   Component Value Date    NITRU Negative 06/06/2021    WBCUA 36 06/06/2021    BACTERIA RARE 06/06/2021    RBCUA 587 06/06/2021    BLOODU LARGE 06/06/2021    SPECGRAV 1.019 06/06/2021    GLUCOSEU 100 06/06/2021       Radiology:  CT ABDOMEN PELVIS WO CONTRAST Additional Contrast? Oral   Final Result   1. Mild bibasilar segmental atelectasis versus pneumonia. 2. Negative for small bowel obstruction. 3. Mild ascites.              Assessment/Plan:    Active Hospital Problems    Diagnosis     Anemia of chronic disease [D63.8]     Hypoalbuminemia [E88.09]     Moderate malnutrition ([address] [E44.0]     Dialysis-associated peritonitis ([facility] [T85.71XA]     Peripheral vascular disease ([facility] [I73.9]     SBO (small bowel obstruction) ([facility] [K56.609]     Coronary artery disease due to lipid rich plaque [I25.10, I25.83]     Diabetes education, encounter for [Z71.89]     ESRD on peritoneal dialysis ([facility] [N18.6, Z99.2]     Type 2 diabetes mellitus with obesity ([facility] [facility] [E11.69, E66.9]      SBO versus ileus: Resolved  CT abdomen pelvis showed no SBO  General surgery on board: Appreciate their rec  Tolerating diet     Leukocytosis secondary to PD catheter related peritonitis: Improving  PD fluid from 7/1/2021 grew heavy growth of Pseudomonas  Blood cultures negative  ID on board appreciate their recs  vancomycin and cefepime discontinued  Ceftazidime in dialysate for 6 doses  IV meropenem d/c on 7/9/2021 and switched to IV Cipro per ID recs    CAD Status post CABG: Stable  On high-dose aspirin  Apparently does not tolerate Plavix    Gangrene of left foot: s/p amputation of toes 1 and 2 on the left on 5/26-21  Podiatry on board; continue Betadine wet-to-dry daily dressing changes to foot until discharge; WBAT in a surgical shoe  May need further ambulation once stable    PVD; status post left popliteal, anterior tibial and dorsalis pedis angioplasty by Dr. Kingsley Montez on 5/27/2021    Normocytic anemia  Hemoglobin stable around 9   Patient status post CABG, target hemoglobin above 8  Status post 1 unit PRBC 7/3/2021    Diabetes mellitus complicated with peripheral neuropathy  Hemoglobin A1c 8.8 on 5/31/2021  Continue Levemir and SSI  Monitor blood glucose closely    Persistent hypoglycemia-resolved    Hypertension  She is on Lopressor 25 twice daily  She has hydralazine and labetalol as needed  Anticipate nephrology recommendations     ESRD on PD  Nephrology following     Mild hyperkalemia: Resolved    Hypokalemia; likely secondary to inadequate intake  Dietitian on board  Replace as needed     Hyponatremia: Management per nephrology  Monitor sodium level closely     Anion gap metabolic acidosis: Resolved  Likely in view of uremia       DVT Prophylaxis: Heparin  Diet: ADULT DIET;  Regular  Adult Oral Nutrition Supplement; Standard High Calorie/High Protein Oral Supplement  Code Status: Full Code      Electronically signed by Jamaica Gonzalez MD on 7/11/2021 at 9:59 AM

## 2021-07-12 ENCOUNTER — APPOINTMENT (OUTPATIENT)
Dept: GENERAL RADIOLOGY | Age: 46
DRG: 919 | End: 2021-07-12
Attending: INTERNAL MEDICINE
Payer: MEDICARE

## 2021-07-12 LAB
ANION GAP SERPL CALCULATED.3IONS-SCNC: 12 MMOL/L (ref 3–16)
APPEARANCE FLUID: CLEAR
BASOPHILS ABSOLUTE: 0.1 K/UL (ref 0–0.2)
BASOPHILS RELATIVE PERCENT: 0.7 %
BODY FLUID CULTURE, STERILE: NORMAL
BUN BLDV-MCNC: 48 MG/DL (ref 7–20)
CALCIUM SERPL-MCNC: 7.8 MG/DL (ref 8.3–10.6)
CELL COUNT FLUID TYPE: NORMAL
CHLORIDE BLD-SCNC: 89 MMOL/L (ref 99–110)
CLOT EVALUATION: NORMAL
CO2: 25 MMOL/L (ref 21–32)
COLOR FLUID: NORMAL
CREAT SERPL-MCNC: 7 MG/DL (ref 0.6–1.1)
EOSINOPHIL FLUID: 3 %
EOSINOPHILS ABSOLUTE: 0.3 K/UL (ref 0–0.6)
EOSINOPHILS RELATIVE PERCENT: 1.9 %
FLUID PATH CONSULT: NO
GFR AFRICAN AMERICAN: 8
GFR NON-AFRICAN AMERICAN: 6
GLUCOSE BLD-MCNC: 207 MG/DL (ref 70–99)
GLUCOSE BLD-MCNC: 218 MG/DL (ref 70–99)
GLUCOSE BLD-MCNC: 248 MG/DL (ref 70–99)
GLUCOSE BLD-MCNC: 259 MG/DL (ref 70–99)
GLUCOSE BLD-MCNC: 259 MG/DL (ref 70–99)
GLUCOSE BLD-MCNC: 303 MG/DL (ref 70–99)
GRAM STAIN RESULT: NORMAL
HCT VFR BLD CALC: 28.1 % (ref 36–48)
HEMOGLOBIN: 8.9 G/DL (ref 12–16)
LYMPHOCYTES ABSOLUTE: 1.1 K/UL (ref 1–5.1)
LYMPHOCYTES RELATIVE PERCENT: 8.1 %
LYMPHOCYTES, BODY FLUID: 12 %
MACROPHAGE FLUID: 11 %
MAGNESIUM: 1.7 MG/DL (ref 1.8–2.4)
MCH RBC QN AUTO: 29.2 PG (ref 26–34)
MCHC RBC AUTO-ENTMCNC: 31.8 G/DL (ref 31–36)
MCV RBC AUTO: 91.7 FL (ref 80–100)
MESOTHELIAL FLUID: 1 %
MONOCYTE, FLUID: 1 %
MONOCYTES ABSOLUTE: 1 K/UL (ref 0–1.3)
MONOCYTES RELATIVE PERCENT: 7.2 %
NEUTROPHIL, FLUID: 72 %
NEUTROPHILS ABSOLUTE: 10.9 K/UL (ref 1.7–7.7)
NEUTROPHILS RELATIVE PERCENT: 82.1 %
NUCLEATED CELLS FLUID: 98 /CUMM
NUMBER OF CELLS COUNTED FLUID: 100
PATH CONSULT FLUID: NORMAL
PDW BLD-RTO: 18.4 % (ref 12.4–15.4)
PERFORMED ON: ABNORMAL
PHOSPHORUS: 4.4 MG/DL (ref 2.5–4.9)
PLATELET # BLD: 151 K/UL (ref 135–450)
PMV BLD AUTO: 10.6 FL (ref 5–10.5)
POTASSIUM SERPL-SCNC: 3.4 MMOL/L (ref 3.5–5.1)
RBC # BLD: 3.06 M/UL (ref 4–5.2)
RBC FLUID: <1000 /CUMM
SODIUM BLD-SCNC: 126 MMOL/L (ref 136–145)
WBC # BLD: 13.3 K/UL (ref 4–11)

## 2021-07-12 PROCEDURE — 6360000002 HC RX W HCPCS: Performed by: INTERNAL MEDICINE

## 2021-07-12 PROCEDURE — 2060000000 HC ICU INTERMEDIATE R&B

## 2021-07-12 PROCEDURE — 99232 SBSQ HOSP IP/OBS MODERATE 35: CPT | Performed by: INTERNAL MEDICINE

## 2021-07-12 PROCEDURE — 80048 BASIC METABOLIC PNL TOTAL CA: CPT

## 2021-07-12 PROCEDURE — 84100 ASSAY OF PHOSPHORUS: CPT

## 2021-07-12 PROCEDURE — 71045 X-RAY EXAM CHEST 1 VIEW: CPT

## 2021-07-12 PROCEDURE — 6370000000 HC RX 637 (ALT 250 FOR IP): Performed by: INTERNAL MEDICINE

## 2021-07-12 PROCEDURE — 2580000003 HC RX 258: Performed by: INTERNAL MEDICINE

## 2021-07-12 PROCEDURE — 36415 COLL VENOUS BLD VENIPUNCTURE: CPT

## 2021-07-12 PROCEDURE — 90945 DIALYSIS ONE EVALUATION: CPT

## 2021-07-12 PROCEDURE — 97530 THERAPEUTIC ACTIVITIES: CPT

## 2021-07-12 PROCEDURE — 6370000000 HC RX 637 (ALT 250 FOR IP): Performed by: NURSE PRACTITIONER

## 2021-07-12 PROCEDURE — 85025 COMPLETE CBC W/AUTO DIFF WBC: CPT

## 2021-07-12 PROCEDURE — 83735 ASSAY OF MAGNESIUM: CPT

## 2021-07-12 PROCEDURE — 99233 SBSQ HOSP IP/OBS HIGH 50: CPT | Performed by: INTERNAL MEDICINE

## 2021-07-12 PROCEDURE — 6360000002 HC RX W HCPCS: Performed by: NURSE PRACTITIONER

## 2021-07-12 PROCEDURE — 89051 BODY FLUID CELL COUNT: CPT

## 2021-07-12 RX ORDER — CIPROFLOXACIN 500 MG/1
500 TABLET, FILM COATED ORAL DAILY
Qty: 5 TABLET | Refills: 0 | Status: SHIPPED | OUTPATIENT
Start: 2021-07-12 | End: 2021-07-14 | Stop reason: SDUPTHER

## 2021-07-12 RX ADMIN — GENTAMICIN SULFATE: 1 CREAM TOPICAL at 18:17

## 2021-07-12 RX ADMIN — METOCLOPRAMIDE 5 MG: 5 INJECTION, SOLUTION INTRAMUSCULAR; INTRAVENOUS at 12:02

## 2021-07-12 RX ADMIN — NYSTATIN 500000 UNITS: 100000 SUSPENSION ORAL at 21:39

## 2021-07-12 RX ADMIN — CEFTAZIDIME: 1 INJECTION, POWDER, FOR SOLUTION INTRAMUSCULAR; INTRAVENOUS at 11:31

## 2021-07-12 RX ADMIN — METOCLOPRAMIDE 5 MG: 5 INJECTION, SOLUTION INTRAMUSCULAR; INTRAVENOUS at 18:16

## 2021-07-12 RX ADMIN — ZOLPIDEM TARTRATE 5 MG: 5 TABLET, COATED ORAL at 21:39

## 2021-07-12 RX ADMIN — ATORVASTATIN CALCIUM 20 MG: 20 TABLET, FILM COATED ORAL at 21:39

## 2021-07-12 RX ADMIN — CIPROFLOXACIN 400 MG: 2 INJECTION, SOLUTION INTRAVENOUS at 12:54

## 2021-07-12 RX ADMIN — MICONAZOLE NITRATE: 20 POWDER TOPICAL at 21:51

## 2021-07-12 RX ADMIN — METOCLOPRAMIDE 5 MG: 5 INJECTION, SOLUTION INTRAMUSCULAR; INTRAVENOUS at 05:05

## 2021-07-12 RX ADMIN — NYSTATIN 500000 UNITS: 100000 SUSPENSION ORAL at 18:16

## 2021-07-12 RX ADMIN — ASPIRIN 325 MG: 325 TABLET, COATED ORAL at 09:02

## 2021-07-12 RX ADMIN — MICONAZOLE NITRATE: 20 POWDER TOPICAL at 09:02

## 2021-07-12 RX ADMIN — METOPROLOL TARTRATE 25 MG: 25 TABLET, FILM COATED ORAL at 21:39

## 2021-07-12 RX ADMIN — Medication 1 CAPSULE: at 09:03

## 2021-07-12 RX ADMIN — THIAMINE HYDROCHLORIDE 100 MG: 100 INJECTION, SOLUTION INTRAMUSCULAR; INTRAVENOUS at 12:05

## 2021-07-12 RX ADMIN — METOCLOPRAMIDE 5 MG: 5 INJECTION, SOLUTION INTRAMUSCULAR; INTRAVENOUS at 00:11

## 2021-07-12 RX ADMIN — PANTOPRAZOLE SODIUM 40 MG: 40 TABLET, DELAYED RELEASE ORAL at 05:05

## 2021-07-12 RX ADMIN — GENTAMICIN SULFATE: 1 CREAM TOPICAL at 18:25

## 2021-07-12 RX ADMIN — METOPROLOL TARTRATE 25 MG: 25 TABLET, FILM COATED ORAL at 09:02

## 2021-07-12 RX ADMIN — Medication 1 CAPSULE: at 18:16

## 2021-07-12 RX ADMIN — NYSTATIN 500000 UNITS: 100000 SUSPENSION ORAL at 12:02

## 2021-07-12 RX ADMIN — NYSTATIN 500000 UNITS: 100000 SUSPENSION ORAL at 09:02

## 2021-07-12 ASSESSMENT — PAIN DESCRIPTION - PAIN TYPE
TYPE: ACUTE PAIN;CHRONIC PAIN
TYPE: ACUTE PAIN;CHRONIC PAIN

## 2021-07-12 ASSESSMENT — PAIN DESCRIPTION - LOCATION
LOCATION: BACK
LOCATION: BACK

## 2021-07-12 ASSESSMENT — ENCOUNTER SYMPTOMS
DIARRHEA: 0
NAUSEA: 0
STRIDOR: 0
PHOTOPHOBIA: 0
ABDOMINAL DISTENTION: 1
EYE REDNESS: 0
APNEA: 0
SHORTNESS OF BREATH: 0
ABDOMINAL PAIN: 0
CHEST TIGHTNESS: 0
FACIAL SWELLING: 0
COUGH: 0
TROUBLE SWALLOWING: 0
EYE DISCHARGE: 0
BLOOD IN STOOL: 0
COLOR CHANGE: 0
RHINORRHEA: 0
CHOKING: 0

## 2021-07-12 ASSESSMENT — PAIN SCALES - GENERAL
PAINLEVEL_OUTOF10: 0
PAINLEVEL_OUTOF10: 4
PAINLEVEL_OUTOF10: 0
PAINLEVEL_OUTOF10: 4
PAINLEVEL_OUTOF10: 0
PAINLEVEL_OUTOF10: 0

## 2021-07-12 ASSESSMENT — PAIN DESCRIPTION - FREQUENCY: FREQUENCY: INTERMITTENT

## 2021-07-12 ASSESSMENT — PAIN DESCRIPTION - DESCRIPTORS: DESCRIPTORS: ACHING;CONSTANT

## 2021-07-12 NOTE — PROGRESS NOTES
XR came back to verify NG tube placement, notified [facility] NP, and NG tube okay to use per NP.

## 2021-07-12 NOTE — PROGRESS NOTES
Comprehensive Nutrition Assessment    Type and Reason for Visit:  Reassess    Nutrition Recommendations/Plan:  1. Recommend EN support to start. Pt will be at significant risk for refeeding syndrome per AND/ASPEN guidelines. 2. Recommend to continue 100 mg thiamine daily for 5-7 days total   3. Recommend to start Nepro (renal formula) at 20 mL per hour x 24 hours and monitor electrolytes. 4. Recommend water flush 30 mL q 4 hours for tube maintenance. 5. If lytes (K+, Mg+ and Phos) are stable 7/13, can advance by 10 mL q 4 hours until goal rate 40 mL per hour is achieved. 6. Nepro at goal rate 40 mL per hour to provide 960 mL total volume, 1728 calories, 78 grams protein, 698 mL free water. 7. Monitor closely and correct lytes (K, Phos, Mg) before progressing TF to goal d/t risk of refeeding syndrome (hx extended inadequate nutritional intake). 8. Ensure head of bed is 30 - 45 degrees during continuous or bolus gastric feeding and for one hour after bolus. Turn off the feeding if head of bed is lowered less than 30 degrees. 9. Monitor for tolerance (bowel habits, N/V, cramping, abdominal exam findings: distended, firm, tense, guarded, discomfort). 10. Continue to offer Ensure Enlive     Nutrition Assessment:  Pt continues with inadequate PO intake. She consumed less than 25% of meals yesterday, but did drink 2.5 Ensure Enlive. Pt had NG tube placed today with plans for EN to start. Pt with multiple complaints about NG tube and is already asking RD when it can be removed. Encouraged pt to try tube feeds given extended period of inadequate nutrition. Will start Nepro at 20 mL per hour today and monitor electrolytes overnight as pt is at significant risk for refeeding syndrome. Pt is receiving 100 mg thiamine daily. Once pt is tolerating EN at goal, can transition to cyclic overnight feeds to promote appetite and PO intake during the day. Malnutrition Assessment:  Malnutrition Status:   Moderate malnutrition    Context:  Chronic Illness     Findings of the 6 clinical characteristics of malnutrition:  Energy Intake:  7 - 75% or less estimated energy requirements for 1 month or longer  Weight Loss:  No significant weight loss     Body Fat Loss:  No significant body fat loss     Muscle Mass Loss:  1 - Mild muscle mass loss Temples (temporalis), Clavicles (pectoralis & deltoids)  Fluid Accumulation:  1 - Mild Extremities   Strength:  Not Performed    Estimated Daily Nutrient Needs:  Energy (kcal):  6337-2281; Weight Used for Energy Requirements:  Admission (102 kg)     Protein (g):  79-99 grams; Weight Used for Protein Requirements:  Ideal (66 kg; 1.2-1.5 grams per kg)        Fluid (ml/day):   ; Method Used for Fluid Requirements:  1 ml/kcal      Nutrition Related Findings:  +2 non-pitting BLE edema. Na+ 126. K+ 3.4. Mg+ 1.7. LBM 7/12. +4 liters. Wounds:  Surgical Incision       Current Nutrition Therapies:    ADULT DIET; Regular  Adult Oral Nutrition Supplement; Standard High Calorie/High Protein Oral Supplement  ADULT TUBE FEEDING; Nasogastric; Renal Formula; Continuous; 20; No; 30; Q 4 hours    Anthropometric Measures:  · Height: 5' 9\" (175.3 cm)  · Current Body Weight: 228 lb 2.8 oz (103.5 kg)   · Admission Body Weight: 225 lb (102.1 kg)    · Ideal Body Weight: 145 lbs; % Ideal Body Weight 157.4 %   · BMI: 33.7  · BMI Categories: Obese Class 1 (BMI 30.0-34. 9)       Nutrition Diagnosis:   · Inadequate oral intake related to inadequate protein-energy intake as evidenced by intake 0-25%    · Moderate malnutrition related to inadequate protein-energy intake as evidenced by intake 0-25%, mild muscle loss, localized or generalized fluid accumulation      Nutrition Interventions:   Food and/or Nutrient Delivery:  Continue Current Diet, Continue Oral Nutrition Supplement, Start Tube Feeding  Nutrition Education/Counseling:  Education completed   Coordination of Nutrition Care:  Continue to monitor while inpatient    Goals:  Pt will tolerate EN at goal       Nutrition Monitoring and Evaluation:   Behavioral-Environmental Outcomes:  None Identified   Food/Nutrient Intake Outcomes:  Food and Nutrient Intake, Supplement Intake, Enteral Nutrition Intake/Tolerance  Physical Signs/Symptoms Outcomes:  Biochemical Data     Discharge Planning:     Too soon to determine     Electronically signed by Flex Dunbra, RD, LD on 7/12/21 at 2:50 PM EDT    Contact: [phone]

## 2021-07-12 NOTE — PROGRESS NOTES
Occupational Therapy  Facility/Department: [address]  Daily Treatment Note  NAME: Tariq Villa  : 1975  MRN: [id number]    Date of Service: 2021    Discharge Recommendations:Abida Munson scored a 13/24 on the AM-PAC ADL Inpatient form. Current research shows that an AM-PAC score of 17 or less is typically not associated with a discharge to the patient's home setting. Based on the patient's AM-PAC score and their current ADL deficits, it is recommended that the patient have 3-5 sessions per week of Occupational Therapy at d/c to increase the patient's independence. Please see assessment section for further patient specific details. If patient discharges prior to next session this note will serve as a discharge summary. Please see below for the latest assessment towards goals. OT Equipment Recommendations  Equipment Needed: No (defer to next level of care)    Assessment   Performance deficits / Impairments: Decreased functional mobility ; Decreased ADL status; Decreased strength;Decreased endurance;Decreased high-level IADLs;Decreased coordination;Decreased balance  Assessment: Patient presents below baseline d/t above deficits. OT indicated to maximize safety and independence in ADLs. Pt would benefit from inpt therapy at d/c to maximize functional independence and safety  Treatment Diagnosis: Above deficits associated with peritonitis and s/p CABG  Prognosis: Good  OT Education: OT Role;Plan of Care;Transfer Training;Precautions  Patient Education: reinforce as needed for transfers  REQUIRES OT FOLLOW UP: Yes  Activity Tolerance  Activity Tolerance: Patient Tolerated treatment well;Patient limited by fatigue  Activity Tolerance: encouragement for tasks, pt self-limiting at times  Safety Devices  Safety Devices in place: Yes  Type of devices: All fall risk precautions in place;Call light within reach;Nurse notified; Bed alarm in place; Left in bed;Patient at risk for falls;Gait belt Patient Diagnosis(es): There were no encounter diagnoses. has a past medical history of Diabetes mellitus ([facility] End stage kidney disease ([facility] Hypertension, Neuropathy, and Peripheral vascular disease ([facility] has a past surgical history that includes Tonsillectomy;  section; other surgical history (2018); Toe amputation (Left, 2021); Toe amputation (Left, 2021); and Coronary artery bypass graft (N/A, 2021). Restrictions  Restrictions/Precautions  Restrictions/Precautions: Weight Bearing, Fall Risk (High fall risk)  Required Braces or Orthoses?: Yes  Lower Extremity Weight Bearing Restrictions  Left Lower Extremity Weight Bearing: Weight Bearing As Tolerated  Required Braces or Orthoses  Left Lower Extremity Brace: Boot  LLE Brace Type: Surgical shoe  Position Activity Restriction  Sternal Precautions: No Pushing, No Pulling, 5# Lifting Restrictions  Other position/activity restrictions: 51 y.o. female with PMHx of ESRD on PD, CAD status post CABG, diabetes, hypertension, peripheral vascular disease status post partial foot accommodation was initially admitted to [facility] 2021 for dizziness. At that time cardiac catheterization was performed which showed multivessel disease. Patient undergone CABG. Subsequently patient was discharged to [facility] acute rehab unit where she was undergoing therapy. She was being followed by nephrology in view of her ESRD for PD. She subsequently developed abdominal discomfort nausea and vomiting. Imaging suggestive of ileus versus SBO. Surgery was consulted at ARU who suggested transfer to acute care. Patient states that she was not able to tolerate anything p.o. or since the day before yesterday. Her last bowel movement was approximately that time, no melena or hematochezia, hard. Today her labs demonstrate hyponatremia, hyperkalemia, BUN/creatinine consistent with ESRD patient, and leukocytosis to 18.     Fluid from PD catheter has grown Pseudomonas aeruginosa. Discussed with nephrology, increased dose of cefepime, give additional dose of meropenem, ID consulted. Subjective   General  Chart Reviewed: Yes  Patient assessed for rehabilitation services?: Yes  Additional Pertinent Hx: DM - peritoneal dialysis; s/p amputation of the 1st and 2nd toes of the left foot (5/26/21)  Response to previous treatment: Patient with no complaints from previous session  Family / Caregiver Present: No  Referring Practitioner: Adina Canchola MD  Diagnosis: peritonitis  Subjective  Subjective: .  General Comment  Comments: . Pain Assessment  Pain Assessment: 0-10  Pain Level: 4  Pain Type: Acute pain;Chronic pain  Pain Location: Back  Pain Descriptors: Aching;Constant  Pain Frequency: Intermittent  Pre Treatment Pain Screening  Intervention List: Patient able to continue with treatment  Vital Signs  Patient Currently in Pain: Yes   Orientation  Orientation  Overall Orientation Status: Within Functional Limits  Objective    ADL  LE Dressing: Dependent/Total  Additional Comments: Declined ADLs this date        Balance  Sitting Balance: Supervision  Standing Balance: Dependent/Total (maxA of 2 sit>stand, Aidan of 2 in stance 15 seconds x 1)  Bed mobility  Supine to Sit: Minimal assistance (HOB elevated, use of bed rail and therapist's hand for support  Simultaneous filing. User may not have seen previous data.)  Sit to Supine: Moderate assistance (BLEs  Simultaneous filing. User may not have seen previous data.)  Scooting: Stand by assistance (Simultaneous filing. User may not have seen previous data.)      Cognition  Overall Cognitive Status: Exceptions  Arousal/Alertness: Appropriate responses to stimuli  Following Commands: Follows one step commands consistently; Follows one step commands with increased time  Attention Span: Appears intact  Memory: Appears intact  Sequencing: Requires cues for some  Cognition Comment: Patient with flat affect, appears depressed  Plan   Plan  Times per week: 3-5  Times per day: Daily  Current Treatment Recommendations: Strengthening, Balance Training, Functional Mobility Training, Safety Education & Training, Endurance Training, Self-Care / ADL, Equipment Evaluation, Education, & procurement, Patient/Caregiver Education & Training    AM-PAC Score        AM-PAC Inpatient Daily Activity Raw Score: 13 (07/12/21 1523)  AM-PAC Inpatient ADL T-Scale Score : 32.03 (07/12/21 1523)  ADL Inpatient CMS 0-100% Score: 63.03 (07/12/21 1523)  ADL Inpatient CMS G-Code Modifier : CL (07/12/21 1523)    Goals  Short term goals  Time Frame for Short term goals: discharge  Short term goal 1: Fxl mobility SBA - goal not met 7/12  Short term goal 2: Fxl transfers SBA - goal not met 7/12  Short term goal 3: Increase standing endurance to 4+ minutes for ADL participation - goal not met 7/12  Short term goal 4: LB ADL min A - goal not met 7/12  Short term goal 5:  Toileting SBA - goal not met 7/12  Long term goals  Time Frame for Long term goals : LTG=STG  Patient Goals   Patient goals : to return home       Therapy Time   Individual Concurrent Group Co-treatment   Time In       1325   Time Out       1355   Minutes       30        Timed Code Treatment Minutes:  30 Minutes    Total Treatment Minutes:  Bryan Tenorio 82, OTR/L [id number]

## 2021-07-12 NOTE — PLAN OF CARE
Nutrition Problem #1: Inadequate oral intake  Intervention: Food and/or Nutrient Delivery: Continue Current Diet, Continue Oral Nutrition Supplement, Start Tube Feeding  Nutritional Goals: Pt will tolerate EN at goal

## 2021-07-12 NOTE — PROGRESS NOTES
Hospitalist Progress Note      PCP: Deidra Menendez MD    Date of Admission: 7/1/2021    Chief Complaint: Nausea and vomiting    Hospital Course: This 51 y.o. female with PMHx of ESRD on PD, CAD status post CABG, diabetes, hypertension, peripheral vascular disease status post partial foot accommodation was initially admitted to [facility] 5/31/2021 for dizziness. At that time cardiac catheterization was performed which showed multivessel disease. Patient undergone CABG. Subsequently patient was discharged to [facility] acute rehab unit where she was undergoing therapy. She was being followed by nephrology in view of her ESRD for PD. She subsequently developed abdominal discomfort nausea and vomiting. Imaging suggestive of ileus versus SBO. General surgery was consulted at ARU and but direct patient was transferred to inpatient. Patient was managed conservatively and her diet was advanced as tolerated. Patient later developed leukocytosis; PD dialysate culture grew Pseudomonas aeruginosa. ID was consulted and patient was started on IV meropenem and ceftazidime    Interval history:   Patient seen and examined today  Overnight events and interval ancillary notes reviewed  Patient continues to use 3 L nasal cannula and refused to take off oxygen  Even though she is satting around 9200%  Afebrile overnight; repeat blood and PD fluid cultures on 7/9/2021 NGTD  Patient p.o. take remained very poor with multiple electrolyte abnormalities  Discussed with the patient for NG tube and patient agreed  Dietitian consulted and patient started on Nepro  S/c Heparin on hold because of blood-tinged dialysate; consider restarting when appropriate.   Discussion about removing PD catheter and obtaining tunneled HD catheter if PD fluid continues to grow Pseudomonas  Plan to discharge patient to SNF once medically stable      Medications:  Reviewed    Infusion Medications    dextrose       Scheduled Medications    custom dialysis builder   Intraperitoneal Daily    insulin aspart  0-6 Units Subcutaneous TID AC    And    insulin aspart  0-3 Units Subcutaneous Nightly    insulin detemir  19 Units Subcutaneous Nightly    thiamine (VITAMIN B1) IVPB  100 mg Intravenous Q24H    ciprofloxacin  400 mg Intravenous Q24H    epoetin toy-epbx  10,000 Units Subcutaneous Q7 Days    lactobacillus  1 capsule Oral BID WC    nystatin  5 mL Oral 4x Daily    metoclopramide  5 mg Intravenous Q6H    aspirin  325 mg Oral Daily    metoprolol tartrate  25 mg Oral BID    pantoprazole  40 mg Oral Daily    sennosides-docusate sodium  1 tablet Oral BID    gentamicin   Topical Daily    atorvastatin  20 mg Oral Nightly    [Held by provider] heparin (porcine)  5,000 Units Subcutaneous 3 times per day    miconazole   Topical BID    polyethylene glycol  17 g Oral Daily    [Held by provider] torsemide  100 mg Oral Daily     PRN Meds: labetalol, dextrose, acetaminophen, magnesium hydroxide, polyethylene glycol, traMADol **OR** traMADol, zolpidem, dextrose, dextrose, glucagon (rDNA), glucose, bisacodyl, calcium carbonate, diphenhydrAMINE, heparin (porcine), hydrALAZINE, ondansetron, ondansetron, promethazine, albuterol      Intake/Output Summary (Last 24 hours) at 7/12/2021 0852  Last data filed at 7/12/2021 0420  Gross per 24 hour   Intake 299.44 ml   Output 1059 ml   Net -759.56 ml       Physical Exam Performed:    BP (!) 153/57   Pulse 91   Temp 97.6 °F (36.4 °C) (Oral)   Resp 16   Ht 5' 9\" (1.753 m)   Wt 228 lb 2.8 oz (103.5 kg)   SpO2 97%   BMI 33.70 kg/m²     General appearance: No apparent distress, appears stated age and cooperative. Respiratory:  Normal respiratory effort. Clear to auscultation, bilaterally without Rales/Wheezes/Rhonchi. Cardiovascular: Regular rate and rhythm with normal S1/S2 without murmurs, rubs or gallops. Abdomen: Soft, non-tender, non-distended with normal bowel sounds.   Small wounds in the groin area   Musculoskeletal:Necrotic area the site to amputations on the left foot  Neurologic:  Neurovascularly intact without any focal sensory/motor deficits. Cranial nerves: II-XII intact, grossly non-focal.  Psychiatric: Alert and oriented, thought content appropriate, normal insight  Peripheral Pulses: +2 palpable, equal bilaterally       Labs:   Recent Labs     07/10/21  0459 07/11/21  0444 07/12/21  0506   WBC 12.7* 13.9* 13.3*   HGB 9.5* 8.8* 8.9*   HCT 29.5* 28.2* 28.1*    155 151     Recent Labs     07/10/21  0459 07/11/21  0444 07/12/21  0506   * 128* 126*   K 3.2* 3.2* 3.4*   CL 91* 90* 89*   CO2 26 28 25   BUN 51* 51* 48*   CREATININE 7.6* 7.4* 7.0*   CALCIUM 7.7* 7.5* 7.8*   PHOS 4.5 4.7 4.4     No results for input(s): AST, ALT, BILIDIR, BILITOT, ALKPHOS in the last 72 hours. No results for input(s): INR in the last 72 hours. No results for input(s): Rodolfo Hug in the last 72 hours. Urinalysis:      Lab Results   Component Value Date    NITRU Negative 06/06/2021    WBCUA 36 06/06/2021    BACTERIA RARE 06/06/2021    RBCUA 587 06/06/2021    BLOODU LARGE 06/06/2021    SPECGRAV 1.019 06/06/2021    GLUCOSEU 100 06/06/2021       Radiology:  CT ABDOMEN PELVIS WO CONTRAST Additional Contrast? Oral   Final Result   1. Mild bibasilar segmental atelectasis versus pneumonia. 2. Negative for small bowel obstruction. 3. Mild ascites.              Assessment/Plan:    Active Hospital Problems    Diagnosis     Anemia of chronic disease [D63.8]     Hypoalbuminemia [E88.09]     Moderate malnutrition ([address] [E44.0]     Dialysis-associated peritonitis ([facility] [T85.71XA]     Peripheral vascular disease ([facility] [I73.9]     SBO (small bowel obstruction) ([facility] [K56.609]     Coronary artery disease due to lipid rich plaque [I25.10, I25.83]     Diabetes education, encounter for [Z71.89]     ESRD on peritoneal dialysis ([facility] [N18.6, Z99.2]     Type 2 diabetes mellitus with obesity ([facility] [E11.69, E66.9]      SBO versus ileus: Resolved  CT abdomen pelvis showed no SBO  General surgery on board: Appreciate their rec  Tolerating diet     Leukocytosis secondary to PD catheter related peritonitis: Improving  PD fluid from 7/1/2021 grew heavy growth of Pseudomonas  Blood cultures negative  ID on board appreciate their recs  vancomycin and cefepime discontinued  Ceftazidime in dialysate for 6 doses  IV meropenem d/c on 7/9/2021 and switched to IV Cipro per ID recs    CAD Status post CABG: Stable  On high-dose aspirin  Apparently does not tolerate Plavix    Gangrene of left foot: s/p amputation of toes 1 and 2 on the left on 5/26-21  Podiatry on board; continue Betadine wet-to-dry daily dressing changes to foot until discharge; WBAT in a surgical shoe  May need further ambulation once stable    PVD; status post left popliteal, anterior tibial and dorsalis pedis angioplasty by Dr. Susannah Brooks on 5/27/2021    Normocytic anemia  Hemoglobin stable around 9   Patient status post CABG, target hemoglobin above 8  Status post 1 unit PRBC 7/3/2021    Diabetes mellitus complicated with peripheral neuropathy  Hemoglobin A1c 8.8 on 5/31/2021  Continue Levemir and SSI  Monitor blood glucose closely    Persistent hypoglycemia-resolved    Hypertension  She is on Lopressor 25 twice daily  She has hydralazine and labetalol as needed  Anticipate nephrology recommendations     ESRD on PD  Nephrology following     Mild hyperkalemia: Resolved    Hypokalemia; likely secondary to inadequate intake  Dietitian on board  Replace as needed     Hyponatremia: Management per nephrology  Monitor sodium level closely     Anion gap metabolic acidosis: Resolved  Likely in view of uremia       DVT Prophylaxis: Heparin  Diet: ADULT DIET;  Regular  Adult Oral Nutrition Supplement; Standard High Calorie/High Protein Oral Supplement  Code Status: Full Code      Electronically signed by Juan Angelo MD on 7/12/2021 at 8:52 AM

## 2021-07-12 NOTE — PROGRESS NOTES
history of Diabetes mellitus ([facility] End stage kidney disease ([facility] Hypertension, Neuropathy, and Peripheral vascular disease ([facility] has a past surgical history that includes Tonsillectomy;  section; other surgical history (2018); Toe amputation (Left, 2021); Toe amputation (Left, 2021); and Coronary artery bypass graft (N/A, 2021). Restrictions  Restrictions/Precautions  Restrictions/Precautions: Weight Bearing, Fall Risk (High fall risk)  Required Braces or Orthoses?: Yes  Lower Extremity Weight Bearing Restrictions  Left Lower Extremity Weight Bearing: Weight Bearing As Tolerated  Required Braces or Orthoses  Left Lower Extremity Brace: Boot  LLE Brace Type: Surgical shoe  Position Activity Restriction  Sternal Precautions: No Pushing, No Pulling, 5# Lifting Restrictions  Other position/activity restrictions: 51 y.o. female with PMHx of ESRD on PD, CAD status post CABG, diabetes, hypertension, peripheral vascular disease status post partial foot accommodation was initially admitted to [facility] 2021 for dizziness. At that time cardiac catheterization was performed which showed multivessel disease. Patient undergone CABG. Subsequently patient was discharged to [facility] acute rehab unit where she was undergoing therapy. She was being followed by nephrology in view of her ESRD for PD. She subsequently developed abdominal discomfort nausea and vomiting. Imaging suggestive of ileus versus SBO. Surgery was consulted at ARU who suggested transfer to acute care. Patient states that she was not able to tolerate anything p.o. or since the day before yesterday. Her last bowel movement was approximately that time, no melena or hematochezia, hard. Today her labs demonstrate hyponatremia, hyperkalemia, BUN/creatinine consistent with ESRD patient, and leukocytosis to 18. Fluid from PD catheter has grown Pseudomonas aeruginosa.   Discussed with nephrology, increased sitting EOB                        G-Code     OutComes Score     AM-PAC Score  AM-PAC Inpatient Mobility Raw Score : 8 (07/12/21 1530)  AM-PAC Inpatient T-Scale Score : 28.52 (07/12/21 1530)  Mobility Inpatient CMS 0-100% Score: 86.62 (07/12/21 1530)  Mobility Inpatient CMS G-Code Modifier : CM (07/12/21 1530)          Goals  Short term goals  Time Frame for Short term goals: to be completed prior to d/c  Short term goal 1: pt will complete bed mobility with SBA  Short term goal 2: pt will complete transfers with mod assist x1  Short term goal 3: pt will ambulate 20' with CGA. Short term goal 4: pt will ambulate x 150' with mod I of LRAD  Short term goal 5: pt will ascend/descend 2 stairs with mod I  NO GOALS MET THIS DATE    Plan    Plan  Times per week: 3-5 x per week  Times per day: Daily  Current Treatment Recommendations: Strengthening, Balance Training, Functional Mobility Training, Transfer Training, Endurance Training, Gait Training, Stair training, Patient/Caregiver Education & Training, Manual Therapy - Soft Tissue Mobilization, Neuromuscular Re-education, Safety Education & Training, Home Exercise Program  Safety Devices  Type of devices: All fall risk precautions in place, Bed alarm in place, Nurse notified, Call light within reach, Gait belt, Left in bed  Restraints  Initially in place: No     Therapy Time   Individual Concurrent Group Co-treatment   Time In       1325   Time Out       1357   Minutes       32   Timed Code Treatment Minutes:  32 Minutes    Total Treatment Minutes:  217 SERJIO Bell    PT providing direct supervision during session and assisting in making skilled judgements throughout session.   [address] [facility] PT, DPT [id number]    [id number] Matthew, PT

## 2021-07-12 NOTE — PROGRESS NOTES
Office : [phone]     Fax :[phone]       Nephrology Progress   Note      Patient's Name: Waymon Essex  8:59 AM  2021    Reason for Consult:  ESRD    History of Present iIlness:    Waymon Essex is a 51 y.o. female with severe end-stage renal disease secondary to diabetic nephropathy on peritoneal dialysis, hypertension, peripheral vascular disease, diabetic neuropathy who was admitted after she had a syncopal episode. She had similar episode 2 weeks ago. Recently had angiogram left lower extremity for gangrene left foot second toe. Denies any shortness of breath. Denies any abdominal pain. Denies any nausea vomiting. Interval HX :          Oral intake very low   Sodium is low at 126   Unable to tolerate sodium chloride pills         No nausea , no vomiting today   PD fluid growing pseudomonas    Rpt culture no growth so far      No chest pain  No sob  S/p CABG on 2010          I/O last 3 completed shifts: In: 299.4 [IV Piggyback:299.4]  Out: 65     Past Medical History:   Diagnosis Date    Diabetes mellitus ([address]     End stage kidney disease ([address]     peritoneal dialysis every night at home x 2 years    Hypertension     Neuropathy     Peripheral vascular disease ([facility]        Past Surgical History:   Procedure Laterality Date     SECTION      CORONARY ARTERY BYPASS GRAFT N/A 2021    URGENT CABG CORONARY ARTERY BYPASS GRAFTS X3. VEIN TO PDA X1, VEIN TO OM X1, LEFT INTERNAL MAMMARY ARTERY TO LAD X1. ENDOSCOPIC HARVEST RIGHT LEG SAPHENOUS VEIN. LIGATION NYA USING 35MM ATRICLIP. EPI AORTIC ULTRASOUND. TOTAL CARDIOPULMONARY BYPASS. DOPPLER GRAFT FLOW VERIFICATION. BILATERAL INITERCOSTAL NERVE BLOCK USING 1.3% EXPAREL.  performed by Og Henriquez MD at [facility]    OTHER SURGICAL HISTORY  08/16/2018     lap PD cath placement lt side 62 cm    TOE AMPUTATION Left 2/26/2021    AMPUTATION OF THE LEFT THIRD TOE performed by Nilay Dumont DPM at [address] [facility] [address] TOE AMPUTATION Left 5/26/2021    AMPUTATION OF HALLUX AND SECOND TOE, LEFT FOOT performed by Nilay Dumont DPM at [facility]         Family History   Problem Relation Age of Onset    Coronary Art Dis Mother     High Blood Pressure Mother     Heart Disease Mother     Diabetes Mother     Cancer Father     Coronary Art Dis Father     High Blood Pressure Father     Heart Disease Father     Diabetes Father      Current Medications:    dianeal lo-massimo 2.5% 1,000 mL with cefTAZidime 1,000 mg solution, Daily  insulin aspart (NOVOLOG) injection pen 0-6 Units - Patient Supplied, TID AC   And  insulin aspart (NOVOLOG) injection pen 0-3 Units - Patient Supplied, Nightly  insulin detemir (LEVEMIR) injection pen 19 Units - Patient Supplied, Nightly  thiamine (B-1) 100 mg in sodium chloride 0.9 % 100 mL IVPB, Q24H  ciprofloxacin (CIPRO) IVPB 400 mg, Q24H  epoetin toy-epbx (RETACRIT) injection 10,000 Units, Q7 Days  lactobacillus (CULTURELLE) capsule 1 capsule, BID WC  nystatin (MYCOSTATIN) 310647 UNIT/ML suspension 500,000 Units, 4x Daily  labetalol (NORMODYNE;TRANDATE) injection 10 mg, Q4H PRN  metoclopramide (REGLAN) injection 5 mg, Q6H  dextrose 50 % IV solution, PRN  acetaminophen (TYLENOL) tablet 650 mg, Q6H PRN  aspirin EC tablet 325 mg, Daily  magnesium hydroxide (MILK OF MAGNESIA) 400 MG/5ML suspension 30 mL, Daily PRN  metoprolol tartrate (LOPRESSOR) tablet 25 mg, BID  pantoprazole (PROTONIX) tablet 40 mg, Daily  polyethylene glycol (GLYCOLAX) packet 17 g, Daily PRN  sennosides-docusate sodium (SENOKOT-S) 8.6-50 MG tablet 1 tablet, BID  traMADol (ULTRAM) tablet 50 mg, Q6H PRN   Or  traMADol (ULTRAM) tablet 100 mg, Q6H PRN  zolpidem (AMBIEN) tablet 5 mg, Nightly PRN  dextrose 5 % solution, PRN  dextrose 50 % IV solution, PRN  glucagon (rDNA) injection 1 mg, PRN  glucose (GLUTOSE) 40 % oral gel 15 g, PRN  gentamicin (GARAMYCIN) 0.1 % cream, Daily  atorvastatin (LIPITOR) tablet 20 mg, Nightly  bisacodyl (DULCOLAX) EC tablet 5 mg, Daily PRN  calcium carbonate (TUMS) chewable tablet 500 mg, TID PRN  diphenhydrAMINE (BENADRYL) tablet 25 mg, Q6H PRN  heparin (porcine) injection 3,400 Units, PRN  [Held by provider] heparin (porcine) injection 5,000 Units, 3 times per day  hydrALAZINE (APRESOLINE) tablet 50 mg, Q8H PRN  miconazole (MICOTIN) 2 % powder, BID  ondansetron (ZOFRAN) injection 4 mg, Q6H PRN  ondansetron (ZOFRAN-ODT) disintegrating tablet 4 mg, Q8H PRN  polyethylene glycol (GLYCOLAX) packet 17 g, Daily  promethazine (PHENERGAN) tablet 12.5 mg, Q6H PRN  [Held by provider] torsemide (DEMADEX) tablet 100 mg, Daily  albuterol (PROVENTIL) nebulizer solution 2.5 mg, Q6H PRN          Physical exam:     Vitals:  BP (!) 153/57   Pulse 91   Temp 97.6 °F (36.4 °C) (Oral)   Resp 16   Ht 5' 9\" (1.753 m)   Wt 228 lb 2.8 oz (103.5 kg)   SpO2 97%   BMI 33.70 kg/m²   Constitutional:  OAA X3 NAD  Skin: no rash, turgor wnl  Heent:  eomi, mmm  Neck: no bruits or jvd noted  Cardiovascular:  S1, S2 without m/r/g  Respiratory: CTA B without w/r/r  Abdomen:  +bs, soft, nt, nd  Ext: no  lower extremity edema      Labs:  CBC:   Recent Labs     07/10/21  0459 07/11/21  0444 07/12/21  0506   WBC 12.7* 13.9* 13.3*   HGB 9.5* 8.8* 8.9*    155 151     BMP:    Recent Labs     07/10/21  0459 07/11/21  0444 07/12/21  0506   * 128* 126*   K 3.2* 3.2* 3.4*   CL 91* 90* 89*   CO2 26 28 25   BUN 51* 51* 48*   CREATININE 7.6* 7.4* 7.0*   GLUCOSE 246* 281* 259*     Ca/Mg/Phos:   Recent Labs     07/10/21  0459 07/11/21  0444 07/12/21  0506   CALCIUM 7.7* 7.5* 7.8*   MG 1.80 1.80 1.70*   PHOS 4.5 4.7 4.4         IMAGING:  CT ABDOMEN PELVIS WO CONTRAST Additional Contrast? Oral   Final Result   1.  Mild bibasilar segmental atelectasis versus pneumonia. 2. Negative for small bowel obstruction. 3. Mild ascites. Assessment/Plan :      1. Acute peritonitis  Ceftazidime  6 hr day time dwell first dose started  on  7/4  On meropenem iv . First dose started on 7/4   PD cycles to 5 cycles with total  8 hr duration, discussed with PD nurse   PD fluid cx : pseudomonas +    PD fluid neutrophil count is high at 88 ---> 82 ---> 67 ---> 70 %    WBC increased     Results pending from today     D/w ID   If  Cell count continue to increase then may need removal of PD catheter   Then will need to transition to HD for few weeks         2   ESRD. Continue on peritoneal dialysis. continue cycler/CCPD as per current prescriptio   Poor oral intake   Low albumin   Hold torsemide    fill volume to 1400 ml   PD site  no discharge. Hold SQ heparin because of blood tinged dialysate drained fluid      2. HTN. BP controlled   Continue current blood pressure medications    3. Anemia of chronic disease. Continue LUIZA    4. Acid- base disorder. Monitor    5. Hyponatremia   Improving     Poor oral intake    Sodium level better at 128 ----> 130  ---> 128 ---> 126   Encourage increase oral intake    6. CAD/Syncopal episode. S/p [facility]   Has Multivessel CAD   S/p CABG       7. Peripheral vascular disease. H/o  amputation of left foot toes. Podiatry following       8. Generalized weakness. Needs rehabilitation/ SNF      9. hypokalemia- replace potassium     D/w Ms. Bart Kasper and she agreed for tube feeds  Will get NG tube   Start Nepro            Thank you for allowing us to participate in care of Char Munoz         Electronically signed by: Marko Trammell MD, 7/12/2021, 8:59 AM      [facility] [address]  Office : [phone]  Fax :[phone]

## 2021-07-12 NOTE — FLOWSHEET NOTE
Disconnected from [facility] per protocol. Effluent: rosa with small amount of large fibrin sheaths noted  Total time: 8 hr 30 min   Total UF:  132 ml. Total Volume:  7013 ml. Dwell time gained:  1 hr 0 min. Pt Tolerated procedure without difficulty. Pt requested stat drain when treatment completed. 304 ml drained after treatment completed. Effluent sent to lab this AM per orders. Report given to: Latosha Chaudhary RN  Last BM: 7/11/21 07/12/21 0420   Vitals   BP (!) 153/57   Temp 97.6 °F (36.4 °C)   Temp Source Oral   Pulse 91   Resp 16   SpO2 97 %   Weight 228 lb 2.8 oz (103.5 kg)   Peritoneal Dialysis Catheter Left lower abdomen   Placement Date/Time: 08/16/18 0818   Inserted by: Reuben Adams MD  Catheter Location: Left lower abdomen   Status Deaccessed   Site Condition No Complications   Dressing Status Clean;Dry; Intact   Dressing Occlusive   Cycler   Ultrafiltration (UF) (mL) 132 mL

## 2021-07-12 NOTE — PROGRESS NOTES
Infectious Diseases   Progress Note      Admission Date: 7/1/2021  Hospital Day: Hospital Day: 12   Attending: Aria Marie MD  Date of service: 7/12/2021     Chief complaint/ Reason for consult:     · Peritoneal dialysis catheter related peritonitis-PD fluid is positive for Pseudomonas aeruginosa  · ESRD on peritoneal dialysis for 2 years  · Type 2 diabetes mellitus  · Essential hypertension    Microbiology:        I have reviewed allavailable micro lab data and cultures    · Blood culture (1/1) - collected on 7/1/2021: negative  · Peritoneal dialysate fluid culture  - collected on 7/1/2021: Heavy growth of Pseudomonas    [address] [facility] [place] [address]   Phone [phone]   Susceptibility    Pseudomonas aeruginosa (1)    Antibiotic Interpretation DENNISE Status    cefepime Sensitive <=2 mcg/mL     ciprofloxacin Sensitive <=1 mcg/mL     gentamicin Sensitive <=4 mcg/mL     meropenem Sensitive <=1 mcg/mL     piperacillin-tazobactam Sensitive <=16 mcg/mL     tobramycin Sensitive <=4 mcg/mL             Antibiotics and immunizations:       Current antibiotics: All antibiotics and their doses were reviewed by me    Recent Abx Admin                   ciprofloxacin (CIPRO) IVPB 400 mg (mg) 400 mg New Bag 07/11/21 1248    dianeal lo-massimo 2.5% 1,000 mL with cefTAZidime 1,000 mg solution ()  New Bag 07/11/21 1158                  Immunization History: All immunization history was reviewed by me today. Immunization History   Administered Date(s) Administered    Influenza Virus Vaccine 10/23/2014, 12/09/2015    Influenza, [place], IM, (6 mo and older Fluzone, Flulaval, Fluarix and 3 yrs and older Afluria) 12/07/2018    Influenza, Quadv, IM, PF (6 mo and older Fluzone, Flulaval, Fluarix, and 3 yrs and older Afluria) 11/08/2016    Pneumococcal Polysaccharide ([id number]) 04/28/2017    Td, unspecified formulation 01/01/1999, 07/01/2007       Known drug allergies:      All allergies were reviewed and updated    Allergies   Allergen Reactions    Zetia [Ezetimibe] Shortness Of Breath    Quinolones Nausea And Vomiting and Nausea Only     Other reaction(s): Vomiting    Lisinopril Swelling       Social history:     Social History:  All social andepidemiologic history was reviewed and updated by me today as needed. · Tobacco use:   reports that she has never smoked. She has never used smokeless tobacco.  · Alcohol use:   reports no history of alcohol use. · Currently lives in: [place] [address]  ·  reports no history of drug use. COVID VACCINATION AND LAB RESULT RECORDS:     Internal Administration   First Dose      Second Dose           Last COVID Lab SARS-CoV-2 (no units)   Date Value   05/24/2021 Not Detected     SARS-CoV-2, NAAT (no units)   Date Value   06/08/2021 Not Detected            Assessment:     The patient is a 51 y.o. old female who  has a past medical history of Diabetes mellitus ([facility] End stage kidney disease ([facility] Hypertension, Neuropathy, and Peripheral vascular disease ([facility] with following problems:    · Peritoneal dialysis catheter related peritonitis-PD fluid culture from 7/1/2021 had heavy growth of pan susceptible Pseudomonas aeruginosa-currently on IV Cipro and intraperitoneal ceftazidime  · ESRD on peritoneal dialysis for 2 years  · Type 2 diabetes mellitus-maintaining good glucose control  · Essential hypertension-BP controlled  · Coronary artery disease-stable  · Peripheral vascular disease  · Diabetic polyneuropathy type II  · Obesity Class 1 due to excess calorie intake : Body mass index is 33.7 kg/m². Discussion:      The patient is afebrile. White cell count electrofulgurated 13,300 today. I had ordered repeat PD fluid bacterial and fungal cultures on 7/9/2021. They remain negative. Serum creatinine 0.7. PD fluid white cell count from today has finally seem to come down to 98. Neutrophil count is now 72%.     Plan:     Diagnostic Workup:      · Continue to follow  fever curve, WBC count and blood cultures. · Continue to monitor blood counts, liver and renal function. Antimicrobials:    · Okay to plan to discontinue empiric IV ceftazidime at discharge  · Nephro will like longer treatment. Will recommend 10 more days of ciprofloxacin. Can be given orally at a dose of 500 mg every 24 hours  · If the patient starts to worsen again or develops peritonitis symptoms again, may need PD catheter removal  · We will follow up on the culture results and clinical progress and will make further recommendations accordingly. · Continue close vitals monitoring. · Maintain good glycemic control. · Fall precautions. Aspiration precautions. · Continue to watch for new fever or diarrhea. · DVT prophylaxis. · Discussed all above with patient and RN. Drug Monitoring:    · Continue monitoring for antibiotic toxicity as follows: CBC, CMP, QTc interval  · Continue to watch for following: new or worsening fever, new hypotension, hives, lip swelling and redness or purulence at vascular access sites. I/v access Management:    · Continue to monitor i.v access sites for erythema, induration, discharge or tenderness. · As always, continue efforts to minimize tubes/lines/drains as clinically appropriate to reduce chances of line associated infections. Patient education and counseling:        · The patient was educated in detail about the side-effects of various antibiotics and things to watch for like new rashes, lip swelling, severe reaction, worsening diarrhea, break through fever etc.  · Discussed patient's condition and what to expect. All of the patient's questions were addressed in a satisfactory manner and patient verbalized understanding all instructions.       Level of complexity of visit: High     Fluoroquinolone related instructions:     Patient instructed to watch for low or high blood sugars, muscle pains and ankle tendon pain while on Ciprofloxacin or problem and vaginal discharge. Musculoskeletal: Negative for arthralgias, joint swelling, myalgias and neck stiffness. Skin: Negative for color change and rash. Allergic/Immunologic: Negative for immunocompromised state. Neurological: Negative for dizziness, seizures, speech difficulty, light-headedness and headaches. Hematological: Negative for adenopathy. Psychiatric/Behavioral: Negative for agitation, hallucinations and suicidal ideas. Past Medical History: All past medical history reviewed today. Past Medical History:   Diagnosis Date    Diabetes mellitus ([facility]     End stage kidney disease ([facility]     peritoneal dialysis every night at home x 2 years    Hypertension     Neuropathy     Peripheral vascular disease ([facility]        Past Surgical History: All past surgical history was reviewed today. Past Surgical History:   Procedure Laterality Date     SECTION      CORONARY ARTERY BYPASS GRAFT N/A 2021    URGENT CABG CORONARY ARTERY BYPASS GRAFTS X3. VEIN TO PDA X1, VEIN TO OM X1, LEFT INTERNAL MAMMARY ARTERY TO LAD X1. ENDOSCOPIC HARVEST RIGHT LEG SAPHENOUS VEIN. LIGATION NYA USING 35MM ATRICLIP. EPI AORTIC ULTRASOUND. TOTAL CARDIOPULMONARY BYPASS. DOPPLER GRAFT FLOW VERIFICATION. BILATERAL INITERCOSTAL NERVE BLOCK USING 1.3% EXPAREL. performed by Ramon Collazo MD at [facility]  2018     lap PD cath placement lt side 62 cm    TOE AMPUTATION Left 2021    AMPUTATION OF THE LEFT THIRD TOE performed by Zara Jones DPM at [address] TOE AMPUTATION Left 2021    AMPUTATION OF HALLUX AND SECOND TOE, LEFT FOOT performed by Zara Jones DPM at [facility]         Family History: All family history was reviewed today.         Problem Relation Age of Onset    Coronary Art Dis Mother     High Blood Pressure Mother     Heart Disease Mother     Diabetes Mother     Cancer Father     Coronary Art Dis Father    Mari Castillo High Blood Pressure Father     Heart Disease Father     Diabetes Father        Objective:       PHYSICAL EXAM:      Vitals:   Vitals:    07/12/21 0025 07/12/21 0420 07/12/21 0436 07/12/21 0900   BP: 118/67 (!) 153/57 (!) 153/57 130/77   Pulse: 96 91 91 108   Resp: 16 16  17   Temp: 97.7 °F (36.5 °C) 97.6 °F (36.4 °C)  97.4 °F (36.3 °C)   TempSrc: Oral Oral  Oral   SpO2: 97% 97%  99%   Weight:  228 lb 2.8 oz (103.5 kg)     Height:         Physical Exam  Vitals and nursing note reviewed. Constitutional:       General: She is not in acute distress. Appearance: She is well-developed. She is not diaphoretic. HENT:      Head: Normocephalic. Right Ear: External ear normal.      Left Ear: External ear normal.      Nose: Nose normal.      Comments: NG tube in place. Eyes:      General: No scleral icterus. Right eye: No discharge. Left eye: No discharge. Conjunctiva/sclera: Conjunctivae normal.      Pupils: Pupils are equal, round, and reactive to light. Cardiovascular:      Rate and Rhythm: Normal rate and regular rhythm. Heart sounds: No murmur heard. No friction rub. Pulmonary:      Effort: Pulmonary effort is normal.      Breath sounds: No stridor. No wheezing or rales. Chest:      Chest wall: No tenderness. Abdominal:      General: There is distension. Palpations: Abdomen is soft. There is no mass. Tenderness: There is no abdominal tenderness. There is no guarding or rebound. Comments: PD catheter noted   Musculoskeletal:         General: No tenderness. Cervical back: Normal range of motion and neck supple. Lymphadenopathy:      Cervical: No cervical adenopathy. Skin:     General: Skin is warm and dry. Findings: No erythema or rash. Neurological:      Mental Status: She is alert and oriented to person, place, and time. Motor: No abnormal muscle tone.    Psychiatric:         Judgment: Judgment normal.             Lines: All vascular access sites are healthy with no local erythema, discharge or tenderness. Intake and output:    I/O last 3 completed shifts: In: 299.4 [IV Piggyback:299.4]  Out: 1564     Lab Data:   All available labs and old records have been reviewed by me. CBC:  Recent Labs     07/10/21  0459 07/11/21  0444 07/12/21  0506   WBC 12.7* 13.9* 13.3*   RBC 3.24* 3.03* 3.06*   HGB 9.5* 8.8* 8.9*   HCT 29.5* 28.2* 28.1*    155 151   MCV 91.0 92.8 91.7   MCH 29.3 29.0 29.2   MCHC 32.2 31.2 31.8   RDW 18.2* 18.7* 18.4*   BANDSPCT  --  1  --         BMP:  Recent Labs     07/10/21  0459 07/11/21  0444 07/12/21  0506   * 128* 126*   K 3.2* 3.2* 3.4*   CL 91* 90* 89*   CO2 26 28 25   BUN 51* 51* 48*   CREATININE 7.6* 7.4* 7.0*   CALCIUM 7.7* 7.5* 7.8*   GLUCOSE 246* 281* 259*        Hepatic Function Panel:   Lab Results   Component Value Date    ALKPHOS 183 07/02/2021    ALT <5 07/02/2021    AST 7 07/02/2021    PROT 6.4 07/02/2021    PROT 7.1 12/13/2012    BILITOT 0.3 07/02/2021    BILIDIR <0.2 07/02/2021    IBILI see below 07/02/2021    LABALBU 1.7 07/02/2021       CPK:   Lab Results   Component Value Date    CKTOTAL 45 12/19/2013     ESR:   Lab Results   Component Value Date    SEDRATE 84 (H) 02/08/2021     CRP:   Lab Results   Component Value Date    CRP 8.9 (H) 02/08/2021           Imaging: All pertinent images and reports for the current visit were reviewed by me during this visit. CT ABDOMEN PELVIS WO CONTRAST Additional Contrast? Oral   Final Result   1. Mild bibasilar segmental atelectasis versus pneumonia. 2. Negative for small bowel obstruction. 3. Mild ascites. Medications: All current and past medications were reviewed.      custom dialysis builder   Intraperitoneal Daily    insulin aspart  0-6 Units Subcutaneous TID AC    And    insulin aspart  0-3 Units Subcutaneous Nightly    insulin detemir  19 Units Subcutaneous Nightly    thiamine (VITAMIN B1) IVPB  100 mg Intravenous Q24H    ciprofloxacin  400 mg Intravenous Q24H    epoetin toy-epbx  10,000 Units Subcutaneous Q7 Days    lactobacillus  1 capsule Oral BID WC    nystatin  5 mL Oral 4x Daily    metoclopramide  5 mg Intravenous Q6H    aspirin  325 mg Oral Daily    metoprolol tartrate  25 mg Oral BID    pantoprazole  40 mg Oral Daily    sennosides-docusate sodium  1 tablet Oral BID    gentamicin   Topical Daily    atorvastatin  20 mg Oral Nightly    [Held by provider] heparin (porcine)  5,000 Units Subcutaneous 3 times per day    miconazole   Topical BID    polyethylene glycol  17 g Oral Daily    [Held by provider] torsemide  100 mg Oral Daily        dextrose         labetalol, dextrose, acetaminophen, magnesium hydroxide, polyethylene glycol, traMADol **OR** traMADol, zolpidem, dextrose, dextrose, glucagon (rDNA), glucose, bisacodyl, calcium carbonate, diphenhydrAMINE, heparin (porcine), hydrALAZINE, ondansetron, ondansetron, promethazine, albuterol      Problem list:       Patient Active Problem List   Diagnosis Code    Diabetes mellitus ([facility] E11.9    Obesity E66.9    Microalbuminuria R80.9    Hyperlipidemia with target LDL less than 100 E78.5    Hypertension I10    Type 2 diabetes mellitus with obesity ([facility]) E11.69, E66.9    Acute renal failure (ARF) ([facility]) N17.9    ESRD on peritoneal dialysis ([facility] N18.6, Z99.2    Non-smoker Z78.9    Elevated C-reactive protein (CRP) R79.82    Elevated sed rate R70.0    Diabetic polyneuropathy associated with type 2 diabetes mellitus ([facility] E11.42    Diabetes education, encounter for Z71.89    Atherosclerosis of native arteries of left leg with ulceration of other part of foot ([facility] I70.245    Near syncope R55    Coronary artery disease due to lipid rich plaque I25.10, I25.83    Mild malnutrition (HCC) E44.1    S/P coronary artery bypass graft x 3 Z95.1    S/P left atrial appendage ligation Z98.890    Generalized weakness R53.1    Functional dysphonia F44.4    SBO (small bowel obstruction) ([facility] K56.609    Dialysis-associated peritonitis ([facility] T85.71XA    Peripheral vascular disease ([facility] [address] I73.9    Moderate malnutrition (HCC) E44.0    Anemia of chronic disease D63.8    Hypoalbuminemia E88.09       Please note that this chart was generated using Dragon dictation software. Although every effort was made to ensure the accuracy of this automated transcription, some errors in transcription may have occurred inadvertently. If you may need any clarification, please do not hesitate to contact me through EPIC or at the phone number provided below with my electronic signature. Any pictures or media included in this note were obtained after taking informed verbal consent from the patient and with their approval to include those in the patient's medical record.     Blade Segura MD, MPH  7/12/2021 , 11:29 AM   [facility]   [address] [place] [address], [address] [place] [address]  Office: [phone]  Fax: [phone]  Clinic days:  Tuesday & Thursday the abdomen and he just had the [facility] catheter placement everything they are telling me is fluid overloaded so I told him

## 2021-07-12 NOTE — PLAN OF CARE
Problem: Falls - Risk of:  Goal: Will remain free from falls  Description: Will remain free from falls  7/12/2021 0933 by Jatinder Lopez RN  Outcome: Ongoing     Problem: Falls - Risk of:  Goal: Absence of physical injury  Description: Absence of physical injury  Outcome: Ongoing     Problem: Skin Integrity:  Goal: Will show no infection signs and symptoms  Description: Will show no infection signs and symptoms  Outcome: Ongoing     Problem: Skin Integrity:  Goal: Absence of new skin breakdown  Description: Absence of new skin breakdown  7/12/2021 0933 by Jatinder Lopez RN  Outcome: Ongoing     Problem: Pain:  Goal: Pain level will decrease  Description: Pain level will decrease  7/12/2021 0933 by Jatinder Lopez RN  Outcome: Ongoing     Problem: Nutrition Deficit:  Goal: Ability to achieve adequate nutritional intake will improve  Description: Ability to achieve adequate nutritional intake will improve  Outcome: Ongoing     Problem: Nutrition  Goal: Optimal nutrition therapy  Outcome: Ongoing

## 2021-07-12 NOTE — PLAN OF CARE
Problem: Falls - Risk of:  Goal: Will remain free from falls  Description: Will remain free from falls  7/12/2021 0208 by Peyton Do RN  Note: Call light within reach, bed in lowest position, bed alarm on, and pt educated to use call light for assistance. Problem: Skin Integrity:  Goal: Absence of new skin breakdown  Description: Absence of new skin breakdown  7/12/2021 0208 by Peyton Do RN  Outcome: Ongoing  Note: Zinc cream applied and pt checked for incontinence every 2 hours as needed.       Problem: Pain:  Goal: Pain level will decrease  Description: Pain level will decrease  7/12/2021 0208 by Peyton Do RN  Note: Pt denies any pain at this time

## 2021-07-13 LAB
ANION GAP SERPL CALCULATED.3IONS-SCNC: 12 MMOL/L (ref 3–16)
ANISOCYTOSIS: ABNORMAL
APPEARANCE FLUID: CLEAR
BANDED NEUTROPHILS RELATIVE PERCENT: 2 % (ref 0–7)
BASOPHILS ABSOLUTE: 0 K/UL (ref 0–0.2)
BASOPHILS RELATIVE PERCENT: 0 %
BUN BLDV-MCNC: 44 MG/DL (ref 7–20)
CALCIUM SERPL-MCNC: 7.6 MG/DL (ref 8.3–10.6)
CELL COUNT FLUID TYPE: NORMAL
CHLORIDE BLD-SCNC: 88 MMOL/L (ref 99–110)
CLOT EVALUATION: NORMAL
CO2: 26 MMOL/L (ref 21–32)
COLOR FLUID: NORMAL
CREAT SERPL-MCNC: 6.6 MG/DL (ref 0.6–1.1)
EOSINOPHILS ABSOLUTE: 0.5 K/UL (ref 0–0.6)
EOSINOPHILS RELATIVE PERCENT: 4 %
GFR AFRICAN AMERICAN: 8
GFR NON-AFRICAN AMERICAN: 7
GLUCOSE BLD-MCNC: 154 MG/DL (ref 70–99)
GLUCOSE BLD-MCNC: 234 MG/DL (ref 70–99)
GLUCOSE BLD-MCNC: 238 MG/DL (ref 70–99)
GLUCOSE BLD-MCNC: 241 MG/DL (ref 70–99)
GLUCOSE BLD-MCNC: 340 MG/DL (ref 70–99)
GLUCOSE BLD-MCNC: 354 MG/DL (ref 70–99)
HCT VFR BLD CALC: 28.3 % (ref 36–48)
HEMOGLOBIN: 9.1 G/DL (ref 12–16)
HYPOCHROMIA: ABNORMAL
LYMPHOCYTES ABSOLUTE: 1.1 K/UL (ref 1–5.1)
LYMPHOCYTES RELATIVE PERCENT: 9 %
LYMPHOCYTES, BODY FLUID: 14 %
MACROPHAGE FLUID: 2 %
MAGNESIUM: 1.7 MG/DL (ref 1.8–2.4)
MCH RBC QN AUTO: 29.5 PG (ref 26–34)
MCHC RBC AUTO-ENTMCNC: 32 G/DL (ref 31–36)
MCV RBC AUTO: 92.2 FL (ref 80–100)
MONOCYTE, FLUID: 3 %
MONOCYTES ABSOLUTE: 0.8 K/UL (ref 0–1.3)
MONOCYTES RELATIVE PERCENT: 6 %
NEUTROPHIL, FLUID: 81 %
NEUTROPHILS ABSOLUTE: 10.3 K/UL (ref 1.7–7.7)
NEUTROPHILS RELATIVE PERCENT: 79 %
NUCLEATED CELLS FLUID: 84 /CUMM
NUMBER OF CELLS COUNTED FLUID: 100
OVALOCYTES: ABNORMAL
PDW BLD-RTO: 18.7 % (ref 12.4–15.4)
PERFORMED ON: ABNORMAL
PHOSPHORUS: 4.2 MG/DL (ref 2.5–4.9)
PLATELET # BLD: 138 K/UL (ref 135–450)
PLATELET SLIDE REVIEW: ADEQUATE
PMV BLD AUTO: 10.4 FL (ref 5–10.5)
POIKILOCYTES: ABNORMAL
POLYCHROMASIA: ABNORMAL
POTASSIUM SERPL-SCNC: 3.3 MMOL/L (ref 3.5–5.1)
RBC # BLD: 3.07 M/UL (ref 4–5.2)
RBC FLUID: <1000 /CUMM
SLIDE REVIEW: ABNORMAL
SODIUM BLD-SCNC: 126 MMOL/L (ref 136–145)
STOMATOCYTES: ABNORMAL
TOXIC GRANULATION: PRESENT
WBC # BLD: 12.7 K/UL (ref 4–11)

## 2021-07-13 PROCEDURE — 90945 DIALYSIS ONE EVALUATION: CPT | Performed by: INTERNAL MEDICINE

## 2021-07-13 PROCEDURE — 2580000003 HC RX 258: Performed by: INTERNAL MEDICINE

## 2021-07-13 PROCEDURE — 85025 COMPLETE CBC W/AUTO DIFF WBC: CPT

## 2021-07-13 PROCEDURE — 2060000000 HC ICU INTERMEDIATE R&B

## 2021-07-13 PROCEDURE — 89051 BODY FLUID CELL COUNT: CPT

## 2021-07-13 PROCEDURE — 97530 THERAPEUTIC ACTIVITIES: CPT

## 2021-07-13 PROCEDURE — 97535 SELF CARE MNGMENT TRAINING: CPT

## 2021-07-13 PROCEDURE — APPNB30 APP NON BILLABLE TIME 0-30 MINS: Performed by: NURSE PRACTITIONER

## 2021-07-13 PROCEDURE — 6360000002 HC RX W HCPCS: Performed by: INTERNAL MEDICINE

## 2021-07-13 PROCEDURE — 6360000002 HC RX W HCPCS: Performed by: NURSE PRACTITIONER

## 2021-07-13 PROCEDURE — 80048 BASIC METABOLIC PNL TOTAL CA: CPT

## 2021-07-13 PROCEDURE — 99232 SBSQ HOSP IP/OBS MODERATE 35: CPT | Performed by: INTERNAL MEDICINE

## 2021-07-13 PROCEDURE — APPSS15 APP SPLIT SHARED TIME 0-15 MINUTES: Performed by: NURSE PRACTITIONER

## 2021-07-13 PROCEDURE — 83735 ASSAY OF MAGNESIUM: CPT

## 2021-07-13 PROCEDURE — 36415 COLL VENOUS BLD VENIPUNCTURE: CPT

## 2021-07-13 PROCEDURE — 84100 ASSAY OF PHOSPHORUS: CPT

## 2021-07-13 PROCEDURE — 6370000000 HC RX 637 (ALT 250 FOR IP): Performed by: NURSE PRACTITIONER

## 2021-07-13 PROCEDURE — 6370000000 HC RX 637 (ALT 250 FOR IP): Performed by: INTERNAL MEDICINE

## 2021-07-13 RX ORDER — ALPRAZOLAM 0.25 MG/1
0.25 TABLET ORAL 2 TIMES DAILY PRN
Status: DISCONTINUED | OUTPATIENT
Start: 2021-07-13 | End: 2021-07-14 | Stop reason: HOSPADM

## 2021-07-13 RX ORDER — CIPROFLOXACIN 500 MG/1
500 TABLET, FILM COATED ORAL
Status: DISCONTINUED | OUTPATIENT
Start: 2021-07-13 | End: 2021-07-14 | Stop reason: HOSPADM

## 2021-07-13 RX ADMIN — GENTAMICIN SULFATE: 1 CREAM TOPICAL at 18:23

## 2021-07-13 RX ADMIN — ATORVASTATIN CALCIUM 20 MG: 20 TABLET, FILM COATED ORAL at 20:37

## 2021-07-13 RX ADMIN — EPOETIN ALFA-EPBX 10000 UNITS: 10000 INJECTION, SOLUTION INTRAVENOUS; SUBCUTANEOUS at 13:20

## 2021-07-13 RX ADMIN — CIPROFLOXACIN 500 MG: 500 TABLET, FILM COATED ORAL at 13:21

## 2021-07-13 RX ADMIN — CEFTAZIDIME: 1 INJECTION, POWDER, FOR SOLUTION INTRAMUSCULAR; INTRAVENOUS at 12:45

## 2021-07-13 RX ADMIN — ZOLPIDEM TARTRATE 5 MG: 5 TABLET, COATED ORAL at 20:37

## 2021-07-13 RX ADMIN — NYSTATIN 500000 UNITS: 100000 SUSPENSION ORAL at 13:20

## 2021-07-13 RX ADMIN — METOPROLOL TARTRATE 25 MG: 25 TABLET, FILM COATED ORAL at 09:51

## 2021-07-13 RX ADMIN — METOCLOPRAMIDE 5 MG: 5 INJECTION, SOLUTION INTRAMUSCULAR; INTRAVENOUS at 13:20

## 2021-07-13 RX ADMIN — Medication 1 CAPSULE: at 18:21

## 2021-07-13 RX ADMIN — NYSTATIN 500000 UNITS: 100000 SUSPENSION ORAL at 18:20

## 2021-07-13 RX ADMIN — THIAMINE HYDROCHLORIDE 100 MG: 100 INJECTION, SOLUTION INTRAMUSCULAR; INTRAVENOUS at 13:25

## 2021-07-13 RX ADMIN — METOCLOPRAMIDE 5 MG: 5 INJECTION, SOLUTION INTRAMUSCULAR; INTRAVENOUS at 18:21

## 2021-07-13 RX ADMIN — POTASSIUM BICARBONATE 40 MEQ: 782 TABLET, EFFERVESCENT ORAL at 18:20

## 2021-07-13 RX ADMIN — PANTOPRAZOLE SODIUM 40 MG: 40 TABLET, DELAYED RELEASE ORAL at 06:10

## 2021-07-13 RX ADMIN — MICONAZOLE NITRATE: 20 POWDER TOPICAL at 22:16

## 2021-07-13 RX ADMIN — MICONAZOLE NITRATE: 20 POWDER TOPICAL at 09:54

## 2021-07-13 RX ADMIN — METOCLOPRAMIDE 5 MG: 5 INJECTION, SOLUTION INTRAMUSCULAR; INTRAVENOUS at 00:05

## 2021-07-13 RX ADMIN — NYSTATIN 500000 UNITS: 100000 SUSPENSION ORAL at 09:52

## 2021-07-13 RX ADMIN — METOCLOPRAMIDE 5 MG: 5 INJECTION, SOLUTION INTRAMUSCULAR; INTRAVENOUS at 06:10

## 2021-07-13 ASSESSMENT — ENCOUNTER SYMPTOMS
COLOR CHANGE: 0
EYE REDNESS: 0
TROUBLE SWALLOWING: 0
DIARRHEA: 0
PHOTOPHOBIA: 0
SHORTNESS OF BREATH: 0
CHEST TIGHTNESS: 0
APNEA: 0
COUGH: 0
BLOOD IN STOOL: 0
STRIDOR: 0
EYE DISCHARGE: 0
NAUSEA: 0
FACIAL SWELLING: 0
CHOKING: 0
ABDOMINAL DISTENTION: 1
ABDOMINAL PAIN: 0
RHINORRHEA: 0

## 2021-07-13 ASSESSMENT — PAIN SCALES - GENERAL
PAINLEVEL_OUTOF10: 0

## 2021-07-13 NOTE — PROGRESS NOTES
Occupational Therapy  Facility/Department: [address]  Daily Treatment Note  NAME: Feliciano Ayon  : 1975  MRN: [id number]    Date of Service: 2021    Discharge Recommendations: Feliciano Ayon scored a 14/24 on the AM-PAC ADL Inpatient form. Current research shows that an AM-PAC score of 17 or less is typically not associated with a discharge to the patient's home setting. Based on the patient's AM-PAC score and their current ADL deficits, it is recommended that the patient have 3-5 sessions per week of Occupational Therapy at d/c to increase the patient's independence. Please see assessment section for further patient specific details. If patient discharges prior to next session this note will serve as a discharge summary. Please see below for the latest assessment towards goals. OT Equipment Recommendations  Equipment Needed: No  Other: continue to assess    Assessment   Performance deficits / Impairments: Decreased functional mobility ; Decreased ADL status; Decreased strength;Decreased endurance;Decreased high-level IADLs;Decreased coordination;Decreased balance  Assessment: Patient presents below baseline d/t above deficits. OT indicated to maximize safety and independence in ADLs. Pt would benefit from additional therapy at d/c to maximize functional independence and safety  Treatment Diagnosis: Above deficits associated with peritonitis and s/p CABG  Prognosis: Guarded;Good  Exam: as above  OT Education: OT Role;Plan of Care;Transfer Training;Precautions  Patient Education: Reinforce as needed for transfers. Continued reinforcement required to maintain sternal precautions, pt demonstrates difficulty following them by using arms to push self up.   REQUIRES OT FOLLOW UP: Yes  Activity Tolerance  Activity Tolerance: Patient Tolerated treatment well;Patient limited by fatigue  Activity Tolerance: max encouragement for tasks, pt self-limiting at times  Safety Devices  Safety Devices in place: Yes  Type of devices: All fall risk precautions in place; Bed alarm in place;Gait belt;Patient at risk for falls;Call light within reach; Left in bed;Nurse notified  Restraints  Initially in place: No         Patient Diagnosis(es): There were no encounter diagnoses. has a past medical history of Diabetes mellitus ([facility] End stage kidney disease ([facility] Hypertension, Neuropathy, and Peripheral vascular disease ([facility] has a past surgical history that includes Tonsillectomy;  section; other surgical history (2018); Toe amputation (Left, 2021); Toe amputation (Left, 2021); and Coronary artery bypass graft (N/A, 2021). Restrictions  Restrictions/Precautions  Restrictions/Precautions: Weight Bearing, Fall Risk  Required Braces or Orthoses?: Yes  Lower Extremity Weight Bearing Restrictions  Left Lower Extremity Weight Bearing: Weight Bearing As Tolerated  Required Braces or Orthoses  Left Lower Extremity Brace: Boot  LLE Brace Type: Surgical shoe  Position Activity Restriction  Sternal Precautions: No Pushing, No Pulling, 5# Lifting Restrictions  Other position/activity restrictions: 51 y.o. female with PMHx of ESRD on PD, CAD status post CABG, diabetes, hypertension, peripheral vascular disease status post partial foot accommodation was initially admitted to [facility] 2021 for dizziness. At that time cardiac catheterization was performed which showed multivessel disease. Patient undergone CABG. Subsequently patient was discharged to [facility] acute rehab unit where she was undergoing therapy. She was being followed by nephrology in view of her ESRD for PD. She subsequently developed abdominal discomfort nausea and vomiting. Imaging suggestive of ileus versus SBO. Surgery was consulted at ARU who suggested transfer to acute care. Patient states that she was not able to tolerate anything p.o. or since the day before yesterday.   Her last bowel movement was approximately that time, no melena or hematochezia, hard. Today her labs demonstrate hyponatremia, hyperkalemia, BUN/creatinine consistent with ESRD patient, and leukocytosis to 18. Fluid from PD catheter has grown Pseudomonas aeruginosa. Discussed with nephrology, increased dose of cefepime, give additional dose of meropenem, ID consulted. Subjective   General  Chart Reviewed: Yes  Patient assessed for rehabilitation services?: Yes  Additional Pertinent Hx: DM - peritoneal dialysis; s/p amputation of the 1st and 2nd toes of the left foot (5/26/21)  Response to previous treatment: Patient with no complaints from previous session  Family / Caregiver Present: No  Diagnosis: peritonitis  Subjective  Subjective: Patient lying upright in bed upon arrival, hesitant but agreeable to session. General Comment  Comments: Adams Ra Vital Signs  Patient Currently in Pain: Denies     Orientation  Orientation  Overall Orientation Status: Within Functional Limits    Objective    ADL  Equipment Provided: Sock aid  Grooming: Supervision;Setup (washes face and underarms in bed)  LE Dressing: Stand by assistance;Setup (used sock aid for donning socks)  Additional Comments: Declined additional ADLs        Balance  Sitting Balance: Supervision  Bed mobility  Rolling to Left: Stand by assistance  Rolling to Right: Stand by assistance  Supine to Sit: Stand by assistance  Sit to Supine: Moderate assistance (assist with BLE)  Scooting: Maximal assistance  Transfers  Transfer Comments: 2 failed attempts sit to stand from EOB with max A x2 -- able to get off bed to walker but unable to complete full stand -- poor adherence to sternal precautions         Cognition  Overall Cognitive Status: Exceptions  Arousal/Alertness: Appropriate responses to stimuli  Following Commands: Follows one step commands consistently; Follows one step commands with increased time  Attention Span: Appears intact  Memory: Appears intact  Initiation: Requires cues for some  Sequencing: Requires cues for some  Cognition Comment: Patient with flat affect, appears depressed     Perception  Overall Perceptual Status: [facility]            Plan   Plan  Times per week: 3-5  Times per day: Daily  Current Treatment Recommendations: Strengthening, Balance Training, Functional Mobility Training, Safety Education & Training, Endurance Training, Self-Care / ADL, Equipment Evaluation, Education, & procurement, Patient/Caregiver Education & Training    AM-PAC Score        AM-PAC Inpatient Daily Activity Raw Score: 14 (07/13/21 1649)  AM-PAC Inpatient ADL T-Scale Score : 33.39 (07/13/21 1649)  ADL Inpatient CMS 0-100% Score: 59.67 (07/13/21 1649)  ADL Inpatient CMS G-Code Modifier : CK (07/13/21 1649)    Goals  Short term goals  Time Frame for Short term goals: discharge  Short term goal 1: Fxl mobility SBA - goal not met 7/13  Short term goal 2: Fxl transfers SBA - goal not met 7/13  Short term goal 3: Increase standing endurance to 4+ minutes for ADL participation - goal not met 7/13  Short term goal 4: LB ADL min A - ongoing, SBA for donning socks with sock aid 7/13  Short term goal 5: Toileting SBA - goal not met 7/13  Long term goals  Time Frame for Long term goals : LTG=STG  Patient Goals   Patient goals : to return home       Therapy Time   Individual Concurrent Group Co-treatment   Time In       1556   Time Out       1631   Minutes       35        Timed Code Treatment Minutes:  35 Minutes    Total Treatment Minutes:  35 minutes    YISSEL Hernandez    OT provided direct supervision to student, facilitated in making skilled judgements throughout duration of session.     RASHAD Elmore OTR/L [id number]     Abby Schmidt OT

## 2021-07-13 NOTE — CARE COORDINATION
[facility] Wound Ostomy Continence Nurse  Consult Note       NAME:  Vilma Hurd  MEDICAL RECORD NUMBER:  [id number]  AGE: 51 y.o. GENDER: female  : 1975  TODAY'S DATE:  2021    Subjective   Reason for WOCN Evaluation and Assessment: wounds to lower abdomen      Vilma Hurd is a 51 y.o. female referred by:   [x] Physician  [x] Nursing  [] Other:     Wound Identification:  Wound Type: undetermined  Contributing Factors: moisture associated skin damage    Wound History: unknown, patient says she has had problems with moisture off and on  Current Wound Care Treatment:  foam    Patient Goal of Care:  [x] Wound Healing  [] Odor Control  [] Palliative Care  [] Pain Control   [] Other:         PAST MEDICAL HISTORY        Diagnosis Date    Diabetes mellitus ([facility]     End stage kidney disease ([facility]     peritoneal dialysis every night at home x 2 years    Hypertension     Neuropathy     Peripheral vascular disease ([facility]        PAST SURGICAL HISTORY    Past Surgical History:   Procedure Laterality Date     SECTION      CORONARY ARTERY BYPASS GRAFT N/A 2021    URGENT CABG CORONARY ARTERY BYPASS GRAFTS X3. VEIN TO PDA X1, VEIN TO OM X1, LEFT INTERNAL MAMMARY ARTERY TO LAD X1. ENDOSCOPIC HARVEST RIGHT LEG SAPHENOUS VEIN. LIGATION NYA USING 35MM ATRICLIP. EPI AORTIC ULTRASOUND. TOTAL CARDIOPULMONARY BYPASS. DOPPLER GRAFT FLOW VERIFICATION. BILATERAL INITERCOSTAL NERVE BLOCK USING 1.3% EXPAREL.  performed by Lay Arellano MD at [facility]. 12.  2018     lap PD cath placement lt side 62 cm    TOE AMPUTATION Left 2021    AMPUTATION OF THE LEFT THIRD TOE performed by Lizzie Reilly DPM at [address] [facility] [address] TOE AMPUTATION Left 2021    AMPUTATION OF HALLUX AND SECOND TOE, LEFT FOOT performed by Lizzie Reilly DPM at [address] [facility] HISTORY    Family History   Problem Relation Age of Onset    Coronary Art Dis Mother     High Blood Pressure Mother     Heart Disease Mother     Diabetes Mother     Cancer Father     Coronary Art Dis Father     High Blood Pressure Father     Heart Disease Father     Diabetes Father        SOCIAL HISTORY    Social History     Tobacco Use    Smoking status: Never Smoker    Smokeless tobacco: Never Used   Vaping Use    Vaping Use: Never used   Substance Use Topics    Alcohol use: No    Drug use: No       ALLERGIES    Allergies   Allergen Reactions    Zetia [Ezetimibe] Shortness Of Breath    Quinolones Nausea And Vomiting and Nausea Only     Other reaction(s): Vomiting    Lisinopril Swelling       MEDICATIONS    No current facility-administered medications on file prior to encounter.      Current Outpatient Medications on File Prior to Encounter   Medication Sig Dispense Refill    acetaminophen (TYLENOL) 500 MG tablet Take 2 tablets by mouth every 6 hours 120 tablet 3    aspirin 325 MG EC tablet Take 1 tablet by mouth daily 30 tablet 3    atorvastatin (LIPITOR) 40 MG tablet Take 1 tablet by mouth nightly 30 tablet 3    metoprolol tartrate (LOPRESSOR) 25 MG tablet Take 1 tablet by mouth 2 times daily 60 tablet 3    bisacodyl (DULCOLAX) 10 MG suppository Place 1 suppository rectally daily as needed for Constipation 7 suppository 0    sennosides-docusate sodium (SENOKOT-S) 8.6-50 MG tablet Take 1 tablet by mouth 2 times daily 60 tablet 0    polyethylene glycol (GLYCOLAX) 17 g packet Take 17 g by mouth daily as needed for Constipation 527 g 1    sevelamer (RENVELA) 800 MG tablet Take 1 tablet by mouth 3 times daily (with meals) 90 tablet 3    pantoprazole (PROTONIX) 40 MG tablet Take 1 tablet by mouth daily 30 tablet 0    insulin aspart (NOVOLOG) 100 UNIT/ML injection vial Inject 0-18 Units into the skin 3 times daily (with meals) 1 vial 3    albuterol sulfate  (90 Base) MCG/ACT inhaler Inhale 2 puffs into the lungs every 6 hours as needed for Wheezing or Shortness of Breath 1 Inhaler 3    insulin aspart (NOVOLOG) 100 UNIT/ML injection vial Inject 0-9 Units into the skin nightly 1 vial 3    insulin detemir (LEVEMIR FLEXTOUCH) 100 UNIT/ML injection pen Inject 25 Units into the skin nightly 5 pen 3    fluticasone (FLONASE) 50 MCG/ACT nasal spray 1 spray by Each Nostril route daily as needed for Rhinitis      Continuous Blood Gluc Sensor (FREESTYLE KOREY 2 SENSOR) MISC Change every 14 days 1 each 0    Continuous Blood Gluc  (FREESTYLE KOREY 2 READER) JOHNNIE Use to check glucose 1 each 0    Continuous Blood Gluc Sensor (FREESTYLE KOREY 14 DAY SENSOR) MISC Check glucose ---change every 14 days 2 each 3    Continuous Blood Gluc  (FREESTYLE KOREY 14 DAY READER) JOHNNIE To test glucose 1 Device 0    insulin aspart (NOVOLOG FLEXPEN) 100 UNIT/ML injection pen Inject 10 Units into the skin 3 times daily (before meals) 3 pen 4    Insulin Pen Needle 32G X 6 MM MISC Use with insulin pens 4 times daily 200 each 5    calcium acetate 667 MG TABS Take 667 mg by mouth 3 times daily (with meals)      [DISCONTINUED] Multiple Vitamins-Minerals (THERAPEUTIC MULTIVITAMIN-MINERALS) tablet Take 1 tablet by mouth daily.  30 tablet 11       Objective    BP (!) 147/91   Pulse 103   Temp 97.5 °F (36.4 °C) (Oral)   Resp 18   Ht 5' 9\" (1.753 m)   Wt 223 lb 8.7 oz (101.4 kg)   SpO2 97%   BMI 33.01 kg/m²     LABS:  WBC:    Lab Results   Component Value Date    WBC 12.7 07/13/2021     H/H:    Lab Results   Component Value Date    HGB 9.1 07/13/2021    HCT 28.3 07/13/2021     PTT:    Lab Results   Component Value Date    APTT 33.8 07/01/2021   [APTT}  PT/INR:    Lab Results   Component Value Date    PROTIME 15.9 07/01/2021    INR 1.39 07/01/2021     HgBA1c:    Lab Results   Component Value Date    LABA1C 8.8 05/31/2021       Assessment   Anthony Risk Score: Anthony Scale Score: 14    Patient Active Problem List   Diagnosis Code    Diabetes mellitus ([facility] E11.9    Obesity E66.9    Microalbuminuria R80.9    Hyperlipidemia with target LDL less than 100 E78.5    Hypertension I10    Type 2 diabetes mellitus with obesity ([facility] E11.69, E66.9    Acute renal failure (ARF) (HCC) N17.9    ESRD on peritoneal dialysis ([facility] N18.6, Z99.2    Non-smoker Z78.9    Elevated C-reactive protein (CRP) R79.82    Elevated sed rate R70.0    Diabetic polyneuropathy associated with type 2 diabetes mellitus (HCC) E11.42    Diabetes education, encounter for Z71.89    Atherosclerosis of native arteries of left leg with ulceration of other part of foot ([facility] I70.245    Near syncope R55    Coronary artery disease due to lipid rich plaque I25.10, I25.83    Mild malnutrition (HCC) E44.1    S/P coronary artery bypass graft x 3 Z95.1    S/P left atrial appendage ligation Z98.890    Generalized weakness R53.1    Functional dysphonia F44.4    SBO (small bowel obstruction) ([facility]) K56.609    Dialysis-associated peritonitis (HCC) T85.71XA    Peripheral vascular disease (HCC) I73.9    Moderate malnutrition (HCC) E44.0    Anemia of chronic disease D63.8    Hypoalbuminemia E88.09       Measurements:  Wound Abdomen Lower; Left large wound left pannus MASD (Active)   Wound Image   07/13/21 1515   Wound Etiology Other 07/13/21 1515   Dressing Status Clean;Dry 07/13/21 1515   Dressing/Treatment Triad hydro/zinc oxide-based hydrophilic paste 45/84/87 4312   Dressing Change Due 07/13/21 07/13/21 1515   Wound Length (cm) 2 cm 07/13/21 1515   Wound Width (cm) 4 cm 07/13/21 1515   Wound Surface Area (cm^2) 8 cm^2 07/13/21 1515   Wound Assessment Slough 07/13/21 1515   Jazmyn-wound Assessment Non-blanchable erythema 07/13/21 1515   Margins Defined edges 07/13/21 1515   Wound Thickness Description not for Pressure Injury Full thickness 07/13/21 1515   Number of days:        Wound Abdomen Lower; Left pannus, left side smaller wound  (Active)   Wound Etiology Other 07/13/21 1515   Dressing/Treatment Triad hydro/zinc oxide-based hydrophilic paste 89/66/95 1515   Wound Length (cm) 3 cm 07/13/21 1515   Wound Width (cm) 1 cm 07/13/21 1515   Wound Surface Area (cm^2) 3 cm^2 07/13/21 1515   Wound Assessment Slough 07/13/21 1515   Jazmyn-wound Assessment Non-blanchable erythema 07/13/21 1515   Number of days:        Wound Abdomen Lower;Right lower right pannus MASD (Active)   Wound Image   07/13/21 1515   Wound Etiology Other 07/13/21 1515   Dressing/Treatment Triad hydro/zinc oxide-based hydrophilic paste 92/62/68 3769   Wound Assessment Slough 07/13/21 1515   Jazmyn-wound Assessment Non-blanchable erythema 07/13/21 1515   Number of days:      Patient has consult for wounds to lower abdomen. Patient is PD and has skin folds that are moist.  Pattient's abdomen has scattered bruising, which she reports is from heparn / insulin shots. On assessment the pannus skin has areas of large, slough covered wounds. There are two wounds on the left and one smaller wound on the right side. Patient reports that at home she tries to keep her skin folds dry but that here it has been difficult. Patient reports that the nursing staff have been using the interdry. Discussed case with nurse Nader Quiñones, will use Triad ointment to the wounds; this will protect the wounds from moisture while the skin heals. Will also continue to use interdry in the abdominal and groin folds. Response to treatment:  Well tolerated by patient. Pain Assessment:  Severity:  0 / 10  Quality of pain: N/A  Wound Pain Timing/Severity: none  Premedicated: No    Plan   Plan of Care:   Keep skin folds clean and dry  Apply Triad ointment to wounds in pannus folds, no cover dressing needed  Apply Interdry to abdomen/pannus/groin folds, change if visibly soiled. Specialty Bed Required : Yes   [x] Low Air Loss   [x] Pressure Redistribution  [] Fluid Immersion  [] Bariatric  [] Total Pressure Relief  [] Other:     Current Diet: ADULT DIET;  Regular  Adult Oral Nutrition Supplement; Standard High Calorie/High Protein Oral Supplement  Dietician consult:  No    Discharge Plan:  Placement for patient upon discharge: skilled nursing    Patient appropriate for Outpatient [address]: No    Referrals:  [x]   [x] [facility]  [] Supplies  [] Other    Patient/Caregiver Teaching:  Level of patient/caregiver understanding able to:   [x] Indicates understanding       [x] Needs reinforcement  [] Unsuccessful      [] Verbal Understanding  [] Demonstrated understanding       [] No evidence of learning  [] Refused teaching         [] N/A       Electronically signed by COSME Guevara, RN  Wound/Ostomy Care on 7/13/2021 at 3:42 PM

## 2021-07-13 NOTE — CARE COORDINATION
Notified [facility], [id number], or [id number]  at the  of probable discharge tomorrow. Patient and son Umu Wells are aware and agreeable. Son requested confirmation of this plan when known. Hens submitted. Please complete & sign the VALENTÍN/AVS/Prescriptions,  RN please print VALENTÍN/AVS once completed.  Thank you, Lona Chase, MSW, [phone]

## 2021-07-13 NOTE — PROGRESS NOTES
Infectious Diseases   Progress Note      Admission Date: 7/1/2021  Hospital Day: Hospital Day: 13   Attending: Giselle Johnson MD  Date of service: 7/13/2021     Chief complaint/ Reason for consult:     · Peritoneal dialysis catheter related peritonitis-PD fluid is positive for Pseudomonas aeruginosa  · ESRD on peritoneal dialysis for 2 years  · Type 2 diabetes mellitus  · Essential hypertension    Microbiology:        I have reviewed allavailable micro lab data and cultures    · Blood culture (1/1) - collected on 7/1/2021: negative  · Peritoneal dialysate fluid culture  - collected on 7/1/2021: Heavy growth of Pseudomonas    [address] [facility], [place] [address]   Phone [phone]   Susceptibility    Pseudomonas aeruginosa (1)    Antibiotic Interpretation DENNISE Status    cefepime Sensitive <=2 mcg/mL     ciprofloxacin Sensitive <=1 mcg/mL     gentamicin Sensitive <=4 mcg/mL     meropenem Sensitive <=1 mcg/mL     piperacillin-tazobactam Sensitive <=16 mcg/mL     tobramycin Sensitive <=4 mcg/mL             Antibiotics and immunizations:       Current antibiotics: All antibiotics and their doses were reviewed by me    Recent Abx Admin                   ciprofloxacin (CIPRO) tablet 500 mg (mg) 500 mg Given 07/13/21 1321    dianeal lo-massimo 2.5% 1,000 mL with cefTAZidime 1,000 mg solution ()  New Bag 07/13/21 1245                  Immunization History: All immunization history was reviewed by me today. Immunization History   Administered Date(s) Administered    Influenza Virus Vaccine 10/23/2014, 12/09/2015    Influenza, Janas Rota, IM, (6 mo and older Fluzone, Flulaval, Fluarix and 3 yrs and older Afluria) 12/07/2018    Influenza, Quadv, IM, PF (6 mo and older Fluzone, Flulaval, Fluarix, and 3 yrs and older Afluria) 11/08/2016    Pneumococcal Polysaccharide ([id number]) 04/28/2017    Td, unspecified formulation 01/01/1999, 07/01/2007       Known drug allergies:      All allergies were reviewed and updated    Allergies   Allergen Reactions    Zetia [Ezetimibe] Shortness Of Breath    Quinolones Nausea And Vomiting and Nausea Only     Other reaction(s): Vomiting    Lisinopril Swelling       Social history:     Social History:  All social andepidemiologic history was reviewed and updated by me today as needed. · Tobacco use:   reports that she has never smoked. She has never used smokeless tobacco.  · Alcohol use:   reports no history of alcohol use. · Currently lives in: [place] [address]  ·  reports no history of drug use. COVID VACCINATION AND LAB RESULT RECORDS:     Internal Administration   First Dose      Second Dose           Last COVID Lab SARS-CoV-2 (no units)   Date Value   05/24/2021 Not Detected     SARS-CoV-2, NAAT (no units)   Date Value   06/08/2021 Not Detected            Assessment:     The patient is a 51 y.o. old female who  has a past medical history of Diabetes mellitus ([facility] [address] End stage kidney disease ([facility] [address] Hypertension, Neuropathy, and Peripheral vascular disease ([facility] [address] with following problems:    · Peritoneal dialysis catheter related peritonitis-PD fluid culture from 7/1/2021 had heavy growth of pan susceptible Pseudomonas aeruginosa-currently on IV Cipro and intraperitoneal ceftazidime  · ESRD on peritoneal dialysis for 2 years  · Type 2 diabetes mellitus-maintaining good glycemic control  · Essential hypertension-blood pressure is okay  · Coronary artery disease-stable  · Peripheral vascular disease  · Diabetic polyneuropathy type II  · Obesity Class 1 due to excess calorie intake : Body mass index is 33.01 kg/m². Discussion:      The patient is on IV Cipro and intraperitoneal ceftazidime. Abdominal pain is improving. White cell count is 12,700 today. PD fluid white cell count continues to improve and his 8400 today with 81% neutrophils    Plan:     Diagnostic Workup:      · Continue to follow  fever curve, WBC count and blood cultures.   · Continue to monitor blood counts, liver and renal function. Antimicrobials:    · Will switch IV Cipro to p.o. Cipro 500 mg every 24 hours  · We will plan to continue oral Cipro until next Friday, June 23  · Okay to continue intraperitoneal IV ceftazidime, given her complicated course  · Will plan to discontinue intraperitoneal ceftazidime at the time of discharge  · We will follow up on the culture results and clinical progress and will make further recommendations accordingly. · Continue close vitals monitoring. · Maintain good glycemic control. · Fall precautions. Aspiration precautions. · Continue to watch for new fever or diarrhea. · DVT prophylaxis. · Discussed all above with patient and RN. · Discussed with Dr. Livan Armas      Drug Monitoring:    · Continue monitoring for antibiotic toxicity as follows: CBC, CMP   · Continue to watch for following: new or worsening fever, new hypotension, hives, lip swelling and redness or purulence at vascular access sites. I/v access Management:    · Continue to monitor i.v access sites for erythema, induration, discharge or tenderness. · As always, continue efforts to minimize tubes/lines/drains as clinically appropriate to reduce chances of line associated infections. Patient education and counseling:        · The patient was educated in detail about the side-effects of various antibiotics and things to watch for like new rashes, lip swelling, severe reaction, worsening diarrhea, break through fever etc.  · Discussed patient's condition and what to expect. All of the patient's questions were addressed in a satisfactory manner and patient verbalized understanding all instructions. Level of complexity of visit: High         Thank you for involving me in the care of your patient. I will continue to follow. If you have anyadditional questions, please do not hesitate to contact me. Subjective:      Interval history: Interval history was obtained from chart review and patient/ RN. She is afebrile. She is tolerating antibiotics okay. No diarrhea     REVIEW OF SYSTEMS:     Review of Systems   Constitutional: Positive for fatigue. Negative for chills, diaphoresis and unexpected weight change. HENT: Negative for congestion, ear discharge, ear pain, facial swelling, hearing loss, rhinorrhea and trouble swallowing. Eyes: Negative for photophobia, discharge, redness and visual disturbance. Respiratory: Negative for apnea, cough, choking, chest tightness, shortness of breath and stridor. Cardiovascular: Negative for chest pain and palpitations. Gastrointestinal: Positive for abdominal distention. Negative for abdominal pain, blood in stool, diarrhea and nausea. Endocrine: Negative for polydipsia, polyphagia and polyuria. Genitourinary: Negative for difficulty urinating, dysuria, frequency, hematuria, menstrual problem and vaginal discharge. Musculoskeletal: Negative for arthralgias, joint swelling, myalgias and neck stiffness. Skin: Negative for color change and rash. Allergic/Immunologic: Negative for immunocompromised state. Neurological: Negative for dizziness, seizures, speech difficulty, light-headedness and headaches. Hematological: Negative for adenopathy. Psychiatric/Behavioral: Negative for agitation, hallucinations and suicidal ideas. Past Medical History: All past medical history reviewed today. Past Medical History:   Diagnosis Date    Diabetes mellitus ([facility] [address]     End stage kidney disease ([facility] [address]     peritoneal dialysis every night at home x 2 years    Hypertension     Neuropathy     Peripheral vascular disease ([facility] [address]        Past Surgical History: All past surgical history was reviewed today. Past Surgical History:   Procedure Laterality Date     SECTION      CORONARY ARTERY BYPASS GRAFT N/A 2021    URGENT CABG CORONARY ARTERY BYPASS GRAFTS X3. VEIN TO PDA X1, VEIN TO OM X1, LEFT INTERNAL MAMMARY ARTERY TO LAD X1. ENDOSCOPIC HARVEST RIGHT LEG SAPHENOUS VEIN. LIGATION NYA USING 35MM ATRICLIP. EPI AORTIC ULTRASOUND. TOTAL CARDIOPULMONARY BYPASS. DOPPLER GRAFT FLOW VERIFICATION. BILATERAL INITERCOSTAL NERVE BLOCK USING 1.3% EXPAREL. performed by Star Oropeza MD at [facility]  08/16/2018     lap PD cath placement lt side 62 cm    TOE AMPUTATION Left 2/26/2021    AMPUTATION OF THE LEFT THIRD TOE performed by Nila Salvador DPM at [address] TOE AMPUTATION Left 5/26/2021    AMPUTATION OF HALLUX AND SECOND TOE, LEFT FOOT performed by Nila Salvador DPM at [facility]         Family History: All family history was reviewed today. Problem Relation Age of Onset    Coronary Art Dis Mother     High Blood Pressure Mother     Heart Disease Mother     Diabetes Mother     Cancer Father     Coronary Art Dis Father     High Blood Pressure Father     Heart Disease Father     Diabetes Father        Objective:       PHYSICAL EXAM:      Vitals:   Vitals:    07/13/21 0425 07/13/21 0533 07/13/21 0825 07/13/21 1300   BP: (!) 142/47 (!) 118/43  (!) 147/91   Pulse: 92 92 100 103   Resp: 18 17 18 18   Temp: 97.5 °F (36.4 °C) 97.4 °F (36.3 °C) 97.3 °F (36.3 °C) 97.5 °F (36.4 °C)   TempSrc: Oral Oral Oral Oral   SpO2: 99% 99% 99% 97%   Weight:  223 lb 8.7 oz (101.4 kg)     Height:         Physical Exam  Vitals and nursing note reviewed. Constitutional:       General: She is not in acute distress. Appearance: She is well-developed. She is not diaphoretic. HENT:      Head: Normocephalic. Right Ear: External ear normal.      Left Ear: External ear normal.      Nose: Nose normal.   Eyes:      General: No scleral icterus. Right eye: No discharge. Left eye: No discharge. Conjunctiva/sclera: Conjunctivae normal.      Pupils: Pupils are equal, round, and reactive to light. Cardiovascular:      Rate and Rhythm: Normal rate and regular rhythm.       Heart sounds: No murmur heard. No friction rub. Pulmonary:      Effort: Pulmonary effort is normal.      Breath sounds: No stridor. No wheezing or rales. Chest:      Chest wall: No tenderness. Abdominal:      General: There is distension. Palpations: Abdomen is soft. There is no mass. Tenderness: There is no abdominal tenderness. There is no guarding or rebound. Musculoskeletal:         General: No tenderness. Cervical back: Normal range of motion and neck supple. Lymphadenopathy:      Cervical: No cervical adenopathy. Skin:     General: Skin is warm and dry. Findings: No erythema or rash. Neurological:      Mental Status: She is alert and oriented to person, place, and time. Motor: No abnormal muscle tone. Psychiatric:         Judgment: Judgment normal.             Lines: All vascular access sites are healthy with no local erythema, discharge or tenderness. Intake and output:    I/O last 3 completed shifts: In: 519.2 [P.O.:250; NG/GT:30; IV Piggyback:239.2]  Out: 6886 [Urine:700]    Lab Data:   All available labs and old records have been reviewed by me.     CBC:  Recent Labs     07/11/21 0444 07/12/21  0506 07/13/21  0420   WBC 13.9* 13.3* 12.7*   RBC 3.03* 3.06* 3.07*   HGB 8.8* 8.9* 9.1*   HCT 28.2* 28.1* 28.3*    151 138   MCV 92.8 91.7 92.2   MCH 29.0 29.2 29.5   MCHC 31.2 31.8 32.0   RDW 18.7* 18.4* 18.7*   BANDSPCT 1  --  2        BMP:  Recent Labs     07/11/21  0444 07/12/21  0506 07/13/21  0420   * 126* 126*   K 3.2* 3.4* 3.3*   CL 90* 89* 88*   CO2 28 25 26   BUN 51* 48* 44*   CREATININE 7.4* 7.0* 6.6*   CALCIUM 7.5* 7.8* 7.6*   GLUCOSE 281* 259* 234*        Hepatic Function Panel:   Lab Results   Component Value Date    ALKPHOS 183 07/02/2021    ALT <5 07/02/2021    AST 7 07/02/2021    PROT 6.4 07/02/2021    PROT 7.1 12/13/2012    BILITOT 0.3 07/02/2021    BILIDIR <0.2 07/02/2021    IBILI see below 07/02/2021    LABALBU 1.7 07/02/2021       CPK:   Lab Results Component Value Date    CKTOTAL 45 12/19/2013     ESR:   Lab Results   Component Value Date    SEDRATE 84 (H) 02/08/2021     CRP:   Lab Results   Component Value Date    CRP 8.9 (H) 02/08/2021           Imaging: All pertinent images and reports for the current visit were reviewed by me during this visit. XR CHEST PORTABLE   Final Result   Status post gastric tube placement in good position along the fundus of the   stomach. Stable mild cardiomegaly with increasing central pulmonary congestion      Hazy bibasilar opacities and questionable small bibasilar pleural effusions   which is more prominent. CT ABDOMEN PELVIS WO CONTRAST Additional Contrast? Oral   Final Result   1. Mild bibasilar segmental atelectasis versus pneumonia. 2. Negative for small bowel obstruction. 3. Mild ascites. Medications: All current and past medications were reviewed.      ciprofloxacin  500 mg Oral Daily    potassium bicarb-citric acid  40 mEq Oral Once    custom dialysis builder   Intraperitoneal Daily    insulin aspart  0-6 Units Subcutaneous TID AC    And    insulin aspart  0-3 Units Subcutaneous Nightly    insulin detemir  19 Units Subcutaneous Nightly    thiamine (VITAMIN B1) IVPB  100 mg Intravenous Q24H    epoetin toy-epbx  10,000 Units Subcutaneous Q7 Days    lactobacillus  1 capsule Oral BID WC    nystatin  5 mL Oral 4x Daily    metoclopramide  5 mg Intravenous Q6H    [Held by provider] aspirin  325 mg Oral Daily    metoprolol tartrate  25 mg Oral BID    pantoprazole  40 mg Oral Daily    sennosides-docusate sodium  1 tablet Oral BID    gentamicin   Topical Daily    atorvastatin  20 mg Oral Nightly    [Held by provider] heparin (porcine)  5,000 Units Subcutaneous 3 times per day    miconazole   Topical BID    polyethylene glycol  17 g Oral Daily    [Held by provider] torsemide  100 mg Oral Daily        dextrose         ALPRAZolam, phenol, labetalol, dextrose, acetaminophen,

## 2021-07-13 NOTE — PLAN OF CARE
Problem: Falls - Risk of:  Goal: Will remain free from falls  Description: Will remain free from falls  Note: Call light within reach, bed in lowest position, and pt educated on the importance of using call light for assistance.       Problem: Skin Integrity:  Goal: Absence of new skin breakdown  Description: Absence of new skin breakdown  Note: Pt changed after incontinence, zinc and micotin powder applied, and repositioning done      Problem: Pain:  Goal: Pain level will decrease  Description: Pain level will decrease  Outcome: Ongoing  Note: Pt denies any pain at this time     Problem: Nutrition  Goal: Optimal nutrition therapy  7/13/2021 0331 by Marie Trinidad, RN  Note: NG tube feedings started and pt tolerating well

## 2021-07-13 NOTE — PROGRESS NOTES
Physical Therapy  Facility/Department: [address]  Daily Treatment Note  NAME: Bertram De La Rosa  : 1975  MRN: [id number]    Date of Service: 2021    Discharge Recommendations:  Bertram De La Rosa scored a 9/24 on the AM-PAC short mobility form. Current research shows that an AM-PAC score of 17 or less is typically not associated with a discharge to the patient's home setting. Based on the patient's AM-PAC score and their current functional mobility deficits, it is recommended that the patient have 3-5 sessions per week of Physical Therapy at d/c to increase the patient's independence. Please see assessment section for further patient specific details. If patient discharges prior to next session this note will serve as a discharge summary. Please see below for the latest assessment towards goals. If patient discharges prior to next session this note will serve as a discharge summary. Please see below for the latest assessment towards goals. 3-5 sessions per week   PT Equipment Recommendations  Equipment Needed: Yes  Walker: Rolling    Assessment   Body structures, Functions, Activity limitations: Decreased functional mobility ; Decreased ADL status; Decreased strength;Decreased balance;Decreased endurance  Assessment: Pt presents with the above limitations. Pt showed ability to go from supine to sit upon arrval with SBA. Pt with two unsuccesful attempts to stand with MaxAx2. Pt would benefit from skilled PT to promote return to baseline functional mobility. Treatment Diagnosis: imparied functional mobility, strength, and endurance  Prognosis: Fair  Clinical Presentation: evolving  PT Education: Goals;PT Role;Plan of Care;General Safety;Precautions  Patient Education: Pt verbalized understanding. REQUIRES PT FOLLOW UP: Yes  Activity Tolerance  Activity Tolerance: Patient limited by endurance; Patient limited by fatigue  Activity Tolerance: Pt was feeling weak and did not think she could do anything. She did attempt to stand for us but told us she is too weak and tired to do it. Patient Diagnosis(es): There were no encounter diagnoses. has a past medical history of Diabetes mellitus ([facility] [address] End stage kidney disease ([facility] [address] Hypertension, Neuropathy, and Peripheral vascular disease ([facility] [address] has a past surgical history that includes Tonsillectomy;  section; other surgical history (2018); Toe amputation (Left, 2021); Toe amputation (Left, 2021); and Coronary artery bypass graft (N/A, 2021). Restrictions  Restrictions/Precautions  Restrictions/Precautions: Weight Bearing, Fall Risk  Required Braces or Orthoses?: Yes  Lower Extremity Weight Bearing Restrictions  Left Lower Extremity Weight Bearing: Weight Bearing As Tolerated  Required Braces or Orthoses  Left Lower Extremity Brace: Boot  LLE Brace Type: Surgical shoe  Position Activity Restriction  Sternal Precautions: No Pushing, No Pulling, 5# Lifting Restrictions  Other position/activity restrictions: 51 y.o. female with PMHx of ESRD on PD, CAD status post CABG, diabetes, hypertension, peripheral vascular disease status post partial foot accommodation was initially admitted to [facility] 2021 for dizziness. At that time cardiac catheterization was performed which showed multivessel disease. Patient undergone CABG. Subsequently patient was discharged to [facility] acute rehab unit where she was undergoing therapy. She was being followed by nephrology in view of her ESRD for PD. She subsequently developed abdominal discomfort nausea and vomiting. Imaging suggestive of ileus versus SBO. Surgery was consulted at [facility] who suggested transfer to acute care. Patient states that she was not able to tolerate anything p.o. or since the day before yesterday. Her last bowel movement was approximately that time, no melena or hematochezia, hard.   Today her labs demonstrate hyponatremia, hyperkalemia, BUN/creatinine consistent with ESRD patient, and leukocytosis to 18. Fluid from PD catheter has grown Pseudomonas aeruginosa. Discussed with nephrology, increased dose of cefepime, give additional dose of meropenem, ID consulted. Subjective   General  Chart Reviewed: Yes  Additional Pertinent Hx: ESRD, DM  Response To Previous Treatment: Patient with no complaints from previous session. Family / Caregiver Present: No  General Comment  Comments: Pt supine in bed upon arrival. Pt wanted to attempt getting into recliner but shortly changed her mind. She was agreeable to stand. Pain Screening  Patient Currently in Pain: No  Vital Signs  Patient Currently in Pain: No       Orientation  Orientation  Overall Orientation Status: Within Functional Limits  Cognition   Cognition  Overall Cognitive Status: Exceptions  Arousal/Alertness: Appropriate responses to stimuli  Following Commands: Follows one step commands consistently; Follows one step commands with increased time  Attention Span: Appears intact  Memory: Appears intact  Initiation: Requires cues for some  Sequencing: Requires cues for some  Cognition Comment: Patient with flat affect, appears depressed  Objective   Bed mobility  Rolling to Left: Minimal assistance  Rolling to Right: Minimal assistance  Supine to Sit: Stand by assistance  Sit to Supine: Moderate assistance  Scooting: Maximal assistance  Transfers  Sit to Stand: Maximum Assistance;2 Person Assistance (2 attempts to stand, never fully stood - using UEs heavily despite cues for sternal precautions, refused use of heart pillow stating \"I can't use that\", refusing to use surgical shoe stating \"I can't stand with that on\")  Stand to sit: 2 Person Assistance;Minimal Assistance  Ambulation  Ambulation?: No  More Ambulation?: No     Balance  Posture: Fair  Sitting - Static: Good  Exercises  Comments:  Ankle pumps x10 RLE, SAQ x5 BLE, marching x10 BLE      Strength RLE  Strength RLE: Exception  Comment: weakness evident through assist of 2 required for transfer  Strength LLE  Strength LLE: Exception  Comment: weakness evident through assist of 2 required for tranfers     AM-PAC Score  AM-PAC Inpatient Mobility Raw Score : 9 (07/13/21 1645)  AM-PAC Inpatient T-Scale Score : 30.55 (07/13/21 1645)  Mobility Inpatient CMS 0-100% Score: 81.38 (07/13/21 1645)  Mobility Inpatient CMS G-Code Modifier : CM (07/13/21 1645)          Goals  Short term goals  Time Frame for Short term goals: to be completed prior to d/c  Short term goal 1: pt will complete bed mobility with SBA  Short term goal 2: pt will complete transfers with mod assist x1  Short term goal 3: pt will ambulate 20' with RW and CGA. Patient Goals   Patient goals : to return home and regain independence  No goals met this treatment. Plan    Plan  Times per week: 3-5 x per week  Times per day: Daily  Current Treatment Recommendations: Strengthening, Balance Training, Functional Mobility Training, Transfer Training, Endurance Training, Gait Training, Safety Education & Training, Home Exercise Program  Safety Devices  Type of devices: All fall risk precautions in place, Bed alarm in place, Nurse notified, Call light within reach, Gait belt, Left in bed  Restraints  Initially in place: No     Therapy Time   Individual Concurrent Group Co-treatment   Time In 1556         Time Out 1631         Minutes 35         Timed Code Treatment Minutes: [address], SPT  Marbin Jacobsen, PT Therapist observed and directed the above evaluation.  Thanks, Marbin Jacobsen [place], DPT [id number]

## 2021-07-13 NOTE — PROGRESS NOTES
HOSPITALISTS PROGRESS NOTE    7/13/2021 3:19 PM        Name: Sena Cunningham . Admitted: 7/1/2021  Primary Care Provider: Arvin Rivera MD (Tel: [phone])      Problem List  Active Problems:    Type 2 diabetes mellitus with obesity ([address]    ESRD on peritoneal dialysis [facility])    Diabetes education, encounter for    Coronary artery disease due to lipid rich plaque    SBO (small bowel obstruction) ([address]    Dialysis-associated peritonitis ([address]    Peripheral vascular disease ([address]    Moderate malnutrition ([facility]    Anemia of chronic disease    Hypoalbuminemia  Resolved Problems:    * No resolved hospital problems. 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 female with PMHx of ESRD on PD, CAD status post CABG, diabetes, hypertension, peripheral vascular disease status post partial foot accommodation was initially admitted to [facility] [place] 5/31/2021 for dizziness.  At that time cardiac catheterization was performed which showed multivessel disease.  Patient undergone CABG.  Subsequently patient was discharged to [facility] [place] acute rehab unit where she was undergoing therapy.  She was being followed by nephrology in view of her ESRD for PD.  She subsequently developed abdominal discomfort nausea and vomiting. Imaging suggestive of ileus versus SBO. General surgery was consulted at [facility] and but direct patient was transferred to inpatient. Patient was managed conservatively and her diet was advanced as tolerated. Patient later developed leukocytosis; PD dialysate culture grew Pseudomonas aeruginosa. ID was consulted and patient was started on IV meropenem and ceftazidime    CC nausea and vomiting    Subjective:  .   Patient seems comfortable  Could not tolerate NJ sign he was taken out  Getting potassium replacement  Feels anxious requesting something for anxiety    Reviewed interval ancillary notes    Current Medications  ciprofloxacin (CIPRO) tablet 500 mg, Daily  potassium bicarb-citric acid (EFFER-K) effervescent tablet 40 mEq, Once  ALPRAZolam (XANAX) tablet 0.25 mg, BID PRN  phenol 1.4 % mouth spray 1 spray, Q2H PRN  dianeal lo-massimo 2.5% 1,000 mL with cefTAZidime 1,000 mg solution, Daily  insulin aspart (NOVOLOG) injection pen 0-6 Units - Patient Supplied, TID AC   And  insulin aspart (NOVOLOG) injection pen 0-3 Units - Patient Supplied, Nightly  insulin detemir (LEVEMIR) injection pen 19 Units - Patient Supplied, Nightly  thiamine (B-1) 100 mg in sodium chloride 0.9 % 100 mL IVPB, Q24H  epoetin toy-epbx (RETACRIT) injection 10,000 Units, Q7 Days  lactobacillus (CULTURELLE) capsule 1 capsule, BID WC  nystatin (MYCOSTATIN) 758115 UNIT/ML suspension 500,000 Units, 4x Daily  labetalol (NORMODYNE;TRANDATE) injection 10 mg, Q4H PRN  metoclopramide (REGLAN) injection 5 mg, Q6H  dextrose 50 % IV solution, PRN  acetaminophen (TYLENOL) tablet 650 mg, Q6H PRN  [Held by provider] aspirin EC tablet 325 mg, Daily  magnesium hydroxide (MILK OF MAGNESIA) 400 MG/5ML suspension 30 mL, Daily PRN  metoprolol tartrate (LOPRESSOR) tablet 25 mg, BID  pantoprazole (PROTONIX) tablet 40 mg, Daily  polyethylene glycol (GLYCOLAX) packet 17 g, Daily PRN  sennosides-docusate sodium (SENOKOT-S) 8.6-50 MG tablet 1 tablet, BID  traMADol (ULTRAM) tablet 50 mg, Q6H PRN   Or  traMADol (ULTRAM) tablet 100 mg, Q6H PRN  zolpidem (AMBIEN) tablet 5 mg, Nightly PRN  dextrose 5 % solution, PRN  dextrose 50 % IV solution, PRN  glucagon (rDNA) injection 1 mg, PRN  glucose (GLUTOSE) 40 % oral gel 15 g, PRN  gentamicin (GARAMYCIN) 0.1 % cream, Daily  atorvastatin (LIPITOR) tablet 20 mg, Nightly  bisacodyl (DULCOLAX) EC tablet 5 mg, Daily PRN  calcium carbonate (TUMS) chewable tablet 500 mg, TID PRN  diphenhydrAMINE (BENADRYL) tablet 25 mg, Q6H PRN  heparin (porcine) injection 3,400 Units, PRN  [Held by provider] heparin (porcine) injection 5,000 Units, 3 times per day  hydrALAZINE (APRESOLINE) tablet 50 mg, Q8H PRN  miconazole (MICOTIN) 2 % powder, BID  ondansetron (ZOFRAN) injection 4 mg, Q6H PRN  ondansetron (ZOFRAN-ODT) disintegrating tablet 4 mg, Q8H PRN  polyethylene glycol (GLYCOLAX) packet 17 g, Daily  promethazine (PHENERGAN) tablet 12.5 mg, Q6H PRN  [Held by provider] torsemide (DEMADEX) tablet 100 mg, Daily  albuterol (PROVENTIL) nebulizer solution 2.5 mg, Q6H PRN        Objective:  BP (!) 147/91   Pulse 103   Temp 97.5 °F (36.4 °C) (Oral)   Resp 18   Ht 5' 9\" (1.753 m)   Wt 223 lb 8.7 oz (101.4 kg)   SpO2 97%   BMI 33.01 kg/m²     Intake/Output Summary (Last 24 hours) at 7/13/2021 1519  Last data filed at 7/13/2021 0930  Gross per 24 hour   Intake 736.17 ml   Output 1920 ml   Net -1183.83 ml      Wt Readings from Last 3 Encounters:   07/13/21 223 lb 8.7 oz (101.4 kg)   07/01/21 217 lb 6 oz (98.6 kg)   06/16/21 227 lb 4.7 oz (103.1 kg)       General appearance:  Appears comfortable  Eyes: Sclera clear. Pupils equal.  ENT: Moist oral mucosa. Trachea midline, no adenopathy. Cardiovascular: Regular rhythm, normal S1, S2. No murmur. No edema in lower extremities  Respiratory: Not using accessory muscles. Good inspiratory effort.  Clear to auscultation bilaterally, no wheeze or crackles. GI: Abdomen soft, no tenderness, not distended, normal bowel sounds  Musculoskeletal: No cyanosis in digits, neck supple  Neurology: CN 2-12 grossly intact. No speech or motor deficits  Psych: Normal affect. Alert and oriented in time, place and person  Skin: Warm, dry, normal turgor  Extremity exam shows brisk capillary refill. Peripheral pulses are palpable in lower extremities     Labs and Tests:  CBC:   Recent Labs     07/11/21 0444 07/12/21  0506 07/13/21  0420   WBC 13.9* 13.3* 12.7*   HGB 8.8* 8.9* 9.1*    151 138     BMP:    Recent Labs     07/11/21 0444 07/12/21  0506 07/13/21  0420   * 126* 126*   K 3.2* 3.4* 3.3*   CL 90* 89* 88*   CO2 28 25 26   BUN 51* 48* 44*   CREATININE 7.4* 7.0* 6.6*   GLUCOSE 281* 259* 234*     Hepatic: No results for input(s): AST, ALT, ALB, BILITOT, ALKPHOS in the last 72 hours. XR CHEST PORTABLE   Final Result   Status post gastric tube placement in good position along the fundus of the   stomach. Stable mild cardiomegaly with increasing central pulmonary congestion      Hazy bibasilar opacities and questionable small bibasilar pleural effusions   which is more prominent. CT ABDOMEN PELVIS WO CONTRAST Additional Contrast? Oral   Final Result   1. Mild bibasilar segmental atelectasis versus pneumonia. 2. Negative for small bowel obstruction. 3. Mild ascites.                Carole Estes MD   7/13/2021 3:19 PM

## 2021-07-13 NOTE — PROGRESS NOTES
NG tube taken out Per Dr. Mandy Hemphill at [phone]    Tube feeding stopped at [address] Saeed held per Dr. Mandy Hemphill for possible Vas Cath placement. Patient NPO at this time. Potassium will be replaced once patient can eat. 40 meq's of Disintegrating tablets.

## 2021-07-13 NOTE — PROGRESS NOTES
Cardiothoracic Surgery Progress Note    CC: s/p CABG/ NYA clip 6/9   Follow-up visit     Subjective:  Hemodynamically stable, 2.5 L O2 NC,  afebrile. Alert and oriented X 3. Patient expressing frustration that she wants her NGT out. Vital Signs:   BP (!) 118/43   Pulse 100   Temp 97.3 °F (36.3 °C) (Oral)   Resp 18    SpO2 99%       Physical Exam:   Cardiac: regular, no murmur  Lungs: clear to auscultation, diminished bases  Abdomen:   NA X 4  Vascular:  pulses all palpable   Extremities: generalized, pitting edema 1+ LE  :   documented last 24 hours   Incision(s):    Sternum: stable, edges approximated, no drainage  Chest tube site: edges approximated, no drainage. Purse string already out. Right leg incision: Edges well approximated, no drainage         Labs:   CBC:   Recent Labs     07/12/21  0506 07/13/21  0420   WBC 13.3* 12.7*   HGB 8.9* 9.1*   HCT 28.1* 28.3*    138     BMP:   Recent Labs     07/12/21  0506 07/13/21  0420   K 3.4* 3.3*   CREATININE 7.0* 6.6*   CALCIUM 7.8* 7.6*   MG 1.70* 1.70*       Assessment/Plan:  As per CC:     Plan:   -Patient unable to follow- up in CVTS office due to readmit from rehab. All incisions WNL.    -CVTS office will schedule outpatient visit once she is back home  -Ok to hold ASA for a few days if upcoming dialysis catheter placement necessitates     Electronically signed by RICHARD Bianchi - CNP on 7/13/2021 at 10:14 AM

## 2021-07-13 NOTE — PLAN OF CARE
Problem: Skin Integrity:  Intervention: Wound dressing application  Note: Keep skin folds clean and dry  Apply Triad ointment to wounds in pannus folds, no cover dressing needed       Problem: Skin Integrity:  Intervention: Manage dressing change  Note: Apply Triad ointment to wounds in pannus fold, can stay in place for 3 days or reapply as needed. . To clean off, use foam cleanser and reapply. Problem: Skin Integrity:  Intervention: Skin care  Note: Keep skin folds clean and dry  Apply Triad ointment to wounds in pannus folds, no cover dressing needed  Apply Interdry to abdomen/pannus/groin folds, change if visibly soiled.

## 2021-07-14 VITALS
TEMPERATURE: 97.6 F | DIASTOLIC BLOOD PRESSURE: 61 MMHG | SYSTOLIC BLOOD PRESSURE: 103 MMHG | OXYGEN SATURATION: 98 % | HEART RATE: 96 BPM | BODY MASS INDEX: 35.76 KG/M2 | WEIGHT: 241.4 LBS | RESPIRATION RATE: 18 BRPM | HEIGHT: 69 IN

## 2021-07-14 LAB
ANION GAP SERPL CALCULATED.3IONS-SCNC: 10 MMOL/L (ref 3–16)
APPEARANCE FLUID: CLEAR
BASOPHILS ABSOLUTE: 0.1 K/UL (ref 0–0.2)
BASOPHILS RELATIVE PERCENT: 0.8 %
BUN BLDV-MCNC: 46 MG/DL (ref 7–20)
CALCIUM SERPL-MCNC: 8.1 MG/DL (ref 8.3–10.6)
CELL COUNT FLUID TYPE: NORMAL
CHLORIDE BLD-SCNC: 88 MMOL/L (ref 99–110)
CLOT EVALUATION: NORMAL
CO2: 28 MMOL/L (ref 21–32)
COLOR FLUID: NORMAL
CREAT SERPL-MCNC: 6.9 MG/DL (ref 0.6–1.1)
EOSINOPHILS ABSOLUTE: 0.1 K/UL (ref 0–0.6)
EOSINOPHILS RELATIVE PERCENT: 0.9 %
GFR AFRICAN AMERICAN: 8
GFR NON-AFRICAN AMERICAN: 6
GLUCOSE BLD-MCNC: 200 MG/DL (ref 70–99)
GLUCOSE BLD-MCNC: 229 MG/DL (ref 70–99)
GLUCOSE BLD-MCNC: 247 MG/DL (ref 70–99)
GLUCOSE BLD-MCNC: 270 MG/DL (ref 70–99)
GLUCOSE BLD-MCNC: 306 MG/DL (ref 70–99)
GLUCOSE BLD-MCNC: 325 MG/DL (ref 70–99)
HCT VFR BLD CALC: 28 % (ref 36–48)
HEMOGLOBIN: 8.9 G/DL (ref 12–16)
LYMPHOCYTES ABSOLUTE: 1 K/UL (ref 1–5.1)
LYMPHOCYTES RELATIVE PERCENT: 7.4 %
LYMPHOCYTES, BODY FLUID: 7 %
MAGNESIUM: 1.7 MG/DL (ref 1.8–2.4)
MCH RBC QN AUTO: 29.1 PG (ref 26–34)
MCHC RBC AUTO-ENTMCNC: 31.7 G/DL (ref 31–36)
MCV RBC AUTO: 91.8 FL (ref 80–100)
MONOCYTE, FLUID: 19 %
MONOCYTES ABSOLUTE: 1.1 K/UL (ref 0–1.3)
MONOCYTES RELATIVE PERCENT: 8.4 %
NEUTROPHIL, FLUID: 74 %
NEUTROPHILS ABSOLUTE: 11.3 K/UL (ref 1.7–7.7)
NEUTROPHILS RELATIVE PERCENT: 82.5 %
NUCLEATED CELLS FLUID: 99 /CUMM
NUMBER OF CELLS COUNTED FLUID: 100
PDW BLD-RTO: 18.7 % (ref 12.4–15.4)
PERFORMED ON: ABNORMAL
PHOSPHORUS: 4 MG/DL (ref 2.5–4.9)
PLATELET # BLD: 155 K/UL (ref 135–450)
PMV BLD AUTO: 10.2 FL (ref 5–10.5)
POTASSIUM SERPL-SCNC: 3.5 MMOL/L (ref 3.5–5.1)
RBC # BLD: 3.05 M/UL (ref 4–5.2)
RBC FLUID: <1000 /CUMM
SODIUM BLD-SCNC: 126 MMOL/L (ref 136–145)
WBC # BLD: 13.7 K/UL (ref 4–11)

## 2021-07-14 PROCEDURE — 6370000000 HC RX 637 (ALT 250 FOR IP): Performed by: INTERNAL MEDICINE

## 2021-07-14 PROCEDURE — 84100 ASSAY OF PHOSPHORUS: CPT

## 2021-07-14 PROCEDURE — 90945 DIALYSIS ONE EVALUATION: CPT

## 2021-07-14 PROCEDURE — 89051 BODY FLUID CELL COUNT: CPT

## 2021-07-14 PROCEDURE — 6360000002 HC RX W HCPCS: Performed by: INTERNAL MEDICINE

## 2021-07-14 PROCEDURE — 2580000003 HC RX 258: Performed by: INTERNAL MEDICINE

## 2021-07-14 PROCEDURE — 83735 ASSAY OF MAGNESIUM: CPT

## 2021-07-14 PROCEDURE — 85025 COMPLETE CBC W/AUTO DIFF WBC: CPT

## 2021-07-14 PROCEDURE — 36415 COLL VENOUS BLD VENIPUNCTURE: CPT

## 2021-07-14 PROCEDURE — 80048 BASIC METABOLIC PNL TOTAL CA: CPT

## 2021-07-14 PROCEDURE — 6360000002 HC RX W HCPCS: Performed by: NURSE PRACTITIONER

## 2021-07-14 PROCEDURE — 99233 SBSQ HOSP IP/OBS HIGH 50: CPT | Performed by: INTERNAL MEDICINE

## 2021-07-14 PROCEDURE — 99232 SBSQ HOSP IP/OBS MODERATE 35: CPT | Performed by: INTERNAL MEDICINE

## 2021-07-14 RX ORDER — CIPROFLOXACIN 500 MG/1
500 TABLET, FILM COATED ORAL DAILY
Qty: 9 TABLET | Refills: 0 | Status: SHIPPED | OUTPATIENT
Start: 2021-07-14 | End: 2021-07-23

## 2021-07-14 RX ORDER — POTASSIUM CHLORIDE 20 MEQ/1
20 TABLET, EXTENDED RELEASE ORAL ONCE
Status: COMPLETED | OUTPATIENT
Start: 2021-07-14 | End: 2021-07-14

## 2021-07-14 RX ORDER — INSULIN DETEMIR 100 [IU]/ML
20 INJECTION, SOLUTION SUBCUTANEOUS NIGHTLY
Qty: 5 PEN | Refills: 3 | Status: SHIPPED | OUTPATIENT
Start: 2021-07-14

## 2021-07-14 RX ORDER — POTASSIUM CHLORIDE 20 MEQ/1
20 TABLET, EXTENDED RELEASE ORAL DAILY
Qty: 90 TABLET | Refills: 1 | Status: SHIPPED | OUTPATIENT
Start: 2021-07-14

## 2021-07-14 RX ADMIN — METOCLOPRAMIDE 5 MG: 5 INJECTION, SOLUTION INTRAMUSCULAR; INTRAVENOUS at 17:15

## 2021-07-14 RX ADMIN — THIAMINE HYDROCHLORIDE 100 MG: 100 INJECTION, SOLUTION INTRAMUSCULAR; INTRAVENOUS at 17:18

## 2021-07-14 RX ADMIN — METOCLOPRAMIDE 5 MG: 5 INJECTION, SOLUTION INTRAMUSCULAR; INTRAVENOUS at 00:32

## 2021-07-14 RX ADMIN — Medication 1 CAPSULE: at 08:36

## 2021-07-14 RX ADMIN — GENTAMICIN SULFATE: 1 CREAM TOPICAL at 17:18

## 2021-07-14 RX ADMIN — ALPRAZOLAM 0.25 MG: 0.25 TABLET ORAL at 01:47

## 2021-07-14 RX ADMIN — PANTOPRAZOLE SODIUM 40 MG: 40 TABLET, DELAYED RELEASE ORAL at 09:39

## 2021-07-14 RX ADMIN — MICONAZOLE NITRATE: 20 POWDER TOPICAL at 08:42

## 2021-07-14 RX ADMIN — ALPRAZOLAM 0.25 MG: 0.25 TABLET ORAL at 17:15

## 2021-07-14 RX ADMIN — METOCLOPRAMIDE 5 MG: 5 INJECTION, SOLUTION INTRAMUSCULAR; INTRAVENOUS at 12:39

## 2021-07-14 RX ADMIN — CIPROFLOXACIN 500 MG: 500 TABLET, FILM COATED ORAL at 08:36

## 2021-07-14 RX ADMIN — Medication 1 CAPSULE: at 17:15

## 2021-07-14 RX ADMIN — METOCLOPRAMIDE 5 MG: 5 INJECTION, SOLUTION INTRAMUSCULAR; INTRAVENOUS at 06:07

## 2021-07-14 RX ADMIN — METOPROLOL TARTRATE 25 MG: 25 TABLET, FILM COATED ORAL at 08:36

## 2021-07-14 RX ADMIN — CEFTAZIDIME: 1 INJECTION, POWDER, FOR SOLUTION INTRAMUSCULAR; INTRAVENOUS at 12:27

## 2021-07-14 RX ADMIN — POTASSIUM CHLORIDE 20 MEQ: 20 TABLET, EXTENDED RELEASE ORAL at 12:56

## 2021-07-14 ASSESSMENT — PAIN SCALES - GENERAL
PAINLEVEL_OUTOF10: 0

## 2021-07-14 ASSESSMENT — ENCOUNTER SYMPTOMS
TROUBLE SWALLOWING: 0
COUGH: 0
PHOTOPHOBIA: 0
EYE DISCHARGE: 0
CHOKING: 0
NAUSEA: 0
BLOOD IN STOOL: 0
APNEA: 0
DIARRHEA: 0
FACIAL SWELLING: 0
ABDOMINAL PAIN: 0
STRIDOR: 0
COLOR CHANGE: 0
CHEST TIGHTNESS: 0
RHINORRHEA: 0
SHORTNESS OF BREATH: 0
EYE REDNESS: 0

## 2021-07-14 NOTE — PROGRESS NOTES
Comprehensive Nutrition Assessment    Type and Reason for Visit:  Reassess    Nutrition Recommendations/Plan:   Offer Ensure Clear and Magic cup in addition to Ensure Enlive     Nutrition Assessment:  Pt had NG tube placed 7/12 with plan to start tube feeding, but pt did not tolerate tube and this was removed 7/13. Pt continues with inadequate PO intake, consuming less than 50% of meals. Seen over lunch tray having consumed 26-50% of hamburger. She drank three Ensure Enlive yesterday but now states they are giving her diarrhea. She is agreeable to trial Ensure Clear and Magic cup in addition to Ensure Enlive. Malnutrition Assessment:  Malnutrition Status: Moderate malnutrition    Context:  Chronic Illness     Findings of the 6 clinical characteristics of malnutrition:  Energy Intake:  7 - 75% or less estimated energy requirements for 1 month or longer  Weight Loss:  No significant weight loss     Body Fat Loss:  1 - Mild body fat loss Orbital   Muscle Mass Loss:  1 - Mild muscle mass loss Temples (temporalis), Clavicles (pectoralis & deltoids)  Fluid Accumulation:  1 - Mild Extremities   Strength:  Not Performed    Estimated Daily Nutrient Needs:  Energy (kcal):  1326-9341; Weight Used for Energy Requirements:  Admission (102 kg)     Protein (g):  79-99 grams; Weight Used for Protein Requirements:  Ideal (66 kg; 1.2-1.5 grams per kg)        Fluid (ml/day):   ; Method Used for Fluid Requirements:  1 ml/kcal      Nutrition Related Findings:  +1 non-pitting BLE edema. Na+ 126. Mg+ 1.7. LBM 7/14. Wounds:  Surgical Incision, Open Wounds       Current Nutrition Therapies:    ADULT DIET;  Regular  Adult Oral Nutrition Supplement; Diabetic Oral Supplement    Anthropometric Measures:  · Height: 5' 9\" (175.3 cm)  · Current Body Weight: 241 lb (109.3 kg)   · Admission Body Weight: 225 lb (102.1 kg)    · Ideal Body Weight: 145 lbs; % Ideal Body Weight 166.2 %   · BMI: 35.6  · BMI Categories: Obese Class 2 (BMI 35.0 -39.9)       Nutrition Diagnosis:   · Inadequate oral intake related to inadequate protein-energy intake as evidenced by intake 26-50%, intake 0-25%    · Moderate malnutrition related to inadequate protein-energy intake as evidenced by intake 0-25%, intake 26-50%, mild muscle loss, localized or generalized fluid accumulation      Nutrition Interventions:   Food and/or Nutrient Delivery:  Continue Current Diet, Modify Oral Nutrition Supplement  Nutrition Education/Counseling:  Education completed   Coordination of Nutrition Care:  Continue to monitor while inpatient    Goals:  Pt will consume at least 50% of meals and supplements       Nutrition Monitoring and Evaluation:   Behavioral-Environmental Outcomes:  None Identified   Food/Nutrient Intake Outcomes:  Food and Nutrient Intake, Supplement Intake  Physical Signs/Symptoms Outcomes:  Biochemical Data, Diarrhea, Skin, Weight     Discharge Planning:    Continue Oral Nutrition Supplement     Electronically signed by Joanne Gonzalez RD, LD on 7/14/21 at 2:07 PM EDT    Contact: [phone]

## 2021-07-14 NOTE — PROGRESS NOTES
Infectious Diseases   Progress Note      Admission Date: 7/1/2021  Hospital Day: Hospital Day: 14   Attending: Kevon Vitale MD  Date of service: 7/14/2021     Chief complaint/ Reason for consult:     · Peritoneal dialysis catheter related peritonitis-PD fluid is positive for Pseudomonas aeruginosa  · ESRD on peritoneal dialysis for 2 years  · Type 2 diabetes mellitus  · Essential hypertension    Microbiology:        I have reviewed allavailable micro lab data and cultures    · Blood culture (1/1) - collected on 7/1/2021: negative  · Peritoneal dialysate fluid culture  - collected on 7/1/2021: Heavy growth of Pseudomonas    [address] [place] [address]   Phone [phone]   Susceptibility    Pseudomonas aeruginosa (1)    Antibiotic Interpretation DENNISE Status    cefepime Sensitive <=2 mcg/mL     ciprofloxacin Sensitive <=1 mcg/mL     gentamicin Sensitive <=4 mcg/mL     meropenem Sensitive <=1 mcg/mL     piperacillin-tazobactam Sensitive <=16 mcg/mL     tobramycin Sensitive <=4 mcg/mL             Antibiotics and immunizations:       Current antibiotics: All antibiotics and their doses were reviewed by me    Recent Abx Admin                   ciprofloxacin (CIPRO) tablet 500 mg (mg) 500 mg Given 07/14/21 0836     500 mg Given 07/13/21 1321    dianeal lo-massimo 2.5% 1,000 mL with cefTAZidime 1,000 mg solution ()  New Bag 07/13/21 1245                  Immunization History: All immunization history was reviewed by me today.     Immunization History   Administered Date(s) Administered    Influenza Virus Vaccine 10/23/2014, 12/09/2015    Influenza, Garcia Coco, IM, (6 mo and older Fluzone, Flulaval, Fluarix and 3 yrs and older Afluria) 12/07/2018    Influenza, Quadv, IM, PF (6 mo and older Fluzone, Flulaval, Fluarix, and 3 yrs and older Afluria) 11/08/2016    Pneumococcal Polysaccharide ([id number]) 04/28/2017    Td, unspecified formulation 01/01/1999, 07/01/2007       Known drug allergies: All allergies were reviewed and updated    Allergies   Allergen Reactions    Zetia [Ezetimibe] Shortness Of Breath    Quinolones Nausea And Vomiting and Nausea Only     Other reaction(s): Vomiting    Lisinopril Swelling       Social history:     Social History:  All social andepidemiologic history was reviewed and updated by me today as needed. · Tobacco use:   reports that she has never smoked. She has never used smokeless tobacco.  · Alcohol use:   reports no history of alcohol use. · Currently lives in: [place] [address]  ·  reports no history of drug use. COVID VACCINATION AND LAB RESULT RECORDS:     Internal Administration   First Dose      Second Dose           Last COVID Lab SARS-CoV-2 (no units)   Date Value   05/24/2021 Not Detected     SARS-CoV-2, NAAT (no units)   Date Value   06/08/2021 Not Detected            Assessment:     The patient is a 51 y.o. old female who  has a past medical history of Diabetes mellitus ([facility] End stage kidney disease ([facility] Hypertension, Neuropathy, and Peripheral vascular disease ([facility] with following problems:    · Peritoneal dialysis catheter related peritonitis-PD fluid culture from 7/1/2021 had heavy growth of pan susceptible Pseudomonas aeruginosa-covered with appropriate antibiotics  · ESRD on peritoneal dialysis for 2 years  · Type 2 diabetes mellitus diabetes counseling done  · Essential hypertension-blood pressure okay  · Coronary artery disease-stable  · Peripheral vascular disease  · Diabetic polyneuropathy type II  · Obesity Class 1 due to excess calorie intake : Body mass index is 35.65 kg/m². Discussion:      WBC count is 13,700. The patient is on oral Cipro and intraperitoneal Lavaun Cookey    She is tolerating peritoneal dialysis okay    Sheis afebrile. No diarrhea. She is tolerating oral Cipro okay      Plan:     Diagnostic Workup:      · Continue to follow  fever curve, WBC count and blood cultures.   · Continue to monitor blood counts, liver and renal function. Antimicrobials:    · Continue p.o. Cipro 500 mg every 24 hours until June 23  · Plan to stop intraperitoneal Fortaz at discharge  · Continue oral probiotics twice daily  · Can plan for discharge whenever okay with nephrology  · We will follow up on the culture results and clinical progress and will make further recommendations accordingly. · Continue close vitals monitoring. · Maintain good glycemic control. · Fall precautions. Aspiration precautions. · Continue to watch for new fever or diarrhea. · DVT prophylaxis. · Discussed all above with patient and RN. Drug Monitoring:    · Continue monitoring for antibiotic toxicity as follows: CBC, CMP, QTc interval  · Continue to watch for following: new or worsening fever, new hypotension, hives, lip swelling and redness or purulence at vascula a r access sites. I/v access Management:    · Continue to monitor i.v access sites for erythema, induration, discharge or tenderness. · As always, continue efforts to minimize tubes/lines/drains as clinically appropriate to reduce chances of line associated infections. Patient education and counseling:      · The patient was educated in detail about the side-effects of various antibiotics and things to watch for like new rashes, lip swelling, severe reaction, worsening diarrhea, break through fever etc.  · Discussed patient's condition and what to expect. All of the patient's questions were addressed in a satisfactory manner and patient verbalized understanding all instructions. Fluoroquinolone related instructions:     Patient instructed to watch for low or high blood sugars, muscle pains and ankle tendon pain while on Ciprofloxacin or Levofloxacin. Patient was advised to keep a sugar candy at all times as all fluoroquinolones have the potential of causing hypoglycemia. If these symptoms develops, patient was instructed to stop the antibiotic and call my office at [phone]. Use sunscreen when going in bright sun while on Ciprofloxacin or Levofloxacin as these antibiotics can cause photosensitivity. Take 1 hour before or 2 hour after dairy, calcium, iron, magnesium, aluminum or zinc.      TIME SPENT TODAY:     - Spent over  26 minutes on visit (including interval history, physical exam, review of data including labs, cultures, imaging, development and implementation of treatment plan and coordination of complex care). More than 50 percent of this includes face-to-face time spent with the patient for counseling and coordination of care. Thank you for involving me in the care of your patient. I will continue to follow. If you have anyadditional questions, please do not hesitate to contact me. Subjective: Interval history: Interval history was obtained from chart review and patient/ RN. The patient is afebrile. She is tolerating antibiotics okay. No diarrhea     REVIEW OF SYSTEMS:     Review of Systems   Constitutional: Negative for chills, diaphoresis and unexpected weight change. HENT: Negative for congestion, ear discharge, ear pain, facial swelling, hearing loss, rhinorrhea and trouble swallowing. Eyes: Negative for photophobia, discharge, redness and visual disturbance. Respiratory: Negative for apnea, cough, choking, chest tightness, shortness of breath and stridor. Cardiovascular: Negative for chest pain and palpitations. Gastrointestinal: Negative for abdominal pain, blood in stool, diarrhea and nausea. Endocrine: Negative for polydipsia, polyphagia and polyuria. Genitourinary: Negative for difficulty urinating, dysuria, frequency, hematuria, menstrual problem and vaginal discharge. Musculoskeletal: Negative for arthralgias, joint swelling, myalgias and neck stiffness. Skin: Negative for color change and rash. Allergic/Immunologic: Negative for immunocompromised state.    Neurological: Negative for dizziness, seizures, speech difficulty, light-headedness and headaches. Hematological: Negative for adenopathy. Psychiatric/Behavioral: Negative for agitation, hallucinations and suicidal ideas. Past Medical History: All past medical history reviewed today. Past Medical History:   Diagnosis Date    Diabetes mellitus ([facility]     End stage kidney disease ([facility] [address] [facility]     peritoneal dialysis every night at home x 2 years    Hypertension     Neuropathy     Peripheral vascular disease ([facility] [facility]        Past Surgical History: All past surgical history was reviewed today. Past Surgical History:   Procedure Laterality Date     SECTION      CORONARY ARTERY BYPASS GRAFT N/A 2021    URGENT CABG CORONARY ARTERY BYPASS GRAFTS X3. VEIN TO PDA X1, VEIN TO OM X1, LEFT INTERNAL MAMMARY ARTERY TO LAD X1. ENDOSCOPIC HARVEST RIGHT LEG SAPHENOUS VEIN. LIGATION NYA USING 35MM ATRICLIP. EPI AORTIC ULTRASOUND. TOTAL CARDIOPULMONARY BYPASS. DOPPLER GRAFT FLOW VERIFICATION. BILATERAL INITERCOSTAL NERVE BLOCK USING 1.3% EXPAREL. performed by Prema Mcgowan MD at [place] [address]  2018     lap PD cath placement lt side 62 cm    TOE AMPUTATION Left 2021    AMPUTATION OF THE LEFT THIRD TOE performed by Julia Means DPM at [address] [facility] [address] TOE AMPUTATION Left 2021    AMPUTATION OF HALLUX AND SECOND TOE, LEFT FOOT performed by Julia Means DPM at [facility]         Family History: All family history was reviewed today.         Problem Relation Age of Onset    Coronary Art Dis Mother     High Blood Pressure Mother     Heart Disease Mother     Diabetes Mother     Cancer Father     Coronary Art Dis Father     High Blood Pressure Father     Heart Disease Father     Diabetes Father        Objective:       PHYSICAL EXAM:      Vitals:   Vitals:    21 2030 21 0009 21 0336 21 0730   BP: (!) 101/45 (!) 110/49 121/62 (!) 124/58   Pulse: 108 103 102 100   Resp: 18 18 20 18   Temp: 98 °F (36.7 °C) 97.6 °F (36.4 °C) 97.7 °F (36.5 °C) 97.5 °F (36.4 °C)   TempSrc: Oral Oral Oral Oral   SpO2: 97% 98% 97% 100%   Weight:   241 lb 6.5 oz (109.5 kg)    Height:         Physical Exam  Vitals and nursing note reviewed. Constitutional:       General: She is not in acute distress. Appearance: She is well-developed. She is not diaphoretic. HENT:      Head: Normocephalic. Right Ear: External ear normal.      Left Ear: External ear normal.      Nose: Nose normal.   Eyes:      General: No scleral icterus. Right eye: No discharge. Left eye: No discharge. Conjunctiva/sclera: Conjunctivae normal.      Pupils: Pupils are equal, round, and reactive to light. Cardiovascular:      Rate and Rhythm: Normal rate and regular rhythm. Heart sounds: No murmur heard. No friction rub. Pulmonary:      Effort: Pulmonary effort is normal.      Breath sounds: No stridor. No wheezing or rales. Chest:      Chest wall: No tenderness. Abdominal:      General: There is distension. Palpations: Abdomen is soft. There is no mass. Tenderness: There is no abdominal tenderness. There is no guarding or rebound. Musculoskeletal:         General: No tenderness. Cervical back: Normal range of motion and neck supple. Lymphadenopathy:      Cervical: No cervical adenopathy. Skin:     General: Skin is warm and dry. Findings: No erythema or rash. Neurological:      Mental Status: She is alert and oriented to person, place, and time. Motor: No abnormal muscle tone. Psychiatric:         Judgment: Judgment normal.             Lines: All vascular access sites are healthy with no local erythema, discharge or tenderness. Intake and output:    I/O last 3 completed shifts: In: 0534 [Other:1200; NG/GT:417]  Out: 18     Lab Data:   All available labs and old records have been reviewed by me.     CBC:  Recent Labs     07/12/21  0506 07/13/21  0420 07/14/21  0654   WBC 13.3* 12.7* 13.7*   RBC 3.06* 3.07* 3.05*   HGB 8.9* 9.1* 8.9*   HCT 28.1* 28.3* 28.0*    138 155   MCV 91.7 92.2 91.8   MCH 29.2 29.5 29.1   MCHC 31.8 32.0 31.7   RDW 18.4* 18.7* 18.7*   BANDSPCT  --  2  --         BMP:  Recent Labs     07/12/21  0506 07/13/21  0420 07/14/21  0654   * 126* 126*   K 3.4* 3.3* 3.5   CL 89* 88* 88*   CO2 25 26 28   BUN 48* 44* 46*   CREATININE 7.0* 6.6* 6.9*   CALCIUM 7.8* 7.6* 8.1*   GLUCOSE 259* 234* 247*        Hepatic Function Panel:   Lab Results   Component Value Date    ALKPHOS 183 07/02/2021    ALT <5 07/02/2021    AST 7 07/02/2021    PROT 6.4 07/02/2021    PROT 7.1 12/13/2012    BILITOT 0.3 07/02/2021    BILIDIR <0.2 07/02/2021    IBILI see below 07/02/2021    LABALBU 1.7 07/02/2021       CPK:   Lab Results   Component Value Date    CKTOTAL 45 12/19/2013     ESR:   Lab Results   Component Value Date    SEDRATE 84 (H) 02/08/2021     CRP:   Lab Results   Component Value Date    CRP 8.9 (H) 02/08/2021           Imaging: All pertinent images and reports for the current visit were reviewed by me during this visit. XR CHEST PORTABLE   Final Result   Status post gastric tube placement in good position along the fundus of the   stomach. Stable mild cardiomegaly with increasing central pulmonary congestion      Hazy bibasilar opacities and questionable small bibasilar pleural effusions   which is more prominent. CT ABDOMEN PELVIS WO CONTRAST Additional Contrast? Oral   Final Result   1. Mild bibasilar segmental atelectasis versus pneumonia. 2. Negative for small bowel obstruction. 3. Mild ascites. Medications: All current and past medications were reviewed.      potassium chloride  20 mEq Oral Once    ciprofloxacin  500 mg Oral Daily    custom dialysis builder   Intraperitoneal Daily    insulin aspart  0-6 Units Subcutaneous TID AC    And    insulin aspart  0-3 Units Subcutaneous Nightly    insulin detemir  19 Units Subcutaneous Nightly  thiamine (VITAMIN B1) IVPB  100 mg Intravenous Q24H    epoetin toy-epbx  10,000 Units Subcutaneous Q7 Days    lactobacillus  1 capsule Oral BID WC    metoclopramide  5 mg Intravenous Q6H    [Held by provider] aspirin  325 mg Oral Daily    metoprolol tartrate  25 mg Oral BID    pantoprazole  40 mg Oral Daily    sennosides-docusate sodium  1 tablet Oral BID    gentamicin   Topical Daily    atorvastatin  20 mg Oral Nightly    [Held by provider] heparin (porcine)  5,000 Units Subcutaneous 3 times per day    miconazole   Topical BID    polyethylene glycol  17 g Oral Daily    [Held by provider] torsemide  100 mg Oral Daily        dextrose         ALPRAZolam, phenol, labetalol, dextrose, acetaminophen, magnesium hydroxide, polyethylene glycol, traMADol **OR** traMADol, zolpidem, dextrose, dextrose, glucagon (rDNA), glucose, bisacodyl, calcium carbonate, diphenhydrAMINE, heparin (porcine), hydrALAZINE, ondansetron, ondansetron, promethazine, albuterol      Problem list:       Patient Active Problem List   Diagnosis Code    Diabetes mellitus ([facility] E11.9    Obesity E66.9    Microalbuminuria R80.9    Hyperlipidemia with target LDL less than 100 E78.5    Hypertension I10    Type 2 diabetes mellitus with obesity (HCC) E11.69, E66.9    Acute renal failure (ARF) ([facility]) N17.9    ESRD on peritoneal dialysis ([facility] N18.6, Z99.2    Non-smoker Z78.9    Elevated C-reactive protein (CRP) R79.82    Elevated sed rate R70.0    Diabetic polyneuropathy associated with type 2 diabetes mellitus ([facility] E11.42    Diabetes education, encounter for Z71.89    Atherosclerosis of native arteries of left leg with ulceration of other part of foot ([facility] I70.245    Near syncope R55    Coronary artery disease due to lipid rich plaque I25.10, I25.83    Mild malnutrition (HCC) E44.1    S/P coronary artery bypass graft x 3 Z95.1    S/P left atrial appendage ligation Z98.890    Generalized weakness R53.1    Functional dysphonia F44.4    SBO (small bowel obstruction) ([address] K56.609    Dialysis-associated peritonitis ([address] T85.71XA    Peripheral vascular disease ([address] I73.9    Moderate malnutrition (HCC) E44.0    Anemia of chronic disease D63.8    Hypoalbuminemia E88.09       Please note that this chart was generated using Dragon dictation software. Although every effort was made to ensure the accuracy of this automated transcription, some errors in transcription may have occurred inadvertently. If you may need any clarification, please do not hesitate to contact me through EPIC or at the phone number provided below with my electronic signature. Any pictures or media included in this note were obtained after taking informed verbal consent from the patient and with their approval to include those in the patient's medical record.     Chet Hines MD, MPH  7/14/2021 , 11:59 AM   [facility]   [address] [place] [address], [address] [place] [address]  Office: [phone]  Fax: [phone]  Clinic days:  Tuesday & Thursday the abdomen and he just had the [facility] catheter placement everything they are telling me is fluid overloaded so I told him

## 2021-07-14 NOTE — PLAN OF CARE
Problem: Falls - Risk of:  Goal: Will remain free from falls  Description: Will remain free from falls  Note: Call light within reach, bed in lowest position, and pt educated on the importance of using call light for assistance. Problem: Skin Integrity:  Goal: Absence of new skin breakdown  Description: Absence of new skin breakdown  Note: Pt changed when incontinent, triad cream applied to open wounds and micotin powder applied, and repositioned for comfort. Problem: Pain:  Goal: Pain level will decrease  Description: Pain level will decrease  Note: Pt denies any pain at this time.

## 2021-07-14 NOTE — PROGRESS NOTES
Central PA team  [phone]  Pool: HE PA MED ([id number])          PA has been initiated.       PA form completed and faxed insurance via Cover My Meds     Key:  Manual fax to [facility] [phone]     Medication:  hydrocortisone 25 mg suppository    Insurance:  [employer]        Response will be received via fax and may take up to 5-10 business days depending on plan         HOSPITALISTS PROGRESS NOTE    7/14/2021 1:26 PM        Name: Edie Cleveland . Admitted: 7/1/2021  Primary Care Provider: Farshad Robb MD (Tel: [phone])      Problem List  Active Problems:    Type 2 diabetes mellitus with obesity ([address]    ESRD on peritoneal dialysis [facility])    Encounter for medication counseling    Coronary artery disease due to lipid rich plaque    SBO (small bowel obstruction) (HCC)    Dialysis-associated peritonitis ([facility]    Peripheral vascular disease ([facility]    Moderate malnutrition ([facility]    Anemia of chronic disease    Hypoalbuminemia  Resolved Problems:    * No resolved hospital problems. *       Assessment & Plan:   Peritonitis   Pseudomonas   ID and nephro input noticed, labs noticed worsening WBC count    Coronary disease s/p CABG  Stable, aspirin on hold nephrology discussed with CT surgery    Severe malnutrition  NG tube is out on 7/13/2021 as patient could not tolerated, Ensure 3 times daily,    Insulin-dependent diabetes mellitus   blood sugars are slightly going up  We will increase the Levemir dose    Stress with amputation of left first and second toe    Peripheral vascular disease    Normocytic anemia  Diabetes mellitus with neuropathy    ESRD on PD nephrology managing    Hypokalemia and hyponatremia  Replacement protocol    Anxiety  Trial of short-term Xanax      IV Access: Peripheral  Russ: No  Diet: ADULT DIET; Regular  Adult Oral Nutrition Supplement; Diabetic Oral Supplement  Code:Full Code  DVT PPX Heparin  Disposition remains inpatient      Brief Course: This 51 y.o. female with PMHx of ESRD on PD, CAD status post CABG, diabetes, hypertension, peripheral vascular disease status post partial foot accommodation was initially admitted to [facility] 5/31/2021 for dizziness.  At that time cardiac catheterization was performed which showed multivessel disease.  Patient undergone CABG. g, Daily PRN  sennosides-docusate sodium (SENOKOT-S) 8.6-50 MG tablet 1 tablet, BID  traMADol (ULTRAM) tablet 50 mg, Q6H PRN   Or  traMADol (ULTRAM) tablet 100 mg, Q6H PRN  zolpidem (AMBIEN) tablet 5 mg, Nightly PRN  dextrose 5 % solution, PRN  dextrose 50 % IV solution, PRN  glucagon (rDNA) injection 1 mg, PRN  glucose (GLUTOSE) 40 % oral gel 15 g, PRN  gentamicin (GARAMYCIN) 0.1 % cream, Daily  atorvastatin (LIPITOR) tablet 20 mg, Nightly  bisacodyl (DULCOLAX) EC tablet 5 mg, Daily PRN  calcium carbonate (TUMS) chewable tablet 500 mg, TID PRN  diphenhydrAMINE (BENADRYL) tablet 25 mg, Q6H PRN  heparin (porcine) injection 3,400 Units, PRN  [Held by provider] heparin (porcine) injection 5,000 Units, 3 times per day  hydrALAZINE (APRESOLINE) tablet 50 mg, Q8H PRN  miconazole (MICOTIN) 2 % powder, BID  ondansetron (ZOFRAN) injection 4 mg, Q6H PRN  ondansetron (ZOFRAN-ODT) disintegrating tablet 4 mg, Q8H PRN  polyethylene glycol (GLYCOLAX) packet 17 g, Daily  promethazine (PHENERGAN) tablet 12.5 mg, Q6H PRN  [Held by provider] torsemide (DEMADEX) tablet 100 mg, Daily  albuterol (PROVENTIL) nebulizer solution 2.5 mg, Q6H PRN        Objective:  /61   Pulse 86   Temp 97.4 °F (36.3 °C) (Oral)   Resp 18   Ht 5' 9\" (1.753 m)   Wt 241 lb 6.5 oz (109.5 kg)   SpO2 100%   BMI 35.65 kg/m²     Intake/Output Summary (Last 24 hours) at 7/14/2021 1326  Last data filed at 7/14/2021 0336  Gross per 24 hour   Intake --   Output 510 ml   Net -510 ml      Wt Readings from Last 3 Encounters:   07/14/21 241 lb 6.5 oz (109.5 kg)   07/01/21 217 lb 6 oz (98.6 kg)   06/16/21 227 lb 4.7 oz (103.1 kg)   Obese  CABG scar noticed  Peritoneal dialysis catheter noticed  Mild pedal edema  General appearance:  Appears comfortable  Eyes: Sclera clear. Pupils equal.  ENT: Moist oral mucosa. Trachea midline, no adenopathy. Cardiovascular: Regular rhythm, normal S1, S2. No murmur.  edema in lower extremities  Respiratory: Not using accessory

## 2021-07-14 NOTE — PROGRESS NOTES
Peritoneal Treatment :  07/13/21-07/14/21    Net UF: 510ml    Pre weight: 101.4kg  Post weight: 101.5kg  Access function: no alarms  Treatment:  Tolerated good. Pt with no c/o. VSS. Declined dressing change to PD site. Few particles of fibrin found in each drainage bag. Specimen sent to lab. Copy of dialysis treatment record placed in chart, to be scanned into EMR.

## 2021-07-14 NOTE — CARE COORDINATION
Discharge Plan:     Patient discharged to:  [address] [facility]  PHONE: [phone]: [phone]  SW/DC Planner faxed, [address] and AVS  Narcotic Prescriptions faxed were: none  RN will call report    Medical Transport with: [facility]  [phone]   time: 7pm  Family advised of discharge?: yes  HENS Submitted?:  yes  All discharge needs met per case management.   Alesha Ramirez MSW, [address]

## 2021-07-14 NOTE — PROGRESS NOTES
Office : [phone]     Fax :[phone]       Nephrology Progress   Note      Patient's Name: Shaila Cordero  8:20 AM  7/14/2021    Reason for Consult:  ESRD    History of Present iIlness:    Shaila Cordero is a 51 y.o. female with severe end-stage renal disease secondary to diabetic nephropathy on peritoneal dialysis, hypertension, peripheral vascular disease, diabetic neuropathy who was admitted after she had a syncopal episode. She had similar episode 2 weeks ago. Recently had angiogram left lower extremity for gangrene left foot second toe. Denies any shortness of breath. Denies any abdominal pain. Denies any nausea vomiting. Interval HX :      No fever   No abdominal pain     Oral intake very low   Sodium is low at 126   No nausea , no vomiting today       PD fluid growing pseudomonas    Rpt culture no growth so far  Cell count improving. Cell count for today still pending    Results for Bo Sanchez (MRN [id number]) as of 7/13/2021 10:33   Ref. Range 7/9/2021 05:40 7/10/2021 06:30 7/11/2021 07:42 7/12/2021 04:05 7/13/2021 05:33   Appearance, Fluid Unknown Hazy Hazy Hazy Clear Clear   Color, Fluid Unknown Pale Yellow Colorless Yellow Pale Yellow Pale Yellow   Eos, Fluid Latest Units: % 3 1  3    RBC, Fluid Latest Units: /cumm 2,800 1,353 2,500 <1,000 <1,000   Lymphocytes, Body Fluid Latest Units: % 5 4 21 12 14   Monocyte Count, Fluid Latest Units: %    1 3   Neutrophil Count, Fluid Latest Units: % 85 67 70 72 81   Nucl Cell, Fluid Latest Units: /cumm 3,770 628 1,236 98 84   Mesothelial, Fluid Latest Units: %  1 2 1        No chest pain  No sob  S/p CABG on 6/9/2010          I/O last 3 completed shifts:   In: 3400 [Other:1200; NG/GT:417]  Out: 18     Past Medical History: Diagnosis Date    [facility]     End stage kidney disease ([facility]     peritoneal dialysis every night at home x 2 years    Hypertension     Neuropathy     Peripheral vascular disease ([facility]        Past Surgical History:   Procedure Laterality Date     SECTION      CORONARY ARTERY BYPASS GRAFT N/A 2021    URGENT CABG CORONARY ARTERY BYPASS GRAFTS X3. VEIN TO PDA X1, VEIN TO OM X1, LEFT INTERNAL MAMMARY ARTERY TO LAD X1. ENDOSCOPIC HARVEST RIGHT LEG SAPHENOUS VEIN. LIGATION NYA USING 35MM ATRICLIP. EPI AORTIC ULTRASOUND. TOTAL CARDIOPULMONARY BYPASS. DOPPLER GRAFT FLOW VERIFICATION. BILATERAL INITERCOSTAL NERVE BLOCK USING 1.3% EXPAREL.  performed by Prema Mcgowan MD at [address] [place] [address]  2018     lap PD cath placement lt side 62 cm    TOE AMPUTATION Left 2021    AMPUTATION OF THE LEFT THIRD TOE performed by Julia Means DPM at [address] [facility] [address] TOE AMPUTATION Left 2021    AMPUTATION OF HALLUX AND SECOND TOE, LEFT FOOT performed by Julia Means DPM at [facility]         Family History   Problem Relation Age of Onset    Coronary Art Dis Mother     High Blood Pressure Mother     Heart Disease Mother     Diabetes Mother     Cancer Father     Coronary Art Dis Father     High Blood Pressure Father     Heart Disease Father     Diabetes Father      Current Medications:    ciprofloxacin (CIPRO) tablet 500 mg, Daily  ALPRAZolam (XANAX) tablet 0.25 mg, BID PRN  phenol 1.4 % mouth spray 1 spray, Q2H PRN  dianeal lo-massimo 2.5% 1,000 mL with cefTAZidime 1,000 mg solution, Daily  insulin aspart (NOVOLOG) injection pen 0-6 Units - Patient Supplied, TID AC   And  insulin aspart (NOVOLOG) injection pen 0-3 Units - Patient Supplied, Nightly  insulin detemir (LEVEMIR) injection pen 19 Units - Patient Supplied, Nightly  thiamine (B-1) 100 mg in sodium chloride 0.9 % 100 mL IVPB, Q24H  epoetin toy-epbx (RETACRIT) injection 10,000 Units, Q7 Days  lactobacillus (CULTURELLE) capsule 1 capsule, BID WC  labetalol (NORMODYNE;TRANDATE) injection 10 mg, Q4H PRN  metoclopramide (REGLAN) injection 5 mg, Q6H  dextrose 50 % IV solution, PRN  acetaminophen (TYLENOL) tablet 650 mg, Q6H PRN  [Held by provider] aspirin EC tablet 325 mg, Daily  magnesium hydroxide (MILK OF MAGNESIA) 400 MG/5ML suspension 30 mL, Daily PRN  metoprolol tartrate (LOPRESSOR) tablet 25 mg, BID  pantoprazole (PROTONIX) tablet 40 mg, Daily  polyethylene glycol (GLYCOLAX) packet 17 g, Daily PRN  sennosides-docusate sodium (SENOKOT-S) 8.6-50 MG tablet 1 tablet, BID  traMADol (ULTRAM) tablet 50 mg, Q6H PRN   Or  traMADol (ULTRAM) tablet 100 mg, Q6H PRN  zolpidem (AMBIEN) tablet 5 mg, Nightly PRN  dextrose 5 % solution, PRN  dextrose 50 % IV solution, PRN  glucagon (rDNA) injection 1 mg, PRN  glucose (GLUTOSE) 40 % oral gel 15 g, PRN  gentamicin (GARAMYCIN) 0.1 % cream, Daily  atorvastatin (LIPITOR) tablet 20 mg, Nightly  bisacodyl (DULCOLAX) EC tablet 5 mg, Daily PRN  calcium carbonate (TUMS) chewable tablet 500 mg, TID PRN  diphenhydrAMINE (BENADRYL) tablet 25 mg, Q6H PRN  heparin (porcine) injection 3,400 Units, PRN  [Held by provider] heparin (porcine) injection 5,000 Units, 3 times per day  hydrALAZINE (APRESOLINE) tablet 50 mg, Q8H PRN  miconazole (MICOTIN) 2 % powder, BID  ondansetron (ZOFRAN) injection 4 mg, Q6H PRN  ondansetron (ZOFRAN-ODT) disintegrating tablet 4 mg, Q8H PRN  polyethylene glycol (GLYCOLAX) packet 17 g, Daily  promethazine (PHENERGAN) tablet 12.5 mg, Q6H PRN  [Held by provider] torsemide (DEMADEX) tablet 100 mg, Daily  albuterol (PROVENTIL) nebulizer solution 2.5 mg, Q6H PRN          Physical exam:     Vitals:  BP (!) 124/58   Pulse 100   Temp 97.5 °F (36.4 °C) (Oral)   Resp 18   Ht 5' 9\" (1.753 m)   Wt 241 lb 6.5 oz (109.5 kg)   SpO2 100%   BMI 35.65 kg/m²   Constitutional:  OAA X3 NAD  Skin: no rash, turgor wnl  Heent:  eomi, mmm  Neck: no bruits or jvd noted  Cardiovascular:  S1, S2 without m/r/g  Respiratory: CTA B without w/r/r  Abdomen:  +bs, soft, nt, nd  Ext: no  lower extremity edema      Labs:  CBC:   Recent Labs     07/12/21  0506 07/13/21  0420 07/14/21  0654   WBC 13.3* 12.7* 13.7*   HGB 8.9* 9.1* 8.9*    138 155     BMP:    Recent Labs     07/12/21  0506 07/13/21  0420 07/14/21  0654   * 126* 126*   K 3.4* 3.3* 3.5   CL 89* 88* 88*   CO2 25 26 28   BUN 48* 44* 46*   CREATININE 7.0* 6.6* 6.9*   GLUCOSE 259* 234* 247*     Ca/Mg/Phos:   Recent Labs     07/12/21  0506 07/13/21  0420 07/14/21  0654   CALCIUM 7.8* 7.6* 8.1*   MG 1.70* 1.70* 1.70*   PHOS 4.4 4.2 4.0         IMAGING:  XR CHEST PORTABLE   Final Result   Status post gastric tube placement in good position along the fundus of the   stomach. Stable mild cardiomegaly with increasing central pulmonary congestion      Hazy bibasilar opacities and questionable small bibasilar pleural effusions   which is more prominent. CT ABDOMEN PELVIS WO CONTRAST Additional Contrast? Oral   Final Result   1. Mild bibasilar segmental atelectasis versus pneumonia. 2. Negative for small bowel obstruction. 3. Mild ascites. Assessment/Plan :      1. Acute peritonitis  Ceftazidime  6 hr day time dwell first dose started  on  7/4  On ciprofloxacin. To be given till June 23   PD cycles to 5 cycles with total  8 hr duration, discussed with PD nurse   PD fluid cx : pseudomonas +    Cell count is coming down   Fluid started to clear up       D/w ID   Continue current abx   No need to remove PD catheter as of now     If cell count continues to improve then will not need  PD catheter to be removed. In that case then can discharge to skilled nursing facility today  Discussed with Dr. Walter Nguyen about the plan in detail. 2   ESRD. Continue on peritoneal dialysis.   continue cycler/CCPD   Poor oral intake   Low albumin   Hold torsemide    fill volume to 1400 ml   PD site  no discharge. 2. HTN. BP controlled   Continue current blood pressure medications    3. Anemia of chronic disease. Continue LUIZA    4. Acid- base disorder. Monitor    5. Hyponatremia   Improving   Sodium level better at 128 ----> 130  ---> 128 ---> 126   Encourage increase oral intake      6. CAD/Syncopal episode. S/p [facility]   Has Multivessel CAD   S/p CABG       7. Peripheral vascular disease. H/o  amputation of left foot toes. Podiatry following       8. Generalized weakness. Needs rehabilitation/ SNF      9. hypokalemia- replace potassium as needed    10 .  Hypomagnesemia - replace magnesium as needed                Thank you for allowing us to participate in care of Tariq Villa         Electronically signed by: Angelica Hidalgo MD, 7/14/2021, 8:20 AM      [facility] [address]  Office : [phone]  Fax :[phone]

## 2021-07-14 NOTE — DISCHARGE SUMMARY
drinking much and having severe malnutrition. Try to place it NG for tube feeding but she did not tolerated. Acute peritonitis probably peritoneal dialysis catheter related  Patient will finish Cipro course 12 June 23 and nephrology is planning to give ceftazidime at nursing home. Discussed with nephrology on the day of discharge they are okay with discharge. Cultures did grew Pseudomonas    ESRD on peritoneal dialysis  S/p recent CABG  Severe malnutrition might benefit from dietitian at [facility]. Insulin-dependent diabetes mellitus might need adjustment of insulin regimen at [facility]  Hypokalemia and hyponatremia probably related to poor nutrition as per nephrology    Consults. IP CONSULT TO GENERAL SURGERY  PHARMACY CONSULT FOR RENAL DOSING  IP CONSULT TO OTOLARYNGOLOGY  IP CONSULT TO SPIRITUAL SERVICES  IP CONSULT TO NEPHROLOGY  IP CONSULT TO INFECTIOUS DISEASES    Physical examination on discharge day. /61   Pulse 86   Temp 97.4 °F (36.3 °C) (Oral)   Resp 18   Ht 5' 9\" (1.753 m)   Wt 241 lb 6.5 oz (109.5 kg)   SpO2 100%   BMI 35.65 kg/m²   General appearance. Alert. Looks comfortable. HEENT. Sclera clear. Moist mucus membranes. Cardiovascular. Regular rate and rhythm, normal S1, S2. No murmur. Respiratory. Not using accessory muscles. Clear to auscultation bilaterally, no wheeze. Gastrointestinal. Abdomen soft, non-tender, not distended, normal bowel sounds  Neurology. Facial symmetry. No speech deficits. Moving all extremities equally. Extremities. No edema in lower extremities. Skin. Warm, dry, normal turgor        Condition at time of discharge stable    Medication instructions provided to patient at discharge.      Medication List      START taking these medications    ciprofloxacin 500 MG tablet  Commonly known as: CIPRO  Take 1 tablet by mouth daily for 9 days     potassium chloride 20 MEQ extended release tablet  Commonly known as: KLOR-CON M  Take 1 tablet by mouth daily CHANGE how you take these medications    aspirin 325 MG EC tablet  Take 0.5 tablets by mouth daily  What changed: how much to take     Levemir FlexTouch 100 UNIT/ML injection pen  Generic drug: insulin detemir  Inject 20 Units into the skin nightly  What changed: how much to take        CONTINUE taking these medications    acetaminophen 500 MG tablet  Commonly known as: TYLENOL  Take 2 tablets by mouth every 6 hours     albuterol sulfate  (90 Base) MCG/ACT inhaler  Inhale 2 puffs into the lungs every 6 hours as needed for Wheezing or Shortness of Breath     atorvastatin 40 MG tablet  Commonly known as: LIPITOR  Take 1 tablet by mouth nightly     bisacodyl 10 MG suppository  Commonly known as: DULCOLAX  Place 1 suppository rectally daily as needed for Constipation     calcium acetate 667 MG Tabs     fluticasone 50 MCG/ACT nasal spray  Commonly known as: FLONASE     * FreeStyle Lien 14 Day [place] Tiffany Reamer  To test glucose     * FreeStyle Lien 2 [place] Tiffany Reamer  Use to check glucose     * FreeStyle Lien 14 Day Sensor Misc  Check glucose ---change every 14 days     * FreeStyle Lien 2 Sensor Misc  Change every 14 days     Insulin Pen Needle 32G X 6 MM Misc  Use with insulin pens 4 times daily     metoprolol tartrate 25 MG tablet  Commonly known as: LOPRESSOR  Take 1 tablet by mouth 2 times daily     * NovoLOG FlexPen 100 UNIT/ML injection pen  Generic drug: insulin aspart  Inject 10 Units into the skin 3 times daily (before meals)     * insulin aspart 100 UNIT/ML injection vial  Commonly known as: NOVOLOG  Inject 0-18 Units into the skin 3 times daily (with meals)     * insulin aspart 100 UNIT/ML injection vial  Commonly known as: NOVOLOG  Inject 0-9 Units into the skin nightly     pantoprazole 40 MG tablet  Commonly known as: PROTONIX  Take 1 tablet by mouth daily     polyethylene glycol 17 g packet  Commonly known as: GLYCOLAX  Take 17 g by mouth daily as needed for Constipation     sennosides-docusate sodium 8.6-50 MG tablet  Commonly known as: SENOKOT-S  Take 1 tablet by mouth 2 times daily     sevelamer 800 MG tablet  Commonly known as: RENVELA  Take 1 tablet by mouth 3 times daily (with meals)         * This list has 7 medication(s) that are the same as other medications prescribed for you. Read the directions carefully, and ask your doctor or other care provider to review them with you. Where to Get Your Medications      These medications were sent to [address], [address] [place] [address]    Phone: [phone]   · potassium chloride 20 MEQ extended release tablet     You can get these medications from any pharmacy    Bring a paper prescription for each of these medications  · aspirin 325 MG EC tablet  · ciprofloxacin 500 MG tablet  · Levemir FlexTouch 100 UNIT/ML injection pen         Discharge recommendations given to patient. Follow Up. Primary care provider and nephrology        Nephrology  Disposition. SNF  Activity. activity as tolerated  Diet: ADULT DIET; Regular  Adult Oral Nutrition Supplement; Diabetic Oral Supplement      Spent 40 minutes in discharge process.     Signed:  Richard Guido MD     7/14/2021 1:57 PM

## 2021-07-15 NOTE — PROGRESS NOTES
Data- discharge order received, pt verbalized agreement to discharge, disposition to [facility] reviewed and signed by physician. Action- AVS prepared, VALENTÍN completed/ reported faxed by case management/. Discharge instruction summary: Diet- general, Activity- As tolerated, medications prescriptions to be filled at receiving facility except for the controlled prescriptions to be sent: none, Transfer code status: Full Code, LDAs to remain with discharge: PD cath. DME used: oxygen. Response- Bedside RN to call report to receiving facility. Pt belongings gathered, peripheral IV and cardiac monitoring removed. Disposition to Discharged via cart/stretcher and via ambulance to skilled nursing by EMS transportation, no complications reported. 1. WEIGHT: Admit Weight: 217 lb (98.4 kg) (07/01/21 1511)        Today  Weight: 241 lb 6.5 oz (109.5 kg) (07/14/21 0336)       2.  O2 SAT.: SpO2: 98 % (07/14/21 1700)

## 2021-08-09 LAB
FUNGUS (MYCOLOGY) CULTURE: NORMAL
FUNGUS STAIN: NORMAL

## 2021-08-25 ENCOUNTER — TELEPHONE (OUTPATIENT)
Dept: CARDIOTHORACIC SURGERY | Age: 46
End: 2021-08-25

## 2021-08-25 NOTE — TELEPHONE ENCOUNTER
Wanted to do follow up on hospitals. She was discharged to ARU, then to the ECF ([facility]). She was admitted to [facility] 8[facility] and discharged back to the ECF. I called to check on her status and was told that she  on 21. I did verify this in [facility].

## 2021-11-04 NOTE — PROGRESS NOTES
"PICC Line Insertion Procedure Note  Pt. Name: Zeynep Martinez  MRN:        [id number]    Procedure: Insertion of a  single Lumen  4 fr  Bard SOLO (valved) Power PICC, Lot number [id number]    Indications: Antibiotic    Contraindications : none    Procedure Details   Patient identified with 2 identifiers and \"Time Out\" conducted.  .     Central line insertion bundle followed: hand hygeine performed prior to procedure, site cleansed with cholraprep, hat, mask, sterile gloves,sterile gown worn, patient draped with maximum barrier head to toe drape, sterile field maintained.    The vein was assessed and found to be compressible and of adequate size. 1 ml 1% Lidocaine administered sq to the insertion site. A 4 Fr PICC was inserted into the basilic vein of the right arm with ultrasound guidance. 1 attempt(s) required to access vein.   Catheter threaded without difficulty. Good blood return noted.    Modified Seldinger Technique used for insertion.    The 8 sharps that are included in the PICC insertion kit were accounted for and disposed of in the sharps container prior to breakdown of the sterile field.    Catheter secured with Statlock, biopatch and Tegaderm dressing applied.    Findings:  Total catheter length  42 cm, with 0 cm exposed. Mid upper arm circumference is 40 cm. Catheter was flushed with 10 cc NS. Patient  tolerated procedure well.    Tip placement verified by 3CG technology . Tip placement in the SVC.    CLABSI prevention brochure left at bedside.    Patient's primary RN notified PICC is ready for use.    Comments:          Lesley Ramirez RN,VA-BC,BSN  [facility]        " Office : [phone]     Fax :[phone]       Nephrology Progress   Note      Patient's Name: Edie Cleveland  9:12 AM  7/13/2021    Reason for Consult:  ESRD    History of Present iIlness:    Edie Cleveland is a 51 y.o. female with severe end-stage renal disease secondary to diabetic nephropathy on peritoneal dialysis, hypertension, peripheral vascular disease, diabetic neuropathy who was admitted after she had a syncopal episode. She had similar episode 2 weeks ago. Recently had angiogram left lower extremity for gangrene left foot second toe. Denies any shortness of breath. Denies any abdominal pain. Denies any nausea vomiting. Interval HX :      No fever   No abdominal pain     Oral intake very low   Sodium is low at 126   No nausea , no vomiting today     Very uncomfortable from the NG tube   Wants it to be taken out     PD fluid growing pseudomonas    Rpt culture no growth so far  Cell count improving. Results for Daja Cruz (MRN [id number]) as of 7/13/2021 10:33   Ref. Range 7/9/2021 05:40 7/10/2021 06:30 7/11/2021 07:42 7/12/2021 04:05 7/13/2021 05:33   Appearance, Fluid Unknown Hazy Hazy Hazy Clear Clear   Color, Fluid Unknown Pale Yellow Colorless Yellow Pale Yellow Pale Yellow   Eos, Fluid Latest Units: % 3 1  3    RBC, Fluid Latest Units: /cumm 2,800 1,353 2,500 <1,000 <1,000   Lymphocytes, Body Fluid Latest Units: % 5 4 21 12 14   Monocyte Count, Fluid Latest Units: %    1 3   Neutrophil Count, Fluid Latest Units: % 85 67 70 72 81   Nucl Cell, Fluid Latest Units: /cumm 3,770 628 1,236 98 84   Mesothelial, Fluid Latest Units: %  1 2 1        No chest pain  No sob  S/p CABG on 6/9/2010          I/O last 3 completed shifts:   In: 519.2 [P.O.:250; NG/GT:30; IV Piggyback:239.2]  Out: 4901 [Urine:700]    Past Medical History:   Diagnosis Date    [facility]     End stage kidney disease ([facility]     peritoneal dialysis every night at home x 2 years    Hypertension     Neuropathy     Peripheral vascular disease ([facility]        Past Surgical History:   Procedure Laterality Date     SECTION      CORONARY ARTERY BYPASS GRAFT N/A 2021    URGENT CABG CORONARY ARTERY BYPASS GRAFTS X3. VEIN TO PDA X1, VEIN TO OM X1, LEFT INTERNAL MAMMARY ARTERY TO LAD X1. ENDOSCOPIC HARVEST RIGHT LEG SAPHENOUS VEIN. LIGATION NYA USING 35MM ATRICLIP. EPI AORTIC ULTRASOUND. TOTAL CARDIOPULMONARY BYPASS. DOPPLER GRAFT FLOW VERIFICATION. BILATERAL INITERCOSTAL NERVE BLOCK USING 1.3% EXPAREL.  performed by Kwesi Hansen MD at [address]  2018     lap PD cath placement lt side 62 cm    TOE AMPUTATION Left 2021    AMPUTATION OF THE LEFT THIRD TOE performed by Brian Parks DPM [address] TOE AMPUTATION Left 2021    AMPUTATION OF HALLUX AND SECOND TOE, LEFT FOOT performed by Brian Parks DPM at [facility]         Family History   Problem Relation Age of Onset    Coronary Art Dis Mother     High Blood Pressure Mother     Heart Disease Mother     Diabetes Mother     Cancer Father     Coronary Art Dis Father     High Blood Pressure Father     Heart Disease Father     Diabetes Father      Current Medications:    phenol 1.4 % mouth spray 1 spray, Q2H PRN  dianeal lo-massimo 2.5% 1,000 mL with cefTAZidime 1,000 mg solution, Daily  insulin aspart (NOVOLOG) injection pen 0-6 Units - Patient Supplied, TID AC   And  insulin aspart (NOVOLOG) injection pen 0-3 Units - Patient Supplied, Nightly  insulin detemir (LEVEMIR) injection pen 19 Units - Patient Supplied, Nightly  thiamine (B-1) 100 mg in sodium chloride 0.9 % 100 mL IVPB, Q24H  ciprofloxacin (CIPRO) IVPB 400 mg, Q24H  epoetin toy-epbx (RETACRIT) injection 10,000 Units, Q7 Days  lactobacillus (CULTURELLE) capsule 1 capsule, BID WC  nystatin (MYCOSTATIN) 070804 UNIT/ML suspension 500,000 Units, 4x Daily  labetalol (NORMODYNE;TRANDATE) injection 10 mg, Q4H PRN  metoclopramide (REGLAN) injection 5 mg, Q6H  dextrose 50 % IV solution, PRN  acetaminophen (TYLENOL) tablet 650 mg, Q6H PRN  aspirin EC tablet 325 mg, Daily  magnesium hydroxide (MILK OF MAGNESIA) 400 MG/5ML suspension 30 mL, Daily PRN  metoprolol tartrate (LOPRESSOR) tablet 25 mg, BID  pantoprazole (PROTONIX) tablet 40 mg, Daily  polyethylene glycol (GLYCOLAX) packet 17 g, Daily PRN  sennosides-docusate sodium (SENOKOT-S) 8.6-50 MG tablet 1 tablet, BID  traMADol (ULTRAM) tablet 50 mg, Q6H PRN   Or  traMADol (ULTRAM) tablet 100 mg, Q6H PRN  zolpidem (AMBIEN) tablet 5 mg, Nightly PRN  dextrose 5 % solution, PRN  dextrose 50 % IV solution, PRN  glucagon (rDNA) injection 1 mg, PRN  glucose (GLUTOSE) 40 % oral gel 15 g, PRN  gentamicin (GARAMYCIN) 0.1 % cream, Daily  atorvastatin (LIPITOR) tablet 20 mg, Nightly  bisacodyl (DULCOLAX) EC tablet 5 mg, Daily PRN  calcium carbonate (TUMS) chewable tablet 500 mg, TID PRN  diphenhydrAMINE (BENADRYL) tablet 25 mg, Q6H PRN  heparin (porcine) injection 3,400 Units, PRN  [Held by provider] heparin (porcine) injection 5,000 Units, 3 times per day  hydrALAZINE (APRESOLINE) tablet 50 mg, Q8H PRN  miconazole (MICOTIN) 2 % powder, BID  ondansetron (ZOFRAN) injection 4 mg, Q6H PRN  ondansetron (ZOFRAN-ODT) disintegrating tablet 4 mg, Q8H PRN  polyethylene glycol (GLYCOLAX) packet 17 g, Daily  promethazine (PHENERGAN) tablet 12.5 mg, Q6H PRN  [Held by provider] torsemide (DEMADEX) tablet 100 mg, Daily  albuterol (PROVENTIL) nebulizer solution 2.5 mg, Q6H PRN          Physical exam:     Vitals:  BP (!) 118/43   Pulse 100   Temp 97.3 °F (36.3 °C) (Oral)   Resp 18   Ht 5' 9\" (1.753 m)   Wt 223 lb 8.7 oz (101.4 kg)   SpO2 99%   BMI 33.01 kg/m²   Constitutional:  OAA X3 NAD  Skin:

## 2025-01-28 NOTE — PROGRESS NOTES
Office : [phone]     Fax :[phone]       Nephrology progress Note      Patient's Name: Tod Holter  8:40 AM  2021    Reason for Consult:  ESRD      Requesting Physician:  Danielle Washington MD      Chief Complaint:    Chief Complaint   Patient presents with    Other     Sent by provider, middle toe on right foot necrotic, pt states no pain, bilster on ring toe and redness noted to nail on big toe, hx diabetes neuropathy and dialysis         History of Present iIlness:    Tod Holter is a 54 y.o. female with h/o ESRD, HTN, DM 2, Anemia of chronic ds, secondary hyperparathyroidism who is on peritoneal dialysis and follows with me. She called today with c/o change in color of 3rd toe of left Foot and blister on 2nd toe. Has necrosis of 3rd toe. She noticed it on Friday night. She is doing her dialysis daily   Uses [facility] for peritoneal dialysis     Interval history. No new complaints overnight. Tolerated PD well. No abdominal pain. Left foot toe area necrotic    I/O last 3 completed shifts: In: 48 [IV Piggyback:50]  Out: 56 [Urine:240]    Past Medical History:   Diagnosis Date    Diabetes mellitus ([facility] [address]     Hypertension        Past Surgical History:   Procedure Laterality Date     SECTION      OTHER SURGICAL HISTORY  2018     lap PD cath placement lt side 62 cm    TONSILLECTOMY         History reviewed. No pertinent family history. reports that she has never smoked. She has never used smokeless tobacco. She reports that she does not drink alcohol or use drugs.         Allergies:  Zetia [ezetimibe], Humalog [insulin lispro], Lisinopril, Lantus [insulin glargine], and Victoza [liraglutide]    Current Medications:        amLODIPine (NORVASC) tablet 10 mg, Daily      insulin detemir (LEVEMIR) injection pen 16 Units (Patient Supplied), Nightly      melatonin tablet 3 mg, Nightly PRN      torsemide (DEMADEX) tablet 200 mg, Daily      glucose (GLUTOSE) 40 % oral gel 15 g, PRN      dextrose 50 % IV solution, PRN      glucagon (rDNA) injection 1 mg, PRN      dextrose 5 % solution, PRN      sodium chloride flush 0.9 % injection 10 mL, 2 times per day      sodium chloride flush 0.9 % injection 10 mL, PRN      heparin (porcine) injection 5,000 Units, 3 times per day      promethazine (PHENERGAN) tablet 12.5 mg, Q6H PRN    Or      ondansetron (ZOFRAN) injection 4 mg, Q6H PRN      polyethylene glycol (GLYCOLAX) packet 17 g, Daily PRN      acetaminophen (TYLENOL) tablet 650 mg, Q6H PRN    Or      acetaminophen (TYLENOL) suppository 650 mg, Q6H PRN      metoprolol tartrate (LOPRESSOR) tablet 50 mg, BID      insulin aspart (NOVOLOG) injection pen 15 Units (Patient Supplied), TID WC      insulin aspart (NOVOLOG) injection pen 0-6 Units (Patient Supplied), TID WC      insulin aspart (NOVOLOG) injection vial 0-3 Units (Patient Supplied), Nightly      gentamicin (GARAMYCIN) 0.1 % cream, Daily PRN      pantoprazole (PROTONIX) tablet 40 mg, Nightly      sevelamer (RENVELA) tablet 800 mg, TID WC        Physical exam:     Vitals:  BP (!) 140/75   Pulse 69   Temp 97.8 °F (36.6 °C)   Resp 20   Ht 5' 6\" (1.676 m)   Wt 242 lb 4.6 oz (109.9 kg)   LMP 01/15/2021   SpO2 96%   BMI 39.11 kg/m²   Constitutional:  OAA X3 NAD  Skin: no rash, turgor wnl  Heent:  eomi, mmm  Neck: no bruits or jvd noted  Cardiovascular:  S1, S2 without m/r/g  Respiratory: CTA B without w/r/r  Abdomen:  +bs, soft, nt, nd  Ext: no  lower extremity edema, necrotic 3rd toe rt foot     Labs:  CBC:   Recent Labs     02/08/21  1408 02/09/21  0616   WBC 9.9 7.7   HGB 11.7* 10.8*    272     BMP:    Recent Labs     02/08/21  1408 02/08/21  1932 02/09/21  0615   * 134* 135*   K 3.9 3.9 3.8   CL 93* 93* 95*   CO2 22 22 22   BUN 79* 81* 75*   CREATININE 13.5* 13.6* 13.5*   GLUCOSE 179* 194* 101*     Ca/Mg/Phos:   Recent Labs     02/08/21  1408 02/08/21  1932 02/09/21  0615   CALCIUM 9.2 9.1 9.0   PHOS  --   --  7.4*     Hepatic:   Recent Labs     02/08/21  1408   AST 10*   ALT 9*   BILITOT 0.3   ALKPHOS 99     Troponin: No results for input(s): TROPONINI in the last 72 hours. BNP: No results for input(s): BNP in the last 72 hours. Lipids: No results for input(s): CHOL, TRIG, HDL, LDLCALC, LABVLDL in the last 72 hours. ABGs: No results for input(s): PHART, PO2ART, AEL2PIL in the last 72 hours. INR:   Recent Labs     02/08/21  1407   INR 0.92     UA:No results for input(s): Tory Pily, GLUCOSEU, BILIRUBINUR, Hillary Baller, BLOODU, PHUR, PROTEINU, UROBILINOGEN, NITRU, LEUKOCYTESUR, Deitra Nunnery in the last 72 hours. Urine Microscopic: No results for input(s): LABCAST, BACTERIA, COMU, HYALCAST, WBCUA, RBCUA, EPIU in the last 72 hours. Urine Culture: No results for input(s): LABURIN in the last 72 hours. Urine Chemistry: No results for input(s): Lysle Naegeli, PROTEINUR, NAUR in the last 72 hours. IMAGING:  XR FOOT LEFT (MIN 3 VIEWS)   Final Result   1. No definite evidence of osteomyelitis radiographically. 2. Soft tissue swelling at the medial aspect of the 2nd digit         MRI FOOT LEFT WO CONTRAST    (Results Pending)         Assessment/Plan :      1. ESRD management   Tolerated PD well. Continue PD at night  Use [facility]   2.5 % solution   4 cycles   Total time 8 hrs   D/w Dialysis nurse     2. HTN. Better controlled. Continue delete home BP meds     3. Anemia of chronic ds. Gets LUIZA  Hemoglobin 10.8    4. Acid- base disorder. monitor     5. Electrolytes. Monitor     6. Hyperphosphatemia. Takes velphoro at home  Phos level is 7.4. Will give Renvela 1 tab p.o. 3 times daily with meals    7. 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  Consulted vascular surgery   Will see recommendations      D/w primary team      Thank you for allowing us to participate in care of [facility]         Electronically signed by: Johnny Maxwell MD, 2/9/2021, 8:40 AM      [facility] [address] [place] [address] S  Office : [phone]  Fax :[phone] no

## (undated) DEVICE — HYPODERMIC SAFETY NEEDLE: Brand: MAGELLAN

## (undated) DEVICE — LEAD PACE L475MM CHNL A OR V MYOCARDIAL STEROID ELUT SIL

## (undated) DEVICE — BOWL MED L 32OZ PLAS W/ MOLD GRAD EZ OPN PEEL PCH

## (undated) DEVICE — INDICATED FOR SURGICAL CLAMPING DURING CARDIOVASCULAR PERIPHERAL VASCULAR, AND GENERAL SURGERY.: Brand: SOFT/FIBRA® SPRING CLIP

## (undated) DEVICE — TTL1LYR 16FR10ML 100%SIL TMPST TR: Brand: MEDLINE

## (undated) DEVICE — SUTURE SZ 7 L18IN NONABSORBABLE SIL CCS L48MM 1/2 CIR STRNM M655G

## (undated) DEVICE — PROCEDURE KIT COMP

## (undated) DEVICE — BANDAGE COMPR W6INXL10YD ST M E WHITE/BEIGE

## (undated) DEVICE — TOWEL 26X17IN 2 PER PACK COTTON DELINTED

## (undated) DEVICE — SYRINGE MED 5ML STD CLR PLAS LUERLOCK TIP N CTRL DISP

## (undated) DEVICE — 3 ML SYRINGE LUER-LOCK TIP: Brand: MONOJECT

## (undated) DEVICE — BANDAGE,ELASTIC,ESMARK,STERILE,6"X9',LF: Brand: MEDLINE

## (undated) DEVICE — SUTURE PDS II SZ 0 L27IN ABSRB VLT L36MM CT-1 1/2 CIR Z340H

## (undated) DEVICE — SUTURE PERMA-HAND SZ 4-0 L144IN NONABSORBABLE BLK LIGAPAK LA53G

## (undated) DEVICE — OPEN HRT BASIC PK

## (undated) DEVICE — APPLICATOR MEDICATED 10.5 CC SOLUTION HI LT ORNG CHLORAPREP

## (undated) DEVICE — SYSTEM ENDOSCP VES HARV W/ TOOL CANN SEAL SHT PRT BLNT TIP

## (undated) DEVICE — PRESSURE MONITORING SET: Brand: TRUWAVE, VAMP PLUS

## (undated) DEVICE — SUTURE ETHLN SZ 4-0 L18IN NONABSORBABLE BLK L19MM PS-2 3/8 1667H

## (undated) DEVICE — GLOVE ORANGE PI 7   MSG9070

## (undated) DEVICE — SYRINGE, LUER LOCK, 10ML: Brand: MEDLINE

## (undated) DEVICE — PADDING CAST W4INXL4YD ST COT RAYON MICROPLEATED HIGHLY

## (undated) DEVICE — BANDAGE GZ W2XL75IN ST RAYON POLY CNFRM STRTCH LTWT

## (undated) DEVICE — LABEL MED CUST CVR

## (undated) DEVICE — EXTENSION SET, 2 INJECTION SITES, MALE LUER LOCK ADAPTER WITH RETRACTABLE COLLAR: Brand: INTERLINK

## (undated) DEVICE — DRESSING WND SM W2.5XL4IN BRTH W/ CATH SECUREMENT AND

## (undated) DEVICE — SUTURE ETHBND EXCEL SZ 2-0 L36IN NONABSORBABLE GRN L26MM SH X523H

## (undated) DEVICE — SUTURE NONABSORBABLE MONOFILAMENT 6-0 C-1 1X30 IN PROLENE 8706H

## (undated) DEVICE — [HIGH FLOW INSUFFLATOR,  DO NOT USE IF PACKAGE IS DAMAGED,  KEEP DRY,  KEEP AWAY FROM SUNLIGHT,  PROTECT FROM HEAT AND RADIOACTIVE SOURCES.]: Brand: PNEUMOSURE

## (undated) DEVICE — FAIRFIELD CABG ACCESSARY PK

## (undated) DEVICE — MASC TURNOVER KIT: Brand: MEDLINE INDUSTRIES, INC.

## (undated) DEVICE — GOWN SIRUS NONREIN XL W/TWL: Brand: MEDLINE INDUSTRIES, INC.

## (undated) DEVICE — 3M™ COBAN™ NL STERILE NON-LATEX SELF-ADHERENT WRAP, 2084S, 4 IN X 5 YD (10 CM X 4,5 M), 18 ROLLS/CASE: Brand: 3M™ COBAN™

## (undated) DEVICE — PACK PROCEDURE SURG EXTREMITY MFFOP CUST

## (undated) DEVICE — SWAN-GANZ CCOMBO V THERMODILUTION CATHETER: Brand: SWAN-GANZ CCOMBO V

## (undated) DEVICE — Z DISCONTINUED PER MEDLINE TRAY SKIN PREP DRY W/ PREM GLV APPLICATORS GZ TWL OVERWRAP

## (undated) DEVICE — SUTURE VCRL SZ 4-0 L18IN ABSRB UD L19MM PS-2 3/8 CIR PRIM J496H

## (undated) DEVICE — GAUZE,SPONGE,4"X4",8PLY,STRL,LF,10/TRAY: Brand: MEDLINE

## (undated) DEVICE — SUTURE VCRL SZ 3-0 L18IN ABSRB UD PS-2 L19MM 3/8 CRV PRIM J497H

## (undated) DEVICE — MEDICINE CUP, GRADUATED, STER: Brand: MEDLINE

## (undated) DEVICE — GLOVE SURG SZ 75 L12IN FNGR THK94MIL STD WHT LTX FREE

## (undated) DEVICE — SYRINGE, LUER LOCK, 60ML: Brand: MEDLINE

## (undated) DEVICE — GAUZE,SPONGE,8"X4",12PLY,XRAY,STRL,LF: Brand: MEDLINE

## (undated) DEVICE — STERNUM BLADE, OFFSET (31.7 X 0.64 X 6.3MM)

## (undated) DEVICE — 3M™ STERI-STRIP™ REINFORCED ADHESIVE SKIN CLOSURES, R1547, 1/2 IN X 4 IN (12 MM X 100 MM), 6 STRIPS/ENVELOPE: Brand: 3M™ STERI-STRIP™

## (undated) DEVICE — GLOVE SURG SZ 7 L12IN THK7.5MIL DK GRN LTX FREE MSG6570] MEDLINE INDUSTRIES INC]

## (undated) DEVICE — SOLUTION IV IRRIG 500ML 0.9% SODIUM CHL 2F7123

## (undated) DEVICE — PAD SURG INSUL AD L CLS CELL FOAM SLT W  TIED RADPQ MRK FOR

## (undated) DEVICE — PAD THRM M SPL TORSO

## (undated) DEVICE — SUTURE VCRL SZ 3-0 L27IN ABSRB UD L26MM SH 1/2 CIR J416H

## (undated) DEVICE — STOCKINETTE,IMPERVIOUS,12X48,STERILE: Brand: MEDLINE

## (undated) DEVICE — SUTURE PERMAHAND SZ 2-0 L30IN NONABSORBABLE BLK SILK W/O A305H

## (undated) DEVICE — CANNULA ART 24FR OVERALL L105IN VENT CONN 3 8IN MTL TIP W

## (undated) DEVICE — ZIMMER® STERILE DISPOSABLE TOURNIQUET CUFF WITH PLC, DUAL PORT, SINGLE BLADDER, 18 IN. (46 CM)

## (undated) DEVICE — SUTURE VCRL SZ 2 L27IN ABSRB VLT L65MM TP-1 1/2 CIR J649G

## (undated) DEVICE — EVERGRIP INSERT SET 61MM: Brand: FOGARTY EVERGRIP

## (undated) DEVICE — COVER,MAYO STAND,XL,STERILE: Brand: MEDLINE

## (undated) DEVICE — CANNULA PERF L15IN DIA29FR VEN 3 STG THN WALL DSGN W  VENT

## (undated) DEVICE — AORTIC PNCH 4.0MM 8IN BX 10 DISP

## (undated) DEVICE — SUTURE NONABSORBABLE MONOFILAMENT 7-0 BV-1 1X24 IN PROLENE 8702H

## (undated) DEVICE — BANDAGE COMPR W4INXL12FT E DISP ESMARCH EVEN

## (undated) DEVICE — APPLICATOR PREP 26ML 0.7% IOD POVACRYLEX 74% ISO ALC ST

## (undated) DEVICE — T-DRAPE,EXTREMITY,STERILE: Brand: MEDLINE

## (undated) DEVICE — WAX SURG 2.5GM HEMSTAT BNE BEESWAX PARAFFIN ISO PALMITATE

## (undated) DEVICE — SET CATH 20GA L1.75IN RAD ART POLYUR RADPQ W/ INTEGR

## (undated) DEVICE — SUTURE PROL SZ 7 0 L24IN NONABSORBABLE BLU BV175 6 L8MM 3 8 EP8735H

## (undated) DEVICE — BLANKET WRM CARD FOR MISTRAL AIR WRM SYS

## (undated) DEVICE — INTENDED FOR TISSUE SEPARATION, AND OTHER PROCEDURES THAT REQUIRE A SHARP SURGICAL BLADE TO PUNCTURE OR CUT.: Brand: BARD-PARKER ® STAINLESS STEEL BLADES

## (undated) DEVICE — DRAIN SURG SGL COLL PT TB FOR ATS BG OASIS

## (undated) DEVICE — 12 FOOT DISPOSABLE EXTENSION CABLE WITH SAFE CONNECT / SCREW-DOWN

## (undated) DEVICE — SUTURE VCRL SZ 2-0 L36IN ABSRB UD L36MM CT-1 1/2 CIR J945H

## (undated) DEVICE — BASIC SINGLE BASIN 1-LF: Brand: MEDLINE INDUSTRIES, INC.

## (undated) DEVICE — ELECTRODE PT RET AD L9FT HI MOIST COND ADH HYDRGEL CORDED

## (undated) DEVICE — DRESSING PETRO W3XL3IN OIL EMUL N ADH GZ KNIT IMPREG CELOS

## (undated) DEVICE — DRESSING GERM DIA1IN CNTR H DIA7MM BLU CHG ANTIMIC PROTCT

## (undated) DEVICE — OPTIFOAM GENTLE LIQUITRAP, SACRUM, 7"X7": Brand: MEDLINE

## (undated) DEVICE — COVER US PRB W13XL244CM SURGICAL INTRAOPERATIVE W RUBBERBAND

## (undated) DEVICE — CONTAINER STOR SURG SLUSH

## (undated) DEVICE — MERCY FAIRFIELD TURNOVER KIT: Brand: MEDLINE INDUSTRIES, INC.

## (undated) DEVICE — 3M™ TEGADERM™ TRANSPARENT FILM DRESSING FRAME STYLE, 1626W, 4 IN X 4-3/4 IN (10 CM X 12 CM), 50/CT 4CT/CASE: Brand: 3M™ TEGADERM™

## (undated) DEVICE — NEEDLE HYPO 18GA L1.5IN THN WALL PIVOTING SHLD BVL ORIENTED

## (undated) DEVICE — CANNULA ART 21FR L145IN VENT CONN SFT FLOW EXT

## (undated) DEVICE — DRAPE THER FLUID WARMING 66X44 IN FLAT SLUSH DBL DISC ORS

## (undated) DEVICE — GLOVE SURG SZ 85 L12IN FNGR ORTHO 126MIL CRM LTX FREE

## (undated) DEVICE — CLIP SM RED INTERN HMOCLP TITAN LIGATING

## (undated) DEVICE — NEEDLE HYPO 20GA L1.5IN YEL POLYPR HUB S STL REG BVL STR

## (undated) DEVICE — CANNULA AORT ROOT STD INTRO SLT TIP 7FR OVERALL LEN 575IN DL

## (undated) DEVICE — BANDAGE ADH W4XL13 3 4IN POSTOP DSG PRIMAPORE

## (undated) DEVICE — PACKING,VAGINAL,XR,ST,4"X36",100EA: Brand: MEDLINE

## (undated) DEVICE — CATH CTR VEN 3LUM INTRLNK INJ 9FRX11CM

## (undated) DEVICE — 20 ML SYRINGE LUER-LOCK TIP: Brand: MONOJECT

## (undated) DEVICE — SET ADMIN PRIMING 67ML L105IN NVENT 180UM FLTR 3 RLER CLMP

## (undated) DEVICE — SUTURE PROL SZ 7-0 L24IN NONABSORBABLE BLU L8MM BV175-6 3/8 8735H

## (undated) DEVICE — DRESSING TRNSPAR W FAB BORD SORBAVIEW ULT 475X425IN

## (undated) DEVICE — DECANTER FLD 9IN ST BG FOR ASEP TRNSF OF FLD

## (undated) DEVICE — SUTURE NONABSORBABLE MONOFILAMENT 4-0 RB-1 36 IN BLU PROLENE 8557H

## (undated) DEVICE — CANNULA VES L25IN RADPQ BODY W  1 W VLV 3MM BLNT TIP DLP

## (undated) DEVICE — CONNECTOR PERF W3/8XH0.25XL0.25IN BASE UNEQUAL Y SHP W/O

## (undated) DEVICE — DRAIN SURG 24FR BLAK SIL HUBLESS 4 CHANNELED RADPQ EXTN TB

## (undated) DEVICE — SUTURE PERMAHAND SZ 3-0 L30IN NONABSORBABLE BLK SH L26MM K832H

## (undated) DEVICE — MEDI-VAC NON-CONDUCTIVE SUCTION TUBING: Brand: CARDINAL HEALTH

## (undated) DEVICE — EZE-BAND LF ST 4X5.5 36/CS: Brand: EZE-BAND LF ST 4X5.5 36/CS

## (undated) DEVICE — SUTURE VCRL SZ 4-0 L27IN ABSRB UD L19MM PS-2 3/8 CIR PRIM J426H

## (undated) DEVICE — VESSEL LOOPS,MAXI, BLUE: Brand: DEVON

## (undated) DEVICE — GLOVE SURG SZ 8 L11.77IN FNGR THK9.8MIL STRW LTX POLYMER

## (undated) DEVICE — KIT ART LN 20GA L12CM FEP RADPQ 0.025X13.75IN SPR GWIRE

## (undated) DEVICE — SUTURE ETHBND SZ 3-0 L30IN NONABSORBABLE GRN SH L26MM 1/2 X562H

## (undated) DEVICE — SUTURE ETHBND EXCEL SZ 0 L18IN NONABSORBABLE GRN L36MM CT-1 CX21D

## (undated) DEVICE — SYRINGE MED 30ML STD CLR PLAS LUERLOCK TIP N CTRL DISP